# Patient Record
Sex: MALE | Race: WHITE | NOT HISPANIC OR LATINO
[De-identification: names, ages, dates, MRNs, and addresses within clinical notes are randomized per-mention and may not be internally consistent; named-entity substitution may affect disease eponyms.]

---

## 2017-07-12 PROBLEM — K64.9 HEMORRHOIDS, UNSPECIFIED HEMORRHOID TYPE: Status: RESOLVED | Noted: 2017-07-12 | Resolved: 2017-07-12

## 2017-07-12 PROBLEM — K21.9 CHRONIC GERD: Status: RESOLVED | Noted: 2017-07-12 | Resolved: 2017-07-12

## 2017-07-12 PROBLEM — Z87.898 HISTORY OF VERTIGO: Status: RESOLVED | Noted: 2017-07-12 | Resolved: 2017-07-12

## 2017-07-12 PROBLEM — Z87.19 HISTORY OF BARRETT'S ESOPHAGUS: Status: RESOLVED | Noted: 2017-07-12 | Resolved: 2017-07-12

## 2017-07-12 PROBLEM — Z82.5 FAMILY HISTORY OF CHRONIC OBSTRUCTIVE PULMONARY DISEASE: Status: ACTIVE | Noted: 2017-07-12

## 2017-07-12 RX ORDER — GLUCOSAMINE SULFATE DIPOT CHLR 500 MG
400 CAPSULE ORAL
Refills: 0 | Status: ACTIVE | COMMUNITY

## 2017-07-18 ENCOUNTER — APPOINTMENT (OUTPATIENT)
Dept: HEMATOLOGY ONCOLOGY | Facility: CLINIC | Age: 52
End: 2017-07-18

## 2017-07-18 DIAGNOSIS — Z87.19 PERSONAL HISTORY OF OTHER DISEASES OF THE DIGESTIVE SYSTEM: ICD-10-CM

## 2017-07-18 DIAGNOSIS — K21.9 GASTRO-ESOPHAGEAL REFLUX DISEASE W/OUT ESOPHAGITIS: ICD-10-CM

## 2017-07-18 DIAGNOSIS — K64.9 UNSPECIFIED HEMORRHOIDS: ICD-10-CM

## 2017-07-18 DIAGNOSIS — Z87.898 PERSONAL HISTORY OF OTHER SPECIFIED CONDITIONS: ICD-10-CM

## 2017-07-18 DIAGNOSIS — Z80.0 FAMILY HISTORY OF MALIGNANT NEOPLASM OF DIGESTIVE ORGANS: ICD-10-CM

## 2017-07-18 DIAGNOSIS — Z82.5 FAMILY HISTORY OF ASTHMA AND OTHER CHRONIC LOWER RESPIRATORY DISEASES: ICD-10-CM

## 2017-07-18 DIAGNOSIS — Z80.1 FAMILY HISTORY OF MALIGNANT NEOPLASM OF TRACHEA, BRONCHUS AND LUNG: ICD-10-CM

## 2017-07-20 ENCOUNTER — APPOINTMENT (OUTPATIENT)
Dept: HEMATOLOGY ONCOLOGY | Facility: CLINIC | Age: 52
End: 2017-07-20

## 2017-07-20 VITALS
TEMPERATURE: 97.6 F | HEIGHT: 69.69 IN | DIASTOLIC BLOOD PRESSURE: 73 MMHG | WEIGHT: 125 LBS | HEART RATE: 84 BPM | BODY MASS INDEX: 18.1 KG/M2 | OXYGEN SATURATION: 99 % | RESPIRATION RATE: 16 BRPM | SYSTOLIC BLOOD PRESSURE: 116 MMHG

## 2017-07-20 PROBLEM — Z80.1 FAMILY HISTORY OF LUNG CANCER: Status: ACTIVE | Noted: 2017-07-12

## 2017-07-24 ENCOUNTER — APPOINTMENT (OUTPATIENT)
Dept: HEMATOLOGY ONCOLOGY | Facility: CLINIC | Age: 52
End: 2017-07-24

## 2017-07-24 VITALS
TEMPERATURE: 98.4 F | HEART RATE: 87 BPM | DIASTOLIC BLOOD PRESSURE: 70 MMHG | HEIGHT: 69.69 IN | BODY MASS INDEX: 17.95 KG/M2 | SYSTOLIC BLOOD PRESSURE: 107 MMHG | OXYGEN SATURATION: 98 % | WEIGHT: 123.99 LBS | RESPIRATION RATE: 16 BRPM

## 2017-07-24 RX ORDER — FERROUS SULFATE 325(65) MG
325 (65 FE) TABLET ORAL
Refills: 0 | Status: DISCONTINUED | COMMUNITY
End: 2017-07-24

## 2017-07-28 ENCOUNTER — OTHER (OUTPATIENT)
Age: 52
End: 2017-07-28

## 2017-08-23 ENCOUNTER — OTHER (OUTPATIENT)
Age: 52
End: 2017-08-23

## 2017-08-24 ENCOUNTER — APPOINTMENT (OUTPATIENT)
Dept: HEMATOLOGY ONCOLOGY | Facility: CLINIC | Age: 52
End: 2017-08-24
Payer: COMMERCIAL

## 2017-08-24 VITALS
OXYGEN SATURATION: 99 % | RESPIRATION RATE: 16 BRPM | HEIGHT: 69.69 IN | DIASTOLIC BLOOD PRESSURE: 74 MMHG | WEIGHT: 125.99 LBS | HEART RATE: 74 BPM | BODY MASS INDEX: 18.24 KG/M2 | TEMPERATURE: 98 F | SYSTOLIC BLOOD PRESSURE: 109 MMHG

## 2017-08-24 PROCEDURE — 99214 OFFICE O/P EST MOD 30 MIN: CPT

## 2017-09-14 ENCOUNTER — APPOINTMENT (OUTPATIENT)
Dept: HEMATOLOGY ONCOLOGY | Facility: CLINIC | Age: 52
End: 2017-09-14
Payer: COMMERCIAL

## 2017-09-14 VITALS
HEART RATE: 97 BPM | SYSTOLIC BLOOD PRESSURE: 130 MMHG | BODY MASS INDEX: 17.95 KG/M2 | DIASTOLIC BLOOD PRESSURE: 77 MMHG | OXYGEN SATURATION: 98 % | RESPIRATION RATE: 16 BRPM | TEMPERATURE: 99 F | WEIGHT: 123.99 LBS | HEIGHT: 69.69 IN

## 2017-09-14 PROCEDURE — 99214 OFFICE O/P EST MOD 30 MIN: CPT

## 2017-10-02 ENCOUNTER — APPOINTMENT (OUTPATIENT)
Dept: HEMATOLOGY ONCOLOGY | Facility: CLINIC | Age: 52
End: 2017-10-02
Payer: COMMERCIAL

## 2017-10-02 VITALS
SYSTOLIC BLOOD PRESSURE: 119 MMHG | HEIGHT: 69.69 IN | HEART RATE: 92 BPM | TEMPERATURE: 98.2 F | BODY MASS INDEX: 18.68 KG/M2 | RESPIRATION RATE: 16 BRPM | OXYGEN SATURATION: 98 % | WEIGHT: 128.99 LBS | DIASTOLIC BLOOD PRESSURE: 86 MMHG

## 2017-10-02 PROCEDURE — 99499A: CUSTOM | Mod: NC

## 2017-10-23 ENCOUNTER — TRANSCRIPTION ENCOUNTER (OUTPATIENT)
Age: 52
End: 2017-10-23

## 2017-10-25 ENCOUNTER — APPOINTMENT (OUTPATIENT)
Dept: HEMATOLOGY ONCOLOGY | Facility: CLINIC | Age: 52
End: 2017-10-25
Payer: COMMERCIAL

## 2017-10-25 VITALS
HEART RATE: 92 BPM | DIASTOLIC BLOOD PRESSURE: 89 MMHG | SYSTOLIC BLOOD PRESSURE: 129 MMHG | RESPIRATION RATE: 16 BRPM | OXYGEN SATURATION: 99 % | HEIGHT: 69.69 IN | WEIGHT: 134 LBS | BODY MASS INDEX: 19.4 KG/M2 | TEMPERATURE: 97.6 F

## 2017-10-25 PROCEDURE — 99213 OFFICE O/P EST LOW 20 MIN: CPT

## 2017-11-29 ENCOUNTER — APPOINTMENT (OUTPATIENT)
Dept: HEMATOLOGY ONCOLOGY | Facility: CLINIC | Age: 52
End: 2017-11-29
Payer: COMMERCIAL

## 2017-11-29 VITALS
HEIGHT: 69.69 IN | DIASTOLIC BLOOD PRESSURE: 92 MMHG | HEART RATE: 84 BPM | WEIGHT: 134 LBS | TEMPERATURE: 97.9 F | OXYGEN SATURATION: 98 % | RESPIRATION RATE: 16 BRPM | SYSTOLIC BLOOD PRESSURE: 148 MMHG | BODY MASS INDEX: 19.4 KG/M2

## 2017-11-29 PROCEDURE — 99214 OFFICE O/P EST MOD 30 MIN: CPT

## 2017-11-29 RX ORDER — VALACYCLOVIR 1 G/1
1 TABLET, FILM COATED ORAL
Qty: 20 | Refills: 0 | Status: DISCONTINUED | COMMUNITY
Start: 2017-07-20 | End: 2017-11-29

## 2017-11-29 RX ORDER — PREDNISONE 20 MG/1
20 TABLET ORAL
Refills: 0 | Status: DISCONTINUED | COMMUNITY
End: 2017-11-29

## 2018-01-03 ENCOUNTER — APPOINTMENT (OUTPATIENT)
Dept: HEMATOLOGY ONCOLOGY | Facility: CLINIC | Age: 53
End: 2018-01-03
Payer: COMMERCIAL

## 2018-01-03 VITALS
HEIGHT: 69.69 IN | RESPIRATION RATE: 16 BRPM | DIASTOLIC BLOOD PRESSURE: 89 MMHG | SYSTOLIC BLOOD PRESSURE: 147 MMHG | BODY MASS INDEX: 18.82 KG/M2 | HEART RATE: 86 BPM | WEIGHT: 129.98 LBS | OXYGEN SATURATION: 100 % | TEMPERATURE: 97.9 F

## 2018-01-03 PROCEDURE — 99214 OFFICE O/P EST MOD 30 MIN: CPT

## 2018-01-03 RX ORDER — PREDNISONE 10 MG/1
10 TABLET ORAL
Qty: 90 | Refills: 0 | Status: DISCONTINUED | COMMUNITY
Start: 2017-07-15 | End: 2018-01-03

## 2018-01-08 ENCOUNTER — RESULT REVIEW (OUTPATIENT)
Age: 53
End: 2018-01-08

## 2018-01-08 ENCOUNTER — TRANSCRIPTION ENCOUNTER (OUTPATIENT)
Age: 53
End: 2018-01-08

## 2018-02-07 ENCOUNTER — APPOINTMENT (OUTPATIENT)
Dept: HEMATOLOGY ONCOLOGY | Facility: CLINIC | Age: 53
End: 2018-02-07
Payer: COMMERCIAL

## 2018-02-14 ENCOUNTER — APPOINTMENT (OUTPATIENT)
Dept: HEMATOLOGY ONCOLOGY | Facility: CLINIC | Age: 53
End: 2018-02-14
Payer: COMMERCIAL

## 2018-02-14 VITALS
HEART RATE: 85 BPM | HEIGHT: 69.69 IN | WEIGHT: 131.99 LBS | TEMPERATURE: 98 F | RESPIRATION RATE: 16 BRPM | DIASTOLIC BLOOD PRESSURE: 97 MMHG | OXYGEN SATURATION: 99 % | SYSTOLIC BLOOD PRESSURE: 136 MMHG | BODY MASS INDEX: 19.11 KG/M2

## 2018-02-14 PROCEDURE — 99214 OFFICE O/P EST MOD 30 MIN: CPT

## 2018-02-27 ENCOUNTER — TRANSCRIPTION ENCOUNTER (OUTPATIENT)
Age: 53
End: 2018-02-27

## 2018-03-14 ENCOUNTER — APPOINTMENT (OUTPATIENT)
Dept: HEMATOLOGY ONCOLOGY | Facility: CLINIC | Age: 53
End: 2018-03-14
Payer: COMMERCIAL

## 2018-03-14 VITALS
OXYGEN SATURATION: 100 % | WEIGHT: 134 LBS | HEIGHT: 69.69 IN | RESPIRATION RATE: 16 BRPM | DIASTOLIC BLOOD PRESSURE: 91 MMHG | TEMPERATURE: 100 F | SYSTOLIC BLOOD PRESSURE: 123 MMHG | HEART RATE: 83 BPM | BODY MASS INDEX: 19.4 KG/M2

## 2018-03-14 PROCEDURE — 99213 OFFICE O/P EST LOW 20 MIN: CPT

## 2018-04-25 ENCOUNTER — APPOINTMENT (OUTPATIENT)
Dept: HEMATOLOGY ONCOLOGY | Facility: CLINIC | Age: 53
End: 2018-04-25
Payer: COMMERCIAL

## 2018-04-25 VITALS
SYSTOLIC BLOOD PRESSURE: 128 MMHG | RESPIRATION RATE: 20 BRPM | BODY MASS INDEX: 19.11 KG/M2 | DIASTOLIC BLOOD PRESSURE: 90 MMHG | HEART RATE: 80 BPM | OXYGEN SATURATION: 99 % | WEIGHT: 131.99 LBS | HEIGHT: 69.69 IN | TEMPERATURE: 97.8 F

## 2018-04-25 PROCEDURE — 99214 OFFICE O/P EST MOD 30 MIN: CPT

## 2018-05-18 ENCOUNTER — OTHER (OUTPATIENT)
Age: 53
End: 2018-05-18

## 2018-06-06 ENCOUNTER — APPOINTMENT (OUTPATIENT)
Dept: HEMATOLOGY ONCOLOGY | Facility: CLINIC | Age: 53
End: 2018-06-06
Payer: COMMERCIAL

## 2018-06-06 VITALS
HEART RATE: 84 BPM | BODY MASS INDEX: 19.11 KG/M2 | HEIGHT: 69.69 IN | OXYGEN SATURATION: 98 % | RESPIRATION RATE: 16 BRPM | DIASTOLIC BLOOD PRESSURE: 84 MMHG | SYSTOLIC BLOOD PRESSURE: 124 MMHG | TEMPERATURE: 97.8 F | WEIGHT: 131.99 LBS

## 2018-06-06 PROCEDURE — 99214 OFFICE O/P EST MOD 30 MIN: CPT

## 2018-07-22 PROBLEM — Z80.0 FAMILY HISTORY OF COLON CANCER: Status: ACTIVE | Noted: 2017-07-20

## 2018-07-25 ENCOUNTER — APPOINTMENT (OUTPATIENT)
Dept: HEMATOLOGY ONCOLOGY | Facility: CLINIC | Age: 53
End: 2018-07-25
Payer: COMMERCIAL

## 2018-07-25 VITALS
RESPIRATION RATE: 20 BRPM | TEMPERATURE: 97.7 F | HEIGHT: 69.69 IN | OXYGEN SATURATION: 100 % | WEIGHT: 126.99 LBS | DIASTOLIC BLOOD PRESSURE: 71 MMHG | SYSTOLIC BLOOD PRESSURE: 112 MMHG | HEART RATE: 63 BPM | BODY MASS INDEX: 18.39 KG/M2

## 2018-07-25 PROCEDURE — 99214 OFFICE O/P EST MOD 30 MIN: CPT

## 2018-09-05 ENCOUNTER — APPOINTMENT (OUTPATIENT)
Dept: HEMATOLOGY ONCOLOGY | Facility: CLINIC | Age: 53
End: 2018-09-05
Payer: COMMERCIAL

## 2018-09-05 VITALS
BODY MASS INDEX: 17.66 KG/M2 | TEMPERATURE: 98.1 F | OXYGEN SATURATION: 100 % | RESPIRATION RATE: 20 BRPM | DIASTOLIC BLOOD PRESSURE: 73 MMHG | WEIGHT: 121.98 LBS | SYSTOLIC BLOOD PRESSURE: 112 MMHG | HEIGHT: 69.69 IN | HEART RATE: 83 BPM

## 2018-09-05 DIAGNOSIS — Z87.19 PERSONAL HISTORY OF OTHER DISEASES OF THE DIGESTIVE SYSTEM: ICD-10-CM

## 2018-09-05 PROCEDURE — 99214 OFFICE O/P EST MOD 30 MIN: CPT

## 2018-10-17 ENCOUNTER — APPOINTMENT (OUTPATIENT)
Dept: HEMATOLOGY ONCOLOGY | Facility: CLINIC | Age: 53
End: 2018-10-17
Payer: COMMERCIAL

## 2018-10-17 VITALS
WEIGHT: 122 LBS | TEMPERATURE: 97.4 F | DIASTOLIC BLOOD PRESSURE: 68 MMHG | BODY MASS INDEX: 17.66 KG/M2 | OXYGEN SATURATION: 100 % | RESPIRATION RATE: 16 BRPM | HEIGHT: 69.69 IN | HEART RATE: 76 BPM | SYSTOLIC BLOOD PRESSURE: 104 MMHG

## 2018-10-17 PROCEDURE — 99214 OFFICE O/P EST MOD 30 MIN: CPT

## 2018-11-01 ENCOUNTER — RECORD ABSTRACTING (OUTPATIENT)
Age: 53
End: 2018-11-01

## 2018-11-01 DIAGNOSIS — Z98.890 OTHER SPECIFIED POSTPROCEDURAL STATES: ICD-10-CM

## 2018-11-01 DIAGNOSIS — Z90.49 OTHER SPECIFIED POSTPROCEDURAL STATES: ICD-10-CM

## 2018-11-15 ENCOUNTER — RECORD ABSTRACTING (OUTPATIENT)
Age: 53
End: 2018-11-15

## 2018-11-15 DIAGNOSIS — Z86.718 PERSONAL HISTORY OF OTHER VENOUS THROMBOSIS AND EMBOLISM: ICD-10-CM

## 2018-11-15 DIAGNOSIS — Z87.438 PERSONAL HISTORY OF OTHER DISEASES OF MALE GENITAL ORGANS: ICD-10-CM

## 2018-11-15 DIAGNOSIS — Z87.39 PERSONAL HISTORY OF OTHER DISEASES OF THE MUSCULOSKELETAL SYSTEM AND CONNECTIVE TISSUE: ICD-10-CM

## 2018-11-15 DIAGNOSIS — Z86.73 PERSONAL HISTORY OF TRANSIENT ISCHEMIC ATTACK (TIA), AND CEREBRAL INFARCTION W/OUT RESIDUAL DEFICITS: ICD-10-CM

## 2018-11-21 NOTE — PHYSICAL EXAM
[Fully active, able to carry on all pre-disease performance without restriction] : Status 0 - Fully active, able to carry on all pre-disease performance without restriction [Thin] : thin [Normal] : affect appropriate

## 2018-11-28 ENCOUNTER — RESULT REVIEW (OUTPATIENT)
Age: 53
End: 2018-11-28

## 2018-11-28 ENCOUNTER — APPOINTMENT (OUTPATIENT)
Dept: HEMATOLOGY ONCOLOGY | Facility: CLINIC | Age: 53
End: 2018-11-28
Payer: COMMERCIAL

## 2018-11-28 VITALS
SYSTOLIC BLOOD PRESSURE: 121 MMHG | OXYGEN SATURATION: 100 % | DIASTOLIC BLOOD PRESSURE: 74 MMHG | HEIGHT: 69.69 IN | RESPIRATION RATE: 20 BRPM | HEART RATE: 74 BPM | WEIGHT: 121.98 LBS | BODY MASS INDEX: 17.66 KG/M2 | TEMPERATURE: 97.6 F

## 2018-11-28 PROCEDURE — 99214 OFFICE O/P EST MOD 30 MIN: CPT

## 2018-11-28 NOTE — HISTORY OF PRESENT ILLNESS
[0 - No Distress] : Distress Level: 0 [de-identified] : Patient is a 52 year old who is referred for initial consultation for anemia secondary to Crohns/GI bleed vs Iron Deficiency/ Vit b12 def. 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 Patient is status post ileo-colonic resections for SBO  in 2016. In 2016 patient had complaints of 10 - 15 lb weight loss. Labs at that time showed Hgb of 12, steatorrhea and stool calprotectin = 236. In 2016 MRI/MRE with narrowing at ileocolonic anastomosis with upstream dilation. Pt refused bridge with  prednisone and Entocort / Flagyl. In 2017 patient Hgb dropped to 9.5. On 2017 patient underwent an EGD and Colonoscopy. Findings consistent with Page's and no dysplasia or AVMs. Colonoscopy showed an open anastomosis but active disease, moderate on the colonic side and limited to the anastomosis and moderate to severe enteritis, just proximal to the anastomosis. Pat had routine labs on 05/10/2017 Hgb: 8.3.  [FreeTextEntry1] : s/p injectafer, copper\par warfarin [de-identified] : He presents for follow up of  anemia, DVT, MTHFR gene mutation follow up, currently on Coumadin.  Complains of some fatigue last iron infusion was a month ago.  Had an MRI of abdomen followed by CT which showed inflammation vs scar tissue. He is trying to increase his carbs.He takes Ibuprofen 200 mg at night and in the am for hip pain and bottom of torso as well as feet. He uses a cane for microfractures of the pelvis.. \par

## 2018-11-28 NOTE — ASSESSMENT
[FreeTextEntry1] : 1.  Anemia- Hg  10.7  \par  -blood loss, anemia of chronic disease, \par - copper deficiency - replaced\par - Continue B12 injection q 4 weeks-continue\par - s/p Injectafer -repeat ferritin level, keep >100, average needed q 4 weeks, last Ferritin 576- holding iron for now \par \par 2. Osteoporosis \par - left ankle- stress fx- advanced  osteoporosis, patient with h/o steroids use\par \par  \par 3.TIA with MTHFR and h/o DVT -  \par not a candidate for DOAC b/o small bowel resection\par -INR 1.2 - 4 mg x 4, 3mg x 3\par \par \par 4. Hypogammaglobulinemia\par - administer Prevnar 13 with next Etyvio\par - flu vaccine given

## 2018-11-28 NOTE — REVIEW OF SYSTEMS
[Fatigue] : fatigue [Negative] : Allergic/Immunologic [Eye Pain] : no eye pain [Vision Problems] : no vision problems [Dysphagia] : no dysphagia [Hoarseness] : no hoarseness [Chest Pain] : no chest pain [Lower Ext Edema] : no lower extremity edema [Shortness Of Breath] : no shortness of breath [Cough] : no cough [Vomiting] : no vomiting [Constipation] : no constipation [Diarrhea] : no diarrhea [Dysuria] : no dysuria [Joint Pain] : no joint pain [Skin Rash] : no skin rash [Dizziness] : no dizziness [Insomnia] : no insomnia [Anxiety] : no anxiety [Depression] : no depression [Muscle Weakness] : no muscle weakness [Easy Bleeding] : no tendency for easy bleeding [Easy Bruising] : no tendency for easy bruising

## 2018-11-28 NOTE — CONSULT LETTER
[Dear  ___] : Dear  [unfilled], [Consult Letter:] : I had the pleasure of evaluating your patient, [unfilled]. [Please see my note below.] : Please see my note below. [Consult Closing:] : Thank you very much for allowing me to participate in the care of this patient.  If you have any questions, please do not hesitate to contact me. [Sincerely,] : Sincerely, [DrMeron  ___] : Dr. REARDON [FreeTextEntry3] : Angie Biswas MD\par [facility] [place] [facility]\par

## 2018-11-28 NOTE — REASON FOR VISIT
[Follow-Up Visit] : a follow-up visit for [FreeTextEntry2] : Anemia, MTHFR Gene mutation, DVT, colitis, copper deficiency

## 2018-11-29 ENCOUNTER — APPOINTMENT (OUTPATIENT)
Dept: GASTROENTEROLOGY | Facility: CLINIC | Age: 53
End: 2018-11-29
Payer: COMMERCIAL

## 2018-11-29 VITALS
WEIGHT: 125 LBS | DIASTOLIC BLOOD PRESSURE: 76 MMHG | HEIGHT: 70 IN | BODY MASS INDEX: 17.9 KG/M2 | SYSTOLIC BLOOD PRESSURE: 118 MMHG

## 2018-11-29 PROCEDURE — 99215 OFFICE O/P EST HI 40 MIN: CPT

## 2018-12-03 ENCOUNTER — RESULT REVIEW (OUTPATIENT)
Age: 53
End: 2018-12-03

## 2018-12-07 ENCOUNTER — LABORATORY RESULT (OUTPATIENT)
Age: 53
End: 2018-12-07

## 2018-12-10 ENCOUNTER — APPOINTMENT (OUTPATIENT)
Dept: ENDOCRINOLOGY | Facility: CLINIC | Age: 53
End: 2018-12-10

## 2018-12-11 RX ORDER — CHOLECALCIFEROL (VITAMIN D3) 25 MCG
TABLET ORAL
Refills: 0 | Status: DISCONTINUED | COMMUNITY
End: 2018-12-11

## 2018-12-11 RX ORDER — IBUPROFEN 200 MG
600 CAPSULE ORAL
Refills: 0 | Status: DISCONTINUED | COMMUNITY
End: 2018-12-11

## 2018-12-20 ENCOUNTER — APPOINTMENT (OUTPATIENT)
Dept: INTERNAL MEDICINE | Facility: CLINIC | Age: 53
End: 2018-12-20

## 2019-01-15 ENCOUNTER — RX RENEWAL (OUTPATIENT)
Age: 54
End: 2019-01-15

## 2019-01-15 ENCOUNTER — APPOINTMENT (OUTPATIENT)
Dept: HEMATOLOGY ONCOLOGY | Facility: CLINIC | Age: 54
End: 2019-01-15
Payer: COMMERCIAL

## 2019-01-15 VITALS
SYSTOLIC BLOOD PRESSURE: 121 MMHG | BODY MASS INDEX: 17.61 KG/M2 | OXYGEN SATURATION: 100 % | RESPIRATION RATE: 16 BRPM | TEMPERATURE: 94.5 F | HEIGHT: 70 IN | HEART RATE: 89 BPM | DIASTOLIC BLOOD PRESSURE: 77 MMHG | WEIGHT: 123 LBS

## 2019-01-15 PROCEDURE — 99214 OFFICE O/P EST MOD 30 MIN: CPT

## 2019-01-15 NOTE — ASSESSMENT
[FreeTextEntry1] : 1.  Anemia- Hg  10.7  \par  -blood loss, anemia of chronic disease, \par - copper deficiency - replaced\par - Continue B12 injection, level still low - increase to q 2 weeks\par - s/p Injectafer -repeat ferritin level today\par - target >100\par \par 2. Osteoporosis \par - left ankle- stress fx- advanced  osteoporosis, patient with h/o steroids use\par - plan for Forteo with Dr. Akers\par \par  3.TIA with MTHFR and h/o DVT -  \par not a candidate for DOAC b/o small bowel resection\par -INR 1.6 - 4 mg x 2-3 , 2 mg x 3\par - chenge to 3 mg daily and check in 7 days \par \par \par 4. Hypogammaglobulinemia\par - s/p  Prevnar 13 12/2018 \par - check ab in 6 m\par - needs Shingrex\par

## 2019-01-15 NOTE — HISTORY OF PRESENT ILLNESS
[0 - No Distress] : Distress Level: 0 [de-identified] : Patient is a 52 year old who is referred for initial consultation for anemia secondary to Crohns/GI bleed vs Iron Deficiency/ Vit b12 def. 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 Patient is status post ileo-colonic resections for SBO  in 2016. In 2016 patient had complaints of 10 - 15 lb weight loss. Labs at that time showed Hgb of 12, steatorrhea and stool calprotectin = 236. In 2016 MRI/MRE with narrowing at ileocolonic anastomosis with upstream dilation. Pt refused bridge with  prednisone and Entocort / Flagyl. In 2017 patient Hgb dropped to 9.5. On 2017 patient underwent an EGD and Colonoscopy. Findings consistent with Page's and no dysplasia or AVMs. Colonoscopy showed an open anastomosis but active disease, moderate on the colonic side and limited to the anastomosis and moderate to severe enteritis, just proximal to the anastomosis. Pat had routine labs on 05/10/2017 Hgb: 8.3.  [FreeTextEntry1] : s/p injectafer, copper\par warfarin [de-identified] : Patient presents for follow up of  anemia, DVT, MTHFR gene mutation follow up, currently on Coumadin 2mg/4mg.

## 2019-01-15 NOTE — CONSULT LETTER
[Dear  ___] : Dear  [unfilled], [Consult Letter:] : I had the pleasure of evaluating your patient, [unfilled]. [Please see my note below.] : Please see my note below. [Consult Closing:] : Thank you very much for allowing me to participate in the care of this patient.  If you have any questions, please do not hesitate to contact me. [Sincerely,] : Sincerely, [DrMeron  ___] : Dr. REARDON [FreeTextEntry3] : Angie Biswas MD\par [facility] [place] [facility]\par

## 2019-01-17 ENCOUNTER — RX RENEWAL (OUTPATIENT)
Age: 54
End: 2019-01-17

## 2019-01-24 ENCOUNTER — APPOINTMENT (OUTPATIENT)
Dept: GASTROENTEROLOGY | Facility: CLINIC | Age: 54
End: 2019-01-24
Payer: COMMERCIAL

## 2019-01-24 VITALS
SYSTOLIC BLOOD PRESSURE: 118 MMHG | HEIGHT: 70 IN | WEIGHT: 122 LBS | BODY MASS INDEX: 17.47 KG/M2 | DIASTOLIC BLOOD PRESSURE: 72 MMHG

## 2019-01-24 PROCEDURE — 99214 OFFICE O/P EST MOD 30 MIN: CPT

## 2019-01-24 NOTE — HISTORY OF PRESENT ILLNESS
[de-identified] : \par \par   \par        PCP: Carrie\par        52 yo M w h/o Crohns Disease for many years, OP\par        h/o CVA-7/2017, DVT + MTHFR Homozygote Mutation on warfarin-->Eliquis' warfarin \par        GERD w Page's, Hemorrhoids, BPH, \par        S/P Ileocolonic resection 1998\par        Since then multiple SBOs\par \par \par        Got IV Iron x 2, since 10/2/17\par        10/19/17: feeling better, Iu=051 on prednisone 15mg x 3weeks, BMs Brown: #5-6, 1-2/D, occas 3-4/d\par        10/25/17: Hgb=10, ESR=5, CRP=5, Alb=3.6, Phos=2, Qzozfenh=517, C87=834\par        11/16/17: Hgb=11.2, ESR=14, CRP=12, \par        11/13/17: MRE-Chr mild distension of distal & vladislav-ileum s/o mild stricturing at anast, mild mural thick & mucosal enhancemt ~ 20cm; little change from 2015 c/w mild acute on chr ileitis, 2.1 cm Liver hemangioma\par        11/28/17: Entyvio\par        11/29/17: Hgb=9.6, ESR=10, CRP=5.8, Alb=3.8,Ferritin-19, Iron=14,Sat=4%, Phos=2.5, Iy=447, BMs:# 5, 1-2x/D, brown,\par        12/19;17: Hgb=10.1, ESR=12, CRP=6.5; 12/21/17: BMs:#3-5, 1-3x/D , brown, no blood, no pain, uk=620\par        1/3/18:Hgb=11.7, ESR=19, CRP=6.5, Alb=4.1, Qiioxvel=966, Iron=31, Sat%=9,Zinc=55\par        1/16/18: Entyvio, Hgb=11.4, ESR=10, CRP=6.9\par        1/18/18: Today: cellulitis R foot, saw dr KEITH--rx augmentum x 10D, Entyvio 1/9 to 1/16, yh=672 w clothes,BMs: # 4-5, 1-2x/D \par        2/14/18: Hgb=11.1, ESR=16, CRP=11.6, Alb=3.6, Iron=28, Ferritin=23, INR=1.5 \par        2/16/18: s/p Entyvio; Iron infusion, again on 2/27\par        2/20/18: Hgb=10.6, ESR=20, CRP=12, INR=1.7\par        2/27/18: s/p iron infusion\par        3/9/18: Dr. Burden for tenosynovitis, rx w Zorvolex: Diclofenac x 5 days--? response\par        3/14/18: Ot=232; BMs: # 5, 1-2x/D; Hgb=11, ESR=18, CRP=11,Irom=45,Gsnckzzw=692,Alb=3.6, INR=1.5\par        3/19/18: s/p Entyvio\par        3/22/18: yh=513, BMs: # 5, 3-4 x/d\par        4/16/18: s/p Entyvio,Hgb=11.9, INR=1.3, ESR=18, CRP=8.4 \par        4/18/18 EGD: gastritis, no HP, no IM, 2+ Mucous, 0.5cm gastric polyp: fundic, 4cm HH, + Barretts:3cm, no dysplasia\par        4/25/18: Hgb=11.5, INR=1.6, Iron=40, Sat=13%, Ferritin=57, Alb=3.2, Phos=2.2, Mag=1.7\par        4/30/18: s/p Iron Infusion\par        5/14/18: s/p Entyvio \par        5/23/18: Hgb=11.5, ESR=16, CRP< 5\par        5/29/18: s/p Iron Infusion\par        6/6/18: Hgb=10.6, INR=1.7, Kwipymqv=281, Alb=3.5, Phos=2.1, W38=251, jg=004; BMs: # 5, 2-3x/D \par        6/19/18: Hgb=10.6, INR=1, CRP=15, ESR=23\par        6/21/18: R ankle is acting up, swollen, pain, having MRI done, took a couple of advil, on low carbs ,BMs: # 5, 1-2x/D, is=825\par        6/26/18: MRI: fx/stress rxn-talar body/navicula; stress related changes--cuneform, cuboid,distal tibia; mod tibiotalar effusion, mild-mod peroneal tendinosis\par        7/19/18: entyvio 6/26/18, eliminated all carbs--anti inflammatory diet, ro=359, BMs: # 4-5, 1-3x/D \par        7/25/18: Hgb=10, INR=1.3, Alb=3.3, Phos=2.4, Xlynzwjk=806, sat%=12, Iron=35\par        8/2/18: BD--osteoporosis of hip/spine: sig decrease BD--17.6% of hip, 17% of spine, osteopenia of wrist: 6.4%\par        8/7/18: Entyvio\par        8/21/18: Hgb=11.1, INR=1.5, ESR=19, CRP=18.6\par        8/23/18: recently w tooth infection, old implant--loose and removed; rx w amox, and advil\par        eating almost no carbs, kt=405, # 5-6, 2-3x/d \par        8/24/18: Stool fat--neg, Appusgvqqfnd=867\par        9/5/18: Hgb=11.4, INR=1.3, ESR=9, CRP=11.3, Iron=41, Nmclsegt=148, Alb=3.8,\par        9/17/18: entyvio, Hgb=10.7, INR=2.2, ESR=17, CRP=9.1, \par        9/20/18: rk=916, BMs: # 5-6, 1-2x/D \par        9/21/18: Phos=2.4, Ca=8.7, Alb=3.4, Vit D=27, XNX=883, \par        Dr. Akers: Hyperparathyroidism: probably secondary due to low Vit D/Ca & ? superimposed primary, rx replete Vit D and Calcium\par        10/9/18: Hgb=11.6, MCV=94, ESR=14, CRP=5.4, INR=2.2\par        10/10/18 MRE: stricturing of neoterminal ileum w worsened upstream dilatation, moderate length of upstream ileum w stricturing extending at least 20cm and more extensive then previous. suggestion of 2 adj extraluminal fluid collections which may be w/i the wall of strictured ileum near the ileocolonic anastomosis. surrounding spiculation and tethered appearance of bowel in this region.\par 10/16/18: entyvio, Hgb=11.7, MCV=94, ESR=14, CRP <5, Alb=3.5\par 10/18/18: Ra=168,   BMs: # 5-6, 3x/D , \par 11/13/18: Entyvio\par 11/21/18 CTE w C: limited 2/2 bowel underdistention, mucosal hyperenhancemt & extensive SM edema of distal ileum including vladislav-ileum, difficult to exclude a sml fluid collection, Liver w relative enlargement w suggestion of lobulation, enlargemt of PV,SMV,IMV,Spl V, rectal V. No ascites\par 11/28/18: Hgb=10, ESR=19, CRP=17,Alb=3.5,Iron=35, Sat%=11, Ugwndbku=117, INR=1.3\par 11/29/18: ql=752, BMs: # 5-6, 3-4x/D\par 12/3/18: Abd sono--Liver 14.8cm Incr heterogenicity, spleen--wnl\par 12/11/18 & 1/14/19: Entyvio\par 1/14/19: Hgb=10.7, ESR=15, Alb=3.6, Iron=36, Ferritin=67, Sat%=11, INR=1.6\par \par  Today:  stools are # 4-6, 3-4x/d   BMs, no pain, eating more calories, liberalizing carbs--avoiding simple sugars\par        Entyvio : last 1/1419, got IV iron 1/22/19\par        R ankle in immobilizer brace\par        sn=197\par        Abd pain--no \par        Nausea--no \par        Vomit--no \par        Early satiety-no \par        Belching-no \par        Regurgitation--no \par        Ht burn--no \par        Throat Clearing-no\par        Globus-no\par        Hoarseness--no \par        Dysphagia--no \par        BMs: # 4-6, 3/4x/D\par        Constipation--no \par        Diarrhea- intermittent \par        Bloating--no \par        Flatulence-no\par        Gurgling--no \par        Melena--no \par        BRBPR--no \par        Anorexia-no \par        Wt Loss--stable\par \par \par \par        PRIOR HISTORY--2017\par \par        1/17/17: Hgb=9.2, ESR=6, CRP=5; 1/19/17: BMs: #4, 5-6, 2-3x/D, Ys=653, Started Creon 1 tid cc\par        3rd Entyvio begining Feb\par        2/14/17: Labs; Hgb=9.6, MCV=97, ESR=5, CRP< 5, O91=864, FA>23, Iron=59, Retic =3.2,\par        2/17/17 Fecal Calprotectin=16, Fats: Increased neutral & Split\par        3/13/17: Labs Hgb=9.5, MCV=95, ESR=7, CRP <5, ALB=3.1\par        3/16/17: lot of stress, to move -[place], had a job-fell thru, BMs: # 5, 2-4 x/D, wt= 131w clothes\par        4/19/17 EGD: gastritis, MACKENZIE, No HP, 2cm HH, +Barretts, No Dysplasia\par        Colonoscopy: Anastamosis: open w mild -mod active colitis, enteritis severe adj to anastomosis, mild more proximal, remainder of colon-wnl, 2-3 deg int hemorrhoids\par        4/26/17 : Hgb=7.3, MCV=95, ESR=13, CRP<5;Immediately post procedure stools very dark on eliquis/iron.\par        Admitted to [facility]: Hgb=8.3-->8.9 after 2 U, Alb 3.3, BUN=17, creat=1.3, Malina/Wdwty=590/460\par        4/27/17: Alb=2.3, BUN=12, creat=1.2, Malina/Lipase=209/863-No abd pain, N/V; 5/5/17: Hgb=10.7.\par        5/8/17 Entyvio iv. 5/8-5/9 w dark red diarrhea; 5/10/17 Hgb=7.8.\par        5/11/17 Adm [facility] w stools brown, Hgb=7.9, BUN=17, Creat=1.4, Alb=2.9; S/P 2 Units- Hgb=10.6, BUN=15/1.1.\par        Prednisone 40mg qd, entocort d/dee.; 5/18/17: Hgb-10.4, hc=093, BMs: #5, 1-2x/D, no pain\par        6/8/17: Hgb=7.4,MCV=98, CRP<5-ASA stopped, Pred20--> 30\par        6/10/17: Hgb=8.2, Alb=2.9, BUN=20/1.2 ; 6/12/17: Hgb=8.3, Retic=0.19, Iron=10, Sat%=3, Ferritin=57, FA=23,D41=694, 6/13/17: s/p 2 Unit PRBCs\par        6/15/17: started MCT oil, Nz=067 , BMs: # 5, 1-2x/D, stools are brownish/green \par        7/11/17: [facility] w unsteadiness. MRI Head: R Cortical Temporo-occipital encephalomalacia, MRA H&N-no sign lesions, PER-wnl.Hgb=9.9--> 8.4->9.7 after 1 Unit. Seen by Heme: received IV Iron \par        CRP<5, Y26=173, YNI=674, FA>23,Iron=22,Sat%=6, Ferritin=42, Alb=3.5. D/C on warfarin 2mg\par        7/20 Hgb=8.5, 7/28 Hgb=7.7, 7/31-s/p 1 Unit \par        As outpt seeing Heme, to get IV Iron and 5 IV Copper\par        Had 'FLU' Fever=101, chills, bloat, N/V/D, Bilious. no pain, melena,brbpr\par        Given anti-emetic by dr Butler,then able to keep things down\par        On prednisone 25, warfarin w INR bet 1.6-2\par        8/17/17: Hgb=9.9, ESR=20, CRP-P,Sm=586, BMs: # 5, 1-2x/D, stools are brownish/green\par        10/2/17: Hgb=8.8, MCV=89,Phos=1.7, K=3.9, Mag=1.9, Alb=3.6,Iron<10,Ferritin=21, INR=2\par        10/17/17: Hgb=7.9,ESR=6,CRP<5, INR=1.6\par        10/25/17: Hgb=10, ESR=5, CRP=5, Alb=3.6, Phos=2, Dobbnney=895, M56=471\par        11/16/17: Hgb=11.2, ESR=14, CRP=12, \par        11/13/17: MRE-Chr mild distension of distal & vladislav-ileum s/o mild stricturing at anast, mild mural thick & mucosal enhancemt ~ 20cm; little change from 2015 c/w mild acute on chr ileitis, 2.1 cm Liver hemangioma\par        11/28/17: Entyvio\par        11/29/17: Hgb=9.6, ESR=10, CRP=5.8, Alb=3.8,Ferritin-P, Iron=14,Sat=4%, Phos=2.5\par        12/19;17: Hgb=10.1, ESR=12, CRP=6.5; 12/21/17: BMs:#3-5, 1-3x/D , brown, no blood, no pain, ft=390\par        1/3/18:Hgb=11.7, ESR=19, CRP=6.5, Alb=4.1, Zywlgplc=271, Iron=31, Sat%=9,Zinc=55\par \par \par        PRIOR HISTORY---2013:\par \par        2/20/13 CT markedly dilated sb loops ext to anast, colonoscopy w open anast ,mild-mod remy-anastamotic disease. quick response to entocort/humira--probably adhesive dis. \par        8/10/13 w GNR bacteremia, ADA 1.7 /KAYLAH 3.4, Humira 8/7, 8/13,8/18,9/15\par        CT- mucosal thickening and spiculation of the distal sb extending to the anastamosis, thickening and stranding of adj mesenteric fat.\par        Humira increased to 40mg weekly, entocort 4\par        9/2013 MRE--dilated loops in mid and distal ileum, markedly thickened and narrowed TI w decreased peristalsis of TI\par        11/15/13 ADA-24.9, KAYLAH-0\par \par \par        PRIOR HISTORY---2014:\par \par        2/15/14--CT dilated loops SB, loop of sb in mid abd 4.3cm w infiltrative changes in the mesentery, bowel tapers in the RLQ to the anastamosis w/o transition\par        WBC=15K, Rx w IV steroids and Abx \par        3/7/14 SBFT: last 10cm sb prox to anast mild distension and sl irregularity. In the mid portion of this loop there is a mild narrowing which appears to reopen but is some what narrowed.\par        3/20/14 ADA=33.1, KAYLAH=0, Lialda switch to Apriso 4 (2/2014)\par \par \par        PRIOR HISTORY--2015\par \par        1/11/15 Adm [facility] w 1 day N,Recurrent V, Abd pain/distension. CT-multiple distended & fluid-filled sb loops, mild wall thickening of ileum w No inflamm changes.\par        WBC=14.6, Hgb=17, RX w NGT suction, Levaquin iv, Solumedrol 40mg q 12( 1/12-->1/16) to prednisone 30mg BID w 5mg taper/wk\par        3/18/15 --Dr. Butler w edema /High BP, prednisone was tapered slowly to 2.5mg \par        switched to entocort 9mg qd, edema and bp improved w lasix 20mg \par        BMs:#4-5, 3x/D, Hgb=11, ESR=4, CRP<5\par        4/28/15 - 5/1/15: Adm [facility] w 1 day Abd pain, distension,N/V. 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 side of anast, opening appeared ulcerated,scarred & severely narrowed\par        6/11/15: [facility] w N/V x1, decr appetite, CT ABD: no obstruction, dilated thickened sb loops of distal jej and ileum\par        6/19/15 [facility]: s/p Lap w extensive lysis of adhesions, hepatic flex, sigmoid and sb anastamosis, s/p partial r colectomy and sb resection--side to side elisabeth: 8-10 inch from prior anast to TV colon.\par        7/17/15: BMs:#4-6, 4-5x/D, wt 131(from 126)\par        8/20/15: BMs: #4, 1-2x/D or #5, 2-3x/D, pd=861, dry cough,Hgb=10, WBC=3, ZEF=879, CRP=56, ESR=21\par        8/25/15 CT Abd/Pelv: Svl RLQ sb loops w wall thickening, mild nodularity & inflamm stranding in mesentery\par        Advised-restart Entocort 9mg qd, Apriso 4 qd, Maintain Humira but given RX to check drug/Ab levels\par        9/15/15: did nt restart meds,Hgb=9.9, WBC=4, ESR=19, CRP=5.8, yj=756; [facility] : ADA=8.5, Antibodies< 1.7\par        10/15/15: No pain, BMs:#4, 1-2x/D, #5-6, 3-4x/D, throat clearing/cough-better, gu=273\par        11/30/15: No pain, BMs:# 4, 5-6 intermittently, No throat clear, fo=611\par \par \par        PRIOR HISTORY-2016\par \par        1/22/16; 4/8/16; 6/6/16 : No pain, BMs:# 4-5 1-2x/D, also #5-6 3x/D for 2-3D/wk, kg=397\par        7/14/16: xl=236, 9/22/16: bi=012.5\par        11/10/16: 9.5lb wt loss, states BMs: 5-6, 5x/D--Taking Magnesium, No pain/anorexia\par        Labs: Stool Gnhuxvyfhxms=806, + Incr Fecal fat, Alb=3.4, FA =23, B98=035, Hgb=12, ESR=10, CRP <5\par        Magnesium-d/c, Obtain MRE asap--Start steroids and possibly switch to Entyvio\par        DDx discussed: active crohns--loss response to Humira, Malabsorption--loss Bile acids, Bile-induced diarrhea, SIBO\par        Pt wanted to wait for imaging-did not start rx\par        12/1//16 MRE: RUQ ileocolonic anastamosis--narrowed w upstream dilatation to 3.2 cm\par        Pt refused pred, Started Entocort 9mg qd and Flagyl 250mg qid about 7-10days ago \par        awaiting Entyvio load to start 12/22, then 1/5; had Cut back on iron bid\par        12/15/16: BMs:# 5, 2-3x/D, occas #6, No pain, Less bloat/flatulence, Uv=219.

## 2019-01-24 NOTE — ASSESSMENT
[FreeTextEntry1] : 1. Crohns disease of both small and large intestine with rectal bleeding  \par    \par CROHNS DISEASE-s/p ileocolonic resection x 2--last 6/19/15 for recurrent sbos: appears to be active , GI symtoms stable but labs were up CRP=18.6/ESR=19 & Xjdbvmbhhrdn=114 and Wt down ( inflam markers ? 2/2 to R ankle Fx/stress rx and tendinosis, also had tooth infection ) & MRE w ? ileal penetrating dis, wt 124\par s/p 4 SBOs w/i 2 yrs--2/2 distal sb disease adj to anastamosis\par when on Humira weekly w ADA =33 (3/20/14), -but had sbo 2/2014 at ADA=25 and another sbo 4/28/15\par 6/10/2015 Colonoscopy: Inflamm ST mass on ileal side w ulceration/scarred/narrow\par 6/11/2015 CT: dilated thickened sb loops distal jej & ileum\par Post-op **probably some malabsorption 2/2 to removal of R Colon and ileum: ?bile/fat/SIBO\par WT. Loss: r/o malabsorption, ?bile-induced--secretory vs decr micelles, active dis, PLE\par r/o SIBO--Elev FA, Alb=3.4-->3.1-->2.9--> (7/28/17)\par ?SIBO/Prevent post-op recurrence --Flagyl 250 mg qid~12/7/16-->d/c: 5/11/17\par Also discussed trial of Cholestyramine/Xifaxin -wanted to wait\par **ACTIVE Crohn's--11/10/16: 9.5lb wt loss, BMs: #5-6, 5x/D--Taking Magnesium \par 12/1//16 MRE: RUQ ileocolonic anastamosis--narrowed w upstream dilatation to 3.2 cm\par Labs: Stool Hlzqpqmpdkkq=254, + Incr Fecal fat, Alb=3.4, FA =23, G30=126, Hgb=12.3,ESR=10,CRP <5\par 4/19/17 :Colonoscopy: Anastamosis: open w mild -mod active colitis, enteritis severe adj to anastomosis, mild more proximal, remainder of colon=wnl\par 5/11/17- Adm [facility] w stools brown, Hgb=7.9, BUN=17, Creat=1.4, Alb=2.9.\par S/P 2 Units w Hgb=10.6, BUN=15/1.1, Prednisone 40mg taper, entocort d/dee\par 6/8/17: Hgb=7.4, MCV=98, CRP<5--ASA stopped, Pred20--> 30mg, 6/13/17: s/p 2 Unit PRBCs\par 7/11/17 [facility] w unsteadiness. MRI Head: R Cortical Temporo-occipital encephalomalacia\par Hgb=9.9--> 8.4->9.7 after 1 Unit. Seen by Heme: received IV Iron \par CRP<5, Y12=576, ZLA=015, FA>23,Iron=22,Sat%=6, Ferritin=42, Alb=3.5. D/C on warfarin 2mg\par 7/28 Hgb=7.7; 7/31/17-s/p 1 Unit \par Heme Consultation: got IV Iron and 5 IV Copper:___\par **Entyvio :time 0=12/22/16 ( Humira 40mg QW); 1/5/17--2wks, s/p 3rd infusion at wk 6, \par #6 :6/21/17--held 2/2 Shingles, Last Infusions=9/19/17 , 10/17/17, 11/28/17, 1/16/18 ,2/16/18, 3/19/18,4/16/18, 5/14/18, 6/25/18, 8/7/18, 9/17/18, 10/16/18, 11/13/18, 12/11/18, 1/14/19\par Entocort 9mg qd: 12/7/16--d/dee 5/11/17\par **Prednisone 40mg qd (5/11/17)--tapered 5mg/wk to 20mg qd, 6/8/17 30mg, \par 7/25/17 25mg, 3wks ago 20--> 15mg, 10/19/17 10mg alt w 7.5-->11/16/17 10mg alt w 5mg-->11/30/17: 5mg-->12/21/17: 5 alt w 2.5mg-->1/18/18: 2.5mg qd-->2.5mg qod--> d/c \par Probiotics 3 qd \par Lactose free, protein drinks tid\par MCT oil begun\par **trend --cbc, esr, crp, albumin\par **monitor wt= 120-->134-->134 --> 135--> 132 w clothes-->133--> 131 (Low carbs now)--> 129--> 127-->126-->124-->125-->124 ________\par Wt Loss : sig change since May-June/2018\par 8/17/17: Hgb=9.9, ESR=20, Pf=237--Agtnpmsxrw s/p GI infection w N/V/D, no obstruction\par 10/17/17: Hgb=7.9,ESR=6,CRP<5, INR=1.6, Got IV Iron x 2, since 10/2/17 for Iron<10, Hgb=8.8\par 11/16/17: Hgb=11.2, ESR=14, CRP=12, 10/26/17 Stool fat-neg, calprotectin-30\par 11/13/17: MRE-Chr mild distension of distal & vladislav-ileum s/o mild stricturing at anast, mild mural thick & mucosal enhancemt ~ 20cm; little change from 2015 c/w mild acute on chr ileitis, 2.1 cm Liver hemangioma\par 10/9/18: Hgb=11.6, MCV=94, ESR=14, CRP=5.4, INR=2.2\par 10/10/18 MRE: stricturing of neoterminal ileum w worsened upstream dilatation, moderate length of upstream ileum w stricturing extending at least 20cm and more extensive then previous. suggestionof 2 adj extraluminal fluid collections which may be w/i the wall of strictured ileum near the ileocolonic anastomosis. surrounding spiculation and tethered appearance of bowel in this region.\par CTE: no discrete collection or intramural fistula, but mucosal enhancemt\par Today: 1/24/19  : feeling ok , Wt=124___off prednisone, BMs Brown: #4-6 ____, Hgb=10.7 , CRP <5 --> 15-->9.4-->19-->11-->9.1-->5.4-><5-->17 : recent ankle fx/stress rx/tendonitis and tooth infection \par stool w + calprotectin and No fat\par Wt loss-- 2/2 to low carbs-->fat burning and flare ; advised to liberalize and add protein, ensure\par Plan: Prednisone d/c 2/20/18\par Entyvio q 6 wks --> 4 weeks \par check inflammatory markers & stool calprotectin\par ? cipro + flagyl \par consider IBD center at [place] for new rx's--biologics.                                                                                                                                                                                                                                                                                                                                                                                                                                                                                                                                                                                                                                                                                                                                                                                                                                                                                                                                                                    \par 2. Iron deficiency anemia due to chronic blood loss  \par  had initially dropped , after clinical flare and post procedure bleeding\par Probably multifactorial: ACD, Blood loss, malabsorption/nutrient deficiencies\par Eliquis-->warfarin after CVA, On Entyvio and prednsione for active dis\par Still requiring IV Iron--prn, \par s/p IV Cu & transfusions \par 7/11/17 Recent Adm for unsteady gait w MRI c/w CVA--warfarin begun: possible better control of AC\par Chromagen 2 qd--> IV Iron , s/p IV Cu x 5, FA 1mg qd , B12 SL & IM\par Hgb=7.9: 10/17/17--after 2 IV Iron\par 11/16/17: Hgb=11.2, 10/25/17 Iron=69, Ifcymqbv=956--> 11/29/17: Iron=14, Ferritin--19,sat=4%, INR=1.4, 12/19/17 Hgb=10.3 after 2 IV iron, \par 1/3/18 Hgb=11.7,Iron=31,Rsnnnpqi=570,INR=2; 1/16/18 Hgb=11.4, INR=1.4; 2/14/18: Hgb=11.1, Iron=28, Ferritin=23, INR=1.5; \par 2/20/18: Hgb=10.6, INR=1.7; 3/14/18: Hgb=11.1, Iron=45, Noaewyzd=622,INR=1.5; 4/25/18: Hgb=11.5, Iron=40, Ferritin=57, INR=1.6; 5/23/18: hgb=11.5, INR=1.5; 6/19/18: Hgb=10.6\par 7/16/18: Hgb=10.6, INR=1.8; 8/21/18: Hgb=11.1, INR=1.5; 9/5/18: Hgb=11.4, INR=1.3; 9/17/18: Hgb=10.7, INR=2.5, 10/9/18: Hgb=11.6, INR=2.2, 10/16/18: Hgb=11.7, INR=1.6, 1/14/19: Hgb=10.7, INR=1.6\par 7/13/17: K67=498, ZLP=668,FA>23; 1/3/18 Q07=803, Ui=816, Zinc=55; 6/6/18 Z86=880, 9/5/18: Hd=172, Zinc=67\par B12 1cc IM ____L. delt-- Given today, \par Last Iron infusion=1/22/19 , Ferritin=67 on 1/14/19; next Iron infusion -per Heme, \par Hem consult IV Iron /Cu given malabsorption, ? BM bx --r/o occullt etiology--on hold\par trend cbc\par Adj INR--closer to low 2's.    \par Agree w Heme--Prevnar-13\par \par \par \par 3. Other osteoporosis without current pathological fracture  \par : Progression on BD from 5/5/16\par Crohns, h/o steroid use\par Calcium 1102-0611 w Vit 1000mg\par Repeat BD of 8/2018 --showed worsening to Osteoporosis from 2016\par Rec Endo consult w Dr. Akers :Osteoporosis center at [facility]--pt never went originally \par 9/21/18: Phos=2.4, Ca=8.7, Alb=3.4, Vit D=27, CEM=298, \par 10/16/18: Phos=2.8, Ca=8.7, Alb=3.5,\par 1/14/19: Phos=2.6,  Ca=8.7, Alb=3.6\par Dr. Akers: Hyperparathyroidism-- probably secondary due to low Vit D/Ca & ? superimposed primary, Rx replete Vit D and Calcium\par trend Vit D, Ca, Phos, PTH--per endo\par BD--8/2019.    \par \par \par \par 4. Gastroesophageal reflux disease with esophagitis  \par  well controlled, no ht burn, no dysphagia, LPR\par Dry cough, CXR:ana, saw MIKE krishnan-->LPR\par ( +++)LPR, (_ ++)Barretts w (_ No)Dysplasia, (_ No, H/O )Esophagitis grade: (__ ), \par Anti-reflux diet and life-style changes emphasized. (_No ) Bedge. \par reg exercise emphasized. \par **(_ ++)PPI: ( Protonix 40 mg qd ), (++) H2B Qhs: ( Zantac 300mg--> Pepcid 40mg ), \par (_ ) Carafate 1gm:\par **(_ ++)F/U EGD: (_ 4)mo. / (_2020 )yrs\par (_ No)pH Monitor, (_No )Manometry, (_No )esophagram \par (_f/u )ENT eval., (_ No)Surgical eval.    \par \par \par \par 5. Internal hemorrhoids  \par  well-controlled , no swelling, itching, bleeding\par Discussed the potential complications of thrombosis, pain, bleeding, swelling, itching, infection.\par Moderate -Fiber Diet was reviewed and emphasized.\par 6 - 8 cups of decaffeinated fluid daily\par (_++ ) Sitz Bathes BID, (_++++ ) Anusol HC Suppos/Cream WA QHS ,\par (_No ) Tucks BID, (_ No) Balneol Lotion,(_ No) Calmoseptine Oint, (_ ++) Prep H prn\par (_No ) Colorectal Surgical evaluation for possible ablation.    \par \par \par \par 6. Barretts esophagus without dysplasia  \par Notes: see GERD.    \par \par \par 7. Hepatomegaly-->not confirmed by recent abd sono\par CTE w relative enlargemt, ? lobulation & enlarged portal/mesenteric veins\par LFTs-wnl, no ascites or edema\par R/O CLD, Hepatic vein thrombosis\par Abd sono w doppler--> Liver and spleen normal size, no thrombosis, normal portal and hepatic vein flow\par

## 2019-01-24 NOTE — ADDENDUM
[FreeTextEntry1] : Imaging\par \par 10/10/18 MRE: stricturing of neoterminal ileum w worsened upstream dilatation, moderate length of upstream ileum w stricturing extending at least 20cm and more extensive then previous. suggestionof 2 adj extraluminal fluid collections which may be w/i the wall of strictured ileum near the ileocolonic anastomosis. surrounding spiculation and tethered appearance of bowel in this region.\par \par 11/13/17: MRE-Chr mild distension of distal & vladislav-ileum s/o mild stricturing at anast, mild mural thick & mucosal enhancemt ~ 20cm; little change from 2015 c/w mild acute on chr ileitis, 2.1 cm Liver hemangioma\par 12/1//16 MRE: RUQ ileocolonic anastamosis--narrowed w upstream dilatation to 3.2 cm\par 5/5/16 BD: Osteoporotic, sig decr all areas: Lumb T=2.6, L Hip=2.1, L arm=2.5\par 3/10/15 BD: Osteopeneia, sig decr since 1/2013 Lumbar T=2.2, L Hip=1.9, Larm=1.6\par 6/11/15: CT ABD/Pelv: no obstruction, dilated thickened sb loops of distal jej and ileum\par 4/28/15 CT Abd/Pelv: Diffusely dilated SB loops, thick walled ileum\par 1/11/15 CT ABD/PEL w po C: multiple distended and fluid-filled sb loops, mild wall thickening of ileum--no inflammatory changes seen\par 3/7/14 SBFT: last 10cm prox to anast w mild distension, sl irregular. In the mid portion of this loop + mild narrowing but appears to open \par 2/15/14 CT ABD/PEL w po C: Loop in mid abd dilated to 4.3cm, bowel wall mildly thickened. No abrupt transition, bowel tapers in the RLQ to level of anastamosis. \par 9/9/13 MRE: Dilatation of loops mid and distal ileum. Distal ileum w marked thickening and decr peristalsis. Mild thickening in sigmoid colon\par 8/11/13 Ct ABD/PEL w po C: Diffusely abnl sb w marked distension extending to anastamosis. Mucoasl Thickening and spiculation of distal sb extending to anastamosis, there is thickening and stranding of adj mesenteric fat \par 1/24/13 BD: Osteopenia of the wrist, spine and hip--sig decr in the wrist \par \par Procedures:\par \par 4/19/17 EGD: gastritis, MACKENZIE, No HP\par 2cm HH, +Barretts, No Dysplasia\par Colonoscopy: Anastamosis: open w mild -mod active colitis, enteritis severe adj to anastomosis, mild more proximal, remainder of colon-wnl, 2-3 deg int hemorrhoids\par 6/10/15 Colonoscopy: Inflammatory ST mass on the ileal side of anast, opening appeared ulcerated,scarred & severely narrowed\par 8/3/10 Colonoscopy: active dz at anastamosis,ulcerated/edema/narrowed\par 8/20/14 EGD: Gastritis, mild, No HP, No IM\par GE wide open at 40cm, 5 cm seg Barretts, No Dysplasia.

## 2019-02-13 ENCOUNTER — APPOINTMENT (OUTPATIENT)
Dept: ENDOCRINOLOGY | Facility: CLINIC | Age: 54
End: 2019-02-13
Payer: COMMERCIAL

## 2019-02-13 VITALS
BODY MASS INDEX: 17.75 KG/M2 | SYSTOLIC BLOOD PRESSURE: 118 MMHG | HEIGHT: 70 IN | WEIGHT: 124 LBS | DIASTOLIC BLOOD PRESSURE: 82 MMHG | HEART RATE: 86 BPM

## 2019-02-13 VITALS
WEIGHT: 124 LBS | HEART RATE: 84 BPM | DIASTOLIC BLOOD PRESSURE: 82 MMHG | HEIGHT: 70 IN | BODY MASS INDEX: 17.75 KG/M2 | SYSTOLIC BLOOD PRESSURE: 118 MMHG

## 2019-02-13 LAB
24R-OH-CALCIDIOL SERPL-MCNC: 34.2 PG/ML
25(OH)D3 SERPL-MCNC: 39.1 NG/ML
ALBUMIN SERPL ELPH-MCNC: 3.8 G/DL
ALP BLD-CCNC: 200 U/L
ALT SERPL-CCNC: 17 U/L
ANION GAP SERPL CALC-SCNC: 11 MMOL/L
AST SERPL-CCNC: 20 U/L
BILIRUB SERPL-MCNC: <0.2 MG/DL
BUN SERPL-MCNC: 15 MG/DL
CALCIUM SERPL-MCNC: 8.6 MG/DL
CALCIUM SERPL-MCNC: 8.6 MG/DL
CHLORIDE SERPL-SCNC: 106 MMOL/L
CO2 SERPL-SCNC: 24 MMOL/L
CREAT SERPL-MCNC: 0.97 MG/DL
GLUCOSE SERPL-MCNC: 91 MG/DL
MAGNESIUM SERPL-MCNC: 1.8 MG/DL
PARATHYROID HORMONE INTACT: 144 PG/ML
PHOSPHATE SERPL-MCNC: 2 MG/DL
POTASSIUM SERPL-SCNC: 4.6 MMOL/L
PROT SERPL-MCNC: 6.3 G/DL
SODIUM SERPL-SCNC: 141 MMOL/L
TSH SERPL-ACNC: 1.32 UIU/ML

## 2019-02-13 PROCEDURE — 99214 OFFICE O/P EST MOD 30 MIN: CPT

## 2019-02-13 RX ORDER — PREDNISONE 5 MG/1
5 TABLET ORAL
Refills: 0 | Status: DISCONTINUED | COMMUNITY
End: 2019-02-13

## 2019-02-13 RX ORDER — WARFARIN 4 MG/1
4 TABLET ORAL DAILY
Qty: 90 | Refills: 1 | Status: DISCONTINUED | COMMUNITY
Start: 2017-08-24 | End: 2019-02-13

## 2019-02-13 RX ORDER — PRENATAL VIT CALC,IRON,FOLIC
760 TABLET ORAL TWICE DAILY
Qty: 180 | Refills: 1 | Status: DISCONTINUED | COMMUNITY
Start: 2018-12-11 | End: 2019-02-13

## 2019-02-13 NOTE — REVIEW OF SYSTEMS
[FreeTextEntry5] : General weight loss, 8lb reducing CHO Denies fever, chills, sweats, anorexia, fatigue, weakness, malaise, sleep disorder.  Eyes Denies:, vision loss , 1 eye, double vision, eye irritation, both eyes, blurring, eye pain, halos, discharge, light sensitivity.  CV Denies :, difficulty breathing at night, near fainting, chest pain or discomfort, racing/skipping heart beats, fatigue, lightheadedness, shortness of breath with exertion, palpitations, swelling of hands or feet, difficulty breathing while lying down, fainting, leg cramps with exertion, bluish discoloration of lips or nails, weight gain.  Resp Denies: , sleep disturbances due to breathing, cough, shortness of breath, coughing up blood, chest discomfort, wheezing, excessive sputum, excessive snoring.  GI loose stools Denies: , excessive appetite, loss of appetite, indigestion, vomiting blood, nausea, vomiting, yellowish skin color, gas, abdominal pain, abdominal bloating, hemorrhoids, diarrhea, change in bowel habits, constipation, dark tarry stools, bloody stools.   Denies:, foul urinary discharge, blood in urine, urinary frequency, inability to empty bladder, urinary urgency, kidney pain, trouble starting urinary stream, painful urination, night time urination, inability to control bladder, genital sores, lack of sexual drive, unusual urinary color, pelvic pain.  MS muscle aches joint pain, Denies:, muscle cramps, joint swelling, presence of joint fluid, back pain, stiffness, muscle weakness, arthritis, gout, loss of strength, .  Derm Denies:, excessive perspiration, dryness , poor wound healing, unusual hair distribution, skin cancer, itching, changes in color of skin, flushing, rash.  Neuro Denies:, difficulty with concentration, poor balance, headaches, disturbances in coordination, sensation of room spinning, tremors, fainting, excessive daytime sleeping, memory loss.  Psych anxiety, Denies:, sense of great danger, thoughts of suicide, mental problems, depression, thoughts of violence, frightening vision or sounds.  Endo Denies:, excessive hunger, cold intolerance, heat intolerance, excessive urination, excessive thirst, weight change.

## 2019-02-13 NOTE — HISTORY OF PRESENT ILLNESS
[FreeTextEntry1] :   54 yo WM coming for a f/u , for severe osteoporosis with bilateral hip fractures, hyperparathyroidism, low D and ca sec to Chron's disease\par on Vit D 50,000 IU twice a week\par Ca citrate 950mg bid\par reports compliance \par labs reviewed still elevated iPTH despite better Vit D and calcium\par        MRI pelvis done 10/3/18 reviewed :\par        There are insufficiency fractures of the bilateral femoral heads without significant subchondral collapse at this time. There are associated moderate to large joint effusions.\par        There are additional microtrabecular/insufficiency fractures involving the sacrum as well as a few foci involving the iliac margins of the sacroiliac joints. No displaced or cortical fracture.\par        Mild degenerative arthrosis of the right hip.\par        sees GEN Chen will see him today again, bone stimulator is considered\par        dental issues : had an implant failure 2 months ago, bone graft 2 months ago, \par        Osteoporosis \par        has Chron disease sees Dr Lugo , never gained weight back after colon resection\par        received iron infussion and copper infusions\par        takes \par       \par        DEXA \par        LS T scroe -3.8\par        stress fractures \par        9/5/18 right foot Fx stress Fx reviewed\par        Impression:\par        Trabecular fractures involving the cuboid, the partially visualized anterior process of the calcaneus, the lateral cuneiform as well as the third metatarsal base. No evidence for associated cortical break. There is improvement in/essentially complete resolution of the previously described microtrabecular fractures within the partially visualized talus, the navicular as well as the middle and medial cuneiforms.\par        Subacute to chronic mild subchondral fracture of the second metatarsal head.\par        Moderate degenerative changes of the hallux sesamoid complex\par        left ankle pain 6/18 MRI:\par        Impression:\par        Microtrabecular/fracture/stress reaction involving the talar body and navicula without evidence for cortical break. There are additional stress-related changes involving the cuneiforms, the cuboid and distal tibia, as above. Associated moderate tibiotalar joint effusion.\par        Mild to moderate peroneal tendinosis with superimposed segmental longitudinal splitting, as above.\par        has pain hip right will have MRI with [facility]er\par        has h/o kidney stones 6 yrs ago went to [facility] Dr Morris did surgery.

## 2019-02-13 NOTE — PHYSICAL EXAM
[Alert] : alert [No Acute Distress] : no acute distress [Normal Sclera/Conjunctiva] : normal sclera/conjunctiva [EOMI] : extra ocular movement intact [No Proptosis] : no proptosis [Normal Oropharynx] : the oropharynx was normal [Thyroid Not Enlarged] : the thyroid was not enlarged [No Thyroid Nodules] : there were no palpable thyroid nodules [No Respiratory Distress] : no respiratory distress [No Accessory Muscle Use] : no accessory muscle use [Clear to Auscultation] : lungs were clear to auscultation bilaterally [Normal Rate] : heart rate was normal  [Normal S1, S2] : normal S1 and S2 [Regular Rhythm] : with a regular rhythm [Pedal Pulses Normal] : the pedal pulses are present [No Edema] : there was no peripheral edema [Normal Bowel Sounds] : normal bowel sounds [Not Tender] : non-tender [Soft] : abdomen soft [Not Distended] : not distended [Post Cervical Nodes] : posterior cervical nodes [Anterior Cervical Nodes] : anterior cervical nodes [Axillary Nodes] : axillary nodes [Normal] : normal and non tender [No Spinal Tenderness] : no spinal tenderness [Spine Straight] : spine straight [No Stigmata of Cushings Syndrome] : no stigmata of cushings syndrome [Normal Gait] : normal gait [Normal Strength/Tone] : muscle strength and tone were normal [No Rash] : no rash [Acanthosis Nigricans] : no acanthosis nigricans [Normal Reflexes] : deep tendon reflexes were 2+ and symmetric [No Tremors] : no tremors [Oriented x3] : oriented to person, place, and time [de-identified] : underweighted

## 2019-02-28 ENCOUNTER — RESULT REVIEW (OUTPATIENT)
Age: 54
End: 2019-02-28

## 2019-02-28 ENCOUNTER — APPOINTMENT (OUTPATIENT)
Dept: GASTROENTEROLOGY | Facility: CLINIC | Age: 54
End: 2019-02-28
Payer: COMMERCIAL

## 2019-02-28 ENCOUNTER — APPOINTMENT (OUTPATIENT)
Dept: HEMATOLOGY ONCOLOGY | Facility: CLINIC | Age: 54
End: 2019-02-28
Payer: COMMERCIAL

## 2019-02-28 VITALS
HEIGHT: 70 IN | DIASTOLIC BLOOD PRESSURE: 84 MMHG | BODY MASS INDEX: 18.04 KG/M2 | WEIGHT: 126 LBS | SYSTOLIC BLOOD PRESSURE: 122 MMHG

## 2019-02-28 PROCEDURE — 99214 OFFICE O/P EST MOD 30 MIN: CPT

## 2019-02-28 RX ORDER — WARFARIN 2 MG/1
2 TABLET ORAL
Qty: 30 | Refills: 0 | Status: DISCONTINUED | COMMUNITY
Start: 2017-07-15 | End: 2019-02-28

## 2019-02-28 NOTE — ASSESSMENT
[FreeTextEntry1] : 1. Crohns disease of both small and large intestine\par    \par CROHNS DISEASE-s/p ileocolonic resection x 2--last 6/19/15 for recurrent sbos: appears to be active , GI symtoms stable but labs were up CRP=18.6/ESR=19 & Ksczarxyufpm=692 and Wt down ( inflam markers ? 2/2 to R ankle Fx/stress rx and tendinosis, also had tooth infection ) & MRE w ? ileal penetrating dis, wt =126\par s/p 4 SBOs w/i 2 yrs--2/2 distal sb disease adj to anastamosis\par when on Humira weekly w ADA =33 (3/20/14), -but had sbo 2/2014 at ADA=25 and another sbo 4/28/15\par 6/10/2015 Colonoscopy: Inflamm ST mass on ileal side w ulceration/scarred/narrow\par 6/11/2015 CT: dilated thickened sb loops distal jej & ileum\par Post-op **probably some malabsorption 2/2 to removal of R Colon and ileum: ?bile/fat/SIBO\par WT. Loss: r/o malabsorption, ?bile-induced--secretory vs decr micelles, active dis, PLE\par r/o SIBO--Elev FA, Alb=3.4-->3.1-->2.9--> (7/28/17)\par ?SIBO/Prevent post-op recurrence --Flagyl 250 mg qid~12/7/16-->d/c: 5/11/17\par Also discussed trial of Cholestyramine/Xifaxin -wanted to wait\par **ACTIVE Crohn's--11/10/16: 9.5lb wt loss, BMs: #5-6, 5x/D--Taking Magnesium \par 12/1//16 MRE: RUQ ileocolonic anastamosis--narrowed w upstream dilatation to 3.2 cm\par Labs: Stool Dgaajgasrlmg=113, + Incr Fecal fat, Alb=3.4, FA =23, M76=050, Hgb=12.3,ESR=10,CRP <5\par 4/19/17 :Colonoscopy: Anastamosis: open w mild -mod active colitis, enteritis severe adj to anastomosis, mild more proximal, remainder of colon=wnl\par 5/11/17- Adm [facility] w stools brown, Hgb=7.9, BUN=17, Creat=1.4, Alb=2.9.\par S/P 2 Units w Hgb=10.6, BUN=15/1.1, Prednisone 40mg taper, entocort d/dee\par 6/8/17: Hgb=7.4, MCV=98, CRP<5--ASA stopped, Pred20--> 30mg, 6/13/17: s/p 2 Unit PRBCs\par 7/11/17 [facility] w unsteadiness. MRI Head: R Cortical Temporo-occipital encephalomalacia\par Hgb=9.9--> 8.4->9.7 after 1 Unit. Seen by Heme: received IV Iron \par CRP<5, F40=533, OLU=563, FA>23,Iron=22,Sat%=6, Ferritin=42, Alb=3.5. D/C on warfarin 2mg\par 7/28 Hgb=7.7; 7/31/17-s/p 1 Unit \par Heme Consultation: got IV Iron and 5 IV Copper:___\par **Entyvio :time 0=12/22/16 ( Humira 40mg QW); 1/5/17--2wks, s/p 3rd infusion at wk 6, \par #6 :6/21/17--held 2/2 Shingles, Last Infusions=9/19/17 , 10/17/17, 11/28/17, 1/16/18 ,2/16/18, 3/19/18,4/16/18, 5/14/18, 6/25/18, 8/7/18, 9/17/18, 10/16/18, 11/13/18, 12/11/18, 1/14/19, 2/15/19\par Entocort 9mg qd: 12/7/16--d/dee 5/11/17\par **Prednisone 40mg qd (5/11/17)--tapered 5mg/wk to 20mg qd, 6/8/17 30mg, \par 7/25/17 25mg, 3wks ago 20--> 15mg, 10/19/17 10mg alt w 7.5-->11/16/17 10mg alt w 5mg-->11/30/17: 5mg-->12/21/17: 5 alt w 2.5mg-->1/18/18: 2.5mg qd-->2.5mg qod--> d/c \par Probiotics 3 qd \par Lactose free, protein drinks tid\par MCT oil begun\par **trend --cbc, esr, crp, albumin\par **monitor wt= 120-->134-->134 --> 135--> 132 w clothes-->133--> 131 (Low carbs now)--> 129--> 127-->126-->124-->125-->124--> 126________\par Wt Loss : sig change since May-June/2018\par 8/17/17: Hgb=9.9, ESR=20, Sg=051--Bsgjihwyiu s/p GI infection w N/V/D, no obstruction\par 10/17/17: Hgb=7.9,ESR=6,CRP<5, INR=1.6, Got IV Iron x 2, since 10/2/17 for Iron<10, Hgb=8.8\par 11/16/17: Hgb=11.2, ESR=14, CRP=12, 10/26/17 Stool fat-neg, calprotectin-30\par 11/13/17: MRE-Chr mild distension of distal & vladislav-ileum s/o mild stricturing at anast, mild mural thick & mucosal enhancemt ~ 20cm; little change from 2015 c/w mild acute on chr ileitis, 2.1 cm Liver hemangioma\par 10/9/18: Hgb=11.6, MCV=94, ESR=14, CRP=5.4, INR=2.2\par 10/10/18 MRE: stricturing of neoterminal ileum w worsened upstream dilatation, moderate length of upstream ileum w stricturing extending at least 20cm and more extensive then previous. suggestionof 2 adj extraluminal fluid collections which may be w/i the wall of strictured ileum near the ileocolonic anastomosis. surrounding spiculation and tethered appearance of bowel in this region.\par CTE: no discrete collection or intramural fistula, but mucosal enhancemt\par Today: 2/28/19  : feeling ok , Wt=126_off prednisone, BMs Brown: #4-5_, Hgb=11.5 , CRP <5 --> 15-->9.4-->19-->11-->9.1-->5.4-><5-->17-->0.36-->6.5 : recent ankle fx/stress rx/tendonitis and tooth infection \par prior stool studies w + calprotectin and No fat\par Wt loss-- 2/2 to low carbs-->fat burning and flare ; advised to liberalize and add protein, ensure\par Plan: Prednisone d/c 2/20/18\par Entyvio q 6 wks --> 4 weeks \par check inflammatory markers & stool calprotectin--pending \par ? cipro + flagyl \par consider IBD center at [facility] for new rx's--biologics.                                                                                                                                                                                                                                                                                                                                                                                                                                                                                                                                                                                                                                                                                                                                                                                                                                                                                                                                                                    \par 2. Iron deficiency anemia due to chronic blood loss  \par  had initially dropped , after clinical flare and post procedure bleeding\par Probably multifactorial: ACD, Blood loss, malabsorption/nutrient deficiencies\par Eliquis-->warfarin after CVA, On Entyvio and prednsione for active dis\par Still requiring IV Iron--prn, \par s/p IV Cu & transfusions \par 7/11/17 Recent Adm for unsteady gait w MRI c/w CVA--warfarin begun: possible better control of AC\par Chromagen 2 qd--> IV Iron , s/p IV Cu x 5, FA 1mg qd , B12 SL & IM\par  1/14/19: Hgb=10.7, INR=1.6, 2/5/19: Hgb=11.2,INR=1.5; 2/28/19: Hgb=11.5, Jrbxeyiw=191\par 7/13/17: R39=922, JTO=154,FA>23; 1/3/18 P89=208, Zh=913, Zinc=55; 6/6/18 B20=774, 9/5/18: Of=813, Zinc=67\par 11/28/18 V65=277\par B12 1cc IM ____R. delt-- Given today, \par Last Iron infusion=1/22/19 , Ferritin=67 on 1/14/19; next Iron infusion -per Heme, \par Hem consult IV Iron /Cu given malabsorption, ? BM bx --r/o occullt etiology--on hold\par trend cbc\par Adj INR--closer to low 2's.    \par Agree w Heme--Prevnar-13\par \par \par \par 3. Other osteoporosis without current pathological fracture  \par : Progression on BD from 5/5/16\par Crohns, h/o steroid use\par Calcium 0640-0729 w Vit 1000mg\par Repeat BD of 8/2018 --showed worsening to Osteoporosis from 2016\par Rec Endo consult w Dr. Akers :Osteoporosis center at [facility]--pt never went originally \par 9/21/18: Phos=2.4, Ca=8.7, Alb=3.4, Vit D=27, ILK=295, \par 10/16/18: Phos=2.8, Ca=8.7, Alb=3.5,\par 1/14/19: Phos=2.6,  Ca=8.7, Alb=3.6\par 2/28/19: Phos=1.8, Ca=8.9, Alb=3.7, VitD=39, BZN=771\par Dr. Akers: Hyperparathyroidism-- probably secondary due to low Vit D/Ca & ? superimposed primary, Rx replete Vit D and Calcium\par trend Vit D, Ca, Phos, PTH,  For Parathyroid scan pre-op per endo\par BD--8/2019.    \par \par \par \par 4. Gastroesophageal reflux disease with esophagitis  \par  well controlled, no ht burn, no dysphagia, LPR\par Dry cough, CXR:ana, saw MIKE krishnan-->LPR\par ( +++)LPR, (_ ++)Barretts w (_ No)Dysplasia, (_ No, H/O )Esophagitis grade: (__ ), \par Anti-reflux diet and life-style changes emphasized. (_No ) Bedge. \par reg exercise emphasized. \par **(_ ++)PPI: ( Protonix 40 mg qd ), (++) H2B Qhs: ( Zantac 300mg--> Pepcid 40mg ), \par (_ ) Carafate 1gm:\par **(_ ++)F/U EGD: (_ 4)mo. / (_2020 )yrs\par (_ No)pH Monitor, (_No )Manometry, (_No )esophagram \par (_f/u )ENT eval., (_ No)Surgical eval.    \par \par \par \par 5. Internal hemorrhoids  \par  well-controlled , no swelling, itching, bleeding\par Discussed the potential complications of thrombosis, pain, bleeding, swelling, itching, infection.\par Moderate -Fiber Diet was reviewed and emphasized.\par 6 - 8 cups of decaffeinated fluid daily\par (_++ ) Sitz Bathes BID, (_++++ ) Anusol HC Suppos/Cream LA QHS ,\par (_No ) Tucks BID, (_ No) Balneol Lotion,(_ No) Calmoseptine Oint, (_ ++) Prep H prn\par (_No ) Colorectal Surgical evaluation for possible ablation.    \par \par \par \par 6. Barretts esophagus without dysplasia  \par Notes: see GERD.    \par \par \par 7. Hepatomegaly-->not confirmed by recent abd sono\par CTE w relative enlargemt, ? lobulation & enlarged portal/mesenteric veins\par LFTs-wnl, no ascites or edema\par R/O CLD, Hepatic vein thrombosis\par Abd sono w doppler--> Liver and spleen normal size, no thrombosis, normal portal and hepatic vein flow\par

## 2019-02-28 NOTE — HISTORY OF PRESENT ILLNESS
[de-identified] : \par \par   \par        PCP: Carrie\par \par        54 yo M w h/o Crohns Disease for many years, OP\par        h/o CVA-7/2017, DVT + MTHFR Homozygote Mutation on warfarin-->Eliquis' warfarin \par        GERD w Page's, Hemorrhoids, BPH, \par        S/P Ileocolonic resection 1998\par        Since then multiple SBOs\par \par \par        Got IV Iron x 2, since 10/2/17\par        10/19/17: feeling better, Kz=702 on prednisone 15mg x 3weeks, BMs Brown: #5-6, 1-2/D, occas 3-4/d\par        10/25/17: Hgb=10, ESR=5, CRP=5, Alb=3.6, Phos=2, Vlcesbdq=140, X46=330\par        11/16/17: Hgb=11.2, ESR=14, CRP=12, \par        11/13/17: MRE-Chr mild distension of distal & vladislav-ileum s/o mild stricturing at anast, mild mural thick & mucosal enhancemt ~ 20cm; little change from 2015 c/w mild acute on chr ileitis, 2.1 cm Liver hemangioma\par        11/28/17: Entyvio\par        11/29/17: Hgb=9.6, ESR=10, CRP=5.8, Alb=3.8,Ferritin-19, Iron=14,Sat=4%, Phos=2.5, Gg=694, BMs:# 5, 1-2x/D, brown,\par        12/19;17: Hgb=10.1, ESR=12, CRP=6.5; 12/21/17: BMs:#3-5, 1-3x/D , brown, no blood, no pain, zn=934\par        1/3/18:Hgb=11.7, ESR=19, CRP=6.5, Alb=4.1, Biftvpnm=485, Iron=31, Sat%=9,Zinc=55\par        1/16/18: Entyvio, Hgb=11.4, ESR=10, CRP=6.9\par        1/18/18: Today: cellulitis R foot, saw dr KEITH--rx augmentum x 10D, Entyvio 1/9 to 1/16, rq=859 w clothes,BMs: # 4-5, 1-2x/D \par        2/14/18: Hgb=11.1, ESR=16, CRP=11.6, Alb=3.6, Iron=28, Ferritin=23, INR=1.5 \par        2/16/18: s/p Entyvio; Iron infusion, again on 2/27\par        2/20/18: Hgb=10.6, ESR=20, CRP=12, INR=1.7\par        2/27/18: s/p iron infusion\par        3/9/18: Dr. Burden for tenosynovitis, rx w Zorvolex: Diclofenac x 5 days--? response\par        3/14/18: Du=977; BMs: # 5, 1-2x/D; Hgb=11, ESR=18, CRP=11,Irom=45,Dpfgsorv=813,Alb=3.6, INR=1.5\par        3/19/18: s/p Entyvio\par        3/22/18: cg=838, BMs: # 5, 3-4 x/d\par        4/16/18: s/p Entyvio,Hgb=11.9, INR=1.3, ESR=18, CRP=8.4 \par        4/18/18 EGD: gastritis, no HP, no IM, 2+ Mucous, 0.5cm gastric polyp: fundic, 4cm HH, + Barretts:3cm, no dysplasia\par        4/25/18: Hgb=11.5, INR=1.6, Iron=40, Sat=13%, Ferritin=57, Alb=3.2, Phos=2.2, Mag=1.7\par        4/30/18: s/p Iron Infusion\par        5/14/18: s/p Entyvio \par        5/23/18: Hgb=11.5, ESR=16, CRP< 5\par        5/29/18: s/p Iron Infusion\par        6/6/18: Hgb=10.6, INR=1.7, Wxwmeigq=605, Alb=3.5, Phos=2.1, S96=458, lw=055; BMs: # 5, 2-3x/D \par        6/19/18: Hgb=10.6, INR=1, CRP=15, ESR=23\par        6/21/18: R ankle is acting up, swollen, pain, having MRI done, took a couple of advil, on low carbs ,BMs: # 5, 1-2x/D, bt=888\par        6/26/18: MRI: fx/stress rxn-talar body/navicula; stress related changes--cuneform, cuboid,distal tibia; mod tibiotalar effusion, mild-mod peroneal tendinosis\par        7/19/18: entyvio 6/26/18, eliminated all carbs--anti inflammatory diet, kp=461, BMs: # 4-5, 1-3x/D \par        7/25/18: Hgb=10, INR=1.3, Alb=3.3, Phos=2.4, Wcrkpvpq=155, sat%=12, Iron=35\par        8/2/18: BD--osteoporosis of hip/spine: sig decrease BD--17.6% of hip, 17% of spine, osteopenia of wrist: 6.4%\par        8/7/18: Entyvio\par        8/21/18: Hgb=11.1, INR=1.5, ESR=19, CRP=18.6\par        8/23/18: recently w tooth infection, old implant--loose and removed; rx w amox, and advil\par        eating almost no carbs, sr=115, # 5-6, 2-3x/d \par        8/24/18: Stool fat--neg, Qbxvksgggthy=675\par        9/5/18: Hgb=11.4, INR=1.3, ESR=9, CRP=11.3, Iron=41, Xgcnvrch=485, Alb=3.8,\par        9/17/18: entyvio, Hgb=10.7, INR=2.2, ESR=17, CRP=9.1, \par        9/20/18: sh=901, BMs: # 5-6, 1-2x/D \par        9/21/18: Phos=2.4, Ca=8.7, Alb=3.4, Vit D=27, PTA=358, \par        Dr. Akers: Hyperparathyroidism: probably secondary due to low Vit D/Ca & ? superimposed primary, rx replete Vit D and Calcium\par        10/9/18: Hgb=11.6, MCV=94, ESR=14, CRP=5.4, INR=2.2\par        10/10/18 MRE: stricturing of neoterminal ileum w worsened upstream dilatation, moderate length of upstream ileum w stricturing extending at least 20cm and more extensive then previous. suggestion of 2 adj extraluminal fluid collections which may be w/i the wall of strictured ileum near the ileocolonic anastomosis. surrounding spiculation and tethered appearance of bowel in this region.\par 10/16/18: entyvio, Hgb=11.7, MCV=94, ESR=14, CRP <5, Alb=3.5\par 10/18/18: It=324,   BMs: # 5-6, 3x/D , \par 11/13/18: Entyvio\par 11/21/18 CTE w C: limited 2/2 bowel underdistention, mucosal hyperenhancemt & extensive SM edema of distal ileum including vladislav-ileum, difficult to exclude a sml fluid collection, Liver w relative enlargement w suggestion of lobulation, enlargemt of PV,SMV,IMV,Spl V, rectal V. No ascites\par 11/28/18: Hgb=10, ESR=19, CRP=17,Alb=3.5,Iron=35, Sat%=11, Eypscqug=280, INR=1.3\par 11/29/18: qo=460, BMs: # 5-6, 3-4x/D\par 12/3/18: Abd sono--Liver 14.8cm Incr heterogenicity, spleen--wnl\par 12/11/18 & 1/14/19: Entyvio\par 1/14/19:Entyvio,  Hgb=10.7, ESR=15, Alb=3.6, Iron=36, Ferritin=67, Sat%=11, INR=1.6\par 1/24/19:  pu=111, stools are # 4-6, 3-4x/d , no pain\par 2/5/19: Hgb=11.2, ESR=14, CRP=0.36\par 2/28/19: Hgb=11.5, ESR=12, CRP=6.5, Alb=3.7, Iron=69, Fzquvfox=563, Ca=8.9\par \par  Today:  Bms: # 4-5, 2-3x/D   , no pain, eating more calories, liberalizing carbs--avoiding simple sugars.  to have parathyroid scan pre-op, still w elev PTH\par        Entyvio : last 2/1519, got IV iron 1/22/19\par        R ankle in immobilizer brace\par        kw=182\par        Abd pain--no \par        Nausea--no \par        Vomit--no \par        Early satiety-no \par        Belching-no \par        Regurgitation--no \par        Ht burn--no \par        Throat Clearing-no\par        Globus-no\par        Hoarseness--no \par        Dysphagia--no \par        BMs: # 4-5, 2-3x/D \par        Constipation--no \par        Diarrhea- intermittent \par        Bloating--no \par        Flatulence-no\par        Gurgling--no \par        Melena--no \par        BRBPR--no \par        Anorexia-no \par        Wt Loss--stable\par \par \par \par        PRIOR HISTORY--2017\par \par        1/17/17: Hgb=9.2, ESR=6, CRP=5; 1/19/17: BMs: #4, 5-6, 2-3x/D, Lr=836, Started Creon 1 tid cc\par        3rd Entyvio begining Feb\par        2/14/17: Labs; Hgb=9.6, MCV=97, ESR=5, CRP< 5, Q45=703, FA>23, Iron=59, Retic =3.2,\par        2/17/17 Fecal Calprotectin=16, Fats: Increased neutral & Split\par        3/13/17: Labs Hgb=9.5, MCV=95, ESR=7, CRP <5, ALB=3.1\par        3/16/17: lot of stress, to move -[place], had a job-fell thru, BMs: # 5, 2-4 x/D, wt= 131w clothes\par        4/19/17 EGD: gastritis, MACKENZIE, No HP, 2cm HH, +Barretts, No Dysplasia\par        Colonoscopy: Anastamosis: open w mild -mod active colitis, enteritis severe adj to anastomosis, mild more proximal, remainder of colon-wnl, 2-3 deg int hemorrhoids\par        4/26/17 : Hgb=7.3, MCV=95, ESR=13, CRP<5;Immediately post procedure stools very dark on eliquis/iron.\par        Admitted to PM: Hgb=8.3-->8.9 after 2 U, Alb 3.3, BUN=17, creat=1.3, Malina/Yzhqd=335/460\par        4/27/17: Alb=2.3, BUN=12, creat=1.2, Malina/Lipase=209/863-No abd pain, N/V; 5/5/17: Hgb=10.7.\par        5/8/17 Entyvio iv. 5/8-5/9 w dark red diarrhea; 5/10/17 Hgb=7.8.\par        5/11/17 Adm [facility] w stools brown, Hgb=7.9, BUN=17, Creat=1.4, Alb=2.9; S/P 2 Units- Hgb=10.6, BUN=15/1.1.\par        Prednisone 40mg qd, entocort d/dee.; 5/18/17: Hgb-10.4, dh=037, BMs: #5, 1-2x/D, no pain\par        6/8/17: Hgb=7.4,MCV=98, CRP<5-ASA stopped, Pred20--> 30\par        6/10/17: Hgb=8.2, Alb=2.9, BUN=20/1.2 ; 6/12/17: Hgb=8.3, Retic=0.19, Iron=10, Sat%=3, Ferritin=57, FA=23,I29=337, 6/13/17: s/p 2 Unit PRBCs\par        6/15/17: started MCT oil, Ev=874 , BMs: # 5, 1-2x/D, stools are brownish/green \par        7/11/17: [facility] w unsteadiness. MRI Head: R Cortical Temporo-occipital encephalomalacia, MRA H&N-no sign lesions, PER-wnl.Hgb=9.9--> 8.4->9.7 after 1 Unit. Seen by Heme: received IV Iron \par        CRP<5, K14=477, YCX=086, FA>23,Iron=22,Sat%=6, Ferritin=42, Alb=3.5. D/C on warfarin 2mg\par        7/20 Hgb=8.5, 7/28 Hgb=7.7, 7/31-s/p 1 Unit \par        As outpt seeing Heme, to get IV Iron and 5 IV Copper\par        Had 'FLU' Fever=101, chills, bloat, N/V/D, Bilious. no pain, melena,brbpr\par        Given anti-emetic by dr Butler,then able to keep things down\par        On prednisone 25, warfarin w INR bet 1.6-2\par        8/17/17: Hgb=9.9, ESR=20, CRP-P,Xe=237, BMs: # 5, 1-2x/D, stools are brownish/green\par        10/2/17: Hgb=8.8, MCV=89,Phos=1.7, K=3.9, Mag=1.9, Alb=3.6,Iron<10,Ferritin=21, INR=2\par        10/17/17: Hgb=7.9,ESR=6,CRP<5, INR=1.6\par        10/25/17: Hgb=10, ESR=5, CRP=5, Alb=3.6, Phos=2, Aljwgyfh=030, J39=604\par        11/16/17: Hgb=11.2, ESR=14, CRP=12, \par        11/13/17: MRE-Chr mild distension of distal & vladislav-ileum s/o mild stricturing at anast, mild mural thick & mucosal enhancemt ~ 20cm; little change from 2015 c/w mild acute on chr ileitis, 2.1 cm Liver hemangioma\par        11/28/17: Entyvio\par        11/29/17: Hgb=9.6, ESR=10, CRP=5.8, Alb=3.8,Ferritin-P, Iron=14,Sat=4%, Phos=2.5\par        12/19;17: Hgb=10.1, ESR=12, CRP=6.5; 12/21/17: BMs:#3-5, 1-3x/D , brown, no blood, no pain, ta=766\par        1/3/18:Hgb=11.7, ESR=19, CRP=6.5, Alb=4.1, Tazyfkjy=832, Iron=31, Sat%=9,Zinc=55\par \par \par        PRIOR HISTORY---2013:\par \par        2/20/13 CT markedly dilated sb loops ext to anast, colonoscopy w open anast ,mild-mod remy-anastamotic disease. quick response to entocort/humira--probably adhesive dis. \par        8/10/13 w GNR bacteremia, ADA 1.7 /KAYLAH 3.4, Humira 8/7, 8/13,8/18,9/15\par        CT- mucosal thickening and spiculation of the distal sb extending to the anastamosis, thickening and stranding of adj mesenteric fat.\par        Humira increased to 40mg weekly, entocort 4\par        9/2013 MRE--dilated loops in mid and distal ileum, markedly thickened and narrowed TI w decreased peristalsis of TI\par        11/15/13 ADA-24.9, KAYLAH-0\par \par \par        PRIOR HISTORY---2014:\par \par        2/15/14--CT dilated loops SB, loop of sb in mid abd 4.3cm w infiltrative changes in the mesentery, bowel tapers in the RLQ to the anastamosis w/o transition\par        WBC=15K, Rx w IV steroids and Abx \par        3/7/14 SBFT: last 10cm sb prox to anast mild distension and sl irregularity. In the mid portion of this loop there is a mild narrowing which appears to reopen but is some what narrowed.\par        3/20/14 ADA=33.1, KAYLAH=0, Lialda switch to Apriso 4 (2/2014)\par \par \par        PRIOR HISTORY--2015\par \par        1/11/15 Adm [facility] w 1 day N,Recurrent V, Abd pain/distension. CT-multiple distended & fluid-filled sb loops, mild wall thickening of ileum w No inflamm changes.\par        WBC=14.6, Hgb=17, RX w NGT suction, Levaquin iv, Solumedrol 40mg q 12( 1/12-->1/16) to prednisone 30mg BID w 5mg taper/wk\par        3/18/15 --Dr. Butler w edema /High BP, prednisone was tapered slowly to 2.5mg \par        switched to entocort 9mg qd, edema and bp improved w lasix 20mg \par        BMs:#4-5, 3x/D, Hgb=11, ESR=4, CRP<5\par        4/28/15 - 5/1/15: Adm [facility] w 1 day Abd pain, distension,N/V. 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 side of anast, opening appeared ulcerated,scarred & severely narrowed\par        6/11/15: [facility] w N/V x1, decr appetite, CT ABD: no obstruction, dilated thickened sb loops of distal jej and ileum\par        6/19/15 [facility]: s/p Lap w extensive lysis of adhesions, hepatic flex, sigmoid and sb anastamosis, s/p partial r colectomy and sb resection--side to side elisabeth: 8-10 inch from prior anast to TV colon.\par        7/17/15: BMs:#4-6, 4-5x/D, wt 131(from 126)\par        8/20/15: BMs: #4, 1-2x/D or #5, 2-3x/D, wu=853, dry cough,Hgb=10, WBC=3, PNV=816, CRP=56, ESR=21\par        8/25/15 CT Abd/Pelv: Svl RLQ sb loops w wall thickening, mild nodularity & inflamm stranding in mesentery\par        Advised-restart Entocort 9mg qd, Apriso 4 qd, Maintain Humira but given RX to check drug/Ab levels\par        9/15/15: did nt restart meds,Hgb=9.9, WBC=4, ESR=19, CRP=5.8, wh=998; [facility] : ADA=8.5, Antibodies< 1.7\par        10/15/15: No pain, BMs:#4, 1-2x/D, #5-6, 3-4x/D, throat clearing/cough-better, up=208\par        11/30/15: No pain, BMs:# 4, 5-6 intermittently, No throat clear, pp=753\par \par \par        PRIOR HISTORY-2016\par \par        1/22/16; 4/8/16; 6/6/16 : No pain, BMs:# 4-5 1-2x/D, also #5-6 3x/D for 2-3D/wk, wy=253\par        7/14/16: sa=296, 9/22/16: cx=791.5\par        11/10/16: 9.5lb wt loss, states BMs: 5-6, 5x/D--Taking Magnesium, No pain/anorexia\par        Labs: Stool Ogxwsbizpvql=552, + Incr Fecal fat, Alb=3.4, FA =23, J57=077, Hgb=12, ESR=10, CRP <5\par        Magnesium-d/c, Obtain MRE asap--Start steroids and possibly switch to Entyvio\par        DDx discussed: active crohns--loss response to Humira, Malabsorption--loss Bile acids, Bile-induced diarrhea, SIBO\par        Pt wanted to wait for imaging-did not start rx\par        12/1//16 MRE: RUQ ileocolonic anastamosis--narrowed w upstream dilatation to 3.2 cm\par        Pt refused pred, Started Entocort 9mg qd and Flagyl 250mg qid about 7-10days ago \par        awaiting Entyvio load to start 12/22, then 1/5; had Cut back on iron bid\par        12/15/16: BMs:# 5, 2-3x/D, occas #6, No pain, Less bloat/flatulence, Bq=629.

## 2019-03-01 NOTE — CONSULT LETTER
[Dear  ___] : Dear  [unfilled], [Consult Letter:] : I had the pleasure of evaluating your patient, [unfilled]. [Please see my note below.] : Please see my note below. [Consult Closing:] : Thank you very much for allowing me to participate in the care of this patient.  If you have any questions, please do not hesitate to contact me. [Sincerely,] : Sincerely, [DrMeron  ___] : Dr. REARDON [FreeTextEntry3] : Angie Biswas MD\par [facility] [place] [facility]\par

## 2019-03-01 NOTE — HISTORY OF PRESENT ILLNESS
[0 - No Distress] : Distress Level: 0 [de-identified] : Patient is a 52 year old who is referred for initial consultation for anemia secondary to Crohns/GI bleed vs Iron Deficiency/ Vit b12 def. 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 Patient is status post ileo-colonic resections for SBO  in 2016. In 2016 patient had complaints of 10 - 15 lb weight loss. Labs at that time showed Hgb of 12, steatorrhea and stool calprotectin = 236. In 2016 MRI/MRE with narrowing at ileocolonic anastomosis with upstream dilation. Pt refused bridge with  prednisone and Entocort / Flagyl. In 2017 patient Hgb dropped to 9.5. On 2017 patient underwent an EGD and Colonoscopy. Findings consistent with Page's and no dysplasia or AVMs. Colonoscopy showed an open anastomosis but active disease, moderate on the colonic side and limited to the anastomosis and moderate to severe enteritis, just proximal to the anastomosis. Pat had routine labs on 05/10/2017 Hgb: 8.3.  [FreeTextEntry1] : s/p injectafer, copper\par warfarin [de-identified] : Patient presents for follow up of  anemia, DVT, MTHFR gene mutation follow up, currently on Coumadin 3mg daily.  He states he is feeling well s/p Injectafor Tuesday.  .

## 2019-03-01 NOTE — ASSESSMENT
[FreeTextEntry1] : 1.  Anemia- Hg  10.7  \par  -blood loss, anemia of chronic disease, \par - copper deficiency - replaced\par - Continue B12 injection, level still low - increase to q 2 weeks\par - s/p Injectafer -repeat ferritin level today\par - target >100\par - needs 8-10 infusions per year\par \par 2. Osteoporosis \par - left ankle- stress fx- advanced  osteoporosis, patient with h/o steroids use\par - plan for Forteo with Dr. Akers\par \par  3.TIA with MTHFR and h/o DVT -  \par not a candidate for DOAC b/o small bowel resection\par -INR 1.6 - 4 mg x 2-3 , 2 mg x 3\par - chenge to 3 mg daily and check in 7 days \par \par \par 4. Hypogammaglobulinemia\par - s/p  Prevnar 13 12/2018 \par - check ab in 6 m\par - needs Shingrex\par

## 2019-03-06 ENCOUNTER — RESULT REVIEW (OUTPATIENT)
Age: 54
End: 2019-03-06

## 2019-03-20 ENCOUNTER — APPOINTMENT (OUTPATIENT)
Dept: ENDOCRINOLOGY | Facility: CLINIC | Age: 54
End: 2019-03-20
Payer: COMMERCIAL

## 2019-03-20 VITALS
HEART RATE: 82 BPM | WEIGHT: 126 LBS | BODY MASS INDEX: 18.04 KG/M2 | DIASTOLIC BLOOD PRESSURE: 82 MMHG | HEIGHT: 70 IN | SYSTOLIC BLOOD PRESSURE: 122 MMHG

## 2019-03-20 LAB
25(OH)D3 SERPL-MCNC: 44.6 NG/ML
ANION GAP SERPL CALC-SCNC: 12 MMOL/L
BUN SERPL-MCNC: 19 MG/DL
CA-I SERPL-SCNC: 1.27 MMOL/L
CALCIUM SERPL-MCNC: 8.5 MG/DL
CALCIUM SERPL-MCNC: 8.5 MG/DL
CAU: 3 MG/DL
CHLORIDE SERPL-SCNC: 108 MMOL/L
CO2 SERPL-SCNC: 23 MMOL/L
COLLAGEN NTX UR-SCNC: 165 NM BCE/MM CR
CREAT 24H UR-MCNC: 1.2 G/24 H
CREAT 24H UR-MCNC: 1.2 G/24 H
CREAT ?TM UR-MCNC: 113 MG/DL
CREAT ?TM UR-MCNC: 113 MG/DL
CREAT SERPL-MCNC: 1.01 MG/DL
CREAT UR-MCNC: 156 MG/DL
GLUCOSE SERPL-MCNC: 79 MG/DL
PARATHYROID HORMONE INTACT: 93 PG/ML
POTASSIUM SERPL-SCNC: 4.5 MMOL/L
PROT ?TM UR-MCNC: 24 HR
PROT ?TM UR-MCNC: 24 HR
SODIUM SERPL-SCNC: 143 MMOL/L
SPECIMEN VOL 24H UR: 1050 ML
SPECIMEN VOL 24H UR: 1050 ML
SPECIMEN VOL 24H UR: 32 MG/24 H

## 2019-03-20 PROCEDURE — 99215 OFFICE O/P EST HI 40 MIN: CPT

## 2019-03-20 NOTE — HISTORY OF PRESENT ILLNESS
[FreeTextEntry1] :   53 yo WM coming for a f/u , for severe osteoporosis with bilateral hip fractures, hyperparathyroidism, low D and ca sec to Chron's disease\par on Vit D 50,000 IU three times a week, \par Ca citrate 950mg tid\par reports compliance \par labs reviewed still elevated iPTH despite better Vit D and calcium still low normal at 8.5\par 24hr urine low calcium \par parathyroid scan reviewed parathyroid adenoma versus neoplasm mediastinum\par US thyroid reviewed FNA was advised\par \par        MRI pelvis done 10/3/18 reviewed :\par        There are insufficiency fractures of the bilateral femoral heads without significant subchondral collapse at this time. There are associated moderate to large joint effusions.\par        There are additional microtrabecular/insufficiency fractures involving the sacrum as well as a few foci involving the iliac margins of the sacroiliac joints. No displaced or cortical fracture.\par        Mild degenerative arthrosis of the right hip.\par        sees GEN Chen will see him today again, bone stimulator is considered\par        dental issues : had an implant failure 2 months ago, bone graft 2 months ago, \par        Osteoporosis \par        has Chron disease sees Dr Lugo , never gained weight back after colon resection\par        received iron infussion and copper infusions\par        takes \par       \par        DEXA \par        LS T scroe -3.8\par        stress fractures \par        9/5/18 right foot Fx stress Fx reviewed\par        Impression:\par        Trabecular fractures involving the cuboid, the partially visualized anterior process of the calcaneus, the lateral cuneiform as well as the third metatarsal base. No evidence for associated cortical break. There is improvement in/essentially complete resolution of the previously described microtrabecular fractures within the partially visualized talus, the navicular as well as the middle and medial cuneiforms.\par        Subacute to chronic mild subchondral fracture of the second metatarsal head.\par        Moderate degenerative changes of the hallux sesamoid complex\par        left ankle pain 6/18 MRI:\par        Impression:\par        Microtrabecular/fracture/stress reaction involving the talar body and navicula without evidence for cortical break. There are additional stress-related changes involving the cuneiforms, the cuboid and distal tibia, as above. Associated moderate tibiotalar joint effusion.\par        Mild to moderate peroneal tendinosis with superimposed segmental longitudinal splitting, as above.\par        has pain hip right will have MRI with [place]\par        has h/o kidney stones 6 yrs ago went to [place] [facility] Dr Morris did surgery.

## 2019-03-20 NOTE — PHYSICAL EXAM
[Alert] : alert [No Acute Distress] : no acute distress [Normal Sclera/Conjunctiva] : normal sclera/conjunctiva [No Proptosis] : no proptosis [EOMI] : extra ocular movement intact [No Thyroid Nodules] : there were no palpable thyroid nodules [No Respiratory Distress] : no respiratory distress [No Accessory Muscle Use] : no accessory muscle use [Clear to Auscultation] : lungs were clear to auscultation bilaterally [Normal Rate] : heart rate was normal  [Regular Rhythm] : with a regular rhythm [Normal S1, S2] : normal S1 and S2 [Pedal Pulses Normal] : the pedal pulses are present [No Edema] : there was no peripheral edema [Normal Bowel Sounds] : normal bowel sounds [Not Tender] : non-tender [Not Distended] : not distended [Soft] : abdomen soft [Post Cervical Nodes] : posterior cervical nodes [Anterior Cervical Nodes] : anterior cervical nodes [Normal] : normal and non tender [Axillary Nodes] : axillary nodes [Spine Straight] : spine straight [No Spinal Tenderness] : no spinal tenderness [Normal Gait] : normal gait [No Stigmata of Cushings Syndrome] : no stigmata of cushings syndrome [No Rash] : no rash [Normal Strength/Tone] : muscle strength and tone were normal [Normal Reflexes] : deep tendon reflexes were 2+ and symmetric [No Tremors] : no tremors [Oriented x3] : oriented to person, place, and time [Acanthosis Nigricans] : no acanthosis nigricans [de-identified] : underweighted  [de-identified] : mildy enlarged thyroid on exam

## 2019-03-28 ENCOUNTER — APPOINTMENT (OUTPATIENT)
Dept: GASTROENTEROLOGY | Facility: CLINIC | Age: 54
End: 2019-03-28
Payer: COMMERCIAL

## 2019-03-28 VITALS
HEIGHT: 70 IN | BODY MASS INDEX: 17.9 KG/M2 | DIASTOLIC BLOOD PRESSURE: 70 MMHG | WEIGHT: 125 LBS | SYSTOLIC BLOOD PRESSURE: 112 MMHG

## 2019-03-28 PROCEDURE — 99215 OFFICE O/P EST HI 40 MIN: CPT | Mod: 25

## 2019-03-28 PROCEDURE — 96372 THER/PROPH/DIAG INJ SC/IM: CPT

## 2019-03-28 RX ORDER — CYANOCOBALAMIN 1000 UG/ML
1000 INJECTION INTRAMUSCULAR; SUBCUTANEOUS
Qty: 0 | Refills: 0 | Status: COMPLETED | OUTPATIENT
Start: 2019-03-28

## 2019-03-28 RX ADMIN — CYANOCOBALAMIN 0 MCG/ML: 1000 INJECTION INTRAMUSCULAR; SUBCUTANEOUS at 00:00

## 2019-03-28 NOTE — ASSESSMENT
[FreeTextEntry1] : 1. Crohns disease of both small and large intestine\par    \par CROHNS DISEASE-s/p ileocolonic resection x 2--last 6/19/15 for recurrent sbos: appears to be active , GI symtoms stable but labs were up CRP=18.6/ESR=19 & Xbuhlhfgoxwr=924 and Wt down ( inflam markers ? 2/2 to R ankle Fx/stress rx and tendinosis, also had tooth infection ) & MRE w ? ileal penetrating dis, wt =125______\par s/p 4 SBOs w/i 2 yrs--2/2 distal sb disease adj to anastamosis\par when on Humira weekly w ADA =33 (3/20/14), -but had sbo 2/2014 at ADA=25 and another sbo 4/28/15\par 6/10/2015 Colonoscopy: Inflamm ST mass on ileal side w ulceration/scarred/narrow\par 6/11/2015 CT: dilated thickened sb loops distal jej & ileum\par Post-op **probably some malabsorption 2/2 to removal of R Colon and ileum: ?bile/fat/SIBO\par WT. Loss: r/o malabsorption, ?bile-induced--secretory vs decr micelles, active dis, PLE\par r/o SIBO--Elev FA, Alb=3.4-->3.1-->2.9--> (7/28/17)\par ?SIBO/Prevent post-op recurrence --Flagyl 250 mg qid~12/7/16-->d/c: 5/11/17\par Also discussed trial of Cholestyramine/Xifaxin -wanted to wait\par **ACTIVE Crohn's--11/10/16: 9.5lb wt loss, BMs: #5-6, 5x/D--Taking Magnesium \par 12/1//16 MRE: RUQ ileocolonic anastamosis--narrowed w upstream dilatation to 3.2 cm\par Labs: Stool Fjngfwsyhlzz=376, + Incr Fecal fat, Alb=3.4, FA =23, S40=475, Hgb=12.3,ESR=10,CRP <5\par 4/19/17 :Colonoscopy: Anastamosis: open w mild -mod active colitis, enteritis severe adj to anastomosis, mild more proximal, remainder of colon=wnl\par 5/11/17- Adm [facility] w stools brown, Hgb=7.9, BUN=17, Creat=1.4, Alb=2.9.\par S/P 2 Units w Hgb=10.6, BUN=15/1.1, Prednisone 40mg taper, entocort d/dee\par 6/8/17: Hgb=7.4, MCV=98, CRP<5--ASA stopped, Pred20--> 30mg, 6/13/17: s/p 2 Unit PRBCs\par 7/11/17 [facility] w unsteadiness. MRI Head: R Cortical Temporo-occipital encephalomalacia\par Hgb=9.9--> 8.4->9.7 after 1 Unit. Seen by Heme: received IV Iron \par CRP<5, Y61=710, SXA=727, FA>23,Iron=22,Sat%=6, Ferritin=42, Alb=3.5. D/C on warfarin 2mg\par 7/28/17 Hgb=7.7; 7/31/17-s/p 1 Unit \par Heme Consultation: got IV Iron and 5 IV Copper:___\par **Entyvio :time 0=12/22/16 ( Humira 40mg QW); 1/5/17--2wks, s/p 3rd infusion at wk 6, \par #6 :6/21/17--held 2/2 Shingles, Last Infusions=9/19/17 , 10/17/17, 11/28/17, 1/16/18 ,2/16/18, 3/19/18,4/16/18, 5/14/18, 6/25/18, 8/7/18, 9/17/18, 10/16/18, 11/13/18, 12/11/18, 1/14/19, 2/15/19, 3/15/19\par Entocort 9mg qd: 12/7/16--d/dee 5/11/17\par **Prednisone 40mg qd (5/11/17)--tapered 5mg/wk to 20mg qd, 6/8/17 30mg, \par 7/25/17 25mg, 3wks ago 20--> 15mg, 10/19/17 10mg alt w 7.5-->11/16/17 10mg alt w 5mg-->11/30/17: 5mg-->12/21/17: 5 alt w 2.5mg-->1/18/18: 2.5mg qd-->2.5mg qod--> d/c \par Probiotics 3 qd \par Lactose free, protein drinks tid\par MCT oil begun\par **trend --cbc, esr, crp, albumin\par **monitor wt= 120-->134-->134 --> 135--> 132 w clothes-->133--> 131 (Low carbs now)--> 129--> 127-->126-->124-->125-->124--> 126-->125________\par Wt Loss : sig change since May-June/2018\par 8/17/17: Hgb=9.9, ESR=20, Sh=715--Rtrpwmnvbr s/p GI infection w N/V/D, no obstruction\par 10/17/17: Hgb=7.9,ESR=6,CRP<5, INR=1.6, Got IV Iron x 2, since 10/2/17 for Iron<10, Hgb=8.8\par 11/16/17: Hgb=11.2, ESR=14, CRP=12, 10/26/17 Stool fat-neg, calprotectin-30\par 11/13/17: MRE-Chr mild distension of distal & vladislav-ileum s/o mild stricturing at anast, mild mural thick & mucosal enhancemt ~ 20cm; little change from 2015 c/w mild acute on chr ileitis, 2.1 cm Liver hemangioma\par 10/9/18: Hgb=11.6, MCV=94, ESR=14, CRP=5.4, INR=2.2\par 10/10/18 MRE: stricturing of neoterminal ileum w worsened upstream dilatation, moderate length of upstream ileum w stricturing extending at least 20cm and more extensive then previous. suggestionof 2 adj extraluminal fluid collections which may be w/i the wall of strictured ileum near the ileocolonic anastomosis. surrounding spiculation and tethered appearance of bowel in this region.\par CTE: no discrete collection or intramural fistula, but mucosal enhancemt\par Today: 3/28/19  : feeling ok , Wt=125___off prednisone, BMs Brown: #4-5_, Hgb=11.5 , CRP <5 --> 15-->9.4-->19-->11-->9.1-->5.4-><5-->17-->0.36-->6.5 : recent ankle fx/stress rx/tendonitis and tooth infection \par prior stool studies w + calprotectin and No fat\par Wt loss-- 2/2 to low carbs-->fat burning and flare ; advised to liberalize and add protein, ensure\par Plan: Prednisone d/c 2/20/18\par Entyvio q 6 wks --> 4 weeks \par check inflammatory markers: 2/28/19 w ESR=12, CRP=6.5 & 2/21/19 stool calprotectin--> 232\par ? cipro + flagyl \par [facility] or investigational rx's--biologics.  \par ? Colonoscopy                                                                                                                                                                                                                                                                                                                                                                                                                                                                                                                                                                                                                                                                                                                                                                                                                                                                                                                                                                  \par 2. Iron deficiency anemia due to chronic blood loss  \par  had initially dropped , after clinical flare and post procedure bleeding\par Probably multifactorial: ACD, Blood loss, malabsorption/nutrient deficiencies\par Eliquis-->warfarin after CVA, On Entyvio and prednsione for active dis\par Still requiring IV Iron--prn, \par s/p IV Cu & transfusions \par 7/11/17 Recent Adm for unsteady gait w MRI c/w CVA--warfarin begun: possible better control of AC\par Chromagen 2 qd--> IV Iron , s/p IV Cu x 5, FA 1mg qd , B12 SL & IM\par  1/14/19: Hgb=10.7, INR=1.6, 2/5/19: Hgb=11.2,INR=1.5; 2/28/19: Hgb=11.5, Swthzzks=001; 3/11/19: INR=2.6\par 7/13/17: R07=803, SXV=554,FA>23; 1/3/18 L34=990, Zf=221, Zinc=55; 6/6/18 G71=290, 9/5/18: Kv=220, Zinc=67\par 11/28/18 J03=839\par B12 1cc IM ____R. delt--  Given today, \par Last Iron infusion=1/22/19 , Ferritin=67 on 1/14/19 & 444 on 2/28/19; next Iron infusion -per Heme, \par Hem consult IV Iron /Cu given malabsorption, ? BM bx --r/o occullt etiology--on hold\par trend cbc\par Adj INR--closer to low 2's.    \par Agree w Heme--Prevnar-13\par \par \par \par 3. Other osteoporosis without current pathological fracture  \par : Progression on BD from 5/5/16\par Crohns, h/o steroid use\par Calcium 7798-7220 w Vit 1000mg\par Repeat BD of 8/2018 --showed worsening to Osteoporosis from 2016\par Rec Endo consult w Dr. Akers :[facility]--pt never went originally \par 9/21/18: Phos=2.4, Ca=8.7, Alb=3.4, Vit D=27, URJ=780, \par 10/16/18: Phos=2.8, Ca=8.7, Alb=3.5,\par 1/14/19: Phos=2.6,  Ca=8.7, Alb=3.6\par 2/28/19: Phos=1.8, Ca=8.9, Alb=3.7, VitD=39, XXV=444\par 3/18/19: Ca=8.5, Alb=3.7, Vit D=44.6, PTH=93\par Dr. Akers: Hyperparathyroidism-- probably secondary due to low Vit D/Ca & ? superimposed primary, Rx replete Vit D and Calcium\par trend Vit D, Ca, Phos, PTH,  \par For [facility] Consult and further Imaging for Parathyroid scan showing activity in mediastinum\par BD--8/2019.    \par \par \par \par 4. Gastroesophageal reflux disease with esophagitis  \par  well controlled, no ht burn, no dysphagia, LPR\par Dry cough, CXR:ana, saw ENT bergstein-->LPR\par ( +++)LPR, (_ ++)Barretts w (_ No)Dysplasia, (_ No, H/O )Esophagitis grade: (__ ), \par Anti-reflux diet and life-style changes emphasized. (_No ) Bedge. \par reg exercise emphasized. \par **(_ ++)PPI: ( Protonix 40 mg qd ), (++) H2B Qhs: ( Zantac 300mg--> Pepcid 40mg ), \par (_ ) Carafate 1gm:\par **(_ ++)F/U EGD: (_ 4)mo. / (_2020 )yrs\par (_ No)pH Monitor, (_No )Manometry, (_No )esophagram \par (_f/u )ENT eval., (_ No)Surgical eval.    \par \par \par \par 5. Internal hemorrhoids  \par  well-controlled , no swelling, itching, bleeding\par Discussed the potential complications of thrombosis, pain, bleeding, swelling, itching, infection.\par Moderate -Fiber Diet was reviewed and emphasized.\par 6 - 8 cups of decaffeinated fluid daily\par (_++ ) Sitz Bathes BID, (_++++ ) Anusol HC Suppos/Cream CO QHS ,\par (_No ) Tucks BID, (_ No) Balneol Lotion,(_ No) Calmoseptine Oint, (_ ++) Prep H prn\par (_No ) Colorectal Surgical evaluation for possible ablation.    \par \par \par \par 6. Barretts esophagus without dysplasia  \par Notes: see GERD.    \par \par \par 7. Hepatomegaly-->not confirmed by recent abd sono\par CTE w relative enlargemt, ? lobulation & enlarged portal/mesenteric veins\par LFTs-wnl, no ascites or edema\par R/O CLD, Hepatic vein thrombosis\par Abd sono w doppler--> Liver and spleen normal size, no thrombosis, normal portal and hepatic vein flow\par

## 2019-03-28 NOTE — HISTORY OF PRESENT ILLNESS
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-->Eliquis' warfarin \par        GERD w Page's, Hemorrhoids, BPH, \par        S/P Ileocolonic resection 1998\par        Since then multiple SBOs\par \par \par        Got IV Iron x 2, since 10/2/17\par        10/19/17: feeling better, Gx=259 on prednisone 15mg x 3weeks, BMs Brown: #5-6, 1-2/D, occas 3-4/d\par        10/25/17: Hgb=10, ESR=5, CRP=5, Alb=3.6, Phos=2, Wgfanoum=447, X34=171\par        11/16/17: Hgb=11.2, ESR=14, CRP=12, \par        11/13/17: MRE-Chr mild distension of distal & vladislav-ileum s/o mild stricturing at anast, mild mural thick & mucosal enhancemt ~ 20cm; little change from 2015 c/w mild acute on chr ileitis, 2.1 cm Liver hemangioma\par        11/28/17: Entyvio\par        11/29/17: Hgb=9.6, ESR=10, CRP=5.8, Alb=3.8,Ferritin-19, Iron=14,Sat=4%, Phos=2.5, Jz=276, BMs:# 5, 1-2x/D, brown,\par        12/19;17: Hgb=10.1, ESR=12, CRP=6.5; 12/21/17: BMs:#3-5, 1-3x/D , brown, no blood, no pain, ya=278\par        1/3/18:Hgb=11.7, ESR=19, CRP=6.5, Alb=4.1, Bwofbnfi=937, Iron=31, Sat%=9,Zinc=55\par        1/16/18: Entyvio, Hgb=11.4, ESR=10, CRP=6.9\par        1/18/18: Today: cellulitis R foot, saw dr KEITH--rx augmentum x 10D, Entyvio 1/9 to 1/16, ub=921 w clothes,BMs: # 4-5, 1-2x/D \par        2/14/18: Hgb=11.1, ESR=16, CRP=11.6, Alb=3.6, Iron=28, Ferritin=23, INR=1.5 \par        2/16/18: s/p Entyvio; Iron infusion, again on 2/27\par        2/20/18: Hgb=10.6, ESR=20, CRP=12, INR=1.7\par        2/27/18: s/p iron infusion\par        3/9/18: Dr. Burden for tenosynovitis, rx w Zorvolex: Diclofenac x 5 days--? response\par        3/14/18: Of=685; BMs: # 5, 1-2x/D; Hgb=11, ESR=18, CRP=11,Irom=45,Gndghfio=920,Alb=3.6, INR=1.5\par        3/19/18: s/p Entyvio\par        3/22/18: vf=665, BMs: # 5, 3-4 x/d\par        4/16/18: s/p Entyvio,Hgb=11.9, INR=1.3, ESR=18, CRP=8.4 \par        4/18/18 EGD: gastritis, no HP, no IM, 2+ Mucous, 0.5cm gastric polyp: fundic, 4cm HH, + Barretts:3cm, no dysplasia\par        4/25/18: Hgb=11.5, INR=1.6, Iron=40, Sat=13%, Ferritin=57, Alb=3.2, Phos=2.2, Mag=1.7\par        4/30/18: s/p Iron Infusion\par        5/14/18: s/p Entyvio \par        5/23/18: Hgb=11.5, ESR=16, CRP< 5\par        5/29/18: s/p Iron Infusion\par        6/6/18: Hgb=10.6, INR=1.7, Uaxsdjuu=952, Alb=3.5, Phos=2.1, X49=138, eu=466; BMs: # 5, 2-3x/D \par        6/19/18: Hgb=10.6, INR=1, CRP=15, ESR=23\par        6/21/18: R ankle is acting up, swollen, pain, having MRI done, took a couple of advil, on low carbs ,BMs: # 5, 1-2x/D, xu=734\par        6/26/18: MRI: fx/stress rxn-talar body/navicula; stress related changes--cuneform, cuboid,distal tibia; mod tibiotalar effusion, mild-mod peroneal tendinosis\par        7/19/18: entyvio 6/26/18, eliminated all carbs--anti inflammatory diet, ry=468, BMs: # 4-5, 1-3x/D \par        7/25/18: Hgb=10, INR=1.3, Alb=3.3, Phos=2.4, Hzfldznv=396, sat%=12, Iron=35\par        8/2/18: BD--osteoporosis of hip/spine: sig decrease BD--17.6% of hip, 17% of spine, osteopenia of wrist: 6.4%\par        8/7/18: Entyvio\par        8/21/18: Hgb=11.1, INR=1.5, ESR=19, CRP=18.6\par        8/23/18: recently w tooth infection, old implant--loose and removed; rx w amox, and advil\par        eating almost no carbs, fq=460, # 5-6, 2-3x/d \par        8/24/18: Stool fat--neg, Baceczemhqde=227\par        9/5/18: Hgb=11.4, INR=1.3, ESR=9, CRP=11.3, Iron=41, Bgzdjgtj=672, Alb=3.8,\par        9/17/18: entyvio, Hgb=10.7, INR=2.2, ESR=17, CRP=9.1, \par        9/20/18: zi=696, BMs: # 5-6, 1-2x/D \par        9/21/18: Phos=2.4, Ca=8.7, Alb=3.4, Vit D=27, GFK=055, \par        Dr. Akers: Hyperparathyroidism: probably secondary due to low Vit D/Ca & ? superimposed primary, rx replete Vit D and Calcium\par        10/9/18: Hgb=11.6, MCV=94, ESR=14, CRP=5.4, INR=2.2\par        10/10/18 MRE: stricturing of neoterminal ileum w worsened upstream dilatation, moderate length of upstream ileum w stricturing extending at least 20cm and more extensive then previous. suggestion of 2 adj extraluminal fluid collections which may be w/i the wall of strictured ileum near the ileocolonic anastomosis. surrounding spiculation and tethered appearance of bowel in this region.\par 10/16/18: entyvio, Hgb=11.7, MCV=94, ESR=14, CRP <5, Alb=3.5\par 10/18/18: Kp=254,   BMs: # 5-6, 3x/D , \par 11/13/18: Entyvio\par 11/21/18 CTE w C: limited 2/2 bowel underdistention, mucosal hyperenhancemt & extensive SM edema of distal ileum including vladislav-ileum, difficult to exclude a sml fluid collection, Liver w relative enlargement w suggestion of lobulation, enlargemt of PV,SMV,IMV,Spl V, rectal V. No ascites\par 11/28/18: Hgb=10, ESR=19, CRP=17,Alb=3.5,Iron=35, Sat%=11, Mspdheua=453, INR=1.3\par 11/29/18: tl=353, BMs: # 5-6, 3-4x/D\par 12/3/18: Abd sono--Liver 14.8cm Incr heterogenicity, spleen--wnl\par 12/11/18 & 1/14/19: Entyvio\par 1/14/19:Entyvio,  Hgb=10.7, ESR=15, Alb=3.6, Iron=36, Ferritin=67, Sat%=11, INR=1.6\par 1/24/19:  lx=160, stools are # 4-6, 3-4x/d , no pain\par 2/5/19: Hgb=11.2, ESR=14, CRP=0.36\par 2/28/19: Hgb=11.5, ESR=12, CRP=6.5, Alb=3.7, Iron=69, Pmtgyzeb=777, Ca=8.9\par                Bms: # 4-5, 2-3x/D , le=985,  Entyvio : last 2/1519,\par                had parathyroid scan pre-op, still w elev PTH, + activity in mediastinum,? ectopic parathyroid tissuevs neoplasm.  To see ENT and have Imaging at [facility]\par \par  Today:  Bms: # 4-5 , 3x/d , no pain, eating more calories, liberalizing carbs--avoiding simple sugars.  \par        Entyvio : last 3/1519,  got IV iron 1/22/19\par        R ankle in immobilizer brace\par        wt= 125\par        Abd pain--no \par        Nausea--no \par        Vomit--no \par        Early satiety-no \par        Belching-no \par        Regurgitation--no \par        Ht burn--no \par        Throat Clearing-no\par        Globus-no\par        Hoarseness--no \par        Dysphagia--no \par        BMs: # 4-5, 3x/D\par        Constipation--no \par        Diarrhea- intermittent \par        Bloating--no \par        Flatulence-no\par        Gurgling--no \par        Melena--no \par        BRBPR--no \par        Anorexia-no \par        Wt Loss--stable\par \par \par \par        PRIOR HISTORY--2017\par \par        1/17/17: Hgb=9.2, ESR=6, CRP=5; 1/19/17: BMs: #4, 5-6, 2-3x/D, Me=503, Started Creon 1 tid cc\par        3rd Entyvio begining Feb\par        2/14/17: Labs; Hgb=9.6, MCV=97, ESR=5, CRP< 5, V14=817, FA>23, Iron=59, Retic =3.2,\par        2/17/17 Fecal Calprotectin=16, Fats: Increased neutral & Split\par        3/13/17: Labs Hgb=9.5, MCV=95, ESR=7, CRP <5, ALB=3.1\par        3/16/17: lot of stress, to move -[place], had a job-fell thru, BMs: # 5, 2-4 x/D, wt= 131w clothes\par        4/19/17 EGD: gastritis, MACKENZIE, No HP, 2cm HH, +Barretts, No Dysplasia\par        Colonoscopy: Anastamosis: open w mild -mod active colitis, enteritis severe adj to anastomosis, mild more proximal, remainder of colon-wnl, 2-3 deg int hemorrhoids\par        4/26/17 : Hgb=7.3, MCV=95, ESR=13, CRP<5;Immediately post procedure stools very dark on eliquis/iron.\par        Admitted to PM: Hgb=8.3-->8.9 after 2 U, Alb 3.3, BUN=17, creat=1.3, Malina/Utnmw=070/460\par        4/27/17: Alb=2.3, BUN=12, creat=1.2, Malina/Lipase=209/863-No abd pain, N/V; 5/5/17: Hgb=10.7.\par        5/8/17 Entyvio iv. 5/8-5/9 w dark red diarrhea; 5/10/17 Hgb=7.8.\par        5/11/17 Adm [facility] w stools brown, Hgb=7.9, BUN=17, Creat=1.4, Alb=2.9; S/P 2 Units- Hgb=10.6, BUN=15/1.1.\par        Prednisone 40mg qd, entocort d/dee.; 5/18/17: Hgb-10.4, ny=802, BMs: #5, 1-2x/D, no pain\par        6/8/17: Hgb=7.4,MCV=98, CRP<5-ASA stopped, Pred20--> 30\par        6/10/17: Hgb=8.2, Alb=2.9, BUN=20/1.2 ; 6/12/17: Hgb=8.3, Retic=0.19, Iron=10, Sat%=3, Ferritin=57, FA=23,P24=860, 6/13/17: s/p 2 Unit PRBCs\par        6/15/17: started MCT oil, Aa=866 , BMs: # 5, 1-2x/D, stools are brownish/green \par        7/11/17: [facility] w unsteadiness. MRI Head: R Cortical Temporo-occipital encephalomalacia, MRA H&N-no sign lesions, PER-wnl.Hgb=9.9--> 8.4->9.7 after 1 Unit. Seen by Torri: received IV Iron \par        CRP<5, Y16=568, OJM=812, FA>23,Iron=22,Sat%=6, Ferritin=42, Alb=3.5. D/C on warfarin 2mg\par        7/20 Hgb=8.5, 7/28 Hgb=7.7, 7/31-s/p 1 Unit \par        As outpt seeing Heme, to get IV Iron and 5 IV Copper\par        Had 'FLU' Fever=101, chills, bloat, N/V/D, Bilious. no pain, melena,brbpr\par        Given anti-emetic by dr Butler,then able to keep things down\par        On prednisone 25, warfarin w INR bet 1.6-2\par        8/17/17: Hgb=9.9, ESR=20, CRP-P,Dl=030, BMs: # 5, 1-2x/D, stools are brownish/green\par        10/2/17: Hgb=8.8, MCV=89,Phos=1.7, K=3.9, Mag=1.9, Alb=3.6,Iron<10,Ferritin=21, INR=2\par        10/17/17: Hgb=7.9,ESR=6,CRP<5, INR=1.6\par        10/25/17: Hgb=10, ESR=5, CRP=5, Alb=3.6, Phos=2, Exoajnnf=550, P20=354\par        11/16/17: Hgb=11.2, ESR=14, CRP=12, \par        11/13/17: MRE-Chr mild distension of distal & vladislav-ileum s/o mild stricturing at anast, mild mural thick & mucosal enhancemt ~ 20cm; little change from 2015 c/w mild acute on chr ileitis, 2.1 cm Liver hemangioma\par        11/28/17: Entyvio\par        11/29/17: Hgb=9.6, ESR=10, CRP=5.8, Alb=3.8,Ferritin-P, Iron=14,Sat=4%, Phos=2.5\par        12/19;17: Hgb=10.1, ESR=12, CRP=6.5; 12/21/17: BMs:#3-5, 1-3x/D , brown, no blood, no pain, bs=318\par        1/3/18:Hgb=11.7, ESR=19, CRP=6.5, Alb=4.1, Xrcleiln=040, Iron=31, Sat%=9,Zinc=55\par \par \par        PRIOR HISTORY---2013:\par \par        2/20/13 CT markedly dilated sb loops ext to anast, colonoscopy w open anast ,mild-mod remy-anastamotic disease. quick response to entocort/humira--probably adhesive dis. \par        8/10/13 w GNR bacteremia, ADA 1.7 /KAYLAH 3.4, Humira 8/7, 8/13,8/18,9/15\par        CT- mucosal thickening and spiculation of the distal sb extending to the anastamosis, thickening and stranding of adj mesenteric fat.\par        Humira increased to 40mg weekly, entocort 4\par        9/2013 MRE--dilated loops in mid and distal ileum, markedly thickened and narrowed TI w decreased peristalsis of TI\par        11/15/13 ADA-24.9, KAYLAH-0\par \par \par        PRIOR HISTORY---2014:\par \par        2/15/14--CT dilated loops SB, loop of sb in mid abd 4.3cm w infiltrative changes in the mesentery, bowel tapers in the RLQ to the anastamosis w/o transition\par        WBC=15K, Rx w IV steroids and Abx \par        3/7/14 SBFT: last 10cm sb prox to anast mild distension and sl irregularity. In the mid portion of this loop there is a mild narrowing which appears to reopen but is some what narrowed.\par        3/20/14 ADA=33.1, KAYLAH=0, Lialda switch to Apriso 4 (2/2014)\par \par \par        PRIOR HISTORY--2015\par \par        1/11/15 Adm [facility] w 1 day N,Recurrent V, Abd pain/distension. CT-multiple distended & fluid-filled sb loops, mild wall thickening of ileum w No inflamm changes.\par        WBC=14.6, Hgb=17, RX w NGT suction, Levaquin iv, Solumedrol 40mg q 12( 1/12-->1/16) to prednisone 30mg BID w 5mg taper/wk\par        3/18/15 --Dr. Butler w edema /High BP, prednisone was tapered slowly to 2.5mg \par        switched to entocort 9mg qd, edema and bp improved w lasix 20mg \par        BMs:#4-5, 3x/D, Hgb=11, ESR=4, CRP<5\par        4/28/15 - 5/1/15: Adm [facility] w 1 day Abd pain, distension,N/V. 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 side of anast, opening appeared ulcerated,scarred & severely narrowed\par        6/11/15: [facility] w N/V x1, decr appetite, CT ABD: no obstruction, dilated thickened sb loops of distal jej and ileum\par        6/19/15 [facility]: s/p Lap w extensive lysis of adhesions, hepatic flex, sigmoid and sb anastamosis, s/p partial r colectomy and sb resection--side to side elisabeth: 8-10 inch from prior anast to TV colon.\par        7/17/15: BMs:#4-6, 4-5x/D, wt 131(from 126)\par        8/20/15: BMs: #4, 1-2x/D or #5, 2-3x/D, pe=677, dry cough,Hgb=10, WBC=3, RUA=017, CRP=56, ESR=21\par        8/25/15 CT Abd/Pelv: Svl RLQ sb loops w wall thickening, mild nodularity & inflamm stranding in mesentery\par        Advised-restart Entocort 9mg qd, Apriso 4 qd, Maintain Humira but given RX to check drug/Ab levels\par        9/15/15: did nt restart meds,Hgb=9.9, WBC=4, ESR=19, CRP=5.8, xp=541; [facility] : ADA=8.5, Antibodies< 1.7\par        10/15/15: No pain, BMs:#4, 1-2x/D, #5-6, 3-4x/D, throat clearing/cough-better, qz=202\par        11/30/15: No pain, BMs:# 4, 5-6 intermittently, No throat clear, bi=268\par \par \par        PRIOR HISTORY-2016\par \par        1/22/16; 4/8/16; 6/6/16 : No pain, BMs:# 4-5 1-2x/D, also #5-6 3x/D for 2-3D/wk, wt=440\par        7/14/16: vz=586, 9/22/16: pc=618.5\par        11/10/16: 9.5lb wt loss, states BMs: 5-6, 5x/D--Taking Magnesium, No pain/anorexia\par        Labs: Stool Antdqjscdmxu=909, + Incr Fecal fat, Alb=3.4, FA =23, E44=344, Hgb=12, ESR=10, CRP <5\par        Magnesium-d/c, Obtain MRE asap--Start steroids and possibly switch to Entyvio\par        DDx discussed: active crohns--loss response to Humira, Malabsorption--loss Bile acids, Bile-induced diarrhea, SIBO\par        Pt wanted to wait for imaging-did not start rx\par        12/1//16 MRE: RUQ ileocolonic anastamosis--narrowed w upstream dilatation to 3.2 cm\par        Pt refused pred, Started Entocort 9mg qd and Flagyl 250mg qid about 7-10days ago \par        awaiting Entyvio load to start 12/22, then 1/5; had Cut back on iron bid\par        12/15/16: BMs:# 5, 2-3x/D, occas #6, No pain, Less bloat/flatulence, Oi=989.

## 2019-04-02 ENCOUNTER — RX RENEWAL (OUTPATIENT)
Age: 54
End: 2019-04-02

## 2019-04-11 ENCOUNTER — RESULT REVIEW (OUTPATIENT)
Age: 54
End: 2019-04-11

## 2019-04-11 ENCOUNTER — APPOINTMENT (OUTPATIENT)
Dept: HEMATOLOGY ONCOLOGY | Facility: CLINIC | Age: 54
End: 2019-04-11
Payer: COMMERCIAL

## 2019-04-11 VITALS
OXYGEN SATURATION: 98 % | HEART RATE: 89 BPM | BODY MASS INDEX: 17.75 KG/M2 | SYSTOLIC BLOOD PRESSURE: 117 MMHG | RESPIRATION RATE: 18 BRPM | DIASTOLIC BLOOD PRESSURE: 81 MMHG | HEIGHT: 70 IN | WEIGHT: 124 LBS | TEMPERATURE: 98.1 F

## 2019-04-11 PROCEDURE — 99213 OFFICE O/P EST LOW 20 MIN: CPT

## 2019-04-11 NOTE — CONSULT LETTER
[Dear  ___] : Dear  [unfilled], [Consult Letter:] : I had the pleasure of evaluating your patient, [unfilled]. [Please see my note below.] : Please see my note below. [Consult Closing:] : Thank you very much for allowing me to participate in the care of this patient.  If you have any questions, please do not hesitate to contact me. [Sincerely,] : Sincerely, [DrMeron  ___] : Dr. REARDON [FreeTextEntry3] : Angie Biswas MD\par [facility] [place] [facility]\par

## 2019-04-11 NOTE — REVIEW OF SYSTEMS
[Fatigue] : fatigue [Negative] : Allergic/Immunologic [Eye Pain] : no eye pain [Vision Problems] : no vision problems [Dysphagia] : no dysphagia [Hoarseness] : no hoarseness [Chest Pain] : no chest pain [Lower Ext Edema] : no lower extremity edema [Shortness Of Breath] : no shortness of breath [Cough] : no cough [Vomiting] : no vomiting [Constipation] : no constipation [Diarrhea] : no diarrhea [Dysuria] : no dysuria [Dizziness] : no dizziness [Skin Rash] : no skin rash [Joint Pain] : no joint pain [Anxiety] : no anxiety [Insomnia] : no insomnia [Depression] : no depression [Muscle Weakness] : no muscle weakness [Easy Bleeding] : no tendency for easy bleeding [Easy Bruising] : no tendency for easy bruising

## 2019-04-11 NOTE — RESULTS/DATA
[FreeTextEntry1] : April 11, 2019\par WBC 5.9\par Hemoglobin 9.7\par Hematocrit 31.9\par Platelets 306,000\par INR 1.6

## 2019-04-11 NOTE — HISTORY OF PRESENT ILLNESS
[0 - No Distress] : Distress Level: 0 [de-identified] : Patient is a 52 year old who is referred for initial consultation for anemia secondary to Crohns/GI bleed vs Iron Deficiency/ Vit b12 def. 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 Patient is status post ileo-colonic resections for SBO  in 2016. In 2016 patient had complaints of 10 - 15 lb weight loss. Labs at that time showed Hgb of 12, steatorrhea and stool calprotectin = 236. In 2016 MRI/MRE with narrowing at ileocolonic anastomosis with upstream dilation. Pt refused bridge with  prednisone and Entocort / Flagyl. In 2017 patient Hgb dropped to 9.5. On 2017 patient underwent an EGD and Colonoscopy. Findings consistent with Page's and no dysplasia or AVMs. Colonoscopy showed an open anastomosis but active disease, moderate on the colonic side and limited to the anastomosis and moderate to severe enteritis, just proximal to the anastomosis. Pat had routine labs on 05/10/2017 Hgb: 8.3.  [FreeTextEntry1] : s/p injectafer, copper\par warfarin [de-identified] : Patient presents for follow up of  anemia, DVT, MTHFR gene mutation follow up, currently on Coumadin 3mg daily.  He states his last Injectafor was six weeks ago. He is concerned about having a low calcium.

## 2019-04-11 NOTE — ASSESSMENT
[FreeTextEntry1] : 1.  Anemia- Hg  9.7  \par  -blood loss, anemia of chronic disease, \par - copper deficiency - replaced\par - Continue B12 injection, level still low - increase to q 2 weeks\par - s/p Injectafer -repeat ferritin level today\par - target >100\par - needs 8-10 infusions per year\par \par 2. Osteoporosis \par - left ankle- stress fx- advanced  osteoporosis, patient with h/o steroids use\par - plan for Forteo with Dr. Akers\par \par  3.TIA with MTHFR and h/o DVT -  \par not a candidate for DOAC b/o small bowel resection\par -INR 1.6  3 mg daily , may take 1 1/2  one day.\par - and check in 7 days \par \par \par 4. Hypogammaglobulinemia\par - s/p  Prevnar 13 12/2018 \par - check ab in 6 m\par - needs Shingrex\par \par History of present illness, review of systems, physical exam and treatment plan reviewed with . \par \par Office visit in 6  weeks or prn for new or worsening symptoms. \par

## 2019-04-16 ENCOUNTER — APPOINTMENT (OUTPATIENT)
Dept: GASTROENTEROLOGY | Facility: CLINIC | Age: 54
End: 2019-04-16
Payer: COMMERCIAL

## 2019-04-16 VITALS
SYSTOLIC BLOOD PRESSURE: 120 MMHG | BODY MASS INDEX: 17.61 KG/M2 | HEART RATE: 95 BPM | HEIGHT: 70 IN | WEIGHT: 123 LBS | DIASTOLIC BLOOD PRESSURE: 70 MMHG

## 2019-04-16 PROCEDURE — 96372 THER/PROPH/DIAG INJ SC/IM: CPT

## 2019-04-16 PROCEDURE — 99214 OFFICE O/P EST MOD 30 MIN: CPT | Mod: 25

## 2019-04-16 RX ORDER — CYANOCOBALAMIN 1000 UG/ML
1000 INJECTION INTRAMUSCULAR; SUBCUTANEOUS
Qty: 0 | Refills: 0 | Status: COMPLETED | OUTPATIENT
Start: 2019-04-16

## 2019-04-16 RX ADMIN — CYANOCOBALAMIN 0 MCG/ML: 1000 INJECTION INTRAMUSCULAR; SUBCUTANEOUS at 00:00

## 2019-04-16 NOTE — ASSESSMENT
[FreeTextEntry1] : 1. Crohns disease of both small and large intestine\par    \par CROHNS DISEASE-s/p ileocolonic resection x 2--last 6/19/15 for recurrent sbos: appears to be active , GI symtoms stable but labs were up CRP=18.6/ESR=19 & Snxxulejoqlx=834 and Wt down ( inflam markers ? 2/2 to R ankle Fx/stress rx and tendinosis, also had tooth infection ) & MRE w ? ileal penetrating dis, wt =125______\par s/p 4 SBOs w/i 2 yrs--2/2 distal sb disease adj to anastamosis\par when on Humira weekly w ADA =33 (3/20/14), -but had sbo 2/2014 at ADA=25 and another sbo 4/28/15\par 6/10/2015 Colonoscopy: Inflamm ST mass on ileal side w ulceration/scarred/narrow\par 6/11/2015 CT: dilated thickened sb loops distal jej & ileum\par Post-op **probably some malabsorption 2/2 to removal of R Colon and ileum: ?bile/fat/SIBO\par WT. Loss: r/o malabsorption, ?bile-induced--secretory vs decr micelles, active dis, PLE\par r/o SIBO--Elev FA, Alb=3.4-->3.1-->2.9--> (7/28/17)\par ?SIBO/Prevent post-op recurrence --Flagyl 250 mg qid~12/7/16-->d/c: 5/11/17\par Also discussed trial of Cholestyramine/Xifaxin -wanted to wait\par **ACTIVE Crohn's--11/10/16: 9.5lb wt loss, BMs: #5-6, 5x/D--Taking Magnesium \par 12/1//16 MRE: RUQ ileocolonic anastamosis--narrowed w upstream dilatation to 3.2 cm\par Labs: Stool Yamklklddycp=802, + Incr Fecal fat, Alb=3.4, FA =23, S71=378, Hgb=12.3,ESR=10,CRP <5\par 4/19/17 :Colonoscopy: Anastamosis: open w mild -mod active colitis, enteritis severe adj to anastomosis, mild more proximal, remainder of colon=wnl\par 5/11/17- Adm [facility] w stools brown, Hgb=7.9, BUN=17, Creat=1.4, Alb=2.9.\par S/P 2 Units w Hgb=10.6, BUN=15/1.1, Prednisone 40mg taper, entocort d/dee\par 6/8/17: Hgb=7.4, MCV=98, CRP<5--ASA stopped, Pred20--> 30mg, 6/13/17: s/p 2 Unit PRBCs\par 7/11/17 [facility] w unsteadiness. MRI Head: R Cortical Temporo-occipital encephalomalacia\par Hgb=9.9--> 8.4->9.7 after 1 Unit. Seen by Heme: received IV Iron \par CRP<5, U64=397, QVV=024, FA>23,Iron=22,Sat%=6, Ferritin=42, Alb=3.5. D/C on warfarin 2mg\par 7/28/17 Hgb=7.7; 7/31/17-s/p 1 Unit \par Heme Consultation: got IV Iron and 5 IV Copper:___\par **Entyvio :time 0=12/22/16 ( Humira 40mg QW); 1/5/17--2wks, s/p 3rd infusion at wk 6, \par #6 :6/21/17--held 2/2 Shingles, Last Infusions=9/19/17 , 10/17/17, 11/28/17, 1/16/18 ,2/16/18, 3/19/18,4/16/18, 5/14/18, 6/25/18, 8/7/18, 9/17/18, 10/16/18, 11/13/18, 12/11/18, 1/14/19, 2/15/19, 3/15/19\par Entocort 9mg qd: 12/7/16--d/dee 5/11/17\par **Prednisone 40mg qd (5/11/17)--tapered 5mg/wk to 20mg qd, 6/8/17 30mg, \par 7/25/17 25mg, 3wks ago 20--> 15mg, 10/19/17 10mg alt w 7.5-->11/16/17 10mg alt w 5mg-->11/30/17: 5mg-->12/21/17: 5 alt w 2.5mg-->1/18/18: 2.5mg qd-->2.5mg qod--> d/c \par Probiotics 3 qd \par Lactose free, protein drinks tid\par MCT oil begun\par **trend --cbc, esr, crp, albumin\par **monitor wt= 120-->134-->134 --> 135--> 132 w clothes-->133--> 131 (Low carbs now)--> 129--> 127-->126-->124-->125-->124--> 126-->125-->125________\par Wt Loss : sig change since May-June/2018\par 8/17/17: Hgb=9.9, ESR=20, Hf=447--Fduruosyad s/p GI infection w N/V/D, no obstruction\par 10/17/17: Hgb=7.9,ESR=6,CRP<5, INR=1.6, Got IV Iron x 2, since 10/2/17 for Iron<10, Hgb=8.8\par 11/16/17: Hgb=11.2, ESR=14, CRP=12, 10/26/17 Stool fat-neg, calprotectin-30\par 11/13/17: MRE-Chr mild distension of distal & vladislav-ileum s/o mild stricturing at anast, mild mural thick & mucosal enhancemt ~ 20cm; little change from 2015 c/w mild acute on chr ileitis, 2.1 cm Liver hemangioma\par 10/9/18: Hgb=11.6, MCV=94, ESR=14, CRP=5.4, INR=2.2\par 10/10/18 MRE: stricturing of neoterminal ileum w worsened upstream dilatation, moderate length of upstream ileum w stricturing extending at least 20cm and more extensive then previous. suggestionof 2 adj extraluminal fluid collections which may be w/i the wall of strictured ileum near the ileocolonic anastomosis. surrounding spiculation and tethered appearance of bowel in this region.\par CTE: no discrete collection or intramural fistula, but mucosal enhancemt\par Today: 4/16/19  : feeling ok , Wt=12___off prednisone, BMs Brown: #4-5_, Hgb=11.5 , \par CRP <5-->17-->0.36-->6.5 : ? s/p recent ankle fx/stress rx/tendonitis and tooth infection \par prior stool studies w elev  calprotectin and No fat\par Wt loss-- 2/2 to low carbs-->fat burning and flare ; advised to liberalize and add protein, ensure\par Plan: Prednisone d/c 2/20/18\par Entyvio q 6 wks --> 4 weeks \par check inflammatory markers: 2/28/19 w ESR=12, CRP=6.5 & 2/21/19 stool calprotectin--> 232\par 4/11/19: ESR=18, CRP=9.4\par ? cipro + flagyl \par pt to call numbers given for  [facility] or [facility]ational [facility]'s--biologics.  \par Colonoscopy to be scheduled                                                                                                                                                                                                                                                                                                                                                                                                                                                                                                                                                                                                                                                                                                                                                                                                                                                                                                                                                                \par 2. Iron deficiency anemia due to chronic blood loss  \par  had initially dropped , after clinical flare and post procedure bleeding\par Probably multifactorial: ACD, Blood loss, malabsorption/nutrient deficiencies\par Eliquis-->warfarin after CVA, On Entyvio and prednsione for active dis\par Still requiring IV Iron--prn, \par s/p IV Cu & transfusions \par 7/11/17 Recent Adm for unsteady gait w MRI c/w CVA--warfarin begun: possible better control of AC\par Chromagen 2 qd--> IV Iron , s/p IV Cu x 5, FA 1mg qd , B12 SL & IM\par  1/14/19: Hgb=10.7, INR=1.6, 2/5/19: Hgb=11.2,INR=1.5; 2/28/19: Hgb=11.5, Rwrncdow=171; 3/11/19: INR=2.6\par 4/11/19: hgb=9.7, INR=1.6\par 7/13/17: S82=953, YBE=922,FA>23; 1/3/18 J01=983, Zw=501, Zinc=55; 6/6/18 G78=617, 9/5/18: Cg=957, Zinc=67\par 11/28/18 P70=899\par B12 1cc IM ____R. delt--  Given today, \par Last Iron infusion=4/12/19 , Iron =45, ? Ferritin;  Ferritin=67 on 1/14/19 & 444 on 2/28/19;\par Hem consult IV Iron /Cu given malabsorption, ? BM bx --r/o occullt etiology--on hold\par trend cbc\par Adj INR--closer to low 2's.    \par Agree w Heme--Prevnar-13\par \par \par \par 3. Other osteoporosis without current pathological fracture  \par : Progression on BD from 5/5/16\par Crohns, h/o steroid use\par Calcium 8239-3591 w Vit 1000mg\par Repeat BD of 8/2018 --showed worsening to Osteoporosis from 2016\par Rec Endo consult w Dr. Akers :Osteoporosis center at [facility]--pt never went originally \par 9/21/18: Phos=2.4, Ca=8.7, Alb=3.4, Vit D=27, UDM=437, \par 10/16/18: Phos=2.8, Ca=8.7, Alb=3.5,\par 1/14/19: Phos=2.6,  Ca=8.7, Alb=3.6\par 2/28/19: Phos=1.8, Ca=8.9, Alb=3.7, VitD=39, RBX=850\par 3/18/19: Ca=8.5, Alb=3.7, Vit D=44.6, PTH=93\par Dr. Akers: Hyperparathyroidism-- probably secondary due to low Vit D/Ca & ? superimposed primary, Rx replete Vit D and Calcium\par trend Vit D, Ca, Phos, PTH,  \par [facility] ENT Consult felt  Parathyroid scan showing activity in mediastinum is ectopic parathyroid, feels sx not indicated at this time, elev PTH is secondary top Vit D/Ca\par BD--8/2019.    \par \par \par \par 4. Gastroesophageal reflux disease with esophagitis  \par  well controlled, no ht burn, no dysphagia, LPR\par Dry cough, CXR:ana, saw ENT eulogio-->LPR\par ( +++)LPR, (_ ++)Barretts w (_ No)Dysplasia, (_ No, H/O )Esophagitis grade: (__ ), \par Anti-reflux diet and life-style changes emphasized. (_No ) Bedge. \par reg exercise emphasized. \par **(_ ++)PPI: ( Protonix 40 mg qd ), (++) H2B Qhs: ( Zantac 300mg--> Pepcid 40mg ), \par (_ ) Carafate 1gm:\par **(_ ++)F/U EGD: (_ 4)mo. / (_2019 )yrs\par (_ No)pH Monitor, (_No )Manometry, (_No )esophagram \par (_f/u )ENT eval., (_ No)Surgical eval.    \par \par \par \par 5. Internal hemorrhoids  \par  well-controlled , no swelling, itching, bleeding\par Discussed the potential complications of thrombosis, pain, bleeding, swelling, itching, infection.\par Moderate -Fiber Diet was reviewed and emphasized.\par 6 - 8 cups of decaffeinated fluid daily\par (_++ ) Sitz Bathes BID, (_++++ ) Anusol HC Suppos/Cream HI QHS ,\par (_No ) Tucks BID, (_ No) Balneol Lotion,(_ No) Calmoseptine Oint, (_ ++) Prep H prn\par (_No ) Colorectal Surgical evaluation for possible ablation.    \par \par \par \par 6. Barretts esophagus without dysplasia  \par Notes: see GERD.    \par \par \par 7. Hepatomegaly-->not confirmed by recent abd sono\par CTE w relative enlargemt, ? lobulation & enlarged portal/mesenteric veins\par LFTs-wnl, no ascites or edema\par R/O CLD, Hepatic vein thrombosis\par Abd sono w doppler--> Liver and spleen normal size, no thrombosis, normal portal and hepatic vein flow\par

## 2019-04-16 NOTE — HISTORY OF PRESENT ILLNESS
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-->Eliquis' warfarin \par        GERD w Page's, Hemorrhoids, BPH, \par        S/P Ileocolonic resection 1998\par        Since then multiple SBOs\par \par \par        Got IV Iron x 2, since 10/2/17\par        10/19/17: feeling better, Lt=121 on prednisone 15mg x 3weeks, BMs Brown: #5-6, 1-2/D, occas 3-4/d\par        10/25/17: Hgb=10, ESR=5, CRP=5, Alb=3.6, Phos=2, Fipghohc=425, O01=197\par        11/16/17: Hgb=11.2, ESR=14, CRP=12, \par        11/13/17: MRE-Chr mild distension of distal & vladislav-ileum s/o mild stricturing at anast, mild mural thick & mucosal enhancemt ~ 20cm; little change from 2015 c/w mild acute on chr ileitis, 2.1 cm Liver hemangioma\par        11/28/17: Entyvio\par        11/29/17: Hgb=9.6, ESR=10, CRP=5.8, Alb=3.8,Ferritin-19, Iron=14,Sat=4%, Phos=2.5, Ay=798, BMs:# 5, 1-2x/D, brown,\par        12/19;17: Hgb=10.1, ESR=12, CRP=6.5; 12/21/17: BMs:#3-5, 1-3x/D , brown, no blood, no pain, tp=085\par        1/3/18:Hgb=11.7, ESR=19, CRP=6.5, Alb=4.1, Qcdzrhqs=512, Iron=31, Sat%=9,Zinc=55\par        1/16/18: Entyvio, Hgb=11.4, ESR=10, CRP=6.9\par        1/18/18: Today: cellulitis R foot, saw dr KEITH--rx augmentum x 10D, Entyvio 1/9 to 1/16, ez=453 w clothes,BMs: # 4-5, 1-2x/D \par        2/14/18: Hgb=11.1, ESR=16, CRP=11.6, Alb=3.6, Iron=28, Ferritin=23, INR=1.5 \par        2/16/18: s/p Entyvio; Iron infusion, again on 2/27\par        2/20/18: Hgb=10.6, ESR=20, CRP=12, INR=1.7\par        2/27/18: s/p iron infusion\par        3/9/18: Dr. Burden for tenosynovitis, rx w Zorvolex: Diclofenac x 5 days--? response\par        3/14/18: Hm=389; BMs: # 5, 1-2x/D; Hgb=11, ESR=18, CRP=11,Irom=45,Ldkanqag=218,Alb=3.6, INR=1.5\par        3/19/18: s/p Entyvio\par        3/22/18: tq=748, BMs: # 5, 3-4 x/d\par        4/16/18: s/p Entyvio,Hgb=11.9, INR=1.3, ESR=18, CRP=8.4 \par        4/18/18 EGD: gastritis, no HP, no IM, 2+ Mucous, 0.5cm gastric polyp: fundic, 4cm HH, + Barretts:3cm, no dysplasia\par        4/25/18: Hgb=11.5, INR=1.6, Iron=40, Sat=13%, Ferritin=57, Alb=3.2, Phos=2.2, Mag=1.7\par        4/30/18: s/p Iron Infusion\par        5/14/18: s/p Entyvio \par        5/23/18: Hgb=11.5, ESR=16, CRP< 5\par        5/29/18: s/p Iron Infusion\par        6/6/18: Hgb=10.6, INR=1.7, Dxqolqei=849, Alb=3.5, Phos=2.1, S69=021, hw=861; BMs: # 5, 2-3x/D \par        6/19/18: Hgb=10.6, INR=1, CRP=15, ESR=23\par        6/21/18: R ankle is acting up, swollen, pain, having MRI done, took a couple of advil, on low carbs ,BMs: # 5, 1-2x/D, qa=448\par        6/26/18: MRI: fx/stress rxn-talar body/navicula; stress related changes--cuneform, cuboid,distal tibia; mod tibiotalar effusion, mild-mod peroneal tendinosis\par        7/19/18: entyvio 6/26/18, eliminated all carbs--anti inflammatory diet, rj=300, BMs: # 4-5, 1-3x/D \par        7/25/18: Hgb=10, INR=1.3, Alb=3.3, Phos=2.4, Ouuhsjpt=637, sat%=12, Iron=35\par        8/2/18: BD--osteoporosis of hip/spine: sig decrease BD--17.6% of hip, 17% of spine, osteopenia of wrist: 6.4%\par        8/7/18: Entyvio\par        8/21/18: Hgb=11.1, INR=1.5, ESR=19, CRP=18.6\par        8/23/18: recently w tooth infection, old implant--loose and removed; rx w amox, and advil\par        eating almost no carbs, ue=548, # 5-6, 2-3x/d \par        8/24/18: Stool fat--neg, Tylhisyfbdbq=015\par        9/5/18: Hgb=11.4, INR=1.3, ESR=9, CRP=11.3, Iron=41, Wxryrfss=667, Alb=3.8,\par        9/17/18: entyvio, Hgb=10.7, INR=2.2, ESR=17, CRP=9.1, \par        9/20/18: xc=289, BMs: # 5-6, 1-2x/D \par        9/21/18: Phos=2.4, Ca=8.7, Alb=3.4, Vit D=27, QXO=017, \par        Dr. Akers: Hyperparathyroidism: probably secondary due to low Vit D/Ca & ? superimposed primary, rx replete Vit D and Calcium\par        10/9/18: Hgb=11.6, MCV=94, ESR=14, CRP=5.4, INR=2.2\par        10/10/18 MRE: stricturing of neoterminal ileum w worsened upstream dilatation, moderate length of upstream ileum w stricturing extending at least 20cm and more extensive then previous. suggestion of 2 adj extraluminal fluid collections which may be w/i the wall of strictured ileum near the ileocolonic anastomosis. surrounding spiculation and tethered appearance of bowel in this region.\par 10/16/18: entyvio, Hgb=11.7, MCV=94, ESR=14, CRP <5, Alb=3.5\par 10/18/18: Bn=700,   BMs: # 5-6, 3x/D , \par 11/13/18: Entyvio\par 11/21/18 CTE w C: limited 2/2 bowel underdistention, mucosal hyperenhancemt & extensive SM edema of distal ileum including vladislav-ileum, difficult to exclude a sml fluid collection, Liver w relative enlargement w suggestion of lobulation, enlargemt of PV,SMV,IMV,Spl V, rectal V. No ascites\par 11/28/18: Hgb=10, ESR=19, CRP=17,Alb=3.5,Iron=35, Sat%=11, Rjodyefc=294, INR=1.3\par 11/29/18: wz=165, BMs: # 5-6, 3-4x/D\par 12/3/18: Abd sono--Liver 14.8cm Incr heterogenicity, spleen--wnl\par 12/11/18 & 1/14/19: Entyvio\par 1/14/19:Entyvio,  Hgb=10.7, ESR=15, Alb=3.6, Iron=36, Ferritin=67, Sat%=11, INR=1.6\par 1/24/19:  re=515, stools are # 4-6, 3-4x/d , no pain\par 2/5/19: Hgb=11.2, ESR=14, CRP=0.36\par 2/28/19: Hgb=11.5, ESR=12, CRP=6.5, Alb=3.7, Iron=69, Cqrbpxwo=867, Ca=8.9\par                Bms: # 4-5, 2-3x/D , jf=137,  Entyvio : last 2/1519,\par                had parathyroid scan pre-op, still w elev PTH, + activity in mediastinum,? ectopic parathyroid tissuevs neoplasm.  To see ENT and have Imaging at [facility]\par 3/28/19: Bms: # 4-5 , 3x/d ;rz=197\par 4/11/19: Hgb-9.7, Iron=45, ESR=18, CRP=9.4, Alb=3.5, \par Saw Endo SX at [facility], felt hyperparathyroid is secondary to Low Ca/Vit D, no sx at this time\par \par  Today:  Bms: #5,  , no pain, eating more calories, liberalizing carbs--avoiding simple sugars.  \par        Entyvio : last 3/1519,  got IV iron 4/12/19\par        R ankle in immobilizer brace\par        wt= 125\par        Abd pain--no \par        Nausea--no \par        Vomit--no \par        Early satiety-no \par        Belching-no \par        Regurgitation--no \par        Ht burn--no \par        Throat Clearing-no\par        Globus-no\par        Hoarseness--no \par        Dysphagia--no \par        BMs: # 5-6, 3-4x/D\par        Constipation--no \par        Diarrhea- intermittent \par        Bloating--no \par        Flatulence-no\par        Gurgling--no \par        Melena--no \par        BRBPR--no \par        Anorexia-no \par        Wt Loss--stable\par \par \par \par        PRIOR HISTORY--2017\par \par        1/17/17: Hgb=9.2, ESR=6, CRP=5; 1/19/17: BMs: #4, 5-6, 2-3x/D, Dm=701, Started Creon 1 tid cc\par        3rd Entyvio begining Feb\par        2/14/17: Labs; Hgb=9.6, MCV=97, ESR=5, CRP< 5, A89=477, FA>23, Iron=59, Retic =3.2,\par        2/17/17 Fecal Calprotectin=16, Fats: Increased neutral & Split\par        3/13/17: Labs Hgb=9.5, MCV=95, ESR=7, CRP <5, ALB=3.1\par        3/16/17: lot of stress, to move -[place], had a job-fell thru, BMs: # 5, 2-4 x/D, wt= 131w clothes\par        4/19/17 EGD: gastritis, MACKENZIE, No HP, 2cm HH, +Barretts, No Dysplasia\par        Colonoscopy: Anastamosis: open w mild -mod active colitis, enteritis severe adj to anastomosis, mild more proximal, remainder of colon-wnl, 2-3 deg int hemorrhoids\par        4/26/17 : Hgb=7.3, MCV=95, ESR=13, CRP<5;Immediately post procedure stools very dark on eliquis/iron.\par        Admitted to [facility]: Hgb=8.3-->8.9 after 2 U, Alb 3.3, BUN=17, creat=1.3, Malina/Brbzn=678/460\par        4/27/17: Alb=2.3, BUN=12, creat=1.2, Malina/Lipase=209/863-No abd pain, N/V; 5/5/17: Hgb=10.7.\par        5/8/17 Entyvio iv. 5/8-5/9 w dark red diarrhea; 5/10/17 Hgb=7.8.\par        5/11/17 Adm [facility] w stools brown, Hgb=7.9, BUN=17, Creat=1.4, Alb=2.9; S/P 2 Units- Hgb=10.6, BUN=15/1.1.\par        Prednisone 40mg qd, entocort d/dee.; 5/18/17: Hgb-10.4, iz=382, BMs: #5, 1-2x/D, no pain\par        6/8/17: Hgb=7.4,MCV=98, CRP<5-ASA stopped, Pred20--> 30\par        6/10/17: Hgb=8.2, Alb=2.9, BUN=20/1.2 ; 6/12/17: Hgb=8.3, Retic=0.19, Iron=10, Sat%=3, Ferritin=57, FA=23,H76=814, 6/13/17: s/p 2 Unit PRBCs\par        6/15/17: started MCT oil, Ey=497 , BMs: # 5, 1-2x/D, stools are brownish/green \par        7/11/17: [facility] w unsteadiness. MRI Head: R Cortical Temporo-occipital encephalomalacia, MRA H&N-no sign lesions, PER-wnl.Hgb=9.9--> 8.4->9.7 after 1 Unit. Seen by Heme: received IV Iron \par        CRP<5, K82=996, WBB=170, FA>23,Iron=22,Sat%=6, Ferritin=42, Alb=3.5. D/C on warfarin 2mg\par        7/20 Hgb=8.5, 7/28 Hgb=7.7, 7/31-s/p 1 Unit \par        As outpt seeing Heme, to get IV Iron and 5 IV Copper\par        Had 'FLU' Fever=101, chills, bloat, N/V/D, Bilious. no pain, melena,brbpr\par        Given anti-emetic by dr Butler,then able to keep things down\par        On prednisone 25, warfarin w INR bet 1.6-2\par        8/17/17: Hgb=9.9, ESR=20, CRP-P,Au=836, BMs: # 5, 1-2x/D, stools are brownish/green\par        10/2/17: Hgb=8.8, MCV=89,Phos=1.7, K=3.9, Mag=1.9, Alb=3.6,Iron<10,Ferritin=21, INR=2\par        10/17/17: Hgb=7.9,ESR=6,CRP<5, INR=1.6\par        10/25/17: Hgb=10, ESR=5, CRP=5, Alb=3.6, Phos=2, Wrntsmvm=283, Q76=395\par        11/16/17: Hgb=11.2, ESR=14, CRP=12, \par        11/13/17: MRE-Chr mild distension of distal & vladislav-ileum s/o mild stricturing at anast, mild mural thick & mucosal enhancemt ~ 20cm; little change from 2015 c/w mild acute on chr ileitis, 2.1 cm Liver hemangioma\par        11/28/17: Entyvio\par        11/29/17: Hgb=9.6, ESR=10, CRP=5.8, Alb=3.8,Ferritin-P, Iron=14,Sat=4%, Phos=2.5\par        12/19;17: Hgb=10.1, ESR=12, CRP=6.5; 12/21/17: BMs:#3-5, 1-3x/D , brown, no blood, no pain, ph=815\par        1/3/18:Hgb=11.7, ESR=19, CRP=6.5, Alb=4.1, Gmqnaxac=486, Iron=31, Sat%=9,Zinc=55\par \par \par        PRIOR HISTORY---2013:\par \par        2/20/13 CT markedly dilated sb loops ext to anast, colonoscopy w open anast ,mild-mod remy-anastamotic disease. quick response to entocort/humira--probably adhesive dis. \par        8/10/13 w GNR bacteremia, ADA 1.7 /KAYLAH 3.4, Humira 8/7, 8/13,8/18,9/15\par        CT- mucosal thickening and spiculation of the distal sb extending to the anastamosis, thickening and stranding of adj mesenteric fat.\par        Humira increased to 40mg weekly, entocort 4\par        9/2013 MRE--dilated loops in mid and distal ileum, markedly thickened and narrowed TI w decreased peristalsis of TI\par        11/15/13 ADA-24.9, KAYLAH-0\par \par \par        PRIOR HISTORY---2014:\par \par        2/15/14--CT dilated loops SB, loop of sb in mid abd 4.3cm w infiltrative changes in the mesentery, bowel tapers in the RLQ to the anastamosis w/o transition\par        WBC=15K, Rx w IV steroids and Abx \par        3/7/14 SBFT: last 10cm sb prox to anast mild distension and sl irregularity. In the mid portion of this loop there is a mild narrowing which appears to reopen but is some what narrowed.\par        3/20/14 ADA=33.1, KAYLAH=0, Lialda switch to Apriso 4 (2/2014)\par \par \par        PRIOR HISTORY--2015\par \par        1/11/15 Adm [facility] w 1 day N,Recurrent V, Abd pain/distension. CT-multiple distended & fluid-filled sb loops, mild wall thickening of ileum w No inflamm changes.\par        WBC=14.6, Hgb=17, RX w NGT suction, Levaquin iv, Solumedrol 40mg q 12( 1/12-->1/16) to prednisone 30mg BID w 5mg taper/wk\par        3/18/15 --Dr. Butler w edema /High BP, prednisone was tapered slowly to 2.5mg \par        switched to entocort 9mg qd, edema and bp improved w lasix 20mg \par        BMs:#4-5, 3x/D, Hgb=11, ESR=4, CRP<5\par        4/28/15 - 5/1/15: Adm [facility] w 1 day Abd pain, distension,N/V. 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 side of anast, opening appeared ulcerated,scarred & severely narrowed\par        6/11/15: [facility] w N/V x1, decr appetite, CT ABD: no obstruction, dilated thickened sb loops of distal jej and ileum\par        6/19/15 [facility]: s/p Lap w extensive lysis of adhesions, hepatic flex, sigmoid and sb anastamosis, s/p partial r colectomy and sb resection--side to side elisabeth: 8-10 inch from prior anast to TV colon.\par        7/17/15: BMs:#4-6, 4-5x/D, wt 131(from 126)\par        8/20/15: BMs: #4, 1-2x/D or #5, 2-3x/D, at=839, dry cough,Hgb=10, WBC=3, JSN=465, CRP=56, ESR=21\par        8/25/15 CT Abd/Pelv: Svl RLQ sb loops w wall thickening, mild nodularity & inflamm stranding in mesentery\par        Advised-restart Entocort 9mg qd, Apriso 4 qd, Maintain Humira but given RX to check drug/Ab levels\par        9/15/15: did nt restart meds,Hgb=9.9, WBC=4, ESR=19, CRP=5.8, vu=546; [facility] : ADA=8.5, Antibodies< 1.7\par        10/15/15: No pain, BMs:#4, 1-2x/D, #5-6, 3-4x/D, throat clearing/cough-better, ap=139\par        11/30/15: No pain, BMs:# 4, 5-6 intermittently, No throat clear, zo=636\par \par \par        PRIOR HISTORY-2016\par \par        1/22/16; 4/8/16; 6/6/16 : No pain, BMs:# 4-5 1-2x/D, also #5-6 3x/D for 2-3D/wk, rp=627\par        7/14/16: vw=171, 9/22/16: en=446.5\par        11/10/16: 9.5lb wt loss, states BMs: 5-6, 5x/D--Taking Magnesium, No pain/anorexia\par        Labs: Stool Ebdopksdxxnr=379, + Incr Fecal fat, Alb=3.4, FA =23, B07=494, Hgb=12, ESR=10, CRP <5\par        Magnesium-d/c, Obtain MRE asap--Start steroids and possibly switch to Entyvio\par        DDx discussed: active crohns--loss response to Humira, Malabsorption--loss Bile acids, Bile-induced diarrhea, SIBO\par        Pt wanted to wait for imaging-did not start rx\par        12/1//16 MRE: RUQ ileocolonic anastamosis--narrowed w upstream dilatation to 3.2 cm\par        Pt refused pred, Started Entocort 9mg qd and Flagyl 250mg qid about 7-10days ago \par        awaiting Entyvio load to start 12/22, then 1/5; had Cut back on iron bid\par        12/15/16: BMs:# 5, 2-3x/D, occas #6, No pain, Less bloat/flatulence, Uj=653.

## 2019-04-25 ENCOUNTER — APPOINTMENT (OUTPATIENT)
Dept: GASTROENTEROLOGY | Facility: CLINIC | Age: 54
End: 2019-04-25

## 2019-04-29 LAB
ALKPISO INTERP: NORMAL
ALP BLD-CCNC: 184 U/L
ALP BONE CFR SERPL: 69 %
ALP INTEST CFR SERPL: 0 %
ALP LIVER CFR SERPL: 31 %
ALP MACRO CFR SERPL: 0 %
ALP PLAC CFR SERPL: 0 %

## 2019-05-03 ENCOUNTER — INBOUND DOCUMENT (OUTPATIENT)
Age: 54
End: 2019-05-03

## 2019-05-08 ENCOUNTER — LABORATORY RESULT (OUTPATIENT)
Age: 54
End: 2019-05-08

## 2019-05-10 ENCOUNTER — APPOINTMENT (OUTPATIENT)
Dept: ENDOCRINOLOGY | Facility: CLINIC | Age: 54
End: 2019-05-10
Payer: COMMERCIAL

## 2019-05-10 VITALS
HEART RATE: 90 BPM | SYSTOLIC BLOOD PRESSURE: 120 MMHG | DIASTOLIC BLOOD PRESSURE: 68 MMHG | WEIGHT: 125 LBS | HEIGHT: 70 IN | BODY MASS INDEX: 17.9 KG/M2

## 2019-05-10 PROCEDURE — 99215 OFFICE O/P EST HI 40 MIN: CPT

## 2019-05-10 NOTE — PHYSICAL EXAM
[No Acute Distress] : no acute distress [Alert] : alert [Normal Sclera/Conjunctiva] : normal sclera/conjunctiva [EOMI] : extra ocular movement intact [No Proptosis] : no proptosis [No Thyroid Nodules] : there were no palpable thyroid nodules [No Respiratory Distress] : no respiratory distress [No Accessory Muscle Use] : no accessory muscle use [Clear to Auscultation] : lungs were clear to auscultation bilaterally [Normal Rate] : heart rate was normal  [Normal S1, S2] : normal S1 and S2 [Regular Rhythm] : with a regular rhythm [Pedal Pulses Normal] : the pedal pulses are present [No Edema] : there was no peripheral edema [Normal Bowel Sounds] : normal bowel sounds [Not Tender] : non-tender [Soft] : abdomen soft [Anterior Cervical Nodes] : anterior cervical nodes [Not Distended] : not distended [Post Cervical Nodes] : posterior cervical nodes [Axillary Nodes] : axillary nodes [Normal] : normal and non tender [Spine Straight] : spine straight [No Stigmata of Cushings Syndrome] : no stigmata of cushings syndrome [No Spinal Tenderness] : no spinal tenderness [Normal Strength/Tone] : muscle strength and tone were normal [Normal Gait] : normal gait [No Rash] : no rash [No Tremors] : no tremors [Normal Reflexes] : deep tendon reflexes were 2+ and symmetric [Oriented x3] : oriented to person, place, and time [de-identified] : mildy enlarged thyroid on exam  [Acanthosis Nigricans] : no acanthosis nigricans [de-identified] : underweighted

## 2019-05-10 NOTE — HISTORY OF PRESENT ILLNESS
[FreeTextEntry1] : Mr. GUDELIA DUNN is a 54 year old male\par  coming for a f/u , for severe osteoporosis with bilateral hip fractures, hyperparathyroidism, low D and hypocalcemia  secondary  to Chron's disease\par on Vit D 50,000 IU once a day started a week ago \par Ca citrate 950mg 2 tab bid \par reports compliance \par labs reviewed still elevated iPTH at 100 despite 25 OH Vit D at 49  and calcium still low normal at 8.5. Received IV iron \par 24hr urine low calcium \par parathyroid scan reviewed parathyroid adenoma versus neoplasm mediastinum\par US thyroid reviewed FNA was advised\par \par        MRI pelvis done 10/3/18 reviewed :\par        There are insufficiency fractures of the bilateral femoral heads without significant subchondral collapse at this time. There are associated moderate to large joint effusions.\par        There are additional microtrabecular/insufficiency fractures involving the sacrum as well as a few foci involving the iliac margins of the sacroiliac joints. No displaced or cortical fracture.\par        Mild degenerative arthrosis of the right hip.\par        sees GEN Chen will see him today again, bone stimulator is considered\par        dental issues : had an implant failure 2 months ago, bone graft 2 months ago, \par        Osteoporosis \par        has Chron disease sees Dr Lugo , never gained weight back after colon resection\par        received iron infussion and copper infusions\par   \par          DEXA \par        LS T scroe -3.8\par        stress fractures \par        9/5/18 right foot Fx stress Fx reviewed\par        Impression:\par        Trabecular fractures involving the cuboid, the partially visualized anterior process of the calcaneus, the lateral cuneiform as well as the third metatarsal base. No evidence for associated cortical break. There is improvement in/essentially complete resolution of the previously described microtrabecular fractures within the partially visualized talus, the navicular as well as the middle and medial cuneiforms.\par        Subacute to chronic mild subchondral fracture of the second metatarsal head.\par        Moderate degenerative changes of the hallux sesamoid complex\par        left ankle pain 6/18 MRI:\par        Impression:\par        Microtrabecular/fracture/stress reaction involving the talar body and navicula without evidence for cortical break. There are additional stress-related changes involving the cuneiforms, the cuboid and distal tibia, as above. Associated moderate tibiotalar joint effusion.\par        Mild to moderate peroneal tendinosis with superimposed segmental longitudinal splitting, as above.\par        has pain hip right will have MRI with [facility]\par        has h/o kidney stones 6 yrs ago went to [place] [facility] Dr Morris did surgery.

## 2019-05-21 ENCOUNTER — RX RENEWAL (OUTPATIENT)
Age: 54
End: 2019-05-21

## 2019-05-30 ENCOUNTER — RESULT REVIEW (OUTPATIENT)
Age: 54
End: 2019-05-30

## 2019-05-30 ENCOUNTER — APPOINTMENT (OUTPATIENT)
Dept: HEMATOLOGY ONCOLOGY | Facility: CLINIC | Age: 54
End: 2019-05-30
Payer: COMMERCIAL

## 2019-05-30 VITALS
TEMPERATURE: 98.5 F | BODY MASS INDEX: 17.46 KG/M2 | HEART RATE: 80 BPM | OXYGEN SATURATION: 100 % | HEIGHT: 70 IN | WEIGHT: 121.98 LBS | SYSTOLIC BLOOD PRESSURE: 120 MMHG | DIASTOLIC BLOOD PRESSURE: 82 MMHG | RESPIRATION RATE: 16 BRPM

## 2019-05-30 PROCEDURE — 99214 OFFICE O/P EST MOD 30 MIN: CPT

## 2019-06-01 NOTE — ASSESSMENT
[FreeTextEntry1] : 1.  Anemia- Hg  12.2\par  -blood loss, anemia of chronic disease, \par - copper deficiency - replaced\par - Continue B12 injection, level still low - increase to q 2 weeks\par - s/p Injectafer -repeat ferritin level today, denies blood loss\par - target >100\par - needs 8-10 infusions per year\par \par 2. Osteoporosis \par - left ankle- stress fx- advanced  osteoporosis, patient with h/o steroids use\par - started Forteo with Dr. Akers\par - might require IV calcium \par \par  3.TIA with MTHFR and h/o DVT -  \par not a candidate for DOAC b/o small bowel resection\par -INR 1.6  3 mg daily , may take 1 1/2  one day.\par - and check in 7 days \par \par \par 4. Hypogammaglobulinemia\par - s/p  Prevnar 13 12/2018 \par - check ab in 6 m\par - needs Shingrex\par \par \par Office visit in 6  weeks or prn for new or worsening symptoms. \par

## 2019-06-01 NOTE — REVIEW OF SYSTEMS
[Fatigue] : fatigue [Negative] : Allergic/Immunologic [Joint Pain] : joint pain [Eye Pain] : no eye pain [Vision Problems] : no vision problems [Dysphagia] : no dysphagia [Hoarseness] : no hoarseness [Lower Ext Edema] : no lower extremity edema [Chest Pain] : no chest pain [Cough] : no cough [Shortness Of Breath] : no shortness of breath [Vomiting] : no vomiting [Constipation] : no constipation [Diarrhea] : no diarrhea [Dysuria] : no dysuria [Dizziness] : no dizziness [Skin Rash] : no skin rash [Insomnia] : no insomnia [Anxiety] : no anxiety [Muscle Weakness] : no muscle weakness [Depression] : no depression [Easy Bleeding] : no tendency for easy bleeding [Easy Bruising] : no tendency for easy bruising

## 2019-06-01 NOTE — CONSULT LETTER
[Dear  ___] : Dear  [unfilled], [Consult Letter:] : I had the pleasure of evaluating your patient, [unfilled]. [Please see my note below.] : Please see my note below. [Consult Closing:] : Thank you very much for allowing me to participate in the care of this patient.  If you have any questions, please do not hesitate to contact me. [Sincerely,] : Sincerely, [DrMeron  ___] : Dr. REARDON [FreeTextEntry3] : Angie Biswas MD\par [facility] [place] [facility]\par

## 2019-06-01 NOTE — HISTORY OF PRESENT ILLNESS
[0 - No Distress] : Distress Level: 0 [FreeTextEntry1] : s/p injectafer, copper\par warfarin [de-identified] : Patient is a 52 year old who is referred for initial consultation for anemia secondary to Crohns/GI bleed vs Iron Deficiency/ Vit b12 def. 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 Patient is status post ileo-colonic resections for SBO  in 2016. In 2016 patient had complaints of 10 - 15 lb weight loss. Labs at that time showed Hgb of 12, steatorrhea and stool calprotectin = 236. In 2016 MRI/MRE with narrowing at ileocolonic anastomosis with upstream dilation. Pt refused bridge with  prednisone and Entocort / Flagyl. In 2017 patient Hgb dropped to 9.5. On 2017 patient underwent an EGD and Colonoscopy. Findings consistent with Page's and no dysplasia or AVMs. Colonoscopy showed an open anastomosis but active disease, moderate on the colonic side and limited to the anastomosis and moderate to severe enteritis, just proximal to the anastomosis. Pat had routine labs on 05/10/2017 Hgb: 8.3.  [de-identified] : Patient presents for follow up of  anemia, DVT, MTHFR gene mutation follow up, currently on Coumadin 3mg daily- he had right hip surgery with screw placement at end of April for his OP.

## 2019-06-25 NOTE — PHYSICAL EXAM
[Fully active, able to carry on all pre-disease performance without restriction] : Status 0 - Fully active, able to carry on all pre-disease performance without restriction [Thin] : thin [Normal] : grossly intact

## 2019-07-02 ENCOUNTER — RESULT REVIEW (OUTPATIENT)
Age: 54
End: 2019-07-02

## 2019-07-02 ENCOUNTER — APPOINTMENT (OUTPATIENT)
Dept: HEMATOLOGY ONCOLOGY | Facility: CLINIC | Age: 54
End: 2019-07-02
Payer: COMMERCIAL

## 2019-07-02 VITALS
TEMPERATURE: 98.4 F | BODY MASS INDEX: 17.75 KG/M2 | SYSTOLIC BLOOD PRESSURE: 115 MMHG | WEIGHT: 123.99 LBS | RESPIRATION RATE: 16 BRPM | OXYGEN SATURATION: 97 % | HEART RATE: 72 BPM | HEIGHT: 70 IN | DIASTOLIC BLOOD PRESSURE: 77 MMHG

## 2019-07-02 PROCEDURE — 99214 OFFICE O/P EST MOD 30 MIN: CPT

## 2019-07-06 NOTE — CONSULT LETTER
[Consult Letter:] : I had the pleasure of evaluating your patient, [unfilled]. [Dear  ___] : Dear  [unfilled], [Consult Closing:] : Thank you very much for allowing me to participate in the care of this patient.  If you have any questions, please do not hesitate to contact me. [Sincerely,] : Sincerely, [Please see my note below.] : Please see my note below. [DrMeron  ___] : Dr. REARDON [FreeTextEntry3] : Angie Biswas MD\par [facility] [place] [facility]\par

## 2019-07-06 NOTE — ASSESSMENT
[FreeTextEntry1] : 1.  Anemia- Hg  12.2\par  -blood loss, anemia of chronic disease, \par - copper deficiency - replaced, check today\par - Continue B12 injection, level still low - increase to q 2 weeks\par - ferritin declined to 40 - schedule IV injectafer\par \par 2. Osteoporosis \par - left ankle- stress fx- advanced  osteoporosis, patient with h/o steroids use\par - started Forteo with Dr. Akers\par - might require IV calcium \par \par  3.TIA with MTHFR and h/o DVT -  \par not a candidate for DOAC b/o small bowel resection\par -INR 2.2  3 mg daily , may take 1 1/2  one day.\par - home monitoring of warfarin\par \par \par 4. Hypogammaglobulinemia\par - s/p  Prevnar 13 12/2018 \par - check ab in 6 m\par - needs Shingrex\par \par \par Office visit in 6  weeks or prn for new or worsening symptoms. \par

## 2019-07-06 NOTE — HISTORY OF PRESENT ILLNESS
[0 - No Distress] : Distress Level: 0 [FreeTextEntry1] : s/p injectafer, copper\par warfarin [de-identified] : Patient is a 52 year old who is referred for initial consultation for anemia secondary to Crohns/GI bleed vs Iron Deficiency/ Vit b12 def. 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 Patient is status post ileo-colonic resections for SBO  in 2016. In 2016 patient had complaints of 10 - 15 lb weight loss. Labs at that time showed Hgb of 12, steatorrhea and stool calprotectin = 236. In 2016 MRI/MRE with narrowing at ileocolonic anastomosis with upstream dilation. Pt refused bridge with  prednisone and Entocort / Flagyl. In 2017 patient Hgb dropped to 9.5. On 2017 patient underwent an EGD and Colonoscopy. Findings consistent with Page's and no dysplasia or AVMs. Colonoscopy showed an open anastomosis but active disease, moderate on the colonic side and limited to the anastomosis and moderate to severe enteritis, just proximal to the anastomosis. Pat had routine labs on 05/10/2017 Hgb: 8.3.  [de-identified] : Patient presents for follow up of  anemia, DVT, MTHFR gene mutation follow up, currently on Coumadin 3mg daily- he had right hip surgery with screw placement at end of April for his OP.

## 2019-07-06 NOTE — REVIEW OF SYSTEMS
[Fatigue] : fatigue [Joint Pain] : joint pain [Negative] : Allergic/Immunologic [Eye Pain] : no eye pain [Vision Problems] : no vision problems [Hoarseness] : no hoarseness [Dysphagia] : no dysphagia [Lower Ext Edema] : no lower extremity edema [Chest Pain] : no chest pain [Cough] : no cough [Shortness Of Breath] : no shortness of breath [Vomiting] : no vomiting [Constipation] : no constipation [Diarrhea] : no diarrhea [Dysuria] : no dysuria [Skin Rash] : no skin rash [Insomnia] : no insomnia [Dizziness] : no dizziness [Depression] : no depression [Anxiety] : no anxiety [Easy Bleeding] : no tendency for easy bleeding [Muscle Weakness] : no muscle weakness [Easy Bruising] : no tendency for easy bruising

## 2019-07-11 ENCOUNTER — RESULT REVIEW (OUTPATIENT)
Age: 54
End: 2019-07-11

## 2019-07-15 ENCOUNTER — OTHER (OUTPATIENT)
Age: 54
End: 2019-07-15

## 2019-07-15 ENCOUNTER — APPOINTMENT (OUTPATIENT)
Dept: ENDOCRINOLOGY | Facility: CLINIC | Age: 54
End: 2019-07-15
Payer: COMMERCIAL

## 2019-07-15 VITALS
RESPIRATION RATE: 16 BRPM | HEIGHT: 70 IN | HEART RATE: 78 BPM | SYSTOLIC BLOOD PRESSURE: 100 MMHG | DIASTOLIC BLOOD PRESSURE: 70 MMHG | WEIGHT: 124 LBS | BODY MASS INDEX: 17.75 KG/M2

## 2019-07-15 PROCEDURE — 99214 OFFICE O/P EST MOD 30 MIN: CPT

## 2019-07-15 RX ORDER — PANTOPRAZOLE 40 MG/1
40 TABLET, DELAYED RELEASE ORAL DAILY
Refills: 0 | Status: DISCONTINUED | COMMUNITY
End: 2019-07-15

## 2019-07-15 RX ORDER — WARFARIN 3 MG/1
3 TABLET ORAL DAILY
Qty: 90 | Refills: 2 | Status: DISCONTINUED | COMMUNITY
End: 2019-07-15

## 2019-07-15 RX ORDER — FERROUS SULFATE 15 MG/ML
75 (15 FE) DROPS ORAL
Refills: 0 | Status: DISCONTINUED | COMMUNITY
End: 2019-07-15

## 2019-07-15 NOTE — HISTORY OF PRESENT ILLNESS
[FreeTextEntry1] : Mr. GUDELIA DUNN is a 54 year old male\par  coming for a f/u , for severe osteoporosis with bilateral hip fractures, hyperparathyroidism, low D and hypocalcemia  secondary  to Chron's disease\par on Vit D 50,000 IUthree times a week \par \par Ca citrate 950mg 3 tab bid \par reports compliance \par labs reviewed much higher  iPTH  despite 25 OH Vit D at 40  and calcium at 9.1mg/dl  Received IV iron \par 24hr urine low calcium while low D \par parathyroid scan reviewed parathyroid adenoma versus neoplasm mediastinum\par US thyroid reviewed FNA was advised\par \par        MRI pelvis done 10/3/18 reviewed :\par        There are insufficiency fractures of the bilateral femoral heads without significant subchondral collapse at this time. There are associated moderate to large joint effusions.\par        There are additional microtrabecular/insufficiency fractures involving the sacrum as well as a few foci involving the iliac margins of the sacroiliac joints. No displaced or cortical fracture.\par        Mild degenerative arthrosis of the right hip.\par        sees GEN Chen will see him today again, bone stimulator is considered\par        dental issues : had an implant failure 2 months ago, bone graft 2 months ago, \par        Osteoporosis \par        has Chron disease sees Dr Lugo , never gained weight back after colon resection\par        received iron infussion and copper infusions\par   \par          DEXA \par        LS T scroe -3.8\par        stress fractures \par        9/5/18 right foot Fx stress Fx reviewed\par        Impression:\par        Trabecular fractures involving the cuboid, the partially visualized anterior process of the calcaneus, the lateral cuneiform as well as the third metatarsal base. No evidence for associated cortical break. There is improvement in/essentially complete resolution of the previously described microtrabecular fractures within the partially visualized talus, the navicular as well as the middle and medial cuneiforms.\par        Subacute to chronic mild subchondral fracture of the second metatarsal head.\par        Moderate degenerative changes of the hallux sesamoid complex\par        left ankle pain 6/18 MRI:\par        Impression:\par        Microtrabecular/fracture/stress reaction involving the talar body and navicula without evidence for cortical break. There are additional stress-related changes involving the cuneiforms, the cuboid and distal tibia, as above. Associated moderate tibiotalar joint effusion.\par        Mild to moderate peroneal tendinosis with superimposed segmental longitudinal splitting, as above.\par        has pain hip right will have MRI with [facility]\par        has h/o kidney stones 6 yrs ago went to [place] ER Dr Morris did surgery.\par seen Dr mercado, per pt he was advised against surgery, no notes available will request for review

## 2019-07-15 NOTE — PHYSICAL EXAM
[Alert] : alert [No Acute Distress] : no acute distress [Normal Sclera/Conjunctiva] : normal sclera/conjunctiva [EOMI] : extra ocular movement intact [No Proptosis] : no proptosis [No Thyroid Nodules] : there were no palpable thyroid nodules [No Respiratory Distress] : no respiratory distress [No Accessory Muscle Use] : no accessory muscle use [Clear to Auscultation] : lungs were clear to auscultation bilaterally [Normal Rate] : heart rate was normal  [Normal S1, S2] : normal S1 and S2 [Regular Rhythm] : with a regular rhythm [Pedal Pulses Normal] : the pedal pulses are present [No Edema] : there was no peripheral edema [Normal Bowel Sounds] : normal bowel sounds [Not Tender] : non-tender [Soft] : abdomen soft [Not Distended] : not distended [Post Cervical Nodes] : posterior cervical nodes [Anterior Cervical Nodes] : anterior cervical nodes [Axillary Nodes] : axillary nodes [Normal] : normal and non tender [No Spinal Tenderness] : no spinal tenderness [Spine Straight] : spine straight [No Stigmata of Cushings Syndrome] : no stigmata of cushings syndrome [Normal Gait] : normal gait [Normal Strength/Tone] : muscle strength and tone were normal [No Rash] : no rash [Acanthosis Nigricans] : no acanthosis nigricans [Normal Reflexes] : deep tendon reflexes were 2+ and symmetric [No Tremors] : no tremors [Oriented x3] : oriented to person, place, and time [de-identified] : underweighted  [de-identified] : mildy enlarged thyroid on exam

## 2019-07-24 ENCOUNTER — RESULT REVIEW (OUTPATIENT)
Age: 54
End: 2019-07-24

## 2019-08-01 ENCOUNTER — APPOINTMENT (OUTPATIENT)
Dept: GASTROENTEROLOGY | Facility: CLINIC | Age: 54
End: 2019-08-01
Payer: COMMERCIAL

## 2019-08-01 ENCOUNTER — RX RENEWAL (OUTPATIENT)
Age: 54
End: 2019-08-01

## 2019-08-01 VITALS
WEIGHT: 124 LBS | SYSTOLIC BLOOD PRESSURE: 138 MMHG | BODY MASS INDEX: 17.75 KG/M2 | HEIGHT: 70 IN | DIASTOLIC BLOOD PRESSURE: 78 MMHG | HEART RATE: 80 BPM

## 2019-08-01 PROCEDURE — 99214 OFFICE O/P EST MOD 30 MIN: CPT | Mod: 25

## 2019-08-01 PROCEDURE — 96372 THER/PROPH/DIAG INJ SC/IM: CPT

## 2019-08-01 RX ORDER — CYANOCOBALAMIN 1000 UG/ML
1000 INJECTION INTRAMUSCULAR; SUBCUTANEOUS
Qty: 0 | Refills: 0 | Status: COMPLETED | OUTPATIENT
Start: 2019-08-01

## 2019-08-01 RX ADMIN — CYANOCOBALAMIN 0 MCG/ML: 1000 INJECTION INTRAMUSCULAR; SUBCUTANEOUS at 00:00

## 2019-08-01 NOTE — HISTORY OF PRESENT ILLNESS
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-->Eliquis' warfarin \par        GERD w Page's, Hemorrhoids, BPH, \par        S/P Ileocolonic resection 1998\par        Since then multiple SBOs\par \par \par        Got IV Iron x 2, since 10/2/17\par        10/19/17: feeling better, Yi=597 on prednisone 15mg x 3weeks, BMs Brown: #5-6, 1-2/D, occas 3-4/d\par        10/25/17: Hgb=10, ESR=5, CRP=5, Alb=3.6, Phos=2, Vmpxgjyd=353, N46=207\par        11/16/17: Hgb=11.2, ESR=14, CRP=12, \par        11/13/17: MRE-Chr mild distension of distal & vladislav-ileum s/o mild stricturing at anast, mild mural thick & mucosal enhancemt ~ 20cm; little change from 2015 c/w mild acute on chr ileitis, 2.1 cm Liver hemangioma\par        11/28/17: Entyvio\par        11/29/17: Hgb=9.6, ESR=10, CRP=5.8, Alb=3.8,Ferritin-19, Iron=14,Sat=4%, Phos=2.5, Pn=792, BMs:# 5, 1-2x/D, brown,\par        12/19;17: Hgb=10.1, ESR=12, CRP=6.5; 12/21/17: BMs:#3-5, 1-3x/D , brown, no blood, no pain, wx=861\par        1/3/18:Hgb=11.7, ESR=19, CRP=6.5, Alb=4.1, Htqentue=936, Iron=31, Sat%=9,Zinc=55\par        1/16/18: Entyvio, Hgb=11.4, ESR=10, CRP=6.9\par        1/18/18: Today: cellulitis R foot, saw dr KEITH--rx augmentum x 10D, Entyvio 1/9 to 1/16, de=961 w clothes,BMs: # 4-5, 1-2x/D \par        2/14/18: Hgb=11.1, ESR=16, CRP=11.6, Alb=3.6, Iron=28, Ferritin=23, INR=1.5 \par        2/16/18: s/p Entyvio; Iron infusion, again on 2/27\par        2/20/18: Hgb=10.6, ESR=20, CRP=12, INR=1.7\par        2/27/18: s/p iron infusion\par        3/9/18: Dr. Burden for tenosynovitis, rx w Zorvolex: Diclofenac x 5 days--? response\par        3/14/18: Pr=953; BMs: # 5, 1-2x/D; Hgb=11, ESR=18, CRP=11,Irom=45,Drojcnbt=216,Alb=3.6, INR=1.5\par        3/19/18: s/p Entyvio\par        3/22/18: py=127, BMs: # 5, 3-4 x/d\par        4/16/18: s/p Entyvio,Hgb=11.9, INR=1.3, ESR=18, CRP=8.4 \par        4/18/18 EGD: gastritis, no HP, no IM, 2+ Mucous, 0.5cm gastric polyp: fundic, 4cm HH, + Barretts:3cm, no dysplasia\par        4/25/18: Hgb=11.5, INR=1.6, Iron=40, Sat=13%, Ferritin=57, Alb=3.2, Phos=2.2, Mag=1.7\par        4/30/18: s/p Iron Infusion\par        5/14/18: s/p Entyvio \par        5/23/18: Hgb=11.5, ESR=16, CRP< 5\par        5/29/18: s/p Iron Infusion\par        6/6/18: Hgb=10.6, INR=1.7, Blxvbvvi=692, Alb=3.5, Phos=2.1, G65=200, za=641; BMs: # 5, 2-3x/D \par        6/19/18: Hgb=10.6, INR=1, CRP=15, ESR=23\par        6/21/18: R ankle is acting up, swollen, pain, having MRI done, took a couple of advil, on low carbs ,BMs: # 5, 1-2x/D, xt=012\par        6/26/18: MRI: fx/stress rxn-talar body/navicula; stress related changes--cuneform, cuboid,distal tibia; mod tibiotalar effusion, mild-mod peroneal tendinosis\par        7/19/18: entyvio 6/26/18, eliminated all carbs--anti inflammatory diet, fk=098, BMs: # 4-5, 1-3x/D \par        7/25/18: Hgb=10, INR=1.3, Alb=3.3, Phos=2.4, Cjxtmdpf=437, sat%=12, Iron=35\par        8/2/18: BD--osteoporosis of hip/spine: sig decrease BD--17.6% of hip, 17% of spine, osteopenia of wrist: 6.4%\par        8/7/18: Entyvio\par        8/21/18: Hgb=11.1, INR=1.5, ESR=19, CRP=18.6\par        8/23/18: recently w tooth infection, old implant--loose and removed; rx w amox, and advil\par        eating almost no carbs, ep=834, # 5-6, 2-3x/d \par        8/24/18: Stool fat--neg, Xvpmvweoxtkz=215\par        9/5/18: Hgb=11.4, INR=1.3, ESR=9, CRP=11.3, Iron=41, Yaupalzb=693, Alb=3.8,\par        9/17/18: entyvio, Hgb=10.7, INR=2.2, ESR=17, CRP=9.1, \par        9/20/18: kh=192, BMs: # 5-6, 1-2x/D \par        9/21/18: Phos=2.4, Ca=8.7, Alb=3.4, Vit D=27, GLN=644, \par        Dr. Akers: Hyperparathyroidism: probably secondary due to low Vit D/Ca & ? superimposed primary, rx replete Vit D and Calcium\par        10/9/18: Hgb=11.6, MCV=94, ESR=14, CRP=5.4, INR=2.2\par        10/10/18 MRE: stricturing of neoterminal ileum w worsened upstream dilatation, moderate length of upstream ileum w stricturing extending at least 20cm and more extensive then previous. suggestion of 2 adj extraluminal fluid collections which may be w/i the wall of strictured ileum near the ileocolonic anastomosis. surrounding spiculation and tethered appearance of bowel in this region.\par 10/16/18: entyvio, Hgb=11.7, MCV=94, ESR=14, CRP <5, Alb=3.5\par 10/18/18: Ie=326,   BMs: # 5-6, 3x/D , \par 11/13/18: Entyvio\par 11/21/18 CTE w C: limited 2/2 bowel underdistention, mucosal hyperenhancemt & extensive SM edema of distal ileum including vladislav-ileum, difficult to exclude a sml fluid collection, Liver w relative enlargement w suggestion of lobulation, enlargemt of PV,SMV,IMV,Spl V, rectal V. No ascites\par 11/28/18: Hgb=10, ESR=19, CRP=17,Alb=3.5,Iron=35, Sat%=11, Xuypujkv=784, INR=1.3\par 11/29/18: xx=580, BMs: # 5-6, 3-4x/D\par 12/3/18: Abd sono--Liver 14.8cm Incr heterogenicity, spleen--wnl\par 12/11/18 & 1/14/19: Entyvio\par 1/14/19:Entyvio,  Hgb=10.7, ESR=15, Alb=3.6, Iron=36, Ferritin=67, Sat%=11, INR=1.6\par 1/24/19:  sv=437, stools are # 4-6, 3-4x/d , no pain\par 2/5/19: Hgb=11.2, ESR=14, CRP=0.36\par 2/28/19: Hgb=11.5, ESR=12, CRP=6.5, Alb=3.7, Iron=69, Uzeelbym=848, Ca=8.9\par                Bms: # 4-5, 2-3x/D , wl=430,  Entyvio : last 2/1519,\par                had parathyroid scan pre-op, still w elev PTH, + activity in mediastinum,? ectopic parathyroid tissuevs neoplasm.  To see ENT and have Imaging at [facility]\par 3/28/19: Bms: # 4-5 , 3x/d ;tt=223\par 4/11/19: Hgb-9.7, Iron=45, ESR=18, CRP=9.4, Alb=3.5, \par Saw Endo SX at [facility], felt hyperparathyroid is secondary to Low Ca/Vit D, no sx at this time\par 4/16/19: BMs: # 5-6, 3-4x/D, id=220,  Entyvio : last 3/1519,  got IV iron 4/12/19 for Ferritin=53\par 4/27/19: s/p R Hip Nailing--missed 4/2019 2/2 fresh wound\par 5/16/19 & 6/18/19 Entyvio\par 7/2/19: Hgb=11.7, Ferritin=41,ESR=12, CRP=5.5, B6=2.8,Mag=1.8,Phos=3.9,Je=011,Zinc=61\par 7/11/19: s/p IV iron\par 7/11/19: Alb=3.7, UGL=497, Ca=9.1, Vit D=40/306, \par 7/24/19: Entyvio, Alb=3.8, JFO=932, Ca=8.9, Vit D=128 \par  Today:  Bms: # 5-6, occas #4, 2-3x/D  , no pain, eating more calories, liberalizing carbs--avoiding simple sugars.  \par      \par       \par        wt= 125\par        Abd pain--no \par        Nausea--no \par        Vomit--no \par        Early satiety-no \par        Belching-no \par        Regurgitation--no \par        Ht burn--no \par        Throat Clearing-no\par        Globus-no\par        Hoarseness--no \par        Dysphagia--no \par        BMs: #  # 5-6, occas #4, 2-3x/D\par        Constipation--no \par        Diarrhea- intermittent \par        Bloating--no \par        Flatulence-no\par        Gurgling--no \par        Melena--no \par        BRBPR--no \par        Anorexia-no \par        Wt Loss--yes\par \par \par \par        PRIOR HISTORY--2017\par \par        1/17/17: Hgb=9.2, ESR=6, CRP=5; 1/19/17: BMs: #4, 5-6, 2-3x/D, Vz=017, Started Creon 1 tid cc\par        3rd Entyvio begining Feb\par        2/14/17: Labs; Hgb=9.6, MCV=97, ESR=5, CRP< 5, A17=341, FA>23, Iron=59, Retic =3.2,\par        2/17/17 Fecal Calprotectin=16, Fats: Increased neutral & Split\par        3/13/17: Labs Hgb=9.5, MCV=95, ESR=7, CRP <5, ALB=3.1\par        3/16/17: lot of stress, to move -[place], had a job-fell thru, BMs: # 5, 2-4 x/D, wt= 131w clothes\par        4/19/17 EGD: gastritis, MACKENZIE, No HP, 2cm HH, +Barretts, No Dysplasia\par        Colonoscopy: Anastamosis: open w mild -mod active colitis, enteritis severe adj to anastomosis, mild more proximal, remainder of colon-wnl, 2-3 deg int hemorrhoids\par        4/26/17 : Hgb=7.3, MCV=95, ESR=13, CRP<5;Immediately post procedure stools very dark on eliquis/iron.\par        Admitted to [facility]: Hgb=8.3-->8.9 after 2 U, Alb 3.3, BUN=17, creat=1.3, Malina/Tgtuq=471/460\par        4/27/17: Alb=2.3, BUN=12, creat=1.2, Malina/Lipase=209/863-No abd pain, N/V; 5/5/17: Hgb=10.7.\par        5/8/17 Entyvio iv. 5/8-5/9 w dark red diarrhea; 5/10/17 Hgb=7.8.\par        5/11/17 Adm [facility] w stools brown, Hgb=7.9, BUN=17, Creat=1.4, Alb=2.9; S/P 2 Units- Hgb=10.6, BUN=15/1.1.\par        Prednisone 40mg qd, entocort d/dee.; 5/18/17: Hgb-10.4, ii=666, BMs: #5, 1-2x/D, no pain\par        6/8/17: Hgb=7.4,MCV=98, CRP<5-ASA stopped, Pred20--> 30\par        6/10/17: Hgb=8.2, Alb=2.9, BUN=20/1.2 ; 6/12/17: Hgb=8.3, Retic=0.19, Iron=10, Sat%=3, Ferritin=57, FA=23,K53=359, 6/13/17: s/p 2 Unit PRBCs\par        6/15/17: started MCT oil, Oz=023 , BMs: # 5, 1-2x/D, stools are brownish/green \par        7/11/17: [facility] w unsteadiness. MRI Head: R Cortical Temporo-occipital encephalomalacia, MRA H&N-no sign lesions, PER-wnl.Hgb=9.9--> 8.4->9.7 after 1 Unit. Seen by Heme: received IV Iron \par        CRP<5, V19=436, SIH=137, FA>23,Iron=22,Sat%=6, Ferritin=42, Alb=3.5. D/C on warfarin 2mg\par        7/20 Hgb=8.5, 7/28 Hgb=7.7, 7/31-s/p 1 Unit \par        As outpt seeing Heme, to get IV Iron and 5 IV Copper\par        Had 'FLU' Fever=101, chills, bloat, N/V/D, Bilious. no pain, melena,brbpr\par        Given anti-emetic by dr Butler,then able to keep things down\par        On prednisone 25, warfarin w INR bet 1.6-2\par        8/17/17: Hgb=9.9, ESR=20, CRP-P,Cs=593, BMs: # 5, 1-2x/D, stools are brownish/green\par        10/2/17: Hgb=8.8, MCV=89,Phos=1.7, K=3.9, Mag=1.9, Alb=3.6,Iron<10,Ferritin=21, INR=2\par        10/17/17: Hgb=7.9,ESR=6,CRP<5, INR=1.6\par        10/25/17: Hgb=10, ESR=5, CRP=5, Alb=3.6, Phos=2, Uqawzmtw=424, D88=753\par        11/16/17: Hgb=11.2, ESR=14, CRP=12, \par        11/13/17: MRE-Chr mild distension of distal & vladislav-ileum s/o mild stricturing at anast, mild mural thick & mucosal enhancemt ~ 20cm; little change from 2015 c/w mild acute on chr ileitis, 2.1 cm Liver hemangioma\par        11/28/17: Entyvio\par        11/29/17: Hgb=9.6, ESR=10, CRP=5.8, Alb=3.8,Ferritin-P, Iron=14,Sat=4%, Phos=2.5\par        12/19;17: Hgb=10.1, ESR=12, CRP=6.5; 12/21/17: BMs:#3-5, 1-3x/D , brown, no blood, no pain, li=115\par        1/3/18:Hgb=11.7, ESR=19, CRP=6.5, Alb=4.1, Ndxzcett=191, Iron=31, Sat%=9,Zinc=55\par \par \par        PRIOR HISTORY---2013:\par \par        2/20/13 CT markedly dilated sb loops ext to anast, colonoscopy w open anast ,mild-mod remy-anastamotic disease. quick response to entocort/humira--probably adhesive dis. \par        8/10/13 w GNR bacteremia, ADA 1.7 /KAYLAH 3.4, Humira 8/7, 8/13,8/18,9/15\par        CT- mucosal thickening and spiculation of the distal sb extending to the anastamosis, thickening and stranding of adj mesenteric fat.\par        Humira increased to 40mg weekly, entocort 4\par        9/2013 MRE--dilated loops in mid and distal ileum, markedly thickened and narrowed TI w decreased peristalsis of TI\par        11/15/13 ADA-24.9, KAYLAH-0\par \par \par        PRIOR HISTORY---2014:\par \par        2/15/14--CT dilated loops SB, loop of sb in mid abd 4.3cm w infiltrative changes in the mesentery, bowel tapers in the RLQ to the anastamosis w/o transition\par        WBC=15K, Rx w IV steroids and Abx \par        3/7/14 SBFT: last 10cm sb prox to anast mild distension and sl irregularity. In the mid portion of this loop there is a mild narrowing which appears to reopen but is some what narrowed.\par        3/20/14 ADA=33.1, KAYLAH=0, Lialda switch to Apriso 4 (2/2014)\par \par \par        PRIOR HISTORY--2015\par \par        1/11/15 Adm [facility] w 1 day N,Recurrent V, Abd pain/distension. CT-multiple distended & fluid-filled sb loops, mild wall thickening of ileum w No inflamm changes.\par        WBC=14.6, Hgb=17, RX w NGT suction, Levaquin iv, Solumedrol 40mg q 12( 1/12-->1/16) to prednisone 30mg BID w 5mg taper/wk\par        3/18/15 --Dr. Butler w edema /High BP, prednisone was tapered slowly to 2.5mg \par        switched to entocort 9mg qd, edema and bp improved w lasix 20mg \par        BMs:#4-5, 3x/D, Hgb=11, ESR=4, CRP<5\par        4/28/15 - 5/1/15: Adm [facility] w 1 day Abd pain, distension,N/V. 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 side of anast, opening appeared ulcerated,scarred & severely narrowed\par        6/11/15: [facility] w N/V x1, decr appetite, CT ABD: no obstruction, dilated thickened sb loops of distal jej and ileum\par        6/19/15 [facility]: s/p Lap w extensive lysis of adhesions, hepatic flex, sigmoid and sb anastamosis, s/p partial r colectomy and sb resection--side to side elisabeth: 8-10 inch from prior anast to TV colon.\par        7/17/15: BMs:#4-6, 4-5x/D, wt 131(from 126)\par        8/20/15: BMs: #4, 1-2x/D or #5, 2-3x/D, sp=362, dry cough,Hgb=10, WBC=3, MJV=346, CRP=56, ESR=21\par        8/25/15 CT Abd/Pelv: Svl RLQ sb loops w wall thickening, mild nodularity & inflamm stranding in mesentery\par        Advised-restart Entocort 9mg qd, Apriso 4 qd, Maintain Humira but given RX to check drug/Ab levels\par        9/15/15: did nt restart meds,Hgb=9.9, WBC=4, ESR=19, CRP=5.8, qo=394; [facility] : ADA=8.5, Antibodies< 1.7\par        10/15/15: No pain, BMs:#4, 1-2x/D, #5-6, 3-4x/D, throat clearing/cough-better, el=139\par        11/30/15: No pain, BMs:# 4, 5-6 intermittently, No throat clear, mo=347\par \par \par        PRIOR HISTORY-2016\par \par        1/22/16; 4/8/16; 6/6/16 : No pain, BMs:# 4-5 1-2x/D, also #5-6 3x/D for 2-3D/wk, zw=614\par        7/14/16: qi=078, 9/22/16: gs=328.5\par        11/10/16: 9.5lb wt loss, states BMs: 5-6, 5x/D--Taking Magnesium, No pain/anorexia\par        Labs: Stool Nklotuyhfxbb=562, + Incr Fecal fat, Alb=3.4, FA =23, X62=187, Hgb=12, ESR=10, CRP <5\par        Magnesium-d/c, Obtain MRE asap--Start steroids and possibly switch to Entyvio\par        DDx discussed: active crohns--loss response to Humira, Malabsorption--loss Bile acids, Bile-induced diarrhea, SIBO\par        Pt wanted to wait for imaging-did not start rx\par        12/1//16 MRE: RUQ ileocolonic anastamosis--narrowed w upstream dilatation to 3.2 cm\par        Pt refused pred, Started Entocort 9mg qd and Flagyl 250mg qid about 7-10days ago \par        awaiting Entyvio load to start 12/22, then 1/5; had Cut back on iron bid\par        12/15/16: BMs:# 5, 2-3x/D, occas #6, No pain, Less bloat/flatulence, Qz=138.

## 2019-08-01 NOTE — ASSESSMENT
[FreeTextEntry1] : 1. Crohns disease of both small and large intestine\par    \par CROHNS DISEASE-s/p ileocolonic resection x 2--last 6/19/15 for recurrent sbos: appears to be active , GI symtoms stable but labs were up CRP=18.6/ESR=19 & Cupvkocidctp=367 and Wt down ( inflam markers ? 2/2 to R ankle Fx/stress rx and tendinosis, also had tooth infection ) & MRE w ? ileal penetrating dis, wt =125______\par s/p 4 SBOs w/i 2 yrs--2/2 distal sb disease adj to anastamosis\par when on Humira weekly w ADA =33 (3/20/14), -but had sbo 2/2014 at ADA=25 and another sbo 4/28/15\par 6/10/2015 Colonoscopy: Inflamm ST mass on ileal side w ulceration/scarred/narrow\par 6/11/2015 CT: dilated thickened sb loops distal jej & ileum\par Post-op **probably some malabsorption 2/2 to removal of R Colon and ileum: ?bile/fat/SIBO\par WT. Loss: r/o malabsorption, ?bile-induced--secretory vs decr micelles, active dis, PLE\par r/o SIBO--Elev FA, Alb=3.4-->3.1-->2.9--> (7/28/17)\par ?SIBO/Prevent post-op recurrence --Flagyl 250 mg qid~12/7/16-->d/c: 5/11/17\par Also discussed trial of Cholestyramine/Xifaxin -wanted to wait\par **ACTIVE Crohn's--11/10/16: 9.5lb wt loss, BMs: #5-6, 5x/D--Taking Magnesium \par 12/1//16 MRE: RUQ ileocolonic anastamosis--narrowed w upstream dilatation to 3.2 cm\par Labs: Stool Wtmjldnqxlzv=654, + Incr Fecal fat, Alb=3.4, FA =23, D00=575, Hgb=12.3,ESR=10,CRP <5\par 4/19/17 :Colonoscopy: Anastamosis: open w mild -mod active colitis, enteritis severe adj to anastomosis, mild more proximal, remainder of colon=wnl\par 5/11/17- Adm [facility] w stools brown, Hgb=7.9, BUN=17, Creat=1.4, Alb=2.9.\par S/P 2 Units w Hgb=10.6, BUN=15/1.1, Prednisone 40mg taper, entocort d/dee\par 6/8/17: Hgb=7.4, MCV=98, CRP<5--ASA stopped, Pred20--> 30mg, 6/13/17: s/p 2 Unit PRBCs\par 7/11/17 [facility] w unsteadiness. MRI Head: R Cortical Temporo-occipital encephalomalacia\par Hgb=9.9--> 8.4->9.7 after 1 Unit. Seen by Heme: received IV Iron \par CRP<5, A10=973, HVG=191, FA>23,Iron=22,Sat%=6, Ferritin=42, Alb=3.5. D/C on warfarin 2mg\par 7/28/17 Hgb=7.7; 7/31/17-s/p 1 Unit \par Heme Consultation: received  IV Iron and 5 IV Copper:___\par **Entyvio :time 0=12/22/16 ( Humira 40mg QW); 1/5/17--2wks, s/p 3rd infusion at wk 6, \par #6 :6/21/17--held 2/2 Shingles, Last Infusions=9/19/17 , 10/17/17, 11/28/17, 1/16/18 ,2/16/18, 3/19/18,4/16/18, 5/14/18, 6/25/18, 8/7/18, 9/17/18, 10/16/18, 11/13/18, 12/11/18, 1/14/19, 2/15/19, 3/15/19, 5/16/19, 6/18/19\par Entocort 9mg qd: 12/7/16--d/dee 5/11/17\par **Prednisone 40mg qd (5/11/17)--tapered 5mg/wk to 20mg qd, 6/8/17 30mg, \par 7/25/17 25mg, 3wks ago 20--> 15mg, 10/19/17 10mg alt w 7.5-->11/16/17 10mg alt w 5mg-->11/30/17: 5mg-->12/21/17: 5 alt w 2.5mg-->1/18/18: 2.5mg qd-->2.5mg qod--> d/c \par Probiotics 3 qd \par Lactose free, protein drinks tid\par MCT oil begun\par **trend --cbc, esr, crp, albumin\par **monitor wt= 120-->134-->134 --> 135--> 132 w clothes-->133--> 131 (Low carbs now)--> 129--> 127-->126-->124-->125-->124--> 126-->125-->125-->125________\par Wt Loss : sig change since May-June/2018\par 8/17/17: Hgb=9.9, ESR=20, Ss=980--Jpmomjtmbq s/p GI infection w N/V/D, no obstruction\par 10/17/17: Hgb=7.9,ESR=6,CRP<5, INR=1.6, Got IV Iron x 2, since 10/2/17 for Iron<10, Hgb=8.8\par 11/16/17: Hgb=11.2, ESR=14, CRP=12, 10/26/17 Stool fat-neg, calprotectin-30\par 11/13/17: MRE-Chr mild distension of distal & vladislav-ileum s/o mild stricturing at anast, mild mural thick & mucosal enhancemt ~ 20cm; little change from 2015 c/w mild acute on chr ileitis, 2.1 cm Liver hemangioma\par 10/9/18: Hgb=11.6, MCV=94, ESR=14, CRP=5.4, INR=2.2\par 10/10/18 MRE: stricturing of neoterminal ileum w worsened upstream dilatation, moderate length of upstream ileum w stricturing extending at least 20cm and more extensive then previous. suggestion of 2 adj extraluminal fluid collections which may be w/i the wall of strictured ileum near the ileocolonic anastomosis. surrounding spiculation and tethered appearance of bowel in this region.\par CTE: no discrete collection or intramural fistula, but mucosal enhancemt\par Today: 8/1/19  : feeling ok , Wt=125___off prednisone, BMs Brown: #5_, Hgb=11.7, \par CRP <5-->17-->0.36-->6.5-->5.5: ? s/p recent ankle fx/stress rx/tendonitis and tooth infection \par prior stool studies w elev  calprotectin and No fat\par Wt loss-- 2/2 to low carbs-->fat burning and flare ; advised to liberalize and add protein, ensure\par Plan: Prednisone d/c 2/20/18\par Entyvio q 6 wks --> 4 weeks \par check inflammatory markers: 2/28/19 w ESR=12, CRP=6.5 & 2/21/19 stool calprotectin--> 232\par 7/2/19: ESR=12, CRP=5.5\par ? cipro + flagyl \par pt to call numbers given for  [facility] or investigational rx's--biologics.  \par Colonoscopy to be scheduled           \par cbc,esr,crp--pending\par check stool calprotectin/fat                                                                                                                                                                                                                                                                                                                                                                                                                                                                                                                                                                                                                                                                                                                                                                                                                                                                                                                                                     \par 2. Iron deficiency anemia due to chronic blood loss  \par  had initially dropped , after clinical flare and post procedure bleeding\par Probably multifactorial: ACD, Blood loss, malabsorption/nutrient deficiencies\par Eliquis-->warfarin after CVA, On Entyvio and prednsione for active dis\par Still requiring IV Iron--prn, \par s/p IV Cu & transfusions \par 7/11/17 Recent Adm for unsteady gait w MRI c/w CVA--warfarin begun: possible better control of AC\par Chromagen 2 qd--> IV Iron , s/p IV Cu x 5, FA 1mg qd , B12 SL & IM\par  1/14/19: Hgb=10.7, INR=1.6, 2/5/19: Hgb=11.2,INR=1.5; 2/28/19: Hgb=11.5, Nphzzfal=087; 3/11/19: INR=2.6\par 4/11/19: hgb=9.7, INR=1.6, 7/2/19: Hgb=11.7, Ferritin=41,INR=2.2\par 7/13/17: Z35=395, IPM=424,FA>23; 1/3/18 H41=705, Fy=018, Zinc=55; 6/6/18 G83=384, 9/5/18: Ja=574, Zinc=67\par 11/28/18 J90=016, 7/2/19: Wk=859, Zinc=61,B6=2.8, \par B12 1cc IM ____R. delt--  not Given today, \par Last Iron infusion=7/11/19 , Iron =61,  Ferritin=41\par Hem consult IV Iron /Cu given malabsorption, ? BM bx --r/o occullt etiology--on hold\par trend cbc\par Adj INR--closer to low 2's.    \par Agree w Heme--Prevnar-13\par \par \par \par 3. Other osteoporosis without current pathological fracture  \par : Progression on BD from 5/5/16\par Crohns, h/o steroid use\par Calcium citrate & Vit D per Endo\par Forteo\par Repeat BD of 8/2018 --showed worsening to Osteoporosis from 2016\par Rec Endo consult w Dr. Akers :Osteoporosis center at [facility]--pt never went originally \par 9/21/18: Phos=2.4, Ca=8.7, Alb=3.4, Vit D=27, PQL=679, \par 10/16/18: Phos=2.8, Ca=8.7, Alb=3.5,\par 1/14/19: Phos=2.6,  Ca=8.7, Alb=3.6\par 2/28/19: Phos=1.8, Ca=8.9, Alb=3.7, VitD=39, UNR=765\par 3/18/19: Ca=8.5, Alb=3.7, Vit D=44.6, PTH=93\par 7/11/19: Ca=9.1, Alb=3.7, Phos=3.9, Vit D=40/ 306, ANA=806\par Dr. Akers: Hyperparathyroidism-- probably secondary due to low Vit D/Ca & ? superimposed primary, Rx replete Vit D and Calcium\par trend Vit D, Ca, Phos, PTH,  \par [facility] ENT Consult init felt  Parathyroid scan showing activity in mediastinum is ectopic parathyroid, feels sx not indicated at this time, elev PTH is secondary to low  Vit D/Ca--to be reconsulted\par BD--8/2019.    \par \par \par \par 4. Gastroesophageal reflux disease with esophagitis  \par  well controlled, no ht burn, no dysphagia, LPR\par Dry cough, CXR:ana, saw ENT bergstein-->LPR\par ( +++)LPR, (_ ++)Barretts w (_ No)Dysplasia, (_ No, H/O )Esophagitis grade: (__ ), \par Anti-reflux diet and life-style changes emphasized. (_No ) Bedge. \par reg exercise emphasized. \par **(_ ++)PPI: ( Protonix 40 mg qd ), (++) H2B Qhs: ( Zantac 300mg--> Pepcid 40mg ), \par (_ ) Carafate 1gm:\par **(_ ++)F/U EGD: (_ 4)mo. / (_2019 )yrs\par (_ No)pH Monitor, (_No )Manometry, (_No )esophagram \par (_f/u )ENT eval., (_ No)Surgical eval.    \par \par \par \par 5. Internal hemorrhoids  \par  well-controlled , no swelling, itching, bleeding\par Discussed the potential complications of thrombosis, pain, bleeding, swelling, itching, infection.\par Moderate -Fiber Diet was reviewed and emphasized.\par 6 - 8 cups of decaffeinated fluid daily\par (_++ ) Sitz Bathes BID, (_++++ ) Anusol HC Suppos/Cream AK QHS ,\par (_No ) Tucks BID, (_ No) Balneol Lotion,(_ No) Calmoseptine Oint, (_ ++) Prep H prn\par (_No ) Colorectal Surgical evaluation for possible ablation.    \par \par \par \par \par 6. Barretts esophagus without dysplasia  \par Notes: see GERD.    \par \par \par \par 7. Hepatomegaly-->not confirmed by recent abd sono\par CTE w relative enlargemt, ? lobulation & enlarged portal/mesenteric veins\par LFTs-wnl, no ascites or edema\par R/O CLD, Hepatic vein thrombosis\par Abd sono w doppler--> Liver and spleen normal size, no thrombosis, normal portal and hepatic vein flow\par \par \par \par \par Informed Consent:\par * The risks & Benefits of EGD  /  Colonoscopy were discussed w patient.\par * This included but was not limited to perforation, bleeding, sedation /med rxns possibly requiring surgery, blood transfusions, antibiotics & CPR/Intubation.\par * Pt. understands & agrees to the procedures.\par * Pt. advised to D/C  ASA/NSAIDs  7  Days  &   Warfarin,  4  -  5  Days  PTP.\par * [ +++ ]  Dulcolax / Miralax / Mag. Citrate ,  [     ] Prepopik/ Clenpiq ,  [     ] Osmo Prep,  [    ] GoLytely,  prep. reviewed w Pt.\par * Hold  [           ] AM of procedure.\par * Hold  [           ] PM of procedure.\par * Take  [           ] PM of procedure.\par * Take  [           ] AM of procedure.\par \par

## 2019-08-05 ENCOUNTER — LABORATORY RESULT (OUTPATIENT)
Age: 54
End: 2019-08-05

## 2019-08-15 ENCOUNTER — APPOINTMENT (OUTPATIENT)
Dept: HEMATOLOGY ONCOLOGY | Facility: CLINIC | Age: 54
End: 2019-08-15
Payer: COMMERCIAL

## 2019-08-15 ENCOUNTER — RESULT REVIEW (OUTPATIENT)
Age: 54
End: 2019-08-15

## 2019-08-15 VITALS
BODY MASS INDEX: 18.04 KG/M2 | RESPIRATION RATE: 18 BRPM | HEART RATE: 75 BPM | OXYGEN SATURATION: 97 % | HEIGHT: 70 IN | WEIGHT: 126 LBS | TEMPERATURE: 98.1 F | DIASTOLIC BLOOD PRESSURE: 68 MMHG | SYSTOLIC BLOOD PRESSURE: 109 MMHG

## 2019-08-15 PROCEDURE — 99213 OFFICE O/P EST LOW 20 MIN: CPT

## 2019-08-20 ENCOUNTER — LABORATORY RESULT (OUTPATIENT)
Age: 54
End: 2019-08-20

## 2019-08-20 NOTE — HISTORY OF PRESENT ILLNESS
[0 - No Distress] : Distress Level: 0 [FreeTextEntry1] : s/p injectafer, copper\par warfarin [de-identified] : Patient is a 52 year old who is referred for initial consultation for anemia secondary to Crohns/GI bleed vs Iron Deficiency/ Vit b12 def. 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 Patient is status post ileo-colonic resections for SBO  in 2016. In 2016 patient had complaints of 10 - 15 lb weight loss. Labs at that time showed Hgb of 12, steatorrhea and stool calprotectin = 236. In 2016 MRI/MRE with narrowing at ileocolonic anastomosis with upstream dilation. Pt refused bridge with  prednisone and Entocort / Flagyl. In 2017 patient Hgb dropped to 9.5. On 2017 patient underwent an EGD and Colonoscopy. Findings consistent with Page's and no dysplasia or AVMs. Colonoscopy showed an open anastomosis but active disease, moderate on the colonic side and limited to the anastomosis and moderate to severe enteritis, just proximal to the anastomosis. Pat had routine labs on 05/10/2017 Hgb: 8.3.  [de-identified] : Patient presents for follow up of  anemia, DVT, MTHFR gene mutation follow up, currently on Coumadin 3mg daily. His last Injectafer was approximately 5 weeks ago. He is having some bruising. His endoscopy and colonoscopy are upcoming. His had a an elevated parathyroid hormone and is getting calcium in the infusion unit

## 2019-08-20 NOTE — CONSULT LETTER
[Dear  ___] : Dear  [unfilled], [Please see my note below.] : Please see my note below. [Consult Letter:] : I had the pleasure of evaluating your patient, [unfilled]. [Sincerely,] : Sincerely, [Consult Closing:] : Thank you very much for allowing me to participate in the care of this patient.  If you have any questions, please do not hesitate to contact me. [DrMeron  ___] : Dr. REARDON [FreeTextEntry3] : Angie Biswas MD\par [facility] [place] [facility]\par

## 2019-08-20 NOTE — ASSESSMENT
[FreeTextEntry1] : 1.  Anemia- Hg  12.2\par  -blood loss, anemia of chronic disease, \par - copper deficiency - replaced, check today\par - Continue B12 injection, level still low - increase to q 2 weeks\par - ferritin 80 - IV injectafer q 5 weeks\par \par 2. Osteoporosis \par - left ankle- stress fx- advanced  osteoporosis, patient with h/o steroids use\par - started Forteo with Dr. Akers\par - will bbe starting IV calcium \par \par  3.TIA with MTHFR and h/o DVT -  \par not a candidate for DOAC b/o small bowel resection\par - INR home monitoring of warfarin takes 3 mg daily\par - \par \par \par 4. Hypogammaglobulinemia\par - s/p  Prevnar 13 12/2018 \par - check ab in 6 m\par - needs Shingrex\par \par History of present illness, review of systems, physical exam and treatment plan reviewed with . \par \par Office visit in 6  weeks or prn for new or worsening symptoms. \par

## 2019-08-20 NOTE — REVIEW OF SYSTEMS
[Fatigue] : fatigue [Joint Pain] : joint pain [Negative] : Heme/Lymph [Eye Pain] : no eye pain [Vision Problems] : no vision problems [Dysphagia] : no dysphagia [Hoarseness] : no hoarseness [Chest Pain] : no chest pain [Shortness Of Breath] : no shortness of breath [Lower Ext Edema] : no lower extremity edema [Vomiting] : no vomiting [Cough] : no cough [Constipation] : no constipation [Diarrhea] : no diarrhea [Skin Rash] : no skin rash [Dysuria] : no dysuria [Dizziness] : no dizziness [Anxiety] : no anxiety [Insomnia] : no insomnia [Depression] : no depression [Muscle Weakness] : no muscle weakness [Easy Bleeding] : no tendency for easy bleeding [Easy Bruising] : no tendency for easy bruising

## 2019-08-23 ENCOUNTER — APPOINTMENT (OUTPATIENT)
Dept: ENDOCRINOLOGY | Facility: CLINIC | Age: 54
End: 2019-08-23
Payer: COMMERCIAL

## 2019-08-23 VITALS
WEIGHT: 125 LBS | HEART RATE: 68 BPM | BODY MASS INDEX: 18.73 KG/M2 | OXYGEN SATURATION: 100 % | RESPIRATION RATE: 16 BRPM | SYSTOLIC BLOOD PRESSURE: 100 MMHG | HEIGHT: 68.5 IN | DIASTOLIC BLOOD PRESSURE: 60 MMHG

## 2019-08-23 PROCEDURE — 99215 OFFICE O/P EST HI 40 MIN: CPT

## 2019-08-23 RX ORDER — FOLIC ACID 1 MG/1
1 TABLET ORAL
Qty: 60 | Refills: 0 | Status: DISCONTINUED | COMMUNITY
Start: 2017-05-12 | End: 2019-08-23

## 2019-08-23 RX ORDER — TERIPARATIDE 250 UG/ML
600 INJECTION, SOLUTION SUBCUTANEOUS
Refills: 0 | Status: DISCONTINUED | COMMUNITY
End: 2019-08-23

## 2019-08-23 RX ORDER — FAMOTIDINE 40 MG/1
40 TABLET, FILM COATED ORAL
Qty: 90 | Refills: 0 | Status: DISCONTINUED | COMMUNITY
Start: 2017-05-18 | End: 2019-08-23

## 2019-08-23 RX ORDER — CHROMIUM 200 MCG
TABLET ORAL
Refills: 0 | Status: DISCONTINUED | COMMUNITY
End: 2019-08-23

## 2019-08-28 NOTE — PHYSICAL EXAM
[Alert] : alert [No Acute Distress] : no acute distress [Normal Sclera/Conjunctiva] : normal sclera/conjunctiva [No Proptosis] : no proptosis [EOMI] : extra ocular movement intact [No Thyroid Nodules] : there were no palpable thyroid nodules [No Respiratory Distress] : no respiratory distress [No Accessory Muscle Use] : no accessory muscle use [Clear to Auscultation] : lungs were clear to auscultation bilaterally [Normal Rate] : heart rate was normal  [Normal S1, S2] : normal S1 and S2 [Regular Rhythm] : with a regular rhythm [Pedal Pulses Normal] : the pedal pulses are present [No Edema] : there was no peripheral edema [Normal Bowel Sounds] : normal bowel sounds [Not Tender] : non-tender [Soft] : abdomen soft [Not Distended] : not distended [Anterior Cervical Nodes] : anterior cervical nodes [Post Cervical Nodes] : posterior cervical nodes [Normal] : normal and non tender [Axillary Nodes] : axillary nodes [Spine Straight] : spine straight [No Spinal Tenderness] : no spinal tenderness [No Stigmata of Cushings Syndrome] : no stigmata of cushings syndrome [Normal Gait] : normal gait [No Rash] : no rash [Normal Strength/Tone] : muscle strength and tone were normal [Acanthosis Nigricans] : no acanthosis nigricans [Normal Reflexes] : deep tendon reflexes were 2+ and symmetric [No Tremors] : no tremors [de-identified] : underweighted  [Oriented x3] : oriented to person, place, and time [de-identified] : mildy enlarged thyroid on exam

## 2019-08-28 NOTE — HISTORY OF PRESENT ILLNESS
[FreeTextEntry1] : Mr. GUDELIA DUNN is a 54 year old male\par  coming for a f/u , for severe osteoporosis with bilateral hip fractures, hyperparathyroidism, low D and hypocalcemia  secondary  to Chron's disease\par on Vit D 50,000 IU 4 times a week\par \par started Forteo May 10 2019\par last DEXA 8/2018\par \par had first IV calcium last Friday at the infusion center\par labs done next Tuesday after the infusion \par \par Ca citrate 950mg 3 tab bid \par reports compliance \par labs reviewed much higher  iPTH  despite 25 OH Vit D at 40  and calcium at 9.1mg/dl  Received IV iron \par 24hr urine low calcium while low D \par parathyroid scan reviewed parathyroid adenoma versus neoplasm mediastinum\par US thyroid reviewed FNA was advised\par \par        MRI pelvis done 10/3/18 reviewed :\par        There are insufficiency fractures of the bilateral femoral heads without significant subchondral collapse at this time. There are associated moderate to large joint effusions.\par        There are additional microtrabecular/insufficiency fractures involving the sacrum as well as a few foci involving the iliac margins of the sacroiliac joints. No displaced or cortical fracture.\par        Mild degenerative arthrosis of the right hip.\par        sees GEN Chen will see him today again, bone stimulator is considered\par        dental issues : had an implant failure 2 months ago, bone graft 2 months ago, \par        Osteoporosis \par        has Chron disease sees Dr Lugo , never gained weight back after colon resection\par        received iron infussion and copper infusions\par   \par          DEXA \par        LS T scroe -3.8\par        stress fractures \par        9/5/18 right foot Fx stress Fx reviewed\par        Impression:\par        Trabecular fractures involving the cuboid, the partially visualized anterior process of the calcaneus, the lateral cuneiform as well as the third metatarsal base. No evidence for associated cortical break. There is improvement in/essentially complete resolution of the previously described microtrabecular fractures within the partially visualized talus, the navicular as well as the middle and medial cuneiforms.\par        Subacute to chronic mild subchondral fracture of the second metatarsal head.\par        Moderate degenerative changes of the hallux sesamoid complex\par        left ankle pain 6/18 MRI:\par        Impression:\par        Microtrabecular/fracture/stress reaction involving the talar body and navicula without evidence for cortical break. There are additional stress-related changes involving the cuneiforms, the cuboid and distal tibia, as above. Associated moderate tibiotalar joint effusion.\par        Mild to moderate peroneal tendinosis with superimposed segmental longitudinal splitting, as above.\par        has pain hip right will have MRI with Iraan\par        has h/o kidney stones 6 yrs ago went to [place] [facility] Dr Morris did surgery.\par seen Dr mercado, per pt he was advised against surgery, no notes available will request for review

## 2019-09-12 ENCOUNTER — RESULT REVIEW (OUTPATIENT)
Age: 54
End: 2019-09-12

## 2019-09-19 ENCOUNTER — APPOINTMENT (OUTPATIENT)
Dept: HEMATOLOGY ONCOLOGY | Facility: CLINIC | Age: 54
End: 2019-09-19
Payer: COMMERCIAL

## 2019-09-19 ENCOUNTER — RESULT REVIEW (OUTPATIENT)
Age: 54
End: 2019-09-19

## 2019-09-19 VITALS
DIASTOLIC BLOOD PRESSURE: 69 MMHG | HEIGHT: 68.5 IN | BODY MASS INDEX: 18.88 KG/M2 | OXYGEN SATURATION: 97 % | HEART RATE: 88 BPM | WEIGHT: 126 LBS | RESPIRATION RATE: 18 BRPM | TEMPERATURE: 97.7 F | SYSTOLIC BLOOD PRESSURE: 120 MMHG

## 2019-09-19 PROCEDURE — 99213 OFFICE O/P EST LOW 20 MIN: CPT

## 2019-09-20 ENCOUNTER — RX RENEWAL (OUTPATIENT)
Age: 54
End: 2019-09-20

## 2019-09-26 ENCOUNTER — APPOINTMENT (OUTPATIENT)
Dept: GASTROENTEROLOGY | Facility: CLINIC | Age: 54
End: 2019-09-26
Payer: COMMERCIAL

## 2019-09-26 VITALS
SYSTOLIC BLOOD PRESSURE: 118 MMHG | BODY MASS INDEX: 18.88 KG/M2 | HEART RATE: 102 BPM | WEIGHT: 126 LBS | HEIGHT: 68.5 IN | DIASTOLIC BLOOD PRESSURE: 82 MMHG

## 2019-09-26 PROCEDURE — 96372 THER/PROPH/DIAG INJ SC/IM: CPT

## 2019-09-26 PROCEDURE — 99214 OFFICE O/P EST MOD 30 MIN: CPT | Mod: 25

## 2019-09-26 RX ORDER — CYANOCOBALAMIN 1000 UG/ML
1000 INJECTION INTRAMUSCULAR; SUBCUTANEOUS
Qty: 0 | Refills: 0 | Status: COMPLETED | OUTPATIENT
Start: 2019-09-26

## 2019-09-26 RX ADMIN — CYANOCOBALAMIN 0 MCG/ML: 1000 INJECTION INTRAMUSCULAR; SUBCUTANEOUS at 00:00

## 2019-09-26 NOTE — HISTORY OF PRESENT ILLNESS
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-->Eliquis' warfarin \par        GERD w Page's, Hemorrhoids, BPH, \par        S/P Ileocolonic resection 1998\par        Since then multiple SBOs\par \par \par        Got IV Iron x 2, since 10/2/17\par        10/19/17: feeling better, Wi=126 on prednisone 15mg x 3weeks, BMs Brown: #5-6, 1-2/D, occas 3-4/d\par        10/25/17: Hgb=10, ESR=5, CRP=5, Alb=3.6, Phos=2, Ahjqsvqr=733, R55=256\par        11/16/17: Hgb=11.2, ESR=14, CRP=12, \par        11/13/17: MRE-Chr mild distension of distal & vladislav-ileum s/o mild stricturing at anast, mild mural thick & mucosal enhancemt ~ 20cm; little change from 2015 c/w mild acute on chr ileitis, 2.1 cm Liver hemangioma\par        11/28/17: Entyvio\par        11/29/17: Hgb=9.6, ESR=10, CRP=5.8, Alb=3.8,Ferritin-19, Iron=14,Sat=4%, Phos=2.5, Xr=695, BMs:# 5, 1-2x/D, brown,\par        12/19;17: Hgb=10.1, ESR=12, CRP=6.5; 12/21/17: BMs:#3-5, 1-3x/D , brown, no blood, no pain, da=410\par        1/3/18:Hgb=11.7, ESR=19, CRP=6.5, Alb=4.1, Gcablwnr=274, Iron=31, Sat%=9,Zinc=55\par        1/16/18: Entyvio, Hgb=11.4, ESR=10, CRP=6.9\par        1/18/18: Today: cellulitis R foot, saw dr KEITH--rx augmentum x 10D, Entyvio 1/9 to 1/16, de=290 w clothes,BMs: # 4-5, 1-2x/D \par        2/14/18: Hgb=11.1, ESR=16, CRP=11.6, Alb=3.6, Iron=28, Ferritin=23, INR=1.5 \par        2/16/18: s/p Entyvio; Iron infusion, again on 2/27\par        2/20/18: Hgb=10.6, ESR=20, CRP=12, INR=1.7\par        2/27/18: s/p iron infusion\par        3/9/18: Dr. Burden for tenosynovitis, rx w Zorvolex: Diclofenac x 5 days--? response\par        3/14/18: Fo=790; BMs: # 5, 1-2x/D; Hgb=11, ESR=18, CRP=11,Irom=45,Tdrwzjuk=611,Alb=3.6, INR=1.5\par        3/19/18: s/p Entyvio\par        3/22/18: md=698, BMs: # 5, 3-4 x/d\par        4/16/18: s/p Entyvio,Hgb=11.9, INR=1.3, ESR=18, CRP=8.4 \par        4/18/18 EGD: gastritis, no HP, no IM, 2+ Mucous, 0.5cm gastric polyp: fundic, 4cm HH, + Barretts:3cm, no dysplasia\par        4/25/18: Hgb=11.5, INR=1.6, Iron=40, Sat=13%, Ferritin=57, Alb=3.2, Phos=2.2, Mag=1.7\par        4/30/18: s/p Iron Infusion\par        5/14/18: s/p Entyvio \par        5/23/18: Hgb=11.5, ESR=16, CRP< 5\par        5/29/18: s/p Iron Infusion\par        6/6/18: Hgb=10.6, INR=1.7, Gkmiaexb=497, Alb=3.5, Phos=2.1, Y09=405, nz=970; BMs: # 5, 2-3x/D \par        6/19/18: Hgb=10.6, INR=1, CRP=15, ESR=23\par        6/21/18: R ankle is acting up, swollen, pain, having MRI done, took a couple of advil, on low carbs ,BMs: # 5, 1-2x/D, zs=850\par        6/26/18: MRI: fx/stress rxn-talar body/navicula; stress related changes--cuneform, cuboid,distal tibia; mod tibiotalar effusion, mild-mod peroneal tendinosis\par        7/19/18: entyvio 6/26/18, eliminated all carbs--anti inflammatory diet, yv=214, BMs: # 4-5, 1-3x/D \par        7/25/18: Hgb=10, INR=1.3, Alb=3.3, Phos=2.4, Eeesyzrk=248, sat%=12, Iron=35\par        8/2/18: BD--osteoporosis of hip/spine: sig decrease BD--17.6% of hip, 17% of spine, osteopenia of wrist: 6.4%\par        8/7/18: Entyvio\par        8/21/18: Hgb=11.1, INR=1.5, ESR=19, CRP=18.6\par        8/23/18: recently w tooth infection, old implant--loose and removed; rx w amox, and advil\par        eating almost no carbs, pd=515, # 5-6, 2-3x/d \par        8/24/18: Stool fat--neg, Gyxwjoupgnhl=666\par        9/5/18: Hgb=11.4, INR=1.3, ESR=9, CRP=11.3, Iron=41, Eoounglk=953, Alb=3.8,\par        9/17/18: entyvio, Hgb=10.7, INR=2.2, ESR=17, CRP=9.1, \par        9/20/18: wl=706, BMs: # 5-6, 1-2x/D \par        9/21/18: Phos=2.4, Ca=8.7, Alb=3.4, Vit D=27, SPG=190, \par        Dr. Akers: Hyperparathyroidism: probably secondary due to low Vit D/Ca & ? superimposed primary, rx replete Vit D and Calcium\par        10/9/18: Hgb=11.6, MCV=94, ESR=14, CRP=5.4, INR=2.2\par        10/10/18 MRE: stricturing of neoterminal ileum w worsened upstream dilatation, moderate length of upstream ileum w stricturing extending at least 20cm and more extensive then previous. suggestion of 2 adj extraluminal fluid collections which may be w/i the wall of strictured ileum near the ileocolonic anastomosis. surrounding spiculation and tethered appearance of bowel in this region.\par 10/16/18: entyvio, Hgb=11.7, MCV=94, ESR=14, CRP <5, Alb=3.5\par 10/18/18: Qb=915,   BMs: # 5-6, 3x/D , \par 11/13/18: Entyvio\par 11/21/18 CTE w C: limited 2/2 bowel underdistention, mucosal hyperenhancemt & extensive SM edema of distal ileum including vladislav-ileum, difficult to exclude a sml fluid collection, Liver w relative enlargement w suggestion of lobulation, enlargemt of PV,SMV,IMV,Spl V, rectal V. No ascites\par 11/28/18: Hgb=10, ESR=19, CRP=17,Alb=3.5,Iron=35, Sat%=11, Vxwooles=147, INR=1.3\par 11/29/18: vc=755, BMs: # 5-6, 3-4x/D\par 12/3/18: Abd sono--Liver 14.8cm Incr heterogenicity, spleen--wnl\par 12/11/18 & 1/14/19: Entyvio\par 1/14/19:Entyvio,  Hgb=10.7, ESR=15, Alb=3.6, Iron=36, Ferritin=67, Sat%=11, INR=1.6\par 1/24/19:  zz=079, stools are # 4-6, 3-4x/d , no pain\par 2/5/19: Hgb=11.2, ESR=14, CRP=0.36\par 2/28/19: Hgb=11.5, ESR=12, CRP=6.5, Alb=3.7, Iron=69, Kzjfxipb=607, Ca=8.9\par                Bms: # 4-5, 2-3x/D , hs=807,  Entyvio : last 2/1519,\par                had parathyroid scan pre-op, still w elev PTH, + activity in mediastinum,? ectopic parathyroid tissuevs neoplasm.  To see ENT and have Imaging at [facility]\par 3/28/19: Bms: # 4-5 , 3x/d ;as=990\par 4/11/19: Hgb-9.7, Iron=45, ESR=18, CRP=9.4, Alb=3.5, \par Saw Endo SX at [facility], felt hyperparathyroid is secondary to Low Ca/Vit D, no sx at this time\par 4/16/19: BMs: # 5-6, 3-4x/D, fp=782,  Entyvio : last 3/1519,  got IV iron 4/12/19 for Ferritin=53\par 4/27/19: s/p R Hip Nailing--missed 4/2019 2/2 fresh wound\par 5/16/19 & 6/18/19 Entyvio\par 7/2/19: Hgb=11.7, Ferritin=41,ESR=12, CRP=5.5, B6=2.8,Mag=1.8,Phos=3.9,Or=613,Zinc=61\par 7/11/19: s/p IV iron\par 7/11/19: Alb=3.7, YOJ=922, Ca=9.1, Vit D=40/306, \par 7/24/19: Entyvio, Alb=3.8, TIR=361, Ca=8.9, Vit D=128 \par 8/1/19: Bms: # 5-6, occas #4, 2-3x/D , re=668\par 8/15/19: Hgb=12, ESR=10, CRP=5.2, Ferritin=68, Alb=3.4, Phos=4.4,Mg=1.7, Ca=8.5,Calprotectin=88,\par 8/20/19: HSK=052, Ca=9, Alb=4.2\par \par  Today:  Bms: # 5-6, 2-3x/D , no pain, eating more calories, liberalizing carbs--avoiding simple sugars.  \par      \par       \par        xy=009 \par        Abd pain--no \par        Nausea--no \par        Vomit--no \par        Early satiety-no \par        Belching-no \par        Regurgitation--no \par        Ht burn--no \par        Throat Clearing-no\par        Globus-no\par        Hoarseness--no \par        Dysphagia--no \par        BMs: # # 5-6, 2-3x/D\par        Constipation--no \par        Diarrhea- intermittent \par        Bloating--no \par        Flatulence-no\par        Gurgling--no \par        Melena--no \par        BRBPR--no \par        Anorexia-no \par        Wt Loss--yes\par \par \par \par        PRIOR HISTORY--2017\par \par        1/17/17: Hgb=9.2, ESR=6, CRP=5; 1/19/17: BMs: #4, 5-6, 2-3x/D, Pg=052, Started Creon 1 tid cc\par        3rd Entyvio begining Feb\par        2/14/17: Labs; Hgb=9.6, MCV=97, ESR=5, CRP< 5, L95=168, FA>23, Iron=59, Retic =3.2,\par        2/17/17 Fecal Calprotectin=16, Fats: Increased neutral & Split\par        3/13/17: Labs Hgb=9.5, MCV=95, ESR=7, CRP <5, ALB=3.1\par        3/16/17: lot of stress, to move -[place], had a job-fell thru, BMs: # 5, 2-4 x/D, wt= 131w clothes\par        4/19/17 EGD: gastritis, MACKENZIE, No HP, 2cm HH, +Barretts, No Dysplasia\par        Colonoscopy: Anastamosis: open w mild -mod active colitis, enteritis severe adj to anastomosis, mild more proximal, remainder of colon-wnl, 2-3 deg int hemorrhoids\par        4/26/17 : Hgb=7.3, MCV=95, ESR=13, CRP<5;Immediately post procedure stools very dark on eliquis/iron.\par        Admitted to [facility]: Hgb=8.3-->8.9 after 2 U, Alb 3.3, BUN=17, creat=1.3, Malina/Utuyi=322/460\par        4/27/17: Alb=2.3, BUN=12, creat=1.2, Malina/Lipase=209/863-No abd pain, N/V; 5/5/17: Hgb=10.7.\par        5/8/17 Entyvio iv. 5/8-5/9 w dark red diarrhea; 5/10/17 Hgb=7.8.\par        5/11/17 Adm [facility] w stools brown, Hgb=7.9, BUN=17, Creat=1.4, Alb=2.9; S/P 2 Units- Hgb=10.6, BUN=15/1.1.\par        Prednisone 40mg qd, entocort d/dee.; 5/18/17: Hgb-10.4, yo=955, BMs: #5, 1-2x/D, no pain\par        6/8/17: Hgb=7.4,MCV=98, CRP<5-ASA stopped, Pred20--> 30\par        6/10/17: Hgb=8.2, Alb=2.9, BUN=20/1.2 ; 6/12/17: Hgb=8.3, Retic=0.19, Iron=10, Sat%=3, Ferritin=57, FA=23,W23=539, 6/13/17: s/p 2 Unit PRBCs\par        6/15/17: started MCT oil, Xl=200 , BMs: # 5, 1-2x/D, stools are brownish/green \par        7/11/17: [facility] w unsteadiness. MRI Head: R Cortical Temporo-occipital encephalomalacia, MRA H&N-no sign lesions, PER-wnl.Hgb=9.9--> 8.4->9.7 after 1 Unit. Seen by Torri: received IV Iron \par        CRP<5, I88=744, KPI=949, FA>23,Iron=22,Sat%=6, Ferritin=42, Alb=3.5. D/C on warfarin 2mg\par        7/20 Hgb=8.5, 7/28 Hgb=7.7, 7/31-s/p 1 Unit \par        As outpt seeing Heme, to get IV Iron and 5 IV Copper\par        Had 'FLU' Fever=101, chills, bloat, N/V/D, Bilious. no pain, melena,brbpr\par        Given anti-emetic by dr Butler,then able to keep things down\par        On prednisone 25, warfarin w INR bet 1.6-2\par        8/17/17: Hgb=9.9, ESR=20, CRP-P,Or=571, BMs: # 5, 1-2x/D, stools are brownish/green\par        10/2/17: Hgb=8.8, MCV=89,Phos=1.7, K=3.9, Mag=1.9, Alb=3.6,Iron<10,Ferritin=21, INR=2\par        10/17/17: Hgb=7.9,ESR=6,CRP<5, INR=1.6\par        10/25/17: Hgb=10, ESR=5, CRP=5, Alb=3.6, Phos=2, Rzjllkgl=745, V34=239\par        11/16/17: Hgb=11.2, ESR=14, CRP=12, \par        11/13/17: MRE-Chr mild distension of distal & vladislav-ileum s/o mild stricturing at anast, mild mural thick & mucosal enhancemt ~ 20cm; little change from 2015 c/w mild acute on chr ileitis, 2.1 cm Liver hemangioma\par        11/28/17: Entyvio\par        11/29/17: Hgb=9.6, ESR=10, CRP=5.8, Alb=3.8,Ferritin-P, Iron=14,Sat=4%, Phos=2.5\par        12/19;17: Hgb=10.1, ESR=12, CRP=6.5; 12/21/17: BMs:#3-5, 1-3x/D , brown, no blood, no pain, jv=654\par        1/3/18:Hgb=11.7, ESR=19, CRP=6.5, Alb=4.1, Yxmwyvtw=290, Iron=31, Sat%=9,Zinc=55\par \par \par        PRIOR HISTORY---2013:\par \par        2/20/13 CT markedly dilated sb loops ext to anast, colonoscopy w open anast ,mild-mod remy-anastamotic disease. quick response to entocort/humira--probably adhesive dis. \par        8/10/13 w GNR bacteremia, ADA 1.7 /KAYLAH 3.4, Humira 8/7, 8/13,8/18,9/15\par        CT- mucosal thickening and spiculation of the distal sb extending to the anastamosis, thickening and stranding of adj mesenteric fat.\par        Humira increased to 40mg weekly, entocort 4\par        9/2013 MRE--dilated loops in mid and distal ileum, markedly thickened and narrowed TI w decreased peristalsis of TI\par        11/15/13 ADA-24.9, KAYLAH-0\par \par \par        PRIOR HISTORY---2014:\par \par        2/15/14--CT dilated loops SB, loop of sb in mid abd 4.3cm w infiltrative changes in the mesentery, bowel tapers in the RLQ to the anastamosis w/o transition\par        WBC=15K, Rx w IV steroids and Abx \par        3/7/14 SBFT: last 10cm sb prox to anast mild distension and sl irregularity. In the mid portion of this loop there is a mild narrowing which appears to reopen but is some what narrowed.\par        3/20/14 ADA=33.1, KAYLAH=0, Lialda switch to Apriso 4 (2/2014)\par \par \par        PRIOR HISTORY--2015\par \par        1/11/15 Adm [facility] w 1 day N,Recurrent V, Abd pain/distension. CT-multiple distended & fluid-filled sb loops, mild wall thickening of ileum w No inflamm changes.\par        WBC=14.6, Hgb=17, RX w NGT suction, Levaquin iv, Solumedrol 40mg q 12( 1/12-->1/16) to prednisone 30mg BID w 5mg taper/wk\par        3/18/15 --Dr. Butler w edema /High BP, prednisone was tapered slowly to 2.5mg \par        switched to entocort 9mg qd, edema and bp improved w lasix 20mg \par        BMs:#4-5, 3x/D, Hgb=11, ESR=4, CRP<5\par        4/28/15 - 5/1/15: Adm [facility] w 1 day Abd pain, distension,N/V. 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 side of anast, opening appeared ulcerated,scarred & severely narrowed\par        6/11/15: [facility] w N/V x1, decr appetite, CT ABD: no obstruction, dilated thickened sb loops of distal jej and ileum\par        6/19/15 [facility]: s/p Lap w extensive lysis of adhesions, hepatic flex, sigmoid and sb anastamosis, s/p partial r colectomy and sb resection--side to side elisabeth: 8-10 inch from prior anast to TV colon.\par        7/17/15: BMs:#4-6, 4-5x/D, wt 131(from 126)\par        8/20/15: BMs: #4, 1-2x/D or #5, 2-3x/D, mc=045, dry cough,Hgb=10, WBC=3, ESX=140, CRP=56, ESR=21\par        8/25/15 CT Abd/Pelv: Svl RLQ sb loops w wall thickening, mild nodularity & inflamm stranding in mesentery\par        Advised-restart Entocort 9mg qd, Apriso 4 qd, Maintain Humira but given RX to check drug/Ab levels\par        9/15/15: did nt restart meds,Hgb=9.9, WBC=4, ESR=19, CRP=5.8, mp=092; [facility] : ADA=8.5, Antibodies< 1.7\par        10/15/15: No pain, BMs:#4, 1-2x/D, #5-6, 3-4x/D, throat clearing/cough-better, an=417\par        11/30/15: No pain, BMs:# 4, 5-6 intermittently, No throat clear, oe=735\par \par \par        PRIOR HISTORY-2016\par \par        1/22/16; 4/8/16; 6/6/16 : No pain, BMs:# 4-5 1-2x/D, also #5-6 3x/D for 2-3D/wk, rf=874\par        7/14/16: ji=000, 9/22/16: yt=153.5\par        11/10/16: 9.5lb wt loss, states BMs: 5-6, 5x/D--Taking Magnesium, No pain/anorexia\par        Labs: Stool Vqeroczikjts=470, + Incr Fecal fat, Alb=3.4, FA =23, J88=016, Hgb=12, ESR=10, CRP <5\par        Magnesium-d/c, Obtain MRE asap--Start steroids and possibly switch to Entyvio\par        DDx discussed: active crohns--loss response to Humira, Malabsorption--loss Bile acids, Bile-induced diarrhea, SIBO\par        Pt wanted to wait for imaging-did not start rx\par        12/1//16 MRE: RUQ ileocolonic anastamosis--narrowed w upstream dilatation to 3.2 cm\par        Pt refused pred, Started Entocort 9mg qd and Flagyl 250mg qid about 7-10days ago \par        awaiting Entyvio load to start 12/22, then 1/5; had Cut back on iron bid\par        12/15/16: BMs:# 5, 2-3x/D, occas #6, No pain, Less bloat/flatulence, Ur=487.

## 2019-09-26 NOTE — ASSESSMENT
[FreeTextEntry1] : 1. Crohns disease of both small and large intestine\par    \par CROHNS DISEASE-s/p ileocolonic resection x 2--last 6/19/15 for recurrent sbos: appears to be active , GI symtoms stable but labs were up CRP=18.6/ESR=19 & Ufmgtfbtkhdt=344 and Wt down ( inflam markers ? 2/2 to R ankle Fx/stress rx and tendinosis, also had tooth infection ) & MRE w ? ileal penetrating dis, wt =126______\par s/p 4 SBOs w/i 2 yrs--2/2 distal sb disease adj to anastamosis\par when on Humira weekly w ADA =33 (3/20/14), -but had sbo 2/2014 at ADA=25 and another sbo 4/28/15\par 6/10/2015 Colonoscopy: Inflamm ST mass on ileal side w ulceration/scarred/narrow\par 6/11/2015 CT: dilated thickened sb loops distal jej & ileum\par Post-op **probably some malabsorption 2/2 to removal of R Colon and ileum: ?bile/fat/SIBO\par WT. Loss: r/o malabsorption, ?bile-induced--secretory vs decr micelles, active dis, PLE\par r/o SIBO--Elev FA, Alb=3.4-->3.1-->2.9--> (7/28/17)\par ?SIBO/Prevent post-op recurrence --Flagyl 250 mg qid~12/7/16-->d/c: 5/11/17\par Also discussed trial of Cholestyramine/Xifaxin -wanted to wait\par **ACTIVE Crohn's--11/10/16: 9.5lb wt loss, BMs: #5-6, 5x/D--Taking Magnesium \par 12/1//16 MRE: RUQ ileocolonic anastamosis--narrowed w upstream dilatation to 3.2 cm\par Labs: Stool Xshsfxrlyvea=765, + Incr Fecal fat, Alb=3.4, FA =23, D00=537, Hgb=12.3,ESR=10,CRP <5\par 4/19/17 :Colonoscopy: Anastamosis: open w mild -mod active colitis, enteritis severe adj to anastomosis, mild more proximal, remainder of colon=wnl\par 5/11/17- Adm [facility] w stools brown, Hgb=7.9, BUN=17, Creat=1.4, Alb=2.9.\par S/P 2 Units w Hgb=10.6, BUN=15/1.1, Prednisone 40mg taper, entocort d/dee\par 6/8/17: Hgb=7.4, MCV=98, CRP<5--ASA stopped, Pred20--> 30mg, 6/13/17: s/p 2 Unit PRBCs\par 7/11/17 [facility] w unsteadiness. MRI Head: R Cortical Temporo-occipital encephalomalacia\par Hgb=9.9--> 8.4->9.7 after 1 Unit. Seen by Heme: received IV Iron \par CRP<5, D87=575, OZP=367, FA>23,Iron=22,Sat%=6, Ferritin=42, Alb=3.5. D/C on warfarin 2mg\par 7/28/17 Hgb=7.7; 7/31/17-s/p 1 Unit \par Heme Consultation: received  IV Iron and 5 IV Copper:___\par **Entyvio :time 0=12/22/16 ( Humira 40mg QW); 1/5/17--2wks, s/p 3rd infusion at wk 6, \par #6 :6/21/17--held 2/2 Shingles, Last Infusions=9/19/17 , 10/17/17, 11/28/17, 1/16/18 ,2/16/18, 3/19/18,4/16/18, 5/14/18, 6/25/18, 8/7/18, 9/17/18, 10/16/18, 11/13/18, 12/11/18, 1/14/19, 2/15/19, 3/15/19, 5/16/19, 6/18/19,7/24/19,\par 9/9/19, \par Entocort 9mg qd: 12/7/16--d/dee 5/11/17\par **Prednisone 40mg qd (5/11/17)--tapered 5mg/wk to 20mg qd, 6/8/17 30mg, \par 7/25/17 25mg, 3wks ago 20--> 15mg, 10/19/17 10mg alt w 7.5-->11/16/17 10mg alt w 5mg-->11/30/17: 5mg-->12/21/17: 5 alt w 2.5mg-->1/18/18: 2.5mg qd-->2.5mg qod--> d/c \par Probiotics 3 qd \par Lactose free, protein drinks tid\par MCT oil begun\par **trend --cbc, esr, crp, albumin\par **monitor wt= 120-->134-->134 --> 135--> 132 w clothes-->133--> 131 (Low carbs now)--> 129--> 127-->126-->124-->125-->124--> 126-->125-->125-->125-->126________\par Wt Loss : sig change since May-June/2018\par 8/17/17: Hgb=9.9, ESR=20, Ez=374--Luwcsykhsz s/p GI infection w N/V/D, no obstruction\par 10/17/17: Hgb=7.9,ESR=6,CRP<5, INR=1.6, Got IV Iron x 2, since 10/2/17 for Iron<10, Hgb=8.8\par 11/16/17: Hgb=11.2, ESR=14, CRP=12, 10/26/17 Stool fat-neg, calprotectin-30\par 11/13/17: MRE-Chr mild distension of distal & vladislav-ileum s/o mild stricturing at anast, mild mural thick & mucosal enhancemt ~ 20cm; little change from 2015 c/w mild acute on chr ileitis, 2.1 cm Liver hemangioma\par 10/9/18: Hgb=11.6, MCV=94, ESR=14, CRP=5.4, INR=2.2\par 10/10/18 MRE: stricturing of neoterminal ileum w worsened upstream dilatation, moderate length of upstream ileum w stricturing extending at least 20cm and more extensive then previous. suggestion of 2 adj extraluminal fluid collections which may be w/i the wall of strictured ileum near the ileocolonic anastomosis. surrounding spiculation and tethered appearance of bowel in this region.\par CTE: no discrete collection or intramural fistula, but mucosal enhancemt\par Today: 9/26/19  : feeling ok , Wt=126___off prednisone, BMs Brown: #5-6_, Hgb=12.8, \par CRP <5-->17-->0.36-->6.5-->5.5: ? s/p recent ankle fx/stress rx/tendonitis and tooth infection \par prior stool studies w elev  calprotectin and No fat\par Wt loss-- 2/2 to low carbs-->fat burning and flare ; advised to liberalize and add protein, ensure\par Plan: Prednisone d/c 2/20/18\par Entyvio q 6 wks --> 4 weeks \par check inflammatory markers: 2/28/19 w ESR=12, CRP=6.5 & 2/21/19 stool calprotectin--> 232\par 8/15/19: ESR=10, CRP=5.2, Calprotectin=88\par 9/19/19: ESR=9, CRP=5.5\par ? cipro + flagyl \par pt to call numbers given for  [facility] new or investigational rx's--biologics.  \par Colonoscopy to be scheduled           \par cbc,esr,crp--pending\par                                                                                                                                                                                                                                                                                                                                                                                                                                                                                                                                                                                                                                                                                                                                                                                                                                                                                                                                                 \par 2. Iron deficiency anemia due to chronic blood loss  \par  had initially dropped , after clinical flare and post procedure bleeding\par Probably multifactorial: ACD, Blood loss, malabsorption/nutrient deficiencies\par Eliquis-->warfarin after CVA, On Entyvio and prednsione for active dis\par Still requiring IV Iron--prn, \par s/p IV Cu & transfusions \par 7/11/17 Recent Adm for unsteady gait w MRI c/w CVA--warfarin begun: possible better control of AC\par Chromagen 2 qd--> IV Iron , s/p IV Cu x 5, FA 1mg qd , B12 SL & IM\par  1/14/19: Hgb=10.7, INR=1.6, 2/5/19: Hgb=11.2,INR=1.5; 2/28/19: Hgb=11.5, Xhpbejoi=003; 3/11/19: INR=2.6\par 4/11/19: hgb=9.7, INR=1.6, 7/2/19: Hgb=11.7, Ferritin=41,INR=2.2, 8/15/19: Hgb=12.2,Ferritin=81,INR=1.6\par 7/13/17: L33=337, VSM=684,FA>23; 1/3/18 L94=625, In=820, Zinc=55; 6/6/18 Y02=941, 9/5/18: Ou=946, Zinc=67\par 11/28/18 O81=247, 7/2/19: Nf=803, Zinc=61,B6=2.8, 8/15/19: Dc=497,Zinc=54,T70=879,\par B12 1cc IM ____R. delt--   Given today, \par Last Iron infusion=9/19/19 , Iron =73,  Bvessghj=892\par Hem consult IV Iron /Cu given malabsorption, ? BM bx --r/o occullt etiology--on hold\par trend cbc\par Adj INR--closer to low 2's.    \par Agree w Heme--Prevnar-13\par \par \par \par 3. Other osteoporosis without current pathological fracture  \par : Progression on BD from 5/5/16\par Crohns, h/o steroid use\par Calcium citrate & Vit D per Endo\par Forteo\par Repeat BD of 8/2018 --showed worsening to Osteoporosis from 2016\par Rec Endo consult w Dr. Akers :Osteoporosis center at [facility]--pt never went originally \par 9/21/18: Phos=2.4, Ca=8.7, Alb=3.4, Vit D=27, NMT=386, \par 10/16/18: Phos=2.8, Ca=8.7, Alb=3.5,\par 1/14/19: Phos=2.6,  Ca=8.7, Alb=3.6\par 2/28/19: Phos=1.8, Ca=8.9, Alb=3.7, VitD=39, IPX=265\par 3/18/19: Ca=8.5, Alb=3.7, Vit D=44.6, PTH=93\par 7/11/19: Ca=9.1, Alb=3.7, Phos=3.9, Vit D=40/ 306, EYP=593\par 8/15/19: Ca=9, Alb=4.2, Phos=4.4, VitD=59, WFZ=233\par Dr. Akers: Hyperparathyroidism-- probably secondary due to low Vit D/Ca & ? superimposed primary, Rx replete Vit D and Calcium\par trend Vit D, Ca, Phos, PTH,  \par [facility] E[facility] Consult init felt  Parathyroid scan showing activity in mediastinum is ectopic parathyroid, feels sx not indicated at this time, elev PTH is secondary to low  Vit D/Ca--to be reconsulted\par BD--9/2019: incr in spine, no change in wrist/hip\par \par \par \par 4. Gastroesophageal reflux disease with esophagitis  \par  well controlled, no ht burn, no dysphagia, LPR\par Dry cough, CXR:ana, saw ENT bergstein-->LPR\par ( +++)LPR, (_ ++)Barretts w (_ No)Dysplasia, (_ No, H/O )Esophagitis grade: (__ ), \par Anti-reflux diet and life-style changes emphasized. (_No ) Bedge. \par reg exercise emphasized. \par **(_ ++)PPI: ( Protonix 40 mg qd ), (++) H2B Qhs: ( Zantac 300mg--> Pepcid 40mg ), \par (_ ) Carafate 1gm:\par **(_ ++)F/U EGD: (_ 4)mo. / (_2019 )yrs\par (_ No)pH Monitor, (_No )Manometry, (_No )esophagram \par (_f/u )ENT eval., (_ No)Surgical eval.    \par \par \par \par 5. Internal hemorrhoids  \par  well-controlled , no swelling, itching, bleeding\par Discussed the potential complications of thrombosis, pain, bleeding, swelling, itching, infection.\par Moderate -Fiber Diet was reviewed and emphasized.\par 6 - 8 cups of decaffeinated fluid daily\par (_++ ) Sitz Bathes BID, (_++++ ) Anusol HC Suppos/Cream MN QHS ,\par (_No ) Tucks BID, (_ No) Balneol Lotion,(_ No) Calmoseptine Oint, (_ ++) Prep H prn\par (_No ) Colorectal Surgical evaluation for possible ablation.    \par \par \par \par \par 6. Barretts esophagus without dysplasia  \par Notes: see GERD.    \par \par \par \par 7. Hepatomegaly-->not confirmed by recent abd sono\par CTE w relative enlargemt, ? lobulation & enlarged portal/mesenteric veins\par LFTs-wnl, no ascites or edema\par R/O CLD, Hepatic vein thrombosis\par Abd sono w doppler--> Liver and spleen normal size, no thrombosis, normal portal and hepatic vein flow\par \par \par \par \par Informed Consent:\par * The risks & Benefits of EGD  /  Colonoscopy were discussed w patient.\par * This included but was not limited to perforation, bleeding, sedation /med rxns possibly requiring surgery, blood transfusions, antibiotics & CPR/Intubation.\par * Pt. understands & agrees to the procedures.\par * Pt. advised to D/C  ASA/NSAIDs  7  Days  &   Warfarin,  4  -  5  Days  PTP.\par * [ +++ ]  Dulcolax / Miralax / Mag. Citrate ,  [     ] Prepopik/ Clenpiq ,  [     ] Osmo Prep,  [    ] GoLytely,  prep. reviewed w Pt.\par * Hold  [           ] AM of procedure.\par * Hold  [           ] PM of procedure.\par * Take  [           ] PM of procedure.\par * Take  [           ] AM of procedure.\par \par

## 2019-10-04 ENCOUNTER — RX RENEWAL (OUTPATIENT)
Age: 54
End: 2019-10-04

## 2019-10-07 ENCOUNTER — APPOINTMENT (OUTPATIENT)
Dept: INTERNAL MEDICINE | Facility: CLINIC | Age: 54
End: 2019-10-07
Payer: COMMERCIAL

## 2019-10-07 VITALS
DIASTOLIC BLOOD PRESSURE: 68 MMHG | TEMPERATURE: 97.7 F | WEIGHT: 123 LBS | SYSTOLIC BLOOD PRESSURE: 102 MMHG | HEIGHT: 68.5 IN | BODY MASS INDEX: 18.43 KG/M2 | HEART RATE: 88 BPM

## 2019-10-07 DIAGNOSIS — M10.372 GOUT DUE TO RENAL IMPAIRMENT, LEFT ANKLE AND FOOT: ICD-10-CM

## 2019-10-07 DIAGNOSIS — Z86.2 PERSONAL HISTORY OF DISEASES OF THE BLOOD AND BLOOD-FORMING ORGANS AND CERTAIN DISORDERS INVOLVING THE IMMUNE MECHANISM: ICD-10-CM

## 2019-10-07 DIAGNOSIS — Z87.19 PERSONAL HISTORY OF OTHER DISEASES OF THE DIGESTIVE SYSTEM: ICD-10-CM

## 2019-10-07 DIAGNOSIS — B02.23 POSTHERPETIC POLYNEUROPATHY: ICD-10-CM

## 2019-10-07 DIAGNOSIS — I82.502 CHRONIC EMBOLISM AND THROMBOSIS OF UNSPECIFIED DEEP VEINS OF LEFT LOWER EXTREMITY: ICD-10-CM

## 2019-10-07 PROCEDURE — 99214 OFFICE O/P EST MOD 30 MIN: CPT

## 2019-10-07 PROCEDURE — 99204 OFFICE O/P NEW MOD 45 MIN: CPT

## 2019-10-07 RX ORDER — FAMOTIDINE 40 MG/1
40 TABLET, FILM COATED ORAL DAILY
Refills: 0 | Status: DISCONTINUED | COMMUNITY
End: 2019-10-07

## 2019-10-07 RX ORDER — PANTOPRAZOLE SODIUM 40 MG/1
40 TABLET, DELAYED RELEASE ORAL DAILY
Refills: 0 | Status: DISCONTINUED | COMMUNITY
End: 2019-10-07

## 2019-10-07 RX ORDER — PANTOPRAZOLE 40 MG/1
40 TABLET, DELAYED RELEASE ORAL DAILY
Qty: 90 | Refills: 3 | Status: DISCONTINUED | COMMUNITY
Start: 2019-04-02 | End: 2019-10-07

## 2019-10-07 NOTE — PHYSICAL EXAM
[Normal] : normal gait, coordination grossly intact, no focal deficits [Thin] : thin [de-identified] : no rashes

## 2019-10-07 NOTE — HEALTH RISK ASSESSMENT
[No] : No [No falls in past year] : Patient reported no falls in the past year [0] : 2) Feeling down, depressed, or hopeless: Not at all (0) [] : No [KFM6Exsub] : 0

## 2019-10-07 NOTE — REVIEW OF SYSTEMS
[Negative] : Heme/Lymph [Abdominal Pain] : no abdominal pain [Diarrhea] : no diarrhea [Vomiting] : no vomiting [FreeTextEntry7] : soft stool

## 2019-10-10 NOTE — ASSESSMENT
[FreeTextEntry1] : 1.  Anemia- Hg  12.2\par  -blood loss, anemia of chronic disease, \par - copper deficiency - replaced, check today\par - Continue B12 injection, level still low - increase to q 2 weeks\par - ferritin 162 today but drops secondary to malabsorption, IV injectafer q 5 weeks\par \par 2. Osteoporosis \par - left ankle- stress fx- advanced  osteoporosis, patient with h/o steroids use\par - started Forteo with Dr. Akers\par - will bbe starting IV calcium \par \par  3.TIA with MTHFR and h/o DVT -  \par not a candidate for DOAC b/o small bowel resection\par - INR home monitoring of warfarin takes 3 mg daily\par - INR 2.2\par \par \par 4. Hypogammaglobulinemia\par - s/p  Prevnar 13 12/2018 \par - check ab in 6 m\par - needs Shingrex\par \par History of present illness, review of systems, physical exam and treatment plan reviewed with . \par \par Office visit in 6  weeks or prn for new or worsening symptoms. \par \par CBC,Chem Profile, Irons, B12,Copper,Zinc, PT/INR at next visit \par

## 2019-10-10 NOTE — REVIEW OF SYSTEMS
[Fatigue] : fatigue [Joint Pain] : joint pain [Negative] : Allergic/Immunologic [Eye Pain] : no eye pain [Dysphagia] : no dysphagia [Vision Problems] : no vision problems [Lower Ext Edema] : no lower extremity edema [Hoarseness] : no hoarseness [Chest Pain] : no chest pain [Cough] : no cough [Vomiting] : no vomiting [Shortness Of Breath] : no shortness of breath [Diarrhea] : no diarrhea [Dysuria] : no dysuria [Constipation] : no constipation [Skin Rash] : no skin rash [Insomnia] : no insomnia [Dizziness] : no dizziness [Depression] : no depression [Anxiety] : no anxiety [Muscle Weakness] : no muscle weakness [Easy Bleeding] : no tendency for easy bleeding [Easy Bruising] : no tendency for easy bruising

## 2019-10-10 NOTE — CONSULT LETTER
[Consult Letter:] : I had the pleasure of evaluating your patient, [unfilled]. [Dear  ___] : Dear  [unfilled], [Please see my note below.] : Please see my note below. [Consult Closing:] : Thank you very much for allowing me to participate in the care of this patient.  If you have any questions, please do not hesitate to contact me. [Sincerely,] : Sincerely, [DrMeron  ___] : Dr. REARDON [FreeTextEntry3] : Angie Biswas MD\par [facility] [place] at [facility]\par

## 2019-10-10 NOTE — HISTORY OF PRESENT ILLNESS
[0 - No Distress] : Distress Level: 0 [de-identified] : Patient is a 52 year old who is referred for initial consultation for anemia secondary to Crohns/GI bleed vs Iron Deficiency/ Vit b12 def. 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 Patient is status post ileo-colonic resections for SBO  in 2016. In 2016 patient had complaints of 10 - 15 lb weight loss. Labs at that time showed Hgb of 12, steatorrhea and stool calprotectin = 236. In 2016 MRI/MRE with narrowing at ileocolonic anastomosis with upstream dilation. Pt refused bridge with  prednisone and Entocort / Flagyl. In 2017 patient Hgb dropped to 9.5. On 2017 patient underwent an EGD and Colonoscopy. Findings consistent with Page's and no dysplasia or AVMs. Colonoscopy showed an open anastomosis but active disease, moderate on the colonic side and limited to the anastomosis and moderate to severe enteritis, just proximal to the anastomosis. Pat had routine labs on 05/10/2017 Hgb: 8.3.  [FreeTextEntry1] : s/p injectafer, copper\par warfarin [de-identified] : Patient presents for follow up of  anemia, DVT, MTHFR gene mutation follow up, currently on Coumadin 3mg daily. He is receiving Injectafer  approximately every 5 weeks. He continues to have some fatigue. He is getting Forteo for osteoporosis. On anticoagulation.

## 2019-10-10 NOTE — REVIEW OF SYSTEMS
[Fatigue] : fatigue [Joint Pain] : joint pain [Negative] : Allergic/Immunologic [Eye Pain] : no eye pain [Vision Problems] : no vision problems [Dysphagia] : no dysphagia [Chest Pain] : no chest pain [Lower Ext Edema] : no lower extremity edema [Hoarseness] : no hoarseness [Cough] : no cough [Shortness Of Breath] : no shortness of breath [Vomiting] : no vomiting [Diarrhea] : no diarrhea [Dysuria] : no dysuria [Constipation] : no constipation [Skin Rash] : no skin rash [Dizziness] : no dizziness [Insomnia] : no insomnia [Anxiety] : no anxiety [Depression] : no depression [Easy Bruising] : no tendency for easy bruising [Easy Bleeding] : no tendency for easy bleeding [Muscle Weakness] : no muscle weakness

## 2019-10-10 NOTE — REVIEW OF SYSTEMS
[Fatigue] : fatigue [Joint Pain] : joint pain [Negative] : Allergic/Immunologic [Eye Pain] : no eye pain [Vision Problems] : no vision problems [Dysphagia] : no dysphagia [Chest Pain] : no chest pain [Hoarseness] : no hoarseness [Lower Ext Edema] : no lower extremity edema [Vomiting] : no vomiting [Cough] : no cough [Shortness Of Breath] : no shortness of breath [Diarrhea] : no diarrhea [Constipation] : no constipation [Dysuria] : no dysuria [Skin Rash] : no skin rash [Dizziness] : no dizziness [Insomnia] : no insomnia [Depression] : no depression [Anxiety] : no anxiety [Easy Bruising] : no tendency for easy bruising [Muscle Weakness] : no muscle weakness [Easy Bleeding] : no tendency for easy bleeding

## 2019-10-10 NOTE — HISTORY OF PRESENT ILLNESS
[0 - No Distress] : Distress Level: 0 [FreeTextEntry1] : s/p injectafer, copper\par warfarin [de-identified] : Patient is a 52 year old who is referred for initial consultation for anemia secondary to Crohns/GI bleed vs Iron Deficiency/ Vit b12 def. 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 Patient is status post ileo-colonic resections for SBO  in 2016. In 2016 patient had complaints of 10 - 15 lb weight loss. Labs at that time showed Hgb of 12, steatorrhea and stool calprotectin = 236. In 2016 MRI/MRE with narrowing at ileocolonic anastomosis with upstream dilation. Pt refused bridge with  prednisone and Entocort / Flagyl. In 2017 patient Hgb dropped to 9.5. On 2017 patient underwent an EGD and Colonoscopy. Findings consistent with Page's and no dysplasia or AVMs. Colonoscopy showed an open anastomosis but active disease, moderate on the colonic side and limited to the anastomosis and moderate to severe enteritis, just proximal to the anastomosis. Pat had routine labs on 05/10/2017 Hgb: 8.3.  [de-identified] : Patient presents for follow up of  anemia, DVT, MTHFR gene mutation follow up, currently on Coumadin 3mg daily. He is receiving Injectafer  approximately every 5 weeks. He continues to have some fatigue. He is getting Forteo for osteoporosis. On anticoagulation.

## 2019-10-10 NOTE — CONSULT LETTER
[Consult Letter:] : I had the pleasure of evaluating your patient, [unfilled]. [Dear  ___] : Dear  [unfilled], [Consult Closing:] : Thank you very much for allowing me to participate in the care of this patient.  If you have any questions, please do not hesitate to contact me. [Please see my note below.] : Please see my note below. [Sincerely,] : Sincerely, [DrMeron  ___] : Dr. REARDON [FreeTextEntry3] : Angie Biswas MD\par [facility] [place] at [facility]\par

## 2019-10-10 NOTE — HISTORY OF PRESENT ILLNESS
[0 - No Distress] : Distress Level: 0 [FreeTextEntry1] : s/p injectafer, copper\par warfarin [de-identified] : Patient is a 52 year old who is referred for initial consultation for anemia secondary to Crohns/GI bleed vs Iron Deficiency/ Vit b12 def. 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 Patient is status post ileo-colonic resections for SBO  in 2016. In 2016 patient had complaints of 10 - 15 lb weight loss. Labs at that time showed Hgb of 12, steatorrhea and stool calprotectin = 236. In 2016 MRI/MRE with narrowing at ileocolonic anastomosis with upstream dilation. Pt refused bridge with  prednisone and Entocort / Flagyl. In 2017 patient Hgb dropped to 9.5. On 2017 patient underwent an EGD and Colonoscopy. Findings consistent with Page's and no dysplasia or AVMs. Colonoscopy showed an open anastomosis but active disease, moderate on the colonic side and limited to the anastomosis and moderate to severe enteritis, just proximal to the anastomosis. Pat had routine labs on 05/10/2017 Hgb: 8.3.  [de-identified] : Patient presents for follow up of  anemia, DVT, MTHFR gene mutation follow up, currently on Coumadin 3mg daily. He is receiving Injectafer  approximately every 5 weeks. He continues to have some fatigue. He is getting Forteo for osteoporosis. On anticoagulation.

## 2019-10-10 NOTE — REVIEW OF SYSTEMS
[Fatigue] : fatigue [Joint Pain] : joint pain [Negative] : Allergic/Immunologic [Eye Pain] : no eye pain [Vision Problems] : no vision problems [Dysphagia] : no dysphagia [Chest Pain] : no chest pain [Lower Ext Edema] : no lower extremity edema [Hoarseness] : no hoarseness [Vomiting] : no vomiting [Shortness Of Breath] : no shortness of breath [Cough] : no cough [Diarrhea] : no diarrhea [Constipation] : no constipation [Dysuria] : no dysuria [Skin Rash] : no skin rash [Dizziness] : no dizziness [Insomnia] : no insomnia [Depression] : no depression [Anxiety] : no anxiety [Easy Bruising] : no tendency for easy bruising [Muscle Weakness] : no muscle weakness [Easy Bleeding] : no tendency for easy bleeding

## 2019-10-10 NOTE — CONSULT LETTER
[Dear  ___] : Dear  [unfilled], [Consult Letter:] : I had the pleasure of evaluating your patient, [unfilled]. [Consult Closing:] : Thank you very much for allowing me to participate in the care of this patient.  If you have any questions, please do not hesitate to contact me. [Please see my note below.] : Please see my note below. [Sincerely,] : Sincerely, [DrMeron  ___] : Dr. REARDON [FreeTextEntry3] : Angie Biswas MD\par [facility] [place] at [facility]\par

## 2019-10-10 NOTE — HISTORY OF PRESENT ILLNESS
[0 - No Distress] : Distress Level: 0 [de-identified] : Patient is a 52 year old who is referred for initial consultation for anemia secondary to Crohns/GI bleed vs Iron Deficiency/ Vit b12 def. 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 Patient is status post ileo-colonic resections for SBO  in 2016. In 2016 patient had complaints of 10 - 15 lb weight loss. Labs at that time showed Hgb of 12, steatorrhea and stool calprotectin = 236. In 2016 MRI/MRE with narrowing at ileocolonic anastomosis with upstream dilation. Pt refused bridge with  prednisone and Entocort / Flagyl. In 2017 patient Hgb dropped to 9.5. On 2017 patient underwent an EGD and Colonoscopy. Findings consistent with Page's and no dysplasia or AVMs. Colonoscopy showed an open anastomosis but active disease, moderate on the colonic side and limited to the anastomosis and moderate to severe enteritis, just proximal to the anastomosis. Pat had routine labs on 05/10/2017 Hgb: 8.3.  [FreeTextEntry1] : s/p injectafer, copper\par warfarin [de-identified] : Patient presents for follow up of  anemia, DVT, MTHFR gene mutation follow up, currently on Coumadin 3mg daily. He is receiving Injectafer  approximately every 5 weeks. He continues to have some fatigue. He is getting Forteo for osteoporosis. On anticoagulation.

## 2019-10-10 NOTE — CONSULT LETTER
[Consult Letter:] : I had the pleasure of evaluating your patient, [unfilled]. [Dear  ___] : Dear  [unfilled], [Please see my note below.] : Please see my note below. [Consult Closing:] : Thank you very much for allowing me to participate in the care of this patient.  If you have any questions, please do not hesitate to contact me. [Sincerely,] : Sincerely, [DrMeron  ___] : Dr. REARDON [FreeTextEntry3] : Angie Biswas MD\par [facility] [place] at [facility]\par

## 2019-10-17 ENCOUNTER — RESULT REVIEW (OUTPATIENT)
Age: 54
End: 2019-10-17

## 2019-10-17 ENCOUNTER — APPOINTMENT (OUTPATIENT)
Dept: HEMATOLOGY ONCOLOGY | Facility: CLINIC | Age: 54
End: 2019-10-17
Payer: COMMERCIAL

## 2019-10-17 VITALS
TEMPERATURE: 98 F | SYSTOLIC BLOOD PRESSURE: 102 MMHG | RESPIRATION RATE: 16 BRPM | OXYGEN SATURATION: 100 % | BODY MASS INDEX: 18.58 KG/M2 | HEIGHT: 68.5 IN | DIASTOLIC BLOOD PRESSURE: 68 MMHG | WEIGHT: 123.99 LBS | HEART RATE: 88 BPM

## 2019-10-17 PROCEDURE — 99214 OFFICE O/P EST MOD 30 MIN: CPT

## 2019-10-17 RX ORDER — COLD-HOT PACK
50 EACH MISCELLANEOUS
Refills: 0 | Status: ACTIVE | COMMUNITY

## 2019-10-17 NOTE — HISTORY OF PRESENT ILLNESS
[0 - No Distress] : Distress Level: 0 [de-identified] : Patient is a 52 year old who is referred for initial consultation for anemia secondary to Crohns/GI bleed vs Iron Deficiency/ Vit b12 def. 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 Patient is status post ileo-colonic resections for SBO  in 2016. In 2016 patient had complaints of 10 - 15 lb weight loss. Labs at that time showed Hgb of 12, steatorrhea and stool calprotectin = 236. In 2016 MRI/MRE with narrowing at ileocolonic anastomosis with upstream dilation. Pt refused bridge with  prednisone and Entocort / Flagyl. In 2017 patient Hgb dropped to 9.5. On 2017 patient underwent an EGD and Colonoscopy. Findings consistent with Page's and no dysplasia or AVMs. Colonoscopy showed an open anastomosis but active disease, moderate on the colonic side and limited to the anastomosis and moderate to severe enteritis, just proximal to the anastomosis. Pat had routine labs on 05/10/2017 Hgb: 8.3.  [FreeTextEntry1] : s/p injectafer, copper\par warfarin [de-identified] : Patient presents for follow up of  anemia, DVT, MTHFR gene mutation follow up, currently on Coumadin 3mg daily. He is receiving Injectafer approximately every 5 weeks. He continues to have some fatigue. He is getting Forteo for osteoporosis.

## 2019-10-17 NOTE — REVIEW OF SYSTEMS
[Fatigue] : fatigue [Joint Pain] : joint pain [Negative] : Endocrine [Eye Pain] : no eye pain [Vision Problems] : no vision problems [Dysphagia] : no dysphagia [Hoarseness] : no hoarseness [Chest Pain] : no chest pain [Lower Ext Edema] : no lower extremity edema [Shortness Of Breath] : no shortness of breath [Cough] : no cough [Vomiting] : no vomiting [Constipation] : no constipation [Diarrhea] : no diarrhea [Dysuria] : no dysuria [Skin Rash] : no skin rash [Dizziness] : no dizziness [Insomnia] : no insomnia [Anxiety] : no anxiety [Depression] : no depression [Muscle Weakness] : no muscle weakness [Easy Bleeding] : no tendency for easy bleeding [Easy Bruising] : no tendency for easy bruising

## 2019-10-17 NOTE — ASSESSMENT
[FreeTextEntry1] : 1.  Anemia- Hg  12.2\par  -blood loss, anemia of chronic disease, \par - copper deficiency - replaced, check today\par - Continue B12 injection, level still low - q 2 weeks\par - ferritin 162 today but drops secondary to malabsorption, IV injectafer q 5 weeks\par \par 2. Osteoporosis \par - left ankle- stress fx- advanced  osteoporosis, patient with h/o steroids use\par - started Forteo with Dr. Akers\par - on IV calcium weekly \par - PT for osteoporosis\par \par  3.TIA with MTHFR and h/o DVT -  \par not a candidate for DOAC b/o small bowel resection\par - INR home monitoring of warfarin takes 3 mg daily\par - INR 1.6\par \par 4. Hypogammaglobulinemia\par - s/p  Prevnar 13 12/2018 \par - check ab in 6 m\par - needs Shingrex ( last shingles August 2017) \par \par 5. Zinc deficiency\par - start oral Zinc\par \par

## 2019-10-17 NOTE — CONSULT LETTER
[Dear  ___] : Dear  [unfilled], [Consult Closing:] : Thank you very much for allowing me to participate in the care of this patient.  If you have any questions, please do not hesitate to contact me. [Please see my note below.] : Please see my note below. [Consult Letter:] : I had the pleasure of evaluating your patient, [unfilled]. [Sincerely,] : Sincerely, [DrMeron  ___] : Dr. REARDON [FreeTextEntry3] : Angie Biswas MD\par [facility] [place] at [facility]\par

## 2019-10-24 ENCOUNTER — APPOINTMENT (OUTPATIENT)
Dept: GASTROENTEROLOGY | Facility: CLINIC | Age: 54
End: 2019-10-24
Payer: COMMERCIAL

## 2019-10-24 VITALS
DIASTOLIC BLOOD PRESSURE: 70 MMHG | BODY MASS INDEX: 18.58 KG/M2 | HEART RATE: 80 BPM | WEIGHT: 124 LBS | HEIGHT: 68.5 IN | SYSTOLIC BLOOD PRESSURE: 122 MMHG

## 2019-10-24 PROCEDURE — 96372 THER/PROPH/DIAG INJ SC/IM: CPT

## 2019-10-24 RX ORDER — CYANOCOBALAMIN 1000 UG/ML
1000 INJECTION INTRAMUSCULAR; SUBCUTANEOUS
Qty: 0 | Refills: 0 | Status: COMPLETED | OUTPATIENT
Start: 2019-10-24

## 2019-10-24 RX ADMIN — CYANOCOBALAMIN 0 MCG/ML: 1000 INJECTION INTRAMUSCULAR; SUBCUTANEOUS at 00:00

## 2019-10-24 NOTE — ASSESSMENT
[FreeTextEntry1] : 1. Crohns disease of both small and large intestine\par    \par CROHNS DISEASE-s/p ileocolonic resection x 2--last 6/19/15 for recurrent sbos: appears to be active , GI symtoms stable but labs were up CRP=18.6/ESR=19 & Ekfnwqgcpqlb=129 and Wt down ( inflam markers ? 2/2 to R ankle Fx/stress rx and tendinosis, also had tooth infection ) & MRE w ? ileal penetrating dis, wt =128______\par s/p 4 SBOs w/i 2 yrs--2/2 distal sb disease adj to anastamosis\par when on Humira weekly w ADA =33 (3/20/14), -but had sbo 2/2014 at ADA=25 and another sbo 4/28/15\par 6/10/2015 Colonoscopy: Inflamm ST mass on ileal side w ulceration/scarred/narrow\par 6/11/2015 CT: dilated thickened sb loops distal jej & ileum\par Post-op **probably some malabsorption 2/2 to removal of R Colon and ileum: ?bile/fat/SIBO\par WT. Loss: r/o malabsorption, ?bile-induced--secretory vs decr micelles, active dis, PLE\par r/o SIBO--Elev FA, Alb=3.4-->3.1-->2.9--> (7/28/17)\par ?SIBO/Prevent post-op recurrence --Flagyl 250 mg qid~12/7/16-->d/c: 5/11/17\par Also discussed trial of Cholestyramine/Xifaxin -wanted to wait\par **ACTIVE Crohn's--11/10/16: 9.5lb wt loss, BMs: #5-6, 5x/D--Taking Magnesium \par 12/1/16 MRE: RUQ ileocolonic anastamosis--narrowed w upstream dilatation to 3.2 cm\par Labs: Stool Nqmoiahuhlop=317, + Incr Fecal fat, Alb=3.4, FA =23, P25=564, Hgb=12.3,ESR=10,CRP <5\par 4/19/17 :Colonoscopy: Anastamosis: open w mild -mod active colitis, enteritis severe adj to anastomosis, mild more proximal, remainder of colon=wnl\par 5/11/17- Adm [facility] w stools brown, Hgb=7.9, BUN=17, Creat=1.4, Alb=2.9.\par S/P 2 Units w Hgb=10.6, BUN=15/1.1, Prednisone 40mg taper, entocort d/dee\par 6/8/17: Hgb=7.4, MCV=98, CRP<5--ASA stopped, Pred20--> 30mg, 6/13/17: s/p 2 Unit PRBCs\par 7/11/17 [facility] w unsteadiness. MRI Head: R Cortical Temporo-occipital encephalomalacia\par Hgb=9.9--> 8.4->9.7 after 1 Unit. Seen by Heme: received IV Iron \par CRP<5, Z50=206, NOO=680, FA>23,Iron=22,Sat%=6, Ferritin=42, Alb=3.5. D/C on warfarin 2mg\par 7/28/17 Hgb=7.7; 7/31/17-s/p 1 Unit \par Heme Consultation: received  IV Iron and 5 IV Copper:___\par **Entyvio :time 0=12/22/16 ( Humira 40mg QW); 1/5/17--2wks, s/p 3rd infusion at wk 6, \par #6 :6/21/17--held 2/2 Shingles, Last Infusions=9/19/17 , 10/17/17, 11/28/17, 1/16/18 ,2/16/18, 3/19/18,4/16/18, 5/14/18, 6/25/18, 8/7/18, 9/17/18, 10/16/18, 11/13/18, 12/11/18, 1/14/19, 2/15/19, 3/15/19, 5/16/19, 6/18/19,7/24/19,\par 9/9/19, 10/2/19\par Entocort 9mg qd: 12/7/16--d/dee 5/11/17\par **Prednisone 40mg qd (5/11/17)--tapered 5mg/wk to 20mg qd, 6/8/17 30mg, \par 7/25/17 25mg, 3wks ago 20--> 15mg, 10/19/17 10mg alt w 7.5-->11/16/17 10mg alt w 5mg-->11/30/17: 5mg-->12/21/17: 5 alt w 2.5mg-->1/18/18: 2.5mg qd-->2.5mg qod--> d/c \par Probiotics 3 qd \par Lactose free, protein drinks tid\par MCT oil begun\par **trend --cbc, esr, crp, albumin\par **monitor wt= 120-->134-->134 --> 135--> 132 w clothes-->133--> 131 (Low carbs now)--> 129--> 127-->126-->124-->125-->124--> 126-->125-->125-->125-->126-->128________\par Wt Loss : sig change since May-June/2018\par 8/17/17: Hgb=9.9, ESR=20, Bi=550--Cqkqlugpzd s/p GI infection w N/V/D, no obstruction\par 10/17/17: Hgb=7.9,ESR=6,CRP<5, INR=1.6, Got IV Iron x 2, since 10/2/17 for Iron<10, Hgb=8.8\par 11/16/17: Hgb=11.2, ESR=14, CRP=12, 10/26/17 Stool fat-neg, calprotectin-30\par 11/13/17: MRE-Chr mild distension of distal & vladislav-ileum s/o mild stricturing at anast, mild mural thick & mucosal enhancemt ~ 20cm; little change from 2015 c/w mild acute on chr ileitis, 2.1 cm Liver hemangioma\par 10/9/18: Hgb=11.6, MCV=94, ESR=14, CRP=5.4, INR=2.2\par 10/10/18 MRE: stricturing of neoterminal ileum w worsened upstream dilatation, moderate length of upstream ileum w stricturing extending at least 20cm and more extensive then previous. suggestion of 2 adj extraluminal fluid collections which may be w/i the wall of strictured ileum near the ileocolonic anastomosis. surrounding spiculation and tethered appearance of bowel in this region.\par CTE: no discrete collection or intramural fistula, but mucosal enhancemt\par Today: 10/24/19  : feeling ok , Wt=128___off prednisone, BMs Brown: #5-6_, Hgb=14\par CRP <5-->17-->0.36-->6.5-->5.5--> <5: ? s/p recent ankle fx/stress rx/tendonitis and tooth infection \par prior stool studies w elev  calprotectin and No fat\par Wt loss-- 2/2 to low carbs-->fat burning and flare ; advised to liberalize and add protein, ensure\par Plan: Prednisone d/c 2/20/18\par Entyvio q 6 wks --> 4 weeks \par check inflammatory markers: 2/28/19 w ESR=12, CRP=6.5 & 2/21/19 stool calprotectin--> 232\par 8/15/19: ESR=10, CRP=5.2, Calprotectin=88\par 9/19/19: ESR=9, CRP=5.5\par ? cipro + flagyl \par pt to call numbers given for  [facility] or [facility]ational [facility]'s--biologics.  \par Colonoscopy scheduled           \par cbc,esr,crp--pending\par                                                                                                                                                                                                                                                                                                                                                                                                                                                                                                                                                                                                                                                                                                                                                                                                                                                                                                                                                 \par 2. Iron deficiency anemia due to chronic blood loss  \par  had initially dropped , after clinical flare and post procedure bleeding\par Probably multifactorial: ACD, Blood loss, malabsorption/nutrient deficiencies\par Eliquis-->warfarin after CVA, On Entyvio and prednsione for active dis\par Still requiring IV Iron--prn, \par s/p IV Cu & transfusions \par 7/11/17 Recent Adm for unsteady gait w MRI c/w CVA--warfarin begun: possible better control of AC\par Chromagen 2 qd--> IV Iron , s/p IV Cu x 5, FA 1mg qd , B12 SL & IM\par  1/14/19: Hgb=10.7, INR=1.6, 2/5/19: Hgb=11.2,INR=1.5; 2/28/19: Hgb=11.5, Orlamewp=738; 3/11/19: INR=2.6\par 4/11/19: hgb=9.7, INR=1.6, 7/2/19: Hgb=11.7, Ferritin=41,INR=2.2, 8/15/19: Hgb=12.2,Ferritin=81,INR=1.6, \par 9/19/19: Hgb=12.8\par 7/13/17: G64=119, PMI=199,FA>23; 1/3/18 H02=924, Wh=776, Zinc=55; 6/6/18 G15=350, 9/5/18: Ku=218, Zinc=67\par 11/28/18 I93=812, 7/2/19: Jp=227, Zinc=61,B6=2.8, 8/15/19: Vd=377,Zinc=54,D22=078,\par B12 1cc IM ____R. delt--  Given today, \par Last Iron infusion=9/19/19 , Iron =73,  Hzlnzfbv=954; most recent Ferritin =297, Iron=85\par Hem consult IV Iron /Cu given malabsorption, ? BM bx --r/o occult etiology--on hold\par trend cbc\par Adj INR--closer to low 2's.    \par Agree w Heme--Prevnar-13\par \par \par \par 3. Other osteoporosis without current pathological fracture  \par : Progression on BD from 5/5/16\par Crohns, h/o steroid use\par Calcium citrate & Vit D per Endo\par Forteo\par Repeat BD of 8/2018 --showed worsening to Osteoporosis from 2016\par Rec Endo consult w Dr. Akers :Osteoporosis center at [facility]--pt never went originally \par 9/21/18: Phos=2.4, Ca=8.7, Alb=3.4, Vit D=27, KPJ=769, \par 10/16/18: Phos=2.8, Ca=8.7, Alb=3.5,\par 1/14/19: Phos=2.6,  Ca=8.7, Alb=3.6\par 2/28/19: Phos=1.8, Ca=8.9, Alb=3.7, VitD=39, AHF=173\par 3/18/19: Ca=8.5, Alb=3.7, Vit D=44.6, PTH=93\par 7/11/19: Ca=9.1, Alb=3.7, Phos=3.9, Vit D=40/ 306, DFJ=094\par 8/15/19: Ca=9, Alb=4.2, Phos=4.4, VitD=59, ILK=906\par 10/17/19: Ca=9.6, Alb=3.9, Phos=3.5\par \par Dr. Akers: Hyperparathyroidism-- probably secondary due to low Vit D/Ca & ? superimposed primary, Rx replete Vit D and current IV Calcium\par trend Vit D, Ca, Phos, PTH,  \par [facility] ENT Consult init felt  Parathyroid scan showing activity in mediastinum is ectopic parathyroid, feels sx not indicated at this time, elev PTH is secondary to low  Vit D/Ca--to be reconsulted\par BD--9/2019: incr in spine, no change in wrist/hip\par \par \par \par 4. Gastroesophageal reflux disease w esophagitis : well controlled, no ht burn, no dysphagia, LPR\par Dry cough, CXR:ana, saw ENT bergstein-->LPR\par ( +++)LPR, (_ ++)Barretts w (_ No)Dysplasia, (_ No, H/O )Esophagitis grade: (__ ), \par Anti-reflux diet and life-style changes emphasized. (_No ) Bedge. \par reg exercise emphasized. \par **(_ ++)PPI: ( Protonix 40 mg qd ), (++) H2B Qhs: ( Zantac 300mg--> Pepcid 40mg ), \par (_ ) Carafate 1gm:\par **(_ ++)F/U EGD: (_ 4)mo. / (_2019 )yrs\par (_ No)pH Monitor, (_No )Manometry, (_No )esophagram \par (_f/u )ENT eval., (_ No)Surgical eval.    \par \par \par \par 5. Internal hemorrhoids  \par  well-controlled , no swelling, itching, bleeding\par Discussed the potential complications of thrombosis, pain, bleeding, swelling, itching, infection.\par Moderate -Fiber Diet was reviewed and emphasized.\par 6 - 8 cups of decaffeinated fluid daily\par (_++ ) Sitz Bathes BID, (_++++ ) Anusol HC Suppos/Cream MS QHS ,\par (_No ) Tucks BID, (_ No) Balneol Lotion,(_ No) Calmoseptine Oint, (_ ++) Prep H prn\par (_No ) Colorectal Surgical evaluation for possible ablation.    \par \par \par \par \par 6. Barretts esophagus without dysplasia  \par Notes: see GERD.    \par \par \par \par 7. Hepatomegaly-->not confirmed by recent abd sono\par CTE w relative enlargemt, ? lobulation & enlarged portal/mesenteric veins\par LFTs-wnl, no ascites or edema\par R/O CLD, Hepatic vein thrombosis\par Abd sono w doppler--> Liver and spleen normal size, no thrombosis, normal portal and hepatic vein flow\par \par \par \par \par Informed Consent:\par * The risks & Benefits of EGD  /  Colonoscopy were discussed w patient.\par * This included but was not limited to perforation, bleeding, sedation /med rxns possibly requiring surgery, blood transfusions, antibiotics & CPR/Intubation.\par * Pt. understands & agrees to the procedures.\par * Pt. advised to D/C  ASA/NSAIDs  7  Days  &   Warfarin,  4  -  5  Days  PTP.\par * [ +++ ]  Dulcolax / Miralax / Mag. Citrate ,  [     ] Prepopik/ Clenpiq ,  [     ] Osmo Prep,  [    ] GoLytely,  prep. reviewed w Pt.\par * Hold  [           ] AM of procedure.\par * Hold  [           ] PM of procedure.\par * Take  [           ] PM of procedure.\par * Take  [           ] AM of procedure.\par \par

## 2019-10-24 NOTE — HISTORY OF PRESENT ILLNESS
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-->Eliquis' warfarin \par        GERD w Page's, Hemorrhoids, BPH, \par        S/P Ileocolonic resection 1998\par        Since then multiple SBOs\par \par \par        Got IV Iron x 2, since 10/2/17\par        10/19/17: feeling better, Mp=418 on prednisone 15mg x 3weeks, BMs Brown: #5-6, 1-2/D, occas 3-4/d\par        10/25/17: Hgb=10, ESR=5, CRP=5, Alb=3.6, Phos=2, Wohsivpc=682, P43=433\par        11/16/17: Hgb=11.2, ESR=14, CRP=12, \par        11/13/17: MRE-Chr mild distension of distal & vladislav-ileum s/o mild stricturing at anast, mild mural thick & mucosal enhancemt ~ 20cm; little change from 2015 c/w mild acute on chr ileitis, 2.1 cm Liver hemangioma\par        11/28/17: Entyvio\par        11/29/17: Hgb=9.6, ESR=10, CRP=5.8, Alb=3.8,Ferritin-19, Iron=14,Sat=4%, Phos=2.5, Za=422, BMs:# 5, 1-2x/D, brown,\par        12/19;17: Hgb=10.1, ESR=12, CRP=6.5; 12/21/17: BMs:#3-5, 1-3x/D , brown, no blood, no pain, ve=853\par        1/3/18:Hgb=11.7, ESR=19, CRP=6.5, Alb=4.1, Zjpebast=158, Iron=31, Sat%=9,Zinc=55\par        1/16/18: Entyvio, Hgb=11.4, ESR=10, CRP=6.9\par        1/18/18: Today: cellulitis R foot, saw dr KEITH--rx augmentum x 10D, Entyvio 1/9 to 1/16, wa=912 w clothes,BMs: # 4-5, 1-2x/D \par        2/14/18: Hgb=11.1, ESR=16, CRP=11.6, Alb=3.6, Iron=28, Ferritin=23, INR=1.5 \par        2/16/18: s/p Entyvio; Iron infusion, again on 2/27\par        2/20/18: Hgb=10.6, ESR=20, CRP=12, INR=1.7\par        2/27/18: s/p iron infusion\par        3/9/18: Dr. Burden for tenosynovitis, rx w Zorvolex: Diclofenac x 5 days--? response\par        3/14/18: Ud=808; BMs: # 5, 1-2x/D; Hgb=11, ESR=18, CRP=11,Irom=45,Sjsvifpm=721,Alb=3.6, INR=1.5\par        3/19/18: s/p Entyvio\par        3/22/18: rg=828, BMs: # 5, 3-4 x/d\par        4/16/18: s/p Entyvio,Hgb=11.9, INR=1.3, ESR=18, CRP=8.4 \par        4/18/18 EGD: gastritis, no HP, no IM, 2+ Mucous, 0.5cm gastric polyp: fundic, 4cm HH, + Barretts:3cm, no dysplasia\par        4/25/18: Hgb=11.5, INR=1.6, Iron=40, Sat=13%, Ferritin=57, Alb=3.2, Phos=2.2, Mag=1.7\par        4/30/18: s/p Iron Infusion\par        5/14/18: s/p Entyvio \par        5/23/18: Hgb=11.5, ESR=16, CRP< 5\par        5/29/18: s/p Iron Infusion\par        6/6/18: Hgb=10.6, INR=1.7, Ibidsrnr=724, Alb=3.5, Phos=2.1, P12=222, gw=455; BMs: # 5, 2-3x/D \par        6/19/18: Hgb=10.6, INR=1, CRP=15, ESR=23\par        6/21/18: R ankle is acting up, swollen, pain, having MRI done, took a couple of advil, on low carbs ,BMs: # 5, 1-2x/D, no=418\par        6/26/18: MRI: fx/stress rxn-talar body/navicula; stress related changes--cuneform, cuboid,distal tibia; mod tibiotalar effusion, mild-mod peroneal tendinosis\par        7/19/18: entyvio 6/26/18, eliminated all carbs--anti inflammatory diet, mi=472, BMs: # 4-5, 1-3x/D \par        7/25/18: Hgb=10, INR=1.3, Alb=3.3, Phos=2.4, Zfsdaget=329, sat%=12, Iron=35\par        8/2/18: BD--osteoporosis of hip/spine: sig decrease BD--17.6% of hip, 17% of spine, osteopenia of wrist: 6.4%\par        8/7/18: Entyvio\par        8/21/18: Hgb=11.1, INR=1.5, ESR=19, CRP=18.6\par        8/23/18: recently w tooth infection, old implant--loose and removed; rx w amox, and advil\par        eating almost no carbs, db=736, # 5-6, 2-3x/d \par        8/24/18: Stool fat--neg, Jrigaomcpflp=994\par        9/5/18: Hgb=11.4, INR=1.3, ESR=9, CRP=11.3, Iron=41, Yvoealrd=921, Alb=3.8,\par        9/17/18: entyvio, Hgb=10.7, INR=2.2, ESR=17, CRP=9.1, \par        9/20/18: cg=390, BMs: # 5-6, 1-2x/D \par        9/21/18: Phos=2.4, Ca=8.7, Alb=3.4, Vit D=27, ZDP=280, \par        Dr. Akers: Hyperparathyroidism: probably secondary due to low Vit D/Ca & ? superimposed primary, rx replete Vit D and Calcium\par        10/9/18: Hgb=11.6, MCV=94, ESR=14, CRP=5.4, INR=2.2\par        10/10/18 MRE: stricturing of neoterminal ileum w worsened upstream dilatation, moderate length of upstream ileum w stricturing extending at least 20cm and more extensive then previous. suggestion of 2 adj extraluminal fluid collections which may be w/i the wall of strictured ileum near the ileocolonic anastomosis. surrounding spiculation and tethered appearance of bowel in this region.\par 10/16/18: entyvio, Hgb=11.7, MCV=94, ESR=14, CRP <5, Alb=3.5\par 10/18/18: Bk=305,   BMs: # 5-6, 3x/D , \par 11/13/18: Entyvio\par 11/21/18 CTE w C: limited 2/2 bowel underdistention, mucosal hyperenhancemt & extensive SM edema of distal ileum including vladislav-ileum, difficult to exclude a sml fluid collection, Liver w relative enlargement w suggestion of lobulation, enlargemt of PV,SMV,IMV,Spl V, rectal V. No ascites\par 11/28/18: Hgb=10, ESR=19, CRP=17,Alb=3.5,Iron=35, Sat%=11, Aithrtks=569, INR=1.3\par 11/29/18: ky=568, BMs: # 5-6, 3-4x/D\par 12/3/18: Abd sono--Liver 14.8cm Incr heterogenicity, spleen--wnl\par 12/11/18 & 1/14/19: Entyvio\par 1/14/19:Entyvio,  Hgb=10.7, ESR=15, Alb=3.6, Iron=36, Ferritin=67, Sat%=11, INR=1.6\par 1/24/19:  ud=850, stools are # 4-6, 3-4x/d , no pain\par 2/5/19: Hgb=11.2, ESR=14, CRP=0.36\par 2/28/19: Hgb=11.5, ESR=12, CRP=6.5, Alb=3.7, Iron=69, Tugkdefd=173, Ca=8.9\par                Bms: # 4-5, 2-3x/D , ku=891,  Entyvio : last 2/1519,\par                had parathyroid scan pre-op, still w elev PTH, + activity in mediastinum,? ectopic parathyroid tissuevs neoplasm.  To see ENT and have Imaging at [facility]\par 3/28/19: Bms: # 4-5 , 3x/d ;mk=170\par 4/11/19: Hgb-9.7, Iron=45, ESR=18, CRP=9.4, Alb=3.5, \par Saw Endo SX at [facility], felt hyperparathyroid is secondary to Low Ca/Vit D, no sx at this time\par 4/16/19: BMs: # 5-6, 3-4x/D, yj=519,  Entyvio : last 3/1519,  got IV iron 4/12/19 for Ferritin=53\par 4/27/19: s/p R Hip Nailing--missed 4/2019 2/2 fresh wound\par 5/16/19 & 6/18/19 Entyvio\par 7/2/19: Hgb=11.7, Ferritin=41,ESR=12, CRP=5.5, B6=2.8,Mag=1.8,Phos=3.9,Wv=442,Zinc=61\par 7/11/19: s/p IV iron\par 7/11/19: Alb=3.7, YWN=958, Ca=9.1, Vit D=40/306, \par 7/24/19: Entyvio, Alb=3.8, RPW=407, Ca=8.9, Vit D=128 \par 8/1/19: Bms: # 5-6, occas #4, 2-3x/D , yg=219\par 8/15/19: Hgb=12, ESR=10, CRP=5.2, Ferritin=68, Alb=3.4, Phos=4.4,Mg=1.7, Ca=8.5,Calprotectin=88,\par 8/20/19: ULA=928, Ca=9, Alb=4.2\par 9/19/19: Hgb=12.8, ESR=9, CRP=5.5, Alb=3.7, Wafcfrqp=671, Mag=1.8, Ca=8.9, Phos=4.2\par 9/26/19: lq=684, BMs: #5-6, 2-3x/D, no pain\par 10/17/19: kc=492, BMs: #4-6, 1-2x/D, no Pain; Hgb=14, ESR=7, CRP<5, Alb=3.9, Iiyuhnsx=888, Mag=1.7, Ca=9.6\par  Today:  Bms: # 4-6 , no pain, eating more calories, liberalizing carbs--avoiding simple sugars.  \par      \par       \par        hl=271\par        Abd pain--no \par        Nausea--no \par        Vomit--no \par        Early satiety-no \par        Belching-no \par        Regurgitation--no \par        Ht burn--no \par        Throat Clearing-no\par        Globus-no\par        Hoarseness--no \par        Dysphagia--no \par        BMs: # : #4-6, 1-2x/D,\par        Constipation--no \par        Diarrhea- intermittent \par        Bloating--no \par        Flatulence-no\par        Gurgling--no \par        Melena--no \par        BRBPR--no \par        Anorexia-no \par        Wt Loss--yes\par \par \par \par        PRIOR HISTORY--2017\par \par        1/17/17: Hgb=9.2, ESR=6, CRP=5; 1/19/17: BMs: #4, 5-6, 2-3x/D, Ji=768, Started Creon 1 tid cc\par        3rd Entyvio begining Feb\par        2/14/17: Labs; Hgb=9.6, MCV=97, ESR=5, CRP< 5, J23=720, FA>23, Iron=59, Retic =3.2,\par        2/17/17 Fecal Calprotectin=16, Fats: Increased neutral & Split\par        3/13/17: Labs Hgb=9.5, MCV=95, ESR=7, CRP <5, ALB=3.1\par        3/16/17: lot of stress, to move -[place], had a job-fell thru, BMs: # 5, 2-4 x/D, wt= 131w clothes\par        4/19/17 EGD: gastritis, MACKENZIE, No HP, 2cm HH, +Barretts, No Dysplasia\par        Colonoscopy: Anastamosis: open w mild -mod active colitis, enteritis severe adj to anastomosis, mild more proximal, remainder of colon-wnl, 2-3 deg int hemorrhoids\par        4/26/17 : Hgb=7.3, MCV=95, ESR=13, CRP<5;Immediately post procedure stools very dark on eliquis/iron.\par        Admitted to [facility]: Hgb=8.3-->8.9 after 2 U, Alb 3.3, BUN=17, creat=1.3, Malina/Llcty=193/460\par        4/27/17: Alb=2.3, BUN=12, creat=1.2, Malina/Lipase=209/863-No abd pain, N/V; 5/5/17: Hgb=10.7.\par        5/8/17 Entyvio iv. 5/8-5/9 w dark red diarrhea; 5/10/17 Hgb=7.8.\par        5/11/17 Adm [facility] w stools brown, Hgb=7.9, BUN=17, Creat=1.4, Alb=2.9; S/P 2 Units- Hgb=10.6, BUN=15/1.1.\par        Prednisone 40mg qd, entocort d/dee.; 5/18/17: Hgb-10.4, bp=616, BMs: #5, 1-2x/D, no pain\par        6/8/17: Hgb=7.4,MCV=98, CRP<5-ASA stopped, Pred20--> 30\par        6/10/17: Hgb=8.2, Alb=2.9, BUN=20/1.2 ; 6/12/17: Hgb=8.3, Retic=0.19, Iron=10, Sat%=3, Ferritin=57, FA=23,E17=034, 6/13/17: s/p 2 Unit PRBCs\par        6/15/17: started MCT oil, Gr=179 , BMs: # 5, 1-2x/D, stools are brownish/green \par        7/11/17: [facility] w unsteadiness. MRI Head: R Cortical Temporo-occipital encephalomalacia, MRA H&N-no sign lesions, PER-wnl.Hgb=9.9--> 8.4->9.7 after 1 Unit. Seen by Torri: received IV Iron \par        CRP<5, V91=723, ILU=832, FA>23,Iron=22,Sat%=6, Ferritin=42, Alb=3.5. D/C on warfarin 2mg\par        7/20 Hgb=8.5, 7/28 Hgb=7.7, 7/31-s/p 1 Unit \par        As outpt seeing Heme, to get IV Iron and 5 IV Copper\par        Had 'FLU' Fever=101, chills, bloat, N/V/D, Bilious. no pain, melena,brbpr\par        Given anti-emetic by dr Butler,then able to keep things down\par        On prednisone 25, warfarin w INR bet 1.6-2\par        8/17/17: Hgb=9.9, ESR=20, CRP-P,Le=240, BMs: # 5, 1-2x/D, stools are brownish/green\par        10/2/17: Hgb=8.8, MCV=89,Phos=1.7, K=3.9, Mag=1.9, Alb=3.6,Iron<10,Ferritin=21, INR=2\par        10/17/17: Hgb=7.9,ESR=6,CRP<5, INR=1.6\par        10/25/17: Hgb=10, ESR=5, CRP=5, Alb=3.6, Phos=2, Qozjjmim=463, H95=201\par        11/16/17: Hgb=11.2, ESR=14, CRP=12, \par        11/13/17: MRE-Chr mild distension of distal & vladislav-ileum s/o mild stricturing at anast, mild mural thick & mucosal enhancemt ~ 20cm; little change from 2015 c/w mild acute on chr ileitis, 2.1 cm Liver hemangioma\par        11/28/17: Entyvio\par        11/29/17: Hgb=9.6, ESR=10, CRP=5.8, Alb=3.8,Ferritin-P, Iron=14,Sat=4%, Phos=2.5\par        12/19;17: Hgb=10.1, ESR=12, CRP=6.5; 12/21/17: BMs:#3-5, 1-3x/D , brown, no blood, no pain, bi=250\par        1/3/18:Hgb=11.7, ESR=19, CRP=6.5, Alb=4.1, Ifkwkptg=629, Iron=31, Sat%=9,Zinc=55\par \par \par        PRIOR HISTORY---2013:\par \par        2/20/13 CT markedly dilated sb loops ext to anast, colonoscopy w open anast ,mild-mod remy-anastamotic disease. quick response to entocort/humira--probably adhesive dis. \par        8/10/13 w GNR bacteremia, ADA 1.7 /KAYLAH 3.4, Humira 8/7, 8/13,8/18,9/15\par        CT- mucosal thickening and spiculation of the distal sb extending to the anastamosis, thickening and stranding of adj mesenteric fat.\par        Humira increased to 40mg weekly, entocort 4\par        9/2013 MRE--dilated loops in mid and distal ileum, markedly thickened and narrowed TI w decreased peristalsis of TI\par        11/15/13 ADA-24.9, KAYLAH-0\par \par \par        PRIOR HISTORY---2014:\par \par        2/15/14--CT dilated loops SB, loop of sb in mid abd 4.3cm w infiltrative changes in the mesentery, bowel tapers in the RLQ to the anastamosis w/o transition\par        WBC=15K, Rx w IV steroids and Abx \par        3/7/14 SBFT: last 10cm sb prox to anast mild distension and sl irregularity. In the mid portion of this loop there is a mild narrowing which appears to reopen but is some what narrowed.\par        3/20/14 ADA=33.1, KAYLAH=0, Lialda switch to Apriso 4 (2/2014)\par \par \par        PRIOR HISTORY--2015\par \par        1/11/15 Adm [facility] w 1 day N,Recurrent V, Abd pain/distension. CT-multiple distended & fluid-filled sb loops, mild wall thickening of ileum w No inflamm changes.\par        WBC=14.6, Hgb=17, RX w NGT suction, Levaquin iv, Solumedrol 40mg q 12( 1/12-->1/16) to prednisone 30mg BID w 5mg taper/wk\par        3/18/15 --Dr. Butler w edema /High BP, prednisone was tapered slowly to 2.5mg \par        switched to entocort 9mg qd, edema and bp improved w lasix 20mg \par        BMs:#4-5, 3x/D, Hgb=11, ESR=4, CRP<5\par        4/28/15 - 5/1/15: Adm [facility] w 1 day Abd pain, distension,N/V. 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 side of anast, opening appeared ulcerated,scarred & severely narrowed\par        6/11/15: [facility] w N/V x1, decr appetite, CT ABD: no obstruction, dilated thickened sb loops of distal jej and ileum\par        6/19/15 [facility]: s/p Lap w extensive lysis of adhesions, hepatic flex, sigmoid and sb anastamosis, s/p partial r colectomy and sb resection--side to side elisabeth: 8-10 inch from prior anast to TV colon.\par        7/17/15: BMs:#4-6, 4-5x/D, wt 131(from 126)\par        8/20/15: BMs: #4, 1-2x/D or #5, 2-3x/D, vv=677, dry cough,Hgb=10, WBC=3, ZQY=766, CRP=56, ESR=21\par        8/25/15 CT Abd/Pelv: Svl RLQ sb loops w wall thickening, mild nodularity & inflamm stranding in mesentery\par        Advised-restart Entocort 9mg qd, Apriso 4 qd, Maintain Humira but given RX to check drug/Ab levels\par        9/15/15: did nt restart meds,Hgb=9.9, WBC=4, ESR=19, CRP=5.8, re=521; [facility] : ADA=8.5, Antibodies< 1.7\par        10/15/15: No pain, BMs:#4, 1-2x/D, #5-6, 3-4x/D, throat clearing/cough-better, zd=746\par        11/30/15: No pain, BMs:# 4, 5-6 intermittently, No throat clear, ts=873\par \par \par        PRIOR HISTORY-2016\par \par        1/22/16; 4/8/16; 6/6/16 : No pain, BMs:# 4-5 1-2x/D, also #5-6 3x/D for 2-3D/wk, ay=721\par        7/14/16: ru=603, 9/22/16: mg=261.5\par        11/10/16: 9.5lb wt loss, states BMs: 5-6, 5x/D--Taking Magnesium, No pain/anorexia\par        Labs: Stool Uhlssigxyebi=406, + Incr Fecal fat, Alb=3.4, FA =23, K57=204, Hgb=12, ESR=10, CRP <5\par        Magnesium-d/c, Obtain MRE asap--Start steroids and possibly switch to Entyvio\par        DDx discussed: active crohns--loss response to Humira, Malabsorption--loss Bile acids, Bile-induced diarrhea, SIBO\par        Pt wanted to wait for imaging-did not start rx\par        12/1//16 MRE: RUQ ileocolonic anastamosis--narrowed w upstream dilatation to 3.2 cm\par        Pt refused pred, Started Entocort 9mg qd and Flagyl 250mg qid about 7-10days ago \par        awaiting Entyvio load to start 12/22, then 1/5; had Cut back on iron bid\par        12/15/16: BMs:# 5, 2-3x/D, occas #6, No pain, Less bloat/flatulence, Ly=951.

## 2019-10-31 ENCOUNTER — OTHER (OUTPATIENT)
Age: 54
End: 2019-10-31

## 2019-10-31 ENCOUNTER — RESULT REVIEW (OUTPATIENT)
Age: 54
End: 2019-10-31

## 2019-11-06 ENCOUNTER — APPOINTMENT (OUTPATIENT)
Dept: ENDOCRINOLOGY | Facility: CLINIC | Age: 54
End: 2019-11-06
Payer: COMMERCIAL

## 2019-11-06 VITALS
WEIGHT: 126 LBS | OXYGEN SATURATION: 98 % | SYSTOLIC BLOOD PRESSURE: 110 MMHG | BODY MASS INDEX: 18.88 KG/M2 | HEART RATE: 91 BPM | HEIGHT: 68.5 IN | DIASTOLIC BLOOD PRESSURE: 68 MMHG

## 2019-11-06 PROCEDURE — 99214 OFFICE O/P EST MOD 30 MIN: CPT | Mod: 25

## 2019-11-06 PROCEDURE — 36415 COLL VENOUS BLD VENIPUNCTURE: CPT

## 2019-11-08 ENCOUNTER — RX RENEWAL (OUTPATIENT)
Age: 54
End: 2019-11-08

## 2019-11-08 ENCOUNTER — MEDICATION RENEWAL (OUTPATIENT)
Age: 54
End: 2019-11-08

## 2019-11-08 LAB
25(OH)D3 SERPL-MCNC: 50.1 NG/ML
ALBUMIN SERPL ELPH-MCNC: 3.9 G/DL
CA-I SERPL-SCNC: 1.29 MMOL/L
CALCIUM SERPL-MCNC: 9.1 MG/DL
CALCIUM SERPL-MCNC: 9.1 MG/DL
CRP SERPL-MCNC: 0.18 MG/DL
ERYTHROCYTE [SEDIMENTATION RATE] IN BLOOD BY WESTERGREN METHOD: 6 MM/HR
PARATHYROID HORMONE INTACT: 80 PG/ML

## 2019-11-08 NOTE — HISTORY OF PRESENT ILLNESS
[FreeTextEntry1] : Mr. GUDELIA DUNN is a 54 year old male\par  coming for a f/u , for severe osteoporosis with bilateral hip fractures, hyperparathyroidism, low D and hypocalcemia  secondary  to Chron's disease\par on Vit D 50,000 IU 4 times a week\par \par started Forteo May 10 2019\par last DEXA 9/2019 stable \par \par on  IV calcium each  Friday at the infusion center\par labs done next Tuesday after the infusion \par \par Ca citrate 950mg 3 tab bid \par reports compliance \par labs reviewed much higher  iPTH  despite 25 OH Vit D at 40  and calcium at 9.1mg/dl  Received IV iron \par 24hr urine low calcium while low D \par parathyroid scan reviewed parathyroid adenoma versus neoplasm mediastinum\par US thyroid reviewed FNA was advised\par \par        MRI pelvis done 10/3/18 reviewed :\par        There are insufficiency fractures of the bilateral femoral heads without significant subchondral collapse at this time. There are associated moderate to large joint effusions.\par        There are additional microtrabecular/insufficiency fractures involving the sacrum as well as a few foci involving the iliac margins of the sacroiliac joints. No displaced or cortical fracture.\par        Mild degenerative arthrosis of the right hip.\par        sees GEN Chen will see him today again, bone stimulator is considered\par        dental issues : had an implant failure 2 months ago, bone graft 2 months ago, \par        Osteoporosis \par        has Chron disease sees Dr Lugo , never gained weight back after colon resection\par        received iron infussion and copper infusions\par   \par          DEXA \par        LS T scroe -3.8\par        stress fractures \par        9/5/18 right foot Fx stress Fx reviewed\par        Impression:\par        Trabecular fractures involving the cuboid, the partially visualized anterior process of the calcaneus, the lateral cuneiform as well as the third metatarsal base. No evidence for associated cortical break. There is improvement in/essentially complete resolution of the previously described microtrabecular fractures within the partially visualized talus, the navicular as well as the middle and medial cuneiforms.\par        Subacute to chronic mild subchondral fracture of the second metatarsal head.\par        Moderate degenerative changes of the hallux sesamoid complex\par        left ankle pain 6/18 MRI:\par        Impression:\par        Microtrabecular/fracture/stress reaction involving the talar body and navicula without evidence for cortical break. There are additional stress-related changes involving the cuneiforms, the cuboid and distal tibia, as above. Associated moderate tibiotalar joint effusion.\par        Mild to moderate peroneal tendinosis with superimposed segmental longitudinal splitting, as above.\par        has pain hip right will have MRI with [facility]\par        has h/o kidney stones 6 yrs ago went to [facility] Dr Morris did surgery.\par seen Dr mercado, per pt he was advised against surgery, no notes available will request for review

## 2019-11-08 NOTE — PHYSICAL EXAM
[No Acute Distress] : no acute distress [Alert] : alert [Normal Sclera/Conjunctiva] : normal sclera/conjunctiva [EOMI] : extra ocular movement intact [No Proptosis] : no proptosis [No Accessory Muscle Use] : no accessory muscle use [No Thyroid Nodules] : there were no palpable thyroid nodules [No Respiratory Distress] : no respiratory distress [Clear to Auscultation] : lungs were clear to auscultation bilaterally [Normal Rate] : heart rate was normal  [Normal S1, S2] : normal S1 and S2 [Regular Rhythm] : with a regular rhythm [Pedal Pulses Normal] : the pedal pulses are present [Normal Bowel Sounds] : normal bowel sounds [No Edema] : there was no peripheral edema [Not Tender] : non-tender [Soft] : abdomen soft [Not Distended] : not distended [Post Cervical Nodes] : posterior cervical nodes [Anterior Cervical Nodes] : anterior cervical nodes [Axillary Nodes] : axillary nodes [No Spinal Tenderness] : no spinal tenderness [Normal] : normal and non tender [Spine Straight] : spine straight [No Stigmata of Cushings Syndrome] : no stigmata of cushings syndrome [Normal Gait] : normal gait [Normal Strength/Tone] : muscle strength and tone were normal [Acanthosis Nigricans] : no acanthosis nigricans [Normal Reflexes] : deep tendon reflexes were 2+ and symmetric [No Rash] : no rash [Oriented x3] : oriented to person, place, and time [No Tremors] : no tremors [de-identified] : mildly enlarged thyroid on exam  [de-identified] : underweighted

## 2019-11-14 ENCOUNTER — APPOINTMENT (OUTPATIENT)
Dept: GASTROENTEROLOGY | Facility: CLINIC | Age: 54
End: 2019-11-14
Payer: COMMERCIAL

## 2019-11-14 VITALS
HEIGHT: 68.5 IN | DIASTOLIC BLOOD PRESSURE: 82 MMHG | HEART RATE: 88 BPM | SYSTOLIC BLOOD PRESSURE: 132 MMHG | BODY MASS INDEX: 18.88 KG/M2 | WEIGHT: 126 LBS

## 2019-11-14 PROCEDURE — 99214 OFFICE O/P EST MOD 30 MIN: CPT | Mod: 25

## 2019-11-14 PROCEDURE — 96372 THER/PROPH/DIAG INJ SC/IM: CPT

## 2019-11-14 RX ORDER — CYANOCOBALAMIN 1000 UG/ML
1000 INJECTION INTRAMUSCULAR; SUBCUTANEOUS
Qty: 0 | Refills: 0 | Status: COMPLETED | OUTPATIENT
Start: 2019-11-14

## 2019-11-14 RX ADMIN — CYANOCOBALAMIN 0 MCG/ML: 1000 INJECTION INTRAMUSCULAR; SUBCUTANEOUS at 00:00

## 2019-11-14 NOTE — ASSESSMENT
[FreeTextEntry1] : 1. Crohns disease of both small and large intestine\par    \par CROHNS DISEASE-s/p ileocolonic resection x 2--last 6/19/15 for recurrent sbos: appears to be active , GI symtoms stable but labs were up CRP=18.6/ESR=19 & Uciointpcihc=040 and Wt down ( inflam markers ? 2/2 to R ankle Fx/stress rx and tendinosis, also had tooth infection ) & MRE w ? ileal penetrating dis, wt =128______\par s/p 4 SBOs w/i 2 yrs--2/2 distal sb disease adj to anastamosis\par when on Humira weekly w ADA =33 (3/20/14), -but had sbo 2/2014 at ADA=25 and another sbo 4/28/15\par 6/10/2015 Colonoscopy: Inflamm ST mass on ileal side w ulceration/scarred/narrow\par 6/11/2015 CT: dilated thickened sb loops distal jej & ileum\par Post-op **probably some malabsorption 2/2 to removal of R Colon and ileum: ?bile/fat/SIBO\par WT. Loss: r/o malabsorption, ?bile-induced--secretory vs decr micelles, active dis, PLE\par r/o SIBO--Elev FA, Alb=3.4-->3.1-->2.9--> (7/28/17)\par ?SIBO/Prevent post-op recurrence --Flagyl 250 mg qid~12/7/16-->d/c: 5/11/17\par Also discussed trial of Cholestyramine/Xifaxin -wanted to wait\par **ACTIVE Crohn's--11/10/16: 9.5lb wt loss, BMs: #5-6, 5x/D--Taking Magnesium \par 12/1/16 MRE: RUQ ileocolonic anastamosis--narrowed w upstream dilatation to 3.2 cm\par Labs: Stool Doqfzulpynsf=629, + Incr Fecal fat, Alb=3.4, FA =23, U11=970, Hgb=12.3,ESR=10,CRP <5\par 4/19/17 :Colonoscopy: Anastamosis: open w mild -mod active colitis, enteritis severe adj to anastomosis, mild more proximal, remainder of colon=wnl\par 5/11/17- Adm [facility] w stools brown, Hgb=7.9, BUN=17, Creat=1.4, Alb=2.9.\par S/P 2 Units w Hgb=10.6, BUN=15/1.1, Prednisone 40mg taper, entocort d/dee\par 6/8/17: Hgb=7.4, MCV=98, CRP<5--ASA stopped, Pred20--> 30mg, 6/13/17: s/p 2 Unit PRBCs\par 7/11/17 [facility] w unsteadiness. MRI Head: R Cortical Temporo-occipital encephalomalacia\par Hgb=9.9--> 8.4->9.7 after 1 Unit. Seen by Heme: received IV Iron \par CRP<5, M08=503, BDX=663, FA>23,Iron=22,Sat%=6, Ferritin=42, Alb=3.5. D/C on warfarin 2mg\par 7/28/17 Hgb=7.7; 7/31/17-s/p 1 Unit \par Heme Consultation: received  IV Iron and 5 IV Copper:___\par **Entyvio :time 0=12/22/16 ( Humira 40mg QW); 1/5/17--2wks, s/p 3rd infusion at wk 6, \par #6 :6/21/17--held 2/2 Shingles, Last Infusions=9/19/17 , 10/17/17, 11/28/17, 1/16/18 ,2/16/18, 3/19/18,4/16/18, 5/14/18, 6/25/18, 8/7/18, 9/17/18, 10/16/18, 11/13/18, 12/11/18, 1/14/19, 2/15/19, 3/15/19, 5/16/19, 6/18/19,7/24/19,\par 9/9/19, 10/2/19, 11/4/19\par Entocort 9mg qd: 12/7/16--d/dee 5/11/17\par **Prednisone 40mg qd (5/11/17)--tapered 5mg/wk to 20mg qd, 6/8/17 30mg, \par 7/25/17 25mg, 3wks ago 20--> 15mg, 10/19/17 10mg alt w 7.5-->11/16/17 10mg alt w 5mg-->11/30/17: 5mg-->12/21/17: 5 alt w 2.5mg-->1/18/18: 2.5mg qd-->2.5mg qod--> d/c \par Probiotics 3 qd \par Lactose free, protein drinks tid\par MCT oil begun\par **trend --cbc, esr, crp, albumin\par **monitor wt= 120-->134-->134 --> 135--> 132 w clothes-->133--> 131 (Low carbs now)--> 129--> 127-->126-->124-->125-->124--> 126-->125-->125-->125-->126-->128-->____\par Wt Loss : sig change since May-June/2018\par 8/17/17: Hgb=9.9, ESR=20, Mw=778--Mgptsmquyz s/p GI infection w N/V/D, no obstruction\par 10/17/17: Hgb=7.9,ESR=6,CRP<5, INR=1.6, Got IV Iron x 2, since 10/2/17 for Iron<10, Hgb=8.8\par 11/16/17: Hgb=11.2, ESR=14, CRP=12, 10/26/17 Stool fat-neg, calprotectin-30\par 11/13/17: MRE-Chr mild distension of distal & vladislav-ileum s/o mild stricturing at anast, mild mural thick & mucosal enhancemt ~ 20cm; little change from 2015 c/w mild acute on chr ileitis, 2.1 cm Liver hemangioma\par 10/9/18: Hgb=11.6, MCV=94, ESR=14, CRP=5.4, INR=2.2\par 10/10/18 MRE: stricturing of neoterminal ileum w worsened upstream dilatation, moderate length of upstream ileum w stricturing extending at least 20cm and more extensive then previous. suggestion of 2 adj extraluminal fluid collections which may be w/i the wall of strictured ileum near the ileocolonic anastomosis. surrounding spiculation and tethered appearance of bowel in this region.\par CTE: no discrete collection or intramural fistula, but mucosal enhancemt\par Today: 11/14/19  : feeling ok , Wt=12___off prednisone, BMs Brown: #___, Hgb=\par CRP <5-->17-->0.36-->6.5-->5.5--> <5-->0.18: ? s/p recent ankle fx/stress rx/tendonitis and tooth infection \par prior stool studies w elev  calprotectin and No fat\par Wt loss-- 2/2 to low carbs-->fat burning and flare ; advised to liberalize and add protein, ensure\par Plan: Prednisone d/c 2/20/18\par Entyvio q 6 wks --> 4 weeks \par check inflammatory markers: 2/28/19 w ESR=12, CRP=6.5 & 2/21/19 stool calprotectin--> 232\par 8/15/19: ESR=10, CRP=5.2, Calprotectin=88\par 9/19/19: ESR=9, CRP=5.5\par 10/17/19: ESR=7, CRP< 5\par 11/9/19 : ESR=6, CRP=0.18\par ? cipro + flagyl \par pt to call numbers given for  [facility] or [facility]ational rx's--biologics.  \par Colonoscopy scheduled           \par cbc,esr,crp--pending\par                                                                                                                                                                                                                                                                                                                                                                                                                                                                                                                                                                                                                                                                                                                                                                                                                                                                                                                                                 \par 2. Iron deficiency anemia due to chronic blood loss  \par  had initially dropped , after clinical flare and post procedure bleeding\par Probably multifactorial: ACD, Blood loss, malabsorption/nutrient deficiencies\par Eliquis-->warfarin after CVA, On Entyvio and prednsione for active dis\par Still requiring IV Iron--prn, \par s/p IV Cu & transfusions \par 7/11/17 Recent Adm for unsteady gait w MRI c/w CVA--warfarin begun: possible better control of AC\par Chromagen 2 qd--> IV Iron , s/p IV Cu x 5, FA 1mg qd , B12 SL & IM\par  1/14/19: Hgb=10.7, INR=1.6, 2/5/19: Hgb=11.2,INR=1.5; 2/28/19: Hgb=11.5, Iziktivu=534; 3/11/19: INR=2.6\par 4/11/19: hgb=9.7, INR=1.6, 7/2/19: Hgb=11.7, Ferritin=41,INR=2.2, 8/15/19: Hgb=12.2,Ferritin=81,INR=1.6, \par 9/19/19: Hgb=12.8, 10/17/19: Hgb=14, 10/26/19: Hgb=14\par 7/13/17: I69=392, YFY=777,FA>23; 1/3/18 N83=831, Gq=373, Zinc=55; 6/6/18 Q11=927, 9/5/18: Dt=382, Zinc=67\par 11/28/18 L84=374, 7/2/19: Bg=093, Zinc=61,B6=2.8, 8/15/19: Pv=947,Zinc=54,S48=171,\par B12 1cc IM ____R. delt--  Given today, \par Last Iron infusion=9/19/19 , Iron =73,  Pcggmxzu=375; most recent Ferritin =297, Iron=85\par Hem consult IV Iron /Cu given malabsorption, ? BM bx --r/o occult etiology--on hold\par trend cbc\par Adj INR--closer to low 2's.    \par Agree w Heme--Prevnar-13\par \par \par \par 3. Other osteoporosis without current pathological fracture  \par : Progression on BD from 5/5/16\par Crohns, h/o steroid use\par Calcium citrate & Vit D per Endo\par Forteo\par Repeat BD of 8/2018 --showed worsening to Osteoporosis from 2016\par Rec Endo consult w Dr. Akers :Osteoporosis center at [facility]--pt never went originally \par 9/21/18: Phos=2.4, Ca=8.7, Alb=3.4, Vit D=27, AEH=282, \par 10/16/18: Phos=2.8, Ca=8.7, Alb=3.5,\par 1/14/19: Phos=2.6,  Ca=8.7, Alb=3.6\par 2/28/19: Phos=1.8, Ca=8.9, Alb=3.7, VitD=39, FUF=409\par 3/18/19: Ca=8.5, Alb=3.7, Vit D=44.6, PTH=93\par 7/11/19: Ca=9.1, Alb=3.7, Phos=3.9, Vit D=40/ 306, JUO=503\par 8/15/19: Ca=9, Alb=4.2, Phos=4.4, VitD=59, QJO=590\par 10/17/19: Ca=9.6, Alb=3.9, Phos=3.5\par 11/6/19: Ca=9.1, Alb=3.9. Phos=3.7, VitD=50, PTH=80\par Dr. Aekrs: Hyperparathyroidism-- probably secondary due to low Vit D/Ca & ? superimposed primary, Rx replete Vit D and current IV Calcium\par trend Vit D, Ca, Phos, PTH,  \par [facility] Consult init felt  Parathyroid scan showing activity in mediastinum is ectopic parathyroid, feels sx not indicated at this time, elev PTH is secondary to low  Vit D/Ca--to be reconsulted\par BD--9/2019: incr in spine, no change in wrist/hip\par \par \par \par 4. Gastroesophageal reflux disease w esophagitis : well controlled, no ht burn, no dysphagia, LPR\par Dry cough, CXR:ana, saw ENT eulogio-->LPR\par ( +++)LPR, (_ ++)Barretts w (_ No)Dysplasia, (_ No, H/O )Esophagitis grade: (__ ), \par Anti-reflux diet and life-style changes emphasized. (_No ) Bedge. \par reg exercise emphasized. \par **(_ ++)PPI: ( Protonix 40 mg qd ), (++) H2B Qhs: ( Zantac 300mg--> Pepcid 40mg ), \par (_ ) Carafate 1gm:\par **(_ ++)F/U EGD: (_ 4)mo. / (_2019 )yrs\par (_ No)pH Monitor, (_No )Manometry, (_No )esophagram \par (_f/u )ENT eval., (_ No)Surgical eval.    \par \par \par \par 5. Internal hemorrhoids  \par  well-controlled , no swelling, itching, bleeding\par Discussed the potential complications of thrombosis, pain, bleeding, swelling, itching, infection.\par Moderate -Fiber Diet was reviewed and emphasized.\par 6 - 8 cups of decaffeinated fluid daily\par (_++ ) Sitz Bathes BID, (_++++ ) Anusol HC Suppos/Cream ND QHS ,\par (_No ) Tucks BID, (_ No) Balneol Lotion,(_ No) Calmoseptine Oint, (_ ++) Prep H prn\par (_No ) Colorectal Surgical evaluation for possible ablation.    \par \par \par \par \par 6. Barretts esophagus without dysplasia  \par Notes: see GERD.    \par \par \par \par 7. Hepatomegaly-->not confirmed by recent abd sono\par CTE w relative enlargemt, ? lobulation & enlarged portal/mesenteric veins\par LFTs-wnl, no ascites or edema\par R/O CLD, Hepatic vein thrombosis\par Abd sono w doppler--> Liver and spleen normal size, no thrombosis, normal portal and hepatic vein flow\par \par \par \par \par Informed Consent:\par * The risks & Benefits of EGD  /  Colonoscopy were discussed w patient.\par * This included but was not limited to perforation, bleeding, sedation /med rxns possibly requiring surgery, blood transfusions, antibiotics & CPR/Intubation.\par * Pt. understands & agrees to the procedures.\par * Pt. advised to D/C  ASA/NSAIDs  7  Days  &   Warfarin,  4  -  5  Days  PTP.\par * [ +++ ]  Dulcolax / Miralax / Mag. Citrate ,  [     ] Prepopik/ Clenpiq ,  [     ] Osmo Prep,  [    ] GoLytely,  prep. reviewed w Pt.\par * Hold  [           ] AM of procedure.\par * Hold  [           ] PM of procedure.\par * Take  [           ] PM of procedure.\par * Take  [           ] AM of procedure.\par \par

## 2019-11-14 NOTE — HISTORY OF PRESENT ILLNESS
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-->Eliquis' warfarin \par        GERD w Page's, Hemorrhoids, BPH, \par        S/P Ileocolonic resection 1998\par        Since then multiple SBOs\par \par \par        Got IV Iron x 2, since 10/2/17\par        10/19/17: feeling better, Ox=585 on prednisone 15mg x 3weeks, BMs Brown: #5-6, 1-2/D, occas 3-4/d\par        10/25/17: Hgb=10, ESR=5, CRP=5, Alb=3.6, Phos=2, Guxlogqd=332, C37=029\par        11/16/17: Hgb=11.2, ESR=14, CRP=12, \par        11/13/17: MRE-Chr mild distension of distal & vladislav-ileum s/o mild stricturing at anast, mild mural thick & mucosal enhancemt ~ 20cm; little change from 2015 c/w mild acute on chr ileitis, 2.1 cm Liver hemangioma\par        11/28/17: Entyvio\par        11/29/17: Hgb=9.6, ESR=10, CRP=5.8, Alb=3.8,Ferritin-19, Iron=14,Sat=4%, Phos=2.5, Aw=858, BMs:# 5, 1-2x/D, brown,\par        12/19;17: Hgb=10.1, ESR=12, CRP=6.5; 12/21/17: BMs:#3-5, 1-3x/D , brown, no blood, no pain, tw=218\par        1/3/18:Hgb=11.7, ESR=19, CRP=6.5, Alb=4.1, Iazlmlum=653, Iron=31, Sat%=9,Zinc=55\par        1/16/18: Entyvio, Hgb=11.4, ESR=10, CRP=6.9\par        1/18/18: Today: cellulitis R foot, saw dr KEITH--rx augmentum x 10D, Entyvio 1/9 to 1/16, zn=373 w clothes,BMs: # 4-5, 1-2x/D \par        2/14/18: Hgb=11.1, ESR=16, CRP=11.6, Alb=3.6, Iron=28, Ferritin=23, INR=1.5 \par        2/16/18: s/p Entyvio; Iron infusion, again on 2/27\par        2/20/18: Hgb=10.6, ESR=20, CRP=12, INR=1.7\par        2/27/18: s/p iron infusion\par        3/9/18: Dr. Burden for tenosynovitis, rx w Zorvolex: Diclofenac x 5 days--? response\par        3/14/18: Hs=786; BMs: # 5, 1-2x/D; Hgb=11, ESR=18, CRP=11,Irom=45,Ualsdflx=355,Alb=3.6, INR=1.5\par        3/19/18: s/p Entyvio\par        3/22/18: ql=087, BMs: # 5, 3-4 x/d\par        4/16/18: s/p Entyvio,Hgb=11.9, INR=1.3, ESR=18, CRP=8.4 \par        4/18/18 EGD: gastritis, no HP, no IM, 2+ Mucous, 0.5cm gastric polyp: fundic, 4cm HH, + Barretts:3cm, no dysplasia\par        4/25/18: Hgb=11.5, INR=1.6, Iron=40, Sat=13%, Ferritin=57, Alb=3.2, Phos=2.2, Mag=1.7\par        4/30/18: s/p Iron Infusion\par        5/14/18: s/p Entyvio \par        5/23/18: Hgb=11.5, ESR=16, CRP< 5\par        5/29/18: s/p Iron Infusion\par        6/6/18: Hgb=10.6, INR=1.7, Unqhlmiv=951, Alb=3.5, Phos=2.1, I64=354, vy=523; BMs: # 5, 2-3x/D \par        6/19/18: Hgb=10.6, INR=1, CRP=15, ESR=23\par        6/21/18: R ankle is acting up, swollen, pain, having MRI done, took a couple of advil, on low carbs ,BMs: # 5, 1-2x/D, sk=990\par        6/26/18: MRI: fx/stress rxn-talar body/navicula; stress related changes--cuneform, cuboid,distal tibia; mod tibiotalar effusion, mild-mod peroneal tendinosis\par        7/19/18: entyvio 6/26/18, eliminated all carbs--anti inflammatory diet, es=960, BMs: # 4-5, 1-3x/D \par        7/25/18: Hgb=10, INR=1.3, Alb=3.3, Phos=2.4, Knjwpqbh=199, sat%=12, Iron=35\par        8/2/18: BD--osteoporosis of hip/spine: sig decrease BD--17.6% of hip, 17% of spine, osteopenia of wrist: 6.4%\par        8/7/18: Entyvio\par        8/21/18: Hgb=11.1, INR=1.5, ESR=19, CRP=18.6\par        8/23/18: recently w tooth infection, old implant--loose and removed; rx w amox, and advil\par        eating almost no carbs, wk=410, # 5-6, 2-3x/d \par        8/24/18: Stool fat--neg, Qltrnsenxyye=106\par        9/5/18: Hgb=11.4, INR=1.3, ESR=9, CRP=11.3, Iron=41, Upkgfbuy=496, Alb=3.8,\par        9/17/18: entyvio, Hgb=10.7, INR=2.2, ESR=17, CRP=9.1, \par        9/20/18: rm=281, BMs: # 5-6, 1-2x/D \par        9/21/18: Phos=2.4, Ca=8.7, Alb=3.4, Vit D=27, FOF=502, \par        Dr. Akers: Hyperparathyroidism: probably secondary due to low Vit D/Ca & ? superimposed primary, rx replete Vit D and Calcium\par        10/9/18: Hgb=11.6, MCV=94, ESR=14, CRP=5.4, INR=2.2\par        10/10/18 MRE: stricturing of neoterminal ileum w worsened upstream dilatation, moderate length of upstream ileum w stricturing extending at least 20cm and more extensive then previous. suggestion of 2 adj extraluminal fluid collections which may be w/i the wall of strictured ileum near the ileocolonic anastomosis. surrounding spiculation and tethered appearance of bowel in this region.\par 10/16/18: entyvio, Hgb=11.7, MCV=94, ESR=14, CRP <5, Alb=3.5\par 10/18/18: Dp=312,   BMs: # 5-6, 3x/D , \par 11/13/18: Entyvio\par 11/21/18 CTE w C: limited 2/2 bowel underdistention, mucosal hyperenhancemt & extensive SM edema of distal ileum including vladislav-ileum, difficult to exclude a sml fluid collection, Liver w relative enlargement w suggestion of lobulation, enlargemt of PV,SMV,IMV,Spl V, rectal V. No ascites\par 11/28/18: Hgb=10, ESR=19, CRP=17,Alb=3.5,Iron=35, Sat%=11, Phvivznq=232, INR=1.3\par 11/29/18: pn=774, BMs: # 5-6, 3-4x/D\par 12/3/18: Abd sono--Liver 14.8cm Incr heterogenicity, spleen--wnl\par 12/11/18 & 1/14/19: Entyvio\par 1/14/19:Entyvio,  Hgb=10.7, ESR=15, Alb=3.6, Iron=36, Ferritin=67, Sat%=11, INR=1.6\par 1/24/19:  qq=886, stools are # 4-6, 3-4x/d , no pain\par 2/5/19: Hgb=11.2, ESR=14, CRP=0.36\par 2/28/19: Hgb=11.5, ESR=12, CRP=6.5, Alb=3.7, Iron=69, Mprpjgjp=253, Ca=8.9\par                Bms: # 4-5, 2-3x/D , dl=375,  Entyvio : last 2/1519,\par                had parathyroid scan pre-op, still w elev PTH, + activity in mediastinum,? ectopic parathyroid tissuevs neoplasm.  To see ENT and have Imaging at [facility]\par 3/28/19: Bms: # 4-5 , 3x/d ;hb=842\par 4/11/19: Hgb-9.7, Iron=45, ESR=18, CRP=9.4, Alb=3.5, \par Saw Endo SX at [facility], felt hyperparathyroid is secondary to Low Ca/Vit D, no sx at this time\par 4/16/19: BMs: # 5-6, 3-4x/D, dm=096,  Entyvio : last 3/1519,  got IV iron 4/12/19 for Ferritin=53\par 4/27/19: s/p R Hip Nailing--missed 4/2019 2/2 fresh wound\par 5/16/19 & 6/18/19 Entyvio\par 7/2/19: Hgb=11.7, Ferritin=41,ESR=12, CRP=5.5, B6=2.8,Mag=1.8,Phos=3.9,No=930,Zinc=61\par 7/11/19: s/p IV iron\par 7/11/19: Alb=3.7, NDP=914, Ca=9.1, Vit D=40/306, \par 7/24/19: Entyvio, Alb=3.8, GXV=271, Ca=8.9, Vit D=128 \par 8/1/19: Bms: # 5-6, occas #4, 2-3x/D , iv=646\par 8/15/19: Hgb=12, ESR=10, CRP=5.2, Ferritin=68, Alb=3.4, Phos=4.4,Mg=1.7, Ca=8.5,Calprotectin=88,\par 8/20/19: UGU=818, Ca=9, Alb=4.2\par 9/19/19: Hgb=12.8, ESR=9, CRP=5.5, Alb=3.7, Jawalpmu=334, Mag=1.8, Ca=8.9, Phos=4.2\par 9/26/19: nt=190, BMs: #5-6, 2-3x/D, no pain\par 10/17/19: cq=531, BMs: #4-6, 1-2x/D, no Pain; Hgb=14, ESR=7, CRP<5, Alb=3.9, Oklyabgu=781, Mag=1.7, Ca=9.6\par 10/24/19: vp=580, Bms: # 4-6 , no pain,\par 11/6/19: ESR=6, CRP=6, PTH=80,Ca=9.1, VitD=50\par \par  Today:  no pain, eating more calories, liberalizing carbs--avoiding simple sugars.  \par         \par        if=845\par        Abd pain--no \par        Nausea--no \par        Vomit--no \par        Early satiety-no \par        Belching-no \par        Regurgitation--no \par        Ht burn--no \par        Throat Clearing-no\par        Globus-no\par        Hoarseness--no \par        Dysphagia--no \par        BMs: # : 4, 1-2, 0ccas #5\par        Constipation--no \par        Diarrhea- intermittent \par        Bloating--no \par        Flatulence-no\par        Gurgling--no \par        Melena--no \par        BRBPR--no \par        Anorexia-no \par        Wt Loss--stable\par \par \par \par        PRIOR HISTORY--2017\par \par        1/17/17: Hgb=9.2, ESR=6, CRP=5; 1/19/17: BMs: #4, 5-6, 2-3x/D, Ss=385, Started Creon 1 tid cc\par        3rd Entyvio begining Feb\par        2/14/17: Labs; Hgb=9.6, MCV=97, ESR=5, CRP< 5, X02=432, FA>23, Iron=59, Retic =3.2,\par        2/17/17 Fecal Calprotectin=16, Fats: Increased neutral & Split\par        3/13/17: Labs Hgb=9.5, MCV=95, ESR=7, CRP <5, ALB=3.1\par        3/16/17: lot of stress, to move -[place], had a job-fell thru, BMs: # 5, 2-4 x/D, wt= 131w clothes\par        4/19/17 EGD: gastritis, MACKENZIE, No HP, 2cm HH, +Barretts, No Dysplasia\par        Colonoscopy: Anastamosis: open w mild -mod active colitis, enteritis severe adj to anastomosis, mild more proximal, remainder of colon-wnl, 2-3 deg int hemorrhoids\par        4/26/17 : Hgb=7.3, MCV=95, ESR=13, CRP<5;Immediately post procedure stools very dark on eliquis/iron.\par        Admitted to [facility]: Hgb=8.3-->8.9 after 2 U, Alb 3.3, BUN=17, creat=1.3, Malina/Gcidv=257/460\par        4/27/17: Alb=2.3, BUN=12, creat=1.2, Malina/Lipase=209/863-No abd pain, N/V; 5/5/17: Hgb=10.7.\par        5/8/17 Entyvio iv. 5/8-5/9 w dark red diarrhea; 5/10/17 Hgb=7.8.\par        5/11/17 Adm [facility] w stools brown, Hgb=7.9, BUN=17, Creat=1.4, Alb=2.9; S/P 2 Units- Hgb=10.6, BUN=15/1.1.\par        Prednisone 40mg qd, entocort d/dee.; 5/18/17: Hgb-10.4, ie=230, BMs: #5, 1-2x/D, no pain\par        6/8/17: Hgb=7.4,MCV=98, CRP<5-ASA stopped, Pred20--> 30\par        6/10/17: Hgb=8.2, Alb=2.9, BUN=20/1.2 ; 6/12/17: Hgb=8.3, Retic=0.19, Iron=10, Sat%=3, Ferritin=57, FA=23,R73=960, 6/13/17: s/p 2 Unit PRBCs\par        6/15/17: started MCT oil, Ql=176 , BMs: # 5, 1-2x/D, stools are brownish/green \par        7/11/17: [facility] w unsteadiness. MRI Head: R Cortical Temporo-occipital encephalomalacia, MRA H&N-no sign lesions, PER-wnl.Hgb=9.9--> 8.4->9.7 after 1 Unit. Seen by Heme: received IV Iron \par        CRP<5, P50=203, MTP=060, FA>23,Iron=22,Sat%=6, Ferritin=42, Alb=3.5. D/C on warfarin 2mg\par        7/20 Hgb=8.5, 7/28 Hgb=7.7, 7/31-s/p 1 Unit \par        As outpt seeing Heme, to get IV Iron and 5 IV Copper\par        Had 'FLU' Fever=101, chills, bloat, N/V/D, Bilious. no pain, melena,brbpr\par        Given anti-emetic by dr Butler,then able to keep things down\par        On prednisone 25, warfarin w INR bet 1.6-2\par        8/17/17: Hgb=9.9, ESR=20, CRP-P,Fj=982, BMs: # 5, 1-2x/D, stools are brownish/green\par        10/2/17: Hgb=8.8, MCV=89,Phos=1.7, K=3.9, Mag=1.9, Alb=3.6,Iron<10,Ferritin=21, INR=2\par        10/17/17: Hgb=7.9,ESR=6,CRP<5, INR=1.6\par        10/25/17: Hgb=10, ESR=5, CRP=5, Alb=3.6, Phos=2, Xhmcxjhl=266, G91=502\par        11/16/17: Hgb=11.2, ESR=14, CRP=12, \par        11/13/17: MRE-Chr mild distension of distal & vladislav-ileum s/o mild stricturing at anast, mild mural thick & mucosal enhancemt ~ 20cm; little change from 2015 c/w mild acute on chr ileitis, 2.1 cm Liver hemangioma\par        11/28/17: Entyvio\par        11/29/17: Hgb=9.6, ESR=10, CRP=5.8, Alb=3.8,Ferritin-P, Iron=14,Sat=4%, Phos=2.5\par        12/19;17: Hgb=10.1, ESR=12, CRP=6.5; 12/21/17: BMs:#3-5, 1-3x/D , brown, no blood, no pain, ux=904\par        1/3/18:Hgb=11.7, ESR=19, CRP=6.5, Alb=4.1, Rzyxbntc=996, Iron=31, Sat%=9,Zinc=55\par \par \par        PRIOR HISTORY---2013:\par \par        2/20/13 CT markedly dilated sb loops ext to anast, colonoscopy w open anast ,mild-mod remy-anastamotic disease. quick response to entocort/humira--probably adhesive dis. \par        8/10/13 w GNR bacteremia, ADA 1.7 /KAYLAH 3.4, Humira 8/7, 8/13,8/18,9/15\par        CT- mucosal thickening and spiculation of the distal sb extending to the anastamosis, thickening and stranding of adj mesenteric fat.\par        Humira increased to 40mg weekly, entocort 4\par        9/2013 MRE--dilated loops in mid and distal ileum, markedly thickened and narrowed TI w decreased peristalsis of TI\par        11/15/13 ADA-24.9, KAYLAH-0\par \par \par        PRIOR HISTORY---2014:\par \par        2/15/14--CT dilated loops SB, loop of sb in mid abd 4.3cm w infiltrative changes in the mesentery, bowel tapers in the RLQ to the anastamosis w/o transition\par        WBC=15K, Rx w IV steroids and Abx \par        3/7/14 SBFT: last 10cm sb prox to anast mild distension and sl irregularity. In the mid portion of this loop there is a mild narrowing which appears to reopen but is some what narrowed.\par        3/20/14 ADA=33.1, KAYLAH=0, Lialda switch to Apriso 4 (2/2014)\par \par \par        PRIOR HISTORY--2015\par \par        1/11/15 Adm [facility] w 1 day N,Recurrent V, Abd pain/distension. CT-multiple distended & fluid-filled sb loops, mild wall thickening of ileum w No inflamm changes.\par        WBC=14.6, Hgb=17, RX w NGT suction, Levaquin iv, Solumedrol 40mg q 12( 1/12-->1/16) to prednisone 30mg BID w 5mg taper/wk\par        3/18/15 --Dr. Butler w edema /High BP, prednisone was tapered slowly to 2.5mg \par        switched to entocort 9mg qd, edema and bp improved w lasix 20mg \par        BMs:#4-5, 3x/D, Hgb=11, ESR=4, CRP<5\par        4/28/15 - 5/1/15: Adm [facility] w 1 day Abd pain, distension,N/V. 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 side of anast, opening appeared ulcerated,scarred & severely narrowed\par        6/11/15: [facility] w N/V x1, decr appetite, CT ABD: no obstruction, dilated thickened sb loops of distal jej and ileum\par        6/19/15 [facility]: s/p Lap w extensive lysis of adhesions, hepatic flex, sigmoid and sb anastamosis, s/p partial r colectomy and sb resection--side to side elisabeth: 8-10 inch from prior anast to TV colon.\par        7/17/15: BMs:#4-6, 4-5x/D, wt 131(from 126)\par        8/20/15: BMs: #4, 1-2x/D or #5, 2-3x/D, aw=513, dry cough,Hgb=10, WBC=3, CUZ=032, CRP=56, ESR=21\par        8/25/15 CT Abd/Pelv: Svl RLQ sb loops w wall thickening, mild nodularity & inflamm stranding in mesentery\par        Advised-restart Entocort 9mg qd, Apriso 4 qd, Maintain Humira but given RX to check drug/Ab levels\par        9/15/15: did nt restart meds,Hgb=9.9, WBC=4, ESR=19, CRP=5.8, mo=004; [facility] : ADA=8.5, Antibodies< 1.7\par        10/15/15: No pain, BMs:#4, 1-2x/D, #5-6, 3-4x/D, throat clearing/cough-better, de=966\par        11/30/15: No pain, BMs:# 4, 5-6 intermittently, No throat clear, we=247\par \par \par        PRIOR HISTORY-2016\par \par        1/22/16; 4/8/16; 6/6/16 : No pain, BMs:# 4-5 1-2x/D, also #5-6 3x/D for 2-3D/wk, qf=106\par        7/14/16: pc=789, 9/22/16: mz=233.5\par        11/10/16: 9.5lb wt loss, states BMs: 5-6, 5x/D--Taking Magnesium, No pain/anorexia\par        Labs: Stool Xtcorlpvuxva=790, + Incr Fecal fat, Alb=3.4, FA =23, G12=248, Hgb=12, ESR=10, CRP <5\par        Magnesium-d/c, Obtain MRE asap--Start steroids and possibly switch to Entyvio\par        DDx discussed: active crohns--loss response to Humira, Malabsorption--loss Bile acids, Bile-induced diarrhea, SIBO\par        Pt wanted to wait for imaging-did not start rx\par        12/1//16 MRE: RUQ ileocolonic anastamosis--narrowed w upstream dilatation to 3.2 cm\par        Pt refused pred, Started Entocort 9mg qd and Flagyl 250mg qid about 7-10days ago \par        awaiting Entyvio load to start 12/22, then 1/5; had Cut back on iron bid\par        12/15/16: BMs:# 5, 2-3x/D, occas #6, No pain, Less bloat/flatulence, Ic=093.

## 2019-11-24 NOTE — PHYSICAL EXAM
[General Appearance - Alert] : alert [General Appearance - In No Acute Distress] : in no acute distress [Sclera] : the sclera and conjunctiva were normal [PERRL With Normal Accommodation] : pupils were equal in size, round, and reactive to light [Extraocular Movements] : extraocular movements were intact [Auscultation Breath Sounds / Voice Sounds] : lungs were clear to auscultation bilaterally [Heart Rate And Rhythm] : heart rate was normal and rhythm regular [Heart Sounds] : normal S1 and S2 [Murmurs] : no murmurs [Heart Sounds Gallop] : no gallops [Edema] : there was no peripheral edema [Heart Sounds Pericardial Friction Rub] : no pericardial rub [Nail Clubbing] : no clubbing  or cyanosis of the fingernails [Skin Color & Pigmentation] : normal skin color and pigmentation [Skin Turgor] : normal skin turgor [Oriented To Time, Place, And Person] : oriented to person, place, and time [] : no rash [Impaired Insight] : insight and judgment were intact [Affect] : the affect was normal [Normal] : normal [Soft, Nontender] : the abdomen was soft and nontender [Flat] : flat [No HSM] : no hepatosplenomegaly noted [No Mass] : no masses were palpated

## 2019-11-27 ENCOUNTER — RESULT REVIEW (OUTPATIENT)
Age: 54
End: 2019-11-27

## 2019-11-27 ENCOUNTER — APPOINTMENT (OUTPATIENT)
Dept: HEMATOLOGY ONCOLOGY | Facility: CLINIC | Age: 54
End: 2019-11-27
Payer: COMMERCIAL

## 2019-11-27 VITALS
TEMPERATURE: 97.6 F | HEIGHT: 68.5 IN | WEIGHT: 121.98 LBS | RESPIRATION RATE: 16 BRPM | OXYGEN SATURATION: 95 % | HEART RATE: 90 BPM | DIASTOLIC BLOOD PRESSURE: 76 MMHG | SYSTOLIC BLOOD PRESSURE: 108 MMHG | BODY MASS INDEX: 18.28 KG/M2

## 2019-11-27 PROCEDURE — 99214 OFFICE O/P EST MOD 30 MIN: CPT

## 2019-11-27 NOTE — REVIEW OF SYSTEMS
[Fatigue] : fatigue [Joint Pain] : joint pain [Negative] : Allergic/Immunologic [Eye Pain] : no eye pain [Vision Problems] : no vision problems [Dysphagia] : no dysphagia [Hoarseness] : no hoarseness [Chest Pain] : no chest pain [Lower Ext Edema] : no lower extremity edema [Shortness Of Breath] : no shortness of breath [Cough] : no cough [Vomiting] : no vomiting [Constipation] : no constipation [Diarrhea] : no diarrhea [Dysuria] : no dysuria [Skin Rash] : no skin rash [Dizziness] : no dizziness [Insomnia] : no insomnia [Anxiety] : no anxiety [Depression] : no depression [Muscle Weakness] : no muscle weakness [Easy Bleeding] : no tendency for easy bleeding [Easy Bruising] : no tendency for easy bruising

## 2019-11-27 NOTE — CONSULT LETTER
[Dear  ___] : Dear  [unfilled], [Consult Letter:] : I had the pleasure of evaluating your patient, [unfilled]. [Consult Closing:] : Thank you very much for allowing me to participate in the care of this patient.  If you have any questions, please do not hesitate to contact me. [Please see my note below.] : Please see my note below. [Sincerely,] : Sincerely, [DrMeron  ___] : Dr. REARDON [FreeTextEntry3] : Angie Biswas MD\par [facility]\par

## 2019-11-27 NOTE — HISTORY OF PRESENT ILLNESS
[0 - No Distress] : Distress Level: 0 [de-identified] : Patient is a 52 year old who is referred for initial consultation for anemia secondary to Crohns/GI bleed vs Iron Deficiency/ Vit b12 def. 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 Patient is status post ileo-colonic resections for SBO  in 2016. In 2016 patient had complaints of 10 - 15 lb weight loss. Labs at that time showed Hgb of 12, steatorrhea and stool calprotectin = 236. In 2016 MRI/MRE with narrowing at ileocolonic anastomosis with upstream dilation. Pt refused bridge with  prednisone and Entocort / Flagyl. In 2017 patient Hgb dropped to 9.5. On 2017 patient underwent an EGD and Colonoscopy. Findings consistent with Page's and no dysplasia or AVMs. Colonoscopy showed an open anastomosis but active disease, moderate on the colonic side and limited to the anastomosis and moderate to severe enteritis, just proximal to the anastomosis. Pat had routine labs on 05/10/2017 Hgb: 8.3.  [FreeTextEntry1] : s/p injectafer, copper\par warfarin [de-identified] : Patient presents for follow up of  anemia, DVT, MTHFR gene mutation follow up, currently on Coumadin 3mg daily- feeling well offers no complaints.

## 2019-11-27 NOTE — ASSESSMENT
[FreeTextEntry1] : 1.  Anemia- pending \par  -blood loss, anemia of chronic disease, \par - copper deficiency - replaced, check today\par - Continue B12 injection, level still low - q 2 weeks\par - [id number] last time - order level today \par \par 2. Osteoporosis \par - left ankle- stress fx- advanced  osteoporosis, patient with h/o steroids use\par - started Forteo with Dr. Akers\par - on IV calcium weekly \par - PT for osteoporosis\par \par  3.TIA with MTHFR and h/o DVT -  \par not a candidate for DOAC b/o small bowel resection\par - INR home monitoring of warfarin takes 3 mg daily\par - INR 2\par \par 4. Hypogammaglobulinemia\par - s/p  Prevnar 13 12/2018 \par - poor response, needs Pneumococcal vaccine 23 boost \par - schedule Shingrex\par \par \par 5. Zinc deficiency\par - start oral Zinc\par \par

## 2019-12-03 ENCOUNTER — RX RENEWAL (OUTPATIENT)
Age: 54
End: 2019-12-03

## 2019-12-05 ENCOUNTER — APPOINTMENT (OUTPATIENT)
Dept: GASTROENTEROLOGY | Facility: CLINIC | Age: 54
End: 2019-12-05

## 2019-12-19 ENCOUNTER — APPOINTMENT (OUTPATIENT)
Dept: GASTROENTEROLOGY | Facility: CLINIC | Age: 54
End: 2019-12-19
Payer: COMMERCIAL

## 2019-12-19 VITALS
WEIGHT: 121 LBS | SYSTOLIC BLOOD PRESSURE: 120 MMHG | HEIGHT: 68 IN | BODY MASS INDEX: 18.34 KG/M2 | HEART RATE: 88 BPM | DIASTOLIC BLOOD PRESSURE: 78 MMHG

## 2019-12-19 PROCEDURE — 96372 THER/PROPH/DIAG INJ SC/IM: CPT

## 2019-12-19 PROCEDURE — 99214 OFFICE O/P EST MOD 30 MIN: CPT | Mod: 25

## 2019-12-19 RX ORDER — CYANOCOBALAMIN 1000 UG/ML
1000 INJECTION INTRAMUSCULAR; SUBCUTANEOUS
Qty: 0 | Refills: 0 | Status: COMPLETED | OUTPATIENT
Start: 2019-12-19

## 2019-12-19 RX ADMIN — CYANOCOBALAMIN 0 MCG/ML: 1000 INJECTION INTRAMUSCULAR; SUBCUTANEOUS at 00:00

## 2019-12-19 NOTE — ASSESSMENT
[FreeTextEntry1] : 1. Crohns disease of both small and large intestine\par    \par CROHNS DISEASE-s/p ileocolonic resection x 2--last 6/19/15 for recurrent sbos: appears to be stable , GI symtoms stable but labs were up CRP=18.6/ESR=19 & Xefpbgpkwbhh=894 and Wt down ( inflam markers ? 2/2 to R ankle Fx/stress rx and tendinosis, also had tooth infection ) & MRE w ? ileal penetrating dis, wt =127__\par s/p 4 SBOs w/i 2 yrs--2/2 distal sb disease adj to anastamosis\par when on Humira weekly w ADA =33 (3/20/14), -but had sbo 2/2014 at ADA=25 and another sbo 4/28/15\par 6/10/2015 Colonoscopy: Inflamm ST mass on ileal side w ulceration/scarred/narrow\par 6/11/2015 CT: dilated thickened sb loops distal jej & ileum\par Post-op **probably some malabsorption 2/2 to removal of R Colon and ileum: ?bile/fat/SIBO\par WT. Loss: r/o malabsorption, ?bile-induced--secretory vs decr micelles, active dis, PLE\par r/o SIBO--Elev FA, Alb=3.4-->3.1-->2.9--> (7/28/17)\par ?SIBO/Prevent post-op recurrence --Flagyl 250 mg qid~12/7/16-->d/c: 5/11/17\par Also discussed trial of Cholestyramine/Xifaxin -wanted to wait\par **ACTIVE Crohn's--11/10/16: 9.5lb wt loss, BMs: #5-6, 5x/D--Taking Magnesium \par 12/1/16 MRE: RUQ ileocolonic anastamosis--narrowed w upstream dilatation to 3.2 cm\par Labs: Stool Klejiytjoqgo=605, + Incr Fecal fat, Alb=3.4, FA =23, N23=093, Hgb=12.3,ESR=10,CRP <5\par 4/19/17 :Colonoscopy: Anastamosis: open w mild -mod active colitis, enteritis severe adj to anastomosis, mild more proximal, remainder of colon=wnl\par 5/11/17- Adm [facility] w stools brown, Hgb=7.9, BUN=17, Creat=1.4, Alb=2.9.\par S/P 2 Units w Hgb=10.6, BUN=15/1.1, Prednisone 40mg taper, entocort d/dee\par 6/8/17: Hgb=7.4, MCV=98, CRP<5--ASA stopped, Pred20--> 30mg, 6/13/17: s/p 2 Unit PRBCs\par 7/11/17 [facility] w unsteadiness. MRI Head: R Cortical Temporo-occipital encephalomalacia\par Hgb=9.9--> 8.4->9.7 after 1 Unit. Seen by Heme: received IV Iron \par CRP<5, R96=957, DVH=522, FA>23,Iron=22,Sat%=6, Ferritin=42, Alb=3.5. D/C on warfarin 2mg\par 7/28/17 Hgb=7.7; 7/31/17-s/p 1 Unit \par Heme Consultation: received  IV Iron and 5 IV Copper:___\par **Entyvio :time 0=12/22/16 ( Humira 40mg QW); 1/5/17--2wks, s/p 3rd infusion at wk 6, \par #6 :6/21/17--held 2/2 Shingles, Last Infusions=9/19/17 , 10/17/17, 11/28/17, 1/16/18 ,2/16/18, 3/19/18,4/16/18, 5/14/18, 6/25/18, 8/7/18, 9/17/18, 10/16/18, 11/13/18, 12/11/18, 1/14/19, 2/15/19, 3/15/19, 5/16/19, 6/18/19,7/24/19,\par 9/9/19, 10/2/19, 11/4/19, 12/12/19 \par Entocort 9mg qd: 12/7/16--d/dee 5/11/17\par **Prednisone 40mg qd (5/11/17)--tapered 5mg/wk to 20mg qd, 6/8/17 30mg, \par 7/25/17 25mg, 3wks ago 20--> 15mg, 10/19/17 10mg alt w 7.5-->11/16/17 10mg alt w 5mg-->11/30/17: 5mg-->12/21/17: 5 alt w 2.5mg-->1/18/18: 2.5mg qd-->2.5mg qod--> d/c \par Probiotics 3 qd \par Lactose free, protein drinks tid\par MCT oil begun\par **trend --cbc, esr, crp, albumin\par **monitor wt= 120-->134-->134 --> 135--> 132 w clothes-->133--> 131 (Low carbs now)--> 129--> 127-->126-->124-->125-->124--> 126-->125-->125-->125-->126-->128-->127____\par Wt Loss : sig change since May-June/2018\par 8/17/17: Hgb=9.9, ESR=20, Ss=426--Mduapicxxs s/p GI infection w N/V/D, no obstruction\par 10/17/17: Hgb=7.9,ESR=6,CRP<5, INR=1.6, Got IV Iron x 2, since 10/2/17 for Iron<10, Hgb=8.8\par 11/16/17: Hgb=11.2, ESR=14, CRP=12, 10/26/17 Stool fat-neg, calprotectin-30\par 11/13/17: MRE-Chr mild distension of distal & vladislav-ileum s/o mild stricturing at anast, mild mural thick & mucosal enhancemt ~ 20cm; little change from 2015 c/w mild acute on chr ileitis, 2.1 cm Liver hemangioma\par 10/9/18: Hgb=11.6, MCV=94, ESR=14, CRP=5.4, INR=2.2\par 10/10/18 MRE: stricturing of neoterminal ileum w worsened upstream dilatation, moderate length of upstream ileum w stricturing extending at least 20cm and more extensive then previous. suggestion of 2 adj extraluminal fluid collections which may be w/i the wall of strictured ileum near the ileocolonic anastomosis. surrounding spiculation and tethered appearance of bowel in this region.\par CTE: no discrete collection or intramural fistula, but mucosal enhancemt\par Today: 12/19/19  : feeling ok , Wt=12___off prednisone, BMs Brown: #___, Hgb=\par CRP <5-->17-->0.36-->6.5-->5.5--> <5-->0.18: ? s/p recent ankle fx/stress rx/tendonitis and tooth infection \par prior stool studies w elev  calprotectin and No fat\par Wt loss-- 2/2 to low carbs-->fat burning and flare ; advised to liberalize and add protein, ensure\par Plan: Prednisone d/c 2/20/18\par Entyvio q 6 wks --> 4 weeks \par check inflammatory markers: 2/28/19 w ESR=12, CRP=6.5 & 2/21/19 stool calprotectin--> 232\par 8/15/19: ESR=10, CRP=5.2, Calprotectin=88\par 9/19/19: ESR=9, CRP=5.5\par 10/17/19: ESR=7, CRP< 5\par 11/9/19 : ESR=6, CRP=0.18\par pt to call numbers given for  [facility] or [facility]ational rx's--biologics.  \par Colonoscopy scheduled           \par cbc,esr,crp--pending\par check entyvio levels                                                                                                                                                                                                                                                                                                                                                                                                                                                                                                                                                                                                                                                                                                                                                                                                                                                                                                                                             \par 2. Iron deficiency anemia due to chronic blood loss  \par  had initially dropped , after clinical flare and post procedure bleeding\par Probably multifactorial: ACD, Blood loss, malabsorption/nutrient deficiencies\par Eliquis-->warfarin after CVA, On Entyvio and prednsione for active dis\par Still requiring IV Iron--prn, \par s/p IV Cu & transfusions \par 7/11/17 Recent Adm for unsteady gait w MRI c/w CVA--warfarin begun: possible better control of AC\par Chromagen 2 qd--> IV Iron , s/p IV Cu x 5, FA 1mg qd , B12 SL & IM\par  1/14/19: Hgb=10.7, INR=1.6, 2/5/19: Hgb=11.2,INR=1.5; 2/28/19: Hgb=11.5, Ydsveruc=919; 3/11/19: INR=2.6\par 4/11/19: hgb=9.7, INR=1.6, 7/2/19: Hgb=11.7, Ferritin=41,INR=2.2, 8/15/19: Hgb=12.2,Ferritin=81,INR=1.6, \par 9/19/19: Hgb=12.8, 10/17/19: Hgb=14, 10/26/19: Hgb=14\par 7/13/17: Q99=645, RXX=404,FA>23; 1/3/18 Y66=763, Gk=888, Zinc=55; 6/6/18 N38=373, 9/5/18: Um=784, Zinc=67\par 11/28/18 N69=015, 7/2/19: Ug=307, Zinc=61,B6=2.8, 8/15/19: Yr=448,Zinc=54,E89=208,\par B12 1cc IM ____R. delt--   Given today, \par Last Iron infusion=9/19/19 , Iron =73,  Whrpvjho=537; most recent Ferritin =297, Iron=85\par Hem consult IV Iron /Cu given malabsorption, ? BM bx --r/o occult etiology--on hold\par trend cbc\par Adj INR--closer to low 2's.    \par Agree w Heme--Prevnar-13\par \par \par \par 3. Other osteoporosis without current pathological fracture  \par : Progression on BD from 5/5/16\par Crohns, h/o steroid use\par Calcium citrate & Vit D per Endo\par Forteo\par Repeat BD of 8/2018 --showed worsening to Osteoporosis from 2016\par Rec Endo consult w Dr. Akers :Osteoporosis center at [facility]--pt never went originally \par 9/21/18: Phos=2.4, Ca=8.7, Alb=3.4, Vit D=27, YDH=852, \par 10/16/18: Phos=2.8, Ca=8.7, Alb=3.5,\par 1/14/19: Phos=2.6,  Ca=8.7, Alb=3.6\par 2/28/19: Phos=1.8, Ca=8.9, Alb=3.7, VitD=39, PIW=522\par 3/18/19: Ca=8.5, Alb=3.7, Vit D=44.6, PTH=93\par 7/11/19: Ca=9.1, Alb=3.7, Phos=3.9, Vit D=40/ 306, MJA=703\par 8/15/19: Ca=9, Alb=4.2, Phos=4.4, VitD=59, SWV=046\par 10/17/19: Ca=9.6, Alb=3.9, Phos=3.5\par 11/6/19: Ca=9.1, Alb=3.9. Phos=3.7, VitD=50, PTH=80\par Dr. Akers: Hyperparathyroidism-- probably secondary due to low Vit D/Ca & ? superimposed primary, Rx replete Vit D and current IV Calcium\par trend Vit D, Ca, Phos, PTH,  \par [facility] Consult init felt  Parathyroid scan showing activity in mediastinum is ectopic parathyroid, feels sx not indicated at this time, elev PTH is secondary to low  Vit D/Ca--to be reconsulted\par BD--9/2019: incr in spine, no change in wrist/hip\par \par \par \par 4. Gastroesophageal reflux disease w esophagitis : well controlled, no ht burn, no dysphagia, LPR\par Dry cough, CXR:ana, saw ENT eulogio-->LPR\par ( +++)LPR, (_ ++)Barretts w (_ No)Dysplasia, (_ No, H/O )Esophagitis grade: (__ ), \par Anti-reflux diet and life-style changes emphasized. (_No ) Bedge. \par reg exercise emphasized. \par **(_ ++)PPI: ( Protonix 40 mg qd ), (++) H2B Qhs: ( Zantac 300mg--> Pepcid 40mg ), \par (_ ) Carafate 1gm:\par **(_ ++)F/U EGD: (_ 4)mo. / (_2019 )yrs\par (_ No)pH Monitor, (_No )Manometry, (_No )esophagram \par (_f/u )ENT eval., (_ No)Surgical eval.    \par \par \par \par 5. Internal hemorrhoids  \par  well-controlled , no swelling, itching, bleeding\par Discussed the potential complications of thrombosis, pain, bleeding, swelling, itching, infection.\par Moderate -Fiber Diet was reviewed and emphasized.\par 6 - 8 cups of decaffeinated fluid daily\par (_++ ) Sitz Bathes BID, (_++++ ) Anusol HC Suppos/Cream PA QHS ,\par (_No ) Tucks BID, (_ No) Balneol Lotion,(_ No) Calmoseptine Oint, (_ ++) Prep H prn\par (_No ) Colorectal Surgical evaluation for possible ablation.    \par \par \par \par \par 6. Barretts esophagus without dysplasia  \par Notes: see GERD.    \par \par \par \par 7. Hepatomegaly-->not confirmed by recent abd sono\par CTE w relative enlargemt, ? lobulation & enlarged portal/mesenteric veins\par LFTs-wnl, no ascites or edema\par R/O CLD, Hepatic vein thrombosis\par Abd sono w doppler--> Liver and spleen normal size, no thrombosis, normal portal and hepatic vein flow\par \par \par \par \par Informed Consent:\par * The risks & Benefits of EGD  /  Colonoscopy were discussed w patient.\par * This included but was not limited to perforation, bleeding, sedation /med rxns possibly requiring surgery, blood transfusions, antibiotics & CPR/Intubation.\par * Pt. understands & agrees to the procedures.\par * Pt. advised to D/C  ASA/NSAIDs  7  Days  &   Warfarin,  4  -  5  Days  PTP.\par * [ +++ ]  Dulcolax / Miralax / Mag. Citrate ,  [     ] Prepopik/ Clenpiq ,  [     ] Osmo Prep,  [    ] GoLytely,  prep. reviewed w Pt.\par * Hold  [           ] AM of procedure.\par * Hold  [           ] PM of procedure.\par * Take  [           ] PM of procedure.\par * Take  [           ] AM of procedure.\par \par

## 2019-12-19 NOTE — HISTORY OF PRESENT ILLNESS
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-->Eliquis' warfarin \par        GERD w Page's, Hemorrhoids, BPH, \par        S/P Ileocolonic resection 1998\par        Since then multiple SBOs\par \par \par        Got IV Iron x 2, since 10/2/17\par        10/19/17: feeling better, Jt=761 on prednisone 15mg x 3weeks, BMs Brown: #5-6, 1-2/D, occas 3-4/d\par        10/25/17: Hgb=10, ESR=5, CRP=5, Alb=3.6, Phos=2, Kjlvrwjt=003, V56=335\par        11/16/17: Hgb=11.2, ESR=14, CRP=12, \par        11/13/17: MRE-Chr mild distension of distal & vladislav-ileum s/o mild stricturing at anast, mild mural thick & mucosal enhancemt ~ 20cm; little change from 2015 c/w mild acute on chr ileitis, 2.1 cm Liver hemangioma\par        11/28/17: Entyvio\par        11/29/17: Hgb=9.6, ESR=10, CRP=5.8, Alb=3.8,Ferritin-19, Iron=14,Sat=4%, Phos=2.5, Rl=977, BMs:# 5, 1-2x/D, brown,\par        12/19;17: Hgb=10.1, ESR=12, CRP=6.5; 12/21/17: BMs:#3-5, 1-3x/D , brown, no blood, no pain, cv=695\par        1/3/18:Hgb=11.7, ESR=19, CRP=6.5, Alb=4.1, Ikcicvdv=620, Iron=31, Sat%=9,Zinc=55\par        1/16/18: Entyvio, Hgb=11.4, ESR=10, CRP=6.9\par        1/18/18: Today: cellulitis R foot, saw dr KEITH--rx augmentum x 10D, Entyvio 1/9 to 1/16, yn=654 w clothes,BMs: # 4-5, 1-2x/D \par        2/14/18: Hgb=11.1, ESR=16, CRP=11.6, Alb=3.6, Iron=28, Ferritin=23, INR=1.5 \par        2/16/18: s/p Entyvio; Iron infusion, again on 2/27\par        2/20/18: Hgb=10.6, ESR=20, CRP=12, INR=1.7\par        2/27/18: s/p iron infusion\par        3/9/18: Dr. Burden for tenosynovitis, rx w Zorvolex: Diclofenac x 5 days--? response\par        3/14/18: Os=223; BMs: # 5, 1-2x/D; Hgb=11, ESR=18, CRP=11,Irom=45,Rzeiunab=751,Alb=3.6, INR=1.5\par        3/19/18: s/p Entyvio\par        3/22/18: ob=312, BMs: # 5, 3-4 x/d\par        4/16/18: s/p Entyvio,Hgb=11.9, INR=1.3, ESR=18, CRP=8.4 \par        4/18/18 EGD: gastritis, no HP, no IM, 2+ Mucous, 0.5cm gastric polyp: fundic, 4cm HH, + Barretts:3cm, no dysplasia\par        4/25/18: Hgb=11.5, INR=1.6, Iron=40, Sat=13%, Ferritin=57, Alb=3.2, Phos=2.2, Mag=1.7\par        4/30/18: s/p Iron Infusion\par        5/14/18: s/p Entyvio \par        5/23/18: Hgb=11.5, ESR=16, CRP< 5\par        5/29/18: s/p Iron Infusion\par        6/6/18: Hgb=10.6, INR=1.7, Eifzbiiz=323, Alb=3.5, Phos=2.1, S81=858, fz=220; BMs: # 5, 2-3x/D \par        6/19/18: Hgb=10.6, INR=1, CRP=15, ESR=23\par        6/21/18: R ankle is acting up, swollen, pain, having MRI done, took a couple of advil, on low carbs ,BMs: # 5, 1-2x/D, ek=737\par        6/26/18: MRI: fx/stress rxn-talar body/navicula; stress related changes--cuneform, cuboid,distal tibia; mod tibiotalar effusion, mild-mod peroneal tendinosis\par        7/19/18: entyvio 6/26/18, eliminated all carbs--anti inflammatory diet, ki=608, BMs: # 4-5, 1-3x/D \par        7/25/18: Hgb=10, INR=1.3, Alb=3.3, Phos=2.4, Zshhxlst=991, sat%=12, Iron=35\par        8/2/18: BD--osteoporosis of hip/spine: sig decrease BD--17.6% of hip, 17% of spine, osteopenia of wrist: 6.4%\par        8/7/18: Entyvio\par        8/21/18: Hgb=11.1, INR=1.5, ESR=19, CRP=18.6\par        8/23/18: recently w tooth infection, old implant--loose and removed; rx w amox, and advil\par        eating almost no carbs, bm=333, # 5-6, 2-3x/d \par        8/24/18: Stool fat--neg, Dtqwkehzacgt=978\par        9/5/18: Hgb=11.4, INR=1.3, ESR=9, CRP=11.3, Iron=41, Echnesdg=125, Alb=3.8,\par        9/17/18: entyvio, Hgb=10.7, INR=2.2, ESR=17, CRP=9.1, \par        9/20/18: pe=846, BMs: # 5-6, 1-2x/D \par        9/21/18: Phos=2.4, Ca=8.7, Alb=3.4, Vit D=27, WXE=921, \par        Dr. Akers: Hyperparathyroidism: probably secondary due to low Vit D/Ca & ? superimposed primary, rx replete Vit D and Calcium\par        10/9/18: Hgb=11.6, MCV=94, ESR=14, CRP=5.4, INR=2.2\par        10/10/18 MRE: stricturing of neoterminal ileum w worsened upstream dilatation, moderate length of upstream ileum w stricturing extending at least 20cm and more extensive then previous. suggestion of 2 adj extraluminal fluid collections which may be w/i the wall of strictured ileum near the ileocolonic anastomosis. surrounding spiculation and tethered appearance of bowel in this region.\par 10/16/18: entyvio, Hgb=11.7, MCV=94, ESR=14, CRP <5, Alb=3.5\par 10/18/18: Fq=486,   BMs: # 5-6, 3x/D , \par 11/13/18: Entyvio\par 11/21/18 CTE w C: limited 2/2 bowel underdistention, mucosal hyperenhancemt & extensive SM edema of distal ileum including vladislav-ileum, difficult to exclude a sml fluid collection, Liver w relative enlargement w suggestion of lobulation, enlargemt of PV,SMV,IMV,Spl V, rectal V. No ascites\par 11/28/18: Hgb=10, ESR=19, CRP=17,Alb=3.5,Iron=35, Sat%=11, Qkkpdedl=716, INR=1.3\par 11/29/18: tk=949, BMs: # 5-6, 3-4x/D\par 12/3/18: Abd sono--Liver 14.8cm Incr heterogenicity, spleen--wnl\par 12/11/18 & 1/14/19: Entyvio\par 1/14/19:Entyvio,  Hgb=10.7, ESR=15, Alb=3.6, Iron=36, Ferritin=67, Sat%=11, INR=1.6\par 1/24/19:  mp=235, stools are # 4-6, 3-4x/d , no pain\par 2/5/19: Hgb=11.2, ESR=14, CRP=0.36\par 2/28/19: Hgb=11.5, ESR=12, CRP=6.5, Alb=3.7, Iron=69, Ogkaarpz=555, Ca=8.9\par                Bms: # 4-5, 2-3x/D , au=472,  Entyvio : last 2/1519,\par                had parathyroid scan pre-op, still w elev PTH, + activity in mediastinum,? ectopic parathyroid tissuevs neoplasm.  To see ENT and have Imaging at [facility]\par 3/28/19: Bms: # 4-5 , 3x/d ;cw=273\par 4/11/19: Hgb-9.7, Iron=45, ESR=18, CRP=9.4, Alb=3.5, \par Saw Endo SX at [facility], felt hyperparathyroid is secondary to Low Ca/Vit D, no sx at this time\par 4/16/19: BMs: # 5-6, 3-4x/D, sd=573,  Entyvio : last 3/1519,  got IV iron 4/12/19 for Ferritin=53\par 4/27/19: s/p R Hip Nailing--missed 4/2019 2/2 fresh wound\par 5/16/19 & 6/18/19 Entyvio\par 7/2/19: Hgb=11.7, Ferritin=41,ESR=12, CRP=5.5, B6=2.8,Mag=1.8,Phos=3.9,Id=661,Zinc=61\par 7/11/19: s/p IV iron\par 7/11/19: Alb=3.7, XHD=237, Ca=9.1, Vit D=40/306, \par 7/24/19: Entyvio, Alb=3.8, SBR=798, Ca=8.9, Vit D=128 \par 8/1/19: Bms: # 5-6, occas #4, 2-3x/D , vk=813\par 8/15/19: Hgb=12, ESR=10, CRP=5.2, Ferritin=68, Alb=3.4, Phos=4.4,Mg=1.7, Ca=8.5,Calprotectin=88,\par 8/20/19: NJI=393, Ca=9, Alb=4.2\par 9/19/19: Hgb=12.8, ESR=9, CRP=5.5, Alb=3.7, Csamxmcl=840, Mag=1.8, Ca=8.9, Phos=4.2\par 9/26/19: dg=535, BMs: #5-6, 2-3x/D, no pain\par 10/17/19: qc=546, BMs: #4-6, 1-2x/D, no Pain; Hgb=14, ESR=7, CRP<5, Alb=3.9, Jzfljfac=836, Mag=1.7, Ca=9.6\par 10/24/19: ss=979, Bms: # 4-6 , no pain,\par 11/6/19: ESR=6, CRP=6, PTH=80,Ca=9.1, VitD=50\par 11/14/19: ej=693, BMs: # : 4, 1-2, 0ccas #5\par  12/12/19: nc=857, BMs: #5-6, 2-3x/D\par  Today:  no pain, eating more calories, liberalizing carbs--avoiding simple sugars.  \par         \par        cf=858\par        Abd pain--no \par        Nausea--no \par        Vomit--no \par        Early satiety-no \par        Belching-no \par        Regurgitation--no \par        Ht burn--no \par        Throat Clearing-no\par        Globus-no\par        Hoarseness--no \par        Dysphagia--no \par        BMs: # :5, 2-3x/d\par        Constipation--no \par        Diarrhea- intermittent \par        Bloating--no \par        Flatulence-no\par        Gurgling--no \par        Melena--no \par        BRBPR--no \par        Anorexia-no \par        Wt Loss--stable\par \par \par \par        PRIOR HISTORY--2017\par \par        1/17/17: Hgb=9.2, ESR=6, CRP=5; 1/19/17: BMs: #4, 5-6, 2-3x/D, Bh=198, Started Creon 1 tid cc\par        3rd Entyvio begining Feb\par        2/14/17: Labs; Hgb=9.6, MCV=97, ESR=5, CRP< 5, Z44=099, FA>23, Iron=59, Retic =3.2,\par        2/17/17 Fecal Calprotectin=16, Fats: Increased neutral & Split\par        3/13/17: Labs Hgb=9.5, MCV=95, ESR=7, CRP <5, ALB=3.1\par        3/16/17: lot of stress, to move -[place], had a job-fell thru, BMs: # 5, 2-4 x/D, wt= 131w clothes\par        4/19/17 EGD: gastritis, MACKENZIE, No HP, 2cm HH, +Barretts, No Dysplasia\par        Colonoscopy: Anastamosis: open w mild -mod active colitis, enteritis severe adj to anastomosis, mild more proximal, remainder of colon-wnl, 2-3 deg int hemorrhoids\par        4/26/17 : Hgb=7.3, MCV=95, ESR=13, CRP<5;Immediately post procedure stools very dark on eliquis/iron.\par        Admitted to PM: Hgb=8.3-->8.9 after 2 U, Alb 3.3, BUN=17, creat=1.3, Malina/Rmxbh=956/460\par        4/27/17: Alb=2.3, BUN=12, creat=1.2, Malina/Lipase=209/863-No abd pain, N/V; 5/5/17: Hgb=10.7.\par        5/8/17 Entyvio iv. 5/8-5/9 w dark red diarrhea; 5/10/17 Hgb=7.8.\par        5/11/17 Adm [facility] w stools brown, Hgb=7.9, BUN=17, Creat=1.4, Alb=2.9; S/P 2 Units- Hgb=10.6, BUN=15/1.1.\par        Prednisone 40mg qd, entocort d/dee.; 5/18/17: Hgb-10.4, nd=507, BMs: #5, 1-2x/D, no pain\par        6/8/17: Hgb=7.4,MCV=98, CRP<5-ASA stopped, Pred20--> 30\par        6/10/17: Hgb=8.2, Alb=2.9, BUN=20/1.2 ; 6/12/17: Hgb=8.3, Retic=0.19, Iron=10, Sat%=3, Ferritin=57, FA=23,S21=330, 6/13/17: s/p 2 Unit PRBCs\par        6/15/17: started MCT oil, Or=607 , BMs: # 5, 1-2x/D, stools are brownish/green \par        7/11/17: [facility] w unsteadiness. MRI Head: R Cortical Temporo-occipital encephalomalacia, MRA H&N-no sign lesions, PER-wnl.Hgb=9.9--> 8.4->9.7 after 1 Unit. Seen by Torri: received IV Iron \par        CRP<5, E86=255, XXN=889, FA>23,Iron=22,Sat%=6, Ferritin=42, Alb=3.5. D/C on warfarin 2mg\par        7/20 Hgb=8.5, 7/28 Hgb=7.7, 7/31-s/p 1 Unit \par        As outpt seeing Heme, to get IV Iron and 5 IV Copper\par        Had 'FLU' Fever=101, chills, bloat, N/V/D, Bilious. no pain, melena,brbpr\par        Given anti-emetic by dr Butler,then able to keep things down\par        On prednisone 25, warfarin w INR bet 1.6-2\par        8/17/17: Hgb=9.9, ESR=20, CRP-P,Bl=183, BMs: # 5, 1-2x/D, stools are brownish/green\par        10/2/17: Hgb=8.8, MCV=89,Phos=1.7, K=3.9, Mag=1.9, Alb=3.6,Iron<10,Ferritin=21, INR=2\par        10/17/17: Hgb=7.9,ESR=6,CRP<5, INR=1.6\par        10/25/17: Hgb=10, ESR=5, CRP=5, Alb=3.6, Phos=2, Jwwnhgjr=641, K86=122\par        11/16/17: Hgb=11.2, ESR=14, CRP=12, \par        11/13/17: MRE-Chr mild distension of distal & vladislav-ileum s/o mild stricturing at anast, mild mural thick & mucosal enhancemt ~ 20cm; little change from 2015 c/w mild acute on chr ileitis, 2.1 cm Liver hemangioma\par        11/28/17: Entyvio\par        11/29/17: Hgb=9.6, ESR=10, CRP=5.8, Alb=3.8,Ferritin-P, Iron=14,Sat=4%, Phos=2.5\par        12/19;17: Hgb=10.1, ESR=12, CRP=6.5; 12/21/17: BMs:#3-5, 1-3x/D , brown, no blood, no pain, nn=601\par        1/3/18:Hgb=11.7, ESR=19, CRP=6.5, Alb=4.1, Nvbwfffy=320, Iron=31, Sat%=9,Zinc=55\par \par \par        PRIOR HISTORY---2013:\par \par        2/20/13 CT markedly dilated sb loops ext to anast, colonoscopy w open anast ,mild-mod remy-anastamotic disease. quick response to entocort/humira--probably adhesive dis. \par        8/10/13 w GNR bacteremia, ADA 1.7 /KAYLAH 3.4, Humira 8/7, 8/13,8/18,9/15\par        CT- mucosal thickening and spiculation of the distal sb extending to the anastamosis, thickening and stranding of adj mesenteric fat.\par        Humira increased to 40mg weekly, entocort 4\par        9/2013 MRE--dilated loops in mid and distal ileum, markedly thickened and narrowed TI w decreased peristalsis of TI\par        11/15/13 ADA-24.9, KAYLAH-0\par \par \par        PRIOR HISTORY---2014:\par \par        2/15/14--CT dilated loops SB, loop of sb in mid abd 4.3cm w infiltrative changes in the mesentery, bowel tapers in the RLQ to the anastamosis w/o transition\par        WBC=15K, Rx w IV steroids and Abx \par        3/7/14 SBFT: last 10cm sb prox to anast mild distension and sl irregularity. In the mid portion of this loop there is a mild narrowing which appears to reopen but is some what narrowed.\par        3/20/14 ADA=33.1, KAYLAH=0, Lialda switch to Apriso 4 (2/2014)\par \par \par        PRIOR HISTORY--2015\par \par        1/11/15 Adm [facility] w 1 day N,Recurrent V, Abd pain/distension. CT-multiple distended & fluid-filled sb loops, mild wall thickening of ileum w No inflamm changes.\par        WBC=14.6, Hgb=17, RX w NGT suction, Levaquin iv, Solumedrol 40mg q 12( 1/12-->1/16) to prednisone 30mg BID w 5mg taper/wk\par        3/18/15 --Dr. Butler w edema /High BP, prednisone was tapered slowly to 2.5mg \par        switched to entocort 9mg qd, edema and bp improved w lasix 20mg \par        BMs:#4-5, 3x/D, Hgb=11, ESR=4, CRP<5\par        4/28/15 - 5/1/15: Adm [facility] w 1 day Abd pain, distension,N/V. 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 side of anast, opening appeared ulcerated,scarred & severely narrowed\par        6/11/15: [facility] w N/V x1, decr appetite, CT ABD: no obstruction, dilated thickened sb loops of distal jej and ileum\par        6/19/15 [facility]: s/p Lap w extensive lysis of adhesions, hepatic flex, sigmoid and sb anastamosis, s/p partial r colectomy and sb resection--side to side elisabeth: 8-10 inch from prior anast to TV colon.\par        7/17/15: BMs:#4-6, 4-5x/D, wt 131(from 126)\par        8/20/15: BMs: #4, 1-2x/D or #5, 2-3x/D, hq=178, dry cough,Hgb=10, WBC=3, UVF=770, CRP=56, ESR=21\par        8/25/15 CT Abd/Pelv: Svl RLQ sb loops w wall thickening, mild nodularity & inflamm stranding in mesentery\par        Advised-restart Entocort 9mg qd, Apriso 4 qd, Maintain Humira but given RX to check drug/Ab levels\par        9/15/15: did nt restart meds,Hgb=9.9, WBC=4, ESR=19, CRP=5.8, pt=982; [facility] : ADA=8.5, Antibodies< 1.7\par        10/15/15: No pain, BMs:#4, 1-2x/D, #5-6, 3-4x/D, throat clearing/cough-better, ws=604\par        11/30/15: No pain, BMs:# 4, 5-6 intermittently, No throat clear, mh=190\par \par \par        PRIOR HISTORY-2016\par \par        1/22/16; 4/8/16; 6/6/16 : No pain, BMs:# 4-5 1-2x/D, also #5-6 3x/D for 2-3D/wk, zr=201\par        7/14/16: cq=251, 9/22/16: nm=497.5\par        11/10/16: 9.5lb wt loss, states BMs: 5-6, 5x/D--Taking Magnesium, No pain/anorexia\par        Labs: Stool Zwihryhyewsg=608, + Incr Fecal fat, Alb=3.4, FA =23, F29=881, Hgb=12, ESR=10, CRP <5\par        Magnesium-d/c, Obtain MRE asap--Start steroids and possibly switch to Entyvio\par        DDx discussed: active crohns--loss response to Humira, Malabsorption--loss Bile acids, Bile-induced diarrhea, SIBO\par        Pt wanted to wait for imaging-did not start rx\par        12/1//16 MRE: RUQ ileocolonic anastamosis--narrowed w upstream dilatation to 3.2 cm\par        Pt refused pred, Started Entocort 9mg qd and Flagyl 250mg qid about 7-10days ago \par        awaiting Entyvio load to start 12/22, then 1/5; had Cut back on iron bid\par        12/15/16: BMs:# 5, 2-3x/D, occas #6, No pain, Less bloat/flatulence, Vb=196.

## 2019-12-24 ENCOUNTER — RESULT REVIEW (OUTPATIENT)
Age: 54
End: 2019-12-24

## 2020-01-14 LAB
ALBUMIN SERPL ELPH-MCNC: 4.1 G/DL
ALP BLD-CCNC: 89 U/L
ALT SERPL-CCNC: 33 U/L
ANION GAP SERPL CALC-SCNC: 12 MMOL/L
AST SERPL-CCNC: 25 U/L
BASOPHILS # BLD AUTO: 0.03 K/UL
BASOPHILS NFR BLD AUTO: 0.6 %
BILIRUB SERPL-MCNC: 0.3 MG/DL
BUN SERPL-MCNC: 16 MG/DL
CALCIUM SERPL-MCNC: 9.1 MG/DL
CHLORIDE SERPL-SCNC: 105 MMOL/L
CO2 SERPL-SCNC: 24 MMOL/L
CREAT SERPL-MCNC: 1.2 MG/DL
CRP SERPL-MCNC: 0.2 MG/DL
EOSINOPHIL # BLD AUTO: 0.06 K/UL
EOSINOPHIL NFR BLD AUTO: 1.2 %
ERYTHROCYTE [SEDIMENTATION RATE] IN BLOOD BY WESTERGREN METHOD: 7 MM/HR
FERRITIN SERPL-MCNC: 157 NG/ML
FOLATE SERPL-MCNC: 10.1 NG/ML
GLUCOSE SERPL-MCNC: 86 MG/DL
HCT VFR BLD CALC: 43.3 %
HGB BLD-MCNC: 13.5 G/DL
IMM GRANULOCYTES NFR BLD AUTO: 0.2 %
LYMPHOCYTES # BLD AUTO: 0.78 K/UL
LYMPHOCYTES NFR BLD AUTO: 15.5 %
MAN DIFF?: NORMAL
MCHC RBC-ENTMCNC: 31.2 GM/DL
MCHC RBC-ENTMCNC: 31.4 PG
MCV RBC AUTO: 100.7 FL
MONOCYTES # BLD AUTO: 0.52 K/UL
MONOCYTES NFR BLD AUTO: 10.3 %
NEUTROPHILS # BLD AUTO: 3.63 K/UL
NEUTROPHILS NFR BLD AUTO: 72.2 %
PLATELET # BLD AUTO: 267 K/UL
POTASSIUM SERPL-SCNC: 4.1 MMOL/L
PROT SERPL-MCNC: 6.2 G/DL
RBC # BLD: 4.3 M/UL
RBC # FLD: 13.4 %
SODIUM SERPL-SCNC: 141 MMOL/L
VIT B12 SERPL-MCNC: 448 PG/ML
WBC # FLD AUTO: 5.03 K/UL

## 2020-01-15 ENCOUNTER — RX RENEWAL (OUTPATIENT)
Age: 55
End: 2020-01-15

## 2020-01-16 ENCOUNTER — APPOINTMENT (OUTPATIENT)
Dept: GASTROENTEROLOGY | Facility: CLINIC | Age: 55
End: 2020-01-16
Payer: COMMERCIAL

## 2020-01-16 VITALS
BODY MASS INDEX: 18.34 KG/M2 | WEIGHT: 121 LBS | DIASTOLIC BLOOD PRESSURE: 80 MMHG | HEART RATE: 84 BPM | SYSTOLIC BLOOD PRESSURE: 124 MMHG | HEIGHT: 68 IN

## 2020-01-16 PROCEDURE — 99214 OFFICE O/P EST MOD 30 MIN: CPT | Mod: 25

## 2020-01-16 PROCEDURE — 96372 THER/PROPH/DIAG INJ SC/IM: CPT

## 2020-01-16 RX ORDER — CYANOCOBALAMIN 1000 UG/ML
1000 INJECTION INTRAMUSCULAR; SUBCUTANEOUS
Qty: 0 | Refills: 0 | Status: COMPLETED | OUTPATIENT
Start: 2020-01-16

## 2020-01-16 RX ADMIN — CYANOCOBALAMIN 0 MCG/ML: 1000 INJECTION INTRAMUSCULAR; SUBCUTANEOUS at 00:00

## 2020-01-16 NOTE — ASSESSMENT
[FreeTextEntry1] : 1. Crohns disease of both small and large intestine\par    \par CROHNS DISEASE-s/p ileocolonic resection x 2--last 6/19/15 for recurrent sbos: appears to be stable , GI symtoms stable but labs were up CRP=18.6/ESR=19 & Ubinnxbsztjg=694 and Wt down ( inflam markers ? 2/2 to R ankle Fx/stress rx and tendinosis, also had tooth infection ) & MRE w ? ileal penetrating dis, wt =127__\par s/p 4 SBOs w/i 2 yrs--2/2 distal sb disease adj to anastamosis\par when on Humira weekly w ADA =33 (3/20/14), -but had sbo 2/2014 at ADA=25 and another sbo 4/28/15\par 6/10/2015 Colonoscopy: Inflamm ST mass on ileal side w ulceration/scarred/narrow\par 6/11/2015 CT: dilated thickened sb loops distal jej & ileum\par Post-op **probably some malabsorption 2/2 to removal of R Colon and ileum: ?bile/fat/SIBO\par WT. Loss: r/o malabsorption, ?bile-induced--secretory vs decr micelles, active dis, PLE\par r/o SIBO--Elev FA, Alb=3.4-->3.1-->2.9--> (7/28/17)\par ?SIBO/Prevent post-op recurrence --Flagyl 250 mg qid~12/7/16-->d/c: 5/11/17\par Also discussed trial of Cholestyramine/Xifaxin -wanted to wait\par **ACTIVE Crohn's--11/10/16: 9.5lb wt loss, BMs: #5-6, 5x/D--Taking Magnesium \par 12/1/16 MRE: RUQ ileocolonic anastamosis--narrowed w upstream dilatation to 3.2 cm\par Labs: Stool Vbpxmxogdweb=233, + Incr Fecal fat, Alb=3.4, FA =23, D12=588, Hgb=12.3,ESR=10,CRP <5\par 4/19/17 :Colonoscopy: Anastamosis: open w mild -mod active colitis, enteritis severe adj to anastomosis, mild more proximal, remainder of colon=wnl\par 5/11/17- Adm [facility] w stools brown, Hgb=7.9, BUN=17, Creat=1.4, Alb=2.9.\par S/P 2 Units w Hgb=10.6, BUN=15/1.1, Prednisone 40mg taper, entocort d/dee\par 6/8/17: Hgb=7.4, MCV=98, CRP<5--ASA stopped, Pred20--> 30mg, 6/13/17: s/p 2 Unit PRBCs\par 7/11/17 [facility] w unsteadiness. MRI Head: R Cortical Temporo-occipital encephalomalacia\par Hgb=9.9--> 8.4->9.7 after 1 Unit. Seen by Heme: received IV Iron \par CRP<5, V00=648, TLW=518, FA>23,Iron=22,Sat%=6, Ferritin=42, Alb=3.5. D/C on warfarin 2mg\par 7/28/17 Hgb=7.7; 7/31/17-s/p 1 Unit \par Heme Consultation: received  IV Iron and 5 IV Copper:___\par **Entyvio :time 0=12/22/16 ( Humira 40mg QW); 1/5/17--2wks, s/p 3rd infusion at wk 6, \par #6 :6/21/17--held 2/2 Shingles, Last Infusions=9/19/17 , 10/17/17, 11/28/17, 1/16/18 ,2/16/18, 3/19/18,4/16/18, 5/14/18, 6/25/18, 8/7/18, 9/17/18, 10/16/18, 11/13/18, 12/11/18, 1/14/19, 2/15/19, 3/15/19, 5/16/19, 6/18/19,7/24/19,\par 9/9/19, 10/2/19, 11/4/19, 12/12/19, 1/6/20\par Entocort 9mg qd: 12/7/16--d/dee 5/11/17\par **Prednisone 40mg qd (5/11/17)--tapered 5mg/wk to 20mg qd, 6/8/17 30mg, \par 7/25/17 25mg, 3wks ago 20--> 15mg, 10/19/17 10mg alt w 7.5-->11/16/17 10mg alt w 5mg-->11/30/17: 5mg-->12/21/17: 5 alt w 2.5mg-->1/18/18: 2.5mg qd-->2.5mg qod--> d/c \par Probiotics 3 qd \par Lactose free, protein drinks tid\par MCT oil begun\par **trend --cbc, esr, crp, albumin\par **monitor wt= 120-->134-->134 --> 135--> 132 w clothes-->133--> 131 (Low carbs now)--> 129--> 127-->126-->124-->125-->124--> 126-->125-->125-->125-->126-->128-->127-->____\par Wt Loss : sig change since May-June/2018\par 8/17/17: Hgb=9.9, ESR=20, Gw=094--Oemnextrgs s/p GI infection w N/V/D, no obstruction\par 10/17/17: Hgb=7.9,ESR=6,CRP<5, INR=1.6, Got IV Iron x 2, since 10/2/17 for Iron<10, Hgb=8.8\par 11/16/17: Hgb=11.2, ESR=14, CRP=12, 10/26/17 Stool fat-neg, calprotectin-30\par 11/13/17: MRE-Chr mild distension of distal & vladislav-ileum s/o mild stricturing at anast, mild mural thick & mucosal enhancemt ~ 20cm; little change from 2015 c/w mild acute on chr ileitis, 2.1 cm Liver hemangioma\par 10/9/18: Hgb=11.6, MCV=94, ESR=14, CRP=5.4, INR=2.2\par 10/10/18 MRE: stricturing of neoterminal ileum w worsened upstream dilatation, moderate length of upstream ileum w stricturing extending at least 20cm and more extensive then previous. suggestion of 2 adj extraluminal fluid collections which may be w/i the wall of strictured ileum near the ileocolonic anastomosis. surrounding spiculation and tethered appearance of bowel in this region.\par CTE: no discrete collection or intramural fistula, but mucosal enhancemt\par Today: 1/16/20  : feeling ok , Wt=127___off prednisone, BMs Brown: #5,1-3x/d___, Hgb=13.5\par CRP <5-->17-->0.36-->6.5-->5.5--> <5-->0.18--><5-->0.2 : ? s/p recent ankle fx/stress rx/tendonitis and tooth infection \par prior stool studies w elev  calprotectin and No fat\par Wt loss-- 2/2 to low carbs-->fat burning and flare ; advised to liberalize and add protein, ensure\par Plan: Prednisone d/c 2/20/18\par Entyvio q 6 wks --> 4 weeks \par check inflammatory markers: 2/28/19 w ESR=12, CRP=6.5 & 2/21/19 stool calprotectin--> 232\par 8/15/19: ESR=10, CRP=5.2, Calprotectin=88\par 9/19/19: ESR=9, CRP=5.5\par 10/17/19: ESR=7, CRP< 5\par 11/6/19 : ESR=6, CRP=0.18\par 11/27/19: ESR=6, CRP <5\par 1/13/20: ESR=7, CRP=0.2\par pt to call numbers given for  [facility] or investigational rx's--biologics.  \par Colonoscopy scheduled           \par cbc,esr,crp--pending\par check entyvio levels                                                                                                                                                                                                                                                                                                                                                                                                                                                                                                                                                                                                                                                                                                                                                                                                                                                                                                                                             \par 2. Iron deficiency anemia due to chronic blood loss  \par  had initially dropped , after clinical flare and post procedure bleeding\par Probably multifactorial: ACD, Blood loss, malabsorption/nutrient deficiencies\par Eliquis-->warfarin after CVA, On Entyvio and prednsione for active dis\par Still requiring IV Iron--prn, \par s/p IV Cu & transfusions \par 7/11/17 Recent Adm for unsteady gait w MRI c/w CVA--warfarin begun: possible better control of AC\par Chromagen 2 qd--> IV Iron , s/p IV Cu x 5, FA 1mg qd , B12 SL & IM\par  1/14/19: Hgb=10.7, INR=1.6, 2/5/19: Hgb=11.2,INR=1.5; 2/28/19: Hgb=11.5, Ytyuxgmt=544; 3/11/19: INR=2.6\par 4/11/19: hgb=9.7, INR=1.6, 7/2/19: Hgb=11.7, Ferritin=41,INR=2.2, 8/15/19: Hgb=12.2,Ferritin=81,INR=1.6, \par 9/19/19: Hgb=12.8, 10/17/19: Hgb=14, 10/26/19: Hgb=14, 1/13/20: Hgb=13.5\par 7/13/17: E05=417, EQF=823,FA>23; 1/3/18 T87=430, Ia=106, Zinc=55; 6/6/18 Q18=889, 9/5/18: Vx=491, Zinc=67\par 11/28/18 G85=609, 7/2/19: Cl=714, Zinc=61,B6=2.8, 8/15/19: Tz=338,Zinc=54,L58=686, 1/13/20: M36=898\par B12 1cc IM ____R. delt--  Given today, \par Last Iron infusion=9/19/19 , Iron =73,  Hgyopxtr=586; most recent Ferritin =157\par Hem consult IV Iron /Cu given malabsorption, ? BM bx --r/o occult etiology--on hold\par trend cbc\par Adj INR--closer to low 2's.    \par Agree w Heme--Prevnar-13\par \par \par \par 3. Other osteoporosis without current pathological fracture  \par : Progression on BD from 5/5/16\par Crohns, h/o steroid use\par Calcium citrate & Vit D per Endo\par Forteo\par Repeat BD of 8/2018 --showed worsening to Osteoporosis from 2016\par Rec Endo consult w Dr. Akers :Osteoporosis center at [facility]--pt never went originally \par 9/21/18: Phos=2.4, Ca=8.7, Alb=3.4, Vit D=27, ABL=937, \par 10/16/18: Phos=2.8, Ca=8.7, Alb=3.5,\par 1/14/19: Phos=2.6,  Ca=8.7, Alb=3.6\par 2/28/19: Phos=1.8, Ca=8.9, Alb=3.7, VitD=39, CJU=908\par 3/18/19: Ca=8.5, Alb=3.7, Vit D=44.6, PTH=93\par 7/11/19: Ca=9.1, Alb=3.7, Phos=3.9, Vit D=40/ 306, AAC=316\par 8/15/19: Ca=9, Alb=4.2, Phos=4.4, VitD=59, KWT=120\par 10/17/19: Ca=9.6, Alb=3.9, Phos=3.5\par 11/6/19: Ca=9.1, Alb=3.9. Phos=3.7, VitD=50, PTH=80\par 1/13/10: ca=9.1\par Dr. Akers: Hyperparathyroidism-- probably secondary due to low Vit D/Ca & ? superimposed primary, Rx replete Vit D and current IV Calcium\par trend Vit D, Ca, Phos, PTH,  \par [facility] Consult init felt  Parathyroid scan showing activity in mediastinum is ectopic parathyroid, feels sx not indicated at this time, elev PTH is secondary to low  Vit D/Ca--to be reconsulted\par BD--9/2019: incr in spine, no change in wrist/hip\par \par \par \par 4. Gastroesophageal reflux disease w esophagitis : well controlled, no ht burn, no dysphagia, LPR\par Dry cough, CXR:ana, saw ENT devanstein-->LPR\par ( +++)LPR, (_ ++)Barretts w (_ No)Dysplasia, (_ No, H/O )Esophagitis grade: (__ ), \par Anti-reflux diet and life-style changes emphasized. (_No ) Bedge. \par reg exercise emphasized. \par **(_ ++)PPI: ( Protonix 40 mg qd ), (++) H2B Qhs: ( Zantac 300mg--> Pepcid 40mg ), \par (_ ) Carafate 1gm:\par **(_ ++)F/U EGD: (_ 4)mo. / (_2019 )yrs--Barretts screen due \par (_ No)pH Monitor, (_No )Manometry, (_No )esophagram \par (_f/u )ENT eval., (_ No)Surgical eval.    \par \par \par \par 5. Internal hemorrhoids  \par  well-controlled , no swelling, itching, bleeding\par Discussed the potential complications of thrombosis, pain, bleeding, swelling, itching, infection.\par Moderate -Fiber Diet was reviewed and emphasized.\par 6 - 8 cups of decaffeinated fluid daily\par (_++ ) Sitz Bathes BID, (_++++ ) Anusol HC Suppos/Cream TX QHS ,\par (_No ) Tucks BID, (_ No) Balneol Lotion,(_ No) Calmoseptine Oint, (_ ++) Prep H prn\par (_No ) Colorectal Surgical evaluation for possible ablation.    \par \par \par \par \par 6. Barretts esophagus without dysplasia  \par Notes: see GERD.    \par \par \par \par 7. Hepatomegaly-->not confirmed by recent abd sono\par CTE w relative enlargemt, ? lobulation & enlarged portal/mesenteric veins\par LFTs-wnl, no ascites or edema\par R/O CLD, Hepatic vein thrombosis\par Abd sono w doppler--> Liver and spleen normal size, no thrombosis, normal portal and hepatic vein flow\par \par \par \par \par Informed Consent:\par * The risks & Benefits of EGD  /  Colonoscopy were discussed w patient.\par * This included but was not limited to perforation, bleeding, sedation /med rxns possibly requiring surgery, blood transfusions, antibiotics & CPR/Intubation.\par * Pt. understands & agrees to the procedures.\par * Pt. advised to D/C  ASA/NSAIDs  7  Days  &   Warfarin,  4  -  5  Days  PTP.\par * [ +++ ]  Dulcolax / Miralax / Mag. Citrate ,  [     ] Prepopik/ Clenpiq ,  [     ] Osmo Prep,  [    ] GoLytely,  prep. reviewed w Pt.\par * Hold  [           ] AM of procedure.\par * Hold  [           ] PM of procedure.\par * Take  [           ] PM of procedure.\par * Take  [           ] AM of procedure.\par \par

## 2020-01-16 NOTE — HISTORY OF PRESENT ILLNESS
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-->Eliquis' warfarin \par        GERD w Page's, Hemorrhoids, BPH, \par        S/P Ileocolonic resection 1998\par        Since then multiple SBOs\par \par \par        Got IV Iron x 2, since 10/2/17\par        10/19/17: feeling better, Eo=348 on prednisone 15mg x 3weeks, BMs Brown: #5-6, 1-2/D, occas 3-4/d\par        10/25/17: Hgb=10, ESR=5, CRP=5, Alb=3.6, Phos=2, Pjvqouyi=172, D34=902\par        11/16/17: Hgb=11.2, ESR=14, CRP=12, \par        11/13/17: MRE-Chr mild distension of distal & vladislav-ileum s/o mild stricturing at anast, mild mural thick & mucosal enhancemt ~ 20cm; little change from 2015 c/w mild acute on chr ileitis, 2.1 cm Liver hemangioma\par        11/28/17: Entyvio\par        11/29/17: Hgb=9.6, ESR=10, CRP=5.8, Alb=3.8,Ferritin-19, Iron=14,Sat=4%, Phos=2.5, Hg=390, BMs:# 5, 1-2x/D, brown,\par        12/19;17: Hgb=10.1, ESR=12, CRP=6.5; 12/21/17: BMs:#3-5, 1-3x/D , brown, no blood, no pain, vl=894\par        1/3/18:Hgb=11.7, ESR=19, CRP=6.5, Alb=4.1, Rhhbeztk=487, Iron=31, Sat%=9,Zinc=55\par        1/16/18: Entyvio, Hgb=11.4, ESR=10, CRP=6.9\par        1/18/18: Today: cellulitis R foot, saw dr KEITH--rx augmentum x 10D, Entyvio 1/9 to 1/16, ex=917 w clothes,BMs: # 4-5, 1-2x/D \par        2/14/18: Hgb=11.1, ESR=16, CRP=11.6, Alb=3.6, Iron=28, Ferritin=23, INR=1.5 \par        2/16/18: s/p Entyvio; Iron infusion, again on 2/27\par        2/20/18: Hgb=10.6, ESR=20, CRP=12, INR=1.7\par        2/27/18: s/p iron infusion\par        3/9/18: Dr. Burden for tenosynovitis, rx w Zorvolex: Diclofenac x 5 days--? response\par        3/14/18: Gt=376; BMs: # 5, 1-2x/D; Hgb=11, ESR=18, CRP=11,Irom=45,Godxcoqn=138,Alb=3.6, INR=1.5\par        3/19/18: s/p Entyvio\par        3/22/18: hj=650, BMs: # 5, 3-4 x/d\par        4/16/18: s/p Entyvio,Hgb=11.9, INR=1.3, ESR=18, CRP=8.4 \par        4/18/18 EGD: gastritis, no HP, no IM, 2+ Mucous, 0.5cm gastric polyp: fundic, 4cm HH, + Barretts:3cm, no dysplasia\par        4/25/18: Hgb=11.5, INR=1.6, Iron=40, Sat=13%, Ferritin=57, Alb=3.2, Phos=2.2, Mag=1.7\par        4/30/18: s/p Iron Infusion\par        5/14/18: s/p Entyvio \par        5/23/18: Hgb=11.5, ESR=16, CRP< 5\par        5/29/18: s/p Iron Infusion\par        6/6/18: Hgb=10.6, INR=1.7, Vlmzohoe=957, Alb=3.5, Phos=2.1, G00=721, ip=471; BMs: # 5, 2-3x/D \par        6/19/18: Hgb=10.6, INR=1, CRP=15, ESR=23\par        6/21/18: R ankle is acting up, swollen, pain, having MRI done, took a couple of advil, on low carbs ,BMs: # 5, 1-2x/D, iy=963\par        6/26/18: MRI: fx/stress rxn-talar body/navicula; stress related changes--cuneform, cuboid,distal tibia; mod tibiotalar effusion, mild-mod peroneal tendinosis\par        7/19/18: entyvio 6/26/18, eliminated all carbs--anti inflammatory diet, ie=226, BMs: # 4-5, 1-3x/D \par        7/25/18: Hgb=10, INR=1.3, Alb=3.3, Phos=2.4, Gegwhuyi=147, sat%=12, Iron=35\par        8/2/18: BD--osteoporosis of hip/spine: sig decrease BD--17.6% of hip, 17% of spine, osteopenia of wrist: 6.4%\par        8/7/18: Entyvio\par        8/21/18: Hgb=11.1, INR=1.5, ESR=19, CRP=18.6\par        8/23/18: recently w tooth infection, old implant--loose and removed; rx w amox, and advil\par        eating almost no carbs, ia=993, # 5-6, 2-3x/d \par        8/24/18: Stool fat--neg, Dpgdywwytddp=302\par        9/5/18: Hgb=11.4, INR=1.3, ESR=9, CRP=11.3, Iron=41, Bnnhrqkh=146, Alb=3.8,\par        9/17/18: entyvio, Hgb=10.7, INR=2.2, ESR=17, CRP=9.1, \par        9/20/18: pb=083, BMs: # 5-6, 1-2x/D \par        9/21/18: Phos=2.4, Ca=8.7, Alb=3.4, Vit D=27, SSQ=591, \par        Dr. Akers: Hyperparathyroidism: probably secondary due to low Vit D/Ca & ? superimposed primary, rx replete Vit D and Calcium\par        10/9/18: Hgb=11.6, MCV=94, ESR=14, CRP=5.4, INR=2.2\par        10/10/18 MRE: stricturing of neoterminal ileum w worsened upstream dilatation, moderate length of upstream ileum w stricturing extending at least 20cm and more extensive then previous. suggestion of 2 adj extraluminal fluid collections which may be w/i the wall of strictured ileum near the ileocolonic anastomosis. surrounding spiculation and tethered appearance of bowel in this region.\par 10/16/18: entyvio, Hgb=11.7, MCV=94, ESR=14, CRP <5, Alb=3.5\par 10/18/18: Rl=312,   BMs: # 5-6, 3x/D , \par 11/13/18: Entyvio\par 11/21/18 CTE w C: limited 2/2 bowel underdistention, mucosal hyperenhancemt & extensive SM edema of distal ileum including vladislav-ileum, difficult to exclude a sml fluid collection, Liver w relative enlargement w suggestion of lobulation, enlargemt of PV,SMV,IMV,Spl V, rectal V. No ascites\par 11/28/18: Hgb=10, ESR=19, CRP=17,Alb=3.5,Iron=35, Sat%=11, Thkpgfdf=757, INR=1.3\par 11/29/18: cw=672, BMs: # 5-6, 3-4x/D\par 12/3/18: Abd sono--Liver 14.8cm Incr heterogenicity, spleen--wnl\par 12/11/18 & 1/14/19: Entyvio\par 1/14/19:Entyvio,  Hgb=10.7, ESR=15, Alb=3.6, Iron=36, Ferritin=67, Sat%=11, INR=1.6\par 1/24/19:  ea=273, stools are # 4-6, 3-4x/d , no pain\par 2/5/19: Hgb=11.2, ESR=14, CRP=0.36\par 2/28/19: Hgb=11.5, ESR=12, CRP=6.5, Alb=3.7, Iron=69, Opvocegy=253, Ca=8.9\par                Bms: # 4-5, 2-3x/D , kn=930,  Entyvio : last 2/1519,\par                had parathyroid scan pre-op, still w elev PTH, + activity in mediastinum,? ectopic parathyroid tissuevs neoplasm.  To see ENT and have Imaging at [facility]\par 3/28/19: Bms: # 4-5 , 3x/d ;gi=822\par 4/11/19: Hgb-9.7, Iron=45, ESR=18, CRP=9.4, Alb=3.5, \par Saw Endo SX at [facility], felt hyperparathyroid is secondary to Low Ca/Vit D, no sx at this time\par 4/16/19: BMs: # 5-6, 3-4x/D, nl=095,  Entyvio : last 3/1519,  got IV iron 4/12/19 for Ferritin=53\par 4/27/19: s/p R Hip Nailing--missed 4/2019 2/2 fresh wound\par 5/16/19 & 6/18/19 Entyvio\par 7/2/19: Hgb=11.7, Ferritin=41,ESR=12, CRP=5.5, B6=2.8,Mag=1.8,Phos=3.9,Ez=095,Zinc=61\par 7/11/19: s/p IV iron\par 7/11/19: Alb=3.7, JLY=750, Ca=9.1, Vit D=40/306, \par 7/24/19: Entyvio, Alb=3.8, OZO=261, Ca=8.9, Vit D=128 \par 8/1/19: Bms: # 5-6, occas #4, 2-3x/D , gt=421\par 8/15/19: Hgb=12, ESR=10, CRP=5.2, Ferritin=68, Alb=3.4, Phos=4.4,Mg=1.7, Ca=8.5,Calprotectin=88,\par 8/20/19: SHT=287, Ca=9, Alb=4.2\par 9/19/19: Hgb=12.8, ESR=9, CRP=5.5, Alb=3.7, Qwdqmyal=456, Mag=1.8, Ca=8.9, Phos=4.2\par 9/26/19: ed=041, BMs: #5-6, 2-3x/D, no pain\par 10/17/19: ix=259, BMs: #4-6, 1-2x/D, no Pain; Hgb=14, ESR=7, CRP<5, Alb=3.9, Jbiyerri=627, Mag=1.7, Ca=9.6\par 10/24/19: fp=292, Bms: # 4-6 , no pain,\par 11/6/19: ESR=6, CRP=6, PTH=80,Ca=9.1, VitD=50\par 11/14/19: pu=478, BMs: # : 4, 1-2, 0ccas #5\par  11/17/19: ESR=6, CRP<5, Huorpiba=727, \par  12/19/19: ad=080, BMs: #5-6, 2-3x/D\par 1/6/20: entyvio\par 1/13/20: ESR=7, CRP=0.2, Hgb=13.5, Pgfmhnrw=547, V08=015, FA=10, Alb=4.1, Ca=9.1\par  Today:  no pain, eating more calories, liberalizing carbs--avoiding simple sugars.  \par         \par        sb=328\par        Abd pain--no \par        Nausea--no \par        Vomit--no \par        Early satiety-no \par        Belching-no \par        Regurgitation--no \par        Ht burn--no \par        Throat Clearing-no\par        Globus-no\par        Hoarseness--no \par        Dysphagia--no \par        BMs: # 5, 1-3x/d\par        Constipation--no \par        Diarrhea- intermittent \par        Bloating--no \par        Flatulence-no\par        Gurgling--no \par        Melena--no \par        BRBPR--no \par        Anorexia-no \par        Wt Loss--stable\par \par \par \par        PRIOR HISTORY--2017\par \par        1/17/17: Hgb=9.2, ESR=6, CRP=5; 1/19/17: BMs: #4, 5-6, 2-3x/D, Qm=161, Started Creon 1 tid cc\par        3rd Entyvio begining Feb\par        2/14/17: Labs; Hgb=9.6, MCV=97, ESR=5, CRP< 5, C75=754, FA>23, Iron=59, Retic =3.2,\par        2/17/17 Fecal Calprotectin=16, Fats: Increased neutral & Split\par        3/13/17: Labs Hgb=9.5, MCV=95, ESR=7, CRP <5, ALB=3.1\par        3/16/17: lot of stress, to move -[place], had a job-fell thru, BMs: # 5, 2-4 x/D, wt= 131w clothes\par        4/19/17 EGD: gastritis, MACKENZIE, No HP, 2cm HH, +Barretts, No Dysplasia\par        Colonoscopy: Anastamosis: open w mild -mod active colitis, enteritis severe adj to anastomosis, mild more proximal, remainder of colon-wnl, 2-3 deg int hemorrhoids\par        4/26/17 : Hgb=7.3, MCV=95, ESR=13, CRP<5;Immediately post procedure stools very dark on eliquis/iron.\par        Admitted to [facility]: Hgb=8.3-->8.9 after 2 U, Alb 3.3, BUN=17, creat=1.3, Malina/Uvfum=447/460\par        4/27/17: Alb=2.3, BUN=12, creat=1.2, Malina/Lipase=209/863-No abd pain, N/V; 5/5/17: Hgb=10.7.\par        5/8/17 Entyvio iv. 5/8-5/9 w dark red diarrhea; 5/10/17 Hgb=7.8.\par        5/11/17 Adm [facility] w stools brown, Hgb=7.9, BUN=17, Creat=1.4, Alb=2.9; S/P 2 Units- Hgb=10.6, BUN=15/1.1.\par        Prednisone 40mg qd, entocort d/dee.; 5/18/17: Hgb-10.4, mc=732, BMs: #5, 1-2x/D, no pain\par        6/8/17: Hgb=7.4,MCV=98, CRP<5-ASA stopped, Pred20--> 30\par        6/10/17: Hgb=8.2, Alb=2.9, BUN=20/1.2 ; 6/12/17: Hgb=8.3, Retic=0.19, Iron=10, Sat%=3, Ferritin=57, FA=23,R96=524, 6/13/17: s/p 2 Unit PRBCs\par        6/15/17: started MCT oil, Xv=801 , BMs: # 5, 1-2x/D, stools are brownish/green \par        7/11/17: [facility] w unsteadiness. MRI Head: R Cortical Temporo-occipital encephalomalacia, MRA H&N-no sign lesions, PER-wnl.Hgb=9.9--> 8.4->9.7 after 1 Unit. Seen by Heme: received IV Iron \par        CRP<5, T11=412, VZV=728, FA>23,Iron=22,Sat%=6, Ferritin=42, Alb=3.5. D/C on warfarin 2mg\par        7/20 Hgb=8.5, 7/28 Hgb=7.7, 7/31-s/p 1 Unit \par        As outpt seeing Heme, to get IV Iron and 5 IV Copper\par        Had 'FLU' Fever=101, chills, bloat, N/V/D, Bilious. no pain, melena,brbpr\par        Given anti-emetic by dr Butler,then able to keep things down\par        On prednisone 25, warfarin w INR bet 1.6-2\par        8/17/17: Hgb=9.9, ESR=20, CRP-P,Sz=276, BMs: # 5, 1-2x/D, stools are brownish/green\par        10/2/17: Hgb=8.8, MCV=89,Phos=1.7, K=3.9, Mag=1.9, Alb=3.6,Iron<10,Ferritin=21, INR=2\par        10/17/17: Hgb=7.9,ESR=6,CRP<5, INR=1.6\par        10/25/17: Hgb=10, ESR=5, CRP=5, Alb=3.6, Phos=2, Kzzhokte=228, H25=729\par        11/16/17: Hgb=11.2, ESR=14, CRP=12, \par        11/13/17: MRE-Chr mild distension of distal & vladislav-ileum s/o mild stricturing at anast, mild mural thick & mucosal enhancemt ~ 20cm; little change from 2015 c/w mild acute on chr ileitis, 2.1 cm Liver hemangioma\par        11/28/17: Entyvio\par        11/29/17: Hgb=9.6, ESR=10, CRP=5.8, Alb=3.8,Ferritin-P, Iron=14,Sat=4%, Phos=2.5\par        12/19;17: Hgb=10.1, ESR=12, CRP=6.5; 12/21/17: BMs:#3-5, 1-3x/D , brown, no blood, no pain, wb=264\par        1/3/18:Hgb=11.7, ESR=19, CRP=6.5, Alb=4.1, Idujbaed=597, Iron=31, Sat%=9,Zinc=55\par \par \par        PRIOR HISTORY---2013:\par \par        2/20/13 CT markedly dilated sb loops ext to anast, colonoscopy w open anast ,mild-mod remy-anastamotic disease. quick response to entocort/humira--probably adhesive dis. \par        8/10/13 w GNR bacteremia, ADA 1.7 /KAYLAH 3.4, Humira 8/7, 8/13,8/18,9/15\par        CT- mucosal thickening and spiculation of the distal sb extending to the anastamosis, thickening and stranding of adj mesenteric fat.\par        Humira increased to 40mg weekly, entocort 4\par        9/2013 MRE--dilated loops in mid and distal ileum, markedly thickened and narrowed TI w decreased peristalsis of TI\par        11/15/13 ADA-24.9, KAYLAH-0\par \par \par        PRIOR HISTORY---2014:\par \par        2/15/14--CT dilated loops SB, loop of sb in mid abd 4.3cm w infiltrative changes in the mesentery, bowel tapers in the RLQ to the anastamosis w/o transition\par        WBC=15K, Rx w IV steroids and Abx \par        3/7/14 SBFT: last 10cm sb prox to anast mild distension and sl irregularity. In the mid portion of this loop there is a mild narrowing which appears to reopen but is some what narrowed.\par        3/20/14 ADA=33.1, KAYLAH=0, Lialda switch to Apriso 4 (2/2014)\par \par \par        PRIOR HISTORY--2015\par \par        1/11/15 Adm [facility] w 1 day N,Recurrent V, Abd pain/distension. CT-multiple distended & fluid-filled sb loops, mild wall thickening of ileum w No inflamm changes.\par        WBC=14.6, Hgb=17, RX w NGT suction, Levaquin iv, Solumedrol 40mg q 12( 1/12-->1/16) to prednisone 30mg BID w 5mg taper/wk\par        3/18/15 --Dr. Butler w edema /High BP, prednisone was tapered slowly to 2.5mg \par        switched to entocort 9mg qd, edema and bp improved w lasix 20mg \par        BMs:#4-5, 3x/D, Hgb=11, ESR=4, CRP<5\par        4/28/15 - 5/1/15: Adm [facility] w 1 day Abd pain, distension,N/V. 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 side of anast, opening appeared ulcerated,scarred & severely narrowed\par        6/11/15: [facility] w N/V x1, decr appetite, CT ABD: no obstruction, dilated thickened sb loops of distal jej and ileum\par        6/19/15 [facility]: s/p Lap w extensive lysis of adhesions, hepatic flex, sigmoid and sb anastamosis, s/p partial r colectomy and sb resection--side to side elisabeth: 8-10 inch from prior anast to TV colon.\par        7/17/15: BMs:#4-6, 4-5x/D, wt 131(from 126)\par        8/20/15: BMs: #4, 1-2x/D or #5, 2-3x/D, rl=631, dry cough,Hgb=10, WBC=3, NKX=787, CRP=56, ESR=21\par        8/25/15 CT Abd/Pelv: Svl RLQ sb loops w wall thickening, mild nodularity & inflamm stranding in mesentery\par        Advised-restart Entocort 9mg qd, Apriso 4 qd, Maintain Humira but given RX to check drug/Ab levels\par        9/15/15: did nt restart meds,Hgb=9.9, WBC=4, ESR=19, CRP=5.8, bi=493; [facility] : ADA=8.5, Antibodies< 1.7\par        10/15/15: No pain, BMs:#4, 1-2x/D, #5-6, 3-4x/D, throat clearing/cough-better, tm=293\par        11/30/15: No pain, BMs:# 4, 5-6 intermittently, No throat clear, zc=896\par \par \par        PRIOR HISTORY-2016\par \par        1/22/16; 4/8/16; 6/6/16 : No pain, BMs:# 4-5 1-2x/D, also #5-6 3x/D for 2-3D/wk, hi=408\par        7/14/16: mi=394, 9/22/16: xc=984.5\par        11/10/16: 9.5lb wt loss, states BMs: 5-6, 5x/D--Taking Magnesium, No pain/anorexia\par        Labs: Stool Anyrqgmllgsc=338, + Incr Fecal fat, Alb=3.4, FA =23, N62=083, Hgb=12, ESR=10, CRP <5\par        Magnesium-d/c, Obtain MRE asap--Start steroids and possibly switch to Entyvio\par        DDx discussed: active crohns--loss response to Humira, Malabsorption--loss Bile acids, Bile-induced diarrhea, SIBO\par        Pt wanted to wait for imaging-did not start rx\par        12/1//16 MRE: RUQ ileocolonic anastamosis--narrowed w upstream dilatation to 3.2 cm\par        Pt refused pred, Started Entocort 9mg qd and Flagyl 250mg qid about 7-10days ago \par        awaiting Entyvio load to start 12/22, then 1/5; had Cut back on iron bid\par        12/15/16: BMs:# 5, 2-3x/D, occas #6, No pain, Less bloat/flatulence, Er=688.

## 2020-01-30 ENCOUNTER — RESULT REVIEW (OUTPATIENT)
Age: 55
End: 2020-01-30

## 2020-01-30 ENCOUNTER — APPOINTMENT (OUTPATIENT)
Dept: HEMATOLOGY ONCOLOGY | Facility: CLINIC | Age: 55
End: 2020-01-30
Payer: COMMERCIAL

## 2020-01-30 VITALS
OXYGEN SATURATION: 95 % | RESPIRATION RATE: 18 BRPM | DIASTOLIC BLOOD PRESSURE: 80 MMHG | TEMPERATURE: 98 F | SYSTOLIC BLOOD PRESSURE: 117 MMHG | HEIGHT: 68 IN | HEART RATE: 98 BPM | BODY MASS INDEX: 18.94 KG/M2 | WEIGHT: 125 LBS

## 2020-01-30 PROCEDURE — 99214 OFFICE O/P EST MOD 30 MIN: CPT

## 2020-01-30 NOTE — HISTORY OF PRESENT ILLNESS
[de-identified] : Patient is a 52 year old who is referred for initial consultation for anemia secondary to Crohns/GI bleed vs Iron Deficiency/ Vit b12 def. 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 Patient is status post ileo-colonic resections for SBO  in 2016. In 2016 patient had complaints of 10 - 15 lb weight loss. Labs at that time showed Hgb of 12, steatorrhea and stool calprotectin = 236. In 2016 MRI/MRE with narrowing at ileocolonic anastomosis with upstream dilation. Pt refused bridge with  prednisone and Entocort / Flagyl. In 2017 patient Hgb dropped to 9.5. On 2017 patient underwent an EGD and Colonoscopy. Findings consistent with Page's and no dysplasia or AVMs. Colonoscopy showed an open anastomosis but active disease, moderate on the colonic side and limited to the anastomosis and moderate to severe enteritis, just proximal to the anastomosis. Pat had routine labs on 05/10/2017 Hgb: 8.3.  [FreeTextEntry1] : s/p injectafer, copper\par warfarin [de-identified] : Patient presents for follow up of  anemia, DVT, MTHFR gene mutation follow up, currently on Coumadin 3mg daily- feeling well offers no complaints.   [0 - No Distress] : Distress Level: 0

## 2020-01-30 NOTE — REVIEW OF SYSTEMS
[Fatigue] : fatigue [Eye Pain] : no eye pain [Vision Problems] : no vision problems [Dysphagia] : no dysphagia [Hoarseness] : no hoarseness [Lower Ext Edema] : no lower extremity edema [Chest Pain] : no chest pain [Shortness Of Breath] : no shortness of breath [Constipation] : no constipation [Vomiting] : no vomiting [Cough] : no cough [Dysuria] : no dysuria [Diarrhea] : no diarrhea [Skin Rash] : no skin rash [Joint Pain] : joint pain [Insomnia] : no insomnia [Dizziness] : no dizziness [Anxiety] : no anxiety [Depression] : no depression [Muscle Weakness] : no muscle weakness [Easy Bleeding] : no tendency for easy bleeding [Easy Bruising] : no tendency for easy bruising [Negative] : Allergic/Immunologic

## 2020-01-30 NOTE — HISTORY OF PRESENT ILLNESS
[0 - No Distress] : Distress Level: 0 [de-identified] : Patient is a 52 year old who is referred for initial consultation for anemia secondary to Crohns/GI bleed vs Iron Deficiency/ Vit b12 def. 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 Patient is status post ileo-colonic resections for SBO  in 2016. In 2016 patient had complaints of 10 - 15 lb weight loss. Labs at that time showed Hgb of 12, steatorrhea and stool calprotectin = 236. In 2016 MRI/MRE with narrowing at ileocolonic anastomosis with upstream dilation. Pt refused bridge with  prednisone and Entocort / Flagyl. In 2017 patient Hgb dropped to 9.5. On 2017 patient underwent an EGD and Colonoscopy. Findings consistent with Page's and no dysplasia or AVMs. Colonoscopy showed an open anastomosis but active disease, moderate on the colonic side and limited to the anastomosis and moderate to severe enteritis, just proximal to the anastomosis. Pat had routine labs on 05/10/2017 Hgb: 8.3.  [FreeTextEntry1] : s/p injectafer, copper\par warfarin [de-identified] : Patient presents for follow up of  anemia, DVT, MTHFR gene mutation follow up, currently on Coumadin 3mg daily- feeling well offers no complaints.

## 2020-01-30 NOTE — CONSULT LETTER
[Dear  ___] : Dear  [unfilled], [Consult Letter:] : I had the pleasure of evaluating your patient, [unfilled]. [Please see my note below.] : Please see my note below. [Sincerely,] : Sincerely, [Consult Closing:] : Thank you very much for allowing me to participate in the care of this patient.  If you have any questions, please do not hesitate to contact me. [FreeTextEntry3] : Angie Biswas MD\par [facility] [place] [facility]\par  [DrMeron  ___] : Dr. REARDON

## 2020-01-30 NOTE — ASSESSMENT
[FreeTextEntry1] : 1.  Anemia- pending \par  -blood loss, anemia of chronic disease, \par - copper deficiency - replaced, check today\par - Continue B12 injection, level still low - q 2 weeks\par - [id number] last time - order level today \par \par 2. Osteoporosis \par - left ankle- stress fx- advanced  osteoporosis, patient with h/o steroids use\par - started Forteo with Dr. Akers\par - on IV calcium weekly \par - PT for osteoporosis\par \par  3.TIA with MTHFR and h/o DVT -  \par not a candidate for DOAC b/o small bowel resection\par - INR home monitoring of warfarin takes 3 mg daily\par - INR 2\par \par 4. Hypogammaglobulinemia\par - s/p  Prevnar 13 12/2018 \par - poor response, needs Pneumococcal vaccine 23 boost \par - schedule Shingrex\par \par \par 5. Zinc deficiency\par - start oral Zinc\par \par

## 2020-01-30 NOTE — REVIEW OF SYSTEMS
[Fatigue] : fatigue [Joint Pain] : joint pain [Negative] : Allergic/Immunologic [Eye Pain] : no eye pain [Vision Problems] : no vision problems [Hoarseness] : no hoarseness [Dysphagia] : no dysphagia [Chest Pain] : no chest pain [Lower Ext Edema] : no lower extremity edema [Cough] : no cough [Shortness Of Breath] : no shortness of breath [Constipation] : no constipation [Vomiting] : no vomiting [Diarrhea] : no diarrhea [Skin Rash] : no skin rash [Dizziness] : no dizziness [Dysuria] : no dysuria [Anxiety] : no anxiety [Insomnia] : no insomnia [Easy Bleeding] : no tendency for easy bleeding [Muscle Weakness] : no muscle weakness [Depression] : no depression [Easy Bruising] : no tendency for easy bruising

## 2020-01-30 NOTE — CONSULT LETTER
[Dear  ___] : Dear  [unfilled], [Please see my note below.] : Please see my note below. [Consult Letter:] : I had the pleasure of evaluating your patient, [unfilled]. [Sincerely,] : Sincerely, [Consult Closing:] : Thank you very much for allowing me to participate in the care of this patient.  If you have any questions, please do not hesitate to contact me. [DrMeron  ___] : Dr. REARDON [FreeTextEntry3] : Angie Biswas MD\par [facility]\par

## 2020-01-30 NOTE — ASSESSMENT
[FreeTextEntry1] : 1.  Anemia- HG 13\par  -blood loss, anemia of chronic disease, \par - copper deficiency - replaced, last level August 2019- due now to be checked  \par - Continue B12 injection, level still low - q 2 weeks\par - ferritin 157 last time - last infusion November 2019\par \par 2. Osteoporosis \par - left ankle- stress fx- advanced  osteoporosis, patient with h/o steroids use\par - started Forteo with Dr. kAers\par - calcium level stable, stopped IV\par - PT for osteoporosis\par \par  3.TIA with MTHFR and h/o DVT -  \par not a candidate for DOAC b/o small bowel resection\par - INR home monitoring of warfarin takes 3 mg daily\par - INR 2.4 \par \par 4. Hypogammaglobulinemia\par - s/p  Prevnar 13 12/2018 \par - poor response, needs Pneumococcal vaccine 23 boost when next iron \par - needs Shingrex\par \par \par 5. Zinc deficiency\par - start oral Zinc\par \par

## 2020-01-30 NOTE — PHYSICAL EXAM
[Thin] : thin [Fully active, able to carry on all pre-disease performance without restriction] : Status 0 - Fully active, able to carry on all pre-disease performance without restriction [Normal] : affect appropriate

## 2020-02-02 ENCOUNTER — RESULT REVIEW (OUTPATIENT)
Age: 55
End: 2020-02-02

## 2020-02-03 ENCOUNTER — APPOINTMENT (OUTPATIENT)
Dept: GASTROENTEROLOGY | Facility: HOSPITAL | Age: 55
End: 2020-02-03

## 2020-02-07 ENCOUNTER — APPOINTMENT (OUTPATIENT)
Dept: ENDOCRINOLOGY | Facility: CLINIC | Age: 55
End: 2020-02-07
Payer: COMMERCIAL

## 2020-02-07 VITALS
BODY MASS INDEX: 18.94 KG/M2 | SYSTOLIC BLOOD PRESSURE: 112 MMHG | WEIGHT: 125 LBS | OXYGEN SATURATION: 100 % | DIASTOLIC BLOOD PRESSURE: 78 MMHG | HEIGHT: 68 IN | HEART RATE: 68 BPM

## 2020-02-07 PROCEDURE — 99215 OFFICE O/P EST HI 40 MIN: CPT

## 2020-02-14 NOTE — PHYSICAL EXAM
[Alert] : alert [No Acute Distress] : no acute distress [EOMI] : extra ocular movement intact [Normal Sclera/Conjunctiva] : normal sclera/conjunctiva [No Thyroid Nodules] : there were no palpable thyroid nodules [No Proptosis] : no proptosis [No Respiratory Distress] : no respiratory distress [No Accessory Muscle Use] : no accessory muscle use [Normal Rate] : heart rate was normal  [Clear to Auscultation] : lungs were clear to auscultation bilaterally [Normal S1, S2] : normal S1 and S2 [Regular Rhythm] : with a regular rhythm [Pedal Pulses Normal] : the pedal pulses are present [Normal Bowel Sounds] : normal bowel sounds [Not Tender] : non-tender [No Edema] : there was no peripheral edema [Soft] : abdomen soft [Not Distended] : not distended [Post Cervical Nodes] : posterior cervical nodes [Anterior Cervical Nodes] : anterior cervical nodes [No Spinal Tenderness] : no spinal tenderness [Normal] : normal and non tender [Axillary Nodes] : axillary nodes [Spine Straight] : spine straight [No Stigmata of Cushings Syndrome] : no stigmata of cushings syndrome [Normal Gait] : normal gait [Normal Strength/Tone] : muscle strength and tone were normal [No Rash] : no rash [Normal Reflexes] : deep tendon reflexes were 2+ and symmetric [Oriented x3] : oriented to person, place, and time [No Tremors] : no tremors [Acanthosis Nigricans] : no acanthosis nigricans [de-identified] : mildly enlarged thyroid on exam

## 2020-02-14 NOTE — HISTORY OF PRESENT ILLNESS
[FreeTextEntry1] : Mr. GUDELIA DUNN is a 54 year old male\par  coming for a f/u , for severe osteoporosis with bilateral hip fractures, hyperparathyroidism, low D and hypocalcemia  secondary  to Chron's disease\par on Vit D 50,000 IU 4 times a week\par \par started Forteo May 10 2019\par last DEXA 9/2019 stable \par \par on  IV calcium each  Friday at the infusion center, last one a month ago \par labs done much improved \par \par Ca citrate 950mg 3 tab bid \par reports compliance \par \par 24hr urine low calcium while low D \par parathyroid scan reviewed parathyroid adenoma versus neoplasm mediastinum\par US thyroid reviewed FNA was advised\par \par        MRI pelvis done 10/3/18 reviewed :\par        There are insufficiency fractures of the bilateral femoral heads without significant subchondral collapse at this time. There are associated moderate to large joint effusions.\par        There are additional microtrabecular/insufficiency fractures involving the sacrum as well as a few foci involving the iliac margins of the sacroiliac joints. No displaced or cortical fracture.\par        Mild degenerative arthrosis of the right hip.\par        sees GEN Chen will see him today again, bone stimulator is considered\par        dental issues : had an implant failure 2 months ago, bone graft 2 months ago, \par        Osteoporosis \par        has Chron disease sees Dr Lugo , never gained weight back after colon resection\par        received iron infussion and copper infusions\par   \par          DEXA \par        LS T scroe -3.8\par        stress fractures \par        9/5/18 right foot Fx stress Fx reviewed\par        Impression:\par        Trabecular fractures involving the cuboid, the partially visualized anterior process of the calcaneus, the lateral cuneiform as well as the third metatarsal base. No evidence for associated cortical break. There is improvement in/essentially complete resolution of the previously described microtrabecular fractures within the partially visualized talus, the navicular as well as the middle and medial cuneiforms.\par        Subacute to chronic mild subchondral fracture of the second metatarsal head.\par        Moderate degenerative changes of the hallux sesamoid complex\par        left ankle pain 6/18 MRI:\par        Impression:\par        Microtrabecular/fracture/stress reaction involving the talar body and navicula without evidence for cortical break. There are additional stress-related changes involving the cuneiforms, the cuboid and distal tibia, as above. Associated moderate tibiotalar joint effusion.\par        Mild to moderate peroneal tendinosis with superimposed segmental longitudinal splitting, as above.\par        has pain hip right will have MRI with [facility]\par        has h/o kidney stones 6 yrs ago went to [place] [facility] Dr Morris did surgery.\par seen Dr mercado, per pt he was advised against surgery, no notes available will request for review

## 2020-02-18 ENCOUNTER — RESULT REVIEW (OUTPATIENT)
Age: 55
End: 2020-02-18

## 2020-02-25 ENCOUNTER — APPOINTMENT (OUTPATIENT)
Dept: GASTROENTEROLOGY | Facility: CLINIC | Age: 55
End: 2020-02-25
Payer: COMMERCIAL

## 2020-02-25 VITALS
BODY MASS INDEX: 19.55 KG/M2 | HEIGHT: 68 IN | HEART RATE: 80 BPM | WEIGHT: 129 LBS | SYSTOLIC BLOOD PRESSURE: 112 MMHG | DIASTOLIC BLOOD PRESSURE: 70 MMHG

## 2020-02-25 PROCEDURE — 99214 OFFICE O/P EST MOD 30 MIN: CPT | Mod: 25

## 2020-02-25 PROCEDURE — 96372 THER/PROPH/DIAG INJ SC/IM: CPT

## 2020-02-25 RX ORDER — CYANOCOBALAMIN 1000 UG/ML
1000 INJECTION INTRAMUSCULAR; SUBCUTANEOUS
Qty: 0 | Refills: 0 | Status: COMPLETED | OUTPATIENT
Start: 2020-02-25

## 2020-02-25 RX ADMIN — CYANOCOBALAMIN 0 MCG/ML: 1000 INJECTION INTRAMUSCULAR; SUBCUTANEOUS at 00:00

## 2020-02-25 NOTE — HISTORY OF PRESENT ILLNESS
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-->Eliquis' warfarin \par        GERD w Page's, Hemorrhoids, BPH, \par        S/P Ileocolonic resection 1998\par        Since then multiple SBOs\par \par \par        Got IV Iron x 2, since 10/2/17\par        10/19/17: feeling better, Eo=066 on prednisone 15mg x 3weeks, BMs Brown: #5-6, 1-2/D, occas 3-4/d\par        10/25/17: Hgb=10, ESR=5, CRP=5, Alb=3.6, Phos=2, Prsuwatx=810, X95=695\par        11/16/17: Hgb=11.2, ESR=14, CRP=12, \par        11/13/17: MRE-Chr mild distension of distal & vladislav-ileum s/o mild stricturing at anast, mild mural thick & mucosal enhancemt ~ 20cm; little change from 2015 c/w mild acute on chr ileitis, 2.1 cm Liver hemangioma\par        11/28/17: Entyvio\par        11/29/17: Hgb=9.6, ESR=10, CRP=5.8, Alb=3.8,Ferritin-19, Iron=14,Sat=4%, Phos=2.5, Bk=373, BMs:# 5, 1-2x/D, brown,\par        12/19;17: Hgb=10.1, ESR=12, CRP=6.5; 12/21/17: BMs:#3-5, 1-3x/D , brown, no blood, no pain, vq=620\par        1/3/18:Hgb=11.7, ESR=19, CRP=6.5, Alb=4.1, Dkdhwisq=089, Iron=31, Sat%=9,Zinc=55\par        1/16/18: Entyvio, Hgb=11.4, ESR=10, CRP=6.9\par        1/18/18: Today: cellulitis R foot, saw dr KEITH--rx augmentum x 10D, Entyvio 1/9 to 1/16, oa=254 w clothes,BMs: # 4-5, 1-2x/D \par        2/14/18: Hgb=11.1, ESR=16, CRP=11.6, Alb=3.6, Iron=28, Ferritin=23, INR=1.5 \par        2/16/18: s/p Entyvio; Iron infusion, again on 2/27\par        2/20/18: Hgb=10.6, ESR=20, CRP=12, INR=1.7\par        2/27/18: s/p iron infusion\par        3/9/18: Dr. Burden for tenosynovitis, rx w Zorvolex: Diclofenac x 5 days--? response\par        3/14/18: Sa=645; BMs: # 5, 1-2x/D; Hgb=11, ESR=18, CRP=11,Irom=45,Xzxzeluj=338,Alb=3.6, INR=1.5\par        3/19/18: s/p Entyvio\par        3/22/18: rg=796, BMs: # 5, 3-4 x/d\par        4/16/18: s/p Entyvio,Hgb=11.9, INR=1.3, ESR=18, CRP=8.4 \par        4/18/18 EGD: gastritis, no HP, no IM, 2+ Mucous, 0.5cm gastric polyp: fundic, 4cm HH, + Barretts:3cm, no dysplasia\par        4/25/18: Hgb=11.5, INR=1.6, Iron=40, Sat=13%, Ferritin=57, Alb=3.2, Phos=2.2, Mag=1.7\par        4/30/18: s/p Iron Infusion\par        5/14/18: s/p Entyvio \par        5/23/18: Hgb=11.5, ESR=16, CRP< 5\par        5/29/18: s/p Iron Infusion\par        6/6/18: Hgb=10.6, INR=1.7, Woxfycge=000, Alb=3.5, Phos=2.1, U71=615, ro=046; BMs: # 5, 2-3x/D \par        6/19/18: Hgb=10.6, INR=1, CRP=15, ESR=23\par        6/21/18: R ankle is acting up, swollen, pain, having MRI done, took a couple of advil, on low carbs ,BMs: # 5, 1-2x/D, yu=901\par        6/26/18: MRI: fx/stress rxn-talar body/navicula; stress related changes--cuneform, cuboid,distal tibia; mod tibiotalar effusion, mild-mod peroneal tendinosis\par        7/19/18: entyvio 6/26/18, eliminated all carbs--anti inflammatory diet, ov=220, BMs: # 4-5, 1-3x/D \par        7/25/18: Hgb=10, INR=1.3, Alb=3.3, Phos=2.4, Afgaqwht=365, sat%=12, Iron=35\par        8/2/18: BD--osteoporosis of hip/spine: sig decrease BD--17.6% of hip, 17% of spine, osteopenia of wrist: 6.4%\par        8/7/18: Entyvio\par        8/21/18: Hgb=11.1, INR=1.5, ESR=19, CRP=18.6\par        8/23/18: recently w tooth infection, old implant--loose and removed; rx w amox, and advil\par        eating almost no carbs, dx=717, # 5-6, 2-3x/d \par        8/24/18: Stool fat--neg, Mxhxpiymvrvv=129\par        9/5/18: Hgb=11.4, INR=1.3, ESR=9, CRP=11.3, Iron=41, Oibjilma=671, Alb=3.8,\par        9/17/18: entyvio, Hgb=10.7, INR=2.2, ESR=17, CRP=9.1, \par        9/20/18: xh=903, BMs: # 5-6, 1-2x/D \par        9/21/18: Phos=2.4, Ca=8.7, Alb=3.4, Vit D=27, TQA=827, \par        Dr. Akers: Hyperparathyroidism: probably secondary due to low Vit D/Ca & ? superimposed primary, rx replete Vit D and Calcium\par        10/9/18: Hgb=11.6, MCV=94, ESR=14, CRP=5.4, INR=2.2\par        10/10/18 MRE: stricturing of neoterminal ileum w worsened upstream dilatation, moderate length of upstream ileum w stricturing extending at least 20cm and more extensive then previous. suggestion of 2 adj extraluminal fluid collections which may be w/i the wall of strictured ileum near the ileocolonic anastomosis. surrounding spiculation and tethered appearance of bowel in this region.\par 10/16/18: entyvio, Hgb=11.7, MCV=94, ESR=14, CRP <5, Alb=3.5\par 10/18/18: Fp=652,   BMs: # 5-6, 3x/D , \par 11/13/18: Entyvio\par 11/21/18 CTE w C: limited 2/2 bowel underdistention, mucosal hyperenhancemt & extensive SM edema of distal ileum including vladislav-ileum, difficult to exclude a sml fluid collection, Liver w relative enlargement w suggestion of lobulation, enlargemt of PV,SMV,IMV,Spl V, rectal V. No ascites\par 11/28/18: Hgb=10, ESR=19, CRP=17,Alb=3.5,Iron=35, Sat%=11, Mxsxkkir=948, INR=1.3\par 11/29/18: ee=600, BMs: # 5-6, 3-4x/D\par 12/3/18: Abd sono--Liver 14.8cm Incr heterogenicity, spleen--wnl\par 12/11/18 & 1/14/19: Entyvio\par 1/14/19:Entyvio,  Hgb=10.7, ESR=15, Alb=3.6, Iron=36, Ferritin=67, Sat%=11, INR=1.6\par 1/24/19:  fu=265, stools are # 4-6, 3-4x/d , no pain\par 2/5/19: Hgb=11.2, ESR=14, CRP=0.36\par 2/28/19: Hgb=11.5, ESR=12, CRP=6.5, Alb=3.7, Iron=69, Azbzmemz=404, Ca=8.9\par                Bms: # 4-5, 2-3x/D , qn=690,  Entyvio : last 2/1519,\par                had parathyroid scan pre-op, still w elev PTH, + activity in mediastinum,? ectopic parathyroid tissuevs neoplasm.  To see ENT and have Imaging at [facility]\par 3/28/19: Bms: # 4-5 , 3x/d ;zz=558\par 4/11/19: Hgb-9.7, Iron=45, ESR=18, CRP=9.4, Alb=3.5, \par Saw Endo SX at [facility], felt hyperparathyroid is secondary to Low Ca/Vit D, no sx at this time\par 4/16/19: BMs: # 5-6, 3-4x/D, uk=509,  Entyvio : last 3/1519,  got IV iron 4/12/19 for Ferritin=53\par 4/27/19: s/p R Hip Nailing--missed 4/2019 2/2 fresh wound\par 5/16/19 & 6/18/19 Entyvio\par 7/2/19: Hgb=11.7, Ferritin=41,ESR=12, CRP=5.5, B6=2.8,Mag=1.8,Phos=3.9,Fz=437,Zinc=61\par 7/11/19: s/p IV iron\par 7/11/19: Alb=3.7, ACQ=153, Ca=9.1, Vit D=40/306, \par 7/24/19: Entyvio, Alb=3.8, GAQ=549, Ca=8.9, Vit D=128 \par 8/1/19: Bms: # 5-6, occas #4, 2-3x/D , bz=234\par 8/15/19: Hgb=12, ESR=10, CRP=5.2, Ferritin=68, Alb=3.4, Phos=4.4,Mg=1.7, Ca=8.5,Calprotectin=88,\par 8/20/19: OIR=691, Ca=9, Alb=4.2\par 9/19/19: Hgb=12.8, ESR=9, CRP=5.5, Alb=3.7, Ovlnxveh=947, Mag=1.8, Ca=8.9, Phos=4.2\par 9/26/19: sc=330, BMs: #5-6, 2-3x/D, no pain\par 10/17/19: to=602, BMs: #4-6, 1-2x/D, no Pain; Hgb=14, ESR=7, CRP<5, Alb=3.9, Nodqwkvo=107, Mag=1.7, Ca=9.6\par 10/24/19: ys=920, Bms: # 4-6 , no pain,\par 11/6/19: ESR=6, CRP=6, PTH=80,Ca=9.1, VitD=50\par 11/14/19: co=258, BMs: # : 4, 1-2, 0ccas #5\par  11/17/19: ESR=6, CRP<5, Yevhvjjk=275, \par  12/19/19: lu=305, BMs: #5-6, 2-3x/D\par 1/6/20: entyvio\par 1/13/20: ESR=7, CRP=0.2, Hgb=13.5, Jtcammzb=880, V91=426, FA=10, Alb=4.1, Ca=9.1\par 1/16/20: wt = 127, BMs: # 5, 1-3x/d\par 1/30/20: ESR=9, CRP=11, Hgb=13.9, Hxghcafk=417,Iron=38, Alb=3.9,Ca=9.2, PTH=87,\par 2/3/20 EGD: gastritis, +MACKENZIE, No HP, +erosion w ooze -clipped, 0.5cm polyp-neg; 4cm HH, Esophagitis A, + Barretts, VC: 1+\par Colonoscopy: 05cm rectal polyp: HP, 2nd deg int hemorrhoids\par mild-mod activity: MARILY w cryptitis & mild archect distortion at ileocolonic anast: colon & ileal side, no stricture\par 2/4/20: Entyvio; 2/12/20: IV Iron\par \par  Today:  no pain, \par         \par        gs=854\par        Abd pain--no \par        Nausea--no \par        Vomit--no \par        Early satiety-no \par        Belching-no \par        Regurgitation--no \par        Ht burn--no \par        Throat Clearing-no\par        Globus-no\par        Hoarseness--no \par        Dysphagia--no \par        BMs: # 4-5, 1-2x/ d\par        Constipation--no \par        Diarrhea- intermittent \par        Bloating--no \par        Flatulence-no\par        Gurgling--no \par        Melena--no \par        BRBPR--no \par        Anorexia-no \par        Wt Loss--stable\par \par \par \par        PRIOR HISTORY--2017\par \par        1/17/17: Hgb=9.2, ESR=6, CRP=5; 1/19/17: BMs: #4, 5-6, 2-3x/D, Wo=994, Started Creon 1 tid cc\par        3rd Entyvio begining Feb\par        2/14/17: Labs; Hgb=9.6, MCV=97, ESR=5, CRP< 5, D45=769, FA>23, Iron=59, Retic =3.2,\par        2/17/17 Fecal Calprotectin=16, Fats: Increased neutral & Split\par        3/13/17: Labs Hgb=9.5, MCV=95, ESR=7, CRP <5, ALB=3.1\par        3/16/17: lot of stress, to move -[place], had a job-fell thru, BMs: # 5, 2-4 x/D, wt= 131w clothes\par        4/19/17 EGD: gastritis, MACKENZIE, No HP, 2cm HH, +Barretts, No Dysplasia\par        Colonoscopy: Anastamosis: open w mild -mod active colitis, enteritis severe adj to anastomosis, mild more proximal, remainder of colon-wnl, 2-3 deg int hemorrhoids\par        4/26/17 : Hgb=7.3, MCV=95, ESR=13, CRP<5;Immediately post procedure stools very dark on eliquis/iron.\par        Admitted to [facility]: Hgb=8.3-->8.9 after 2 U, Alb 3.3, BUN=17, creat=1.3, Malina/Ylxic=659/460\par        4/27/17: Alb=2.3, BUN=12, creat=1.2, Malina/Lipase=209/863-No abd pain, N/V; 5/5/17: Hgb=10.7.\par        5/8/17 Entyvio iv. 5/8-5/9 w dark red diarrhea; 5/10/17 Hgb=7.8.\par        5/11/17 Adm [facility] w stools brown, Hgb=7.9, BUN=17, Creat=1.4, Alb=2.9; S/P 2 Units- Hgb=10.6, BUN=15/1.1.\par        Prednisone 40mg qd, entocort d/dee.; 5/18/17: Hgb-10.4, ok=317, BMs: #5, 1-2x/D, no pain\par        6/8/17: Hgb=7.4,MCV=98, CRP<5-ASA stopped, Pred20--> 30\par        6/10/17: Hgb=8.2, Alb=2.9, BUN=20/1.2 ; 6/12/17: Hgb=8.3, Retic=0.19, Iron=10, Sat%=3, Ferritin=57, FA=23,O84=195, 6/13/17: s/p 2 Unit PRBCs\par        6/15/17: started MCT oil, Pi=258 , BMs: # 5, 1-2x/D, stools are brownish/green \par        7/11/17: [facility] w unsteadiness. MRI Head: R Cortical Temporo-occipital encephalomalacia, MRA H&N-no sign lesions, PER-wnl.Hgb=9.9--> 8.4->9.7 after 1 Unit. Seen by Torri: received IV Iron \par        CRP<5, R65=601, SLM=795, FA>23,Iron=22,Sat%=6, Ferritin=42, Alb=3.5. D/C on warfarin 2mg\par        7/20 Hgb=8.5, 7/28 Hgb=7.7, 7/31-s/p 1 Unit \par        As outpt seeing Heme, to get IV Iron and 5 IV Copper\par        Had 'FLU' Fever=101, chills, bloat, N/V/D, Bilious. no pain, melena,brbpr\par        Given anti-emetic by dr Butler,then able to keep things down\par        On prednisone 25, warfarin w INR bet 1.6-2\par        8/17/17: Hgb=9.9, ESR=20, CRP-P,Gb=431, BMs: # 5, 1-2x/D, stools are brownish/green\par        10/2/17: Hgb=8.8, MCV=89,Phos=1.7, K=3.9, Mag=1.9, Alb=3.6,Iron<10,Ferritin=21, INR=2\par        10/17/17: Hgb=7.9,ESR=6,CRP<5, INR=1.6\par        10/25/17: Hgb=10, ESR=5, CRP=5, Alb=3.6, Phos=2, Zxajlsnf=240, C35=129\par        11/16/17: Hgb=11.2, ESR=14, CRP=12, \par        11/13/17: MRE-Chr mild distension of distal & vladislav-ileum s/o mild stricturing at anast, mild mural thick & mucosal enhancemt ~ 20cm; little change from 2015 c/w mild acute on chr ileitis, 2.1 cm Liver hemangioma\par        11/28/17: Entyvio\par        11/29/17: Hgb=9.6, ESR=10, CRP=5.8, Alb=3.8,Ferritin-P, Iron=14,Sat=4%, Phos=2.5\par        12/19;17: Hgb=10.1, ESR=12, CRP=6.5; 12/21/17: BMs:#3-5, 1-3x/D , brown, no blood, no pain, aq=813\par        1/3/18:Hgb=11.7, ESR=19, CRP=6.5, Alb=4.1, Feskwmrr=020, Iron=31, Sat%=9,Zinc=55\par \par \par        PRIOR HISTORY---2013:\par \par        2/20/13 CT markedly dilated sb loops ext to anast, colonoscopy w open anast ,mild-mod remy-anastamotic disease. quick response to entocort/humira--probably adhesive dis. \par        8/10/13 w GNR bacteremia, ADA 1.7 /KAYLAH 3.4, Humira 8/7, 8/13,8/18,9/15\par        CT- mucosal thickening and spiculation of the distal sb extending to the anastamosis, thickening and stranding of adj mesenteric fat.\par        Humira increased to 40mg weekly, entocort 4\par        9/2013 MRE--dilated loops in mid and distal ileum, markedly thickened and narrowed TI w decreased peristalsis of TI\par        11/15/13 ADA-24.9, KAYLAH-0\par \par \par        PRIOR HISTORY---2014:\par \par        2/15/14--CT dilated loops SB, loop of sb in mid abd 4.3cm w infiltrative changes in the mesentery, bowel tapers in the RLQ to the anastamosis w/o transition\par        WBC=15K, Rx w IV steroids and Abx \par        3/7/14 SBFT: last 10cm sb prox to anast mild distension and sl irregularity. In the mid portion of this loop there is a mild narrowing which appears to reopen but is some what narrowed.\par        3/20/14 ADA=33.1, KAYLAH=0, Lialda switch to Apriso 4 (2/2014)\par \par \par        PRIOR HISTORY--2015\par \par        1/11/15 Adm [facility] w 1 day N,Recurrent V, Abd pain/distension. CT-multiple distended & fluid-filled sb loops, mild wall thickening of ileum w No inflamm changes.\par        WBC=14.6, Hgb=17, RX w NGT suction, Levaquin iv, Solumedrol 40mg q 12( 1/12-->1/16) to prednisone 30mg BID w 5mg taper/wk\par        3/18/15 --Dr. Butler w edema /High BP, prednisone was tapered slowly to 2.5mg \par        switched to entocort 9mg qd, edema and bp improved w lasix 20mg \par        BMs:#4-5, 3x/D, Hgb=11, ESR=4, CRP<5\par        4/28/15 - 5/1/15: Adm [facility] w 1 day Abd pain, distension,N/V. 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 side of anast, opening appeared ulcerated,scarred & severely narrowed\par        6/11/15: [facility] w N/V x1, decr appetite, CT ABD: no obstruction, dilated thickened sb loops of distal jej and ileum\par        6/19/15 [facility]: s/p Lap w extensive lysis of adhesions, hepatic flex, sigmoid and sb anastamosis, s/p partial r colectomy and sb resection--side to side elisabeth: 8-10 inch from prior anast to TV colon.\par        7/17/15: BMs:#4-6, 4-5x/D, wt 131(from 126)\par        8/20/15: BMs: #4, 1-2x/D or #5, 2-3x/D, gl=780, dry cough,Hgb=10, WBC=3, YNT=254, CRP=56, ESR=21\par        8/25/15 CT Abd/Pelv: Svl RLQ sb loops w wall thickening, mild nodularity & inflamm stranding in mesentery\par        Advised-restart Entocort 9mg qd, Apriso 4 qd, Maintain Humira but given RX to check drug/Ab levels\par        9/15/15: did nt restart meds,Hgb=9.9, WBC=4, ESR=19, CRP=5.8, bb=184; [facility] : ADA=8.5, Antibodies< 1.7\par        10/15/15: No pain, BMs:#4, 1-2x/D, #5-6, 3-4x/D, throat clearing/cough-better, ax=819\par        11/30/15: No pain, BMs:# 4, 5-6 intermittently, No throat clear, fy=147\par \par \par        PRIOR HISTORY-2016\par \par        1/22/16; 4/8/16; 6/6/16 : No pain, BMs:# 4-5 1-2x/D, also #5-6 3x/D for 2-3D/wk, oe=179\par        7/14/16: mk=576, 9/22/16: kx=778.5\par        11/10/16: 9.5lb wt loss, states BMs: 5-6, 5x/D--Taking Magnesium, No pain/anorexia\par        Labs: Stool Mtivvpibxexf=524, + Incr Fecal fat, Alb=3.4, FA =23, C31=786, Hgb=12, ESR=10, CRP <5\par        Magnesium-d/c, Obtain MRE asap--Start steroids and possibly switch to Entyvio\par        DDx discussed: active crohns--loss response to Humira, Malabsorption--loss Bile acids, Bile-induced diarrhea, SIBO\par        Pt wanted to wait for imaging-did not start rx\par        12/1//16 MRE: RUQ ileocolonic anastamosis--narrowed w upstream dilatation to 3.2 cm\par        Pt refused pred, Started Entocort 9mg qd and Flagyl 250mg qid about 7-10days ago \par        awaiting Entyvio load to start 12/22, then 1/5; had Cut back on iron bid\par        12/15/16: BMs:# 5, 2-3x/D, occas #6, No pain, Less bloat/flatulence, Fd=344.

## 2020-02-25 NOTE — ASSESSMENT
[FreeTextEntry1] : 1. Crohns disease of both small and large intestine\par    \par CROHNS DISEASE-s/p ileocolonic resection x 2--last 6/19/15 for recurrent sbos: appears to be stable , GI symtoms stable but labs were up CRP=18.6/ESR=19 & Irknhfgurdlb=031 and Wt down ( inflam markers ? 2/2 to R ankle Fx/stress rx and tendinosis, also had tooth infection ) & MRE w ? ileal penetrating dis, wt =130_____\par s/p 4 SBOs w/i 2 yrs--2/2 distal sb disease adj to anastamosis\par when on Humira weekly w ADA =33 (3/20/14), -but had sbo 2/2014 at ADA=25 and another sbo 4/28/15\par 6/10/2015 Colonoscopy: Inflamm ST mass on ileal side w ulceration/scarred/narrow\par 6/11/2015 CT: dilated thickened sb loops distal jej & ileum\par Post-op **probably some malabsorption 2/2 to removal of R Colon and ileum: ?bile/fat/SIBO\par WT. Loss: r/o malabsorption, ?bile-induced--secretory vs decr micelles, active dis, PLE\par r/o SIBO--Elev FA, Alb=3.4-->3.1-->2.9--> (7/28/17)\par ?SIBO/Prevent post-op recurrence --Flagyl 250 mg qid~12/7/16-->d/c: 5/11/17\par Also discussed trial of Cholestyramine/Xifaxin -wanted to wait\par **ACTIVE Crohn's--11/10/16: 9.5lb wt loss, BMs: #5-6, 5x/D--Taking Magnesium \par 12/1/16 MRE: RUQ ileocolonic anastamosis--narrowed w upstream dilatation to 3.2 cm\par Labs: Stool Hstrfirfricp=562, + Incr Fecal fat, Alb=3.4, FA =23, S68=888, Hgb=12.3,ESR=10,CRP <5\par 4/19/17 :Colonoscopy: Anastamosis: open w mild -mod active colitis, enteritis severe adj to anastomosis, mild more proximal, remainder of colon=wnl\par 5/11/17- Adm [facility] w stools brown, Hgb=7.9, BUN=17, Creat=1.4, Alb=2.9.\par S/P 2 Units w Hgb=10.6, BUN=15/1.1, Prednisone 40mg taper, entocort d/dee\par 6/8/17: Hgb=7.4, MCV=98, CRP<5--ASA stopped, Pred20--> 30mg, 6/13/17: s/p 2 Unit PRBCs\par 7/11/17 [facility] w unsteadiness. MRI Head: R Cortical Temporo-occipital encephalomalacia\par Hgb=9.9--> 8.4->9.7 after 1 Unit. Seen by Heme: received IV Iron \par CRP<5, T96=817, KMO=770, FA>23,Iron=22,Sat%=6, Ferritin=42, Alb=3.5. D/C on warfarin 2mg\par 7/28/17 Hgb=7.7; 7/31/17-s/p 1 Unit \par Heme Consultation: received  IV Iron and 5 IV Copper:___\par **Entyvio :time 0=12/22/16 ( Humira 40mg QW); 1/5/17--2wks, s/p 3rd infusion at wk 6, \par #6 :6/21/17--held 2/2 Shingles, Last Infusions=9/19/17 , 10/17/17, 11/28/17, 1/16/18 ,2/16/18, 3/19/18,4/16/18, 5/14/18, 6/25/18, 8/7/18, 9/17/18, 10/16/18, 11/13/18, 12/11/18, 1/14/19, 2/15/19, 3/15/19, 5/16/19, 6/18/19,7/24/19,\par 9/9/19, 10/2/19, 11/4/19, 12/12/19, 1/6/20, 2/4/20, 3/3/20\par Entocort 9mg qd: 12/7/16--d/dee 5/11/17\par **Prednisone 40mg qd (5/11/17)--tapered 5mg/wk to 20mg qd, 6/8/17 30mg, \par 7/25/17 25mg, 3wks ago 20--> 15mg, 10/19/17 10mg alt w 7.5-->11/16/17 10mg alt w 5mg-->11/30/17: 5mg-->12/21/17: 5 alt w 2.5mg-->1/18/18: 2.5mg qd-->2.5mg qod--> d/c \par Probiotics 3 qd \par Lactose free, protein drinks tid\par MCT oil begun\par **trend --cbc, esr, crp, albumin\par **monitor wt= 120-->134-->134 --> 135--> 132 w clothes-->133--> 131 (Low carbs now)--> 129--> 127-->126-->124-->125-->124--> 126-->125-->125-->125-->126-->128-->127-->130____\par Wt Loss : sig change since May-June/2018\par 8/17/17: Hgb=9.9, ESR=20, Vw=172--Eivayvpysn s/p GI infection w N/V/D, no obstruction\par 10/17/17: Hgb=7.9,ESR=6,CRP<5, INR=1.6, Got IV Iron x 2, since 10/2/17 for Iron<10, Hgb=8.8\par 11/16/17: Hgb=11.2, ESR=14, CRP=12, 10/26/17 Stool fat-neg, calprotectin-30\par 11/13/17: MRE-Chr mild distension of distal & vladislav-ileum s/o mild stricturing at anast, mild mural thick & mucosal enhancemt ~ 20cm; little change from 2015 c/w mild acute on chr ileitis, 2.1 cm Liver hemangioma\par 10/9/18: Hgb=11.6, MCV=94, ESR=14, CRP=5.4, INR=2.2\par 10/10/18 MRE: stricturing of neoterminal ileum w worsened upstream dilatation, moderate length of upstream ileum w stricturing extending at least 20cm and more extensive then previous. suggestion of 2 adj extraluminal fluid collections which may be w/i the wall of strictured ileum near the ileocolonic anastomosis. surrounding spiculation and tethered appearance of bowel in this region.\par CTE: no discrete collection or intramural fistula, but mucosal enhancemt\par \par Colonoscopy: 05cm rectal polyp: HP, 2nd deg int hemorrhoids\par mild-mod activity: MARILY w cryptitis & mild archect distortion at ileocolonic anast: colon & ileal side, no stricture\par \par Today: 2/25/20  : feeling ok , Wt=130____off prednisone, BMs Brown: #4-5,1-2x/d___, Hgb=13\par CRP <5-->17-->0.36-->6.5-->5.5--> <5-->0.18--><5-->0.2 ->11: ? s/p recent ankle fx/stress rx/tendonitis and tooth infection \par prior stool studies w elev  calprotectin and No fat\par Wt loss-- 2/2 to low carbs-->fat burning and flare ; advised to liberalize and add protein, ensure\par Plan: Prednisone d/c 2/20/18\par Entyvio q 6 wks --> 4 weeks \par check inflammatory markers: 2/28/19 w ESR=12, CRP=6.5 & 2/21/19 stool calprotectin--> 232\par 8/15/19: ESR=10, CRP=5.2, Calprotectin=88\par 9/19/19: ESR=9, CRP=5.5\par 10/17/19: ESR=7, CRP< 5\par 11/6/19 : ESR=6, CRP=0.18\par 11/27/19: ESR=6, CRP <5\par 1/13/20: ESR=7, CRP=0.2\par 1/30/20: ESR=9, CRP=11\par pt to call numbers given for  [facility] new or investigational rx's--biologics.  \par           \par cbc,esr,crp--pending\par check stool calprotectin \par check entyvio levels                                                                                                                                                                                                                                                                                                                                                                                                                                                                                                                                                                                                                                                                                                                                                                                                                                                                                                                                             \par 2. Iron deficiency anemia due to chronic blood loss  \par  had initially dropped , after clinical flare and post procedure bleeding\par Probably multifactorial: ACD, Blood loss, malabsorption/nutrient deficiencies\par Eliquis-->warfarin after CVA, On Entyvio and prednsione for active dis\par Still requiring IV Iron--prn, \par s/p IV Cu & transfusions \par 7/11/17 Recent Adm for unsteady gait w MRI c/w CVA--warfarin begun: possible better control of AC\par Chromagen 2 qd--> IV Iron , s/p IV Cu x 5, FA 1mg qd , B12 SL & IM\par  1/14/19: Hgb=10.7, INR=1.6, 2/5/19: Hgb=11.2,INR=1.5; 2/28/19: Hgb=11.5, Brntlycc=900; 3/11/19: INR=2.6\par 4/11/19: hgb=9.7, INR=1.6, 7/2/19: Hgb=11.7, Ferritin=41,INR=2.2, 8/15/19: Hgb=12.2,Ferritin=81,INR=1.6, \par 9/19/19: Hgb=12.8, 10/17/19: Hgb=14, 10/26/19: Hgb=14, 1/13/20: Hgb=13.5, 1/30/20: Hgb=13.9, Xuaziljk=672, Iron=38\par 7/13/17: M35=592, ESG=610,FA>23; 1/3/18 M80=694, Il=537, Zinc=55; 6/6/18 Y20=299, 9/5/18: Is=347, Zinc=67\par 11/28/18 D77=740, 7/2/19: Iv=581, Zinc=61,B6=2.8, 8/15/19: Cw=735,Zinc=54,T54=067, 1/13/20: L42=400\par B12 1cc IM ____R. delt--  not Given today, \par Last Iron infusion=9/19/19 , Iron =73,  Srwmmhoy=319; most recent IV Iron 2/12/20 for 1/30/20 Ferritin =131, Iron=38\par Hem consult IV Iron /Cu given malabsorption, ? BM bx --r/o occult etiology--on hold\par trend cbc\par Adj INR--closer to low 2's.    \par Agree w Heme--Prevnar-13\par \par \par \par 3. Other osteoporosis without current pathological fracture  \par : Progression on BD from 5/5/16\par Crohns, h/o steroid use\par Calcium citrate & Vit D per Endo\par Forteo since 5/10/19 \par Repeat BD of 8/2018 --showed worsening to Osteoporosis from 2016\par Rec Endo consult w Dr. Akers :[facility]oporosis center at [facility]--pt never went originally \par 9/21/18: Phos=2.4, Ca=8.7, Alb=3.4, Vit D=27, DYN=923, \par 10/16/18: Phos=2.8, Ca=8.7, Alb=3.5,\par 1/14/19: Phos=2.6,  Ca=8.7, Alb=3.6\par 2/28/19: Phos=1.8, Ca=8.9, Alb=3.7, VitD=39, KKD=139\par 3/18/19: Ca=8.5, Alb=3.7, Vit D=44.6, PTH=93\par 7/11/19: Ca=9.1, Alb=3.7, Phos=3.9, Vit D=40/ 306, NLL=959\par 8/15/19: Ca=9, Alb=4.2, Phos=4.4, VitD=59, BYT=671\par 10/17/19: Ca=9.6, Alb=3.9, Phos=3.5\par 11/6/19: Ca=9.1, Alb=3.9. Phos=3.7, VitD=50, PTH=80\par 1/13/20: ca=9.1\par 1/30/20: Ca=9.2, Alb=3.9, Phos=2.7, Mag=1.5, Vit D=103/41, PTH=87\par Dr. Akers: Hyperparathyroidism-- probably secondary due to low Vit D/Ca & ? superimposed primary, Rx replete Vit D and current IV Calcium\par trend Vit D, Ca, Phos, PTH,  \par [facility] ENT Consult init felt  Parathyroid scan showing activity in mediastinum is ectopic parathyroid, feels sx not indicated at this time, elev PTH is secondary to low  Vit D/Ca--to be reconsulted\par BD--9/2019: incr in spine, no change in wrist/hip\par \par \par \par 4. Gastroesophageal reflux disease w esophagitis : well controlled, no ht burn, no dysphagia, LPR\par Dry cough, CXR:ana, saw ENT bergstein-->LPR\par ( +++)LPR, (_ ++)Barretts w (_ No)Dysplasia, (_ No, H/O )Esophagitis grade: (__ ), \par Anti-reflux diet and life-style changes emphasized. (_No ) Bedge. \par reg exercise emphasized. \par **(_ ++)PPI: ( Protonix 40 mg qd ), (++) H2B Qhs: ( Zantac 300mg--> Pepcid 40mg ), \par (_ ) Carafate 1gm:\par **(_ ++)F/U EGD: (_ 2mo. / (_2022 )yrs--Barretts screen due \par (_ No)pH Monitor, (_No )Manometry, (_No )esophagram \par (_f/u )ENT eval., (_ No)Surgical eval.    \par \par \par \par 5. Internal hemorrhoids  \par  well-controlled , no swelling, itching, bleeding\par Discussed the potential complications of thrombosis, pain, bleeding, swelling, itching, infection.\par Moderate -Fiber Diet was reviewed and emphasized.\par 6 - 8 cups of decaffeinated fluid daily\par (_++ ) Sitz Bathes BID, (_++++ ) Anusol HC Suppos/Cream MT QHS ,\par (_No ) Tucks BID, (_ No) Balneol Lotion,(_ No) Calmoseptine Oint, (_ ++) Prep H prn\par (_No ) Colorectal Surgical evaluation for possible ablation.    \par \par \par \par \par 6. Barretts esophagus without dysplasia  \par Notes: see GERD.    \par \par \par \par 7. Hepatomegaly-->not confirmed by recent abd sono\par CTE w relative enlargemt, ? lobulation & enlarged portal/mesenteric veins\par LFTs-wnl, no ascites or edema\par R/O CLD, Hepatic vein thrombosis\par Abd sono w doppler--> Liver and spleen normal size, no thrombosis, normal portal and hepatic vein flow\par \par \par \par \par .\par \par

## 2020-03-02 ENCOUNTER — LABORATORY RESULT (OUTPATIENT)
Age: 55
End: 2020-03-02

## 2020-03-17 ENCOUNTER — RESULT REVIEW (OUTPATIENT)
Age: 55
End: 2020-03-17

## 2020-03-17 ENCOUNTER — APPOINTMENT (OUTPATIENT)
Dept: HEMATOLOGY ONCOLOGY | Facility: CLINIC | Age: 55
End: 2020-03-17
Payer: COMMERCIAL

## 2020-03-17 PROCEDURE — 99213 OFFICE O/P EST LOW 20 MIN: CPT

## 2020-03-18 NOTE — CONSULT LETTER
[FreeTextEntry3] : Angie Biswas MD\par [facility]\par

## 2020-03-18 NOTE — RESULTS/DATA
[FreeTextEntry1] : 3/17/20  wbc 5.8 hgb 13.1 hct 39.6 plts 221\par INR/PT - 1.7/18.9\par Ferritin 272

## 2020-03-18 NOTE — HISTORY OF PRESENT ILLNESS
[de-identified] : Patient is a 52 year old who is referred for initial consultation for anemia secondary to Crohns/GI bleed vs Iron Deficiency/ Vit b12 def. 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 Patient is status post ileo-colonic resections for SBO  in 2016. In 2016 patient had complaints of 10 - 15 lb weight loss. Labs at that time showed Hgb of 12, steatorrhea and stool calprotectin = 236. In 2016 MRI/MRE with narrowing at ileocolonic anastomosis with upstream dilation. Pt refused bridge with  prednisone and Entocort / Flagyl. In 2017 patient Hgb dropped to 9.5. On 2017 patient underwent an EGD and Colonoscopy. Findings consistent with Page's and no dysplasia or AVMs. Colonoscopy showed an open anastomosis but active disease, moderate on the colonic side and limited to the anastomosis and moderate to severe enteritis, just proximal to the anastomosis. Pat had routine labs on 05/10/2017 Hgb: 8.3.  [FreeTextEntry1] : s/p injectafer, copper\par warfarin [de-identified] : Patient presents for follow up of  anemia, DVT, MTHFR gene mutation follow up, currently on Coumadin 3mg allternating with 1.5 as needed with INRI level that takes with home machines. Pt is overall, doing well with no fever, sob/duran, n/v, headaches, dizziness, bruises, bleeding, fluid retention.

## 2020-03-18 NOTE — ASSESSMENT
[FreeTextEntry1] : 1.  Anemia- Hgb 13.1\par  -blood loss, anemia of chronic disease, \par - copper deficiency - replaced, last level August 2019- will level ordered - result pending \par - Continue B12 injection, level still low - q 2 weeks\par - 3/17/20 ferritin 272  - last infusion November 2019 - no infusion needed.\par \par 2. Osteoporosis \par - left ankle- stress fx- advanced  osteoporosis, patient with h/o steroids use\par - started Forteo with Dr. Akers\par - calcium level stable, stopped IV\par - PT for osteoporosis\par \par  3.TIA with MTHFR and h/o DVT -  \par not a candidate for DOAC b/o small bowel resection\par - INR home monitoring of warfarin takes 3 mg daily\par - INR 1.7 - was on 1.5mg yesterday - to resume 3mg.  \par \par 4. Hypogammaglobulinemia\par - s/p  Prevnar 13 12/2018 \par - poor response, needs Pneumococcal vaccine 23 boost when next iron \par - needs Shingrex\par \par \par 5. Zinc deficiency\par - start oral Zinc\par \par case and management discussed with Dr. Biswas.\par

## 2020-03-18 NOTE — REVIEW OF SYSTEMS
[Eye Pain] : no eye pain [Vision Problems] : no vision problems [Dysphagia] : no dysphagia [Hoarseness] : no hoarseness [Chest Pain] : no chest pain [Lower Ext Edema] : no lower extremity edema [Shortness Of Breath] : no shortness of breath [Cough] : no cough [Vomiting] : no vomiting [Constipation] : no constipation [Diarrhea] : no diarrhea [Dysuria] : no dysuria [Skin Rash] : no skin rash [Dizziness] : no dizziness [Insomnia] : no insomnia [Anxiety] : no anxiety [Depression] : no depression [Muscle Weakness] : no muscle weakness [Easy Bleeding] : no tendency for easy bleeding [Easy Bruising] : no tendency for easy bruising

## 2020-03-19 ENCOUNTER — APPOINTMENT (OUTPATIENT)
Dept: GASTROENTEROLOGY | Facility: CLINIC | Age: 55
End: 2020-03-19
Payer: COMMERCIAL

## 2020-03-19 VITALS
TEMPERATURE: 98.6 F | DIASTOLIC BLOOD PRESSURE: 78 MMHG | HEIGHT: 68 IN | BODY MASS INDEX: 19.7 KG/M2 | SYSTOLIC BLOOD PRESSURE: 136 MMHG | WEIGHT: 130 LBS

## 2020-03-19 PROCEDURE — 96372 THER/PROPH/DIAG INJ SC/IM: CPT

## 2020-03-19 PROCEDURE — 99214 OFFICE O/P EST MOD 30 MIN: CPT | Mod: 25

## 2020-03-19 RX ORDER — CYANOCOBALAMIN 1000 UG/ML
1000 INJECTION INTRAMUSCULAR; SUBCUTANEOUS
Qty: 0 | Refills: 0 | Status: COMPLETED | OUTPATIENT
Start: 2020-03-19

## 2020-03-19 RX ADMIN — CYANOCOBALAMIN 0 MCG/ML: 1000 INJECTION INTRAMUSCULAR; SUBCUTANEOUS at 00:00

## 2020-03-19 NOTE — HISTORY OF PRESENT ILLNESS
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-->Eliquis' warfarin \par        GERD w Page's, Hemorrhoids, BPH, \par        S/P Ileocolonic resection 1998\par        Since then multiple SBOs\par \par \par        Got IV Iron x 2, since 10/2/17\par        10/19/17: feeling better, Qn=494 on prednisone 15mg x 3weeks, BMs Brown: #5-6, 1-2/D, occas 3-4/d\par        10/25/17: Hgb=10, ESR=5, CRP=5, Alb=3.6, Phos=2, Sgdfyhoj=425, V47=423\par        11/16/17: Hgb=11.2, ESR=14, CRP=12, \par        11/13/17: MRE-Chr mild distension of distal & vladislav-ileum s/o mild stricturing at anast, mild mural thick & mucosal enhancemt ~ 20cm; little change from 2015 c/w mild acute on chr ileitis, 2.1 cm Liver hemangioma\par        11/28/17: Entyvio\par        11/29/17: Hgb=9.6, ESR=10, CRP=5.8, Alb=3.8,Ferritin-19, Iron=14,Sat=4%, Phos=2.5, Ii=012, BMs:# 5, 1-2x/D, brown,\par        12/19;17: Hgb=10.1, ESR=12, CRP=6.5; 12/21/17: BMs:#3-5, 1-3x/D , brown, no blood, no pain, uh=241\par        1/3/18:Hgb=11.7, ESR=19, CRP=6.5, Alb=4.1, Hqktlhms=725, Iron=31, Sat%=9,Zinc=55\par        1/16/18: Entyvio, Hgb=11.4, ESR=10, CRP=6.9\par        1/18/18: Today: cellulitis R foot, saw dr KEITH--rx augmentum x 10D, Entyvio 1/9 to 1/16, fv=600 w clothes,BMs: # 4-5, 1-2x/D \par        2/14/18: Hgb=11.1, ESR=16, CRP=11.6, Alb=3.6, Iron=28, Ferritin=23, INR=1.5 \par        2/16/18: s/p Entyvio; Iron infusion, again on 2/27\par        2/20/18: Hgb=10.6, ESR=20, CRP=12, INR=1.7\par        2/27/18: s/p iron infusion\par        3/9/18: Dr. Burden for tenosynovitis, rx w Zorvolex: Diclofenac x 5 days--? response\par        3/14/18: Hk=070; BMs: # 5, 1-2x/D; Hgb=11, ESR=18, CRP=11,Irom=45,Zbsqytzi=998,Alb=3.6, INR=1.5\par        3/19/18: s/p Entyvio\par        3/22/18: vm=859, BMs: # 5, 3-4 x/d\par        4/16/18: s/p Entyvio,Hgb=11.9, INR=1.3, ESR=18, CRP=8.4 \par        4/18/18 EGD: gastritis, no HP, no IM, 2+ Mucous, 0.5cm gastric polyp: fundic, 4cm HH, + Barretts:3cm, no dysplasia\par        4/25/18: Hgb=11.5, INR=1.6, Iron=40, Sat=13%, Ferritin=57, Alb=3.2, Phos=2.2, Mag=1.7\par        4/30/18: s/p Iron Infusion\par        5/14/18: s/p Entyvio \par        5/23/18: Hgb=11.5, ESR=16, CRP< 5\par        5/29/18: s/p Iron Infusion\par        6/6/18: Hgb=10.6, INR=1.7, Xgpsvfvm=301, Alb=3.5, Phos=2.1, K59=725, zx=735; BMs: # 5, 2-3x/D \par        6/19/18: Hgb=10.6, INR=1, CRP=15, ESR=23\par        6/21/18: R ankle is acting up, swollen, pain, having MRI done, took a couple of advil, on low carbs ,BMs: # 5, 1-2x/D, ev=764\par        6/26/18: MRI: fx/stress rxn-talar body/navicula; stress related changes--cuneform, cuboid,distal tibia; mod tibiotalar effusion, mild-mod peroneal tendinosis\par        7/19/18: entyvio 6/26/18, eliminated all carbs--anti inflammatory diet, wk=643, BMs: # 4-5, 1-3x/D \par        7/25/18: Hgb=10, INR=1.3, Alb=3.3, Phos=2.4, Vlzwxnum=462, sat%=12, Iron=35\par        8/2/18: BD--osteoporosis of hip/spine: sig decrease BD--17.6% of hip, 17% of spine, osteopenia of wrist: 6.4%\par        8/7/18: Entyvio\par        8/21/18: Hgb=11.1, INR=1.5, ESR=19, CRP=18.6\par        8/23/18: recently w tooth infection, old implant--loose and removed; rx w amox, and advil\par        eating almost no carbs, vb=607, # 5-6, 2-3x/d \par        8/24/18: Stool fat--neg, Fbnoabbizxsr=927\par        9/5/18: Hgb=11.4, INR=1.3, ESR=9, CRP=11.3, Iron=41, Fdydltjs=022, Alb=3.8,\par        9/17/18: entyvio, Hgb=10.7, INR=2.2, ESR=17, CRP=9.1, \par        9/20/18: oc=095, BMs: # 5-6, 1-2x/D \par        9/21/18: Phos=2.4, Ca=8.7, Alb=3.4, Vit D=27, OIM=336, \par        Dr. Akers: Hyperparathyroidism: probably secondary due to low Vit D/Ca & ? superimposed primary, rx replete Vit D and Calcium\par        10/9/18: Hgb=11.6, MCV=94, ESR=14, CRP=5.4, INR=2.2\par        10/10/18 MRE: stricturing of neoterminal ileum w worsened upstream dilatation, moderate length of upstream ileum w stricturing extending at least 20cm and more extensive then previous. suggestion of 2 adj extraluminal fluid collections which may be w/i the wall of strictured ileum near the ileocolonic anastomosis. surrounding spiculation and tethered appearance of bowel in this region.\par 10/16/18: entyvio, Hgb=11.7, MCV=94, ESR=14, CRP <5, Alb=3.5\par 10/18/18: St=091,   BMs: # 5-6, 3x/D , \par 11/13/18: Entyvio\par 11/21/18 CTE w C: limited 2/2 bowel underdistention, mucosal hyperenhancemt & extensive SM edema of distal ileum including vladislav-ileum, difficult to exclude a sml fluid collection, Liver w relative enlargement w suggestion of lobulation, enlargemt of PV,SMV,IMV,Spl V, rectal V. No ascites\par 11/28/18: Hgb=10, ESR=19, CRP=17,Alb=3.5,Iron=35, Sat%=11, Fyoqmnhb=324, INR=1.3\par 11/29/18: dq=328, BMs: # 5-6, 3-4x/D\par 12/3/18: Abd sono--Liver 14.8cm Incr heterogenicity, spleen--wnl\par 12/11/18 & 1/14/19: Entyvio\par 1/14/19:Entyvio,  Hgb=10.7, ESR=15, Alb=3.6, Iron=36, Ferritin=67, Sat%=11, INR=1.6\par 1/24/19:  vo=897, stools are # 4-6, 3-4x/d , no pain\par 2/5/19: Hgb=11.2, ESR=14, CRP=0.36\par 2/28/19: Hgb=11.5, ESR=12, CRP=6.5, Alb=3.7, Iron=69, Gnvavlwi=776, Ca=8.9\par                Bms: # 4-5, 2-3x/D , ry=606,  Entyvio : last 2/1519,\par                had parathyroid scan pre-op, still w elev PTH, + activity in mediastinum,? ectopic parathyroid tissuevs neoplasm.  To see ENT and have Imaging at [facility]\par 3/28/19: Bms: # 4-5 , 3x/d ;wg=773\par 4/11/19: Hgb-9.7, Iron=45, ESR=18, CRP=9.4, Alb=3.5, \par Saw Endo SX at [facility], felt hyperparathyroid is secondary to Low Ca/Vit D, no sx at this time\par 4/16/19: BMs: # 5-6, 3-4x/D, me=313,  Entyvio : last 3/1519,  got IV iron 4/12/19 for Ferritin=53\par 4/27/19: s/p R Hip Nailing--missed 4/2019 2/2 fresh wound\par 5/16/19 & 6/18/19 Entyvio\par 7/2/19: Hgb=11.7, Ferritin=41,ESR=12, CRP=5.5, B6=2.8,Mag=1.8,Phos=3.9,Ap=189,Zinc=61\par 7/11/19: s/p IV iron\par 7/11/19: Alb=3.7, LYE=757, Ca=9.1, Vit D=40/306, \par 7/24/19: Entyvio, Alb=3.8, TWI=984, Ca=8.9, Vit D=128 \par 8/1/19: Bms: # 5-6, occas #4, 2-3x/D , sv=606\par 8/15/19: Hgb=12, ESR=10, CRP=5.2, Ferritin=68, Alb=3.4, Phos=4.4,Mg=1.7, Ca=8.5,Calprotectin=88,\par 8/20/19: FXJ=471, Ca=9, Alb=4.2\par 9/19/19: Hgb=12.8, ESR=9, CRP=5.5, Alb=3.7, Knqkbjcm=564, Mag=1.8, Ca=8.9, Phos=4.2\par 9/26/19: uu=105, BMs: #5-6, 2-3x/D, no pain\par 10/17/19: px=265, BMs: #4-6, 1-2x/D, no Pain; Hgb=14, ESR=7, CRP<5, Alb=3.9, Guohswab=435, Mag=1.7, Ca=9.6\par 10/24/19: xm=978, Bms: # 4-6 , no pain,\par 11/6/19: ESR=6, CRP=6, PTH=80,Ca=9.1, VitD=50\par 11/14/19: zn=349, BMs: # : 4, 1-2, 0ccas #5\par  11/17/19: ESR=6, CRP<5, Bzzcyrwd=794, \par  12/19/19: pc=997, BMs: #5-6, 2-3x/D\par 1/6/20: entyvio\par 1/13/20: ESR=7, CRP=0.2, Hgb=13.5, Tbxvccrt=652, G14=759, FA=10, Alb=4.1, Ca=9.1\par 1/16/20: wt = 127, BMs: # 5, 1-3x/d\par 1/30/20: ESR=9, CRP=11, Hgb=13.9, Wzkqoyem=280,Iron=38, Alb=3.9,Ca=9.2, PTH=87,\par 2/3/20 EGD: gastritis, +MACKENZIE, No HP, +erosion w ooze -clipped, 0.5cm polyp-neg; 4cm HH, Esophagitis A, + Barretts, VC: 1+\par Colonoscopy: 05cm rectal polyp: HP, 2nd deg int hemorrhoids\par mild-mod activity: MARILY w cryptitis & mild archect distortion at ileocolonic anast: colon & ileal side, no stricture\par 2/4/20: Entyvio; 2/12/20: IV Iron, 3/3/20: Entyvio\par 2/25/20: go=189,  BMs: # 4-5, 1-2x/ d\par 3/19/20: bx=313, BMs: #4-5, 1-2x/D\par  Today:  no pain, \par         \par        aw=974\par        Abd pain--no \par        Nausea--no \par        Vomit--no \par        Early satiety-no \par        Belching-no \par        Regurgitation--no \par        Ht burn--no \par        Throat Clearing-no\par        Globus-no\par        Hoarseness--no \par        Dysphagia--no \par        BMs:  #4-5, 1-2x/D\par        Constipation--no \par        Diarrhea- intermittent \par        Bloating--no \par        Flatulence-no\par        Gurgling--no \par        Melena--no \par        BRBPR--no \par        Anorexia-no \par        Wt Loss--stable\par \par \par \par        PRIOR HISTORY--2017\par \par        1/17/17: Hgb=9.2, ESR=6, CRP=5; 1/19/17: BMs: #4, 5-6, 2-3x/D, Jo=214, Started Creon 1 tid cc\par        3rd Entyvio begining Feb\par        2/14/17: Labs; Hgb=9.6, MCV=97, ESR=5, CRP< 5, M08=781, FA>23, Iron=59, Retic =3.2,\par        2/17/17 Fecal Calprotectin=16, Fats: Increased neutral & Split\par        3/13/17: Labs Hgb=9.5, MCV=95, ESR=7, CRP <5, ALB=3.1\par        3/16/17: lot of stress, to move -[place], had a job-fell thru, BMs: # 5, 2-4 x/D, wt= 131w clothes\par        4/19/17 EGD: gastritis, MACKENZIE, No HP, 2cm HH, +Barretts, No Dysplasia\par        Colonoscopy: Anastamosis: open w mild -mod active colitis, enteritis severe adj to anastomosis, mild more proximal, remainder of colon-wnl, 2-3 deg int hemorrhoids\par        4/26/17 : Hgb=7.3, MCV=95, ESR=13, CRP<5;Immediately post procedure stools very dark on eliquis/iron.\par        Admitted to [facility]: Hgb=8.3-->8.9 after 2 U, Alb 3.3, BUN=17, creat=1.3, Malina/Hwmzl=892/460\par        4/27/17: Alb=2.3, BUN=12, creat=1.2, Malina/Lipase=209/863-No abd pain, N/V; 5/5/17: Hgb=10.7.\par        5/8/17 Entyvio iv. 5/8-5/9 w dark red diarrhea; 5/10/17 Hgb=7.8.\par        5/11/17 Adm [facility] w stools brown, Hgb=7.9, BUN=17, Creat=1.4, Alb=2.9; S/P 2 Units- Hgb=10.6, BUN=15/1.1.\par        Prednisone 40mg qd, entocort d/dee.; 5/18/17: Hgb-10.4, kr=485, BMs: #5, 1-2x/D, no pain\par        6/8/17: Hgb=7.4,MCV=98, CRP<5-ASA stopped, Pred20--> 30\par        6/10/17: Hgb=8.2, Alb=2.9, BUN=20/1.2 ; 6/12/17: Hgb=8.3, Retic=0.19, Iron=10, Sat%=3, Ferritin=57, FA=23,V73=215, 6/13/17: s/p 2 Unit PRBCs\par        6/15/17: started MCT oil, Dn=764 , BMs: # 5, 1-2x/D, stools are brownish/green \par        7/11/17: [facility] w unsteadiness. MRI Head: R Cortical Temporo-occipital encephalomalacia, MRA H&N-no sign lesions, PER-wnl.Hgb=9.9--> 8.4->9.7 after 1 Unit. Seen by Heme: received IV Iron \par        CRP<5, W75=182, KCT=310, FA>23,Iron=22,Sat%=6, Ferritin=42, Alb=3.5. D/C on warfarin 2mg\par        7/20 Hgb=8.5, 7/28 Hgb=7.7, 7/31-s/p 1 Unit \par        As outpt seeing Heme, to get IV Iron and 5 IV Copper\par        Had 'FLU' Fever=101, chills, bloat, N/V/D, Bilious. no pain, melena,brbpr\par        Given anti-emetic by dr Butler,then able to keep things down\par        On prednisone 25, warfarin w INR bet 1.6-2\par        8/17/17: Hgb=9.9, ESR=20, CRP-P,Yd=652, BMs: # 5, 1-2x/D, stools are brownish/green\par        10/2/17: Hgb=8.8, MCV=89,Phos=1.7, K=3.9, Mag=1.9, Alb=3.6,Iron<10,Ferritin=21, INR=2\par        10/17/17: Hgb=7.9,ESR=6,CRP<5, INR=1.6\par        10/25/17: Hgb=10, ESR=5, CRP=5, Alb=3.6, Phos=2, Vpmqvkxm=526, S13=458\par        11/16/17: Hgb=11.2, ESR=14, CRP=12, \par        11/13/17: MRE-Chr mild distension of distal & vladislav-ileum s/o mild stricturing at anast, mild mural thick & mucosal enhancemt ~ 20cm; little change from 2015 c/w mild acute on chr ileitis, 2.1 cm Liver hemangioma\par        11/28/17: Entyvio\par        11/29/17: Hgb=9.6, ESR=10, CRP=5.8, Alb=3.8,Ferritin-P, Iron=14,Sat=4%, Phos=2.5\par        12/19;17: Hgb=10.1, ESR=12, CRP=6.5; 12/21/17: BMs:#3-5, 1-3x/D , brown, no blood, no pain, zo=502\par        1/3/18:Hgb=11.7, ESR=19, CRP=6.5, Alb=4.1, Svaavphz=523, Iron=31, Sat%=9,Zinc=55\par \par \par        PRIOR HISTORY---2013:\par \par        2/20/13 CT markedly dilated sb loops ext to anast, colonoscopy w open anast ,mild-mod remy-anastamotic disease. quick response to entocort/humira--probably adhesive dis. \par        8/10/13 w GNR bacteremia, ADA 1.7 /KAYLAH 3.4, Humira 8/7, 8/13,8/18,9/15\par        CT- mucosal thickening and spiculation of the distal sb extending to the anastamosis, thickening and stranding of adj mesenteric fat.\par        Humira increased to 40mg weekly, entocort 4\par        9/2013 MRE--dilated loops in mid and distal ileum, markedly thickened and narrowed TI w decreased peristalsis of TI\par        11/15/13 ADA-24.9, KAYLAH-0\par \par \par        PRIOR HISTORY---2014:\par \par        2/15/14--CT dilated loops SB, loop of sb in mid abd 4.3cm w infiltrative changes in the mesentery, bowel tapers in the RLQ to the anastamosis w/o transition\par        WBC=15K, Rx w IV steroids and Abx \par        3/7/14 SBFT: last 10cm sb prox to anast mild distension and sl irregularity. In the mid portion of this loop there is a mild narrowing which appears to reopen but is some what narrowed.\par        3/20/14 ADA=33.1, KAYLAH=0, Lialda switch to Apriso 4 (2/2014)\par \par \par        PRIOR HISTORY--2015\par \par        1/11/15 Adm [facility] w 1 day N,Recurrent V, Abd pain/distension. CT-multiple distended & fluid-filled sb loops, mild wall thickening of ileum w No inflamm changes.\par        WBC=14.6, Hgb=17, RX w NGT suction, Levaquin iv, Solumedrol 40mg q 12( 1/12-->1/16) to prednisone 30mg BID w 5mg taper/wk\par        3/18/15 --Dr. Butler w edema /High BP, prednisone was tapered slowly to 2.5mg \par        switched to entocort 9mg qd, edema and bp improved w lasix 20mg \par        BMs:#4-5, 3x/D, Hgb=11, ESR=4, CRP<5\par        4/28/15 - 5/1/15: Adm [facility] w 1 day Abd pain, distension,N/V. 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 side of anast, opening appeared ulcerated,scarred & severely narrowed\par        6/11/15: [facility] w N/V x1, decr appetite, CT ABD: no obstruction, dilated thickened sb loops of distal jej and ileum\par        6/19/15 [facility]: s/p Lap w extensive lysis of adhesions, hepatic flex, sigmoid and sb anastamosis, s/p partial r colectomy and sb resection--side to side elisabeth: 8-10 inch from prior anast to TV colon.\par        7/17/15: BMs:#4-6, 4-5x/D, wt 131(from 126)\par        8/20/15: BMs: #4, 1-2x/D or #5, 2-3x/D, wd=634, dry cough,Hgb=10, WBC=3, BJY=856, CRP=56, ESR=21\par        8/25/15 CT Abd/Pelv: Svl RLQ sb loops w wall thickening, mild nodularity & inflamm stranding in mesentery\par        Advised-restart Entocort 9mg qd, Apriso 4 qd, Maintain Humira but given RX to check drug/Ab levels\par        9/15/15: did nt restart meds,Hgb=9.9, WBC=4, ESR=19, CRP=5.8, ww=263; [facility] : ADA=8.5, Antibodies< 1.7\par        10/15/15: No pain, BMs:#4, 1-2x/D, #5-6, 3-4x/D, throat clearing/cough-better, wh=328\par        11/30/15: No pain, BMs:# 4, 5-6 intermittently, No throat clear, vh=765\par \par \par        PRIOR HISTORY-2016\par \par        1/22/16; 4/8/16; 6/6/16 : No pain, BMs:# 4-5 1-2x/D, also #5-6 3x/D for 2-3D/wk, gl=702\par        7/14/16: hj=412, 9/22/16: fs=777.5\par        11/10/16: 9.5lb wt loss, states BMs: 5-6, 5x/D--Taking Magnesium, No pain/anorexia\par        Labs: Stool Rfgdewjjfgpf=472, + Incr Fecal fat, Alb=3.4, FA =23, J23=662, Hgb=12, ESR=10, CRP <5\par        Magnesium-d/c, Obtain MRE asap--Start steroids and possibly switch to Entyvio\par        DDx discussed: active crohns--loss response to Humira, Malabsorption--loss Bile acids, Bile-induced diarrhea, SIBO\par        Pt wanted to wait for imaging-did not start rx\par        12/1//16 MRE: RUQ ileocolonic anastamosis--narrowed w upstream dilatation to 3.2 cm\par        Pt refused pred, Started Entocort 9mg qd and Flagyl 250mg qid about 7-10days ago \par        awaiting Entyvio load to start 12/22, then 1/5; had Cut back on iron bid\par        12/15/16: BMs:# 5, 2-3x/D, occas #6, No pain, Less bloat/flatulence, Uv=377.

## 2020-03-19 NOTE — ASSESSMENT
[FreeTextEntry1] : 1. Crohns disease of both small and large intestine\par    \par CROHNS DISEASE-s/p ileocolonic resection x 2--last 6/19/15 for recurrent sbos: appears to be stable , GI symtoms stable but labs were up CRP=18.6/ESR=19 & Wysqfiieoqhp=706 and Wt down ( inflam markers ? 2/2 to R ankle Fx/stress rx and tendinosis, also had tooth infection ) & MRE w ? ileal penetrating dis, wt =131_____\par s/p 4 SBOs w/i 2 yrs--2/2 distal sb disease adj to anastamosis\par when on Humira weekly w ADA =33 (3/20/14), -but had sbo 2/2014 at ADA=25 and another sbo 4/28/15\par 6/10/2015 Colonoscopy: Inflamm ST mass on ileal side w ulceration/scarred/narrow\par 6/11/2015 CT: dilated thickened sb loops distal jej & ileum\par Post-op **probably some malabsorption 2/2 to removal of R Colon and ileum: ?bile/fat/SIBO\par WT. Loss: r/o malabsorption, ?bile-induced--secretory vs decr micelles, active dis, PLE\par r/o SIBO--Elev FA, Alb=3.4-->3.1-->2.9--> (7/28/17)\par ?SIBO/Prevent post-op recurrence --Flagyl 250 mg qid~12/7/16-->d/c: 5/11/17\par Also discussed trial of Cholestyramine/Xifaxin -wanted to wait\par **ACTIVE Crohn's--11/10/16: 9.5lb wt loss, BMs: #5-6, 5x/D--Taking Magnesium \par 12/1/16 MRE: RUQ ileocolonic anastamosis--narrowed w upstream dilatation to 3.2 cm\par Labs: Stool Bvzwanpwinrr=928, + Incr Fecal fat, Alb=3.4, FA =23, W29=074, Hgb=12.3,ESR=10,CRP <5\par 4/19/17 :Colonoscopy: Anastamosis: open w mild -mod active colitis, enteritis severe adj to anastomosis, mild more proximal, remainder of colon=wnl\par 5/11/17- Adm [facility] w stools brown, Hgb=7.9, BUN=17, Creat=1.4, Alb=2.9.\par S/P 2 Units w Hgb=10.6, BUN=15/1.1, Prednisone 40mg taper, entocort d/dee\par 6/8/17: Hgb=7.4, MCV=98, CRP<5--ASA stopped, Pred20--> 30mg, 6/13/17: s/p 2 Unit PRBCs\par 7/11/17 [facility] w unsteadiness. MRI Head: R Cortical Temporo-occipital encephalomalacia\par Hgb=9.9--> 8.4->9.7 after 1 Unit. Seen by Heme: received IV Iron \par CRP<5, A70=007, PWD=941, FA>23,Iron=22,Sat%=6, Ferritin=42, Alb=3.5. D/C on warfarin 2mg\par 7/28/17 Hgb=7.7; 7/31/17-s/p 1 Unit \par Heme Consultation: received  IV Iron and 5 IV Copper:___\par **Entyvio :time 0=12/22/16 ( Humira 40mg QW); 1/5/17--2wks, s/p 3rd infusion at wk 6, \par #6 :6/21/17--held 2/2 Shingles, Last Infusions=9/19/17 , 10/17/17, 11/28/17, 1/16/18 ,2/16/18, 3/19/18,4/16/18, 5/14/18, 6/25/18, 8/7/18, 9/17/18, 10/16/18, 11/13/18, 12/11/18, 1/14/19, 2/15/19, 3/15/19, 5/16/19, 6/18/19,7/24/19,\par 9/9/19, 10/2/19, 11/4/19, 12/12/19, 1/6/20, 2/4/20, 3/3/20\par Entocort 9mg qd: 12/7/16--d/dee 5/11/17\par **Prednisone 40mg qd (5/11/17)--tapered 5mg/wk to 20mg qd, 6/8/17 30mg, \par 7/25/17 25mg, 3wks ago 20--> 15mg, 10/19/17 10mg alt w 7.5-->11/16/17 10mg alt w 5mg-->11/30/17: 5mg-->12/21/17: 5 alt w 2.5mg-->1/18/18: 2.5mg qd-->2.5mg qod--> d/c \par Probiotics 3 qd \par Lactose free, protein drinks tid\par MCT oil begun\par **trend --cbc, esr, crp, albumin\par **monitor wt= 120-->134-->134 --> 135--> 132 w clothes-->133--> 131 (Low carbs now)--> 129--> 127-->126-->124-->125-->124--> 126-->125-->125-->125-->126-->128-->127-->130-->131____\par Wt Loss : sig change since May-June/2018\par 8/17/17: Hgb=9.9, ESR=20, Un=763--Gyuiwautwh s/p GI infection w N/V/D, no obstruction\par 10/17/17: Hgb=7.9,ESR=6,CRP<5, INR=1.6, Got IV Iron x 2, since 10/2/17 for Iron<10, Hgb=8.8\par 11/16/17: Hgb=11.2, ESR=14, CRP=12, 10/26/17 Stool fat-neg, calprotectin-30\par 11/13/17: MRE-Chr mild distension of distal & vladislav-ileum s/o mild stricturing at anast, mild mural thick & mucosal enhancemt ~ 20cm; little change from 2015 c/w mild acute on chr ileitis, 2.1 cm Liver hemangioma\par 10/9/18: Hgb=11.6, MCV=94, ESR=14, CRP=5.4, INR=2.2\par 10/10/18 MRE: stricturing of neoterminal ileum w worsened upstream dilatation, moderate length of upstream ileum w stricturing extending at least 20cm and more extensive then previous. suggestion of 2 adj extraluminal fluid collections which may be w/i the wall of strictured ileum near the ileocolonic anastomosis. surrounding spiculation and tethered appearance of bowel in this region.\par CTE: no discrete collection or intramural fistula, but mucosal enhancemt\par \par Colonoscopy: 05cm rectal polyp: HP, 2nd deg int hemorrhoids\par mild-mod activity: MARILY w cryptitis & mild archect distortion at ileocolonic anast: colon & ileal side, no stricture\par \par Today: 3/19/20  : feeling ok , Wt=13____off prednisone, BMs Brown: #4-5,1-2x/d___, Hgb=12\par CRP <5-->17-->0.36-->6.5-->5.5--> <5-->0.18--><5-->0.2 ->11->0.26: ? s/p recent ankle fx/stress rx/tendonitis and tooth infection \par prior stool studies w elev  calprotectin and No fat\par Wt loss-- 2/2 to low carbs-->fat burning and flare ; advised to liberalize and add protein, ensure\par Plan: Prednisone d/c 2/20/18\par Entyvio q 6 wks --> 4 weeks \par check inflammatory markers: 2/28/19 w ESR=12, CRP=6.5 & 2/21/19 stool calprotectin--> 232\par 8/15/19: ESR=10, CRP=5.2, Calprotectin=88\par 9/19/19: ESR=9, CRP=5.5\par 10/17/19: ESR=7, CRP< 5\par 11/6/19 : ESR=6, CRP=0.18\par 11/27/19: ESR=6, CRP <5\par 1/13/20: ESR=7, CRP=0.2\par 1/30/20: ESR=9, CRP=11\par 3/2/20:ESR=7, CRP=0.26\par 3/9/20: VDZ>40, Ab to VDZ <1.6\par 3/17/20: ESR=6, CRP=6.4\par pt to call numbers given [facility]  [facility] or [facility]ational [facility]'s--biologics.  \par           \par check cbc,esr,crp\par check stool calprotectin \par                                                                                                                                                                                                                                                                                                                                                                                                                                                                                                                                                                                                                                                                                                                                                                                                                                                                                                                                          \par 2. Iron deficiency anemia due to chronic blood loss  \par  had initially dropped , after clinical flare and post procedure bleeding\par Probably multifactorial: ACD, Blood loss, malabsorption/nutrient deficiencies\par Eliquis-->warfarin after CVA, On Entyvio and prednsione for active dis\par Still requiring IV Iron--prn, \par s/p IV Cu & transfusions \par 7/11/17 Recent Adm for unsteady gait w MRI c/w CVA--warfarin begun: possible better control of AC\par Chromagen 2 qd--> IV Iron , s/p IV Cu x 5, FA 1mg qd , B12 SL & IM\par  1/14/19: Hgb=10.7, INR=1.6, 2/5/19: Hgb=11.2,INR=1.5; 2/28/19: Hgb=11.5, Ogejiewo=703; 3/11/19: INR=2.6\par 4/11/19: hgb=9.7, INR=1.6, 7/2/19: Hgb=11.7, Ferritin=41,INR=2.2, 8/15/19: Hgb=12.2,Ferritin=81,INR=1.6, \par 9/19/19: Hgb=12.8, 10/17/19: Hgb=14, 10/26/19: Hgb=14, 1/13/20: Hgb=13.5, 1/30/20: Hgb=13.9, Ekiodihe=640, Iron=38,3/17/20: Hgb=13.1, Sylznrpp=242, INR=1.7\par 7/13/17: V46=548, JHK=522,FA>23; 1/3/18 L57=156, Ju=828, Zinc=55; 6/6/18 H00=643, 9/5/18: Oc=735, Zinc=67\par 11/28/18 B81=006, 7/2/19: Hr=388, Zinc=61,B6=2.8, 8/15/19: Av=418,Zinc=54,J80=310, 1/13/20: O59=909\par B12 1cc IM ____R. delt--  Given today, \par most recent IV Iron 2/12/20 for 1/30/20 Ferritin =131, Iron=38\par Hem consult IV Iron /Cu given malabsorption, ? BM bx --r/o occult etiology--on hold\par trend cbc\par Adj INR--closer to low 2's.    \par Agree w Heme--Prevnar-13\par \par \par \par 3. Other osteoporosis without current pathological fracture  \par : Progression on BD from 5/5/16\par Crohns, h/o steroid use\par Calcium citrate & Vit D per Endo\par Forteo since 5/10/19 \par Repeat BD of 8/2018 --showed worsening to Osteoporosis from 2016\par Rec Endo consult w Dr. Akers :Osteoporosis center at [facility]--pt never went originally \par 9/21/18: Phos=2.4, Ca=8.7, Alb=3.4, Vit D=27, ODX=365, \par 10/16/18: Phos=2.8, Ca=8.7, Alb=3.5,\par 1/14/19: Phos=2.6,  Ca=8.7, Alb=3.6\par 2/28/19: Phos=1.8, Ca=8.9, Alb=3.7, VitD=39, WYK=506\par 3/18/19: Ca=8.5, Alb=3.7, Vit D=44.6, PTH=93\par 7/11/19: Ca=9.1, Alb=3.7, Phos=3.9, Vit D=40/ 306, WFM=098\par 8/15/19: Ca=9, Alb=4.2, Phos=4.4, VitD=59, PRX=508\par 10/17/19: Ca=9.6, Alb=3.9, Phos=3.5\par 11/6/19: Ca=9.1, Alb=3.9. Phos=3.7, VitD=50, PTH=80\par 1/13/20: ca=9.1\par 1/30/20: Ca=9.2, Alb=3.9, Phos=2.7, Mag=1.5, Vit D=103/41, PTH=87\par 2/18/20:ca=8.5, Alb=3.6, \par 3/2/20: Ca=8.5, Alb=3.6\par 3/17/20: Ca=9.1, Alb=3.7, Phos=3.4, Mag=1.7\par Dr. Akers: Hyperparathyroidism-- probably secondary due to low Vit D/Ca & ? superimposed primary, Rx replete Vit D and current IV Calcium\par trend Vit D, Ca, Phos, PTH,  \par [facility] ENT Consult init felt  Parathyroid scan showing activity in mediastinum is ectopic parathyroid, feels sx not indicated at this time, elev PTH is secondary to low  Vit D/Ca--to be reconsulted\par BD--9/2019: incr in spine, no change in wrist/hip\par \par \par \par 4. Gastroesophageal reflux disease w esophagitis : well controlled, no ht burn, no dysphagia, LPR\par Dry cough, CXR:ana, saw ENT bergstein-->LPR\par ( +++)LPR, (_ ++)Barretts w (_ No)Dysplasia, (_ No, H/O )Esophagitis grade: (__ ), \par Anti-reflux diet and life-style changes emphasized. (_No ) Bedge. \par reg exercise emphasized. \par **(_ ++)PPI: ( Protonix 40 mg qd ), (++) H2B Qhs: ( Zantac 300mg--> Pepcid 40mg ), \par (_ ) Carafate 1gm:\par **(_ ++)F/U EGD: (_ 2mo. / (_2022 )yrs--Barretts screen due \par (_ No)pH Monitor, (_No )Manometry, (_No )esophagram \par (_f/u )ENT eval., (_ No)Surgical eval.    \par \par \par \par 5. Internal hemorrhoids  \par  well-controlled , no swelling, itching, bleeding\par Discussed the potential complications of thrombosis, pain, bleeding, swelling, itching, infection.\par Moderate -Fiber Diet was reviewed and emphasized.\par 6 - 8 cups of decaffeinated fluid daily\par (_++ ) Sitz Bathes BID, (_++++ ) Anusol HC Suppos/Cream PA QHS ,\par (_No ) Tucks BID, (_ No) Balneol Lotion,(_ No) Calmoseptine Oint, (_ ++) Prep H prn\par (_No ) Colorectal Surgical evaluation for possible ablation.    \par \par \par \par \par 6. Barretts esophagus without dysplasia  \par Notes: see GERD.    \par \par \par \par 7. Hepatomegaly-->not confirmed by recent abd sono\par CTE w relative enlargemt, ? lobulation & enlarged portal/mesenteric veins\par LFTs-wnl, no ascites or edema\par R/O CLD, Hepatic vein thrombosis\par Abd sono w doppler--> Liver and spleen normal size, no thrombosis, normal portal and hepatic vein flow\par \par \par \par \par .\par \par

## 2020-03-30 ENCOUNTER — RESULT REVIEW (OUTPATIENT)
Age: 55
End: 2020-03-30

## 2020-03-30 NOTE — ASSESSMENT
[FreeTextEntry1] : 1. Crohns disease of both small and large intestine\par    \par CROHNS DISEASE-s/p ileocolonic resection x 2--last 6/19/15 for recurrent sbos: appears to be stable , GI symtoms stable but labs were up CRP=18.6/ESR=19 & Masovelfivab=264 and Wt down ( inflam markers ? 2/2 to R ankle Fx/stress rx and tendinosis, also had tooth infection ) & MRE w ? ileal penetrating dis, wt =1_____\par s/p 4 SBOs w/i 2 yrs--2/2 distal sb disease adj to anastamosis\par when on Humira weekly w ADA =33 (3/20/14), -but had sbo 2/2014 at ADA=25 and another sbo 4/28/15\par 6/10/2015 Colonoscopy: Inflamm ST mass on ileal side w ulceration/scarred/narrow\par 6/11/2015 CT: dilated thickened sb loops distal jej & ileum\par Post-op **probably some malabsorption 2/2 to removal of R Colon and ileum: ?bile/fat/SIBO\par WT. Loss: r/o malabsorption, ?bile-induced--secretory vs decr micelles, active dis, PLE\par r/o SIBO--Elev FA, Alb=3.4-->3.1-->2.9--> (7/28/17)\par ?SIBO/Prevent post-op recurrence --Flagyl 250 mg qid~12/7/16-->d/c: 5/11/17\par Also discussed trial of Cholestyramine/Xifaxin -wanted to wait\par **ACTIVE Crohn's--11/10/16: 9.5lb wt loss, BMs: #5-6, 5x/D--Taking Magnesium \par 12/1/16 MRE: RUQ ileocolonic anastamosis--narrowed w upstream dilatation to 3.2 cm\par Labs: Stool Ucnszzysswxr=062, + Incr Fecal fat, Alb=3.4, FA =23, R08=241, Hgb=12.3,ESR=10,CRP <5\par 4/19/17 :Colonoscopy: Anastamosis: open w mild -mod active colitis, enteritis severe adj to anastomosis, mild more proximal, remainder of colon=wnl\par 5/11/17- Adm [facility] w stools brown, Hgb=7.9, BUN=17, Creat=1.4, Alb=2.9.\par S/P 2 Units w Hgb=10.6, BUN=15/1.1, Prednisone 40mg taper, entocort d/dee\par 6/8/17: Hgb=7.4, MCV=98, CRP<5--ASA stopped, Pred20--> 30mg, 6/13/17: s/p 2 Unit PRBCs\par 7/11/17 [facility] w unsteadiness. MRI Head: R Cortical Temporo-occipital encephalomalacia\par Hgb=9.9--> 8.4->9.7 after 1 Unit. Seen by Heme: received IV Iron \par CRP<5, H89=491, DEN=747, FA>23,Iron=22,Sat%=6, Ferritin=42, Alb=3.5. D/C on warfarin 2mg\par 7/28/17 Hgb=7.7; 7/31/17-s/p 1 Unit \par Heme Consultation: received  IV Iron and 5 IV Copper:___\par **Entyvio :time 0=12/22/16 ( Humira 40mg QW); 1/5/17--2wks, s/p 3rd infusion at wk 6, \par #6 :6/21/17--held 2/2 Shingles, Last Infusions=9/19/17 , 10/17/17, 11/28/17, 1/16/18 ,2/16/18, 3/19/18,4/16/18, 5/14/18, 6/25/18, 8/7/18, 9/17/18, 10/16/18, 11/13/18, 12/11/18, 1/14/19, 2/15/19, 3/15/19, 5/16/19, 6/18/19,7/24/19,\par 9/9/19, 10/2/19, 11/4/19, 12/12/19, 1/6/20, 2/4/20, 3/3/20, _______\par Entocort 9mg qd: 12/7/16--d/dee 5/11/17\par **Prednisone 40mg qd (5/11/17)--tapered 5mg/wk to 20mg qd, 6/8/17 30mg, \par 7/25/17 25mg, 3wks ago 20--> 15mg, 10/19/17 10mg alt w 7.5-->11/16/17 10mg alt w 5mg-->11/30/17: 5mg-->12/21/17: 5 alt w 2.5mg-->1/18/18: 2.5mg qd-->2.5mg qod--> d/c \par Probiotics 3 qd \par Lactose free, protein drinks tid\par MCT oil begun\par **trend --cbc, esr, crp, albumin\par **monitor wt= 120-->134-->134 --> 135--> 132 w clothes-->133--> 131 (Low carbs now)--> 129--> 127-->126-->124-->125-->124--> 126-->125-->125-->125-->126-->128-->127-->130-->131--> ____\par Wt Loss : sig change since May-June/2018\par 8/17/17: Hgb=9.9, ESR=20, Rk=108--Wrumhfcmqj s/p GI infection w N/V/D, no obstruction\par 10/17/17: Hgb=7.9,ESR=6,CRP<5, INR=1.6, Got IV Iron x 2, since 10/2/17 for Iron<10, Hgb=8.8\par 11/16/17: Hgb=11.2, ESR=14, CRP=12, 10/26/17 Stool fat-neg, calprotectin-30\par 11/13/17: MRE-Chr mild distension of distal & vladislav-ileum s/o mild stricturing at anast, mild mural thick & mucosal enhancemt ~ 20cm; little change from 2015 c/w mild acute on chr ileitis, 2.1 cm Liver hemangioma\par 10/9/18: Hgb=11.6, MCV=94, ESR=14, CRP=5.4, INR=2.2\par 10/10/18 MRE: stricturing of neoterminal ileum w worsened upstream dilatation, moderate length of upstream ileum w stricturing extending at least 20cm and more extensive then previous. suggestion of 2 adj extraluminal fluid collections which may be w/i the wall of strictured ileum near the ileocolonic anastomosis. surrounding spiculation and tethered appearance of bowel in this region.\par CTE: no discrete collection or intramural fistula, but mucosal enhancemt\par \par Colonoscopy: 05cm rectal polyp: HP, 2nd deg int hemorrhoids\par mild-mod activity: MARILY w cryptitis & mild archect distortion at ileocolonic anast: colon & ileal side, no stricture\par \par Today: 4/16/20  : feeling ok , Wt=13____off prednisone, BMs Brown: #4-5,1-2x/d___, Hgb=13\par CRP <5-->17-->0.36-->6.5-->5.5--> <5-->0.18--><5-->0.2 ->11->0.26-->6.4 : ? s/p recent ankle fx/stress rx/tendonitis and tooth infection \par prior stool studies w elev  calprotectin and No fat\par Wt loss-- 2/2 to low carbs-->fat burning and flare ; advised to liberalize and add protein, ensure\par Plan: Prednisone d/c 2/20/18\par Entyvio q 6 wks --> 4 weeks \par check inflammatory markers: 2/28/19 w ESR=12, CRP=6.5 & 2/21/19 stool calprotectin--> 232\par 8/15/19: ESR=10, CRP=5.2, Calprotectin=88\par 9/19/19: ESR=9, CRP=5.5\par 10/17/19: ESR=7, CRP< 5\par 11/6/19 : ESR=6, CRP=0.18\par 11/27/19: ESR=6, CRP <5\par 1/13/20: ESR=7, CRP=0.2\par 1/30/20: ESR=9, CRP=11\par 3/2/20:ESR=7, CRP=0.26\par 3/9/20: VDZ>40, Ab to VDZ <1.6\par 3/17/20: ESR=6, CRP=6.4\par pt declined to call numbers given for  IBD center at [facility] for new or investigational rx's--biologics.  \par feels he is stablizing w wt loss, and inflammatory markers\par           \par check cbc,esr,crp\par trend stool calprotectin \par                                                                                                                                                                                                                                                                                                                                                                                                                                                                                                                                                                                                                                                                                                                                                                                                                                                                                                                                          \par 2. Iron deficiency anemia due to chronic blood loss  \par  had initially dropped , after clinical flare and post procedure bleeding\par Probably multifactorial: ACD, Blood loss, malabsorption/nutrient deficiencies\par Eliquis-->warfarin after CVA, On Entyvio and prednsione for active dis\par Still requiring IV Iron--prn, \par s/p IV Cu & transfusions \par 7/11/17 Recent Adm for unsteady gait w MRI c/w CVA--warfarin begun: possible better control of AC\par Chromagen 2 qd--> IV Iron , s/p IV Cu x 5, FA 1mg qd , B12 SL & IM\par  1/14/19: Hgb=10.7, INR=1.6, 2/5/19: Hgb=11.2,INR=1.5; 2/28/19: Hgb=11.5, Vaypmkrz=225; 3/11/19: INR=2.6\par 4/11/19: hgb=9.7, INR=1.6, 7/2/19: Hgb=11.7, Ferritin=41,INR=2.2, 8/15/19: Hgb=12.2,Ferritin=81,INR=1.6, \par 9/19/19: Hgb=12.8, 10/17/19: Hgb=14, 10/26/19: Hgb=14, 1/13/20: Hgb=13.5, 1/30/20: Hgb=13.9, Ezpcetro=227, Iron=38,  3/17/20: Hgb=13.1, Mfmrupzy=715, INR=1.7\par 7/13/17: F21=114, SXV=166,FA>23; 1/3/18 T86=467, Af=191, Zinc=55; 6/6/18 Z41=703, 9/5/18: Pj=554, Zinc=67\par 11/28/18 Y87=988, 7/2/19: At=863, Zinc=61,B6=2.8, 8/15/19: By=711,Zinc=54,O57=369, 1/13/20: S64=802\par B12 1cc IM ____R. delt--  not Given today, \par most recent IV Iron 2/12/20 for 1/30/20 Ferritin =131, Iron=38\par Hem consult IV Iron /Cu given malabsorption, ? BM bx --r/o occult etiology--on hold\par trend cbc\par Adj INR--closer to low 2's.    \par Agree w Heme--Prevnar-13\par \par \par \par 3. Other osteoporosis without current pathological fracture  \par : Progression on BD from 5/5/16\par Crohns, h/o steroid use\par Calcium citrate & Vit D per Endo\par Forteo since 5/10/19 \par Repeat BD of 8/2018 --showed worsening to Osteoporosis from 2016\par Rec Endo consult w Dr. Akers :Osteoporosis center at [facility]--pt never went originally \par 9/21/18: Phos=2.4, Ca=8.7, Alb=3.4, Vit D=27, ZRD=574, \par 10/16/18: Phos=2.8, Ca=8.7, Alb=3.5,\par 1/14/19: Phos=2.6,  Ca=8.7, Alb=3.6\par 2/28/19: Phos=1.8, Ca=8.9, Alb=3.7, VitD=39, GIQ=903\par 3/18/19: Ca=8.5, Alb=3.7, Vit D=44.6, PTH=93\par 7/11/19: Ca=9.1, Alb=3.7, Phos=3.9, Vit D=40/ 306, YPQ=836\par 8/15/19: Ca=9, Alb=4.2, Phos=4.4, VitD=59, FKI=761\par 10/17/19: Ca=9.6, Alb=3.9, Phos=3.5\par 11/6/19: Ca=9.1, Alb=3.9. Phos=3.7, VitD=50, PTH=80\par 1/13/20: ca=9.1\par 1/30/20: Ca=9.2, Alb=3.9, Phos=2.7, Mag=1.5, Vit D=103/41, PTH=87\par 2/18/20:ca=8.5, Alb=3.6, \par 3/2/20: Ca=8.5, Alb=3.6\par 3/17/20: Ca=9.1, Alb=3.7, Phos=3.4, Mag=1.7\par Dr. Akers: Hyperparathyroidism-- probably secondary due to low Vit D/Ca & ? superimposed primary, Rx replete Vit D and current IV Calcium\par trend Vit D, Ca, Phos, PTH,  \par [facility] ENT Consult init felt  Parathyroid scan showing activity in mediastinum is ectopic parathyroid, feels sx not indicated at this time, elev PTH is secondary to low  Vit D/Ca--to be reconsulted\par BD--9/2019: incr in spine, no change in wrist/hip\par \par \par \par 4. Gastroesophageal reflux disease w esophagitis : well controlled, no ht burn, no dysphagia, LPR\par Dry cough, CXR:ana, saw ENT bergstein-->LPR\par * ++ LPR,  ++  Page’s w No Dysplasia,  No , H/O  Esophagitis grade: A\par * Anti-reflux diet & life-style changes emphasized.  ++ Bedge\par * Weight reduction & regular exercise emphasized\par *  ++   PPI: Protonix 40 mg qd ,  ++ H2B q HS:Zantac 300mg--> Pepcid 40mg  ,  No Carafate  1 gram:\par * F/U  EGD:  [   2   ] mo.  /  [   2022    ] yr\par * No  pH Monitor,  No  Manometry,  No  Esophagram\par * ++   ENT  eval/F/U,  No  Surgical  eval\par \par \par \par \par \par 5. Internal hemorrhoids :  well – controlled.  No pain,  swelling,  itch,  bleeding\par * Discussed the potential complications of thrombosis,  pain,  infection,  swelling, itching,  bleeding \par * High – Fiber Diet was reviewed and emphasized\par * 6  --  8 cups of decaffeinated fluid daily\par * Sitz Bathes BID,  ++  :  Anusol HC  Suppos / Cream  CT qhs \par * No:  Tucks BID,   No:  Balneol Lotion,   No:  Calmoseptine Oint,   ++ Prep H prn\par * No:  Colorectal surgical evaluation for possible ablation \par \par \par \par \par \par 6. Barretts esophagus without dysplasia  \par Notes: see GERD.    \par \par \par \par 7. Hepatomegaly-->not confirmed by recent abd sono\par CTE w relative enlargemt, ? lobulation & enlarged portal/mesenteric veins\par LFTs-wnl, no ascites or edema\par R/O CLD, Hepatic vein thrombosis\par Abd sono w doppler--> Liver and spleen normal size, no thrombosis, normal portal and hepatic vein flow\par \par \par \par \par .\par \par

## 2020-03-30 NOTE — PHYSICAL EXAM
[General Appearance - Alert] : alert [General Appearance - In No Acute Distress] : in no acute distress [Sclera] : the sclera and conjunctiva were normal [PERRL With Normal Accommodation] : pupils were equal in size, round, and reactive to light [Extraocular Movements] : extraocular movements were intact [Auscultation Breath Sounds / Voice Sounds] : lungs were clear to auscultation bilaterally [Heart Rate And Rhythm] : heart rate was normal and rhythm regular [Heart Sounds] : normal S1 and S2 [Heart Sounds Gallop] : no gallops [Murmurs] : no murmurs [Heart Sounds Pericardial Friction Rub] : no pericardial rub [Edema] : there was no peripheral edema [Nail Clubbing] : no clubbing  or cyanosis of the fingernails [Skin Color & Pigmentation] : normal skin color and pigmentation [Skin Turgor] : normal skin turgor [] : no rash [Oriented To Time, Place, And Person] : oriented to person, place, and time [Impaired Insight] : insight and judgment were intact [Affect] : the affect was normal [Flat] : flat [Normal] : normal [Soft, Nontender] : the abdomen was soft and nontender [No Mass] : no masses were palpated [No HSM] : no hepatosplenomegaly noted

## 2020-03-30 NOTE — HISTORY OF PRESENT ILLNESS
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-->Eliquis' warfarin \par        GERD w Page's, Hemorrhoids, BPH, \par        S/P Ileocolonic resection 1998\par        Since then multiple SBOs\par \par \par        Got IV Iron x 2, since 10/2/17\par        10/19/17: feeling better, Dg=801 on prednisone 15mg x 3weeks, BMs Brown: #5-6, 1-2/D, occas 3-4/d\par        10/25/17: Hgb=10, ESR=5, CRP=5, Alb=3.6, Phos=2, Mfipqmug=022, Y25=311\par        11/16/17: Hgb=11.2, ESR=14, CRP=12, \par        11/13/17: MRE-Chr mild distension of distal & vladislav-ileum s/o mild stricturing at anast, mild mural thick & mucosal enhancemt ~ 20cm; little change from 2015 c/w mild acute on chr ileitis, 2.1 cm Liver hemangioma\par        11/28/17: Entyvio\par        11/29/17: Hgb=9.6, ESR=10, CRP=5.8, Alb=3.8,Ferritin-19, Iron=14,Sat=4%, Phos=2.5, Nn=080, BMs:# 5, 1-2x/D, brown,\par        12/19;17: Hgb=10.1, ESR=12, CRP=6.5; 12/21/17: BMs:#3-5, 1-3x/D , brown, no blood, no pain, ri=266\par        1/3/18:Hgb=11.7, ESR=19, CRP=6.5, Alb=4.1, Ciswcdbk=828, Iron=31, Sat%=9,Zinc=55\par        1/16/18: Entyvio, Hgb=11.4, ESR=10, CRP=6.9\par        1/18/18: Today: cellulitis R foot, saw dr KEITH--rx augmentum x 10D, Entyvio 1/9 to 1/16, nt=813 w clothes,BMs: # 4-5, 1-2x/D \par        2/14/18: Hgb=11.1, ESR=16, CRP=11.6, Alb=3.6, Iron=28, Ferritin=23, INR=1.5 \par        2/16/18: s/p Entyvio; Iron infusion, again on 2/27\par        2/20/18: Hgb=10.6, ESR=20, CRP=12, INR=1.7\par        2/27/18: s/p iron infusion\par        3/9/18: Dr. Burden for tenosynovitis, rx w Zorvolex: Diclofenac x 5 days--? response\par        3/14/18: Tu=025; BMs: # 5, 1-2x/D; Hgb=11, ESR=18, CRP=11,Irom=45,Zvraflat=191,Alb=3.6, INR=1.5\par        3/19/18: s/p Entyvio\par        3/22/18: wu=882, BMs: # 5, 3-4 x/d\par        4/16/18: s/p Entyvio,Hgb=11.9, INR=1.3, ESR=18, CRP=8.4 \par        4/18/18 EGD: gastritis, no HP, no IM, 2+ Mucous, 0.5cm gastric polyp: fundic, 4cm HH, + Barretts:3cm, no dysplasia\par        4/25/18: Hgb=11.5, INR=1.6, Iron=40, Sat=13%, Ferritin=57, Alb=3.2, Phos=2.2, Mag=1.7\par        4/30/18: s/p Iron Infusion\par        5/14/18: s/p Entyvio \par        5/23/18: Hgb=11.5, ESR=16, CRP< 5\par        5/29/18: s/p Iron Infusion\par        6/6/18: Hgb=10.6, INR=1.7, Fiocppor=993, Alb=3.5, Phos=2.1, R18=139, wp=554; BMs: # 5, 2-3x/D \par        6/19/18: Hgb=10.6, INR=1, CRP=15, ESR=23\par        6/21/18: R ankle is acting up, swollen, pain, having MRI done, took a couple of advil, on low carbs ,BMs: # 5, 1-2x/D, tt=300\par        6/26/18: MRI: fx/stress rxn-talar body/navicula; stress related changes--cuneform, cuboid,distal tibia; mod tibiotalar effusion, mild-mod peroneal tendinosis\par        7/19/18: entyvio 6/26/18, eliminated all carbs--anti inflammatory diet, aw=870, BMs: # 4-5, 1-3x/D \par        7/25/18: Hgb=10, INR=1.3, Alb=3.3, Phos=2.4, Bntqxbqg=954, sat%=12, Iron=35\par        8/2/18: BD--osteoporosis of hip/spine: sig decrease BD--17.6% of hip, 17% of spine, osteopenia of wrist: 6.4%\par        8/7/18: Entyvio\par        8/21/18: Hgb=11.1, INR=1.5, ESR=19, CRP=18.6\par        8/23/18: recently w tooth infection, old implant--loose and removed; rx w amox, and advil\par        eating almost no carbs, fa=487, # 5-6, 2-3x/d \par        8/24/18: Stool fat--neg, Rawlmzpdkmds=023\par        9/5/18: Hgb=11.4, INR=1.3, ESR=9, CRP=11.3, Iron=41, Eatxvtcv=470, Alb=3.8,\par        9/17/18: entyvio, Hgb=10.7, INR=2.2, ESR=17, CRP=9.1, \par        9/20/18: jh=324, BMs: # 5-6, 1-2x/D \par        9/21/18: Phos=2.4, Ca=8.7, Alb=3.4, Vit D=27, TSM=661, \par        Dr. Akers: Hyperparathyroidism: probably secondary due to low Vit D/Ca & ? superimposed primary, rx replete Vit D and Calcium\par        10/9/18: Hgb=11.6, MCV=94, ESR=14, CRP=5.4, INR=2.2\par        10/10/18 MRE: stricturing of neoterminal ileum w worsened upstream dilatation, moderate length of upstream ileum w stricturing extending at least 20cm and more extensive then previous. suggestion of 2 adj extraluminal fluid collections which may be w/i the wall of strictured ileum near the ileocolonic anastomosis. surrounding spiculation and tethered appearance of bowel in this region.\par 10/16/18: entyvio, Hgb=11.7, MCV=94, ESR=14, CRP <5, Alb=3.5\par 10/18/18: Gd=894,   BMs: # 5-6, 3x/D , \par 11/13/18: Entyvio\par 11/21/18 CTE w C: limited 2/2 bowel underdistention, mucosal hyperenhancemt & extensive SM edema of distal ileum including vladislav-ileum, difficult to exclude a sml fluid collection, Liver w relative enlargement w suggestion of lobulation, enlargemt of PV,SMV,IMV,Spl V, rectal V. No ascites\par 11/28/18: Hgb=10, ESR=19, CRP=17,Alb=3.5,Iron=35, Sat%=11, Mitxqgmz=189, INR=1.3\par 11/29/18: pg=313, BMs: # 5-6, 3-4x/D\par 12/3/18: Abd sono--Liver 14.8cm Incr heterogenicity, spleen--wnl\par 12/11/18 & 1/14/19: Entyvio\par 1/14/19:Entyvio,  Hgb=10.7, ESR=15, Alb=3.6, Iron=36, Ferritin=67, Sat%=11, INR=1.6\par 1/24/19:  gg=466, stools are # 4-6, 3-4x/d , no pain\par 2/5/19: Hgb=11.2, ESR=14, CRP=0.36\par 2/28/19: Hgb=11.5, ESR=12, CRP=6.5, Alb=3.7, Iron=69, Iqbxxzpd=078, Ca=8.9\par                Bms: # 4-5, 2-3x/D , du=155,  Entyvio : last 2/1519,\par                had parathyroid scan pre-op, still w elev PTH, + activity in mediastinum,? ectopic parathyroid tissuevs neoplasm.  To see ENT and have Imaging at [facility]\par 3/28/19: Bms: # 4-5 , 3x/d ;oy=997\par 4/11/19: Hgb-9.7, Iron=45, ESR=18, CRP=9.4, Alb=3.5, \par Saw Endo SX at [facility], felt hyperparathyroid is secondary to Low Ca/Vit D, no sx at this time\par 4/16/19: BMs: # 5-6, 3-4x/D, yc=277,  Entyvio : last 3/1519,  got IV iron 4/12/19 for Ferritin=53\par 4/27/19: s/p R Hip Nailing--missed 4/2019 2/2 fresh wound\par 5/16/19 & 6/18/19 Entyvio\par 7/2/19: Hgb=11.7, Ferritin=41,ESR=12, CRP=5.5, B6=2.8,Mag=1.8,Phos=3.9,Va=199,Zinc=61\par 7/11/19: s/p IV iron\par 7/11/19: Alb=3.7, VWL=913, Ca=9.1, Vit D=40/306, \par 7/24/19: Entyvio, Alb=3.8, EVO=793, Ca=8.9, Vit D=128 \par 8/1/19: Bms: # 5-6, occas #4, 2-3x/D , mq=161\par 8/15/19: Hgb=12, ESR=10, CRP=5.2, Ferritin=68, Alb=3.4, Phos=4.4,Mg=1.7, Ca=8.5,Calprotectin=88,\par 8/20/19: IIE=239, Ca=9, Alb=4.2\par 9/19/19: Hgb=12.8, ESR=9, CRP=5.5, Alb=3.7, Pqtzpwor=963, Mag=1.8, Ca=8.9, Phos=4.2\par 9/26/19: me=297, BMs: #5-6, 2-3x/D, no pain\par 10/17/19: hi=123, BMs: #4-6, 1-2x/D, no Pain; Hgb=14, ESR=7, CRP<5, Alb=3.9, Gfbbhwef=352, Mag=1.7, Ca=9.6\par 10/24/19: mp=538, Bms: # 4-6 , no pain,\par 11/6/19: ESR=6, CRP=6, PTH=80,Ca=9.1, VitD=50\par 11/14/19: sm=181, BMs: # : 4, 1-2, 0ccas #5\par  11/17/19: ESR=6, CRP<5, Wxrivajy=550, \par  12/19/19: ni=527, BMs: #5-6, 2-3x/D\par 1/6/20: entyvio\par 1/13/20: ESR=7, CRP=0.2, Hgb=13.5, Mquxxmej=898, S73=361, FA=10, Alb=4.1, Ca=9.1\par 1/16/20: wt = 127, BMs: # 5, 1-3x/d\par 1/30/20: ESR=9, CRP=11, Hgb=13.9, Baqfjlqa=848,Iron=38, Alb=3.9,Ca=9.2, PTH=87,\par 2/3/20 EGD: gastritis, +MACKENZIE, No HP, +erosion w ooze -clipped, 0.5cm polyp-neg; 4cm HH, Esophagitis A, + Barretts, VC: 1+\par Colonoscopy: 05cm rectal polyp: HP, 2nd deg int hemorrhoids\par mild-mod activity: MARILY w cryptitis & mild archect distortion at ileocolonic anast: colon & ileal side, no stricture\par 2/4/20: Entyvio; 2/12/20: IV Iron, 3/3/20: Entyvio\par 2/25/20: mq=244,  BMs: # 4-5, 1-2x/ d\par 3/19/20: pd=515, BMs: #4-5, 1-2x/D\par \par  Today:  no pain, \par         \par        wt=\par        Abd pain--no \par        Nausea--no \par        Vomit--no \par        Early satiety-no \par        Belching-no \par        Regurgitation--no \par        Ht burn--no \par        Throat Clearing-no\par        Globus-no\par        Hoarseness--no \par        Dysphagia--no \par        BMs:  #\par        Constipation--no \par        Diarrhea- intermittent \par        Bloating--no \par        Flatulence-no\par        Gurgling--no \par        Melena--no \par        BRBPR--no \par        Anorexia-no \par        Wt Loss--stable\par \par \par \par        PRIOR HISTORY--2017\par \par        1/17/17: Hgb=9.2, ESR=6, CRP=5; 1/19/17: BMs: #4, 5-6, 2-3x/D, Ys=468, Started Creon 1 tid cc\par        3rd Entyvio begining Feb\par        2/14/17: Labs; Hgb=9.6, MCV=97, ESR=5, CRP< 5, T42=854, FA>23, Iron=59, Retic =3.2,\par        2/17/17 Fecal Calprotectin=16, Fats: Increased neutral & Split\par        3/13/17: Labs Hgb=9.5, MCV=95, ESR=7, CRP <5, ALB=3.1\par        3/16/17: lot of stress, to move -[place], had a job-fell thru, BMs: # 5, 2-4 x/D, wt= 131w clothes\par        4/19/17 EGD: gastritis, MACKENZIE, No HP, 2cm HH, +Barretts, No Dysplasia\par        Colonoscopy: Anastamosis: open w mild -mod active colitis, enteritis severe adj to anastomosis, mild more proximal, remainder of colon-wnl, 2-3 deg int hemorrhoids\par        4/26/17 : Hgb=7.3, MCV=95, ESR=13, CRP<5;Immediately post procedure stools very dark on eliquis/iron.\par        Admitted to PM: Hgb=8.3-->8.9 after 2 U, Alb 3.3, BUN=17, creat=1.3, Malina/Qhvfi=044/460\par        4/27/17: Alb=2.3, BUN=12, creat=1.2, Malina/Lipase=209/863-No abd pain, N/V; 5/5/17: Hgb=10.7.\par        5/8/17 Entyvio iv. 5/8-5/9 w dark red diarrhea; 5/10/17 Hgb=7.8.\par        5/11/17 Adm [facility] w stools brown, Hgb=7.9, BUN=17, Creat=1.4, Alb=2.9; S/P 2 Units- Hgb=10.6, BUN=15/1.1.\par        Prednisone 40mg qd, entocort d/dee.; 5/18/17: Hgb-10.4, zh=974, BMs: #5, 1-2x/D, no pain\par        6/8/17: Hgb=7.4,MCV=98, CRP<5-ASA stopped, Pred20--> 30\par        6/10/17: Hgb=8.2, Alb=2.9, BUN=20/1.2 ; 6/12/17: Hgb=8.3, Retic=0.19, Iron=10, Sat%=3, Ferritin=57, FA=23,L92=034, 6/13/17: s/p 2 Unit PRBCs\par        6/15/17: started MCT oil, Ex=818 , BMs: # 5, 1-2x/D, stools are brownish/green \par        7/11/17: [facility] w unsteadiness. MRI Head: R Cortical Temporo-occipital encephalomalacia, MRA H&N-no sign lesions, PER-wnl.Hgb=9.9--> 8.4->9.7 after 1 Unit. Seen by Heme: received IV Iron \par        CRP<5, Q45=615, RUR=083, FA>23,Iron=22,Sat%=6, Ferritin=42, Alb=3.5. D/C on warfarin 2mg\par        7/20 Hgb=8.5, 7/28 Hgb=7.7, 7/31-s/p 1 Unit \par        As outpt seeing Heme, to get IV Iron and 5 IV Copper\par        Had 'FLU' Fever=101, chills, bloat, N/V/D, Bilious. no pain, melena,brbpr\par        Given anti-emetic by dr Butler,then able to keep things down\par        On prednisone 25, warfarin w INR bet 1.6-2\par        8/17/17: Hgb=9.9, ESR=20, CRP-P,Xd=476, BMs: # 5, 1-2x/D, stools are brownish/green\par        10/2/17: Hgb=8.8, MCV=89,Phos=1.7, K=3.9, Mag=1.9, Alb=3.6,Iron<10,Ferritin=21, INR=2\par        10/17/17: Hgb=7.9,ESR=6,CRP<5, INR=1.6\par        10/25/17: Hgb=10, ESR=5, CRP=5, Alb=3.6, Phos=2, Thhboehs=228, F63=037\par        11/16/17: Hgb=11.2, ESR=14, CRP=12, \par        11/13/17: MRE-Chr mild distension of distal & vladislav-ileum s/o mild stricturing at anast, mild mural thick & mucosal enhancemt ~ 20cm; little change from 2015 c/w mild acute on chr ileitis, 2.1 cm Liver hemangioma\par        11/28/17: Entyvio\par        11/29/17: Hgb=9.6, ESR=10, CRP=5.8, Alb=3.8,Ferritin-P, Iron=14,Sat=4%, Phos=2.5\par        12/19;17: Hgb=10.1, ESR=12, CRP=6.5; 12/21/17: BMs:#3-5, 1-3x/D , brown, no blood, no pain, sy=717\par        1/3/18:Hgb=11.7, ESR=19, CRP=6.5, Alb=4.1, Umumkvmi=505, Iron=31, Sat%=9,Zinc=55\par \par \par        PRIOR HISTORY---2013:\par \par        2/20/13 CT markedly dilated sb loops ext to anast, colonoscopy w open anast ,mild-mod remy-anastamotic disease. quick response to entocort/humira--probably adhesive dis. \par        8/10/13 w GNR bacteremia, ADA 1.7 /KAYLAH 3.4, Humira 8/7, 8/13,8/18,9/15\par        CT- mucosal thickening and spiculation of the distal sb extending to the anastamosis, thickening and stranding of adj mesenteric fat.\par        Humira increased to 40mg weekly, entocort 4\par        9/2013 MRE--dilated loops in mid and distal ileum, markedly thickened and narrowed TI w decreased peristalsis of TI\par        11/15/13 ADA-24.9, KAYLAH-0\par \par \par        PRIOR HISTORY---2014:\par \par        2/15/14--CT dilated loops SB, loop of sb in mid abd 4.3cm w infiltrative changes in the mesentery, bowel tapers in the RLQ to the anastamosis w/o transition\par        WBC=15K, Rx w IV steroids and Abx \par        3/7/14 SBFT: last 10cm sb prox to anast mild distension and sl irregularity. In the mid portion of this loop there is a mild narrowing which appears to reopen but is some what narrowed.\par        3/20/14 ADA=33.1, KAYLAH=0, Lialda switch to Apriso 4 (2/2014)\par \par \par        PRIOR HISTORY--2015\par \par        1/11/15 Adm [facility] w 1 day N,Recurrent V, Abd pain/distension. CT-multiple distended & fluid-filled sb loops, mild wall thickening of ileum w No inflamm changes.\par        WBC=14.6, Hgb=17, RX w NGT suction, Levaquin iv, Solumedrol 40mg q 12( 1/12-->1/16) to prednisone 30mg BID w 5mg taper/wk\par        3/18/15 --Dr. Butler w edema /High BP, prednisone was tapered slowly to 2.5mg \par        switched to entocort 9mg qd, edema and bp improved w lasix 20mg \par        BMs:#4-5, 3x/D, Hgb=11, ESR=4, CRP<5\par        4/28/15 - 5/1/15: Adm [facility] w 1 day Abd pain, distension,N/V. 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 side of anast, opening appeared ulcerated,scarred & severely narrowed\par        6/11/15: [facility] w N/V x1, decr appetite, CT ABD: no obstruction, dilated thickened sb loops of distal jej and ileum\par        6/19/15 [facility]: s/p Lap w extensive lysis of adhesions, hepatic flex, sigmoid and sb anastamosis, s/p partial r colectomy and sb resection--side to side elisabeth: 8-10 inch from prior anast to TV colon.\par        7/17/15: BMs:#4-6, 4-5x/D, wt 131(from 126)\par        8/20/15: BMs: #4, 1-2x/D or #5, 2-3x/D, jk=817, dry cough,Hgb=10, WBC=3, NQU=488, CRP=56, ESR=21\par        8/25/15 CT Abd/Pelv: Svl RLQ sb loops w wall thickening, mild nodularity & inflamm stranding in mesentery\par        Advised-restart Entocort 9mg qd, Apriso 4 qd, Maintain Humira but given RX to check drug/Ab levels\par        9/15/15: did nt restart meds,Hgb=9.9, WBC=4, ESR=19, CRP=5.8, qv=663; [facility] : ADA=8.5, Antibodies< 1.7\par        10/15/15: No pain, BMs:#4, 1-2x/D, #5-6, 3-4x/D, throat clearing/cough-better, pc=891\par        11/30/15: No pain, BMs:# 4, 5-6 intermittently, No throat clear, kz=570\par \par \par        PRIOR HISTORY-2016\par \par        1/22/16; 4/8/16; 6/6/16 : No pain, BMs:# 4-5 1-2x/D, also #5-6 3x/D for 2-3D/wk, fy=478\par        7/14/16: xi=720, 9/22/16: yi=977.5\par        11/10/16: 9.5lb wt loss, states BMs: 5-6, 5x/D--Taking Magnesium, No pain/anorexia\par        Labs: Stool Bnyzqhcpritw=748, + Incr Fecal fat, Alb=3.4, FA =23, S05=973, Hgb=12, ESR=10, CRP <5\par        Magnesium-d/c, Obtain MRE asap--Start steroids and possibly switch to Entyvio\par        DDx discussed: active crohns--loss response to Humira, Malabsorption--loss Bile acids, Bile-induced diarrhea, SIBO\par        Pt wanted to wait for imaging-did not start rx\par        12/1//16 MRE: RUQ ileocolonic anastamosis--narrowed w upstream dilatation to 3.2 cm\par        Pt refused pred, Started Entocort 9mg qd and Flagyl 250mg qid about 7-10days ago \par        awaiting Entyvio load to start 12/22, then 1/5; had Cut back on iron bid\par        12/15/16: BMs:# 5, 2-3x/D, occas #6, No pain, Less bloat/flatulence, Qk=718.

## 2020-04-16 ENCOUNTER — APPOINTMENT (OUTPATIENT)
Dept: GASTROENTEROLOGY | Facility: CLINIC | Age: 55
End: 2020-04-16

## 2020-04-26 ENCOUNTER — MESSAGE (OUTPATIENT)
Age: 55
End: 2020-04-26

## 2020-04-28 DIAGNOSIS — Z00.00 ENCOUNTER FOR GENERAL ADULT MEDICAL EXAMINATION W/OUT ABNORMAL FINDINGS: ICD-10-CM

## 2020-05-05 ENCOUNTER — RESULT REVIEW (OUTPATIENT)
Age: 55
End: 2020-05-05

## 2020-05-09 ENCOUNTER — APPOINTMENT (OUTPATIENT)
Age: 55
End: 2020-05-09

## 2020-05-15 NOTE — CONSULT LETTER
[Dear  ___] : Dear  [unfilled], [Please see my note below.] : Please see my note below. [Consult Letter:] : I had the pleasure of evaluating your patient, [unfilled]. [Consult Closing:] : Thank you very much for allowing me to participate in the care of this patient.  If you have any questions, please do not hesitate to contact me. [DrMeron  ___] : Dr. REARDON [FreeTextEntry3] : Angie Biswas MD\par [facility] [place] [facility]\par  [Sincerely,] : Sincerely,

## 2020-05-15 NOTE — REVIEW OF SYSTEMS
[Fatigue] : fatigue [Eye Pain] : no eye pain [Dysphagia] : no dysphagia [Vision Problems] : no vision problems [Hoarseness] : no hoarseness [Chest Pain] : no chest pain [Lower Ext Edema] : no lower extremity edema [Shortness Of Breath] : no shortness of breath [Cough] : no cough [Vomiting] : no vomiting [Constipation] : no constipation [Diarrhea] : no diarrhea [Dysuria] : no dysuria [Joint Pain] : joint pain [Skin Rash] : no skin rash [Dizziness] : no dizziness [Insomnia] : no insomnia [Anxiety] : no anxiety [Depression] : no depression [Easy Bleeding] : no tendency for easy bleeding [Muscle Weakness] : no muscle weakness [Easy Bruising] : no tendency for easy bruising [Negative] : Allergic/Immunologic

## 2020-05-15 NOTE — HISTORY OF PRESENT ILLNESS
[de-identified] : Patient is a 52 year old who is referred for initial consultation for anemia secondary to Crohns/GI bleed vs Iron Deficiency/ Vit b12 def. 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 Patient is status post ileo-colonic resections for SBO  in 2016. In 2016 patient had complaints of 10 - 15 lb weight loss. Labs at that time showed Hgb of 12, steatorrhea and stool calprotectin = 236. In 2016 MRI/MRE with narrowing at ileocolonic anastomosis with upstream dilation. Pt refused bridge with  prednisone and Entocort / Flagyl. In 2017 patient Hgb dropped to 9.5. On 2017 patient underwent an EGD and Colonoscopy. Findings consistent with Page's and no dysplasia or AVMs. Colonoscopy showed an open anastomosis but active disease, moderate on the colonic side and limited to the anastomosis and moderate to severe enteritis, just proximal to the anastomosis. Pat had routine labs on 05/10/2017 Hgb: 8.3.  [FreeTextEntry1] : s/p injectafer, copper\par warfarin [de-identified] : Patient presents for follow up of  anemia, DVT, MTHFR gene mutation follow up, currently on Coumadin 3mg allternating with 1.5 as needed with INRI level that takes with home machines. Pt is overall, doing well with no fever, sob/duran, n/v, headaches, dizziness, bruises, bleeding, fluid retention.    [0 - No Distress] : Distress Level: 0

## 2020-05-19 ENCOUNTER — RESULT REVIEW (OUTPATIENT)
Age: 55
End: 2020-05-19

## 2020-05-19 ENCOUNTER — APPOINTMENT (OUTPATIENT)
Dept: HEMATOLOGY ONCOLOGY | Facility: CLINIC | Age: 55
End: 2020-05-19
Payer: COMMERCIAL

## 2020-05-19 VITALS
WEIGHT: 132.98 LBS | OXYGEN SATURATION: 96 % | DIASTOLIC BLOOD PRESSURE: 81 MMHG | HEART RATE: 94 BPM | TEMPERATURE: 97.6 F | BODY MASS INDEX: 20.15 KG/M2 | SYSTOLIC BLOOD PRESSURE: 117 MMHG | HEIGHT: 67.99 IN | RESPIRATION RATE: 18 BRPM

## 2020-05-19 PROCEDURE — 99214 OFFICE O/P EST MOD 30 MIN: CPT

## 2020-05-19 NOTE — ASSESSMENT
[FreeTextEntry1] : 1.  Anemia- Hgb 13.1\par  -blood loss, anemia of chronic disease, \par - copper deficiency - replaced, last level August 2019- will level ordered - result pending \par - Continue B12 injection, level still low - q 2 weeks\par - 3/17/20 ferritin 272  - last infusion November 2019 - no infusion needed.\par \par 2. Osteoporosis \par - left ankle- stress fx- advanced  osteoporosis, patient with h/o steroids use\par - started Forteo with Dr. Akers\par - calcium level stable, stopped IV\par - PT for osteoporosis\par \par  3.TIA with MTHFR and h/o DVT -  \par not a candidate for DOAC b/o small bowel resection\par - INR home monitoring of warfarin takes 3 mg daily\par - INR 1.7 - was on 1.5mg yesterday - to resume 3mg.  \par \par 4. Hypogammaglobulinemia\par - s/p  Prevnar 13 12/2018 \par - poor response,  Pneumococcal vaccine 23 boost \par - needs Shingrex\par \par \par 5. Zinc deficiency\par - start oral Zinc\par \par case and management discussed with Dr. Biswas.\par

## 2020-05-19 NOTE — REVIEW OF SYSTEMS
[Fatigue] : fatigue [Joint Pain] : joint pain [Negative] : Allergic/Immunologic [Eye Pain] : no eye pain [Vision Problems] : no vision problems [Dysphagia] : no dysphagia [Chest Pain] : no chest pain [Hoarseness] : no hoarseness [Shortness Of Breath] : no shortness of breath [Lower Ext Edema] : no lower extremity edema [Cough] : no cough [Constipation] : no constipation [Vomiting] : no vomiting [Diarrhea] : no diarrhea [Dysuria] : no dysuria [Skin Rash] : no skin rash [Insomnia] : no insomnia [Dizziness] : no dizziness [Anxiety] : no anxiety [Depression] : no depression [Easy Bleeding] : no tendency for easy bleeding [Muscle Weakness] : no muscle weakness [Easy Bruising] : no tendency for easy bruising

## 2020-05-19 NOTE — HISTORY OF PRESENT ILLNESS
[0 - No Distress] : Distress Level: 0 [de-identified] : Patient is a 52 year old who is referred for initial consultation for anemia secondary to Crohns/GI bleed vs Iron Deficiency/ Vit b12 def. 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 Patient is status post ileo-colonic resections for SBO  in 2016. In 2016 patient had complaints of 10 - 15 lb weight loss. Labs at that time showed Hgb of 12, steatorrhea and stool calprotectin = 236. In 2016 MRI/MRE with narrowing at ileocolonic anastomosis with upstream dilation. Pt refused bridge with  prednisone and Entocort / Flagyl. In 2017 patient Hgb dropped to 9.5. On 2017 patient underwent an EGD and Colonoscopy. Findings consistent with Page's and no dysplasia or AVMs. Colonoscopy showed an open anastomosis but active disease, moderate on the colonic side and limited to the anastomosis and moderate to severe enteritis, just proximal to the anastomosis. Pat had routine labs on 05/10/2017 Hgb: 8.3.  [FreeTextEntry1] : s/p injectafer, copper\par warfarin [de-identified] : Patient presents for follow up of  anemia, DVT, MTHFR gene mutation follow up, currently on Coumadin 3mg allternating with 1.5 as needed with INRI level that takes with home machines. Pt is overall, doing well with no fever, sob/duran, n/v, headaches, dizziness, bruises, bleeding, fluid retention.

## 2020-05-19 NOTE — CONSULT LETTER
[Consult Letter:] : I had the pleasure of evaluating your patient, [unfilled]. [Dear  ___] : Dear  [unfilled], [Consult Closing:] : Thank you very much for allowing me to participate in the care of this patient.  If you have any questions, please do not hesitate to contact me. [Please see my note below.] : Please see my note below. [Sincerely,] : Sincerely, [DrMeron  ___] : Dr. REARDON [FreeTextEntry3] : Angie Biswas MD\par [facility]\par

## 2020-05-21 ENCOUNTER — RX RENEWAL (OUTPATIENT)
Age: 55
End: 2020-05-21

## 2020-06-29 ENCOUNTER — RESULT REVIEW (OUTPATIENT)
Age: 55
End: 2020-06-29

## 2020-07-01 ENCOUNTER — APPOINTMENT (OUTPATIENT)
Dept: ENDOCRINOLOGY | Facility: CLINIC | Age: 55
End: 2020-07-01
Payer: COMMERCIAL

## 2020-07-01 PROCEDURE — 99214 OFFICE O/P EST MOD 30 MIN: CPT | Mod: 95

## 2020-07-12 NOTE — REVIEW OF SYSTEMS
[Recent Weight Gain (___ Lbs)] : recent weight gain: [unfilled] lbs [Back Pain] : back pain [Joint Pain] : joint pain [Negative] : Psychiatric [FreeTextEntry9] : better

## 2020-07-12 NOTE — HISTORY OF PRESENT ILLNESS
[Other Location: e.g. Home (Enter Location, City,State)___] : at [unfilled] [Home] : at home, [unfilled] , at the time of the visit. [Spouse] : spouse [Verbal consent obtained from patient] : the patient, [unfilled] [FreeTextEntry1] : Mr. GUDELIA DUNN is a 54 year old male\par  coming for a f/u , for severe osteoporosis with bilateral hip fractures, hyperparathyroidism, low D and hypocalcemia  secondary  to Chron's disease\par on Vit D 50,000 IU 4 times a week\par \par started Forteo May 10 2019\par last DEXA 9/2019 stable \par \par on  IV calcium each  Friday at the infusion center, last one a month ago \par labs done much improved \par \par Ca citrate 950mg 3 tab bid \par reports compliance \par \par 24hr urine low calcium while low D \par parathyroid scan reviewed parathyroid adenoma versus neoplasm mediastinum\par US thyroid reviewed FNA was advised\par \par        MRI pelvis done 10/3/18 reviewed :\par        There are insufficiency fractures of the bilateral femoral heads without significant subchondral collapse at this time. There are associated moderate to large joint effusions.\par        There are additional microtrabecular/insufficiency fractures involving the sacrum as well as a few foci involving the iliac margins of the sacroiliac joints. No displaced or cortical fracture.\par        Mild degenerative arthrosis of the right hip.\par        sees GEN Chen will see him today again, bone stimulator is considered\par        dental issues : had an implant failure 2 months ago, bone graft 2 months ago, \par        Osteoporosis \par        has Chron disease sees Dr Lugo , never gained weight back after colon resection\par        received iron infussion and copper infusions\par   \par          DEXA \par        LS T scroe -3.8\par        stress fractures \par        9/5/18 right foot Fx stress Fx reviewed\par        Impression:\par        Trabecular fractures involving the cuboid, the partially visualized anterior process of the calcaneus, the lateral cuneiform as well as the third metatarsal base. No evidence for associated cortical break. There is improvement in/essentially complete resolution of the previously described microtrabecular fractures within the partially visualized talus, the navicular as well as the middle and medial cuneiforms.\par        Subacute to chronic mild subchondral fracture of the second metatarsal head.\par        Moderate degenerative changes of the hallux sesamoid complex\par        left ankle pain 6/18 MRI:\par        Impression:\par        Microtrabecular/fracture/stress reaction involving the talar body and navicula without evidence for cortical break. There are additional stress-related changes involving the cuneiforms, the cuboid and distal tibia, as above. Associated moderate tibiotalar joint effusion.\par        Mild to moderate peroneal tendinosis with superimposed segmental longitudinal splitting, as above.\par        has pain hip right will have MRI with [place]\par        has h/o kidney stones 6 yrs ago went to [facility] Dr Morris did surgery.\par seen Dr mercado, per pt he was advised against surgery, no notes available will request for review

## 2020-07-13 ENCOUNTER — RESULT REVIEW (OUTPATIENT)
Age: 55
End: 2020-07-13

## 2020-07-21 ENCOUNTER — RESULT REVIEW (OUTPATIENT)
Age: 55
End: 2020-07-21

## 2020-07-21 ENCOUNTER — APPOINTMENT (OUTPATIENT)
Dept: HEMATOLOGY ONCOLOGY | Facility: CLINIC | Age: 55
End: 2020-07-21
Payer: COMMERCIAL

## 2020-07-21 VITALS
TEMPERATURE: 99.2 F | DIASTOLIC BLOOD PRESSURE: 79 MMHG | OXYGEN SATURATION: 97 % | HEIGHT: 67.99 IN | RESPIRATION RATE: 20 BRPM | BODY MASS INDEX: 20 KG/M2 | SYSTOLIC BLOOD PRESSURE: 110 MMHG | HEART RATE: 83 BPM | WEIGHT: 131.99 LBS

## 2020-07-21 PROCEDURE — 99214 OFFICE O/P EST MOD 30 MIN: CPT

## 2020-07-21 NOTE — HISTORY OF PRESENT ILLNESS
[0 - No Distress] : Distress Level: 0 [FreeTextEntry1] : s/p injectafer, copper\par warfarin [de-identified] : Patient is a 52 year old who is referred for initial consultation for anemia secondary to Crohns/GI bleed vs Iron Deficiency/ Vit b12 def. 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 Patient is status post ileo-colonic resections for SBO  in 2016. In 2016 patient had complaints of 10 - 15 lb weight loss. Labs at that time showed Hgb of 12, steatorrhea and stool calprotectin = 236. In 2016 MRI/MRE with narrowing at ileocolonic anastomosis with upstream dilation. Pt refused bridge with  prednisone and Entocort / Flagyl. In 2017 patient Hgb dropped to 9.5. On 2017 patient underwent an EGD and Colonoscopy. Findings consistent with Page's and no dysplasia or AVMs. Colonoscopy showed an open anastomosis but active disease, moderate on the colonic side and limited to the anastomosis and moderate to severe enteritis, just proximal to the anastomosis. Pat had routine labs on 05/10/2017 Hgb: 8.3.  [de-identified] : Patient presents for follow up of  anemia, DVT, MTHFR gene mutation follow up, currently on Coumadin 3mg alternating with (1.5 as needed) with INR level that takes with home machines. Pt is overall, doing well with no fever, sob/duran, n/v, headaches, dizziness, bruises, bleeding, fluid retention.   \par \par Patient has been off Coumadin since Sunday for FNA of thyroid nodule tomorrow.

## 2020-07-21 NOTE — REVIEW OF SYSTEMS
[Fatigue] : fatigue [Joint Pain] : joint pain [Negative] : Allergic/Immunologic [Eye Pain] : no eye pain [Dysphagia] : no dysphagia [Hoarseness] : no hoarseness [Vision Problems] : no vision problems [Shortness Of Breath] : no shortness of breath [Chest Pain] : no chest pain [Lower Ext Edema] : no lower extremity edema [Vomiting] : no vomiting [Cough] : no cough [Constipation] : no constipation [Diarrhea] : no diarrhea [Dysuria] : no dysuria [Dizziness] : no dizziness [Skin Rash] : no skin rash [Depression] : no depression [Insomnia] : no insomnia [Anxiety] : no anxiety [Easy Bleeding] : no tendency for easy bleeding [Muscle Weakness] : no muscle weakness [Easy Bruising] : no tendency for easy bruising

## 2020-07-21 NOTE — ASSESSMENT
Nursing Note by Altagracia Rios MA at 08/24/18 08:48 AM     Author:  Altagracia Rios MA Service:  (none) Author Type:  Medical Assistant     Filed:  08/24/18 08:50 AM Encounter Date:  8/24/2018 Status:  Signed     :  Altagracia Rios MA (Medical Assistant)            Medicare Health Risk Assessment    Demographics:  Name: Yolanda Guillen  Age: 68 year old  Gender: female   Race:[AG1.1T] [AG1.1M]  Ethnicity:[AG1.1T] [AG1.1M]    Self Assessment:  How do you consider your health status?[AG1.1T] Good[AG1.1M]  Do you feel weak?[AG1.1T] No[AG1.1M]  Are you able to carry out your daily routines?[AG1.1T] Yes[AG1.1M]    Psychosocial Risks:  Depression Screening:  Over the past 2 weeks, has patient felt down, depressed or hopeless?[AG1.1T] No[AG1.1M]  Over the past 2 weeks, has patient felt little interest or pleasure in doing things?[AG1.1T] No[AG1.1M]  On the basis of the above screen, the following is initiated:[AG1.1T]  Normal screen, continue to monitor for symptoms over time[AG1.1M]    Do you feel stressed?[AG1.1T] No[AG1.1M]  Do you feel angry?[AG1.1T] No[AG1.1M]  Do you feel lonely or isolated?[AG1.1T] No[AG1.1M]  Do you experience pain or fatigue?[AG1.1T] No[AG1.1M]    Behavioral Risks:   Do you smoke?[AG1.1T]  No[AG1.1M]  Are you worried about your alcohol consumption?[AG1.1T] No[AG1.1M]  Do you exercise?[AG1.1T] Yes[AG1.1M]  Have you seen your dentist within the last year?[AG1.1T] Yes[AG1.1M]  What meals do you eat?[AG1.1T] Breakfast, Lunch and Dinner[AG1.1M]   Are you sexually active?[AG1.1T] No[AG1.1M]  Do you wear your seatbelt?[AG1.1T] Yes[AG1.1M]  Do you feel safe in your home?[AG1.1T] Yes[AG1.1M]     Activities of Daily Living:  Can you dress yourself without assistance?[AG1.1T] Yes[AG1.1M]  Can you feed yourself without assistance?[AG1.1T] Yes[AG1.1M]  Can you use the toilet without assistance?[AG1.1T] Yes[AG1.1M]  Can you groom yourself without assistance?[AG1.1T] Yes[AG1.1M]  How many times  [FreeTextEntry1] : 1.  Anemia- Hgb 12.5\par  -blood loss, anemia of chronic disease, \par - copper deficiency - replaced, level WNL 5/2020\par -5/20 ferritin dropped to 52- IV iron\par \par 2. Osteoporosis \par - left ankle- stress fx- advanced  osteoporosis, patient with h/o steroids use\par - started Forteo with Dr. Akers\par - calcium level stable, stopped IV\par - PT for osteoporosis\par \par  3.TIA with MTHFR and h/o DVT -  \par not a candidate for DOAC b/o small bowel resection\par - INR home monitoring of warfarin takes 3 mg daily\par - INR 1.7 - was on 1.5mg yesterday - to resume 3mg.  \par \par 4. Hypogammaglobulinemia\par - s/p  Prevnar 13 12/2018 \par -  Pneumococcal vaccine 23 boost \par - needs Shingrex\par \par 5. Zinc deficiency\par - oral Zinc, level better - 72\par \par \par  have you fallen in the last 12 months?[AG1.1T] Zero - Patient is not considered to be at increased risk for falls.[AG1.1M]  Can you bathe yourself without assistance?[AG1.1T] Yes[AG1.1M]    Instrumental Activities of Daily Living:  Do you need assistance with shopping?[AG1.1T] No[AG1.1M]  Do you need assistance preparing your food?[AG1.1T] No[AG1.1M]  Do you need assistance using the telephone?[AG1.1T] No[AG1.1M]  Do you need assistance cleaning your home?[AG1.1T] No[AG1.1M]  Do you need assistance doing your laundry?[AG1.1T] No[AG1.1M]  Do you drive?[AG1.1T] Yes[AG1.1M]  Do you manage your own medications?[AG1.1T] Yes[AG1.1M]  Do you manage your own finances?[AG1.1T] Yes[AG1.1M]          Revision History        User Key Date/Time User Provider Type Action    > AG1.1 08/24/18 08:50 AM Altagracia Rios MA Medical Assistant Sign    M - Manual, T - Template

## 2020-07-21 NOTE — CONSULT LETTER
[Dear  ___] : Dear  [unfilled], [Consult Letter:] : I had the pleasure of evaluating your patient, [unfilled]. [Please see my note below.] : Please see my note below. [Consult Closing:] : Thank you very much for allowing me to participate in the care of this patient.  If you have any questions, please do not hesitate to contact me. [Sincerely,] : Sincerely, [DrMeron  ___] : Dr. REARDON [FreeTextEntry3] : Angie Biswas MD\par [facility] [place] [facility]\par

## 2020-07-26 ENCOUNTER — RESULT REVIEW (OUTPATIENT)
Age: 55
End: 2020-07-26

## 2020-07-27 ENCOUNTER — APPOINTMENT (OUTPATIENT)
Dept: ENDOCRINOLOGY | Facility: CLINIC | Age: 55
End: 2020-07-27
Payer: COMMERCIAL

## 2020-07-27 PROCEDURE — 99443: CPT

## 2020-07-27 NOTE — DISCUSSION/SUMMARY
[FreeTextEntry1] : I spoke with Mr boyd on Friday evening, FNA thyroid suspicious for papillary thyroid cancer from Dr King result, I advised Dr Luis Felipe Monsalve at [facility] \par pt reluctant to go to the [facility]\par then consider Dr Bolanos Ezekiel [facility] versus [facility]\par he wrote all the names and info, will let me know what he decides

## 2020-07-27 NOTE — HISTORY OF PRESENT ILLNESS
[Home] : at home, [unfilled] , at the time of the visit. [Medical Office: ([facility])___] : at the medical office located in  [Spouse] : spouse [Verbal consent obtained from patient] : the patient, [unfilled] [FreeTextEntry1] : abnormal FNA suspicious for papillary thyroid cancer

## 2020-07-27 NOTE — DISCUSSION/SUMMARY
[FreeTextEntry1] : I spoke with Mr boyd on Friday evening, FNA thyroid suspicious for papillary thyroid cancer from Dr King result, I advised Dr Luis Felipe Monsalve at [facility] \par pt reluctant to go to the [facility]\par then consider Dr Bolanos Ezekiel [facility] versus [facility]\par he wrote all the names and info, will let me know what he decides

## 2020-07-27 NOTE — HISTORY OF PRESENT ILLNESS
[Medical Office: ([facility])___] : at the medical office located in  [Home] : at home, [unfilled] , at the time of the visit. [Verbal consent obtained from patient] : the patient, [unfilled] [Spouse] : spouse [FreeTextEntry1] : abnormal FNA suspicious for papillary thyroid cancer

## 2020-08-06 ENCOUNTER — APPOINTMENT (OUTPATIENT)
Dept: OTOLARYNGOLOGY | Facility: CLINIC | Age: 55
End: 2020-08-06
Payer: COMMERCIAL

## 2020-08-06 VITALS
RESPIRATION RATE: 14 BRPM | SYSTOLIC BLOOD PRESSURE: 124 MMHG | TEMPERATURE: 98.6 F | BODY MASS INDEX: 19.33 KG/M2 | HEART RATE: 80 BPM | WEIGHT: 135 LBS | DIASTOLIC BLOOD PRESSURE: 83 MMHG | OXYGEN SATURATION: 97 % | HEIGHT: 70 IN

## 2020-08-06 DIAGNOSIS — Z78.9 OTHER SPECIFIED HEALTH STATUS: ICD-10-CM

## 2020-08-06 PROCEDURE — 31575 DIAGNOSTIC LARYNGOSCOPY: CPT

## 2020-08-06 PROCEDURE — 76536 US EXAM OF HEAD AND NECK: CPT

## 2020-08-06 PROCEDURE — 99205 OFFICE O/P NEW HI 60 MIN: CPT | Mod: 25

## 2020-08-06 NOTE — REASON FOR VISIT
[FreeTextEntry2] : a surgical consultation for a left mid lobe thyroid nodule suspicious for PTC [FreeTextEntry1] : Referred by Montserrat Akers MD Endocrinologist, PCP Manasa Garcia MD, Hematologist is Angie Biswas MD, Nolan Lugo MD GI

## 2020-08-06 NOTE — CONSULT LETTER
[Dear  ___] : Dear  [unfilled], [Consult Letter:] : I had the pleasure of evaluating your patient, [unfilled]. [Please see my note below.] : Please see my note below. [Consult Closing:] : Thank you very much for allowing me to participate in the care of this patient.  If you have any questions, please do not hesitate to contact me. [Sincerely,] : Sincerely, [DrMeron ___] : Dr. REARDON [DrMeron  ___] : Dr. REARDON

## 2020-08-06 NOTE — HISTORY OF PRESENT ILLNESS
[de-identified] : Miles is a 55-year-old  with Crohns disease, chronic anemia, osteoporosis, Barretts esophagus, past Hx of DVT (MTHR mutation) who was noted to have a left thyroid lobe solid nodule that was monitored but grew over the past 2 years.  This was initially detected after imaging for w/u of possible primary hyperparathyroidism.  A 4D CT scan of the neck in December 2019 revealed a left thyroid lobe nodule and a left inferior ovoid structure thought to be a possible atypical parathyroid adenoma and an FNA biopsy in July was suspicious for papillary thyroid carcinoma. Normocalcemic hyperparathyroidism is now thought to be secondary to malabsorption (2 SM resections). His osteoporosis was in part due to steroid Rx for the Crohns and secondary hyperparathyroidism. Miles denies recent shortness of breath, voice changes, dysphagia, anterior neck pain, neck pressure or mass. His sister had a total thyroidectomy for thyroid cancer. He denies any known radiation exposures in his youth. He denies fever, body aches, cough, cyanosis, chest burning, anosmia or recent known COVID exposures.  All family members at home are well.

## 2020-08-06 NOTE — DATA REVIEWED
[de-identified] : see HPI [de-identified] : see HPI [de-identified] : see HPI [de-identified] : cytology reviewed

## 2020-08-06 NOTE — PROCEDURE
[Image(s) Captured] : image(s) captured and filed [Unable to Cooperate with Mirror] : patient unable to cooperate with mirror [Gag Reflex] : gag reflex preventing mirror examination [Topical Lidocaine] : topical lidocaine [Serial Number: ___] : Serial Number: [unfilled] [Flexible Endoscope] : examined with the flexible endoscope [Oxymetazoline HCl] : oxymetazoline HCl [FreeTextEntry1] : Physician performed high-resolution ultrasound gray scale imaging and color Doppler supplementation of the thyroid gland and neck was obtained in the longitudinal and transverse planes using a 13 MHz linear transducer with image capture.  All measurements are in centimeters (longitudinal x AP x transverse. [FreeTextEntry2] : Preoperative assessment and lymph node mapping for papillary thyroid carcinoma. [FreeTextEntry3] : See full dictated note:\par \par Preliminary findings: The thyroid gland is heterogeneous in echotexture normal vascularity and is not significantly enlarged. Posterior to the left mid lobe there is a hypoechoic structure with numerous punctate echogenic foci and infiltrative borders measuring 1.3 cm, consistent with this patient's biopsy-proven suspicious for papillary thyroid carcinoma. Extrathyroidal extension is of concern.  There are no other significant thyroid nodules. Inferior to the left thyroid lobe, level VI there is another hypoechoic structure with ill-defined margins and punctate echogenic foci measuring ~ 7 mm also consistent with a metastatic central compartment lymph node.  In the left neck, level IV, 2 rounded  lymph nodes that lack central echogenic hilar lines are also suspicious for possible metastatic papillary thyroid carcinoma. [de-identified] : The nasal septum is minimally deviated to the right.  There are no masses or polyps and the nasal mucosa and secretions are normal. The choanae and posterior nasopharynx are normal without masses or drainage. The Eustachian tube orifices appear patent. The pharynx, including the posterior and lateral pharyngeal walls, the vallecula and base of tongue are normal without ulcerations, lesions or masses. The hypopharynx including the pyriform sinuses open well without pooling of secretions, mucosal lesions or masses. The supraglottic larynx including the epiglottis, petiole, arytenoids, glossoepiglottic, aryepiglottic and pharyngoepiglottic folds are normal without mucosal lesions, ulcerations or masses. The glottis reveals normal false vocal folds. The true vocal folds are glistening white, tense and of equal length, without paralysis, having symmetric mobility on adduction and abduction. There are no mucosal lesions, nodules, cysts, erythroplasia or leukoplakia. The posterior cricoid area has healthy pink mucosa in the interarytenoid area and esophageal inlet. There is no thickening/edema of the interarytenoid mucosa suggestive of posterior laryngitis from laryngopharyngeal acid reflux disease. The trachea is clear without narrowing in the immediate subglottic region, without deviation or lesions. \par  [de-identified] : preoperative assessment for thyroid cancer

## 2020-08-09 ENCOUNTER — RESULT REVIEW (OUTPATIENT)
Age: 55
End: 2020-08-09

## 2020-08-19 ENCOUNTER — APPOINTMENT (OUTPATIENT)
Dept: INTERNAL MEDICINE | Facility: CLINIC | Age: 55
End: 2020-08-19
Payer: COMMERCIAL

## 2020-08-19 ENCOUNTER — NON-APPOINTMENT (OUTPATIENT)
Age: 55
End: 2020-08-19

## 2020-08-19 ENCOUNTER — RESULT REVIEW (OUTPATIENT)
Age: 55
End: 2020-08-19

## 2020-08-19 VITALS
DIASTOLIC BLOOD PRESSURE: 70 MMHG | HEIGHT: 70 IN | TEMPERATURE: 98.4 F | WEIGHT: 132 LBS | SYSTOLIC BLOOD PRESSURE: 106 MMHG | BODY MASS INDEX: 18.9 KG/M2 | HEART RATE: 100 BPM | OXYGEN SATURATION: 96 %

## 2020-08-19 DIAGNOSIS — E72.12 METHYLENETETRAHYDROFOLATE REDUCTASE DEFICIENCY: ICD-10-CM

## 2020-08-19 DIAGNOSIS — Z87.81 PERSONAL HISTORY OF (HEALED) TRAUMATIC FRACTURE: ICD-10-CM

## 2020-08-19 DIAGNOSIS — I82.409 ACUTE EMBOLISM AND THROMBOSIS OF UNSPECIFIED DEEP VEINS OF UNSPECIFIED LOWER EXTREMITY: ICD-10-CM

## 2020-08-19 PROCEDURE — 99214 OFFICE O/P EST MOD 30 MIN: CPT | Mod: 25

## 2020-08-19 PROCEDURE — 36415 COLL VENOUS BLD VENIPUNCTURE: CPT

## 2020-08-19 PROCEDURE — 93000 ELECTROCARDIOGRAM COMPLETE: CPT

## 2020-08-19 RX ORDER — OMEPRAZOLE 40 MG/1
40 CAPSULE, DELAYED RELEASE ORAL
Qty: 90 | Refills: 3 | Status: DISCONTINUED | COMMUNITY
Start: 2020-02-12 | End: 2020-08-19

## 2020-08-19 RX ORDER — OMEPRAZOLE MAGNESIUM 10 MG/1
GRANULE, DELAYED RELEASE ORAL
Refills: 0 | Status: DISCONTINUED | COMMUNITY
End: 2020-08-19

## 2020-08-20 ENCOUNTER — APPOINTMENT (OUTPATIENT)
Dept: ENDOCRINOLOGY | Facility: CLINIC | Age: 55
End: 2020-08-20

## 2020-08-20 LAB
24R-OH-CALCIDIOL SERPL-MCNC: 97 PG/ML
25(OH)D3 SERPL-MCNC: 67.3 NG/ML
ALBUMIN SERPL ELPH-MCNC: 4.2 G/DL
ALP BLD-CCNC: 87 U/L
ALT SERPL-CCNC: 27 U/L
ANION GAP SERPL CALC-SCNC: 12 MMOL/L
APTT BLD: 40.4 SEC
AST SERPL-CCNC: 26 U/L
BASOPHILS # BLD AUTO: 0.03 K/UL
BASOPHILS NFR BLD AUTO: 0.5 %
BILIRUB SERPL-MCNC: 0.3 MG/DL
BUN SERPL-MCNC: 17 MG/DL
CALCIUM SERPL-MCNC: 9.4 MG/DL
CALCIUM SERPL-MCNC: 9.4 MG/DL
CHLORIDE SERPL-SCNC: 108 MMOL/L
CO2 SERPL-SCNC: 25 MMOL/L
CREAT SERPL-MCNC: 1.14 MG/DL
EOSINOPHIL # BLD AUTO: 0.06 K/UL
EOSINOPHIL NFR BLD AUTO: 1.1 %
GLUCOSE SERPL-MCNC: 92 MG/DL
HCT VFR BLD CALC: 43.1 %
HGB BLD-MCNC: 13.6 G/DL
IMM GRANULOCYTES NFR BLD AUTO: 0.2 %
INR PPP: 1.52 RATIO
LYMPHOCYTES # BLD AUTO: 0.71 K/UL
LYMPHOCYTES NFR BLD AUTO: 12.9 %
MAN DIFF?: NORMAL
MCHC RBC-ENTMCNC: 30.2 PG
MCHC RBC-ENTMCNC: 31.6 GM/DL
MCV RBC AUTO: 95.6 FL
MONOCYTES # BLD AUTO: 0.43 K/UL
MONOCYTES NFR BLD AUTO: 7.8 %
NEUTROPHILS # BLD AUTO: 4.26 K/UL
NEUTROPHILS NFR BLD AUTO: 77.5 %
PARATHYROID HORMONE INTACT: 60 PG/ML
PLATELET # BLD AUTO: 290 K/UL
POTASSIUM SERPL-SCNC: 4.9 MMOL/L
PROT SERPL-MCNC: 6.2 G/DL
PT BLD: 17.7 SEC
RBC # BLD: 4.51 M/UL
RBC # FLD: 14.9 %
SODIUM SERPL-SCNC: 144 MMOL/L
T3FREE SERPL-MCNC: 2.88 PG/ML
T4 FREE SERPL-MCNC: 1.5 NG/DL
THYROGLOB AB SERPL-ACNC: <20 IU/ML
THYROPEROXIDASE AB SERPL IA-ACNC: 24.7 IU/ML
TSH SERPL-ACNC: 0.83 UIU/ML
WBC # FLD AUTO: 5.5 K/UL

## 2020-08-23 ENCOUNTER — RESULT REVIEW (OUTPATIENT)
Age: 55
End: 2020-08-23

## 2020-08-25 ENCOUNTER — RESULT REVIEW (OUTPATIENT)
Age: 55
End: 2020-08-25

## 2020-08-25 PROCEDURE — 88321 CONSLTJ&REPRT SLD PREP ELSWR: CPT

## 2020-08-27 ENCOUNTER — OUTPATIENT (OUTPATIENT)
Dept: OUTPATIENT SERVICES | Facility: HOSPITAL | Age: 55
LOS: 1 days | End: 2020-08-27
Payer: COMMERCIAL

## 2020-08-27 DIAGNOSIS — C73 MALIGNANT NEOPLASM OF THYROID GLAND: ICD-10-CM

## 2020-08-28 ENCOUNTER — TRANSCRIPTION ENCOUNTER (OUTPATIENT)
Age: 55
End: 2020-08-28

## 2020-09-01 ENCOUNTER — RESULT REVIEW (OUTPATIENT)
Age: 55
End: 2020-09-01

## 2020-09-01 ENCOUNTER — APPOINTMENT (OUTPATIENT)
Dept: HEMATOLOGY ONCOLOGY | Facility: CLINIC | Age: 55
End: 2020-09-01
Payer: COMMERCIAL

## 2020-09-01 VITALS
DIASTOLIC BLOOD PRESSURE: 86 MMHG | OXYGEN SATURATION: 99 % | HEART RATE: 71 BPM | BODY MASS INDEX: 19.04 KG/M2 | TEMPERATURE: 98.6 F | RESPIRATION RATE: 18 BRPM | HEIGHT: 70 IN | SYSTOLIC BLOOD PRESSURE: 135 MMHG | WEIGHT: 132.98 LBS

## 2020-09-01 PROCEDURE — 99214 OFFICE O/P EST MOD 30 MIN: CPT

## 2020-09-01 NOTE — HISTORY OF PRESENT ILLNESS
[No Pertinent Cardiac History] : no history of aortic stenosis, atrial fibrillation, coronary artery disease, recent myocardial infarction, or implantable device/pacemaker [No Pertinent Pulmonary History] : no history of asthma, COPD, sleep apnea, or smoking [No Adverse Anesthesia Reaction] : no adverse anesthesia reaction in self or family member [Chronic Anticoagulation] : chronic anticoagulation [(Patient denies any chest pain, claudication, dyspnea on exertion, orthopnea, palpitations or syncope)] : Patient denies any chest pain, claudication, dyspnea on exertion, orthopnea, palpitations or syncope [Good (7-10 METs)] : Good (7-10 METs) [Chronic Kidney Disease] : no chronic kidney disease [Diabetes] : no diabetes [FreeTextEntry1] : thyroidectomy [FreeTextEntry2] : 9/11 [FreeTextEntry3] : Dr Hernandez [FreeTextEntry4] : Mr. Azul comes  in today for preoperative evaluation prior to total thyroidectomy with dr Monsalve  on September 11.\par He has been followed by endocrinology and hematology. He had prior biopsy of a thyroid nodule which showed papillary carcinoma. He  is on chronic anticoagulation and stopped Coumadin prior other previous surgeries without bridging and no adverse outcome.

## 2020-09-01 NOTE — PHYSICAL EXAM
[Thin] : thin [Normal] : normal gait, coordination grossly intact, no focal deficits [de-identified] : no thyroid nodule or large LN [de-identified] : no rashes

## 2020-09-01 NOTE — ASSESSMENT
[Patient Optimized for Surgery] : Patient optimized for surgery [No Further Testing Recommended] : no further testing recommended [As per surgery] : as per surgery [FreeTextEntry4] : Mr. DUNN  is a 55  year-old male  with no significant history of coronary artery disease.  He  denies chest pain, palpitations or shortness of breath and  His EKG today is normal.  He  does take blood thinners and will continue his  medications perioperatively as instructed. He will stop Coumadin 4 days prior to procedure. \par \par Mr. DUNN  has a low risk for perioperative cardiovascular complications and will undergo the procedure as planned provided today's blood work is favorable.\par

## 2020-09-01 NOTE — REVIEW OF SYSTEMS
[Easy Bruising] : easy bruising [Negative] : Neurological [Abdominal Pain] : no abdominal pain [Diarrhea] : no diarrhea [Vomiting] : no vomiting [Easy Bleeding] : no easy bleeding

## 2020-09-08 ENCOUNTER — RESULT REVIEW (OUTPATIENT)
Age: 55
End: 2020-09-08

## 2020-09-10 ENCOUNTER — TRANSCRIPTION ENCOUNTER (OUTPATIENT)
Age: 55
End: 2020-09-10

## 2020-09-10 VITALS
SYSTOLIC BLOOD PRESSURE: 135 MMHG | HEART RATE: 79 BPM | OXYGEN SATURATION: 98 % | WEIGHT: 130.95 LBS | DIASTOLIC BLOOD PRESSURE: 90 MMHG | RESPIRATION RATE: 16 BRPM | HEIGHT: 69 IN | TEMPERATURE: 98 F

## 2020-09-10 NOTE — ASU PATIENT PROFILE, ADULT - PMH
Anemia    Page's esophagus    BPH (benign prostatic hyperplasia)    Crohn's disease    DVT (deep venous thrombosis)    GERD (gastroesophageal reflux disease)    Gout    Hemorrhoids    MTHFR gene mutation  homozygous, curently on blood thinner  Osteoporosis    Thyroid cancer    TIA (transient ischemic attack) Anemia    Page's esophagus    BPH (benign prostatic hyperplasia)    Crohn's disease    DVT (deep venous thrombosis)  left leg 2002  GERD (gastroesophageal reflux disease)    Gout    Hemorrhoids    MTHFR gene mutation  homozygous, curently on blood thinner  Osteoporosis    Thyroid cancer    TIA (transient ischemic attack)  2017

## 2020-09-10 NOTE — ASU PATIENT PROFILE, ADULT - PSH
Surgery, elective  Hypospadias repair  Surgery, elective  Small bowel resection  Surgery, elective  Ileocecal Resection  Surgery, elective  right hip, s/p fx

## 2020-09-10 NOTE — ASU PREOP CHECKLIST - HEIGHT IN INCHES
Physical Therapy Evaluation     Patient's Name: Kareem Painting    Admitting Diagnosis  Sore throat [J02 9]  Tylenol overdose [T39 1X1A]    Problem List  Patient Active Problem List   Diagnosis    Macrocytic anemia    Hypertensive heart disease without heart failure    Mixed hyperlipidemia    Chronic obstructive pulmonary disease with acute exacerbation (HCC)    Thyroid nodule    Palpitations    Severe episode of recurrent major depressive disorder, without psychotic features ([facility] )    Type 2 diabetes mellitus with hyperglycemia, without long-term current use of insulin (HCC)    Chronic pain    MDS (myelodysplastic syndrome) (HCC)    GERD (gastroesophageal reflux disease)    Acute colitis    Dysplastic colon polyp    Tobacco abuse    Generalized anxiety disorder    Myelodysplastic syndrome, unspecified ([facility] )    Hypokalemia    [facility]a ([facility] )    Septic shock ([place] [address] )    Sepsis due to methicillin resistant Staphylococcus aureus (MRSA) ([place] [address] )    Port or reservoir infection    Chronic kidney disease    Depression    Iron deficiency anemia    Osteoporosis    Vitamin D deficiency    Impacted cerumen of right ear    URI (upper respiratory infection)    Abnormal EKG    Essential hypertension    First degree AV block    Right bundle branch block    Accidental acetaminophen overdose    Acetaminophen overdose of undetermined intent    Elevated AST (SGOT)    Tenderness of chest wall    Epigastric pain    Aortic mural thrombus (HCC)    Epigastric abdominal tenderness with rebound tenderness    Acute respiratory failure with hypoxia (HCC)    Polyp of ascending colon       Past Medical History  Past Medical History:   Diagnosis Date    Acute colitis     Anemia     Anxiety     Arthritis     Asthma     Cataracts, bilateral     Chronic kidney disease 11/9/2019    COPD (chronic obstructive pulmonary disease) (HCC)     Diabetes mellitus ([facility] )     niddm - type 2    GERD (gastroesophageal reflux disease)     History of GI bleed     History of transfusion     Hyperlipidemia     Hypertension     Hyperthyroidism     MDS [facility]elodysplastic syndrome) [facility] ) 10/12/2018    Migraines     Osteoporosis 3/28/2017    Pancreatitis     Panic attack     Paroxysmal A-fib ([facility] ) 2017    Pneumonia of both upper lobes ([facility] ) 10/18/2018    Psychiatric disorder     Severe episode of recurrent major depressive disorder, without psychotic features ([place] [address] ) 7/24/2018    Sleep difficulties     Suicide attempt [facility])        Past Surgical History  Past Surgical History:   Procedure Laterality Date    ABDOMINAL SURGERY Right     right upper quadrant - pt does not know specifics    CATARACT EXTRACTION      and lens implantation    CHOLECYSTECTOMY      EGD AND COLONOSCOPY N/A 11/15/2018    Procedure: EGD with biopsy  AND COLONOSCOPY with biopsy;  Surgeon: Jermain Sherman MD;  Location: [facility]; Service: Gastroenterology    ESOPHAGOGASTRODUODENOSCOPY N/A 2/10/2017    Procedure: ESOPHAGOGASTRODUODENOSCOPY (EGD); Surgeon: Nidia Davis MD;  Location: [facility]; Service:     FRACTURE SURGERY      HEMORRHOID SURGERY      HEMORROIDECTOMY      IR PORT PLACEMENT  10/25/2019    KIDNEY STONE SURGERY      KNEE SURGERY      KNEE SURGERY      LEG SURGERY      REMOVAL VENOUS PORT (PORT-A-CATH) Right 11/7/2019    Procedure: REMOVAL VENOUS PORT (PORT-A-CATH);   Surgeon: Max Kemp MD;  Location[facility] MAIN OR;  Service: General       Vitals:    02/10/20 1401 02/10/20 1423 02/10/20 1432 02/10/20 1516   BP: 105/53 109/54 115/76 122/63   BP Location: Right arm Right arm Right arm Right arm   Pulse: 90 90 90 98   Resp: 18 18 18 18   Temp: 98 1 °F (36 7 °C) 98 1 °F (36 7 °C) 98 1 °F (36 7 °C) 98 7 °F (37 1 °C)   TempSrc: Temporal Temporal Temporal Temporal   SpO2: 90% 90% 97% 96%   Weight:       Height:            02/10/20 1520   Note Type   Note type Eval only   Pain Assessment   Pain Assessment 0-10   Pain Score 6   Pain Location Head   Home Living   Type of [address] Two level;Performs ADLs on one level; Able to live on main level with bedroom/bathroom;Stairs to enter with rails  (4 ROLLY)   Bathroom Shower/Tub Tub/shower unit   Bathroom Toilet Standard   [facility]  (for community ambulation)   Prior Function   Lives With [employer] Help From Family   ADL Assistance Independent   IADLs Independent   Comments pt lives with dtr and son in law who are home most of day and are available to provide assist as needed   Restrictions/Precautions   Weight Bearing Precautions Per Order No   Other Precautions Contact/isolation; Fall Risk;Pain   General   Family/Caregiver Present No   Cognition   Overall Cognitive Status WFL   Arousal/Participation Alert   Orientation Level Oriented X4   Memory Within functional limits   Following Commands Follows all commands and directions without difficulty   RLE Assessment   RLE Assessment WFL   LLE Assessment   LLE Assessment WFL   Coordination   Movements are Fluid and Coordinated 1   Sensation WFL   Bed Mobility   Supine to Sit 6  Modified independent   Additional items Increased time required   Sit to Supine 6  Modified independent   Additional items Increased time required   Transfers   Sit to Stand 5  Supervision   Additional items Increased time required   Stand to Sit 5  Supervision   Additional items Increased time required   Additional Comments completed with no AD   Ambulation/Elevation   Gait pattern Excessively slow; Short stride;Decreased foot clearance;Narrow CRESENCIO   Gait Assistance 5  Supervision   Additional items Verbal cues   Assistive Device None   Distance 250ft   Stair Management Assistance 5  Supervision   Additional items Verbal cues; Increased time required   Stair Management Technique One rail R;Step to pattern; Foreward   Number of Stairs 5   Balance   Static Sitting Good   Dynamic Sitting Good   Static Standing Fair +   Dynamic Standing Fair   Ambulatory Fair   Endurance Deficit   Endurance Deficit Yes   Endurance Deficit Description fatigue and minor SOB with ambulation   Activity Tolerance   Activity Tolerance Patient tolerated treatment well   Nurse Made Aware spoke to RN   Assessment   Prognosis Good   Problem List Decreased endurance; Impaired balance;Decreased mobility;Pain   Assessment Yenni Batista is a 77 y o  female admitted to [facility]/2020 for Acetaminophen overdose of undetermined intent  Pt  has a past medical history of Acute colitis, Anemia, Anxiety, Arthritis, Asthma, Cataracts, bilateral, Chronic kidney disease (11/9/2019), COPD (chronic obstructive pulmonary disease) ([facility] ), Diabetes mellitus ([facility] ), GERD (gastroesophageal reflux disease), History of GI bleed, History of transfusion, Hyperlipidemia, Hypertension, Hyperthyroidism, MDS (myelodysplastic syndrome) ([facility] ) (10/12/2018), Migraines, Osteoporosis (3/28/2017), Pancreatitis, Panic attack, Paroxysmal [facility]fib ([facility] ) (2017), Pneumonia of both upper lobes ([facility] ) (10/18/2018), Psychiatric disorder, Severe episode of recurrent major depressive disorder, without psychotic features ([facility] ) (7/24/2018), Sleep difficulties, and Suicide attempt ([place] [address] )    PT was consulted and pt was seen on 2/10/2020 for mobility assessment and d/c planning  Pt presents supine in bed alert and agreeable to therapy when provided encouragement  She is reporting increased anxiety, as well as a headache, RN aware of pt Sx  She has functional strength in BLE  O2 saturation hovering around 90% throughout session on room air  Pt lives with family in a 2 story home with 4 ROLLY and first floor setup  Prior to admission pt required modified independent assistance for transfers, elevations and ambulation using a Rolling Walker for community distances and received occasional assistance as needed for IADLs and ADLs   Pt is currently functioning at a modified independent assistance level for bed mobility, supervision assistance x1 level for transfers, supervision assistance x1 level for ambulation with no assistive device, and supervision assistance x1 for elevations  Pt demonstrated fair balance during upright mobility, provided VC for safety and sequencing on stairs but able to complete without LOB  Pt did have some minor BARGER and reported very minimal SOB after stair negotiation however O2 sat after stairs measured at 92%  Pt reports that O2 sats usually improve for her with ambulation and activity  Pt is completing functional mobility at level consistent with safe D/C home but would benefit from the initiation of home PT to address the above mentioned impairments  in order to maximize recovery and increase functional independence when completing mobility and ADLs and to prevent hospital readmission  Currently PT recommendations for DME include no AD for household distance ambulation and RW for community ambulation  At this time PT recommendations for d/c are home with family support and initiate Home PT  Barriers to Discharge Inaccessible home environment   Goals   Patient Goals to return home tomorrow   Plan   Treatment/Interventions   (D/C inpt PT)   Recommendation   Recommendation Home PT; Home with family support   Equipment Recommended Walker  (for community ambulation)   PT - OK to Discharge Yes   Additional Comments pt OK to D/C home when medically cleared     Yaakov Renae PT, DPT 9

## 2020-09-11 ENCOUNTER — OUTPATIENT (OUTPATIENT)
Dept: OUTPATIENT SERVICES | Facility: HOSPITAL | Age: 55
LOS: 1 days | Discharge: HOME | End: 2020-09-11
Payer: COMMERCIAL

## 2020-09-11 ENCOUNTER — RESULT REVIEW (OUTPATIENT)
Age: 55
End: 2020-09-11

## 2020-09-11 ENCOUNTER — APPOINTMENT (OUTPATIENT)
Dept: OTOLARYNGOLOGY | Facility: HOSPITAL | Age: 55
End: 2020-09-11

## 2020-09-11 DIAGNOSIS — Z41.9 ENCOUNTER FOR PROCEDURE FOR PURPOSES OTHER THAN REMEDYING HEALTH STATE, UNSPECIFIED: Chronic | ICD-10-CM

## 2020-09-11 LAB — PTH-INTACT IO % DIF SERPL: 71.8 PG/ML — SIGNIFICANT CHANGE UP (ref 8.5–72.5)

## 2020-09-11 PROCEDURE — 88305 TISSUE EXAM BY PATHOLOGIST: CPT | Mod: 26

## 2020-09-11 PROCEDURE — 88331 PATH CONSLTJ SURG 1 BLK 1SPC: CPT | Mod: 26

## 2020-09-11 PROCEDURE — 88321 CONSLTJ&REPRT SLD PREP ELSWR: CPT

## 2020-09-11 PROCEDURE — 88307 TISSUE EXAM BY PATHOLOGIST: CPT | Mod: 26

## 2020-09-11 RX ORDER — METOCLOPRAMIDE HCL 10 MG
10 TABLET ORAL ONCE
Refills: 0 | Status: COMPLETED | OUTPATIENT
Start: 2020-09-11 | End: 2020-09-11

## 2020-09-11 RX ORDER — ACETAMINOPHEN 500 MG
1000 TABLET ORAL ONCE
Refills: 0 | Status: COMPLETED | OUTPATIENT
Start: 2020-09-11 | End: 2020-09-11

## 2020-09-11 RX ORDER — ONDANSETRON 8 MG/1
4 TABLET, FILM COATED ORAL ONCE
Refills: 0 | Status: COMPLETED | OUTPATIENT
Start: 2020-09-11 | End: 2020-09-11

## 2020-09-11 RX ORDER — HYDROMORPHONE HYDROCHLORIDE 2 MG/ML
0.5 INJECTION INTRAMUSCULAR; INTRAVENOUS; SUBCUTANEOUS ONCE
Refills: 0 | Status: DISCONTINUED | OUTPATIENT
Start: 2020-09-11 | End: 2020-09-11

## 2020-09-11 RX ADMIN — HYDROMORPHONE HYDROCHLORIDE 0.5 MILLIGRAM(S): 2 INJECTION INTRAMUSCULAR; INTRAVENOUS; SUBCUTANEOUS at 19:49

## 2020-09-11 RX ADMIN — Medication 400 MILLIGRAM(S): at 21:51

## 2020-09-11 RX ADMIN — HYDROMORPHONE HYDROCHLORIDE 0.5 MILLIGRAM(S): 2 INJECTION INTRAMUSCULAR; INTRAVENOUS; SUBCUTANEOUS at 20:28

## 2020-09-11 RX ADMIN — ONDANSETRON 4 MILLIGRAM(S): 8 TABLET, FILM COATED ORAL at 19:49

## 2020-09-11 RX ADMIN — Medication 10 MILLIGRAM(S): at 21:13

## 2020-09-11 NOTE — PROGRESS NOTE ADULT - SUBJECTIVE AND OBJECTIVE BOX
STATUS POST:  total tyroidectomy     SUBJECTIVE: Pt seen and examined in the PACU. patient feeling okay. nausea is improved with reglan. slight headache. sore throat. denies numbness and dizziness    Vital Signs Last 24 Hrs  T(C): 37 (11 Sep 2020 19:00), Max: 37 (11 Sep 2020 19:00)  T(F): 98.6 (11 Sep 2020 19:00), Max: 98.6 (11 Sep 2020 19:00)  HR: 91 (11 Sep 2020 21:15) (91 - 116)  BP: 138/82 (11 Sep 2020 21:15) (125/79 - 144/88)  BP(mean): 105 (11 Sep 2020 21:15) (93 - 110)  RR: 15 (11 Sep 2020 21:15) (15 - 21)  SpO2: 97% (11 Sep 2020 21:15) (97% - 99%)  I&O's Summary    11 Sep 2020 07:01  -  11 Sep 2020 21:42  --------------------------------------------------------  IN: 1000 mL / OUT: 310 mL / NET: 690 mL      I&O's Detail    11 Sep 2020 07:01  -  11 Sep 2020 21:42  --------------------------------------------------------  IN:    Lactated Ringers IV Bolus: 1000 mL  Total IN: 1000 mL    OUT:    Estimated Blood Loss: 50 mL    Voided: 260 mL  Total OUT: 310 mL    Total NET: 690 mL            LABS:                Physical exam :  Neuro: Alert and Oriented X 4  Neck: dressing c/d/i, ALISSON to suction with serosangfluid   Respiratory: CTA b/l. no respiratory distress  Cardiovascular: NSR, RRR  Gastrointestinal: soft, NT/ND  Extremities: (-) edema b/l      A/P: 55y Male   - Diet: soft  - Activity: OOBA  - Labs: 10 PM labs   - Pain medication/ nausea control   - SCDs

## 2020-09-11 NOTE — BRIEF OPERATIVE NOTE - OPERATION/FINDINGS
Total thyroidectomy with central lymph node dissection via 5cm incision in inferior neck fold. Bilateral vagus, superior and recurrent laryngeal nerves identified and preserved. Left upper, right upper and lower parathyroid glands identified and preserved. Left lower parathyroid likely in specimen. Central lymph node dissection; multiple grossly positive nodes. Postoperative PTH 71.8. Closed strap muscles with 5-0 Vicryl. Drain left below strap muscles. Closed skin with 5-0 Prolene and Steri-Strips.

## 2020-09-11 NOTE — ASSESSMENT
[FreeTextEntry1] : 1.  Anemia- Hgb 12.5\par  -blood loss, anemia of chronic disease, \par - copper deficiency - replaced, level WNL 5/2020\par -5/20 ferritin dropped to 52- IV iron\par \par 2. Osteoporosis \par - left ankle- stress fx- advanced  osteoporosis, patient with h/o steroids use\par - started Forteo with Dr. Akers\par - calcium level stable, stopped IV\par - PT for osteoporosis\par \par  3.TIA with MTHFR and h/o DVT -  \par not a candidate for DOAC b/o small bowel resection\par - INR home monitoring of warfarin takes 3 mg daily\par - INR 1.7 - was on 1.5mg yesterday - to resume 3mg.  \par \par 4. Hypogammaglobulinemia\par - s/p  Prevnar 13 12/2018 \par -  Pneumococcal vaccine 23 boost \par - s/p  Shingrex\par \par 5. Zinc deficiency\par - oral Zinc, level better - 72\par \par Patient diagnosed with thyroid papillary cancer, planned for thyroidectomy on 9/11/2020.  He has h/o DVT x3, no PE, h/o cryptogenic stroke at high risk for thrombosis. \par He will stop warfarin 5 full days prior to surgery and bridge with enoxaparin 60 mg SQ q 24 h with last dose 24h before surgery. He will resume anticoagulation 24 h after surgery with enoxaparin 60 mg and if no bleeding resume warfarin after 72h.  D/w Dr. Monsalve\par \par Dr. Luis Felipe Monsalve\par cell [phone]\par office [phone]\par \par \par \par \par \par

## 2020-09-11 NOTE — REVIEW OF SYSTEMS
[Fatigue] : fatigue [Joint Pain] : joint pain [Negative] : Allergic/Immunologic [Dysphagia] : no dysphagia [Vision Problems] : no vision problems [Eye Pain] : no eye pain [Chest Pain] : no chest pain [Hoarseness] : no hoarseness [Lower Ext Edema] : no lower extremity edema [Shortness Of Breath] : no shortness of breath [Cough] : no cough [Constipation] : no constipation [Vomiting] : no vomiting [Diarrhea] : no diarrhea [Dizziness] : no dizziness [Dysuria] : no dysuria [Skin Rash] : no skin rash [Insomnia] : no insomnia [Depression] : no depression [Anxiety] : no anxiety [Muscle Weakness] : no muscle weakness [Easy Bleeding] : no tendency for easy bleeding [Easy Bruising] : no tendency for easy bruising

## 2020-09-11 NOTE — CONSULT LETTER
[Dear  ___] : Dear  [unfilled], [Please see my note below.] : Please see my note below. [Consult Letter:] : I had the pleasure of evaluating your patient, [unfilled]. [Consult Closing:] : Thank you very much for allowing me to participate in the care of this patient.  If you have any questions, please do not hesitate to contact me. [Sincerely,] : Sincerely, [DrMeron  ___] : Dr. REARDON [FreeTextEntry3] : Angie Biswas MD\par [facility] [place] [facility]\par

## 2020-09-11 NOTE — HISTORY OF PRESENT ILLNESS
[0 - No Distress] : Distress Level: 0 [de-identified] : Patient is a 52 year old who is referred for initial consultation for anemia secondary to Crohns/GI bleed vs Iron Deficiency/ Vit b12 def. 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 Patient is status post ileo-colonic resections for SBO  in 2016. In 2016 patient had complaints of 10 - 15 lb weight loss. Labs at that time showed Hgb of 12, steatorrhea and stool calprotectin = 236. In 2016 MRI/MRE with narrowing at ileocolonic anastomosis with upstream dilation. Pt refused bridge with  prednisone and Entocort / Flagyl. In 2017 patient Hgb dropped to 9.5. On 2017 patient underwent an EGD and Colonoscopy. Findings consistent with Page's and no dysplasia or AVMs. Colonoscopy showed an open anastomosis but active disease, moderate on the colonic side and limited to the anastomosis and moderate to severe enteritis, just proximal to the anastomosis. Pat had routine labs on 05/10/2017 Hgb: 8.3.  [de-identified] : Patient presents for follow up of  anemia, DVT, MTHFR gene mutation follow up, currently on Coumadin 3mg alternating with (1.5 as needed) with INR level that takes with home machines. Pt preparing for upcoming surgery. [FreeTextEntry1] : s/p injectafer, copper\par warfarin

## 2020-09-12 ENCOUNTER — TRANSCRIPTION ENCOUNTER (OUTPATIENT)
Age: 55
End: 2020-09-12

## 2020-09-12 VITALS
SYSTOLIC BLOOD PRESSURE: 154 MMHG | DIASTOLIC BLOOD PRESSURE: 86 MMHG | HEART RATE: 85 BPM | TEMPERATURE: 98 F | RESPIRATION RATE: 18 BRPM | OXYGEN SATURATION: 95 %

## 2020-09-12 LAB
ANION GAP SERPL CALC-SCNC: 12 MMOL/L — SIGNIFICANT CHANGE UP (ref 5–17)
ANION GAP SERPL CALC-SCNC: 13 MMOL/L — SIGNIFICANT CHANGE UP (ref 5–17)
BUN SERPL-MCNC: 14 MG/DL — SIGNIFICANT CHANGE UP (ref 7–23)
BUN SERPL-MCNC: 14 MG/DL — SIGNIFICANT CHANGE UP (ref 7–23)
CA-I BLD-SCNC: 1.15 MMOL/L — SIGNIFICANT CHANGE UP (ref 1.12–1.3)
CA-I BLD-SCNC: 1.16 MMOL/L — SIGNIFICANT CHANGE UP (ref 1.12–1.3)
CALCIUM SERPL-MCNC: 8.3 MG/DL — LOW (ref 8.4–10.5)
CALCIUM SERPL-MCNC: 8.5 MG/DL — SIGNIFICANT CHANGE UP (ref 8.4–10.5)
CHLORIDE SERPL-SCNC: 103 MMOL/L — SIGNIFICANT CHANGE UP (ref 96–108)
CHLORIDE SERPL-SCNC: 105 MMOL/L — SIGNIFICANT CHANGE UP (ref 96–108)
CO2 SERPL-SCNC: 23 MMOL/L — SIGNIFICANT CHANGE UP (ref 22–31)
CO2 SERPL-SCNC: 26 MMOL/L — SIGNIFICANT CHANGE UP (ref 22–31)
CREAT SERPL-MCNC: 1.14 MG/DL — SIGNIFICANT CHANGE UP (ref 0.5–1.3)
CREAT SERPL-MCNC: 1.18 MG/DL — SIGNIFICANT CHANGE UP (ref 0.5–1.3)
GLUCOSE SERPL-MCNC: 100 MG/DL — HIGH (ref 70–99)
GLUCOSE SERPL-MCNC: 169 MG/DL — HIGH (ref 70–99)
HCT VFR BLD CALC: 41.8 % — SIGNIFICANT CHANGE UP (ref 39–50)
HGB BLD-MCNC: 13.1 G/DL — SIGNIFICANT CHANGE UP (ref 13–17)
MAGNESIUM SERPL-MCNC: 1.7 MG/DL — SIGNIFICANT CHANGE UP (ref 1.6–2.6)
MCHC RBC-ENTMCNC: 29.4 PG — SIGNIFICANT CHANGE UP (ref 27–34)
MCHC RBC-ENTMCNC: 31.3 GM/DL — LOW (ref 32–36)
MCV RBC AUTO: 93.7 FL — SIGNIFICANT CHANGE UP (ref 80–100)
NRBC # BLD: 0 /100 WBCS — SIGNIFICANT CHANGE UP (ref 0–0)
PHOSPHATE SERPL-MCNC: 3.5 MG/DL — SIGNIFICANT CHANGE UP (ref 2.5–4.5)
PLATELET # BLD AUTO: 227 K/UL — SIGNIFICANT CHANGE UP (ref 150–400)
POTASSIUM SERPL-MCNC: 3.9 MMOL/L — SIGNIFICANT CHANGE UP (ref 3.5–5.3)
POTASSIUM SERPL-MCNC: 4.2 MMOL/L — SIGNIFICANT CHANGE UP (ref 3.5–5.3)
POTASSIUM SERPL-SCNC: 3.9 MMOL/L — SIGNIFICANT CHANGE UP (ref 3.5–5.3)
POTASSIUM SERPL-SCNC: 4.2 MMOL/L — SIGNIFICANT CHANGE UP (ref 3.5–5.3)
PTH-INTACT IO % DIF SERPL: 63.8 PG/ML — SIGNIFICANT CHANGE UP (ref 8.5–72.5)
PTH-INTACT IO % DIF SERPL: 88.5 PG/ML — HIGH (ref 8.5–72.5)
RBC # BLD: 4.46 M/UL — SIGNIFICANT CHANGE UP (ref 4.2–5.8)
RBC # FLD: 14.8 % — HIGH (ref 10.3–14.5)
SODIUM SERPL-SCNC: 140 MMOL/L — SIGNIFICANT CHANGE UP (ref 135–145)
SODIUM SERPL-SCNC: 142 MMOL/L — SIGNIFICANT CHANGE UP (ref 135–145)
WBC # BLD: 15.66 K/UL — HIGH (ref 3.8–10.5)
WBC # FLD AUTO: 15.66 K/UL — HIGH (ref 3.8–10.5)

## 2020-09-12 PROCEDURE — 83735 ASSAY OF MAGNESIUM: CPT

## 2020-09-12 PROCEDURE — 84100 ASSAY OF PHOSPHORUS: CPT

## 2020-09-12 PROCEDURE — 60252 REMOVAL OF THYROID: CPT

## 2020-09-12 PROCEDURE — 88331 PATH CONSLTJ SURG 1 BLK 1SPC: CPT

## 2020-09-12 PROCEDURE — 85027 COMPLETE CBC AUTOMATED: CPT

## 2020-09-12 PROCEDURE — 80048 BASIC METABOLIC PNL TOTAL CA: CPT

## 2020-09-12 PROCEDURE — 88305 TISSUE EXAM BY PATHOLOGIST: CPT

## 2020-09-12 PROCEDURE — C1889: CPT

## 2020-09-12 PROCEDURE — 82330 ASSAY OF CALCIUM: CPT

## 2020-09-12 PROCEDURE — 88307 TISSUE EXAM BY PATHOLOGIST: CPT

## 2020-09-12 PROCEDURE — 83970 ASSAY OF PARATHORMONE: CPT

## 2020-09-12 PROCEDURE — 36415 COLL VENOUS BLD VENIPUNCTURE: CPT

## 2020-09-12 NOTE — PROGRESS NOTE ADULT - SUBJECTIVE AND OBJECTIVE BOX
Pt did well ON. He is drinking fluids and tolerating, no nausea/vomiting. Dressing is still in place and pt was informed that it will be removed before discharge. He feels his throat is sore and voice is a little hoarse from ET tube, but no signs of nerve injury. Pain is well controlled. Patient seen and examined bedside with chief resident.        Vital Signs Last 24 Hrs  T(C): 36.4 (12 Sep 2020 09:25), Max: 37 (11 Sep 2020 19:00)  T(F): 97.6 (12 Sep 2020 09:25), Max: 98.6 (11 Sep 2020 19:00)  HR: 103 (12 Sep 2020 09:25) (91 - 116)  BP: 133/79 (12 Sep 2020 09:25) (125/79 - 144/88)  BP(mean): 105 (11 Sep 2020 21:15) (93 - 110)  RR: 16 (12 Sep 2020 09:25) (15 - 21)  SpO2: 95% (12 Sep 2020 09:25) (95% - 99%)    I&O's Detail    11 Sep 2020 07:01  -  12 Sep 2020 07:00  --------------------------------------------------------  IN:    IV PiggyBack: 100 mL    Lactated Ringers Bolus: 1000 mL  Total IN: 1100 mL    OUT:    Estimated Blood Loss (mL): 50 mL    Voided (mL): 260 mL  Total OUT: 310 mL    Total NET: 790 mL      12 Sep 2020 07:01  -  12 Sep 2020 10:58  --------------------------------------------------------  IN:    Oral Fluid: 360 mL  Total IN: 360 mL    OUT:    Voided (mL): 200 mL  Total OUT: 200 mL    Total NET: 160 mL                          13.1   15.66 )-----------( 227      ( 12 Sep 2020 07:39 )             41.8       PHYSICAL EXAM:  09-12    142  |  103  |  14  ----------------------------<  100<H>  3.9   |  26  |  1.18    Ca    8.5      12 Sep 2020 07:38  Phos  3.5     09-12  Mg     1.7     09-12      Physical Exam  General: NAD, resting comfortably in bed  Neck: dressing in place, no hematoma, ALISSON w serosanguinous output  Pulm: Nonlabored breathing, no respiratory distress  Abd: soft, non distended, incisions c/d/i, non tender  Extrem: WWP, no edema,  Neuro: A/O x 3, CNs II-XII grossly intact, no focal deficits, normal sensation  Pulses: palpable distal pulses

## 2020-09-12 NOTE — PROGRESS NOTE ADULT - ASSESSMENT
55 year old PMHx Crohns, DVT with MTHFR gene mutation on lovenox, GERD with Page's, left parapillary thyroid cancer s/p total thyroidectomy with central neck dissection (9/11).   tylenol for pain  zofran for nausea  soft diet  SCDs, no dvt ppx given recent lovenox use  d/c today pending AM labs

## 2020-09-12 NOTE — DISCHARGE NOTE PROVIDER - NSDCFUADDINST_GEN_ALL_CORE_FT
Please follow up with Dr. Monsalve next week. Call his office to schedule an appointment: [phone].    General Discharge Instructions:  Please resume all regular home medications unless specifically advised not to take a particular medication. Please resume your lovenox this evening 9/12. Also, please take any new medications as prescribed.  Please get plenty of rest, continue to ambulate several times per day, and drink adequate amounts of fluids. Avoid lifting weights greater than 5-10 lbs until you follow-up with your surgeon, who will instruct you further regarding activity restrictions.  Avoid driving or operating heavy machinery while taking pain medications.  Please follow-up with your surgeon and Primary Care Provider (PCP) as advised.  Incision Care:  *Please call your doctor or nurse practitioner if you have increased pain, swelling, redness, or drainage from the incision site.  *Avoid swimming and baths until your follow-up appointment.  *You may shower, and wash surgical incisions with a mild soap and warm water. Gently pat the area dry.  *If you have steri-strips, they will fall off on their own. Please remove any remaining strips 7-10 days after surgery.     Warning Signs:  Please call your doctor or nurse practitioner if you experience the following:  *You experience new chest pain, pressure, squeezing or tightness.  *New or worsening cough, shortness of breath, or wheeze.  *If you are vomiting and cannot keep down fluids or your medications.  *You are getting dehydrated due to continued vomiting, diarrhea, or other reasons. Signs of dehydration include dry mouth, rapid heartbeat, or feeling dizzy or faint when standing.  *You see blood or dark/black material when you vomit or have a bowel movement.  *You experience burning when you urinate, have blood in your urine, or experience a discharge.  *Your pain is not improving within 8-12 hours or is not gone within 24 hours. Call or return immediately if your pain is getting worse, changes location, or moves to your chest or back.  *You have shaking chills, or fever greater than 101.5 degrees Fahrenheit or 38 degrees Celsius.  *Any change in your symptoms, or any new symptoms that concern you.

## 2020-09-12 NOTE — DISCHARGE NOTE PROVIDER - CARE PROVIDERS DIRECT ADDRESSES
[email or web address]

## 2020-09-12 NOTE — DISCHARGE NOTE NURSING/CASE MANAGEMENT/SOCIAL WORK - NSDCPNINST_GEN_ALL_CORE
Report to Dr. Monsalve if you have numbness or tingling sensations around mouth or hands, neck swelling. Keep head of bed elevated with pillows until cleared from Dr. Monsalve. Due to pandemic corona virus- please continue proper handwashing/ hand hygiene, wearing mask, social distancing. Educational material given- [employer] Online Patient Education: Surgical Wound Discharge Instructions.

## 2020-09-12 NOTE — DISCHARGE NOTE PROVIDER - NSDCCPCAREPLAN_GEN_ALL_CORE_FT
PRINCIPAL DISCHARGE DIAGNOSIS  Diagnosis: Papillary thyroid carcinoma  Assessment and Plan of Treatment:

## 2020-09-12 NOTE — DISCHARGE NOTE NURSING/CASE MANAGEMENT/SOCIAL WORK - PATIENT PORTAL LINK FT
You can access the FollowMyHealth Patient Portal offered by [facility] by registering at the following website: [email or web address]. By joining [facility]’s FollowMyHealth portal, you will also be able to view your health information using other applications (apps) compatible with our system.

## 2020-09-12 NOTE — DISCHARGE NOTE PROVIDER - HOSPITAL COURSE
55 year old PMHx Crohns, DVT with MTHFR gene mutation on lovenox, GERD with Page's, left parapillary thyroid cancer s/p total thyroidectomy with central neck dissection (9/11). Patient's post-operative course was uncomplicated. Diet was advanced as tolerated and pain was well controlled on medication. On day of discharge, patient had stable vital signs, was tolerating diet, voiding without assistance, and pain was controlled. The patient is to follow up in 1-2 weeks.

## 2020-09-12 NOTE — DISCHARGE NOTE PROVIDER - NSDCFUSCHEDAPPT_GEN_ALL_CORE_FT
GUDELIA DUNN ; 09/25/2020 ; [facility] [address] GUDELIA Gama ; 10/13/2020 ; [facility] [address]

## 2020-09-17 ENCOUNTER — LABORATORY RESULT (OUTPATIENT)
Age: 55
End: 2020-09-17

## 2020-09-17 ENCOUNTER — APPOINTMENT (OUTPATIENT)
Dept: OTOLARYNGOLOGY | Facility: CLINIC | Age: 55
End: 2020-09-17
Payer: COMMERCIAL

## 2020-09-17 VITALS
HEART RATE: 112 BPM | SYSTOLIC BLOOD PRESSURE: 142 MMHG | TEMPERATURE: 98.1 F | OXYGEN SATURATION: 98 % | DIASTOLIC BLOOD PRESSURE: 87 MMHG

## 2020-09-17 LAB
25(OH)D3 SERPL-MCNC: 65.6 NG/ML
ALBUMIN SERPL ELPH-MCNC: 4.1 G/DL
ALP BLD-CCNC: 77 U/L
ALT SERPL-CCNC: 34 U/L
ANION GAP SERPL CALC-SCNC: 13 MMOL/L
AST SERPL-CCNC: 30 U/L
BILIRUB SERPL-MCNC: 0.2 MG/DL
BUN SERPL-MCNC: 17 MG/DL
CALCIUM SERPL-MCNC: 9.4 MG/DL
CALCIUM SERPL-MCNC: 9.4 MG/DL
CHLORIDE SERPL-SCNC: 101 MMOL/L
CO2 SERPL-SCNC: 26 MMOL/L
CREAT SERPL-MCNC: 1.16 MG/DL
GLUCOSE SERPL-MCNC: 99 MG/DL
PARATHYROID HORMONE INTACT: 47 PG/ML
POTASSIUM SERPL-SCNC: 4.5 MMOL/L
PROT SERPL-MCNC: 6.2 G/DL
SODIUM SERPL-SCNC: 140 MMOL/L

## 2020-09-17 PROCEDURE — 99024 POSTOP FOLLOW-UP VISIT: CPT

## 2020-09-17 PROCEDURE — 31575 DIAGNOSTIC LARYNGOSCOPY: CPT | Mod: 58

## 2020-09-17 RX ORDER — ACETAMINOPHEN AND CODEINE 300; 30 MG/1; MG/1
300-30 TABLET ORAL
Qty: 12 | Refills: 0 | Status: COMPLETED | COMMUNITY
Start: 2020-09-10 | End: 2020-09-17

## 2020-09-17 NOTE — PHYSICAL EXAM
[de-identified] : The neck is flat without seroma or hematoma. The subcuticular suture was removed. The voice is raspy.  A [place] sign was not present.

## 2020-09-17 NOTE — CONSULT LETTER
[FreeTextEntry3] : \par Luis Felipe Monsalve M.D., FACS, ECNU\par Director [facility]\par [facility]\par Certified in Thyroid/Parathyroid/Neck Ultrasound, ECNU/ AIUM\par \par , Department of Otolaryngology\par [employer] [employer] at [facility]\par

## 2020-09-17 NOTE — REASON FOR VISIT
[FreeTextEntry2] : a first postop visit after total thyroidectomy for papillary thyroid carcinoma [FreeTextEntry1] : Referred by Montserrat Akers MD Endocrinologist, PCP Manasa Garcia MD, Hematologist is Angie Biswas MD, Nolan Lugo MD GI

## 2020-09-17 NOTE — PROCEDURE
[de-identified] : The nasal septum is minimally deviated to the right. There are no masses or polyps and the nasal mucosa and secretions are normal. The choanae and posterior nasopharynx are normal without masses or drainage. The Eustachian tube orifices appear patent. The pharynx, including the posterior and lateral pharyngeal walls, the vallecula and base of tongue are normal without ulcerations, lesions or masses. The hypopharynx including the pyriform sinuses open well without pooling of secretions, mucosal lesions or masses. The supraglottic larynx including the epiglottis, petiole, arytenoids, glossoepiglottic, aryepiglottic and pharyngoepiglottic folds are normal without mucosal lesions, ulcerations or masses. The glottis reveals normal false vocal folds. The true vocal folds are hyperemic but tense and of equal length, without paralysis, having symmetric mobility on adduction and abduction. There are no mucosal lesions, nodules, cysts, erythroplasia or leukoplakia. The posterior cricoid area has healthy pink mucosa in the interarytenoid area and esophageal inlet. There is no thickening/edema of the interarytenoid mucosa suggestive of posterior laryngitis from laryngopharyngeal acid reflux disease. The trachea is clear without narrowing in the immediate subglottic region, without deviation or lesions. \par  [de-identified] : postoperative assessment after total thyroidectomy for PTC

## 2020-09-17 NOTE — HISTORY OF PRESENT ILLNESS
[de-identified] : Miles is a 55-year-old  with Crohns disease, chronic anemia, osteoporosis, Barretts esophagus, past Hx of DVT (MTHR mutation) who was noted to have a left thyroid lobe solid nodule that was monitored but grew over the past 2 years.  This was initially detected after imaging for w/u of possible primary hyperparathyroidism.  A 4D CT scan of the neck in December 2019 revealed a left thyroid lobe nodule and a left inferior ovoid structure thought to be a possible atypical parathyroid adenoma and an FNA biopsy in July was suspicious for papillary thyroid carcinoma. Normocalcemic hyperparathyroidism is now thought to be secondary to malabsorption (2 SM resections). His osteoporosis was in part due to steroid Rx for the Crohns and secondary hyperparathyroidism. Miles denies recent shortness of breath, voice changes, dysphagia, anterior neck pain, neck pressure or mass. His sister had a total thyroidectomy for thyroid cancer. He denies any known radiation exposures in his youth. He denies fever, body aches, cough, cyanosis, chest burning, anosmia or recent known COVID exposures.  All family members at home are well.\par    [FreeTextEntry1] : Miles had an uneventful total thyroidectomy and left CCLND on 09/11/2020.  His hospital discharge Ca/PTH were WNL.  He takes only vitamin D3. He denies paresthesias. His voice is minimally hoarse but improving. Surgical path is still pending. He has restarted his Coumadin and there is no history of bleeding or hematoma.

## 2020-09-18 PROBLEM — D64.9 ANEMIA, UNSPECIFIED: Chronic | Status: ACTIVE | Noted: 2020-09-10

## 2020-09-18 PROBLEM — C73 MALIGNANT NEOPLASM OF THYROID GLAND: Chronic | Status: ACTIVE | Noted: 2020-09-10

## 2020-09-18 PROBLEM — E72.12 METHYLENETETRAHYDROFOLATE REDUCTASE DEFICIENCY: Chronic | Status: ACTIVE | Noted: 2020-09-10

## 2020-09-18 PROBLEM — K50.90 CROHN'S DISEASE, UNSPECIFIED, WITHOUT COMPLICATIONS: Chronic | Status: ACTIVE | Noted: 2020-09-10

## 2020-09-18 PROBLEM — G45.9 TRANSIENT CEREBRAL ISCHEMIC ATTACK, UNSPECIFIED: Chronic | Status: ACTIVE | Noted: 2020-09-10

## 2020-09-18 PROBLEM — K22.70 BARRETT'S ESOPHAGUS WITHOUT DYSPLASIA: Chronic | Status: ACTIVE | Noted: 2020-09-10

## 2020-09-18 PROBLEM — N40.0 BENIGN PROSTATIC HYPERPLASIA WITHOUT LOWER URINARY TRACT SYMPTOMS: Chronic | Status: ACTIVE | Noted: 2020-09-10

## 2020-09-18 PROBLEM — M10.9 GOUT, UNSPECIFIED: Chronic | Status: ACTIVE | Noted: 2020-09-10

## 2020-09-18 PROBLEM — K64.9 UNSPECIFIED HEMORRHOIDS: Chronic | Status: ACTIVE | Noted: 2020-09-10

## 2020-09-18 PROBLEM — M81.0 AGE-RELATED OSTEOPOROSIS WITHOUT CURRENT PATHOLOGICAL FRACTURE: Chronic | Status: ACTIVE | Noted: 2020-09-10

## 2020-09-18 PROBLEM — I82.409 ACUTE EMBOLISM AND THROMBOSIS OF UNSPECIFIED DEEP VEINS OF UNSPECIFIED LOWER EXTREMITY: Chronic | Status: ACTIVE | Noted: 2020-09-10

## 2020-09-18 PROBLEM — K21.9 GASTRO-ESOPHAGEAL REFLUX DISEASE WITHOUT ESOPHAGITIS: Chronic | Status: ACTIVE | Noted: 2020-09-10

## 2020-09-18 LAB
THYROGLOB AB SERPL-ACNC: <20 IU/ML
THYROGLOB SERPL-MCNC: 60.2 NG/ML

## 2020-09-21 ENCOUNTER — LABORATORY RESULT (OUTPATIENT)
Age: 55
End: 2020-09-21

## 2020-09-21 ENCOUNTER — RESULT REVIEW (OUTPATIENT)
Age: 55
End: 2020-09-21

## 2020-09-22 LAB
CAU: 4 MG/DL
CREAT 24H UR-MCNC: 1.3 G/24 H
CREAT 24H UR-MCNC: 1.3 G/24 H
CREAT ?TM UR-MCNC: 102 MG/DL
CREAT ?TM UR-MCNC: 102 MG/DL
PROT ?TM UR-MCNC: 24 HR
PROT ?TM UR-MCNC: 24 HR
SPECIMEN VOL 24H UR: 1250 ML
SPECIMEN VOL 24H UR: 1250 ML
SPECIMEN VOL 24H UR: 50 MG/24 H
SURGICAL PATHOLOGY STUDY: SIGNIFICANT CHANGE UP

## 2020-09-25 ENCOUNTER — APPOINTMENT (OUTPATIENT)
Dept: ENDOCRINOLOGY | Facility: CLINIC | Age: 55
End: 2020-09-25
Payer: COMMERCIAL

## 2020-09-25 VITALS
BODY MASS INDEX: 19.04 KG/M2 | DIASTOLIC BLOOD PRESSURE: 80 MMHG | OXYGEN SATURATION: 96 % | HEART RATE: 94 BPM | SYSTOLIC BLOOD PRESSURE: 124 MMHG | TEMPERATURE: 99 F | HEIGHT: 70 IN | WEIGHT: 133 LBS

## 2020-09-25 DIAGNOSIS — E04.2 NONTOXIC MULTINODULAR GOITER: ICD-10-CM

## 2020-09-25 DIAGNOSIS — R89.9 UNSPECIFIED ABNORMAL FINDING IN SPECIMENS FROM OTHER ORGANS, SYSTEMS AND TISSUES: ICD-10-CM

## 2020-09-25 PROCEDURE — 99215 OFFICE O/P EST HI 40 MIN: CPT

## 2020-10-06 ENCOUNTER — TRANSCRIPTION ENCOUNTER (OUTPATIENT)
Age: 55
End: 2020-10-06

## 2020-10-08 NOTE — CONSULT LETTER
[Dear  ___] : Dear  [unfilled], [Consult Letter:] : I had the pleasure of evaluating your patient, [unfilled]. [Please see my note below.] : Please see my note below. [Consult Closing:] : Thank you very much for allowing me to participate in the care of this patient.  If you have any questions, please do not hesitate to contact me. [Sincerely,] : Sincerely, [DrMeron  ___] : Dr. REARDON

## 2020-10-08 NOTE — REASON FOR VISIT
[Follow - Up] : a follow-up visit [Hyperparathyroidism] : hyperparathyroidism [Follow-Up Visit] : a follow-up visit for

## 2020-10-09 PROBLEM — R89.9 ABNORMAL THYROID BIOPSY: Status: RESOLVED | Noted: 2020-07-27 | Resolved: 2020-10-09

## 2020-10-09 PROBLEM — E04.2 MULTIPLE THYROID NODULES: Status: RESOLVED | Noted: 2020-02-07 | Resolved: 2020-10-09

## 2020-10-09 NOTE — REVIEW OF SYSTEMS
[Fatigue] : fatigue [Joint Pain] : joint pain [Muscle Weakness] : muscle weakness [Negative] : Heme/Lymph

## 2020-10-09 NOTE — HISTORY OF PRESENT ILLNESS
[0 - No Distress] : Distress Level: 0 [FreeTextEntry1] : total thyroidectomy and central compartment  dissection 9/11/2020\par started Levothyroxine 125 mcg on 9/18/2020 no side effects \par \par abnormal FNA suspicious for papillary thyroid cancer

## 2020-10-13 ENCOUNTER — RESULT REVIEW (OUTPATIENT)
Age: 55
End: 2020-10-13

## 2020-10-13 ENCOUNTER — APPOINTMENT (OUTPATIENT)
Dept: HEMATOLOGY ONCOLOGY | Facility: CLINIC | Age: 55
End: 2020-10-13
Payer: COMMERCIAL

## 2020-10-13 VITALS
HEIGHT: 70 IN | OXYGEN SATURATION: 98 % | TEMPERATURE: 98.1 F | RESPIRATION RATE: 18 BRPM | HEART RATE: 98 BPM | BODY MASS INDEX: 19.18 KG/M2 | WEIGHT: 134 LBS | SYSTOLIC BLOOD PRESSURE: 120 MMHG | DIASTOLIC BLOOD PRESSURE: 80 MMHG

## 2020-10-13 PROCEDURE — 99214 OFFICE O/P EST MOD 30 MIN: CPT

## 2020-10-13 NOTE — ASSESSMENT
[FreeTextEntry1] : 1.  Anemia- Hgb 13.1 \par  -blood loss, anemia of chronic disease, \par - copper deficiency - replaced, level WNL 5/2020\par -5/20 ferritin dropped to 52- IV iron September 2020\par \par 2. Osteoporosis \par - left ankle- stress fx- advanced  osteoporosis, patient with h/o steroids use\par - started Forteo with Dr. Akers\par - calcium level stable, stopped IV\par - PT for osteoporosis\par \par  3.TIA with MTHFR and h/o DVT x 3 with cryptogenic CVA  \par not a candidate for DOAC b/o small bowel resection\par - INR home monitoring of warfarin takes 3 mg daily\par - Warfarin 3 mg x 6, 1.5 mg x 1\par \par 4. Hypogammaglobulinemia- no increased infection rate \par - s/p  Prevnar 13 12/2018 \par -  Pneumococcal vaccine 23 boost \par - s/p  Shingrex\par \par 5. Zinc deficiency\par - oral Zinc, level better - 72\par \par Dr. Luis Felipe Monsalve\par cell [phone]\par office [phone]\par \par \par \par \par \par

## 2020-10-13 NOTE — HISTORY OF PRESENT ILLNESS
[de-identified] : Patient is a 52 year old who is referred for initial consultation for anemia secondary to Crohns/GI bleed vs Iron Deficiency/ Vit b12 def. 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 Patient is status post ileo-colonic resections for SBO  in 2016. In 2016 patient had complaints of 10 - 15 lb weight loss. Labs at that time showed Hgb of 12, steatorrhea and stool calprotectin = 236. In 2016 MRI/MRE with narrowing at ileocolonic anastomosis with upstream dilation. Pt refused bridge with  prednisone and Entocort / Flagyl. In 2017 patient Hgb dropped to 9.5. On 2017 patient underwent an EGD and Colonoscopy. Findings consistent with Page's and no dysplasia or AVMs. Colonoscopy showed an open anastomosis but active disease, moderate on the colonic side and limited to the anastomosis and moderate to severe enteritis, just proximal to the anastomosis. Pat had routine labs on 05/10/2017 Hgb: 8.3.  [FreeTextEntry1] : s/p injectafer, copper\par warfarin [de-identified] : Patient presents for follow up of  anemia, DVT, MTHFR gene mutation follow up, currently on Coumadin 3mg alternating with (1.5 as needed) with INR level that takes with home machines. Pt preparing for upcoming surgery.

## 2020-10-13 NOTE — CONSULT LETTER
[FreeTextEntry3] : Angie Biswas MD\par [facility] [place] [facility]\par

## 2020-10-16 LAB
OXALATE UR-SCNC: 42.5 MG/24 H
PORPHYRINS UR-MCNC: 1250 ML/24 H

## 2020-10-23 ENCOUNTER — LABORATORY RESULT (OUTPATIENT)
Age: 55
End: 2020-10-23

## 2020-11-02 ENCOUNTER — NON-APPOINTMENT (OUTPATIENT)
Age: 55
End: 2020-11-02

## 2020-11-05 ENCOUNTER — APPOINTMENT (OUTPATIENT)
Dept: GASTROENTEROLOGY | Facility: CLINIC | Age: 55
End: 2020-11-05
Payer: COMMERCIAL

## 2020-11-05 VITALS
HEIGHT: 70 IN | WEIGHT: 135 LBS | DIASTOLIC BLOOD PRESSURE: 70 MMHG | SYSTOLIC BLOOD PRESSURE: 118 MMHG | BODY MASS INDEX: 19.33 KG/M2 | HEART RATE: 88 BPM

## 2020-11-05 PROCEDURE — 99072 ADDL SUPL MATRL&STAF TM PHE: CPT

## 2020-11-05 PROCEDURE — 99214 OFFICE O/P EST MOD 30 MIN: CPT

## 2020-11-05 NOTE — ASSESSMENT
[FreeTextEntry1] : 1. Crohns disease of both small and large intestine\par    \par CROHNS DISEASE-s/p ileocolonic resection x 2--last 6/19/15 for recurrent sbos: appears to be stable , GI symtoms stable but labs were up CRP=18.6/ESR=19 & Taeotbkidxrq=894 and Wt down ( inflam markers ? 2/2 to R ankle Fx/stress rx and tendinosis, also had tooth infection ) & MRE w ? ileal penetrating dis, wt =135_____\par s/p 4 SBOs w/i 2 yrs--2/2 distal sb disease adj to anastamosis\par when on Humira weekly w ADA =33 (3/20/14), -but had sbo 2/2014 at ADA=25 and another sbo 4/28/15\par 6/10/2015 Colonoscopy: Inflamm ST mass on ileal side w ulceration/scarred/narrow\par 6/11/2015 CT: dilated thickened sb loops distal jej & ileum\par Post-op **probably some malabsorption 2/2 to removal of R Colon and ileum: ?bile/fat/SIBO\par WT. Loss: r/o malabsorption, ?bile-induced--secretory vs decr micelles, active dis, PLE\par r/o SIBO--Elev FA, Alb=3.4-->3.1-->2.9--> (7/28/17)\par ?SIBO/Prevent post-op recurrence --Flagyl 250 mg qid~12/7/16-->d/c: 5/11/17\par Also discussed trial of Cholestyramine/Xifaxin -wanted to wait\par **ACTIVE Crohn's--11/10/16: 9.5lb wt loss, BMs: #5-6, 5x/D--Taking Magnesium \par 12/1/16 MRE: RUQ ileocolonic anastamosis--narrowed w upstream dilatation to 3.2 cm\par Labs: Stool Rsvkejquagkv=313, + Incr Fecal fat, Alb=3.4, FA =23, L82=877, Hgb=12.3,ESR=10,CRP <5\par 4/19/17 :Colonoscopy: Anastamosis: open w mild -mod active colitis, enteritis severe adj to anastomosis, mild more proximal, remainder of colon=wnl\par 5/11/17- Adm [facility] w stools brown, Hgb=7.9, BUN=17, Creat=1.4, Alb=2.9.\par S/P 2 Units w Hgb=10.6, BUN=15/1.1, Prednisone 40mg taper, entocort d/dee\par 6/8/17: Hgb=7.4, MCV=98, CRP<5--ASA stopped, Pred20--> 30mg, 6/13/17: s/p 2 Unit PRBCs\par 7/11/17 [facility] w unsteadiness. MRI Head: R Cortical Temporo-occipital encephalomalacia\par Hgb=9.9--> 8.4->9.7 after 1 Unit. Seen by Heme: received IV Iron \par CRP<5, T71=906, HPR=273, FA>23,Iron=22,Sat%=6, Ferritin=42, Alb=3.5. D/C on warfarin 2mg\par 7/28/17 Hgb=7.7; 7/31/17-s/p 1 Unit \par Heme Consultation: received  IV Infusions of  Iron and 5 IV Copper:___\par **Entyvio :time 0=12/22/16 ( Humira 40mg QW); 1/5/17--2wks, s/p 3rd infusion at wk 6, \par #6 :6/21/17--held 2/2 Shingles, Last Infusions=9/19/17 , 10/17/17, 11/28/17, 1/16/18 ,2/16/18, 3/19/18,4/16/18, 5/14/18, 6/25/18, 8/7/18, 9/17/18, 10/16/18, 11/13/18, 12/11/18, 1/14/19, 2/15/19, 3/15/19, 5/16/19, 6/18/19,7/24/19,\par 9/9/19, 10/2/19, 11/4/19, 12/12/19, 1/6/20, 2/4/20, 3/3/20, 9/17/20, 10/15/20, _______\par Entocort 9mg qd: 12/7/16--d/dee 5/11/17\par **Prednisone 40mg qd (5/11/17)--tapered 5mg/wk to 20mg qd, 6/8/17 30mg, \par 7/25/17 25mg, 3wks ago 20--> 15mg, 10/19/17 10mg alt w 7.5-->11/16/17 10mg alt w 5mg-->11/30/17: 5mg-->12/21/17: 5 alt w 2.5mg-->1/18/18: 2.5mg qd-->2.5mg qod--> d/c \par Probiotics 3 qd \par Lactose free, protein drinks tid\par MCT oil begun\par **trend --cbc, esr, crp, albumin\par **monitor wt= 120-->134-->134 --> 135--> 132 w clothes-->133--> 131 (Low carbs now)--> 129--> 127-->126-->124-->125-->124--> 126-->125-->125-->125-->126-->128-->127-->130-->131--> 135____\par Wt Loss : sig change since May-June/2018\par 8/17/17: Hgb=9.9, ESR=20, Je=863--Stvelqinjk s/p GI infection w N/V/D, no obstruction\par 10/17/17: Hgb=7.9,ESR=6,CRP<5, INR=1.6, Got IV Iron x 2, since 10/2/17 for Iron<10, Hgb=8.8\par 11/16/17: Hgb=11.2, ESR=14, CRP=12, 10/26/17 Stool fat-neg, calprotectin-30\par 11/13/17: MRE-Chr mild distension of distal & vladislav-ileum s/o mild stricturing at anast, mild mural thick & mucosal enhancemt ~ 20cm; little change from 2015 c/w mild acute on chr ileitis, 2.1 cm Liver hemangioma\par 10/9/18: Hgb=11.6, MCV=94, ESR=14, CRP=5.4, INR=2.2\par 10/10/18 MRE: stricturing of neoterminal ileum w worsened upstream dilatation, moderate length of upstream ileum w stricturing extending at least 20cm and more extensive then previous. suggestion of 2 adj extraluminal fluid collections which may be w/i the wall of strictured ileum near the ileocolonic anastomosis. surrounding spiculation and tethered appearance of bowel in this region.\par CTE: no discrete collection or intramural fistula, but mucosal enhancemt\par \par Colonoscopy: 05cm rectal polyp: HP, 2nd deg int hemorrhoids\par mild-mod activity: MARILY w cryptitis & mild archect distortion at ileocolonic anast: colon & ileal side, no stricture\par \par Today: 11/5/20 : feeling ok , Wt=135____off prednisone, BMs Brown: #4-5,1-2x/d___, Hgb=13\par CRP <5 : ? s/p recent ankle fx/stress rx/tendonitis and tooth infection \par prior stool studies w elev  calprotectin and No fat\par Wt loss-- 2/2 to low carbs-->fat burning and flare ; advised to liberalize and add protein, ensure\par Plan: Prednisone d/c 2/20/18\par Entyvio q 6 wks --> 4 weeks \par check inflammatory markers: 2/28/19 w ESR=12, CRP=6.5 & 2/21/19 stool calprotectin--> 232\par 8/15/19: ESR=10, CRP=5.2, Calprotectin=88\par 9/19/19: ESR=9, CRP=5.5\par 10/17/19: ESR=7, CRP< 5\par 11/6/19 : ESR=6, CRP=0.18\par 11/27/19: ESR=6, CRP <5\par 1/13/20: ESR=7, CRP=0.2\par 1/30/20: ESR=9, CRP=11\par 3/2/20:ESR=7, CRP=0.26\par 3/9/20: VDZ>40, Ab to VDZ <1.6\par 3/17/20: ESR=6, CRP=6.4\par 10/17/20: ESR=11, CRP <5\par pt declined to call numbers given for  [facility] or investigational rx's--biologics.  \par feels he is stablizing w wt loss, and inflammatory markers\par           \par check cbc,esr,crp\par trend stool calprotectin \par                                                                                                                                                                                                                                                                                                                                                                                                                                                                                                                                                                                                                                                                                                                                                                                                                                                                                                                                          \par 2. Iron deficiency anemia due to chronic blood loss  \par  had initially dropped , after clinical flare and post procedure bleeding\par Probably multifactorial: ACD, Blood loss, malabsorption/nutrient deficiencies\par Eliquis-->warfarin after CVA, On Entyvio and prednsione for active dis\par Still requiring IV Iron--prn, \par s/p IV Cu & transfusions \par 7/11/17 Recent Adm for unsteady gait w MRI c/w CVA--warfarin begun: possible better control of AC\par Chromagen 2 qd--> IV Iron , s/p IV Cu x 5, FA 1mg qd , B12 SL & IM\par  1/14/19: Hgb=10.7, INR=1.6, 2/5/19: Hgb=11.2,INR=1.5; 2/28/19: Hgb=11.5, Ehfweira=957; 3/11/19: INR=2.6\par 4/11/19: hgb=9.7, INR=1.6, 7/2/19: Hgb=11.7, Ferritin=41,INR=2.2, 8/15/19: Hgb=12.2,Ferritin=81,INR=1.6, \par 9/19/19: Hgb=12.8, 10/17/19: Hgb=14, 10/26/19: Hgb=14, 1/13/20: Hgb=13.5, 1/30/20: Hgb=13.9, Gyzqctby=628, Iron=38,  3/17/20: Hgb=13.1, Vkenmbaz=975, INR=1.7, 10/17/20: Hgb=13, Ferritin=46, INR=1.9\par 7/13/17: V98=178, EUT=613,FA>23; 1/3/18 F02=405, Xq=034, Zinc=55; 6/6/18 H02=795, 9/5/18: Oe=602, Zinc=67\par 11/28/18 Y63=689, 7/2/19: Tu=254, Zinc=61,B6=2.8, 8/15/19: Sc=114,Zinc=54,P91=506, 1/13/20: V58=823;\par 10/o17/20: \par B12 1cc IM ____R. delt-- t Given today, \par most recent IV Iron 11/4/20  for 10/17/ /20 Ferritin =46, Iron=44\par Hem consult IV Iron /Cu given malabsorption, ? BM bx --r/o occult etiology--on hold\par trend cbc\par Adj INR--closer to low 2's.    \par Agree w Heme--Prevnar-13\par \par \par \par 3. Other osteoporosis without current pathological fracture  \par : Progression on BD from 5/5/16\par Crohns, h/o steroid use\par Calcium citrate & Vit D per Endo\par Forteo since 5/10/19 , for Reclast          \par Repeat BD of 8/2018 --showed worsening to Osteoporosis from 2016\par Rec Endo consult w Dr. Akers :Osteoporosis center at [facility]--pt never went originally \par 9/21/18: Phos=2.4, Ca=8.7, Alb=3.4, Vit D=27, ELR=423, \par 10/16/18: Phos=2.8, Ca=8.7, Alb=3.5,\par 1/14/19: Phos=2.6,  Ca=8.7, Alb=3.6\par 2/28/19: Phos=1.8, Ca=8.9, Alb=3.7, VitD=39, GZK=559\par 3/18/19: Ca=8.5, Alb=3.7, Vit D=44.6, PTH=93\par 7/11/19: Ca=9.1, Alb=3.7, Phos=3.9, Vit D=40/ 306, UMQ=985\par 8/15/19: Ca=9, Alb=4.2, Phos=4.4, VitD=59, YRN=828\par 10/17/19: Ca=9.6, Alb=3.9, Phos=3.5\par 11/6/19: Ca=9.1, Alb=3.9. Phos=3.7, VitD=50, PTH=80\par 1/13/20: ca=9.1\par 1/30/20: Ca=9.2, Alb=3.9, Phos=2.7, Mag=1.5, Vit D=103/41, PTH=87\par 2/18/20:ca=8.5, Alb=3.6, \par 3/2/20: Ca=8.5, Alb=3.6\par 3/17/20: Ca=9.1, Alb=3.7, Phos=3.4, Mag=1.7\par 10/17/20: Ca=8.9, Alb=4, Phos=2.3, Mag-2.0, Vit D=66, PTH=47\par Dr. Akers: Hyperparathyroidism-- probably secondary due to low Vit D/Ca & ? superimposed primary, Rx replete Vit D and current IV Calcium\par trend Vit D, Ca, Phos, PTH,  \par [facility] ENT Consult init felt  Parathyroid scan showing activity in mediastinum is ectopic parathyroid, feels sx not indicated at this time, elev PTH is secondary to low  Vit D/Ca--to be reconsulted\par BD--9/2019: incr in spine, no change in wrist/hip\par \par \par \par 4. Gastroesophageal reflux disease w esophagitis : well controlled, no ht burn, no dysphagia, LPR\par Dry cough, CXR:ana, saw ENT devanstein-->LPR\par * ++ LPR,  ++  Page’s w No Dysplasia,  No , H/O  Esophagitis grade: A\par * Anti-reflux diet & life-style changes emphasized.  ++ Bedge\par * Weight reduction & regular exercise emphasized\par *  ++   PPI: Protonix 40 mg qd ,  ++ H2B q HS:Zantac 300mg--> Pepcid 40mg  ,  No Carafate  1 gram:\par * F/U  EGD:  [   2   ] mo.  /  [   2022    ] yr\par * No  pH Monitor,  No  Manometry,  No  Esophagram\par * ++   ENT  eval/F/U,  No  Surgical  eval\par \par \par \par \par \par 5. Internal hemorrhoids :  well – controlled.  No pain,  swelling,  itch,  bleeding\par * Discussed the potential complications of thrombosis,  pain,  infection,  swelling, itching,  bleeding \par * High – Fiber Diet was reviewed and emphasized\par * 6  --  8 cups of decaffeinated fluid daily\par * Sitz Bathes BID,  ++  :  Anusol HC  Suppos / Cream  MN qhs \par * No:  Tucks BID,   No:  Balneol Lotion,   No:  Calmoseptine Oint,   ++ Prep H prn\par * No:  Colorectal surgical evaluation for possible ablation \par \par \par \par \par \par 6. Barretts esophagus without dysplasia  \par Notes: see GERD.    \par \par \par \par 7. Hepatomegaly-->not confirmed by recent abd sono\par CTE w relative enlargemt, ? lobulation & enlarged portal/mesenteric veins\par LFTs-wnl, no ascites or edema\par R/O CLD, Hepatic vein thrombosis\par Abd sono w doppler--> Liver and spleen normal size, no thrombosis, normal portal and hepatic vein flow\par \par \par \par \par .\par \par

## 2020-11-05 NOTE — HISTORY OF PRESENT ILLNESS
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-->Eliquis' warfarin \par        GERD w Page's, Hemorrhoids, BPH, \par        S/P Ileocolonic resection 1998\par        Since then multiple SBOs\par \par \par        Got IV Iron x 2, since 10/2/17\par        10/19/17: feeling better, Ue=798 on prednisone 15mg x 3weeks, BMs Brown: #5-6, 1-2/D, occas 3-4/d\par        10/25/17: Hgb=10, ESR=5, CRP=5, Alb=3.6, Phos=2, Vmrsafex=520, Z36=605\par        11/16/17: Hgb=11.2, ESR=14, CRP=12, \par        11/13/17: MRE-Chr mild distension of distal & vladislav-ileum s/o mild stricturing at anast, mild mural thick & mucosal enhancemt ~ 20cm; little change from 2015 c/w mild acute on chr ileitis, 2.1 cm Liver hemangioma\par        11/28/17: Entyvio\par        11/29/17: Hgb=9.6, ESR=10, CRP=5.8, Alb=3.8,Ferritin-19, Iron=14,Sat=4%, Phos=2.5, Xa=593, BMs:# 5, 1-2x/D, brown,\par        12/19;17: Hgb=10.1, ESR=12, CRP=6.5; 12/21/17: BMs:#3-5, 1-3x/D , brown, no blood, no pain, pt=443\par        1/3/18:Hgb=11.7, ESR=19, CRP=6.5, Alb=4.1, Grwpfkbf=110, Iron=31, Sat%=9,Zinc=55\par        1/16/18: Entyvio, Hgb=11.4, ESR=10, CRP=6.9\par        1/18/18: Today: cellulitis R foot, saw dr KEITH--rx augmentum x 10D, Entyvio 1/9 to 1/16, uf=736 w clothes,BMs: # 4-5, 1-2x/D \par        2/14/18: Hgb=11.1, ESR=16, CRP=11.6, Alb=3.6, Iron=28, Ferritin=23, INR=1.5 \par        2/16/18: s/p Entyvio; Iron infusion, again on 2/27\par        2/20/18: Hgb=10.6, ESR=20, CRP=12, INR=1.7\par        2/27/18: s/p iron infusion\par        3/9/18: Dr. Burden for tenosynovitis, rx w Zorvolex: Diclofenac x 5 days--? response\par        3/14/18: Uc=359; BMs: # 5, 1-2x/D; Hgb=11, ESR=18, CRP=11,Irom=45,Ktscfpmv=907,Alb=3.6, INR=1.5\par        3/19/18: s/p Entyvio\par        3/22/18: xz=116, BMs: # 5, 3-4 x/d\par        4/16/18: s/p Entyvio,Hgb=11.9, INR=1.3, ESR=18, CRP=8.4 \par        4/18/18 EGD: gastritis, no HP, no IM, 2+ Mucous, 0.5cm gastric polyp: fundic, 4cm HH, + Barretts:3cm, no dysplasia\par        4/25/18: Hgb=11.5, INR=1.6, Iron=40, Sat=13%, Ferritin=57, Alb=3.2, Phos=2.2, Mag=1.7\par        4/30/18: s/p Iron Infusion\par        5/14/18: s/p Entyvio \par        5/23/18: Hgb=11.5, ESR=16, CRP< 5\par        5/29/18: s/p Iron Infusion\par        6/6/18: Hgb=10.6, INR=1.7, Hzhfsxgd=859, Alb=3.5, Phos=2.1, S87=252, bo=976; BMs: # 5, 2-3x/D \par        6/19/18: Hgb=10.6, INR=1, CRP=15, ESR=23\par        6/21/18: R ankle is acting up, swollen, pain, having MRI done, took a couple of advil, on low carbs ,BMs: # 5, 1-2x/D, ss=283\par        6/26/18: MRI: fx/stress rxn-talar body/navicula; stress related changes--cuneform, cuboid,distal tibia; mod tibiotalar effusion, mild-mod peroneal tendinosis\par        7/19/18: entyvio 6/26/18, eliminated all carbs--anti inflammatory diet, yw=261, BMs: # 4-5, 1-3x/D \par        7/25/18: Hgb=10, INR=1.3, Alb=3.3, Phos=2.4, Jlntycyh=089, sat%=12, Iron=35\par        8/2/18: BD--osteoporosis of hip/spine: sig decrease BD--17.6% of hip, 17% of spine, osteopenia of wrist: 6.4%\par        8/7/18: Entyvio\par        8/21/18: Hgb=11.1, INR=1.5, ESR=19, CRP=18.6\par        8/23/18: recently w tooth infection, old implant--loose and removed; rx w amox, and advil\par        eating almost no carbs, og=303, # 5-6, 2-3x/d \par        8/24/18: Stool fat--neg, Dbbgamefaake=237\par        9/5/18: Hgb=11.4, INR=1.3, ESR=9, CRP=11.3, Iron=41, Yraaanfd=693, Alb=3.8,\par        9/17/18: entyvio, Hgb=10.7, INR=2.2, ESR=17, CRP=9.1, \par        9/20/18: yj=151, BMs: # 5-6, 1-2x/D \par        9/21/18: Phos=2.4, Ca=8.7, Alb=3.4, Vit D=27, VAM=291, \par        Dr. Akers: Hyperparathyroidism: probably secondary due to low Vit D/Ca & ? superimposed primary, rx replete Vit D and Calcium\par        10/9/18: Hgb=11.6, MCV=94, ESR=14, CRP=5.4, INR=2.2\par        10/10/18 MRE: stricturing of neoterminal ileum w worsened upstream dilatation, moderate length of upstream ileum w stricturing extending at least 20cm and more extensive then previous. suggestion of 2 adj extraluminal fluid collections which may be w/i the wall of strictured ileum near the ileocolonic anastomosis. surrounding spiculation and tethered appearance of bowel in this region.\par 10/16/18: entyvio, Hgb=11.7, MCV=94, ESR=14, CRP <5, Alb=3.5\par 10/18/18: Ez=014,   BMs: # 5-6, 3x/D , \par 11/13/18: Entyvio\par 11/21/18 CTE w C: limited 2/2 bowel underdistention, mucosal hyperenhancemt & extensive SM edema of distal ileum including vladislav-ileum, difficult to exclude a sml fluid collection, Liver w relative enlargement w suggestion of lobulation, enlargemt of PV,SMV,IMV,Spl V, rectal V. No ascites\par 11/28/18: Hgb=10, ESR=19, CRP=17,Alb=3.5,Iron=35, Sat%=11, Urtbwmkq=874, INR=1.3\par 11/29/18: mo=665, BMs: # 5-6, 3-4x/D\par 12/3/18: Abd sono--Liver 14.8cm Incr heterogenicity, spleen--wnl\par 12/11/18 & 1/14/19: Entyvio\par 1/14/19:Entyvio,  Hgb=10.7, ESR=15, Alb=3.6, Iron=36, Ferritin=67, Sat%=11, INR=1.6\par 1/24/19:  fu=742, stools are # 4-6, 3-4x/d , no pain\par 2/5/19: Hgb=11.2, ESR=14, CRP=0.36\par 2/28/19: Hgb=11.5, ESR=12, CRP=6.5, Alb=3.7, Iron=69, Iukshouh=763, Ca=8.9\par                Bms: # 4-5, 2-3x/D , bi=574,  Entyvio : last 2/1519,\par                had parathyroid scan pre-op, still w elev PTH, + activity in mediastinum,? ectopic parathyroid tissuevs neoplasm.  To see ENT and have [facility] at [facility]\par 3/28/19: Bms: # 4-5 , 3x/d ;zk=064\par 4/11/19: Hgb-9.7, Iron=45, ESR=18, CRP=9.4, Alb=3.5, \par Saw Endo SX at [facility], felt hyperparathyroid is secondary to Low Ca/Vit D, no sx at this time\par 4/16/19: BMs: # 5-6, 3-4x/D, em=706,  Entyvio : last 3/1519,  got IV iron 4/12/19 for Ferritin=53\par 4/27/19: s/p R Hip Nailing--missed 4/2019 2/2 fresh wound\par 5/16/19 & 6/18/19 Entyvio\par 7/2/19: Hgb=11.7, Ferritin=41,ESR=12, CRP=5.5, B6=2.8,Mag=1.8,Phos=3.9,Zc=832,Zinc=61\par 7/11/19: s/p IV iron\par 7/11/19: Alb=3.7, WUX=712, Ca=9.1, Vit D=40/306, \par 7/24/19: Entyvio, Alb=3.8, KKM=292, Ca=8.9, Vit D=128 \par 8/1/19: Bms: # 5-6, occas #4, 2-3x/D , rc=408\par 8/15/19: Hgb=12, ESR=10, CRP=5.2, Ferritin=68, Alb=3.4, Phos=4.4,Mg=1.7, Ca=8.5,Calprotectin=88,\par 8/20/19: EGF=057, Ca=9, Alb=4.2\par 9/19/19: Hgb=12.8, ESR=9, CRP=5.5, Alb=3.7, Rpyzlgwr=911, Mag=1.8, Ca=8.9, Phos=4.2\par 9/26/19: jh=628, BMs: #5-6, 2-3x/D, no pain\par 10/17/19: gm=460, BMs: #4-6, 1-2x/D, no Pain; Hgb=14, ESR=7, CRP<5, Alb=3.9, Xerrczop=156, Mag=1.7, Ca=9.6\par 10/24/19: gd=429, Bms: # 4-6 , no pain,\par 11/6/19: ESR=6, CRP=6, PTH=80,Ca=9.1, VitD=50\par 11/14/19: qc=834, BMs: # : 4, 1-2, 0ccas #5\par  11/17/19: ESR=6, CRP<5, Blluzqjk=604, \par  12/19/19: mo=419, BMs: #5-6, 2-3x/D\par 1/6/20: entyvio\par 1/13/20: ESR=7, CRP=0.2, Hgb=13.5, Kbaaxkwp=594, P68=510, FA=10, Alb=4.1, Ca=9.1\par 1/16/20: wt = 127, BMs: # 5, 1-3x/d\par 1/30/20: ESR=9, CRP=11, Hgb=13.9, Piahhofp=272,Iron=38, Alb=3.9,Ca=9.2, PTH=87,\par 2/3/20 EGD: gastritis, +MACKENZIE, No HP, +erosion w ooze -clipped, 0.5cm polyp-neg; 4cm HH, Esophagitis A, + Barretts, VC: 1+\par Colonoscopy: 05cm rectal polyp: HP, 2nd deg int hemorrhoids\par mild-mod activity: MARILY w cryptitis & mild archect distortion at ileocolonic anast: colon & ileal side, no stricture\par 2/4/20: Entyvio; 2/12/20: IV Iron, 3/3/20: Entyvio\par 2/25/20: pv=937,  BMs: # 4-5, 1-2x/ d\par 3/19/20: te=039, BMs: #4-5, 1-2x/D\par 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\par 10/17/20 : Hgb=13, CRP< 5, ESR= 11, Iron=44, Ferritin=46, Alb=4, Phos=2.3, \par  s/p IV Iron x 2 ,  11/4 and 1 week prior\par  Entyvio ~ 2 weeks ago \par \par  Today:  no pain, stool more formed \par         \par        rd=534\par        Abd pain--no \par        Nausea--no \par        Vomit--no \par        Early satiety-no \par        Belching-no \par        Regurgitation--no \par        Ht burn--no \par        Throat Clearing-no\par        Globus-no\par        Hoarseness--no \par        Dysphagia--no \par        BMs:  # 4-5, 1-2x/D \par        Constipation--no \par        Diarrhea- intermittent: occas \par        Bloating--no \par        Flatulence-no\par        Gurgling--no \par        Melena--no \par        BRBPR--no \par        Anorexia-no \par        Wt Loss--stable\par \par \par \par        PRIOR HISTORY--2017\par \par        1/17/17: Hgb=9.2, ESR=6, CRP=5; 1/19/17: BMs: #4, 5-6, 2-3x/D, Yo=105, Started Creon 1 tid cc\par        3rd Entyvio begining Feb\par        2/14/17: Labs; Hgb=9.6, MCV=97, ESR=5, CRP< 5, V98=134, FA>23, Iron=59, Retic =3.2,\par        2/17/17 Fecal Calprotectin=16, Fats: Increased neutral & Split\par        3/13/17: Labs Hgb=9.5, MCV=95, ESR=7, CRP <5, ALB=3.1\par        3/16/17: lot of stress, to move -[place], had a job-fell thru, BMs: # 5, 2-4 x/D, wt= 131w clothes\par        4/19/17 EGD: gastritis, MACKENZIE, No HP, 2cm HH, +Barretts, No Dysplasia\par        Colonoscopy: Anastamosis: open w mild -mod active colitis, enteritis severe adj to anastomosis, mild more proximal, remainder of colon-wnl, 2-3 deg int hemorrhoids\par        4/26/17 : Hgb=7.3, MCV=95, ESR=13, CRP<5;Immediately post procedure stools very dark on eliquis/iron.\par        Admitted to [facility]: Hgb=8.3-->8.9 after 2 U, Alb 3.3, BUN=17, creat=1.3, Malina/Ilvtd=337/460\par        4/27/17: Alb=2.3, BUN=12, creat=1.2, Malina/Lipase=209/863-No abd pain, N/V; 5/5/17: Hgb=10.7.\par        5/8/17 Entyvio iv. 5/8-5/9 w dark red diarrhea; 5/10/17 Hgb=7.8.\par        5/11/17 Adm [facility] w stools brown, Hgb=7.9, BUN=17, Creat=1.4, Alb=2.9; S/P 2 Units- Hgb=10.6, BUN=15/1.1.\par        Prednisone 40mg qd, entocort d/dee.; 5/18/17: Hgb-10.4, aw=667, BMs: #5, 1-2x/D, no pain\par        6/8/17: Hgb=7.4,MCV=98, CRP<5-ASA stopped, Pred20--> 30\par        6/10/17: Hgb=8.2, Alb=2.9, BUN=20/1.2 ; 6/12/17: Hgb=8.3, Retic=0.19, Iron=10, Sat%=3, Ferritin=57, FA=23,U94=018, 6/13/17: s/p 2 Unit PRBCs\par        6/15/17: started MCT oil, Ib=982 , BMs: # 5, 1-2x/D, stools are brownish/green \par        7/11/17: [facility] w unsteadiness. MRI Head: R Cortical Temporo-occipital encephalomalacia, MRA H&N-no sign lesions, PER-wnl.Hgb=9.9--> 8.4->9.7 after 1 Unit. Seen by Torri: received IV Iron \par        CRP<5, X07=435, FGV=392, FA>23,Iron=22,Sat%=6, Ferritin=42, Alb=3.5. D/C on warfarin 2mg\par        7/20 Hgb=8.5, 7/28 Hgb=7.7, 7/31-s/p 1 Unit \par        As outpt seeing Heme, to get IV Iron and 5 IV Copper\par        Had 'FLU' Fever=101, chills, bloat, N/V/D, Bilious. no pain, melena,brbpr\par        Given anti-emetic by dr Butler,then able to keep things down\par        On prednisone 25, warfarin w INR bet 1.6-2\par        8/17/17: Hgb=9.9, ESR=20, CRP-P,Rj=824, BMs: # 5, 1-2x/D, stools are brownish/green\par        10/2/17: Hgb=8.8, MCV=89,Phos=1.7, K=3.9, Mag=1.9, Alb=3.6,Iron<10,Ferritin=21, INR=2\par        10/17/17: Hgb=7.9,ESR=6,CRP<5, INR=1.6\par        10/25/17: Hgb=10, ESR=5, CRP=5, Alb=3.6, Phos=2, Xecwvjvf=854, R71=977\par        11/16/17: Hgb=11.2, ESR=14, CRP=12, \par        11/13/17: MRE-Chr mild distension of distal & vladislav-ileum s/o mild stricturing at anast, mild mural thick & mucosal enhancemt ~ 20cm; little change from 2015 c/w mild acute on chr ileitis, 2.1 cm Liver hemangioma\par        11/28/17: Entyvio\par        11/29/17: Hgb=9.6, ESR=10, CRP=5.8, Alb=3.8,Ferritin-P, Iron=14,Sat=4%, Phos=2.5\par        12/19;17: Hgb=10.1, ESR=12, CRP=6.5; 12/21/17: BMs:#3-5, 1-3x/D , brown, no blood, no pain, vs=748\par        1/3/18:Hgb=11.7, ESR=19, CRP=6.5, Alb=4.1, Hzvscfnr=667, Iron=31, Sat%=9,Zinc=55\par \par \par        PRIOR HISTORY---2013:\par \par        2/20/13 CT markedly dilated sb loops ext to anast, colonoscopy w open anast ,mild-mod remy-anastamotic disease. quick response to entocort/humira--probably adhesive dis. \par        8/10/13 w GNR bacteremia, ADA 1.7 /KAYLAH 3.4, Humira 8/7, 8/13,8/18,9/15\par        CT- mucosal thickening and spiculation of the distal sb extending to the anastamosis, thickening and stranding of adj mesenteric fat.\par        Humira increased to 40mg weekly, entocort 4\par        9/2013 MRE--dilated loops in mid and distal ileum, markedly thickened and narrowed TI w decreased peristalsis of TI\par        11/15/13 ADA-24.9, KAYLAH-0\par \par \par        PRIOR HISTORY---2014:\par \par        2/15/14--CT dilated loops SB, loop of sb in mid abd 4.3cm w infiltrative changes in the mesentery, bowel tapers in the RLQ to the anastamosis w/o transition\par        WBC=15K, Rx w IV steroids and Abx \par        3/7/14 SBFT: last 10cm sb prox to anast mild distension and sl irregularity. In the mid portion of this loop there is a mild narrowing which appears to reopen but is some what narrowed.\par        3/20/14 ADA=33.1, KAYLAH=0, Lialda switch to Apriso 4 (2/2014)\par \par \par        PRIOR HISTORY--2015\par \par        1/11/15 Adm [facility] w 1 day N,Recurrent V, Abd pain/distension. CT-multiple distended & fluid-filled sb loops, mild wall thickening of ileum w No inflamm changes.\par        WBC=14.6, Hgb=17, RX w NGT suction, Levaquin iv, Solumedrol 40mg q 12( 1/12-->1/16) to prednisone 30mg BID w 5mg taper/wk\par        3/18/15 --Dr. Butler w edema /High BP, prednisone was tapered slowly to 2.5mg \par        switched to entocort 9mg qd, edema and bp improved w lasix 20mg \par        BMs:#4-5, 3x/D, Hgb=11, ESR=4, CRP<5\par        4/28/15 - 5/1/15: Adm [facility] w 1 day Abd pain, distension,N/V. 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 side of anast, opening appeared ulcerated,scarred & severely narrowed\par        6/11/15: [facility] w N/V x1, decr appetite, CT ABD: no obstruction, dilated thickened sb loops of distal jej and ileum\par        6/19/15 [facility]: s/p Lap w extensive lysis of adhesions, hepatic flex, sigmoid and sb anastamosis, s/p partial r colectomy and sb resection--side to side elisabeth: 8-10 inch from prior anast to TV colon.\par        7/17/15: BMs:#4-6, 4-5x/D, wt 131(from 126)\par        8/20/15: BMs: #4, 1-2x/D or #5, 2-3x/D, db=047, dry cough,Hgb=10, WBC=3, WSO=540, CRP=56, ESR=21\par        8/25/15 CT Abd/Pelv: Svl RLQ sb loops w wall thickening, mild nodularity & inflamm stranding in mesentery\par        Advised-restart Entocort 9mg qd, Apriso 4 qd, Maintain Humira but given RX to check drug/Ab levels\par        9/15/15: did nt restart meds,Hgb=9.9, WBC=4, ESR=19, CRP=5.8, uh=209; [facility] : ADA=8.5, Antibodies< 1.7\par        10/15/15: No pain, BMs:#4, 1-2x/D, #5-6, 3-4x/D, throat clearing/cough-better, ak=538\par        11/30/15: No pain, BMs:# 4, 5-6 intermittently, No throat clear, xd=239\par \par \par        PRIOR HISTORY-2016\par \par        1/22/16; 4/8/16; 6/6/16 : No pain, BMs:# 4-5 1-2x/D, also #5-6 3x/D for 2-3D/wk, qr=936\par        7/14/16: wn=219, 9/22/16: rp=953.5\par        11/10/16: 9.5lb wt loss, states BMs: 5-6, 5x/D--Taking Magnesium, No pain/anorexia\par        Labs: Stool Yzyapzhxopkf=345, + Incr Fecal fat, Alb=3.4, FA =23, A34=515, Hgb=12, ESR=10, CRP <5\par        Magnesium-d/c, Obtain MRE asap--Start steroids and possibly switch to Entyvio\par        DDx discussed: active crohns--loss response to Humira, Malabsorption--loss Bile acids, Bile-induced diarrhea, SIBO\par        Pt wanted to wait for imaging-did not start rx\par        12/1//16 MRE: RUQ ileocolonic anastamosis--narrowed w upstream dilatation to 3.2 cm\par        Pt refused pred, Started Entocort 9mg qd and Flagyl 250mg qid about 7-10days ago \par        awaiting Entyvio load to start 12/22, then 1/5; had Cut back on iron bid\par        12/15/16: BMs:# 5, 2-3x/D, occas #6, No pain, Less bloat/flatulence, Gc=103.

## 2020-11-10 ENCOUNTER — LABORATORY RESULT (OUTPATIENT)
Age: 55
End: 2020-11-10

## 2020-11-11 LAB
THYROGLOB AB SERPL-ACNC: <20 IU/ML
THYROGLOB SERPL-MCNC: 0.45 NG/ML

## 2020-11-12 LAB
25(OH)D3 SERPL-MCNC: 87.5 NG/ML
T4 FREE SERPL-MCNC: 1.2 NG/DL
THYROGLOB AB SERPL-ACNC: <20 IU/ML
THYROGLOB SERPL-MCNC: <0.2 NG/ML
TSH SERPL-ACNC: 6.61 UIU/ML

## 2020-11-16 ENCOUNTER — APPOINTMENT (OUTPATIENT)
Dept: OTOLARYNGOLOGY | Facility: CLINIC | Age: 55
End: 2020-11-16

## 2020-11-24 ENCOUNTER — RESULT REVIEW (OUTPATIENT)
Age: 55
End: 2020-11-24

## 2020-11-24 ENCOUNTER — APPOINTMENT (OUTPATIENT)
Dept: HEMATOLOGY ONCOLOGY | Facility: CLINIC | Age: 55
End: 2020-11-24

## 2020-11-24 ENCOUNTER — APPOINTMENT (OUTPATIENT)
Dept: HEMATOLOGY ONCOLOGY | Facility: CLINIC | Age: 55
End: 2020-11-24
Payer: COMMERCIAL

## 2020-11-24 VITALS
WEIGHT: 134.5 LBS | HEIGHT: 70 IN | TEMPERATURE: 98.2 F | OXYGEN SATURATION: 97 % | RESPIRATION RATE: 18 BRPM | BODY MASS INDEX: 19.26 KG/M2 | SYSTOLIC BLOOD PRESSURE: 116 MMHG | DIASTOLIC BLOOD PRESSURE: 78 MMHG | HEART RATE: 78 BPM

## 2020-11-24 PROCEDURE — 99214 OFFICE O/P EST MOD 30 MIN: CPT

## 2020-11-24 NOTE — CONSULT LETTER
[FreeTextEntry3] : Angie Biswas MD\par [facility] [place] [facility]\par

## 2020-11-24 NOTE — HISTORY OF PRESENT ILLNESS
[de-identified] : Patient is a 52 year old who is referred for initial consultation for anemia secondary to Crohns/GI bleed vs Iron Deficiency/ Vit b12 def. 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 Patient is status post ileo-colonic resections for SBO  in 2016. In 2016 patient had complaints of 10 - 15 lb weight loss. Labs at that time showed Hgb of 12, steatorrhea and stool calprotectin = 236. In 2016 MRI/MRE with narrowing at ileocolonic anastomosis with upstream dilation. Pt refused bridge with  prednisone and Entocort / Flagyl. In 2017 patient Hgb dropped to 9.5. On 2017 patient underwent an EGD and Colonoscopy. Findings consistent with Page's and no dysplasia or AVMs. Colonoscopy showed an open anastomosis but active disease, moderate on the colonic side and limited to the anastomosis and moderate to severe enteritis, just proximal to the anastomosis. Pat had routine labs on 05/10/2017 Hgb: 8.3.  [FreeTextEntry1] : s/p injectafer, copper\par warfarin [de-identified] : Patient presents for follow up of  anemia, DVT, MTHFR gene mutation follow up, currently on Coumadin 3mg- takes 1.5mg once a week for lower lab values with INR level that takes with home machines.

## 2020-11-24 NOTE — ASSESSMENT
[FreeTextEntry1] : 1.  Anemia- Hgb 13.0\par  -blood loss, anemia of chronic disease, \par - copper deficiency - replaced, level WNL 5/2020\par -10/2020 ferritin dropped to 46- IV iron September 2020\par \par 2. Osteoporosis \par - left ankle- stress fx- advanced  osteoporosis, patient with h/o steroids use\par - started Forteo with Dr. Akers\par - calcium level stable, stopped IV\par - PT for osteoporosis\par \par  3.TIA with MTHFR and h/o DVT x 3 with cryptogenic CVA  \par not a candidate for DOAC b/o small bowel resection\par - INR home monitoring of warfarin takes 3 mg daily\par - Warfarin 3 mg x 6, 0.5mg x 1, INR- INR 1.5 today will take 5mg tonight and resume reg dosing \par \par 4. Hypogammaglobulinemia- no increased infection rate \par - s/p  Prevnar 13 12/2018 \par -  Pneumococcal vaccine 23 boost \par - s/p  Shingrex\par \par 5. Zinc deficiency\par - oral Zinc, level better - 72\par \par Dr. Luis Felipe Monsalve\par cell [phone]\par office [phone]\par \par \par \par \par \par

## 2020-12-28 ENCOUNTER — RESULT REVIEW (OUTPATIENT)
Age: 55
End: 2020-12-28

## 2021-01-04 ENCOUNTER — RESULT REVIEW (OUTPATIENT)
Age: 56
End: 2021-01-04

## 2021-01-04 ENCOUNTER — APPOINTMENT (OUTPATIENT)
Dept: HEMATOLOGY ONCOLOGY | Facility: CLINIC | Age: 56
End: 2021-01-04
Payer: COMMERCIAL

## 2021-01-04 VITALS
RESPIRATION RATE: 18 BRPM | WEIGHT: 137.2 LBS | SYSTOLIC BLOOD PRESSURE: 118 MMHG | HEIGHT: 70 IN | DIASTOLIC BLOOD PRESSURE: 81 MMHG | HEART RATE: 88 BPM | OXYGEN SATURATION: 99 % | TEMPERATURE: 97.8 F | BODY MASS INDEX: 19.64 KG/M2

## 2021-01-04 PROCEDURE — 99072 ADDL SUPL MATRL&STAF TM PHE: CPT

## 2021-01-04 PROCEDURE — 99214 OFFICE O/P EST MOD 30 MIN: CPT

## 2021-01-04 NOTE — CONSULT LETTER
[Dear  ___] : Dear  [unfilled], [Consult Letter:] : I had the pleasure of evaluating your patient, [unfilled]. [Please see my note below.] : Please see my note below. [Consult Closing:] : Thank you very much for allowing me to participate in the care of this patient.  If you have any questions, please do not hesitate to contact me. [Sincerely,] : Sincerely, [DrMeron  ___] : Dr. REARDON [FreeTextEntry3] : Angie Biswas MD\par [facility] [place] [facility]\par

## 2021-01-04 NOTE — HISTORY OF PRESENT ILLNESS
[0 - No Distress] : Distress Level: 0 [de-identified] : Patient is a 52 year old who is referred for initial consultation for anemia secondary to Crohns/GI bleed vs Iron Deficiency/ Vit b12 def. 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 Patient is status post ileo-colonic resections for SBO  in 2016. In 2016 patient had complaints of 10 - 15 lb weight loss. Labs at that time showed Hgb of 12, steatorrhea and stool calprotectin = 236. In 2016 MRI/MRE with narrowing at ileocolonic anastomosis with upstream dilation. Pt refused bridge with  prednisone and Entocort / Flagyl. In 2017 patient Hgb dropped to 9.5. On 2017 patient underwent an EGD and Colonoscopy. Findings consistent with Pgae's and no dysplasia or AVMs. Colonoscopy showed an open anastomosis but active disease, moderate on the colonic side and limited to the anastomosis and moderate to severe enteritis, just proximal to the anastomosis. Pat had routine labs on 05/10/2017 Hgb: 8.3.  [FreeTextEntry1] : s/p injectafer, copper\par warfarin [de-identified] : Patient presents for follow up of  anemia, DVT, MTHFR gene mutation follow up, currently on Coumadin 3mg- takes 1.5mg once a week for lower lab values with INR level that takes with home machines.

## 2021-01-04 NOTE — ASSESSMENT
[FreeTextEntry1] : 1.  Anemia- Hgb 13.6 \par  -blood loss, anemia of chronic disease with need for intermittent iron  \par - copper deficiency - replaced, level WNL 5/2020\par - 11/2020- Ferritin 141, last IV iron November 4 2020- Venofer 400 mg x 2\par \par 2. Osteoporosis \par - left ankle- stress fx- advanced  osteoporosis, patient with h/o steroids use\par - completed Forteo 18/24 m\par - calcium level stable,IV calcium 1-2 times per month\par - PT for osteoporosis\par - dexa - Sep 2020- Left fem neck -2.8 \par \par  3.TIA with MTHFR and h/o DVT x 3 with cryptogenic CVA  \par not a candidate for DOAC b/o small bowel resection\par - INR home monitoring of warfarin takes 3 mg daily\par - Warfarin 3 mg x 6, 1.5mg x 1, INR- INR 2.4 at home\par \par 4. Hypogammaglobulinemia- no increased infection rate \par - s/p  Prevnar 13 12/2018 \par -  Pneumococcal vaccine 23 boost \par - s/p  Shingrex\par - schedule COVID vaccine \par \par 5. Zinc deficiency\par - oral Zinc, level better - 72\par \par Dr. Luis Felipe Monsalve\par cell [phone]\par office [phone]\par \par Labs next visit- Zinc, Copper, Irons, Igg, \par \par \par \par \par \par

## 2021-01-11 ENCOUNTER — RESULT REVIEW (OUTPATIENT)
Age: 56
End: 2021-01-11

## 2021-01-11 ENCOUNTER — NON-APPOINTMENT (OUTPATIENT)
Age: 56
End: 2021-01-11

## 2021-01-13 ENCOUNTER — APPOINTMENT (OUTPATIENT)
Dept: ENDOCRINOLOGY | Facility: CLINIC | Age: 56
End: 2021-01-13
Payer: COMMERCIAL

## 2021-01-13 ENCOUNTER — NON-APPOINTMENT (OUTPATIENT)
Age: 56
End: 2021-01-13

## 2021-01-13 VITALS
TEMPERATURE: 97.4 F | BODY MASS INDEX: 20.31 KG/M2 | WEIGHT: 134 LBS | SYSTOLIC BLOOD PRESSURE: 120 MMHG | OXYGEN SATURATION: 98 % | DIASTOLIC BLOOD PRESSURE: 76 MMHG | HEIGHT: 68 IN | HEART RATE: 86 BPM

## 2021-01-13 PROCEDURE — 99072 ADDL SUPL MATRL&STAF TM PHE: CPT

## 2021-01-13 PROCEDURE — 99215 OFFICE O/P EST HI 40 MIN: CPT

## 2021-01-13 RX ORDER — TERIPARATIDE 250 UG/ML
600 INJECTION, SOLUTION SUBCUTANEOUS
Qty: 9 | Refills: 1 | Status: DISCONTINUED | COMMUNITY
Start: 2020-05-21 | End: 2021-01-13

## 2021-01-13 RX ORDER — SYRINGE WITH NEEDLE, 1 ML 25GX5/8"
25G X 5/8" SYRINGE, EMPTY DISPOSABLE MISCELLANEOUS
Qty: 24 | Refills: 0 | Status: DISCONTINUED | COMMUNITY
Start: 2017-07-24 | End: 2021-01-13

## 2021-01-13 RX ORDER — PEN NEEDLE, DIABETIC 29 G X1/2"
31G X 5 MM NEEDLE, DISPOSABLE MISCELLANEOUS
Qty: 1 | Refills: 1 | Status: DISCONTINUED | COMMUNITY
Start: 2019-05-10 | End: 2021-01-13

## 2021-01-13 RX ORDER — LEVOTHYROXINE SODIUM 0.12 MG/1
125 TABLET ORAL DAILY
Qty: 60 | Refills: 3 | Status: DISCONTINUED | COMMUNITY
Start: 2020-09-17 | End: 2021-01-13

## 2021-01-13 NOTE — HISTORY OF PRESENT ILLNESS
[FreeTextEntry1] : Mr. GUDELIA DUNN is a 54 year old male\par  coming for a f/u , for severe osteoporosis with bilateral hip fractures, hyperparathyroidism, low D and hypocalcemia secondary to Chron's disease\par on Vit D 50,000 IU 4 times a week\par \par started Forteo May 10 2019\par last DEXA 9/2019 stable \par \par on IV calcium each Friday at the infusion center, last one a month ago \par labs done much improved \par \par Ca citrate 950mg 3 tab bid \par reports compliance \par \par 24hr urine low calcium while low D \par parathyroid scan reviewed parathyroid adenoma versu\par \par total thyroidectomy and central compartment  dissection 9/11/2020\par Pathology showing 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 No evidence of lymphatic or vascular invasion no extra thyroid extension. 8 of 10 lymph nodes positive for metastatic papillary thyroid carcinoma.\par \par Large test metastatic focus is 0.7 cm in greatest dimension extra low dose extension present. Thyroid gland left sided within normal limits. Tihymic tissue present.\par \par received iodine 131 at 29.3 mCi in October 2020\par Posterior ideation scan showed focal site of increased tracer localization in the neck to the right of midline as was evident on prior study on October 23, 1920.\par \par started Levothyroxine 125 mcg on 9/18/2020 no side effects dose increased to 137 mcg on November 2020\par \par last DEXA 9/2020\par As compared to the patient's most recent study dated 9/12/2019, there has been a statistically\par significant interval increase in bone density at both the lumbar spine and left hip with no s\par tatistically significant change noted at the left forearm.\par worse T score LFN -2.8

## 2021-01-13 NOTE — REASON FOR VISIT
[Follow - Up] : a follow-up visit [Osteoporosis] : osteoporosis [Hyperparathyroidism] : hyperparathyroidism [Thyroid Cancer] : thyroid cancer

## 2021-01-14 ENCOUNTER — APPOINTMENT (OUTPATIENT)
Dept: GASTROENTEROLOGY | Facility: CLINIC | Age: 56
End: 2021-01-14
Payer: COMMERCIAL

## 2021-01-14 VITALS
HEIGHT: 68 IN | HEART RATE: 80 BPM | WEIGHT: 134 LBS | DIASTOLIC BLOOD PRESSURE: 78 MMHG | BODY MASS INDEX: 20.31 KG/M2 | SYSTOLIC BLOOD PRESSURE: 120 MMHG | TEMPERATURE: 98.2 F

## 2021-01-14 PROCEDURE — 99072 ADDL SUPL MATRL&STAF TM PHE: CPT

## 2021-01-14 PROCEDURE — 96372 THER/PROPH/DIAG INJ SC/IM: CPT

## 2021-01-14 PROCEDURE — 99215 OFFICE O/P EST HI 40 MIN: CPT | Mod: 25

## 2021-01-14 RX ORDER — CYANOCOBALAMIN 1000 UG/ML
1000 INJECTION INTRAMUSCULAR; SUBCUTANEOUS
Qty: 0 | Refills: 0 | Status: COMPLETED | OUTPATIENT
Start: 2021-01-14

## 2021-01-14 RX ADMIN — CYANOCOBALAMIN 0 MCG/ML: 1000 INJECTION INTRAMUSCULAR; SUBCUTANEOUS at 00:00

## 2021-01-14 NOTE — ASSESSMENT
[FreeTextEntry1] : 1. Crohns disease of both small and large intestine\par    \par CROHNS DISEASE-s/p ileocolonic resection x 2--last 6/19/15 for recurrent sbos: appears to be stable , GI symptoms stable but labs were up CRP=18.6/ESR=19 & Htuqggpbfqev=347 and Wt down ( inflam markers ? 2/2 to R ankle Fx/stress rx and tendinosis, also had tooth infection ) & MRE w ? ileal penetrating dis, \par Presently GI symptoms, APR's and wt are all improved w wt =135 - 137 _____\par s/p 4 SBOs w/i 2 yrs--2/2 distal sb disease adj to anastamosis\par when on Humira weekly w ADA =33 (3/20/14), -but had sbo 2/2014 at ADA=25 and another sbo 4/28/15\par 6/10/2015 Colonoscopy: Inflamm ST mass on ileal side w ulceration/scarred/narrow\par 6/11/2015 CT: dilated thickened sb loops distal jej & ileum\par Post-op **probably some malabsorption 2/2 to removal of R Colon and ileum: ?bile/fat/SIBO\par WT. Loss: r/o malabsorption, ?bile-induced--secretory vs decr micelles, active dis, PLE\par r/o SIBO--Elev FA, Alb=3.4-->3.1-->2.9--> (7/28/17)\par ?SIBO/Prevent post-op recurrence --Flagyl 250 mg qid~12/7/16-->d/c: 5/11/17\par Also discussed trial of Cholestyramine/Xifaxin -wanted to wait\par **ACTIVE Crohn's--11/10/16: 9.5lb wt loss, BMs: #5-6, 5x/D--Taking Magnesium \par 12/1/16 MRE: RUQ ileocolonic anastamosis--narrowed w upstream dilatation to 3.2 cm\par Labs: Stool Eiwivyqywtac=358, + Incr Fecal fat, Alb=3.4, FA =23, I49=402, Hgb=12.3,ESR=10,CRP <5\par 4/19/17 :Colonoscopy: Anastamosis: open w mild -mod active colitis, enteritis severe adj to anastomosis, mild more proximal, remainder of colon=wnl\par 5/11/17- Adm [facility] w stools brown, Hgb=7.9, BUN=17, Creat=1.4, Alb=2.9.\par S/P 2 Units w Hgb=10.6, BUN=15/1.1, Prednisone 40mg taper, entocort d/dee\par 6/8/17: Hgb=7.4, MCV=98, CRP<5--ASA stopped, Pred20--> 30mg, 6/13/17: s/p 2 Unit PRBCs\par 7/11/17 [facility] w unsteadiness. MRI Head: R Cortical Temporo-occipital encephalomalacia\par Hgb=9.9--> 8.4->9.7 after 1 Unit. Seen by Heme: received IV Iron \par CRP<5, D77=823, QYS=262, FA>23,Iron=22,Sat%=6, Ferritin=42, Alb=3.5. D/C on warfarin 2mg\par 7/28/17 Hgb=7.7; 7/31/17-s/p 1 Unit \par Heme Consultation: received  IV Infusions of  Iron and 5 IV Copper:___\par **Entyvio :time 0=12/22/16 ( Humira 40mg QW); 1/5/17--2wks, s/p 3rd infusion at wk 6, \par #6 :6/21/17--held 2/2 Shingles, Last Infusions=9/19/17 , 10/17/17, 11/28/17, 1/16/18 ,2/16/18, 3/19/18,4/16/18, 5/14/18, 6/25/18, 8/7/18, 9/17/18, 10/16/18, 11/13/18, 12/11/18, 1/14/19, 2/15/19, 3/15/19, 5/16/19, 6/18/19,7/24/19,\par 9/9/19, 10/2/19, 11/4/19, 12/12/19, 1/6/20, 2/4/20, 3/3/20, 9/17/20, 10/15/20, 11/18/20, 1/11/21 _______\par Entocort 9mg qd: 12/7/16--d/dee 5/11/17\par **Prednisone 40mg qd (5/11/17)--tapered 5mg/wk to 20mg qd, 6/8/17 30mg, \par 7/25/17 25mg, 3wks ago 20--> 15mg, 10/19/17 10mg alt w 7.5-->11/16/17 10mg alt w 5mg-->11/30/17: 5mg-->12/21/17: 5 alt w 2.5mg-->1/18/18: 2.5mg qd-->2.5mg qod--> d/c \par Probiotics 3 qd \par Lactose free, protein drinks tid\par MCT oil begun\par **trend --cbc, esr, crp, albumin\par **monitor wt= 120-->134-->134 --> 135--> 132 w clothes-->133--> 131 (Low carbs now)--> 129--> 127-->126-->124-->125-->124--> 126-->125-->125-->125-->126-->128-->127-->130-->131--> 135-->135 - 137 ____\par Wt Loss : sig change since May-June/2018\par 8/17/17: Hgb=9.9, ESR=20, Zl=313--Rvaangcwhq s/p GI infection w N/V/D, no obstruction\par 10/17/17: Hgb=7.9,ESR=6,CRP<5, INR=1.6, Got IV Iron x 2, since 10/2/17 for Iron<10, Hgb=8.8\par 11/16/17: Hgb=11.2, ESR=14, CRP=12, 10/26/17 Stool fat-neg, calprotectin-30\par 11/13/17: MRE-Chr mild distension of distal & valdislav-ileum s/o mild stricturing at anast, mild mural thick & mucosal enhancemt ~ 20cm; little change from 2015 c/w mild acute on chr ileitis, 2.1 cm Liver hemangioma\par 10/9/18: Hgb=11.6, MCV=94, ESR=14, CRP=5.4, INR=2.2\par 10/10/18 MRE: stricturing of neoterminal ileum w worsened upstream dilatation, moderate length of upstream ileum w stricturing extending at least 20cm and more extensive then previous. suggestion of 2 adj extraluminal fluid collections which may be w/i the wall of strictured ileum near the ileocolonic anastomosis. surrounding spiculation and tethered appearance of bowel in this region.\par CTE: no discrete collection or intramural fistula, but mucosal enhancemt\par \par Colonoscopy: 05cm rectal polyp: HP, 2nd deg int hemorrhoids\par mild-mod activity: MARILY w cryptitis & mild archect distortion at ileocolonic anast: colon & ileal side, no stricture\par \par Today: 1/4/21 : feeling ok , Mm=971 - 137 ____off prednisone, BMs Brown: #4-5,1-2x/d___, Hgb=13.6\par CRP <5 : ? s/p recent ankle fx/stress rx/tendonitis and tooth infection \par prior stool studies w elev  calprotectin and No fat\par Wt loss-- 2/2 to low carbs-->fat burning and flare ; advised to liberalize and add protein, ensure\par Plan: Prednisone d/c 2/20/18\par Entyvio q 6 wks --> 4 weeks \par check inflammatory markers: 2/28/19 w ESR=12, CRP=6.5 & 2/21/19 stool calprotectin--> 232\par 8/15/19: ESR=10, CRP=5.2, Calprotectin=88\par 9/19/19: ESR=9, CRP=5.5\par 10/17/19: ESR=7, CRP< 5\par 11/6/19 : ESR=6, CRP=0.18\par 11/27/19: ESR=6, CRP <5\par 1/13/20: ESR=7, CRP=0.2\par 1/30/20: ESR=9, CRP=11\par 3/2/20:ESR=7, CRP=0.26\par 3/9/20: VDZ>40, Ab to VDZ <1.6\par 3/17/20: ESR=6, CRP=6.4\par 10/17/20: ESR=11, CRP <5\par 1/4/21:ESR=9, CRP<5\par pt declined to call numbers given for  [facility] or investigational rx's--biologics.  \par feels he is stablizing w wt loss, and inflammatory markers\par           \par \par today 1/14/21 --  Most recent labs  and imaging reviewed w patient:\par \par check cbc,esr,crp\par trend stool calprotectin \par                                                                                                                                                                                                                                                                                                                                                                                                                                                                                                                                                                                                                                                                                                                                                                                                                                                                                                                                          \par 2. Iron deficiency anemia due to chronic blood loss  \par  had initially dropped , after clinical flare and post procedure bleeding\par Probably multifactorial: ACD, Blood loss, malabsorption/nutrient deficiencies\par Eliquis-->warfarin after CVA, On Entyvio and prednsione for active dis\par Still requiring IV Iron--prn, \par s/p IV Cu & transfusions \par 7/11/17 Recent Adm for unsteady gait w MRI c/w CVA--warfarin begun: possible better control of AC\par Chromagen 2 qd--> IV Iron , s/p IV Cu x 5, FA 1mg qd , B12 SL & IM\par  1/14/19: Hgb=10.7, INR=1.6, 2/5/19: Hgb=11.2,INR=1.5; 2/28/19: Hgb=11.5, Puabqhaf=782; 3/11/19: INR=2.6\par 4/11/19: hgb=9.7, INR=1.6, 7/2/19: Hgb=11.7, Ferritin=41,INR=2.2, 8/15/19: Hgb=12.2,Ferritin=81,INR=1.6, \par 9/19/19: Hgb=12.8, 10/17/19: Hgb=14, 10/26/19: Hgb=14, 1/13/20: Hgb=13.5, 1/30/20: Hgb=13.9, Pjirvdyj=015, Iron=38,  3/17/20: Hgb=13.1, Olrzubbn=113, INR=1.7, 10/17/20: Hgb=13, Ferritin=46, INR=1.9,  \par 1/4/21 Hgb=13.6, Ferritn=60, Iron=73\par 7/13/17: Z98=166, EJZ=325,FA>23; 1/3/18 R03=824, Qf=868, Zinc=55; 6/6/18 O94=868, 9/5/18: Yb=347, Zinc=67\par 11/28/18 O16=059, 7/2/19: Gc=404, Zinc=61,B6=2.8, 8/15/19: Mk=943,Zinc=54,Q41=237, 1/13/20: K84=327;\par \par B12 1cc IM ____R. delt-- Given today, \par most recent IV Iron 1/11/21   for 1/4/21  Ferritin =60, Iron=44\par Hem consult IV Iron /Cu given malabsorption, ? BM bx --r/o occult etiology--on hold\par trend cbc, B12, FA, Iron panel \par Adj INR--closer to low 2's.    \par Agree w Heme--Prevnar-13\par \par \par \par 3. Other osteoporosis without current pathological fracture  \par : Progression on BD from 5/5/16\par Crohns, h/o steroid use\par Calcium citrate & Vit D per Endo\par Forteo since 5/10/19 , for Reclast          \par Repeat BD of 8/2018 --showed worsening to Osteoporosis from 2016\par Rec Endo consult w Dr. Akers :Osteoporosis center at [facility]--pt never went originally \par 9/21/18: Phos=2.4, Ca=8.7, Alb=3.4, Vit D=27, BCL=999, \par 10/16/18: Phos=2.8, Ca=8.7, Alb=3.5,\par 1/14/19: Phos=2.6,  Ca=8.7, Alb=3.6\par 2/28/19: Phos=1.8, Ca=8.9, Alb=3.7, VitD=39, YHJ=452\par 3/18/19: Ca=8.5, Alb=3.7, Vit D=44.6, PTH=93\par 7/11/19: Ca=9.1, Alb=3.7, Phos=3.9, Vit D=40/ 306, KMF=393\par 8/15/19: Ca=9, Alb=4.2, Phos=4.4, VitD=59, HYX=324\par 10/17/19: Ca=9.6, Alb=3.9, Phos=3.5\par 11/6/19: Ca=9.1, Alb=3.9. Phos=3.7, VitD=50, PTH=80\par 1/13/20: ca=9.1\par 1/30/20: Ca=9.2, Alb=3.9, Phos=2.7, Mag=1.5, Vit D=103/41, PTH=87\par 2/18/20:ca=8.5, Alb=3.6, \par 3/2/20: Ca=8.5, Alb=3.6\par 3/17/20: Ca=9.1, Alb=3.7, Phos=3.4, Mag=1.7\par 10/17/20: Ca=8.9, Alb=4, Phos=2.3, Mag-2.0, Vit D=66, PTH=47\par 1/4/21 : Ca=9.4, Alb=4.2, Phos=2.7, Mag=2, Vit D=64, PTH=87.5\par Dr. Akers: Hyperparathyroidism-- probably secondary due to low Vit D/Ca & ? superimposed primary, Rx replete Vit D and current IV Calcium\par trend Vit D, Ca, Phos, PTH,  \par [facility] ENT Consult init felt  Parathyroid scan showing activity in mediastinum is ectopic parathyroid, feels sx not indicated at this time, elev PTH is secondary to low  Vit D/Ca--to be reconsulted\par BD--9/2019: incr in spine, no change in wrist/hip\par 10/5/20-s/p Reclast\par \par \par 4. Gastroesophageal reflux disease w esophagitis : well controlled, no ht burn, no dysphagia, LPR\par Dry cough, CXR:ana, saw ENT bergstein-->LPR\par * ++ LPR,  ++  Page’s w No Dysplasia,  No , H/O  Esophagitis grade: A\par * Anti-reflux diet & life-style changes emphasized.  ++ Bedge\par * Weight reduction & regular exercise emphasized\par *  ++   PPI: Protonix 40 mg qd ,  ++ H2B q HS:Zantac 300mg--> Pepcid 40mg  ,  No Carafate  1 gram:\par * F/U  EGD:  [   2   ] mo.  /  [   2022    ] yr\par * No  pH Monitor,  No  Manometry,  No  Esophagram\par * ++   ENT  eval/F/U,  No  Surgical  eval\par \par \par \par \par \par 5. Hemorrhoids:  well - controlled.  No pain,  swelling,  itch,  bleeding\par * Discussed the potential complications of thrombosis,  pain,  infection,  swelling, itching,  bleeding \par Reccomend: \par * Moderate  - Fiber Diet was reviewed and emphasized\par * 6  --  8 cups of decaffeinated fluid daily was emphasized \par * Sitz Bathes BID,  No:  Anusol HC  Suppos / Cream  [place] BID -- was needed\par * No:  Tucks BID,   No:  Balneol Lotion,   No:  Calmoseptine Oint -- was needed ;    ++ Prep H prn\par * No:  need for  Colorectal surgical evaluation for possible ablation w Dr --  was emphasized\par \par \par \par \par \par \par \par 6. Barretts esophagus without dysplasia  \par Notes: see GERD.    \par \par \par \par 7. Hepatomegaly-->not confirmed by recent abd sono\par CTE w relative enlargemt, ? lobulation & enlarged portal/mesenteric veins\par LFTs-wnl, no ascites or edema\par R/O CLD, Hepatic vein thrombosis\par Abd sono w doppler--> Liver and spleen normal size, no thrombosis, normal portal and hepatic vein flow\par \par \par \par \par .\par \par

## 2021-01-14 NOTE — HISTORY OF PRESENT ILLNESS
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-->Eliquis' warfarin \par        GERD w Page's, Hemorrhoids, BPH, \par        S/P Ileocolonic resection 1998\par        Since then multiple SBOs\par \par \par        Got IV Iron x 2, since 10/2/17\par        10/19/17: feeling better, Mo=489 on prednisone 15mg x 3weeks, BMs Brown: #5-6, 1-2/D, occas 3-4/d\par        10/25/17: Hgb=10, ESR=5, CRP=5, Alb=3.6, Phos=2, Txdstozc=302, B01=832\par        11/16/17: Hgb=11.2, ESR=14, CRP=12, \par        11/13/17: MRE-Chr mild distension of distal & vladislav-ileum s/o mild stricturing at anast, mild mural thick & mucosal enhancemt ~ 20cm; little change from 2015 c/w mild acute on chr ileitis, 2.1 cm Liver hemangioma\par        11/28/17: Entyvio\par        11/29/17: Hgb=9.6, ESR=10, CRP=5.8, Alb=3.8,Ferritin-19, Iron=14,Sat=4%, Phos=2.5, Tz=927, BMs:# 5, 1-2x/D, brown,\par        12/19;17: Hgb=10.1, ESR=12, CRP=6.5; 12/21/17: BMs:#3-5, 1-3x/D , brown, no blood, no pain, uj=938\par        1/3/18:Hgb=11.7, ESR=19, CRP=6.5, Alb=4.1, Qbxkgsoh=400, Iron=31, Sat%=9,Zinc=55\par        1/16/18: Entyvio, Hgb=11.4, ESR=10, CRP=6.9\par        1/18/18: Today: cellulitis R foot, saw dr KEITH--rx augmentum x 10D, Entyvio 1/9 to 1/16, vr=762 w clothes,BMs: # 4-5, 1-2x/D \par        2/14/18: Hgb=11.1, ESR=16, CRP=11.6, Alb=3.6, Iron=28, Ferritin=23, INR=1.5 \par        2/16/18: s/p Entyvio; Iron infusion, again on 2/27\par        2/20/18: Hgb=10.6, ESR=20, CRP=12, INR=1.7\par        2/27/18: s/p iron infusion\par        3/9/18: Dr. Burden for tenosynovitis, rx w Zorvolex: Diclofenac x 5 days--? response\par        3/14/18: Is=907; BMs: # 5, 1-2x/D; Hgb=11, ESR=18, CRP=11,Irom=45,Ohdjvdis=010,Alb=3.6, INR=1.5\par        3/19/18: s/p Entyvio\par        3/22/18: zx=852, BMs: # 5, 3-4 x/d\par        4/16/18: s/p Entyvio,Hgb=11.9, INR=1.3, ESR=18, CRP=8.4 \par        4/18/18 EGD: gastritis, no HP, no IM, 2+ Mucous, 0.5cm gastric polyp: fundic, 4cm HH, + Barretts:3cm, no dysplasia\par        4/25/18: Hgb=11.5, INR=1.6, Iron=40, Sat=13%, Ferritin=57, Alb=3.2, Phos=2.2, Mag=1.7\par        4/30/18: s/p Iron Infusion\par        5/14/18: s/p Entyvio \par        5/23/18: Hgb=11.5, ESR=16, CRP< 5\par        5/29/18: s/p Iron Infusion\par        6/6/18: Hgb=10.6, INR=1.7, Wgzxchye=410, Alb=3.5, Phos=2.1, X54=712, ct=527; BMs: # 5, 2-3x/D \par        6/19/18: Hgb=10.6, INR=1, CRP=15, ESR=23\par        6/21/18: R ankle is acting up, swollen, pain, having MRI done, took a couple of advil, on low carbs ,BMs: # 5, 1-2x/D, jv=710\par        6/26/18: MRI: fx/stress rxn-talar body/navicula; stress related changes--cuneform, cuboid,distal tibia; mod tibiotalar effusion, mild-mod peroneal tendinosis\par        7/19/18: entyvio 6/26/18, eliminated all carbs--anti inflammatory diet, ed=488, BMs: # 4-5, 1-3x/D \par        7/25/18: Hgb=10, INR=1.3, Alb=3.3, Phos=2.4, Tntdaeez=016, sat%=12, Iron=35\par        8/2/18: BD--osteoporosis of hip/spine: sig decrease BD--17.6% of hip, 17% of spine, osteopenia of wrist: 6.4%\par        8/7/18: Entyvio\par        8/21/18: Hgb=11.1, INR=1.5, ESR=19, CRP=18.6\par        8/23/18: recently w tooth infection, old implant--loose and removed; rx w amox, and advil\par        eating almost no carbs, ac=126, # 5-6, 2-3x/d \par        8/24/18: Stool fat--neg, Zlryzozdnvtt=024\par        9/5/18: Hgb=11.4, INR=1.3, ESR=9, CRP=11.3, Iron=41, Ssfotfoq=109, Alb=3.8,\par        9/17/18: entyvio, Hgb=10.7, INR=2.2, ESR=17, CRP=9.1, \par        9/20/18: je=574, BMs: # 5-6, 1-2x/D \par        9/21/18: Phos=2.4, Ca=8.7, Alb=3.4, Vit D=27, YPH=239, \par        Dr. Akers: Hyperparathyroidism: probably secondary due to low Vit D/Ca & ? superimposed primary, rx replete Vit D and Calcium\par        10/9/18: Hgb=11.6, MCV=94, ESR=14, CRP=5.4, INR=2.2\par        10/10/18 MRE: stricturing of neoterminal ileum w worsened upstream dilatation, moderate length of upstream ileum w stricturing extending at least 20cm and more extensive then previous. suggestion of 2 adj extraluminal fluid collections which may be w/i the wall of strictured ileum near the ileocolonic anastomosis. surrounding spiculation and tethered appearance of bowel in this region.\par 10/16/18: entyvio, Hgb=11.7, MCV=94, ESR=14, CRP <5, Alb=3.5\par 10/18/18: Yp=905,   BMs: # 5-6, 3x/D , \par 11/13/18: Entyvio\par 11/21/18 CTE w C: limited 2/2 bowel underdistention, mucosal hyperenhancemt & extensive SM edema of distal ileum including vladislav-ileum, difficult to exclude a sml fluid collection, Liver w relative enlargement w suggestion of lobulation, enlargemt of PV,SMV,IMV,Spl V, rectal V. No ascites\par 11/28/18: Hgb=10, ESR=19, CRP=17,Alb=3.5,Iron=35, Sat%=11, Vssehvcq=251, INR=1.3\par 11/29/18: zo=027, BMs: # 5-6, 3-4x/D\par 12/3/18: Abd sono--Liver 14.8cm Incr heterogenicity, spleen--wnl\par 12/11/18 & 1/14/19: Entyvio\par 1/14/19:Entyvio,  Hgb=10.7, ESR=15, Alb=3.6, Iron=36, Ferritin=67, Sat%=11, INR=1.6\par 1/24/19:  hu=256, stools are # 4-6, 3-4x/d , no pain\par 2/5/19: Hgb=11.2, ESR=14, CRP=0.36\par 2/28/19: Hgb=11.5, ESR=12, CRP=6.5, Alb=3.7, Iron=69, Krkjftzd=848, Ca=8.9\par                Bms: # 4-5, 2-3x/D , ii=054,  Entyvio : last 2/1519,\par                had parathyroid scan pre-op, still w elev PTH, + activity in mediastinum,? ectopic parathyroid tissuevs neoplasm.  To see ENT and have Imaging at [facility]\par 3/28/19: Bms: # 4-5 , 3x/d ;ua=028\par 4/11/19: Hgb-9.7, Iron=45, ESR=18, CRP=9.4, Alb=3.5, \par Saw Endo SX at [facility], felt hyperparathyroid is secondary to Low Ca/Vit D, no sx at this time\par 4/16/19: BMs: # 5-6, 3-4x/D, nu=662,  Entyvio : last 3/1519,  got IV iron 4/12/19 for Ferritin=53\par 4/27/19: s/p R Hip Nailing--missed 4/2019 2/2 fresh wound\par 5/16/19 & 6/18/19 Entyvio\par 7/2/19: Hgb=11.7, Ferritin=41,ESR=12, CRP=5.5, B6=2.8,Mag=1.8,Phos=3.9,Yy=726,Zinc=61\par 7/11/19: s/p IV iron\par 7/11/19: Alb=3.7, EAR=736, Ca=9.1, Vit D=40/306, \par 7/24/19: Entyvio, Alb=3.8, BDS=410, Ca=8.9, Vit D=128 \par 8/1/19: Bms: # 5-6, occas #4, 2-3x/D , qi=093\par 8/15/19: Hgb=12, ESR=10, CRP=5.2, Ferritin=68, Alb=3.4, Phos=4.4,Mg=1.7, Ca=8.5,Calprotectin=88,\par 8/20/19: MXP=724, Ca=9, Alb=4.2\par 9/19/19: Hgb=12.8, ESR=9, CRP=5.5, Alb=3.7, Gieigpab=249, Mag=1.8, Ca=8.9, Phos=4.2\par 9/26/19: xc=758, BMs: #5-6, 2-3x/D, no pain\par 10/17/19: qw=410, BMs: #4-6, 1-2x/D, no Pain; Hgb=14, ESR=7, CRP<5, Alb=3.9, Enxvqknk=660, Mag=1.7, Ca=9.6\par 10/24/19: lb=754, Bms: # 4-6 , no pain,\par 11/6/19: ESR=6, CRP=6, PTH=80,Ca=9.1, VitD=50\par 11/14/19: bw=697, BMs: # : 4, 1-2, 0ccas #5\par  11/17/19: ESR=6, CRP<5, Vaosjeud=897, \par  12/19/19: zu=121, BMs: #5-6, 2-3x/D\par 1/6/20: entyvio\par 1/13/20: ESR=7, CRP=0.2, Hgb=13.5, Tffvzgtu=201, V26=355, FA=10, Alb=4.1, Ca=9.1\par 1/16/20: wt = 127, BMs: # 5, 1-3x/d\par 1/30/20: ESR=9, CRP=11, Hgb=13.9, Keaywbzp=611,Iron=38, Alb=3.9,Ca=9.2, PTH=87,\par 2/3/20 EGD: gastritis, +MACKENZIE, No HP, +erosion w ooze -clipped, 0.5cm polyp-neg; 4cm HH, Esophagitis A, + Barretts, VC: 1+\par Colonoscopy: 05cm rectal polyp: HP, 2nd deg int hemorrhoids\par mild-mod activity: MARILY w cryptitis & mild archect distortion at ileocolonic anast: colon & ileal side, no stricture\par 2/4/20: Entyvio; 2/12/20: IV Iron, 3/3/20: Entyvio\par 2/25/20: ew=667,  BMs: # 4-5, 1-2x/ d\par 3/19/20: ox=738, BMs: #4-5, 1-2x/D\par 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\par 10/17/20 : Hgb=13, CRP< 5, ESR= 11, Iron=44, Ferritin=46, Alb=4, Phos=2.3, \par 11/5/20: qy=282; s/p IV Iron x 2 ,  11/4 and 1 week prior;   BMs:  # 4-5, 1-2x/D , no pain\par 11/18/20  Entyvio + IV Ca\par  1/4/21: Hgb=13.6, CRP<5, ESR=9, Ferritin=60, Alb=4.2, Phos=2.7\par 1/11/21: Entyvio & IV Ca\par \par  Today:  no pain, stool more formed \par         \par        ol=705 - 137\par        Abd pain--no \par        Nausea--no \par        Vomit--no \par        Early satiety-no \par        Belching-no \par        Regurgitation--no \par        Ht burn--no \par        Throat Clearing-no\par        Globus-no\par        Hoarseness--no \par        Dysphagia--no \par        BMs:  # 4-5, 1-2x/D \par        Constipation--no \par        Diarrhea- intermittent: occas \par        Bloating--no \par        Flatulence-no\par        Gurgling--no \par        Melena--no \par        BRBPR--no \par        Anorexia-no \par        Wt Loss--stable\par \par \par \par        PRIOR HISTORY--2017\par \par        1/17/17: Hgb=9.2, ESR=6, CRP=5; 1/19/17: BMs: #4, 5-6, 2-3x/D, Ad=289, Started Creon 1 tid cc\par        3rd Entyvio begining Feb\par        2/14/17: Labs; Hgb=9.6, MCV=97, ESR=5, CRP< 5, H72=858, FA>23, Iron=59, Retic =3.2,\par        2/17/17 Fecal Calprotectin=16, Fats: Increased neutral & Split\par        3/13/17: Labs Hgb=9.5, MCV=95, ESR=7, CRP <5, ALB=3.1\par        3/16/17: lot of stress, to move -[place], had a job-fell thru, BMs: # 5, 2-4 x/D, wt= 131w clothes\par        4/19/17 EGD: gastritis, MACKENZIE, No HP, 2cm HH, +Barretts, No Dysplasia\par        Colonoscopy: Anastamosis: open w mild -mod active colitis, enteritis severe adj to anastomosis, mild more proximal, remainder of colon-wnl, 2-3 deg int hemorrhoids\par        4/26/17 : Hgb=7.3, MCV=95, ESR=13, CRP<5;Immediately post procedure stools very dark on eliquis/iron.\par        Admitted to [facility]: Hgb=8.3-->8.9 after 2 U, Alb 3.3, BUN=17, creat=1.3, Malina/Tidkq=597/460\par        4/27/17: Alb=2.3, BUN=12, creat=1.2, Malina/Lipase=209/863-No abd pain, N/V; 5/5/17: Hgb=10.7.\par        5/8/17 Entyvio iv. 5/8-5/9 w dark red diarrhea; 5/10/17 Hgb=7.8.\par        5/11/17 Adm [facility] w stools brown, Hgb=7.9, BUN=17, Creat=1.4, Alb=2.9; S/P 2 Units- Hgb=10.6, BUN=15/1.1.\par        Prednisone 40mg qd, entocort d/dee.; 5/18/17: Hgb-10.4, mc=078, BMs: #5, 1-2x/D, no pain\par        6/8/17: Hgb=7.4,MCV=98, CRP<5-ASA stopped, Pred20--> 30\par        6/10/17: Hgb=8.2, Alb=2.9, BUN=20/1.2 ; 6/12/17: Hgb=8.3, Retic=0.19, Iron=10, Sat%=3, Ferritin=57, FA=23,P93=300, 6/13/17: s/p 2 Unit PRBCs\par        6/15/17: started MCT oil, Cp=483 , BMs: # 5, 1-2x/D, stools are brownish/green \par        7/11/17: [facility] w unsteadiness. MRI Head: R Cortical Temporo-occipital encephalomalacia, MRA H&N-no sign lesions, PER-wnl.Hgb=9.9--> 8.4->9.7 after 1 Unit. Seen by Heme: received IV Iron \par        CRP<5, F41=250, KZW=180, FA>23,Iron=22,Sat%=6, Ferritin=42, Alb=3.5. D/C on warfarin 2mg\par        7/20 Hgb=8.5, 7/28 Hgb=7.7, 7/31-s/p 1 Unit \par        As outpt seeing Heme, to get IV Iron and 5 IV Copper\par        Had 'FLU' Fever=101, chills, bloat, N/V/D, Bilious. no pain, melena,brbpr\par        Given anti-emetic by dr Butler,then able to keep things down\par        On prednisone 25, warfarin w INR bet 1.6-2\par        8/17/17: Hgb=9.9, ESR=20, CRP-P,Uo=369, BMs: # 5, 1-2x/D, stools are brownish/green\par        10/2/17: Hgb=8.8, MCV=89,Phos=1.7, K=3.9, Mag=1.9, Alb=3.6,Iron<10,Ferritin=21, INR=2\par        10/17/17: Hgb=7.9,ESR=6,CRP<5, INR=1.6\par        10/25/17: Hgb=10, ESR=5, CRP=5, Alb=3.6, Phos=2, Jngosoni=474, Z39=985\par        11/16/17: Hgb=11.2, ESR=14, CRP=12, \par        11/13/17: MRE-Chr mild distension of distal & vladislav-ileum s/o mild stricturing at anast, mild mural thick & mucosal enhancemt ~ 20cm; little change from 2015 c/w mild acute on chr ileitis, 2.1 cm Liver hemangioma\par        11/28/17: Entyvio\par        11/29/17: Hgb=9.6, ESR=10, CRP=5.8, Alb=3.8,Ferritin-P, Iron=14,Sat=4%, Phos=2.5\par        12/19;17: Hgb=10.1, ESR=12, CRP=6.5; 12/21/17: BMs:#3-5, 1-3x/D , brown, no blood, no pain, ih=812\par        1/3/18:Hgb=11.7, ESR=19, CRP=6.5, Alb=4.1, Uktfinkc=988, Iron=31, Sat%=9,Zinc=55\par \par \par        PRIOR HISTORY---2013:\par \par        2/20/13 CT markedly dilated sb loops ext to anast, colonoscopy w open anast ,mild-mod remy-anastamotic disease. quick response to entocort/humira--probably adhesive dis. \par        8/10/13 w GNR bacteremia, ADA 1.7 /KAYLAH 3.4, Humira 8/7, 8/13,8/18,9/15\par        CT- mucosal thickening and spiculation of the distal sb extending to the anastamosis, thickening and stranding of adj mesenteric fat.\par        Humira increased to 40mg weekly, entocort 4\par        9/2013 MRE--dilated loops in mid and distal ileum, markedly thickened and narrowed TI w decreased peristalsis of TI\par        11/15/13 ADA-24.9, KAYLAH-0\par \par \par        PRIOR HISTORY---2014:\par \par        2/15/14--CT dilated loops SB, loop of sb in mid abd 4.3cm w infiltrative changes in the mesentery, bowel tapers in the RLQ to the anastamosis w/o transition\par        WBC=15K, Rx w IV steroids and Abx \par        3/7/14 SBFT: last 10cm sb prox to anast mild distension and sl irregularity. In the mid portion of this loop there is a mild narrowing which appears to reopen but is some what narrowed.\par        3/20/14 ADA=33.1, KAYLAH=0, Lialda switch to Apriso 4 (2/2014)\par \par \par        PRIOR HISTORY--2015\par \par        1/11/15 Adm [facility] w 1 day N,Recurrent V, Abd pain/distension. CT-multiple distended & fluid-filled sb loops, mild wall thickening of ileum w No inflamm changes.\par        WBC=14.6, Hgb=17, RX w NGT suction, Levaquin iv, Solumedrol 40mg q 12( 1/12-->1/16) to prednisone 30mg BID w 5mg taper/wk\par        3/18/15 --Dr. Butler w edema /High BP, prednisone was tapered slowly to 2.5mg \par        switched to entocort 9mg qd, edema and bp improved w lasix 20mg \par        BMs:#4-5, 3x/D, Hgb=11, ESR=4, CRP<5\par        4/28/15 - 5/1/15: Adm [facility] w 1 day Abd pain, distension,N/V. 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 side of anast, opening appeared ulcerated,scarred & severely narrowed\par        6/11/15: [facility] w N/V x1, decr appetite, CT ABD: no obstruction, dilated thickened sb loops of distal jej and ileum\par        6/19/15 [facility]: s/p Lap w extensive lysis of adhesions, hepatic flex, sigmoid and sb anastamosis, s/p partial r colectomy and sb resection--side to side elisabeth: 8-10 inch from prior anast to TV colon.\par        7/17/15: BMs:#4-6, 4-5x/D, wt 131(from 126)\par        8/20/15: BMs: #4, 1-2x/D or #5, 2-3x/D, sm=204, dry cough,Hgb=10, WBC=3, NDG=651, CRP=56, ESR=21\par        8/25/15 CT Abd/Pelv: Svl RLQ sb loops w wall thickening, mild nodularity & inflamm stranding in mesentery\par        Advised-restart Entocort 9mg qd, Apriso 4 qd, Maintain Humira but given RX to check drug/Ab levels\par        9/15/15: did nt restart meds,Hgb=9.9, WBC=4, ESR=19, CRP=5.8, sg=299; [facility] : ADA=8.5, Antibodies< 1.7\par        10/15/15: No pain, BMs:#4, 1-2x/D, #5-6, 3-4x/D, throat clearing/cough-better, wp=977\par        11/30/15: No pain, BMs:# 4, 5-6 intermittently, No throat clear, ce=767\par \par \par        PRIOR HISTORY-2016\par \par        1/22/16; 4/8/16; 6/6/16 : No pain, BMs:# 4-5 1-2x/D, also #5-6 3x/D for 2-3D/wk, hl=092\par        7/14/16: cp=192, 9/22/16: gz=746.5\par        11/10/16: 9.5lb wt loss, states BMs: 5-6, 5x/D--Taking Magnesium, No pain/anorexia\par        Labs: Stool Iyegmbxaprju=300, + Incr Fecal fat, Alb=3.4, FA =23, U70=957, Hgb=12, ESR=10, CRP <5\par        Magnesium-d/c, Obtain MRE asap--Start steroids and possibly switch to Entyvio\par        DDx discussed: active crohns--loss response to Humira, Malabsorption--loss Bile acids, Bile-induced diarrhea, SIBO\par        Pt wanted to wait for imaging-did not start rx\par        12/1//16 MRE: RUQ ileocolonic anastamosis--narrowed w upstream dilatation to 3.2 cm\par        Pt refused pred, Started Entocort 9mg qd and Flagyl 250mg qid about 7-10days ago \par        awaiting Entyvio load to start 12/22, then 1/5; had Cut back on iron bid\par        12/15/16: BMs:# 5, 2-3x/D, occas #6, No pain, Less bloat/flatulence, Al=434.

## 2021-02-21 ENCOUNTER — RESULT REVIEW (OUTPATIENT)
Age: 56
End: 2021-02-21

## 2021-02-22 ENCOUNTER — RESULT REVIEW (OUTPATIENT)
Age: 56
End: 2021-02-22

## 2021-02-22 ENCOUNTER — APPOINTMENT (OUTPATIENT)
Dept: HEMATOLOGY ONCOLOGY | Facility: CLINIC | Age: 56
End: 2021-02-22
Payer: COMMERCIAL

## 2021-02-22 VITALS
TEMPERATURE: 98.2 F | SYSTOLIC BLOOD PRESSURE: 149 MMHG | RESPIRATION RATE: 18 BRPM | BODY MASS INDEX: 20.31 KG/M2 | HEIGHT: 68 IN | OXYGEN SATURATION: 98 % | HEART RATE: 48 BPM | WEIGHT: 134 LBS | DIASTOLIC BLOOD PRESSURE: 82 MMHG

## 2021-02-22 PROCEDURE — 99213 OFFICE O/P EST LOW 20 MIN: CPT

## 2021-02-22 PROCEDURE — 99072 ADDL SUPL MATRL&STAF TM PHE: CPT

## 2021-02-25 ENCOUNTER — NON-APPOINTMENT (OUTPATIENT)
Age: 56
End: 2021-02-25

## 2021-02-26 ENCOUNTER — NON-APPOINTMENT (OUTPATIENT)
Age: 56
End: 2021-02-26

## 2021-02-26 ENCOUNTER — APPOINTMENT (OUTPATIENT)
Dept: GASTROENTEROLOGY | Facility: CLINIC | Age: 56
End: 2021-02-26
Payer: COMMERCIAL

## 2021-02-26 VITALS
DIASTOLIC BLOOD PRESSURE: 80 MMHG | BODY MASS INDEX: 20.61 KG/M2 | TEMPERATURE: 98.5 F | WEIGHT: 136 LBS | SYSTOLIC BLOOD PRESSURE: 110 MMHG | HEIGHT: 68 IN | HEART RATE: 78 BPM

## 2021-02-26 PROCEDURE — 99072 ADDL SUPL MATRL&STAF TM PHE: CPT

## 2021-02-26 PROCEDURE — 99215 OFFICE O/P EST HI 40 MIN: CPT | Mod: 25

## 2021-02-26 PROCEDURE — 96372 THER/PROPH/DIAG INJ SC/IM: CPT

## 2021-02-26 RX ORDER — CYANOCOBALAMIN 1000 UG/ML
1000 INJECTION INTRAMUSCULAR; SUBCUTANEOUS
Qty: 0 | Refills: 0 | Status: COMPLETED | OUTPATIENT
Start: 2021-02-26

## 2021-02-26 RX ADMIN — CYANOCOBALAMIN 0 MCG/ML: 1000 INJECTION INTRAMUSCULAR; SUBCUTANEOUS at 00:00

## 2021-03-08 ENCOUNTER — NON-APPOINTMENT (OUTPATIENT)
Age: 56
End: 2021-03-08

## 2021-03-21 ENCOUNTER — RESULT REVIEW (OUTPATIENT)
Age: 56
End: 2021-03-21

## 2021-03-28 NOTE — HISTORY OF PRESENT ILLNESS
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-->Eliquis' warfarin \par        GERD w Page's, Hemorrhoids, BPH, \par        S/P Ileocolonic resection 1998\par        Since then multiple SBOs\par \par \par        Got IV Iron x 2, since 10/2/17\par        10/19/17: feeling better, Fi=994 on prednisone 15mg x 3weeks, BMs Brown: #5-6, 1-2/D, occas 3-4/d\par        10/25/17: Hgb=10, ESR=5, CRP=5, Alb=3.6, Phos=2, Dklxmuvv=336, Y10=189\par        11/16/17: Hgb=11.2, ESR=14, CRP=12, \par        11/13/17: MRE-Chr mild distension of distal & vladislav-ileum s/o mild stricturing at anast, mild mural thick & mucosal enhancemt ~ 20cm; little change from 2015 c/w mild acute on chr ileitis, 2.1 cm Liver hemangioma\par        11/28/17: Entyvio\par        11/29/17: Hgb=9.6, ESR=10, CRP=5.8, Alb=3.8,Ferritin-19, Iron=14,Sat=4%, Phos=2.5, Yq=418, BMs:# 5, 1-2x/D, brown,\par        12/19;17: Hgb=10.1, ESR=12, CRP=6.5; 12/21/17: BMs:#3-5, 1-3x/D , brown, no blood, no pain, ag=170\par        1/3/18:Hgb=11.7, ESR=19, CRP=6.5, Alb=4.1, Eqlikiiy=096, Iron=31, Sat%=9,Zinc=55\par        1/16/18: Entyvio, Hgb=11.4, ESR=10, CRP=6.9\par        1/18/18: Today: cellulitis R foot, saw dr KEITH--rx augmentum x 10D, Entyvio 1/9 to 1/16, jz=407 w clothes,BMs: # 4-5, 1-2x/D \par        2/14/18: Hgb=11.1, ESR=16, CRP=11.6, Alb=3.6, Iron=28, Ferritin=23, INR=1.5 \par        2/16/18: s/p Entyvio; Iron infusion, again on 2/27\par        2/20/18: Hgb=10.6, ESR=20, CRP=12, INR=1.7\par        2/27/18: s/p iron infusion\par        3/9/18: Dr. Burden for tenosynovitis, rx w Zorvolex: Diclofenac x 5 days--? response\par        3/14/18: Wm=695; BMs: # 5, 1-2x/D; Hgb=11, ESR=18, CRP=11,Irom=45,Nwjxwqtg=183,Alb=3.6, INR=1.5\par        3/19/18: s/p Entyvio\par        3/22/18: iw=010, BMs: # 5, 3-4 x/d\par        4/16/18: s/p Entyvio,Hgb=11.9, INR=1.3, ESR=18, CRP=8.4 \par        4/18/18 EGD: gastritis, no HP, no IM, 2+ Mucous, 0.5cm gastric polyp: fundic, 4cm HH, + Barretts:3cm, no dysplasia\par        4/25/18: Hgb=11.5, INR=1.6, Iron=40, Sat=13%, Ferritin=57, Alb=3.2, Phos=2.2, Mag=1.7\par        4/30/18: s/p Iron Infusion\par        5/14/18: s/p Entyvio \par        5/23/18: Hgb=11.5, ESR=16, CRP< 5\par        5/29/18: s/p Iron Infusion\par        6/6/18: Hgb=10.6, INR=1.7, Yojrxcrt=516, Alb=3.5, Phos=2.1, L02=933, av=539; BMs: # 5, 2-3x/D \par        6/19/18: Hgb=10.6, INR=1, CRP=15, ESR=23\par        6/21/18: R ankle is acting up, swollen, pain, having MRI done, took a couple of advil, on low carbs ,BMs: # 5, 1-2x/D, nf=744\par        6/26/18: MRI: fx/stress rxn-talar body/navicula; stress related changes--cuneform, cuboid,distal tibia; mod tibiotalar effusion, mild-mod peroneal tendinosis\par        7/19/18: entyvio 6/26/18, eliminated all carbs--anti inflammatory diet, er=666, BMs: # 4-5, 1-3x/D \par        7/25/18: Hgb=10, INR=1.3, Alb=3.3, Phos=2.4, Ycccagdi=345, sat%=12, Iron=35\par        8/2/18: BD--osteoporosis of hip/spine: sig decrease BD--17.6% of hip, 17% of spine, osteopenia of wrist: 6.4%\par        8/7/18: Entyvio\par        8/21/18: Hgb=11.1, INR=1.5, ESR=19, CRP=18.6\par        8/23/18: recently w tooth infection, old implant--loose and removed; rx w amox, and advil\par        eating almost no carbs, su=043, # 5-6, 2-3x/d \par        8/24/18: Stool fat--neg, Cyemqfrhhyzk=461\par        9/5/18: Hgb=11.4, INR=1.3, ESR=9, CRP=11.3, Iron=41, Rvodjezi=637, Alb=3.8,\par        9/17/18: entyvio, Hgb=10.7, INR=2.2, ESR=17, CRP=9.1, \par        9/20/18: as=791, BMs: # 5-6, 1-2x/D \par        9/21/18: Phos=2.4, Ca=8.7, Alb=3.4, Vit D=27, LMC=035, \par        Dr. Akers: Hyperparathyroidism: probably secondary due to low Vit D/Ca & ? superimposed primary, rx replete Vit D and Calcium\par        10/9/18: Hgb=11.6, MCV=94, ESR=14, CRP=5.4, INR=2.2\par        10/10/18 MRE: stricturing of neoterminal ileum w worsened upstream dilatation, moderate length of upstream ileum w stricturing extending at least 20cm and more extensive then previous. suggestion of 2 adj extraluminal fluid collections which may be w/i the wall of strictured ileum near the ileocolonic anastomosis. surrounding spiculation and tethered appearance of bowel in this region.\par 10/16/18: entyvio, Hgb=11.7, MCV=94, ESR=14, CRP <5, Alb=3.5\par 10/18/18: Fb=381,   BMs: # 5-6, 3x/D , \par 11/13/18: Entyvio\par 11/21/18 CTE w C: limited 2/2 bowel underdistention, mucosal hyperenhancemt & extensive SM edema of distal ileum including vladislav-ileum, difficult to exclude a sml fluid collection, Liver w relative enlargement w suggestion of lobulation, enlargemt of PV,SMV,IMV,Spl V, rectal V. No ascites\par 11/28/18: Hgb=10, ESR=19, CRP=17,Alb=3.5,Iron=35, Sat%=11, Ybswozht=462, INR=1.3\par 11/29/18: hz=051, BMs: # 5-6, 3-4x/D\par 12/3/18: Abd sono--Liver 14.8cm Incr heterogenicity, spleen--wnl\par 12/11/18 & 1/14/19: Entyvio\par 1/14/19:Entyvio,  Hgb=10.7, ESR=15, Alb=3.6, Iron=36, Ferritin=67, Sat%=11, INR=1.6\par 1/24/19:  il=255, stools are # 4-6, 3-4x/d , no pain\par 2/5/19: Hgb=11.2, ESR=14, CRP=0.36\par 2/28/19: Hgb=11.5, ESR=12, CRP=6.5, Alb=3.7, Iron=69, Wqzdaism=253, Ca=8.9\par                Bms: # 4-5, 2-3x/D , ca=899,  Entyvio : last 2/1519,\par                had parathyroid scan pre-op, still w elev PTH, + activity in mediastinum,? ectopic parathyroid tissuevs neoplasm.  To see ENT and have Imaging at [facility]\par 3/28/19: Bms: # 4-5 , 3x/d ;ei=446\par 4/11/19: Hgb-9.7, Iron=45, ESR=18, CRP=9.4, Alb=3.5, \par Saw Endo SX at [facility], felt hyperparathyroid is secondary to Low Ca/Vit D, no sx at this time\par 4/16/19: BMs: # 5-6, 3-4x/D, ke=444,  Entyvio : last 3/1519,  got IV iron 4/12/19 for Ferritin=53\par 4/27/19: s/p R Hip Nailing--missed 4/2019 2/2 fresh wound\par 5/16/19 & 6/18/19 Entyvio\par 7/2/19: Hgb=11.7, Ferritin=41,ESR=12, CRP=5.5, B6=2.8,Mag=1.8,Phos=3.9,Zp=612,Zinc=61\par 7/11/19: s/p IV iron\par 7/11/19: Alb=3.7, XQP=003, Ca=9.1, Vit D=40/306, \par 7/24/19: Entyvio, Alb=3.8, KID=534, Ca=8.9, Vit D=128 \par 8/1/19: Bms: # 5-6, occas #4, 2-3x/D , az=496\par 8/15/19: Hgb=12, ESR=10, CRP=5.2, Ferritin=68, Alb=3.4, Phos=4.4,Mg=1.7, Ca=8.5,Calprotectin=88,\par 8/20/19: HDH=834, Ca=9, Alb=4.2\par 9/19/19: Hgb=12.8, ESR=9, CRP=5.5, Alb=3.7, Qnfhwhyp=731, Mag=1.8, Ca=8.9, Phos=4.2\par 9/26/19: ii=763, BMs: #5-6, 2-3x/D, no pain\par 10/17/19: rw=675, BMs: #4-6, 1-2x/D, no Pain; Hgb=14, ESR=7, CRP<5, Alb=3.9, Krpdpekm=735, Mag=1.7, Ca=9.6\par 10/24/19: xq=980, Bms: # 4-6 , no pain,\par 11/6/19: ESR=6, CRP=6, PTH=80,Ca=9.1, VitD=50\par 11/14/19: yo=436, BMs: # : 4, 1-2, 0ccas #5\par  11/17/19: ESR=6, CRP<5, Qsosjlyi=277, \par  12/19/19: gy=426, BMs: #5-6, 2-3x/D\par 1/6/20: entyvio\par 1/13/20: ESR=7, CRP=0.2, Hgb=13.5, Qoirpgpc=670, O05=391, FA=10, Alb=4.1, Ca=9.1\par 1/16/20: wt = 127, BMs: # 5, 1-3x/d\par 1/30/20: ESR=9, CRP=11, Hgb=13.9, Zufckvnf=869,Iron=38, Alb=3.9,Ca=9.2, PTH=87,\par 2/3/20 EGD: gastritis, +MACKENZIE, No HP, +erosion w ooze -clipped, 0.5cm polyp-neg; 4cm HH, Esophagitis A, + Barretts, VC: 1+\par Colonoscopy: 05cm rectal polyp: HP, 2nd deg int hemorrhoids\par mild-mod activity: MARILY w cryptitis & mild archect distortion at ileocolonic anast: colon & ileal side, no stricture\par 2/4/20: Entyvio; 2/12/20: IV Iron, 3/3/20: Entyvio\par 2/25/20: xs=766,  BMs: # 4-5, 1-2x/ d\par 3/19/20: xe=784, BMs: #4-5, 1-2x/D\par 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\par 10/17/20 : Hgb=13, CRP< 5, ESR= 11, Iron=44, Ferritin=46, Alb=4, Phos=2.3, \par 11/5/20: ex=388; s/p IV Iron x 2 ,  11/4 and 1 week prior;   BMs:  # 4-5, 1-2x/D , no pain\par 11/18/20  Entyvio + IV Ca\par  1/4/21: Hgb=13.6, CRP<5, ESR=9, Ferritin=60, Alb=4.2, Phos=2.7\par 1/11/21: Entyvio & IV Ca\par 1/14/21: no pain, stool more formed ,  nd=919 - 137; BMs:  # 4-5, 1-2x/D \par 2/8/21: Entyvio & IV Ca\par 2/23/21 IV Ca\par 2/22/21: Hgb=12.7, CRP<5, ESR=8, Wnmsklgmf=370, Phos=2.3, Mag=1.8, X74=461, Zinc=58, Dr=635\par               \par \par  Today:  no pain, stool more formed \par         \par        ks=720\par        Abd pain--no \par        Nausea--no \par        Vomit--no \par        Early satiety-no \par        Belching-no \par        Regurgitation--no \par        Ht burn--no \par        Throat Clearing-no\par        Globus-no\par        Hoarseness--no \par        Dysphagia--no \par        BMs:  # 4-5, 1-2x/D \par        Constipation--no \par        Diarrhea- intermittent: rare \par        Bloating--no \par        Flatulence-no\par        Gurgling--no \par        Melena--no \par        BRBPR--no \par        Anorexia-no \par        Wt Loss--stable\par \par \par \par        PRIOR HISTORY--2017\par \par        1/17/17: Hgb=9.2, ESR=6, CRP=5; 1/19/17: BMs: #4, 5-6, 2-3x/D, Xe=343, Started Creon 1 tid cc\par        3rd Entyvio begining Feb\par        2/14/17: Labs; Hgb=9.6, MCV=97, ESR=5, CRP< 5, J20=711, FA>23, Iron=59, Retic =3.2,\par        2/17/17 Fecal Calprotectin=16, Fats: Increased neutral & Split\par        3/13/17: Labs Hgb=9.5, MCV=95, ESR=7, CRP <5, ALB=3.1\par        3/16/17: lot of stress, to move -[place], had a job-fell thru, BMs: # 5, 2-4 x/D, wt= 131w clothes\par        4/19/17 EGD: gastritis, MACKENZIE, No HP, 2cm HH, +Barretts, No Dysplasia\par        Colonoscopy: Anastamosis: open w mild -mod active colitis, enteritis severe adj to anastomosis, mild more proximal, remainder of colon-wnl, 2-3 deg int hemorrhoids\par        4/26/17 : Hgb=7.3, MCV=95, ESR=13, CRP<5;Immediately post procedure stools very dark on eliquis/iron.\par        Admitted to [facility]: Hgb=8.3-->8.9 after 2 U, Alb 3.3, BUN=17, creat=1.3, Malina/Cdpwd=355/460\par        4/27/17: Alb=2.3, BUN=12, creat=1.2, Malina/Lipase=209/863-No abd pain, N/V; 5/5/17: Hgb=10.7.\par        5/8/17 Entyvio iv. 5/8-5/9 w dark red diarrhea; 5/10/17 Hgb=7.8.\par        5/11/17 Adm [facility] w stools brown, Hgb=7.9, BUN=17, Creat=1.4, Alb=2.9; S/P 2 Units- Hgb=10.6, BUN=15/1.1.\par        Prednisone 40mg qd, entocort d/dee.; 5/18/17: Hgb-10.4, ml=721, BMs: #5, 1-2x/D, no pain\par        6/8/17: Hgb=7.4,MCV=98, CRP<5-ASA stopped, Pred20--> 30\par        6/10/17: Hgb=8.2, Alb=2.9, BUN=20/1.2 ; 6/12/17: Hgb=8.3, Retic=0.19, Iron=10, Sat%=3, Ferritin=57, FA=23,R88=553, 6/13/17: s/p 2 Unit PRBCs\par        6/15/17: started MCT oil, Mh=252 , BMs: # 5, 1-2x/D, stools are brownish/green \par        7/11/17: [facility] w unsteadiness. MRI Head: R Cortical Temporo-occipital encephalomalacia, MRA H&N-no sign lesions, PER-wnl.Hgb=9.9--> 8.4->9.7 after 1 Unit. Seen by Heme: received IV Iron \par        CRP<5, A27=435, YRW=068, FA>23,Iron=22,Sat%=6, Ferritin=42, Alb=3.5. D/C on warfarin 2mg\par        7/20 Hgb=8.5, 7/28 Hgb=7.7, 7/31-s/p 1 Unit \par        As outpt seeing Heme, to get IV Iron and 5 IV Copper\par        Had 'FLU' Fever=101, chills, bloat, N/V/D, Bilious. no pain, melena,brbpr\par        Given anti-emetic by dr Butler,then able to keep things down\par        On prednisone 25, warfarin w INR bet 1.6-2\par        8/17/17: Hgb=9.9, ESR=20, CRP-P,Yi=062, BMs: # 5, 1-2x/D, stools are brownish/green\par        10/2/17: Hgb=8.8, MCV=89,Phos=1.7, K=3.9, Mag=1.9, Alb=3.6,Iron<10,Ferritin=21, INR=2\par        10/17/17: Hgb=7.9,ESR=6,CRP<5, INR=1.6\par        10/25/17: Hgb=10, ESR=5, CRP=5, Alb=3.6, Phos=2, Jqzatngg=109, L55=069\par        11/16/17: Hgb=11.2, ESR=14, CRP=12, \par        11/13/17: MRE-Chr mild distension of distal & vladislav-ileum s/o mild stricturing at anast, mild mural thick & mucosal enhancemt ~ 20cm; little change from 2015 c/w mild acute on chr ileitis, 2.1 cm Liver hemangioma\par        11/28/17: Entyvio\par        11/29/17: Hgb=9.6, ESR=10, CRP=5.8, Alb=3.8,Ferritin-P, Iron=14,Sat=4%, Phos=2.5\par        12/19;17: Hgb=10.1, ESR=12, CRP=6.5; 12/21/17: BMs:#3-5, 1-3x/D , brown, no blood, no pain, wv=013\par        1/3/18:Hgb=11.7, ESR=19, CRP=6.5, Alb=4.1, Aaqoxfty=773, Iron=31, Sat%=9,Zinc=55\par \par \par        PRIOR HISTORY---2013:\par \par        2/20/13 CT markedly dilated sb loops ext to anast, colonoscopy w open anast ,mild-mod remy-anastamotic disease. quick response to entocort/humira--probably adhesive dis. \par        8/10/13 w GNR bacteremia, ADA 1.7 /KAYLAH 3.4, Humira 8/7, 8/13,8/18,9/15\par        CT- mucosal thickening and spiculation of the distal sb extending to the anastamosis, thickening and stranding of adj mesenteric fat.\par        Humira increased to 40mg weekly, entocort 4\par        9/2013 MRE--dilated loops in mid and distal ileum, markedly thickened and narrowed TI w decreased peristalsis of TI\par        11/15/13 ADA-24.9, KAYLAH-0\par \par \par        PRIOR HISTORY---2014:\par \par        2/15/14--CT dilated loops SB, loop of sb in mid abd 4.3cm w infiltrative changes in the mesentery, bowel tapers in the RLQ to the anastamosis w/o transition\par        WBC=15K, Rx w IV steroids and Abx \par        3/7/14 SBFT: last 10cm sb prox to anast mild distension and sl irregularity. In the mid portion of this loop there is a mild narrowing which appears to reopen but is some what narrowed.\par        3/20/14 ADA=33.1, KAYLAH=0, Lialda switch to Apriso 4 (2/2014)\par \par \par        PRIOR HISTORY--2015\par \par        1/11/15 Adm [facility] w 1 day N,Recurrent V, Abd pain/distension. CT-multiple distended & fluid-filled sb loops, mild wall thickening of ileum w No inflamm changes.\par        WBC=14.6, Hgb=17, RX w NGT suction, Levaquin iv, Solumedrol 40mg q 12( 1/12-->1/16) to prednisone 30mg BID w 5mg taper/wk\par        3/18/15 --Dr. Butler w edema /High BP, prednisone was tapered slowly to 2.5mg \par        switched to entocort 9mg qd, edema and bp improved w lasix 20mg \par        BMs:#4-5, 3x/D, Hgb=11, ESR=4, CRP<5\par        4/28/15 - 5/1/15: Adm [facility] w 1 day Abd pain, distension,N/V. 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 side of anast, opening appeared ulcerated,scarred & severely narrowed\par        6/11/15: [facility] w N/V x1, decr appetite, CT ABD: no obstruction, dilated thickened sb loops of distal jej and ileum\par        6/19/15 [facility]: s/p Lap w extensive lysis of adhesions, hepatic flex, sigmoid and sb anastamosis, s/p partial r colectomy and sb resection--side to side elisabeth: 8-10 inch from prior anast to TV colon.\par        7/17/15: BMs:#4-6, 4-5x/D, wt 131(from 126)\par        8/20/15: BMs: #4, 1-2x/D or #5, 2-3x/D, qe=824, dry cough,Hgb=10, WBC=3, SKX=783, CRP=56, ESR=21\par        8/25/15 CT Abd/Pelv: Svl RLQ sb loops w wall thickening, mild nodularity & inflamm stranding in mesentery\par        Advised-restart Entocort 9mg qd, Apriso 4 qd, Maintain Humira but given RX to check drug/Ab levels\par        9/15/15: did nt restart meds,Hgb=9.9, WBC=4, ESR=19, CRP=5.8, lm=026; [facility] : ADA=8.5, Antibodies< 1.7\par        10/15/15: No pain, BMs:#4, 1-2x/D, #5-6, 3-4x/D, throat clearing/cough-better, in=922\par        11/30/15: No pain, BMs:# 4, 5-6 intermittently, No throat clear, jq=443\par \par \par        PRIOR HISTORY-2016\par \par        1/22/16; 4/8/16; 6/6/16 : No pain, BMs:# 4-5 1-2x/D, also #5-6 3x/D for 2-3D/wk, jl=396\par        7/14/16: ge=820, 9/22/16: fh=526.5\par        11/10/16: 9.5lb wt loss, states BMs: 5-6, 5x/D--Taking Magnesium, No pain/anorexia\par        Labs: Stool Ybqqyfikqlbc=736, + Incr Fecal fat, Alb=3.4, FA =23, K84=777, Hgb=12, ESR=10, CRP <5\par        Magnesium-d/c, Obtain MRE asap--Start steroids and possibly switch to Entyvio\par        DDx discussed: active crohns--loss response to Humira, Malabsorption--loss Bile acids, Bile-induced diarrhea, SIBO\par        Pt wanted to wait for imaging-did not start rx\par        12/1//16 MRE: RUQ ileocolonic anastamosis--narrowed w upstream dilatation to 3.2 cm\par        Pt refused pred, Started Entocort 9mg qd and Flagyl 250mg qid about 7-10days ago \par        awaiting Entyvio load to start 12/22, then 1/5; had Cut back on iron bid\par        12/15/16: BMs:# 5, 2-3x/D, occas #6, No pain, Less bloat/flatulence, Nc=423.

## 2021-03-28 NOTE — ASSESSMENT
[FreeTextEntry1] : 1. Crohns disease of both small and large intestine\par    \par CROHNS DISEASE-s/p ileocolonic resection x 2--last 6/19/15 for recurrent sbos: appears to be stable , GI symptoms stable but labs were up CRP=18.6/ESR=19 & Siosemjrrsyu=579 and Wt down ( inflam markers ? 2/2 to R ankle Fx/stress rx and tendinosis, also had tooth infection ) & MRE w ? ileal penetrating dis, \par Presently GI symptoms, APR's and wt are all improved w wt =136 _____\par s/p 4 SBOs w/i 2 yrs--2/2 distal sb disease adj to anastamosis\par when on Humira weekly w ADA =33 (3/20/14), -but had sbo 2/2014 at ADA=25 and another sbo 4/28/15\par 6/10/2015 Colonoscopy: Inflamm ST mass on ileal side w ulceration/scarred/narrow\par 6/11/2015 CT: dilated thickened sb loops distal jej & ileum\par Post-op **probably some malabsorption 2/2 to removal of R Colon and ileum: ?bile/fat/SIBO\par WT. Loss: r/o malabsorption, ?bile-induced--secretory vs decr micelles, active dis, PLE\par r/o SIBO--Elev FA, Alb=3.4-->3.1-->2.9--> (7/28/17)\par ?SIBO/Prevent post-op recurrence --Flagyl 250 mg qid~12/7/16-->d/c: 5/11/17\par Also discussed trial of Cholestyramine/Xifaxin -wanted to wait\par **ACTIVE Crohn's--11/10/16: 9.5lb wt loss, BMs: #5-6, 5x/D--Taking Magnesium \par 12/1/16 MRE: RUQ ileocolonic anastamosis--narrowed w upstream dilatation to 3.2 cm\par Labs: Stool Yrkzhtztyvby=697, + Incr Fecal fat, Alb=3.4, FA =23, P02=390, Hgb=12.3,ESR=10,CRP <5\par 4/19/17 :Colonoscopy: Anastamosis: open w mild -mod active colitis, enteritis severe adj to anastomosis, mild more proximal, remainder of colon=wnl\par 5/11/17- Adm [facility] w stools brown, Hgb=7.9, BUN=17, Creat=1.4, Alb=2.9.\par S/P 2 Units w Hgb=10.6, BUN=15/1.1, Prednisone 40mg taper, entocort d/dee\par 6/8/17: Hgb=7.4, MCV=98, CRP<5--ASA stopped, Pred20--> 30mg, 6/13/17: s/p 2 Unit PRBCs\par 7/11/17 [facility] w unsteadiness. MRI Head: R Cortical Temporo-occipital encephalomalacia\par Hgb=9.9--> 8.4->9.7 after 1 Unit. Seen by Heme: received IV Iron \par CRP<5, O44=535, RVG=684, FA>23,Iron=22,Sat%=6, Ferritin=42, Alb=3.5. D/C on warfarin 2mg\par 7/28/17 Hgb=7.7; 7/31/17-s/p 1 Unit \par Heme Consultation: received  IV Infusions of  Iron and 5 IV Copper:___\par **Entyvio :time 0=12/22/16 ( Humira 40mg QW); 1/5/17--2wks, s/p 3rd infusion at wk 6, \par #6 :6/21/17--held 2/2 Shingles, Last Infusions=9/19/17 , 10/17/17, 11/28/17, 1/16/18 ,2/16/18, 3/19/18,4/16/18, 5/14/18, 6/25/18, 8/7/18, 9/17/18, 10/16/18, 11/13/18, 12/11/18, 1/14/19, 2/15/19, 3/15/19, 5/16/19, 6/18/19,7/24/19,\par 9/9/19, 10/2/19, 11/4/19, 12/12/19, 1/6/20, 2/4/20, 3/3/20, 9/17/20, 10/15/20, 11/18/20, 1/11/21, 2/8/21 _______\par Entocort 9mg qd: 12/7/16--d/dee 5/11/17\par **Prednisone 40mg qd (5/11/17)--tapered 5mg/wk to 20mg qd, 6/8/17 30mg, \par 7/25/17 25mg, 3wks ago 20--> 15mg, 10/19/17 10mg alt w 7.5-->11/16/17 10mg alt w 5mg-->11/30/17: 5mg-->12/21/17: 5 alt w 2.5mg-->1/18/18: 2.5mg qd-->2.5mg qod--> d/c \par Probiotics 3 qd \par Lactose free, protein drinks tid\par MCT oil begun\par **trend --cbc, esr, crp, albumin\par **monitor wt= 120-->134-->134 --> 135--> 132 w clothes-->133--> 131 (Low carbs now)--> 129--> 127-->126-->124-->125-->124--> 126-->125-->125-->125-->126-->128-->127-->130-->131--> 135-->135 - 137--> 136 ____\par Wt Loss : sig change since May-June/2018\par 8/17/17: Hgb=9.9, ESR=20, Hs=636--[place] s/p GI infection w N/V/D, no obstruction\par 10/17/17: Hgb=7.9,ESR=6,CRP<5, INR=1.6, Got IV Iron x 2, since 10/2/17 for Iron<10, Hgb=8.8\par 11/16/17: Hgb=11.2, ESR=14, CRP=12, 10/26/17 Stool fat-neg, calprotectin-30\par 11/13/17: MRE-Chr mild distension of distal & vladislav-ileum s/o mild stricturing at anast, mild mural thick & mucosal enhancemt ~ 20cm; little change from 2015 c/w mild acute on chr ileitis, 2.1 cm Liver hemangioma\par 10/9/18: Hgb=11.6, MCV=94, ESR=14, CRP=5.4, INR=2.2\par 10/10/18 MRE: stricturing of neoterminal ileum w worsened upstream dilatation, moderate length of upstream ileum w stricturing extending at least 20cm and more extensive then previous. suggestion of 2 adj extraluminal fluid collections which may be w/i the wall of strictured ileum near the ileocolonic a, Hgb=13.6\par Colonoscopy: 05cm rectal polyp: HP, 2nd deg int hemorrhoids\par mild-mod activity: MARILY w cryptitis & mild archect distortion at ileocolonic anast: colon & ileal side, no stricture\par \par \par CRP <5 : ? s/p recent ankle fx/stress rx/tendonitis and tooth infection \par prior stool studies w elev  calprotectin and No fat\par Wt loss-- 2/2 to low carbs-->fat burning and flare ; advised to liberalize and add protein, ensure\par Plan: Prednisone d/c 2/20/18\par Entyvio q 6 wks --> 4 weeks \par check inflammatory markers: 2/28/19 w ESR=12, CRP=6.5 & 2/21/19 stool calprotectin--> 232\par 8/15/19: ESR=10, CRP=5.2, Calprotectin=88\par 9/19/19: ESR=9, CRP=5.5\par 10/17/19: ESR=7, CRP< 5\par 11/6/19 : ESR=6, CRP=0.18\par 11/27/19: ESR=6, CRP <5\par 1/13/20: ESR=7, CRP=0.2\par 1/30/20: ESR=9, CRP=11\par 3/2/20:ESR=7, CRP=0.26\par 3/9/20: VDZ>40, Ab to VDZ <1.6\par 3/17/20: ESR=6, CRP=6.4\par 10/17/20: ESR=11, CRP <5\par 1/4/21:ESR=9, CRP<5\par 2/22/21: ESR=8, CRP<5\par pt declined to call numbers given for  IBD center at [facility] center for new or investigational rx's--biologics.  \par feels he is stablizing w wt loss, and inflammatory markers\par           \par \par today 2/26/21 --  Most recent labs  and imaging reviewed w patient: from2/22/21\par \par trend cbc,esr,crp\par trend stool calprotectin \par                                                                                                                                                                                                                                                                                                                                                                                                                                                                                                                                                                                                                                                                                                                                                                                                                                                                                                                                          \par 2. Iron deficiency anemia due to chronic blood loss  \par  had initially dropped , after clinical flare and post procedure bleeding\par Probably multifactorial: ACD, Blood loss, malabsorption/nutrient deficiencies\par Eliquis-->warfarin after CVA, On Entyvio and prednsione for active dis\par Still requiring IV Iron--prn, \par s/p IV Cu & transfusions \par 7/11/17 Recent Adm for unsteady gait w MRI c/w CVA--warfarin begun: possible better control of AC\par Chromagen 2 qd--> IV Iron , s/p IV Cu x 5, FA 1mg qd , B12 SL & IM\par  1/14/19: Hgb=10.7, INR=1.6, 2/5/19: Hgb=11.2,INR=1.5; 2/28/19: Hgb=11.5, Wdbttjox=229; 3/11/19: INR=2.6\par 4/11/19: hgb=9.7, INR=1.6, 7/2/19: Hgb=11.7, Ferritin=41,INR=2.2, 8/15/19: Hgb=12.2,Ferritin=81,INR=1.6, \par 9/19/19: Hgb=12.8, 10/17/19: Hgb=14, 10/26/19: Hgb=14, 1/13/20: Hgb=13.5, 1/30/20: Hgb=13.9, Lsouzzak=827, Iron=38,  3/17/20: Hgb=13.1, Puueomci=183, INR=1.7, 10/17/20: Hgb=13, Ferritin=46, INR=1.9,  \par 1/4/21 Hgb=13.6, Ferritn=60, Iron=73, 2/22/21: Hgb=12.7, Lqqhimnq=670\par 7/13/17: X56=844, HSC=637,FA>23; 1/3/18 S05=271, Kp=804, Zinc=55; 6/6/18 H16=425, 9/5/18: Kv=963, Zinc=67\par 11/28/18 J50=548, 7/2/19: Ld=564, Zinc=61,B6=2.8, 8/15/19: Lb=902,Zinc=54,H96=271, 1/13/20: F14=440;\par 2/22/21: F17=234, Zk=368, Zinc=58, \par B12 1cc IM ____R. delt-- Given today, \par most recent IV Iron 1/11/21   for 1/4/21  Ferritin =60, Iron=44\par Hem consult IV Iron /Cu given malabsorption, ? BM bx --r/o occult etiology--on hold\par trend cbc, B12, FA, Iron panel \par Adj INR--closer to low 2's.    \par Agree w Heme--Prevnar-13\par \par \par \par 3. Other osteoporosis without current pathological fracture  \par : Progression on BD from 5/5/16\par Crohns, h/o steroid use\par Calcium citrate & Vit D per Endo\par Forteo since 5/10/19 , for Reclast          \par Repeat BD of 8/2018 --showed worsening to Osteoporosis from 2016\par Rec Endo consult w Dr. Akers :Osteoporosis center at [facility]--pt never went originally \par 9/21/18: Phos=2.4, Ca=8.7, Alb=3.4, Vit D=27, LER=934, \par 10/16/18: Phos=2.8, Ca=8.7, Alb=3.5,\par 1/14/19: Phos=2.6,  Ca=8.7, Alb=3.6\par 2/28/19: Phos=1.8, Ca=8.9, Alb=3.7, VitD=39, MUN=909\par 3/18/19: Ca=8.5, Alb=3.7, Vit D=44.6, PTH=93\par 7/11/19: Ca=9.1, Alb=3.7, Phos=3.9, Vit D=40/ 306, HFD=105\par 8/15/19: Ca=9, Alb=4.2, Phos=4.4, VitD=59, VKV=141\par 10/17/19: Ca=9.6, Alb=3.9, Phos=3.5\par 11/6/19: Ca=9.1, Alb=3.9. Phos=3.7, VitD=50, PTH=80\par 1/13/20: ca=9.1\par 1/30/20: Ca=9.2, Alb=3.9, Phos=2.7, Mag=1.5, Vit D=103/41, PTH=87\par 2/18/20:ca=8.5, Alb=3.6, \par 3/2/20: Ca=8.5, Alb=3.6\par 3/17/20: Ca=9.1, Alb=3.7, Phos=3.4, Mag=1.7\par 10/17/20: Ca=8.9, Alb=4, Phos=2.3, Mag-2.0, Vit D=66, PTH=47\par 1/4/21 : Ca=9.4, Alb=4.2, Phos=2.7, Mag=2, Vit D=64, PTH=87.5\par Dr. Akers: Hyperparathyroidism-- probably secondary due to low Vit D/Ca & ? superimposed primary, Rx replete Vit D and current IV Calcium\par trend Vit D, Ca, Phos, PTH,  \par [facility] ENT Consult init felt  Parathyroid scan showing activity in mediastinum is ectopic parathyroid, feels sx not indicated at this time, elev PTH is secondary to low  Vit D/Ca--to be reconsulted\par BD--9/2019: incr in spine, no change in wrist/hip\par 10/5/20-s/p Reclast\par \par \par 4. Gastroesophageal reflux disease w esophagitis : well controlled, no ht burn, no dysphagia, LPR\par Dry cough, CXR:ana, saw ENT eulogio-->LPR\par * ++ LPR,  ++  Page’s w No Dysplasia,  No , H/O  Esophagitis grade: A\par * Anti-reflux diet & life-style changes emphasized.  ++ Bedge\par * Weight reduction & regular exercise emphasized\par *  ++   PPI: Protonix 40 mg qd ,  ++ H2B q HS:Zantac 300mg--> Pepcid 40mg  ,  No Carafate  1 gram:\par * F/U  EGD:  [   2   ] mo.  /  [   2022    ] yr\par * No  pH Monitor,  No  Manometry,  No  Esophagram\par * ++   ENT  eval/F/U,  No  Surgical  eval\par \par \par \par \par \par 5. Hemorrhoids:  well - controlled.  No pain,  swelling,  itch,  bleeding\par * Discussed the potential complications of thrombosis,  pain,  infection,  swelling, itching,  bleeding \par Reccomend: \par * Moderate  - Fiber Diet was reviewed and emphasized\par * 6  --  8 cups of decaffeinated fluid daily was emphasized \par * Sitz Bathes BID,  No:  Anusol HC  Suppos / Cream  AK BID -- was needed\par * No:  Tucks BID,   No:  Balneol Lotion,   No:  Calmoseptine Oint -- was needed ;    ++ Prep H prn\par * No:  need for  Colorectal surgical evaluation for possible ablation w Dr --  was emphasized\par \par \par \par \par \par \par \par 6. Barretts esophagus without dysplasia  \par Notes: see GERD.    \par \par \par \par 7. Hepatomegaly-->not confirmed by recent abd sono\par CTE w relative enlargemt, ? lobulation & enlarged portal/mesenteric veins\par LFTs-wnl, no ascites or edema\par R/O CLD, Hepatic vein thrombosis\par Abd sono w doppler--> Liver and spleen normal size, no thrombosis, normal portal and hepatic vein flow\par \par \par \par \par .\par \par

## 2021-04-05 ENCOUNTER — RESULT REVIEW (OUTPATIENT)
Age: 56
End: 2021-04-05

## 2021-04-05 ENCOUNTER — APPOINTMENT (OUTPATIENT)
Dept: HEMATOLOGY ONCOLOGY | Facility: CLINIC | Age: 56
End: 2021-04-05
Payer: COMMERCIAL

## 2021-04-05 VITALS
HEIGHT: 68 IN | BODY MASS INDEX: 20.16 KG/M2 | DIASTOLIC BLOOD PRESSURE: 84 MMHG | TEMPERATURE: 97.6 F | RESPIRATION RATE: 18 BRPM | OXYGEN SATURATION: 97 % | SYSTOLIC BLOOD PRESSURE: 126 MMHG | WEIGHT: 133 LBS | HEART RATE: 98 BPM

## 2021-04-05 PROCEDURE — 99214 OFFICE O/P EST MOD 30 MIN: CPT

## 2021-04-05 PROCEDURE — 99072 ADDL SUPL MATRL&STAF TM PHE: CPT

## 2021-04-05 RX ORDER — ENOXAPARIN SODIUM 100 MG/ML
60 INJECTION SUBCUTANEOUS
Qty: 10 | Refills: 0 | Status: COMPLETED | COMMUNITY
Start: 2020-09-01 | End: 2021-04-05

## 2021-04-05 RX ORDER — ENOXAPARIN SODIUM 100 MG/ML
60 INJECTION SUBCUTANEOUS
Qty: 100 | Refills: 11 | Status: COMPLETED | COMMUNITY
Start: 2020-09-11 | End: 2021-04-05

## 2021-04-05 NOTE — DISCUSSION/SUMMARY
[FreeTextEntry1] : Patients ferritin dropped to 60 from 148- Hgb 13.6- to receive venofer 400 mg x 2 per Dr. Evans

## 2021-04-05 NOTE — CONSULT LETTER
[FreeTextEntry3] : Angie Biswas MD\par [facility] [place] [facility] [place]\par

## 2021-04-05 NOTE — REVIEW OF SYSTEMS
[Easy Bruising] : a tendency for easy bruising [Negative] : Musculoskeletal [Eye Pain] : no eye pain [Vision Problems] : no vision problems [Dysphagia] : no dysphagia [Hoarseness] : no hoarseness [Chest Pain] : no chest pain [Lower Ext Edema] : no lower extremity edema [Shortness Of Breath] : no shortness of breath [Cough] : no cough [Vomiting] : no vomiting [Constipation] : no constipation [Diarrhea] : no diarrhea [Dysuria] : no dysuria [Joint Pain] : no joint pain [Skin Rash] : no skin rash [Dizziness] : no dizziness [Insomnia] : no insomnia [Anxiety] : no anxiety [Depression] : no depression [Muscle Weakness] : no muscle weakness [Easy Bleeding] : no tendency for easy bleeding [de-identified] : pt is on coumadin

## 2021-04-05 NOTE — HISTORY OF PRESENT ILLNESS
[de-identified] : Patient is a 52 year old who is referred for initial consultation for anemia secondary to Crohns/GI bleed vs Iron Deficiency/ Vit b12 def. 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 Patient is status post ileo-colonic resections for SBO  in 2016. In 2016 patient had complaints of 10 - 15 lb weight loss. Labs at that time showed Hgb of 12, steatorrhea and stool calprotectin = 236. In 2016 MRI/MRE with narrowing at ileocolonic anastomosis with upstream dilation. Pt refused bridge with  prednisone and Entocort / Flagyl. In 2017 patient Hgb dropped to 9.5. On 2017 patient underwent an EGD and Colonoscopy. Findings consistent with Page's and no dysplasia or AVMs. Colonoscopy showed an open anastomosis but active disease, moderate on the colonic side and limited to the anastomosis and moderate to severe enteritis, just proximal to the anastomosis. Pat had routine labs on 05/10/2017 Hgb: 8.3.  [FreeTextEntry1] : s/p injectafer, copper\par warfarin [de-identified] : Patient presents for follow up of  anemia, DVT, MTHFR gene mutation follow up, currently on Coumadin 3mg- takes 0.5mg once a week for lower lab values with INR level that takes with home machines. Patient states that he feels a little fatigued but overall feels good. Patient received the COVID vaccination in Jan, 2021.

## 2021-04-05 NOTE — ASSESSMENT
[FreeTextEntry1] : 1.  Anemia- Hgb stable\par  -blood loss, anemia of chronic disease with need for intermittent iron  \par - copper deficiency - replaced, level WNL 5/2020\par - 11/2020- Ferritin 141, last IV iron November 4 2020- Venofer 400 mg x 2- March 2021\par \par 2. Osteoporosis \par - left ankle- stress fx- advanced  osteoporosis, patient with h/o steroids use\par - completed Forteo 18/24 m\par - calcium level stable,IV calcium 1-2 times per month\par - PT for osteoporosis\par - dexa - Sep 2020- Left fem neck -2.8 \par \par  3.TIA with MTHFR and h/o DVT x 3 with cryptogenic CVA  \par not a candidate for DOAC b/o small bowel resection\par - INR home monitoring of warfarin takes 3 mg daily\par - Warfarin 3 mg x 6, 1.5mg x 1, INR- INR 2.5 at home\par \par 4. Hypogammaglobulinemia- no increased infection rate \par - s/p  Prevnar 13 12/2018 \par -  Pneumococcal vaccine 23 boost \par - s/p  Shingrex\par - schedule COVID vaccine \par \par 5. Zinc deficiency\par - oral Zinc, level better - 72\par \par Dr. Luis Felipe Monsalve\par cell [phone]\par office [phone]\par \par Labs next visit- Zinc, Copper, Irons, Igg, \par \par \par \par \par \par

## 2021-04-07 ENCOUNTER — NON-APPOINTMENT (OUTPATIENT)
Age: 56
End: 2021-04-07

## 2021-04-09 ENCOUNTER — APPOINTMENT (OUTPATIENT)
Dept: GASTROENTEROLOGY | Facility: CLINIC | Age: 56
End: 2021-04-09
Payer: COMMERCIAL

## 2021-04-09 PROCEDURE — 99072 ADDL SUPL MATRL&STAF TM PHE: CPT

## 2021-04-09 PROCEDURE — 99215 OFFICE O/P EST HI 40 MIN: CPT

## 2021-04-09 NOTE — ASSESSMENT
[FreeTextEntry1] : 1. Crohns disease of both small and large intestine\par    \par CROHNS DISEASE-s/p ileocolonic resection x 2--last 6/19/15 for recurrent sbos: appears to be stable , GI symptoms stable but labs were up CRP=18.6/ESR=19 & Bcnqeiffymzc=942 and Wt down ( inflam markers ? 2/2 to R ankle Fx/stress rx and tendinosis, also had tooth infection ) & MRE w ? ileal penetrating dis, \par Presently GI symptoms, APR's and wt are all improved w wt =135_____\par s/p 4 SBOs w/i 2 yrs--2/2 distal sb disease adj to anastamosis\par when on Humira weekly w ADA =33 (3/20/14), -but had sbo 2/2014 at ADA=25 and another sbo 4/28/15\par 6/10/2015 Colonoscopy: Inflamm ST mass on ileal side w ulceration/scarred/narrow\par 6/11/2015 CT: dilated thickened sb loops distal jej & ileum\par Post-op **probably some malabsorption 2/2 to removal of R Colon and ileum: ?bile/fat/SIBO\par WT. Loss: r/o malabsorption, ?bile-induced--secretory vs decr micelles, active dis, PLE\par r/o SIBO--Elev FA, Alb=3.4-->3.1-->2.9--> (7/28/17)\par ?SIBO/Prevent post-op recurrence --Flagyl 250 mg qid~12/7/16-->d/c: 5/11/17\par Also discussed trial of Cholestyramine/Xifaxin -wanted to wait\par **ACTIVE Crohn's--11/10/16: 9.5lb wt loss, BMs: #5-6, 5x/D--Taking Magnesium \par 12/1/16 MRE: RUQ ileocolonic anastamosis--narrowed w upstream dilatation to 3.2 cm\par Labs: Stool Hmbmkzbczazu=143, + Incr Fecal fat, Alb=3.4, FA =23, E69=200, Hgb=12.3,ESR=10,CRP <5\par 4/19/17 :Colonoscopy: Anastamosis: open w mild -mod active colitis, enteritis severe adj to anastomosis, mild more proximal, remainder of colon=wnl\par 5/11/17- Adm [facility] w stools brown, Hgb=7.9, BUN=17, Creat=1.4, Alb=2.9.\par S/P 2 Units w Hgb=10.6, BUN=15/1.1, Prednisone 40mg taper, entocort d/dee\par 6/8/17: Hgb=7.4, MCV=98, CRP<5--ASA stopped, Pred20--> 30mg, 6/13/17: s/p 2 Unit PRBCs\par 7/11/17 [facility] w unsteadiness. MRI Head: R Cortical Temporo-occipital encephalomalacia\par Hgb=9.9--> 8.4->9.7 after 1 Unit. Seen by Heme: received IV Iron \par CRP<5, S79=189, UXB=536, FA>23,Iron=22,Sat%=6, Ferritin=42, Alb=3.5. D/C on warfarin 2mg\par 7/28/17 Hgb=7.7; 7/31/17-s/p 1 Unit \par Heme Consultation: received  IV Infusions of  Iron and 5 IV Copper:___\par **Entyvio :time 0=12/22/16 ( Humira 40mg QW); 1/5/17--2wks, s/p 3rd infusion at wk 6, \par #6 :6/21/17--held 2/2 Shingles, Last Infusions=9/19/17 , 10/17/17, 11/28/17, 1/16/18 ,2/16/18, 3/19/18,4/16/18, 5/14/18, 6/25/18, 8/7/18, 9/17/18, 10/16/18, 11/13/18, 12/11/18, 1/14/19, 2/15/19, 3/15/19, 5/16/19, 6/18/19,7/24/19,\par 9/9/19, 10/2/19, 11/4/19, 12/12/19, 1/6/20, 2/4/20, 3/3/20, 9/17/20, 10/15/20, 11/18/20, 1/11/21, 2/8/21, 3/8/21,   _______\par Entocort 9mg qd: 12/7/16--d/dee 5/11/17\par **Prednisone 40mg qd (5/11/17)--tapered 5mg/wk to 20mg qd, 6/8/17 30mg, \par 7/25/17 25mg, 3wks ago 20--> 15mg, 10/19/17 10mg alt w 7.5-->11/16/17 10mg alt w 5mg-->11/30/17: 5mg-->12/21/17: 5 alt w 2.5mg-->1/18/18: 2.5mg qd-->2.5mg qod--> d/c \par Probiotics 3 qd \par Lactose free, protein drinks tid\par MCT oil begun\par **trend --cbc, esr, crp, albumin\par **monitor wt= 120-->134-->134 --> 135--> 132 w clothes-->133--> 131 (Low carbs now)--> 129--> 127-->126-->124-->125-->124--> 126-->125-->125-->125-->126-->128-->127-->130-->131--> 135-->135 - 137--> 136--> 135____\par Wt Loss : sig change since May-June/2018\par 8/17/17: Hgb=9.9, ESR=20, Aw=937--Rgpjnoeknl s/p GI infection w N/V/D, no obstruction\par 10/17/17: Hgb=7.9,ESR=6,CRP<5, INR=1.6, Got IV Iron x 2, since 10/2/17 for Iron<10, Hgb=8.8\par 11/16/17: Hgb=11.2, ESR=14, CRP=12, 10/26/17 Stool fat-neg, calprotectin-30\par 11/13/17: MRE-Chr mild distension of distal & vladislav-ileum s/o mild stricturing at anast, mild mural thick & mucosal enhancemt ~ 20cm; little change from 2015 c/w mild acute on chr ileitis, 2.1 cm Liver hemangioma\par 10/9/18: Hgb=11.6, MCV=94, ESR=14, CRP=5.4, INR=2.2\par 10/10/18 MRE: stricturing of neoterminal ileum w worsened upstream dilatation, moderate length of upstream ileum w stricturing extending at least 20cm and more extensive then previous. suggestion of 2 adj extraluminal fluid collections which may be w/i the wall of strictured ileum near the ileocolonic a, Hgb=13.6\par Colonoscopy: 05cm rectal polyp: HP, 2nd deg int hemorrhoids\par mild-mod activity: MARILY w cryptitis & mild archect distortion at ileocolonic anast: colon & ileal side, no stricture\par \par \par CRP <5 : ? s/p recent ankle fx/stress rx/tendonitis and tooth infection \par prior stool studies w elev  calprotectin and No fat\par Wt loss-- 2/2 to low carbs-->fat burning and flare ; advised to liberalize and add protein, ensure\par Plan: Prednisone d/c 2/20/18\par Entyvio q 6 wks --> 4 weeks \par check inflammatory markers: 2/28/19 w ESR=12, CRP=6.5 & 2/21/19 stool calprotectin--> 232\par 8/15/19: ESR=10, CRP=5.2, Calprotectin=88\par 9/19/19: ESR=9, CRP=5.5\par 10/17/19: ESR=7, CRP< 5\par 11/6/19 : ESR=6, CRP=0.18\par 11/27/19: ESR=6, CRP <5\par 1/13/20: ESR=7, CRP=0.2\par 1/30/20: ESR=9, CRP=11\par 3/2/20:ESR=7, CRP=0.26\par 3/9/20: VDZ>40, Ab to VDZ <1.6\par 3/17/20: ESR=6, CRP=6.4\par 10/17/20: ESR=11, CRP <5\par 1/4/21:ESR=9, CRP<5\par 2/22/21: ESR=8, CRP<5\par 4/5/21: ESR=9, CRP<5\par pt declined to call numbers given for  [facility] or investigational rx's--biologics.  \par feels he is stablizing w wt loss, and inflammatory markers\par           \par \par today 4/9/21  --  Most recent labs  and imaging reviewed w patient: from:  4/5/21\par \par trend cbc,esr,crp\par trend stool calprotectin \par                                                                                                                                                                                                                                                                                                                                                                                                                                                                                                                                                                                                                                                                                                                                                                                                                                                                                                                                          \par 2. Iron deficiency anemia due to chronic blood loss  \par  had initially dropped , after clinical flare and post procedure bleeding\par Probably multifactorial: ACD, Blood loss, malabsorption/nutrient deficiencies\par Eliquis-->warfarin after CVA, On Entyvio and prednsione for active dis\par Still requiring IV Iron--prn, \par s/p IV Cu & transfusions \par 7/11/17 Recent Adm for unsteady gait w MRI c/w CVA--warfarin begun: possible better control of AC\par Chromagen 2 qd--> IV Iron , s/p IV Cu x 5, FA 1mg qd , B12 SL & IM\par  1/14/19: Hgb=10.7, INR=1.6, 2/5/19: Hgb=11.2,INR=1.5; 2/28/19: Hgb=11.5, Eqcxgvbd=615; 3/11/19: INR=2.6\par 4/11/19: hgb=9.7, INR=1.6, 7/2/19: Hgb=11.7, Ferritin=41,INR=2.2, 8/15/19: Hgb=12.2,Ferritin=81,INR=1.6, \par 9/19/19: Hgb=12.8, 10/17/19: Hgb=14, 10/26/19: Hgb=14, 1/13/20: Hgb=13.5, 1/30/20: Hgb=13.9, Oxretyhc=961, Iron=38,  3/17/20: Hgb=13.1, Xohrgjhb=909, INR=1.7, 10/17/20: Hgb=13, Ferritin=46, INR=1.9,  \par 1/4/21 Hgb=13.6, Ferritn=60, Iron=73, 2/22/21: Hgb=12.7, Hbdkbube=964, 4/5/21: Hgb=13, Ferritin=94\par 7/13/17: L33=798, BOY=772,FA>23; 1/3/18 N88=841, Hb=788, Zinc=55; 6/6/18 P66=295, 9/5/18: Bc=117, Zinc=67\par 11/28/18 S24=167, 7/2/19: Ts=411, Zinc=61,B6=2.8, 8/15/19: Vx=513,Zinc=54,X42=570, 1/13/20: E12=088;\par 2/22/21: D15=128, Fs=754, Zinc=58, \par B12 1cc IM ____R. delt--  Given today, \par most recent IV Iron 3.24.21   for 1/4/21  Ferritin =60, Iron=44; 4/5/21 Ferritin=94, Iron-56\par Hem consult IV Iron /Cu given malabsorption, ? BM bx --r/o occult etiology--on hold\par trend cbc, B12, FA, Iron panel \par Adj INR--closer to low 2's.    \par Agree w Heme--Prevnar-13\par \par \par \par 3. Other osteoporosis without current pathological fracture  \par : Progression on BD from 5/5/16\par Crohns, h/o steroid use\par Calcium citrate & Vit D per Endo\par Forteo since 5/10/19 , for Reclast          \par Repeat BD of 8/2018 --showed worsening to Osteoporosis from 2016\par Rec Endo consult w Dr. Akers :Osteoporosis center at [facility]--pt never went originally \par 9/21/18: Phos=2.4, Ca=8.7, Alb=3.4, Vit D=27, IMY=939, \par 10/16/18: Phos=2.8, Ca=8.7, Alb=3.5,\par 1/14/19: Phos=2.6,  Ca=8.7, Alb=3.6\par 2/28/19: Phos=1.8, Ca=8.9, Alb=3.7, VitD=39, VPR=802\par 3/18/19: Ca=8.5, Alb=3.7, Vit D=44.6, PTH=93\par 7/11/19: Ca=9.1, Alb=3.7, Phos=3.9, Vit D=40/ 306, YIS=101\par 8/15/19: Ca=9, Alb=4.2, Phos=4.4, VitD=59, AWQ=215\par 10/17/19: Ca=9.6, Alb=3.9, Phos=3.5\par 11/6/19: Ca=9.1, Alb=3.9. Phos=3.7, VitD=50, PTH=80\par 1/13/20: ca=9.1\par 1/30/20: Ca=9.2, Alb=3.9, Phos=2.7, Mag=1.5, Vit D=103/41, PTH=87\par 2/18/20:ca=8.5, Alb=3.6, \par 3/2/20: Ca=8.5, Alb=3.6\par 3/17/20: Ca=9.1, Alb=3.7, Phos=3.4, Mag=1.7\par 10/17/20: Ca=8.9, Alb=4, Phos=2.3, Mag-2.0, Vit D=66, PTH=47\par 1/4/21 : Ca=9.4, Alb=4.2, Phos=2.7, Mag=2, Vit D=64, PTH=87.5\par 4/5/21: Ca=9.1, Alb=4, Phos=3.1, Mag=1.7, \par Dr. Akers: Hyperparathyroidism-- probably secondary due to low Vit D/Ca & ? superimposed primary, Rx replete Vit D and current IV Calcium\par trend Vit D, Ca, Phos, PTH,  \par [facility] ENT Consult init felt  Parathyroid scan showing activity in mediastinum is ectopic parathyroid, feels sx not indicated at this time, elev PTH is secondary to low  Vit D/Ca--to be reconsulted\par BD--9/2019: incr in spine, no change in wrist/hip\par 10/5/20-s/p Reclast\par \par \par \par \par \par 4. GERD:  well  -  controlled,  no ht burn,  dysphagia,  throat clear\par                 Dry cough, CXR:ana, saw ENT eulogio-->LPR\par * ++ LPR,  ++  Page’s w No Dysplasia,  No , H/O  Esophagitis grade: A   was found \par \par Recommend: \par * Anti-reflux diet & life-style changes reviewed & re-emphasized.  ++  Bedge required\par * Weight reduction & regular exercise emphasized\par \par * need for  PPI:  Protonix 40 mg qd ,  ++ H2B q HS:Zantac 300mg--> Pepcid 40mg\par No Carafate  1 gram:   --was emphasized\par I reviewed & summarized the prospective randomized & retrospective non-randomized studies\par looking at potential long term SE's of PPIs, w special attention to associations & actual cause\par as related to GI infections, bone loss, cognitive changes, KD, Covid, vitamin & electrolyte deficiencies\par questions were answered, pt advised that PPIs should be used when needed as indicated by their\par clinical indication and response and tapering off is always the goal if possible. pt understood.\par \par *  F/U  EGD:  [  2    ] mo.  /  [   2022   ] yr  --for Barretts screening / surveillance \par * No  need for pH Monitor,  No  Manometry,  No  Esophagram -- was emphasized \par *  ++  need for ENT  eval/F/U,  No  need for Surgical  eval  --  was emphasized \par \par \par \par \par \par 5. Hemorrhoids:  well - controlled.  No pain,  swelling,  itch,  bleeding\par * Discussed the potential complications of thrombosis,  pain,  infection,  swelling, itching,  bleeding \par Reccomend: \par * Moderate  - Fiber Diet was reviewed and emphasized\par * 6  --  8 cups of decaffeinated fluid daily was emphasized \par * Sitz Bathes BID,  No:  Anusol HC  Suppos / Cream  ND BID -- was needed\par * No:  Tucks BID,   No:  Balneol Lotion,   No:  Calmoseptine Oint -- was needed ;    ++ Prep H prn\par * No:  need for  Colorectal surgical evaluation for possible ablation w Dr --  was emphasized\par \par \par \par \par \par \par \par 6. Barretts esophagus without dysplasia  \par Notes: see GERD.    \par \par \par \par 7. Hepatomegaly-->not confirmed by recent abd sono\par CTE w relative enlargemt, ? lobulation & enlarged portal/mesenteric veins\par LFTs-wnl, no ascites or edema\par R/O CLD, Hepatic vein thrombosis\par Abd sono w doppler--> Liver and spleen normal size, no thrombosis, normal portal and hepatic vein flow\par \par \par \par \par .\par \par

## 2021-04-09 NOTE — REVIEW OF SYSTEMS
[Red Eyes] : eyes not red [Dysuria] : no dysuria [Confused] : no confusion [Suicidal] : not suicidal [Proptosis] : no proptosis

## 2021-04-09 NOTE — HISTORY OF PRESENT ILLNESS
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-->Eliquis' warfarin \par        GERD w Page's, Hemorrhoids, BPH, \par        S/P Ileocolonic resection 1998\par        Since then multiple SBOs\par \par \par        Got IV Iron x 2, since 10/2/17\par        10/19/17: feeling better, Jg=127 on prednisone 15mg x 3weeks, BMs Brown: #5-6, 1-2/D, occas 3-4/d\par        10/25/17: Hgb=10, ESR=5, CRP=5, Alb=3.6, Phos=2, Mrajyfuz=430, B48=213\par        11/16/17: Hgb=11.2, ESR=14, CRP=12, \par        11/13/17: MRE-Chr mild distension of distal & vladislav-ileum s/o mild stricturing at anast, mild mural thick & mucosal enhancemt ~ 20cm; little change from 2015 c/w mild acute on chr ileitis, 2.1 cm Liver hemangioma\par        11/28/17: Entyvio\par        11/29/17: Hgb=9.6, ESR=10, CRP=5.8, Alb=3.8,Ferritin-19, Iron=14,Sat=4%, Phos=2.5, Uh=718, BMs:# 5, 1-2x/D, brown,\par        12/19;17: Hgb=10.1, ESR=12, CRP=6.5; 12/21/17: BMs:#3-5, 1-3x/D , brown, no blood, no pain, cw=757\par        1/3/18:Hgb=11.7, ESR=19, CRP=6.5, Alb=4.1, Sajmrega=900, Iron=31, Sat%=9,Zinc=55\par        1/16/18: Entyvio, Hgb=11.4, ESR=10, CRP=6.9\par        1/18/18: Today: cellulitis R foot, saw dr KEITH--rx augmentum x 10D, Entyvio 1/9 to 1/16, by=361 w clothes,BMs: # 4-5, 1-2x/D \par        2/14/18: Hgb=11.1, ESR=16, CRP=11.6, Alb=3.6, Iron=28, Ferritin=23, INR=1.5 \par        2/16/18: s/p Entyvio; Iron infusion, again on 2/27\par        2/20/18: Hgb=10.6, ESR=20, CRP=12, INR=1.7\par        2/27/18: s/p iron infusion\par        3/9/18: Dr. Burden for tenosynovitis, rx w Zorvolex: Diclofenac x 5 days--? response\par        3/14/18: Dm=761; BMs: # 5, 1-2x/D; Hgb=11, ESR=18, CRP=11,Irom=45,Jhkvnbib=102,Alb=3.6, INR=1.5\par        3/19/18: s/p Entyvio\par        3/22/18: lg=112, BMs: # 5, 3-4 x/d\par        4/16/18: s/p Entyvio,Hgb=11.9, INR=1.3, ESR=18, CRP=8.4 \par        4/18/18 EGD: gastritis, no HP, no IM, 2+ Mucous, 0.5cm gastric polyp: fundic, 4cm HH, + Barretts:3cm, no dysplasia\par        4/25/18: Hgb=11.5, INR=1.6, Iron=40, Sat=13%, Ferritin=57, Alb=3.2, Phos=2.2, Mag=1.7\par        4/30/18: s/p Iron Infusion\par        5/14/18: s/p Entyvio \par        5/23/18: Hgb=11.5, ESR=16, CRP< 5\par        5/29/18: s/p Iron Infusion\par        6/6/18: Hgb=10.6, INR=1.7, Inalhxnr=737, Alb=3.5, Phos=2.1, Z23=795, es=439; BMs: # 5, 2-3x/D \par        6/19/18: Hgb=10.6, INR=1, CRP=15, ESR=23\par        6/21/18: R ankle is acting up, swollen, pain, having MRI done, took a couple of advil, on low carbs ,BMs: # 5, 1-2x/D, mv=965\par        6/26/18: MRI: fx/stress rxn-talar body/navicula; stress related changes--cuneform, cuboid,distal tibia; mod tibiotalar effusion, mild-mod peroneal tendinosis\par        7/19/18: entyvio 6/26/18, eliminated all carbs--anti inflammatory diet, lh=651, BMs: # 4-5, 1-3x/D \par        7/25/18: Hgb=10, INR=1.3, Alb=3.3, Phos=2.4, Cpihynid=829, sat%=12, Iron=35\par        8/2/18: BD--osteoporosis of hip/spine: sig decrease BD--17.6% of hip, 17% of spine, osteopenia of wrist: 6.4%\par        8/7/18: Entyvio\par        8/21/18: Hgb=11.1, INR=1.5, ESR=19, CRP=18.6\par        8/23/18: recently w tooth infection, old implant--loose and removed; rx w amox, and advil\par        eating almost no carbs, ph=699, # 5-6, 2-3x/d \par        8/24/18: Stool fat--neg, Rotupfejpxms=191\par        9/5/18: Hgb=11.4, INR=1.3, ESR=9, CRP=11.3, Iron=41, Kjbsfriw=988, Alb=3.8,\par        9/17/18: entyvio, Hgb=10.7, INR=2.2, ESR=17, CRP=9.1, \par        9/20/18: px=625, BMs: # 5-6, 1-2x/D \par        9/21/18: Phos=2.4, Ca=8.7, Alb=3.4, Vit D=27, YOO=407, \par        Dr. Akers: Hyperparathyroidism: probably secondary due to low Vit D/Ca & ? superimposed primary, rx replete Vit D and Calcium\par        10/9/18: Hgb=11.6, MCV=94, ESR=14, CRP=5.4, INR=2.2\par        10/10/18 MRE: stricturing of neoterminal ileum w worsened upstream dilatation, moderate length of upstream ileum w stricturing extending at least 20cm and more extensive then previous. suggestion of 2 adj extraluminal fluid collections which may be w/i the wall of strictured ileum near the ileocolonic anastomosis. surrounding spiculation and tethered appearance of bowel in this region.\par 10/16/18: entyvio, Hgb=11.7, MCV=94, ESR=14, CRP <5, Alb=3.5\par 10/18/18: Aa=639,   BMs: # 5-6, 3x/D , \par 11/13/18: Entyvio\par 11/21/18 CTE w C: limited 2/2 bowel underdistention, mucosal hyperenhancemt & extensive SM edema of distal ileum including vladislav-ileum, difficult to exclude a sml fluid collection, Liver w relative enlargement w suggestion of lobulation, enlargemt of PV,SMV,IMV,Spl V, rectal V. No ascites\par 11/28/18: Hgb=10, ESR=19, CRP=17,Alb=3.5,Iron=35, Sat%=11, Xctdpaxj=860, INR=1.3\par 11/29/18: km=370, BMs: # 5-6, 3-4x/D\par 12/3/18: Abd sono--Liver 14.8cm Incr heterogenicity, spleen--wnl\par 12/11/18 & 1/14/19: Entyvio\par 1/14/19:Entyvio,  Hgb=10.7, ESR=15, Alb=3.6, Iron=36, Ferritin=67, Sat%=11, INR=1.6\par 1/24/19:  ul=116, stools are # 4-6, 3-4x/d , no pain\par 2/5/19: Hgb=11.2, ESR=14, CRP=0.36\par 2/28/19: Hgb=11.5, ESR=12, CRP=6.5, Alb=3.7, Iron=69, Gvmibuqw=792, Ca=8.9\par                Bms: # 4-5, 2-3x/D , jj=768,  Entyvio : last 2/1519,\par                had parathyroid scan pre-op, still w elev PTH, + activity in mediastinum,? ectopic parathyroid tissuevs neoplasm.  To see ENT and have [facility] at [facility]\par 3/28/19: Bms: # 4-5 , 3x/d ;xi=772\par 4/11/19: Hgb-9.7, Iron=45, ESR=18, CRP=9.4, Alb=3.5, \par Saw Endo SX at [facility], felt hyperparathyroid is secondary to Low Ca/Vit D, no sx at this time\par 4/16/19: BMs: # 5-6, 3-4x/D, kv=489,  Entyvio : last 3/1519,  got IV iron 4/12/19 for Ferritin=53\par 4/27/19: s/p R Hip Nailing--missed 4/2019 2/2 fresh wound\par 5/16/19 & 6/18/19 Entyvio\par 7/2/19: Hgb=11.7, Ferritin=41,ESR=12, CRP=5.5, B6=2.8,Mag=1.8,Phos=3.9,Hy=201,Zinc=61\par 7/11/19: s/p IV iron\par 7/11/19: Alb=3.7, RPQ=713, Ca=9.1, Vit D=40/306, \par 7/24/19: Entyvio, Alb=3.8, YRS=429, Ca=8.9, Vit D=128 \par 8/1/19: Bms: # 5-6, occas #4, 2-3x/D , gy=586\par 8/15/19: Hgb=12, ESR=10, CRP=5.2, Ferritin=68, Alb=3.4, Phos=4.4,Mg=1.7, Ca=8.5,Calprotectin=88,\par 8/20/19: UIN=842, Ca=9, Alb=4.2\par 9/19/19: Hgb=12.8, ESR=9, CRP=5.5, Alb=3.7, Meazzkwd=539, Mag=1.8, Ca=8.9, Phos=4.2\par 9/26/19: tc=991, BMs: #5-6, 2-3x/D, no pain\par 10/17/19: mq=765, BMs: #4-6, 1-2x/D, no Pain; Hgb=14, ESR=7, CRP<5, Alb=3.9, Rnjnzwst=505, Mag=1.7, Ca=9.6\par 10/24/19: gw=169, Bms: # 4-6 , no pain,\par 11/6/19: ESR=6, CRP=6, PTH=80,Ca=9.1, VitD=50\par 11/14/19: rf=515, BMs: # : 4, 1-2, 0ccas #5\par  11/17/19: ESR=6, CRP<5, Ibmbgwdg=474, \par  12/19/19: ao=683, BMs: #5-6, 2-3x/D\par 1/6/20: entyvio\par 1/13/20: ESR=7, CRP=0.2, Hgb=13.5, Cmuifpak=279, L69=751, FA=10, Alb=4.1, Ca=9.1\par 1/16/20: wt = 127, BMs: # 5, 1-3x/d\par 1/30/20: ESR=9, CRP=11, Hgb=13.9, Rpymmxsm=972,Iron=38, Alb=3.9,Ca=9.2, PTH=87,\par 2/3/20 EGD: gastritis, +MACKENZIE, No HP, +erosion w ooze -clipped, 0.5cm polyp-neg; 4cm HH, Esophagitis A, + Barretts, VC: 1+\par Colonoscopy: 05cm rectal polyp: HP, 2nd deg int hemorrhoids\par mild-mod activity: MARILY w cryptitis & mild archect distortion at ileocolonic anast: colon & ileal side, no stricture\par 2/4/20: Entyvio; 2/12/20: IV Iron, 3/3/20: Entyvio\par 2/25/20: vb=938,  BMs: # 4-5, 1-2x/ d\par 3/19/20: dx=740, BMs: #4-5, 1-2x/D\par 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\par 10/17/20 : Hgb=13, CRP< 5, ESR= 11, Iron=44, Ferritin=46, Alb=4, Phos=2.3, \par 11/5/20: ff=182; s/p IV Iron x 2 ,  11/4 and 1 week prior;   BMs:  # 4-5, 1-2x/D , no pain\par 11/18/20  Entyvio + IV Ca\par  1/4/21: Hgb=13.6, CRP<5, ESR=9, Ferritin=60, Alb=4.2, Phos=2.7\par 1/11/21: Entyvio & IV Ca\par 1/14/21: no pain, stool more formed ,  nu=132 - 137; BMs:  # 4-5, 1-2x/D \par 2/8/21: Entyvio & IV Ca\par 2/23/21 IV Ca\par 2/22/21: Hgb=12.7, CRP<5, ESR=8, Tpignxctz=822, Phos=2.3, Mag=1.8, G95=413, Zinc=58, Ch=388\par 2/26/21: zu=832,  BMs:  # 4-5, 1-2x/D , no pain \par 3/8/21 & 4/8/21: Entyvio\par 3/9/21 & 3/24/21: IV Iron\par 4/5/21: Hgb=13, CRP<5, ESR=9, Ferritin=94, Phos=3.1, Mag=1.7,Ca=9.1/ Alb=4\par               \par Pt Ins co is refusing to cover Entyvio q 4wks, despite numerous attempts to appeal, they also refuse to set up a peer to peer discussion bet myself and another healthcare provider, even one that works for a third party.  They do acknowledge that there is literature supporting the successful use in individual cases who don’t respond to the q 8 wk interval and need\par less then q 8weeks , but state it had not in FDA approved at less then 8wks so they will not approve it.  I spoke to the ins co rep and discussed that fact that patients should not be  pigeon holed since practicing medicine is done on an individual basis.  Humans are not all the same.   Their  response didn’t change eventhough the pt clinically needed the medication and it  induced a beneficial clinical response towards remission.  Therefore the patient and I decided to slowly increase the interval since the insurance company will not pay for this benefit.  Hopefully he may be able to maintain his present clinical state.  If not we will return to q 4weeks and will again appeal using this patients real clinical data .\par \par \par  Today:  Feeling well, no c/o , CP, SOB/ AMARO, Cough, Wheeze, Palpitations, edema\par   Most recent labs  reviewed w patient: 4/5/21 \par '\par \par        no pain, stool more formed # 4, 1-2x/d \par         \par        im=391\par        Abd pain--no \par        Nausea--no \par        Vomit--no \par        Early satiety-no \par        Belching-no \par        Regurgitation--no \par        Ht burn--no \par        Throat Clearing-no\par        Globus-no\par        Hoarseness--no \par        Dysphagia--no \par        BMs:  # 4, 1-2x/d \par        Constipation--no \par        Diarrhea- intermittent:  no\par        Bloating--no \par        Flatulence-no\par        Gurgling--no \par        Melena--no \par        BRBPR--no \par        Anorexia-no \par        Wt Loss--stable\par \par \par \par        PRIOR HISTORY--2017\par \par        1/17/17: Hgb=9.2, ESR=6, CRP=5; 1/19/17: BMs: #4, 5-6, 2-3x/D, Oo=845, Started Creon 1 tid cc\par        3rd Entyvio begining Feb\par        2/14/17: Labs; Hgb=9.6, MCV=97, ESR=5, CRP< 5, H45=631, FA>23, Iron=59, Retic =3.2,\par        2/17/17 Fecal Calprotectin=16, Fats: Increased neutral & Split\par        3/13/17: Labs Hgb=9.5, MCV=95, ESR=7, CRP <5, ALB=3.1\par        3/16/17: lot of stress, to move -[place], had a job-fell thru, BMs: # 5, 2-4 x/D, wt= 131w clothes\par        4/19/17 EGD: gastritis, MACKENZIE, No HP, 2cm HH, +Barretts, No Dysplasia\par        Colonoscopy: Anastamosis: open w mild -mod active colitis, enteritis severe adj to anastomosis, mild more proximal, remainder of colon-wnl, 2-3 deg int hemorrhoids\par        4/26/17 : Hgb=7.3, MCV=95, ESR=13, CRP<5;Immediately post procedure stools very dark on eliquis/iron.\par        Admitted to [facility]: Hgb=8.3-->8.9 after 2 U, Alb 3.3, BUN=17, creat=1.3, Malina/Ymjjc=672/460\par        4/27/17: Alb=2.3, BUN=12, creat=1.2, Malina/Lipase=209/863-No abd pain, N/V; 5/5/17: Hgb=10.7.\par        5/8/17 Entyvio iv. 5/8-5/9 w dark red diarrhea; 5/10/17 Hgb=7.8.\par        5/11/17 Adm [facility] w stools brown, Hgb=7.9, BUN=17, Creat=1.4, Alb=2.9; S/P 2 Units- Hgb=10.6, BUN=15/1.1.\par        Prednisone 40mg qd, entocort d/dee.; 5/18/17: Hgb-10.4, fl=965, BMs: #5, 1-2x/D, no pain\par        6/8/17: Hgb=7.4,MCV=98, CRP<5-ASA stopped, Pred20--> 30\par        6/10/17: Hgb=8.2, Alb=2.9, BUN=20/1.2 ; 6/12/17: Hgb=8.3, Retic=0.19, Iron=10, Sat%=3, Ferritin=57, FA=23,M22=387, 6/13/17: s/p 2 Unit PRBCs\par        6/15/17: started MCT oil, Iv=140 , BMs: # 5, 1-2x/D, stools are brownish/green \par        7/11/17: [facility] w unsteadiness. MRI Head: R Cortical Temporo-occipital encephalomalacia, MRA H&N-no sign lesions, PER-wnl.Hgb=9.9--> 8.4->9.7 after 1 Unit. Seen by Heme: received IV Iron \par        CRP<5, Y78=498, MMZ=076, FA>23,Iron=22,Sat%=6, Ferritin=42, Alb=3.5. D/C on warfarin 2mg\par        7/20 Hgb=8.5, 7/28 Hgb=7.7, 7/31-s/p 1 Unit \par        As outpt seeing Heme, to get IV Iron and 5 IV Copper\par        Had 'FLU' Fever=101, chills, bloat, N/V/D, Bilious. no pain, melena,brbpr\par        Given anti-emetic by dr Butler,then able to keep things down\par        On prednisone 25, warfarin w INR bet 1.6-2\par        8/17/17: Hgb=9.9, ESR=20, CRP-P,Cf=233, BMs: # 5, 1-2x/D, stools are brownish/green\par        10/2/17: Hgb=8.8, MCV=89,Phos=1.7, K=3.9, Mag=1.9, Alb=3.6,Iron<10,Ferritin=21, INR=2\par        10/17/17: Hgb=7.9,ESR=6,CRP<5, INR=1.6\par        10/25/17: Hgb=10, ESR=5, CRP=5, Alb=3.6, Phos=2, Mteuwxst=828, Y29=850\par        11/16/17: Hgb=11.2, ESR=14, CRP=12, \par        11/13/17: MRE-Chr mild distension of distal & vladislav-ileum s/o mild stricturing at anast, mild mural thick & mucosal enhancemt ~ 20cm; little change from 2015 c/w mild acute on chr ileitis, 2.1 cm Liver hemangioma\par        11/28/17: Entyvio\par        11/29/17: Hgb=9.6, ESR=10, CRP=5.8, Alb=3.8,Ferritin-P, Iron=14,Sat=4%, Phos=2.5\par        12/19;17: Hgb=10.1, ESR=12, CRP=6.5; 12/21/17: BMs:#3-5, 1-3x/D , brown, no blood, no pain, ud=483\par        1/3/18:Hgb=11.7, ESR=19, CRP=6.5, Alb=4.1, Yfczclfx=371, Iron=31, Sat%=9,Zinc=55\par \par \par        PRIOR HISTORY---2013:\par \par        2/20/13 CT markedly dilated sb loops ext to anast, colonoscopy w open anast ,mild-mod remy-anastamotic disease. quick response to entocort/humira--probably adhesive dis. \par        8/10/13 w GNR bacteremia, ADA 1.7 /KAYLAH 3.4, Humira 8/7, 8/13,8/18,9/15\par        CT- mucosal thickening and spiculation of the distal sb extending to the anastamosis, thickening and stranding of adj mesenteric fat.\par        Humira increased to 40mg weekly, entocort 4\par        9/2013 MRE--dilated loops in mid and distal ileum, markedly thickened and narrowed TI w decreased peristalsis of TI\par        11/15/13 ADA-24.9, KAYLAH-0\par \par \par        PRIOR HISTORY---2014:\par \par        2/15/14--CT dilated loops SB, loop of sb in mid abd 4.3cm w infiltrative changes in the mesentery, bowel tapers in the RLQ to the anastamosis w/o transition\par        WBC=15K, Rx w IV steroids and Abx \par        3/7/14 SBFT: last 10cm sb prox to anast mild distension and sl irregularity. In the mid portion of this loop there is a mild narrowing which appears to reopen but is some what narrowed.\par        3/20/14 ADA=33.1, KAYLAH=0, Lialda switch to Apriso 4 (2/2014)\par \par \par        PRIOR HISTORY--2015\par \par        1/11/15 Adm [facility] w 1 day N,Recurrent V, Abd pain/distension. CT-multiple distended & fluid-filled sb loops, mild wall thickening of ileum w No inflamm changes.\par        WBC=14.6, Hgb=17, RX w NGT suction, Levaquin iv, Solumedrol 40mg q 12( 1/12-->1/16) to prednisone 30mg BID w 5mg taper/wk\par        3/18/15 --Dr. Butler w edema /High BP, prednisone was tapered slowly to 2.5mg \par        switched to entocort 9mg qd, edema and bp improved w lasix 20mg \par        BMs:#4-5, 3x/D, Hgb=11, ESR=4, CRP<5\par        4/28/15 - 5/1/15: Adm [facility] w 1 day Abd pain, distension,N/V. 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 side of anast, opening appeared ulcerated,scarred & severely narrowed\par        6/11/15: [facility] w N/V x1, decr appetite, CT ABD: no obstruction, dilated thickened sb loops of distal jej and ileum\par        6/19/15 [facility]: s/p Lap w extensive lysis of adhesions, hepatic flex, sigmoid and sb anastamosis, s/p partial r colectomy and sb resection--side to side elisabeth: 8-10 inch from prior anast to TV colon.\par        7/17/15: BMs:#4-6, 4-5x/D, wt 131(from 126)\par        8/20/15: BMs: #4, 1-2x/D or #5, 2-3x/D, vu=487, dry cough,Hgb=10, WBC=3, FMH=773, CRP=56, ESR=21\par        8/25/15 CT Abd/Pelv: Svl RLQ sb loops w wall thickening, mild nodularity & inflamm stranding in mesentery\par        Advised-restart Entocort 9mg qd, Apriso 4 qd, Maintain Humira but given RX to check drug/Ab levels\par        9/15/15: did nt restart meds,Hgb=9.9, WBC=4, ESR=19, CRP=5.8, lw=614; [facility] : ADA=8.5, Antibodies< 1.7\par        10/15/15: No pain, BMs:#4, 1-2x/D, #5-6, 3-4x/D, throat clearing/cough-better, dh=796\par        11/30/15: No pain, BMs:# 4, 5-6 intermittently, No throat clear, gq=911\par \par \par        PRIOR HISTORY-2016\par \par        1/22/16; 4/8/16; 6/6/16 : No pain, BMs:# 4-5 1-2x/D, also #5-6 3x/D for 2-3D/wk, lt=058\par        7/14/16: fe=435, 9/22/16: yn=378.5\par        11/10/16: 9.5lb wt loss, states BMs: 5-6, 5x/D--Taking Magnesium, No pain/anorexia\par        Labs: Stool Ugekfbdganyy=066, + Incr Fecal fat, Alb=3.4, FA =23, J91=354, Hgb=12, ESR=10, CRP <5\par        Magnesium-d/c, Obtain MRE asap--Start steroids and possibly switch to Entyvio\par        DDx discussed: active crohns--loss response to Humira, Malabsorption--loss Bile acids, Bile-induced diarrhea, SIBO\par        Pt wanted to wait for imaging-did not start rx\par        12/1//16 MRE: RUQ ileocolonic anastamosis--narrowed w upstream dilatation to 3.2 cm\par        Pt refused pred, Started Entocort 9mg qd and Flagyl 250mg qid about 7-10days ago \par        awaiting Entyvio load to start 12/22, then 1/5; had Cut back on iron bid\par        12/15/16: BMs:# 5, 2-3x/D, occas #6, No pain, Less bloat/flatulence, Pg=587.

## 2021-04-10 ENCOUNTER — RESULT REVIEW (OUTPATIENT)
Age: 56
End: 2021-04-10

## 2021-04-19 NOTE — CONSULT LETTER
[Dear  ___] : Dear  [unfilled], [Consult Letter:] : I had the pleasure of evaluating your patient, [unfilled]. [Please see my note below.] : Please see my note below. [Consult Closing:] : Thank you very much for allowing me to participate in the care of this patient.  If you have any questions, please do not hesitate to contact me. [Sincerely,] : Sincerely, [DrMeron  ___] : Dr. REARDON [FreeTextEntry3] : Angie Biswas MD\par [facility] [place] [facility]\par

## 2021-04-19 NOTE — HISTORY OF PRESENT ILLNESS
[0 - No Distress] : Distress Level: 0 [de-identified] : Patient is a 52 year old who is referred for initial consultation for anemia secondary to Crohns/GI bleed vs Iron Deficiency/ Vit b12 def. 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 Patient is status post ileo-colonic resections for SBO  in 2016. In 2016 patient had complaints of 10 - 15 lb weight loss. Labs at that time showed Hgb of 12, steatorrhea and stool calprotectin = 236. In 2016 MRI/MRE with narrowing at ileocolonic anastomosis with upstream dilation. Pt refused bridge with  prednisone and Entocort / Flagyl. In 2017 patient Hgb dropped to 9.5. On 2017 patient underwent an EGD and Colonoscopy. Findings consistent with Page's and no dysplasia or AVMs. Colonoscopy showed an open anastomosis but active disease, moderate on the colonic side and limited to the anastomosis and moderate to severe enteritis, just proximal to the anastomosis. Pat had routine labs on 05/10/2017 Hgb: 8.3.  [FreeTextEntry1] : s/p injectafer, copper\par warfarin [de-identified] : Patient presents for follow up of  anemia, DVT, MTHFR gene mutation follow up, currently on Coumadin 3mg- takes 0.5mg once a week for lower lab values with INR level that takes with home machines.

## 2021-04-19 NOTE — ASSESSMENT
[FreeTextEntry1] : 1.  Anemia- Hgb 12.7\par  -blood loss, anemia of chronic disease with need for intermittent iron  \par - copper deficiency - replaced, level WNL 5/2020\par - 11/2020- Ferritin 141, last IV iron November 4 2020- Venofer 400 mg x 2\par \par 2. Osteoporosis \par - left ankle- stress fx- advanced  osteoporosis, patient with h/o steroids use\par - completed Forteo 18/24 m\par - calcium level stable,IV calcium 1-2 times per month\par - PT for osteoporosis\par - dexa - Sep 2020- Left fem neck -2.8 \par \par  3.TIA with MTHFR and h/o DVT x 3 with cryptogenic CVA  \par not a candidate for DOAC b/o small bowel resection\par - INR home monitoring of warfarin takes 3 mg daily\par - Warfarin 3 mg x 6, 1.5mg x 1, INR- INR 2.4 at home\par \par 4. Hypogammaglobulinemia- no increased infection rate \par - s/p  Prevnar 13 12/2018 \par -  Pneumococcal vaccine 23 boost \par - s/p  Shingrex\par - schedule COVID vaccine \par \par 5. Zinc deficiency\par - oral Zinc, level better - 72\par \par Dr. Luis Felipe Monsalve\par cell [phone]\par office [phone]\par \par Labs next visit- Zinc, Copper, Irons, Igg case and mgmt discussed with dr walter \par \par \par \par \par \par

## 2021-04-22 ENCOUNTER — RESULT REVIEW (OUTPATIENT)
Age: 56
End: 2021-04-22

## 2021-05-10 ENCOUNTER — RESULT REVIEW (OUTPATIENT)
Age: 56
End: 2021-05-10

## 2021-05-13 ENCOUNTER — RESULT REVIEW (OUTPATIENT)
Age: 56
End: 2021-05-13

## 2021-05-17 ENCOUNTER — RESULT REVIEW (OUTPATIENT)
Age: 56
End: 2021-05-17

## 2021-05-19 ENCOUNTER — APPOINTMENT (OUTPATIENT)
Dept: ENDOCRINOLOGY | Facility: CLINIC | Age: 56
End: 2021-05-19
Payer: COMMERCIAL

## 2021-05-19 VITALS
DIASTOLIC BLOOD PRESSURE: 70 MMHG | RESPIRATION RATE: 16 BRPM | OXYGEN SATURATION: 98 % | HEIGHT: 68 IN | BODY MASS INDEX: 20.61 KG/M2 | SYSTOLIC BLOOD PRESSURE: 116 MMHG | HEART RATE: 98 BPM | WEIGHT: 136 LBS

## 2021-05-19 PROCEDURE — 99072 ADDL SUPL MATRL&STAF TM PHE: CPT

## 2021-05-19 PROCEDURE — 99215 OFFICE O/P EST HI 40 MIN: CPT

## 2021-05-19 RX ORDER — FAMOTIDINE 40 MG/1
40 TABLET, FILM COATED ORAL TWICE DAILY
Qty: 180 | Refills: 3 | Status: DISCONTINUED | COMMUNITY
Start: 2019-04-02 | End: 2021-05-19

## 2021-05-19 RX ORDER — OMEPRAZOLE 40 MG/1
40 CAPSULE, DELAYED RELEASE ORAL
Qty: 90 | Refills: 3 | Status: DISCONTINUED | COMMUNITY
Start: 2020-02-12 | End: 2021-05-19

## 2021-05-19 NOTE — HISTORY OF PRESENT ILLNESS
[FreeTextEntry1] : Mr. GUDELIA DUNN is a 56 year old male\par  coming for a f/u , for severe osteoporosis with bilateral hip fractures, hyperparathyroidism, low D and hypocalcemia secondary to Chron's disease\par on Vit D 50,000 IU 4 times a week\par \par started Forteo May 10 2019\par last DEXA 9/2019 stable \par \par on IV calcium each Friday at the infusion center, last one a month ago \par labs done much improved \par \par Ca citrate 950mg 3 tab bid \par reports compliance \par \par 24hr urine low calcium while low D \par parathyroid scan reviewed parathyroid adenoma versu\par \par total thyroidectomy and central compartment  dissection 9/11/2020\par Pathology showing 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 No evidence of lymphatic or vascular invasion no extra thyroid extension. 8 of 10 lymph nodes positive for metastatic papillary thyroid carcinoma.\par \par Large test metastatic focus is 0.7 cm in greatest dimension extra low dose extension present. Thyroid gland left sided within normal limits. Tihymic tissue present.\par \par received iodine 131 at 29.3 mCi in October 2020\par Posterior ideation scan showed focal site of increased tracer localization in the neck to the right of midline as was evident on prior study on October 23, 1920.\par \par started Levothyroxine 125 mcg on 9/18/2020 no side effects dose increased to 137 mcg on November 2020\par \par last DEXA 9/2020\par As compared to the patient's most recent study dated 9/12/2019, there has been a statistically\par significant interval increase in bone density at both the lumbar spine and left hip with no s\par tatistically significant change noted at the left forearm.\par worse T score LFN -2.8

## 2021-06-04 ENCOUNTER — RESULT REVIEW (OUTPATIENT)
Age: 56
End: 2021-06-04

## 2021-06-04 ENCOUNTER — APPOINTMENT (OUTPATIENT)
Dept: HEMATOLOGY ONCOLOGY | Facility: CLINIC | Age: 56
End: 2021-06-04

## 2021-06-04 VITALS
BODY MASS INDEX: 20.76 KG/M2 | OXYGEN SATURATION: 98 % | HEART RATE: 78 BPM | SYSTOLIC BLOOD PRESSURE: 128 MMHG | WEIGHT: 137 LBS | RESPIRATION RATE: 18 BRPM | TEMPERATURE: 97.5 F | HEIGHT: 68 IN | DIASTOLIC BLOOD PRESSURE: 77 MMHG

## 2021-06-09 ENCOUNTER — NON-APPOINTMENT (OUTPATIENT)
Age: 56
End: 2021-06-09

## 2021-06-10 ENCOUNTER — RX RENEWAL (OUTPATIENT)
Age: 56
End: 2021-06-10

## 2021-06-11 ENCOUNTER — APPOINTMENT (OUTPATIENT)
Dept: GASTROENTEROLOGY | Facility: CLINIC | Age: 56
End: 2021-06-11
Payer: COMMERCIAL

## 2021-06-11 VITALS
WEIGHT: 135 LBS | DIASTOLIC BLOOD PRESSURE: 80 MMHG | HEART RATE: 74 BPM | OXYGEN SATURATION: 96 % | SYSTOLIC BLOOD PRESSURE: 120 MMHG | HEIGHT: 68 IN | BODY MASS INDEX: 20.46 KG/M2

## 2021-06-11 PROCEDURE — 96372 THER/PROPH/DIAG INJ SC/IM: CPT

## 2021-06-11 PROCEDURE — 99072 ADDL SUPL MATRL&STAF TM PHE: CPT

## 2021-06-11 PROCEDURE — 99215 OFFICE O/P EST HI 40 MIN: CPT | Mod: 25

## 2021-06-11 RX ORDER — CYANOCOBALAMIN 1000 UG/ML
1000 INJECTION INTRAMUSCULAR; SUBCUTANEOUS
Qty: 0 | Refills: 0 | Status: COMPLETED | OUTPATIENT
Start: 2021-06-11

## 2021-06-11 RX ADMIN — CYANOCOBALAMIN 0 MCG/ML: 1000 INJECTION INTRAMUSCULAR; SUBCUTANEOUS at 00:00

## 2021-06-11 NOTE — ASSESSMENT
[FreeTextEntry1] : 1. Crohns disease of both small and large intestine\par    possible recently flare vs partial sbo vs GE vs gastric dysmotility\par CROHNS DISEASE-s/p ileocolonic resection x 2--last 6/19/15 for recurrent sbos: appears to be stable , GI symptoms stable but labs were up CRP=18.6/ESR=19 & Bxncuxdleuxg=247 and Wt down ( inflam markers ? 2/2 to R ankle Fx/stress rx and tendinosis, also had tooth infection ) & MRE w ? ileal penetrating dis, \par Presently GI symptoms, APR's and wt are all improved w wt =135_____\par s/p 4 SBOs w/i 2 yrs--2/2 distal sb disease adj to anastamosis\par when on Humira weekly w ADA =33 (3/20/14), -but had sbo 2/2014 at ADA=25 and another sbo 4/28/15\par 6/10/2015 Colonoscopy: Inflamm ST mass on ileal side w ulceration/scarred/narrow\par 6/11/2015 CT: dilated thickened sb loops distal jej & ileum\par Post-op **probably some malabsorption 2/2 to removal of R Colon and ileum: ?bile/fat/SIBO\par WT. Loss: r/o malabsorption, ?bile-induced--secretory vs decr micelles, active dis, PLE\par r/o SIBO--Elev FA, Alb=3.4-->3.1-->2.9--> (7/28/17)\par ?SIBO/Prevent post-op recurrence --Flagyl 250 mg qid~12/7/16-->d/c: 5/11/17\par Also discussed trial of Cholestyramine/Xifaxin -wanted to wait\par **ACTIVE Crohn's--11/10/16: 9.5lb wt loss, BMs: #5-6, 5x/D--Taking Magnesium \par 12/1/16 MRE: RUQ ileocolonic anastamosis--narrowed w upstream dilatation to 3.2 cm\par Labs: Stool Bzafgmjcvmaw=049, + Incr Fecal fat, Alb=3.4, FA =23, Y18=291, Hgb=12.3,ESR=10,CRP <5\par 4/19/17 :Colonoscopy: Anastamosis: open w mild -mod active colitis, enteritis severe adj to anastomosis, mild more proximal, remainder of colon=wnl\par 5/11/17- Adm [facility] w stools brown, Hgb=7.9, BUN=17, Creat=1.4, Alb=2.9.\par S/P 2 Units w Hgb=10.6, BUN=15/1.1, Prednisone 40mg taper, entocort d/dee\par 6/8/17: Hgb=7.4, MCV=98, CRP<5--ASA stopped, Pred20--> 30mg, 6/13/17: s/p 2 Unit PRBCs\par 7/11/17 [facility] w unsteadiness. MRI Head: R Cortical Temporo-occipital encephalomalacia\par Hgb=9.9--> 8.4->9.7 after 1 Unit. Seen by Heme: received IV Iron \par CRP<5, W80=255, DNU=746, FA>23,Iron=22,Sat%=6, Ferritin=42, Alb=3.5. D/C on warfarin 2mg\par 7/28/17 Hgb=7.7; 7/31/17-s/p 1 Unit \par Heme Consultation: received  IV Infusions of  Iron and 5 IV Copper:___\par **Entyvio :time 0=12/22/16 ( Humira 40mg QW); 1/5/17--2wks, s/p 3rd infusion at wk 6, \par #6 :6/21/17--held 2/2 Shingles, Last Infusions=9/19/17 , 10/17/17, 11/28/17, 1/16/18 ,2/16/18, 3/19/18,4/16/18, 5/14/18, 6/25/18, 8/7/18, 9/17/18, 10/16/18, 11/13/18, 12/11/18, 1/14/19, 2/15/19, 3/15/19, 5/16/19, 6/18/19,7/24/19,\par 9/9/19, 10/2/19, 11/4/19, 12/12/19, 1/6/20, 2/4/20, 3/3/20, 9/17/20, 10/15/20, 11/18/20, 1/11/21, 2/8/21, 3/8/21, 4/8/21, 6/3/21 _______\par Entocort 9mg qd: 12/7/16--d/dee 5/11/17\par **Prednisone 40mg qd (5/11/17)--tapered 5mg/wk to 20mg qd, 6/8/17 30mg, \par 7/25/17 25mg, 3wks ago 20--> 15mg, 10/19/17 10mg alt w 7.5-->11/16/17 10mg alt w 5mg-->11/30/17: 5mg-->12/21/17: 5 alt w 2.5mg-->1/18/18: 2.5mg qd-->2.5mg qod--> d/c \par Probiotics 3 qd \par Lactose free, protein drinks tid\par MCT oil begun\par **trend --cbc, esr, crp, albumin\par **monitor wt= 120-->134-->134 --> 135--> 132 w clothes-->133--> 131 (Low carbs now)--> 129--> 127-->126-->124-->125-->124--> 126-->125-->125-->125-->126-->128-->127-->130-->131--> 135-->135 - 137--> 136--> 135--> 135 ____\par Wt Loss : sig change since May-June/2018\par 8/17/17: Hgb=9.9, ESR=20, Wr=064--Sdnmnuzwcw s/p GI infection w N/V/D, no obstruction\par 10/17/17: Hgb=7.9,ESR=6,CRP<5, INR=1.6, Got IV Iron x 2, since 10/2/17 for Iron<10, Hgb=8.8\par 11/16/17: Hgb=11.2, ESR=14, CRP=12, 10/26/17 Stool fat-neg, calprotectin-30\par 11/13/17: MRE-Chr mild distension of distal & vladislav-ileum s/o mild stricturing at anast, mild mural thick & mucosal enhancemt ~ 20cm; little change from 2015 c/w mild acute on chr ileitis, 2.1 cm Liver hemangioma\par 10/9/18: Hgb=11.6, MCV=94, ESR=14, CRP=5.4, INR=2.2\par 10/10/18 MRE: stricturing of neoterminal ileum w worsened upstream dilatation, moderate length of upstream ileum w stricturing extending at least 20cm and more extensive then previous. suggestion of 2 adj extraluminal fluid collections which may be w/i the wall of strictured ileum near the ileocolonic a, Hgb=13.6\par pt had declined to call numbers given for  [facility] or [facility]ational [facility]'s--biologics.  \par later he felt  he was  stablizing w wt loss, and inflammatory markers\par Colonoscopy: 05cm rectal polyp: HP, 2nd deg int hemorrhoids\par mild-mod activity: MARILY w cryptitis & mild archect distortion at ileocolonic anast: colon & ileal side, no stricture\par \par CRP <5 : ? s/p recent ankle fx/stress rx/tendonitis and tooth infection \par prior stool studies w elev  calprotectin and No fat\par Wt loss-- 2/2 to low carbs-->fat burning and flare ; advised to liberalize and add protein, ensure\par Plan: Prednisone d/c 2/20/18\par Entyvio q 6 wks --> 4 weeks \par check inflammatory markers: 2/28/19 w ESR=12, CRP=6.5 & 2/21/19 stool calprotectin--> 232\par 8/15/19: ESR=10, CRP=5.2, Calprotectin=88\par 9/19/19: ESR=9, CRP=5.5\par 10/17/19: ESR=7, CRP< 5\par 11/6/19 : ESR=6, CRP=0.18\par 11/27/19: ESR=6, CRP <5\par 1/13/20: ESR=7, CRP=0.2\par 1/30/20: ESR=9, CRP=11\par 3/2/20:ESR=7, CRP=0.26\par 3/9/20: VDZ>40, Ab to VDZ <1.6\par 3/17/20: ESR=6, CRP=6.4\par 10/17/20: ESR=11, CRP <5\par 1/4/21:ESR=9, CRP<5\par 2/22/21: ESR=8, CRP<5\par 4/5/21: ESR=9, CRP<5\par 6/4/21: ESR=9, CRP<5  \par \par today 6/11/21   --  ? flare --> entyvio should be q 5, went 8 wks\par          next dose should be in 4 weeks x 2 then 5 weeks \par          MRE\par \par trend cbc,esr,crp\par trend stool calprotectin \par                                                                                                                                                                                                                                                                                                                                                                                                                                                                                                                                                                                                                                                                                                                                                                                                                                                                                                                                          \par 2. Iron deficiency anemia due to chronic blood loss  \par  had initially dropped , after clinical flare and post procedure bleeding\par Probably multifactorial: ACD, Blood loss, malabsorption/nutrient deficiencies\par Eliquis-->warfarin after CVA, On Entyvio and prednsione was tapered  for active dis\par Still requiring IV Iron--prn, \par s/p IV Cu & transfusions \par 7/11/17 Recent Adm for unsteady gait w MRI c/w CVA--warfarin begun: possible better control of AC\par Chromagen 2 qd--> IV Iron , s/p IV Cu x 5, FA 1mg qd , B12 SL & IM\par  1/14/19: Hgb=10.7, INR=1.6, 2/5/19: Hgb=11.2,INR=1.5; 2/28/19: Hgb=11.5, Kjxmiuou=563; 3/11/19: INR=2.6\par 4/11/19: hgb=9.7, INR=1.6, 7/2/19: Hgb=11.7, Ferritin=41,INR=2.2, 8/15/19: Hgb=12.2,Ferritin=81,INR=1.6, \par 9/19/19: Hgb=12.8, 10/17/19: Hgb=14, 10/26/19: Hgb=14, 1/13/20: Hgb=13.5, 1/30/20: Hgb=13.9, Vutdjytt=634, Iron=38,  3/17/20: Hgb=13.1, Jnekqocq=003, INR=1.7, 10/17/20: Hgb=13, Ferritin=46, INR=1.9,  \par 1/4/21 Hgb=13.6, Ferritn=60, Iron=73, 2/22/21: Hgb=12.7, Dbiyxbbj=780, 4/5/21: Hgb=13, Ferritin=94\par 7/13/17: C57=189, NKV=562,FA>23; 1/3/18 Z33=513, Qn=206, Zinc=55; 6/6/18 A79=765, 9/5/18: Bt=243, Zinc=67\par 11/28/18 D31=124, 7/2/19: Lc=460, Zinc=61,B6=2.8, 8/15/19: Cw=145,Zinc=54,U61=968, 1/13/20: W27=444;\par 2/22/21: Y08=003, Hx=439, Zinc=58, \par 6/4/21: Cu=91, Zinc=69, Ferritin=32,Iron=43, \par B12 1cc IM ____R. delt--  Given today, \par most recent IV Iron  : to have 2 over next 2weeks  for  6/4/21 Ferritin=32\par Hem consult IV Iron /Cu given malabsorption, ? BM bx --r/o occult etiology--on hold\par trend cbc, B12, FA, Iron panel \par Adj INR--closer to low 2's.    \par Agree w Heme--Prevnar-13\par \par \par \par 3. Other osteoporosis without current pathological fracture  \par : Progression on BD from 5/5/16\par Crohns, h/o steroid use\par Calcium citrate & Vit D per Endo\par Forteo since 5/10/19 , for Reclast          \par Repeat BD of 8/2018 --showed worsening to Osteoporosis from 2016\par Rec Endo consult w Dr. Akers :Osteoporosis center at [facility]--pt never went originally \par 9/21/18: Phos=2.4, Ca=8.7, Alb=3.4, Vit D=27, HRH=796, \par 10/16/18: Phos=2.8, Ca=8.7, Alb=3.5,\par 1/14/19: Phos=2.6,  Ca=8.7, Alb=3.6\par 2/28/19: Phos=1.8, Ca=8.9, Alb=3.7, VitD=39, JDT=536\par 3/18/19: Ca=8.5, Alb=3.7, Vit D=44.6, PTH=93\par 7/11/19: Ca=9.1, Alb=3.7, Phos=3.9, Vit D=40/ 306, TOD=275\par 8/15/19: Ca=9, Alb=4.2, Phos=4.4, VitD=59, TMX=690\par 10/17/19: Ca=9.6, Alb=3.9, Phos=3.5\par 11/6/19: Ca=9.1, Alb=3.9. Phos=3.7, VitD=50, PTH=80\par 1/13/20: ca=9.1\par 1/30/20: Ca=9.2, Alb=3.9, Phos=2.7, Mag=1.5, Vit D=103/41, PTH=87\par 2/18/20:ca=8.5, Alb=3.6, \par 3/2/20: Ca=8.5, Alb=3.6\par 3/17/20: Ca=9.1, Alb=3.7, Phos=3.4, Mag=1.7\par 10/17/20: Ca=8.9, Alb=4, Phos=2.3, Mag-2.0, Vit D=66, PTH=47\par 1/4/21 : Ca=9.4, Alb=4.2, Phos=2.7, Mag=2, Vit D=64, PTH=87.5\par 4/5/21: Ca=9.1, Alb=4, Phos=3.1, Mag=1.7, \par 6/4/21: ca=9, Alb=3.8, Phos=2.8, Mag=1.8\par Dr. Akers: Hyperparathyroidism-- probably secondary due to low Vit D/Ca & ? superimposed primary, Rx replete Vit D and current IV Calcium\par trend Vit D, Ca, Phos, PTH,  \par [facility] ENT Consult init felt  Parathyroid scan showing activity in mediastinum is ectopic parathyroid, feels sx not indicated at this time, elev PTH is secondary to low  Vit D/Ca--to be reconsulted\par BD--9/2019: incr in spine, no change in wrist/hip\par 10/5/20-s/p Reclast\par \par \par \par \par \par 4. GERD:  well  -  controlled,  no ht burn,  dysphagia,  throat clear\par                 Dry cough, CXR:ana, saw ENT bergstein-->LPR\par * ++ LPR,  ++  Page’s w No Dysplasia,  No , H/O  Esophagitis grade: A   was found \par \par Recommend: \par * Anti-reflux diet & life-style changes reviewed & re-emphasized.  ++  Bedge required\par * Weight reduction & regular exercise emphasized\par \par * need for  PPI:  Protonix 40 mg qd ,  ++ H2B q HS:Zantac 300mg--> Pepcid 40mg\par No Carafate  1 gram:   --was emphasized\par I reviewed & summarized the prospective randomized & retrospective non-randomized studies\par looking at potential long term SE's of PPIs, w special attention to associations & actual cause\par as related to GI infections, bone loss, cognitive changes, KD, Covid, vitamin & electrolyte deficiencies\par questions were answered, pt advised that PPIs should be used when needed as indicated by their\par clinical indication and response and tapering off is always the goal if possible. pt understood.\par \par *  F/U  EGD:  [  2    ] mo.  /  [   2022   ] yr  --for Barretts screening / surveillance \par * No  need for pH Monitor,  No  Manometry,  No  Esophagram -- was emphasized \par *  ++  need for ENT  eval/F/U,  No  need for Surgical  eval  --  was emphasized \par \par \par \par \par \par 5. Hemorrhoids:  well - controlled.  No pain,  swelling,  itch,  bleeding\par * Discussed the potential complications of thrombosis,  pain,  infection,  swelling, itching,  bleeding \par Reccomend: \par * Moderate  - Fiber Diet was reviewed and emphasized\par * 6  --  8 cups of decaffeinated fluid daily was emphasized \par * Sitz Bathes BID,  No:  Anusol HC  Suppos / Cream  AK BID -- was needed\par * No:  Tucks BID,   No:  Balneol Lotion,   No:  Calmoseptine Oint -- was needed ;    ++ Prep H prn\par * No:  need for  Colorectal surgical evaluation for possible ablation w Dr --  was emphasized\par \par \par \par \par \par \par \par 6. Barretts esophagus without dysplasia  \par Notes: see GERD.    \par \par \par \par 7. Hepatomegaly-->not confirmed by recent abd sono\par CTE w relative enlargemt, ? lobulation & enlarged portal/mesenteric veins\par LFTs-wnl, no ascites or edema\par R/O CLD, Hepatic vein thrombosis\par Abd sono w doppler--> Liver and spleen normal size, no thrombosis, normal portal and hepatic vein flow\par \par \par \par \par .\par \par

## 2021-06-11 NOTE — HISTORY OF PRESENT ILLNESS
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-->Eliquis' warfarin \par        GERD w Page's, Hemorrhoids, BPH, \par        S/P Ileocolonic resection 1998\par        Since then multiple SBOs\par \par \par        Got IV Iron x 2, since 10/2/17\par        10/19/17: feeling better, Vg=236 on prednisone 15mg x 3weeks, BMs Brown: #5-6, 1-2/D, occas 3-4/d\par        10/25/17: Hgb=10, ESR=5, CRP=5, Alb=3.6, Phos=2, Dbkshrrx=496, E80=057\par        11/16/17: Hgb=11.2, ESR=14, CRP=12, \par        11/13/17: MRE-Chr mild distension of distal & vladislav-ileum s/o mild stricturing at anast, mild mural thick & mucosal enhancemt ~ 20cm; little change from 2015 c/w mild acute on chr ileitis, 2.1 cm Liver hemangioma\par        11/28/17: Entyvio\par        11/29/17: Hgb=9.6, ESR=10, CRP=5.8, Alb=3.8,Ferritin-19, Iron=14,Sat=4%, Phos=2.5, Qu=763, BMs:# 5, 1-2x/D, brown,\par        12/19;17: Hgb=10.1, ESR=12, CRP=6.5; 12/21/17: BMs:#3-5, 1-3x/D , brown, no blood, no pain, rj=798\par        1/3/18:Hgb=11.7, ESR=19, CRP=6.5, Alb=4.1, Qswecapp=756, Iron=31, Sat%=9,Zinc=55\par        1/16/18: Entyvio, Hgb=11.4, ESR=10, CRP=6.9\par        1/18/18: Today: cellulitis R foot, saw dr KEITH--rx augmentum x 10D, Entyvio 1/9 to 1/16, fk=855 w clothes,BMs: # 4-5, 1-2x/D \par        2/14/18: Hgb=11.1, ESR=16, CRP=11.6, Alb=3.6, Iron=28, Ferritin=23, INR=1.5 \par        2/16/18: s/p Entyvio; Iron infusion, again on 2/27\par        2/20/18: Hgb=10.6, ESR=20, CRP=12, INR=1.7\par        2/27/18: s/p iron infusion\par        3/9/18: Dr. Burden for tenosynovitis, rx w Zorvolex: Diclofenac x 5 days--? response\par        3/14/18: Pd=394; BMs: # 5, 1-2x/D; Hgb=11, ESR=18, CRP=11,Irom=45,Lxlkawtu=431,Alb=3.6, INR=1.5\par        3/19/18: s/p Entyvio\par        3/22/18: np=403, BMs: # 5, 3-4 x/d\par        4/16/18: s/p Entyvio,Hgb=11.9, INR=1.3, ESR=18, CRP=8.4 \par        4/18/18 EGD: gastritis, no HP, no IM, 2+ Mucous, 0.5cm gastric polyp: fundic, 4cm HH, + Barretts:3cm, no dysplasia\par        4/25/18: Hgb=11.5, INR=1.6, Iron=40, Sat=13%, Ferritin=57, Alb=3.2, Phos=2.2, Mag=1.7\par        4/30/18: s/p Iron Infusion\par        5/14/18: s/p Entyvio \par        5/23/18: Hgb=11.5, ESR=16, CRP< 5\par        5/29/18: s/p Iron Infusion\par        6/6/18: Hgb=10.6, INR=1.7, Hcjjgvux=039, Alb=3.5, Phos=2.1, H51=759, zk=813; BMs: # 5, 2-3x/D \par        6/19/18: Hgb=10.6, INR=1, CRP=15, ESR=23\par        6/21/18: R ankle is acting up, swollen, pain, having MRI done, took a couple of advil, on low carbs ,BMs: # 5, 1-2x/D, qx=023\par        6/26/18: MRI: fx/stress rxn-talar body/navicula; stress related changes--cuneform, cuboid,distal tibia; mod tibiotalar effusion, mild-mod peroneal tendinosis\par        7/19/18: entyvio 6/26/18, eliminated all carbs--anti inflammatory diet, nt=664, BMs: # 4-5, 1-3x/D \par        7/25/18: Hgb=10, INR=1.3, Alb=3.3, Phos=2.4, Xavnqptb=598, sat%=12, Iron=35\par        8/2/18: BD--osteoporosis of hip/spine: sig decrease BD--17.6% of hip, 17% of spine, osteopenia of wrist: 6.4%\par        8/7/18: Entyvio\par        8/21/18: Hgb=11.1, INR=1.5, ESR=19, CRP=18.6\par        8/23/18: recently w tooth infection, old implant--loose and removed; rx w amox, and advil\par        eating almost no carbs, do=380, # 5-6, 2-3x/d \par        8/24/18: Stool fat--neg, Mrqcrxtysqeg=808\par        9/5/18: Hgb=11.4, INR=1.3, ESR=9, CRP=11.3, Iron=41, Nbnlhjii=661, Alb=3.8,\par        9/17/18: entyvio, Hgb=10.7, INR=2.2, ESR=17, CRP=9.1, \par        9/20/18: da=042, BMs: # 5-6, 1-2x/D \par        9/21/18: Phos=2.4, Ca=8.7, Alb=3.4, Vit D=27, CXH=553, \par        Dr. Akers: Hyperparathyroidism: probably secondary due to low Vit D/Ca & ? superimposed primary, rx replete Vit D and Calcium\par        10/9/18: Hgb=11.6, MCV=94, ESR=14, CRP=5.4, INR=2.2\par        10/10/18 MRE: stricturing of neoterminal ileum w worsened upstream dilatation, moderate length of upstream ileum w stricturing extending at least 20cm and more extensive then previous. suggestion of 2 adj extraluminal fluid collections which may be w/i the wall of strictured ileum near the ileocolonic anastomosis. surrounding spiculation and tethered appearance of bowel in this region.\par 10/16/18: entyvio, Hgb=11.7, MCV=94, ESR=14, CRP <5, Alb=3.5\par 10/18/18: Gx=195,   BMs: # 5-6, 3x/D , \par 11/13/18: Entyvio\par 11/21/18 CTE w C: limited 2/2 bowel underdistention, mucosal hyperenhancemt & extensive SM edema of distal ileum including vladislav-ileum, difficult to exclude a sml fluid collection, Liver w relative enlargement w suggestion of lobulation, enlargemt of PV,SMV,IMV,Spl V, rectal V. No ascites\par 11/28/18: Hgb=10, ESR=19, CRP=17,Alb=3.5,Iron=35, Sat%=11, Gongdthp=254, INR=1.3\par 11/29/18: nk=193, BMs: # 5-6, 3-4x/D\par 12/3/18: Abd sono--Liver 14.8cm Incr heterogenicity, spleen--wnl\par 12/11/18 & 1/14/19: Entyvio\par 1/14/19:Entyvio,  Hgb=10.7, ESR=15, Alb=3.6, Iron=36, Ferritin=67, Sat%=11, INR=1.6\par 1/24/19:  lg=550, stools are # 4-6, 3-4x/d , no pain\par 2/5/19: Hgb=11.2, ESR=14, CRP=0.36\par 2/28/19: Hgb=11.5, ESR=12, CRP=6.5, Alb=3.7, Iron=69, Eoovhvjr=416, Ca=8.9\par                Bms: # 4-5, 2-3x/D , cl=826,  Entyvio : last 2/1519,\par                had parathyroid scan pre-op, still w elev PTH, + activity in mediastinum,? ectopic parathyroid tissuevs neoplasm.  To see ENT and have Imaging at [facility]\par 3/28/19: Bms: # 4-5 , 3x/d ;ri=466\par 4/11/19: Hgb-9.7, Iron=45, ESR=18, CRP=9.4, Alb=3.5, \par Saw Endo SX at [facility], felt hyperparathyroid is secondary to Low Ca/Vit D, no sx at this time\par 4/16/19: BMs: # 5-6, 3-4x/D, cm=382,  Entyvio : last 3/1519,  got IV iron 4/12/19 for Ferritin=53\par 4/27/19: s/p R Hip Nailing--missed 4/2019 2/2 fresh wound\par 5/16/19 & 6/18/19 Entyvio\par 7/2/19: Hgb=11.7, Ferritin=41,ESR=12, CRP=5.5, B6=2.8,Mag=1.8,Phos=3.9,Qr=079,Zinc=61\par 7/11/19: s/p IV iron\par 7/11/19: Alb=3.7, BKC=633, Ca=9.1, Vit D=40/306, \par 7/24/19: Entyvio, Alb=3.8, QDD=702, Ca=8.9, Vit D=128 \par 8/1/19: Bms: # 5-6, occas #4, 2-3x/D , hn=347\par 8/15/19: Hgb=12, ESR=10, CRP=5.2, Ferritin=68, Alb=3.4, Phos=4.4,Mg=1.7, Ca=8.5,Calprotectin=88,\par 8/20/19: WBQ=446, Ca=9, Alb=4.2\par 9/19/19: Hgb=12.8, ESR=9, CRP=5.5, Alb=3.7, Nwygxawv=395, Mag=1.8, Ca=8.9, Phos=4.2\par 9/26/19: sz=318, BMs: #5-6, 2-3x/D, no pain\par 10/17/19: ln=458, BMs: #4-6, 1-2x/D, no Pain; Hgb=14, ESR=7, CRP<5, Alb=3.9, Ggcvqdbk=418, Mag=1.7, Ca=9.6\par 10/24/19: ex=450, Bms: # 4-6 , no pain,\par 11/6/19: ESR=6, CRP=6, PTH=80,Ca=9.1, VitD=50\par 11/14/19: bd=899, BMs: # : 4, 1-2, 0ccas #5\par  11/17/19: ESR=6, CRP<5, Wdyclifj=199, \par  12/19/19: pn=082, BMs: #5-6, 2-3x/D\par 1/6/20: entyvio\par 1/13/20: ESR=7, CRP=0.2, Hgb=13.5, Mfxyhblg=563, J28=536, FA=10, Alb=4.1, Ca=9.1\par 1/16/20: wt = 127, BMs: # 5, 1-3x/d\par 1/30/20: ESR=9, CRP=11, Hgb=13.9, Tvjhqvcn=489,Iron=38, Alb=3.9,Ca=9.2, PTH=87,\par 2/3/20 EGD: gastritis, +MACKENZIE, No HP, +erosion w ooze -clipped, 0.5cm polyp-neg; 4cm HH, Esophagitis A, + Barretts, VC: 1+\par Colonoscopy: 05cm rectal polyp: HP, 2nd deg int hemorrhoids\par mild-mod activity: MARILY w cryptitis & mild archect distortion at ileocolonic anast: colon & ileal side, no stricture\par 2/4/20: Entyvio; 2/12/20: IV Iron, 3/3/20: Entyvio\par 2/25/20: nn=452,  BMs: # 4-5, 1-2x/ d\par 3/19/20: jy=339, BMs: #4-5, 1-2x/D\par 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\par 10/17/20 : Hgb=13, CRP< 5, ESR= 11, Iron=44, Ferritin=46, Alb=4, Phos=2.3, \par 11/5/20: nm=200; s/p IV Iron x 2 ,  11/4 and 1 week prior;   BMs:  # 4-5, 1-2x/D , no pain\par 11/18/20  Entyvio + IV Ca\par  1/4/21: Hgb=13.6, CRP<5, ESR=9, Ferritin=60, Alb=4.2, Phos=2.7\par 1/11/21: Entyvio & IV Ca\par 1/14/21: no pain, stool more formed ,  ex=176 - 137; BMs:  # 4-5, 1-2x/D \par 2/8/21: Entyvio & IV Ca\par 2/23/21 IV Ca\par 2/22/21: Hgb=12.7, CRP<5, ESR=8, Gavvqlbiu=858, Phos=2.3, Mag=1.8, K11=619, Zinc=58, Bc=490\par 2/26/21: px=461,  BMs:  # 4-5, 1-2x/D , no pain \par 3/8/21 & 4/8/21: Entyvio\par 3/9/21 & 3/24/21: IV Iron\par 4/5/21: Hgb=13, CRP<5, ESR=9, Ferritin=94, Phos=3.1, Mag=1.7,Ca=9.1/ Alb=4\par               \par Pt Ins co is refusing to cover Entyvio q 4wks, despite numerous attempts to appeal, they also refuse to set up a peer to peer discussion bet myself and another healthcare provider, even one that works for a third party.  They do acknowledge that there is literature supporting the successful use in individual cases who don’t respond to the q 8 wk interval and need\par less then q 8weeks , but state it had not in FDA approved at less then 8wks so they will not approve it.  I spoke to the ins co rep and discussed that fact that patients should not be  pigeon holed since practicing medicine is done on an individual basis.  Humans are not all the same.   Their  response didn’t change eventhough the pt clinically needed the medication and it  induced a beneficial clinical response towards remission.  Therefore the patient and I decided to slowly increase the interval since the insurance company will not pay for this benefit.  Hopefully he may be able to maintain his present clinical state.  If not we will return to q 4weeks and will again appeal using this patients real clinical data .\par \par 4/9/21:  te=636,  no pain, stool more formed # 4, 1-2x/d \par         \par        \par  Today:  Feeling well, no c/o , CP, SOB/ AMARO, Cough, Wheeze, Palpitations, edema\par   Most recent labs  reviewed w patient: 6/4/21 \par '\par recently had an episode of abd distenstion, pain, N/V, no BM\par   eventually started having diarrhea and flatus, BMs returned to normal\par   lost 2-3 lbs but regained\par  Doesnt recall eating anything different, no fever, chills, brbpr, melena\par  Last entyvio was 6/3/21--which is almost 8 weeks, was supposed to be 5-6 to start\par        no pain, stool more formed # 4, 1-2x/d \par         \par        ag=313 \par        Abd pain--no \par        Nausea--no \par        Vomit--no \par        Early satiety-no \par        Belching-no \par        Regurgitation--no \par        Ht burn--no \par        Throat Clearing-no\par        Globus-no\par        Hoarseness--no \par        Dysphagia--no \par        BMs:  # 4, 1-2x/d \par        Constipation--no \par        Diarrhea- intermittent:  no\par        Bloating--no \par        Flatulence-no\par        Gurgling--no \par        Melena--no \par        BRBPR--no \par        Anorexia-no \par        Wt Loss--stable\par \par \par \par        PRIOR HISTORY--2017\par \par        1/17/17: Hgb=9.2, ESR=6, CRP=5; 1/19/17: BMs: #4, 5-6, 2-3x/D, Gw=271, Started Creon 1 tid cc\par        3rd Entyvio begining Feb\par        2/14/17: Labs; Hgb=9.6, MCV=97, ESR=5, CRP< 5, H74=181, FA>23, Iron=59, Retic =3.2,\par        2/17/17 Fecal Calprotectin=16, Fats: Increased neutral & Split\par        3/13/17: Labs Hgb=9.5, MCV=95, ESR=7, CRP <5, ALB=3.1\par        3/16/17: lot of stress, to move -[place], had a job-fell thru, BMs: # 5, 2-4 x/D, wt= 131w clothes\par        4/19/17 EGD: gastritis, MACKENZIE, No HP, 2cm HH, +Barretts, No Dysplasia\par        Colonoscopy: Anastamosis: open w mild -mod active colitis, enteritis severe adj to anastomosis, mild more proximal, remainder of colon-wnl, 2-3 deg int hemorrhoids\par        4/26/17 : Hgb=7.3, MCV=95, ESR=13, CRP<5;Immediately post procedure stools very dark on eliquis/iron.\par        Admitted to [facility]: Hgb=8.3-->8.9 after 2 U, Alb 3.3, BUN=17, creat=1.3, Malina/Ihuvk=459/460\par        4/27/17: Alb=2.3, BUN=12, creat=1.2, Malina/Lipase=209/863-No abd pain, N/V; 5/5/17: Hgb=10.7.\par        5/8/17 Entyvio iv. 5/8-5/9 w dark red diarrhea; 5/10/17 Hgb=7.8.\par        5/11/17 Adm [facility] w stools brown, Hgb=7.9, BUN=17, Creat=1.4, Alb=2.9; S/P 2 Units- Hgb=10.6, BUN=15/1.1.\par        Prednisone 40mg qd, entocort d/dee.; 5/18/17: Hgb-10.4, pm=651, BMs: #5, 1-2x/D, no pain\par        6/8/17: Hgb=7.4,MCV=98, CRP<5-ASA stopped, Pred20--> 30\par        6/10/17: Hgb=8.2, Alb=2.9, BUN=20/1.2 ; 6/12/17: Hgb=8.3, Retic=0.19, Iron=10, Sat%=3, Ferritin=57, FA=23,L94=408, 6/13/17: s/p 2 Unit PRBCs\par        6/15/17: started MCT oil, Mi=839 , BMs: # 5, 1-2x/D, stools are brownish/green \par        7/11/17: [facility] w unsteadiness. MRI Head: R Cortical Temporo-occipital encephalomalacia, MRA H&N-no sign lesions, PER-wnl.Hgb=9.9--> 8.4->9.7 after 1 Unit. Seen by Heme: received IV Iron \par        CRP<5, E27=994, GEW=989, FA>23,Iron=22,Sat%=6, Ferritin=42, Alb=3.5. D/C on warfarin 2mg\par        7/20 Hgb=8.5, 7/28 Hgb=7.7, 7/31-s/p 1 Unit \par        As outpt seeing Heme, to get IV Iron and 5 IV Copper\par        Had 'FLU' Fever=101, chills, bloat, N/V/D, Bilious. no pain, melena,brbpr\par        Given anti-emetic by dr Butler,then able to keep things down\par        On prednisone 25, warfarin w INR bet 1.6-2\par        8/17/17: Hgb=9.9, ESR=20, CRP-P,Na=179, BMs: # 5, 1-2x/D, stools are brownish/green\par        10/2/17: Hgb=8.8, MCV=89,Phos=1.7, K=3.9, Mag=1.9, Alb=3.6,Iron<10,Ferritin=21, INR=2\par        10/17/17: Hgb=7.9,ESR=6,CRP<5, INR=1.6\par        10/25/17: Hgb=10, ESR=5, CRP=5, Alb=3.6, Phos=2, Nffwctjn=179, H56=589\par        11/16/17: Hgb=11.2, ESR=14, CRP=12, \par        11/13/17: MRE-Chr mild distension of distal & vladislav-ileum s/o mild stricturing at anast, mild mural thick & mucosal enhancemt ~ 20cm; little change from 2015 c/w mild acute on chr ileitis, 2.1 cm Liver hemangioma\par        11/28/17: Entyvio\par        11/29/17: Hgb=9.6, ESR=10, CRP=5.8, Alb=3.8,Ferritin-P, Iron=14,Sat=4%, Phos=2.5\par        12/19;17: Hgb=10.1, ESR=12, CRP=6.5; 12/21/17: BMs:#3-5, 1-3x/D , brown, no blood, no pain, uh=105\par        1/3/18:Hgb=11.7, ESR=19, CRP=6.5, Alb=4.1, Papllysy=818, Iron=31, Sat%=9,Zinc=55\par \par \par        PRIOR HISTORY---2013:\par \par        2/20/13 CT markedly dilated sb loops ext to anast, colonoscopy w open anast ,mild-mod remy-anastamotic disease. quick response to entocort/humira--probably adhesive dis. \par        8/10/13 w GNR bacteremia, ADA 1.7 /KAYLAH 3.4, Humira 8/7, 8/13,8/18,9/15\par        CT- mucosal thickening and spiculation of the distal sb extending to the anastamosis, thickening and stranding of adj mesenteric fat.\par        Humira increased to 40mg weekly, entocort 4\par        9/2013 MRE--dilated loops in mid and distal ileum, markedly thickened and narrowed TI w decreased peristalsis of TI\par        11/15/13 ADA-24.9, KAYLAH-0\par \par \par        PRIOR HISTORY---2014:\par \par        2/15/14--CT dilated loops SB, loop of sb in mid abd 4.3cm w infiltrative changes in the mesentery, bowel tapers in the RLQ to the anastamosis w/o transition\par        WBC=15K, Rx w IV steroids and Abx \par        3/7/14 SBFT: last 10cm sb prox to anast mild distension and sl irregularity. In the mid portion of this loop there is a mild narrowing which appears to reopen but is some what narrowed.\par        3/20/14 ADA=33.1, KAYLAH=0, Lialda switch to Apriso 4 (2/2014)\par \par \par        PRIOR HISTORY--2015\par \par        1/11/15 Adm [facility] w 1 day N,Recurrent V, Abd pain/distension. CT-multiple distended & fluid-filled sb loops, mild wall thickening of ileum w No inflamm changes.\par        WBC=14.6, Hgb=17, RX w NGT suction, Levaquin iv, Solumedrol 40mg q 12( 1/12-->1/16) to prednisone 30mg BID w 5mg taper/wk\par        3/18/15 --Dr. Butler w edema /High BP, prednisone was tapered slowly to 2.5mg \par        switched to entocort 9mg qd, edema and bp improved w lasix 20mg \par        BMs:#4-5, 3x/D, Hgb=11, ESR=4, CRP<5\par        4/28/15 - 5/1/15: Adm [facility] w 1 day Abd pain, distension,N/V. 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 side of anast, opening appeared ulcerated,scarred & severely narrowed\par        6/11/15: [facility] w N/V x1, decr appetite, CT ABD: no obstruction, dilated thickened sb loops of distal jej and ileum\par        6/19/15 [facility]: s/p Lap w extensive lysis of adhesions, hepatic flex, sigmoid and sb anastamosis, s/p partial r colectomy and sb resection--side to side elisabeth: 8-10 inch from prior anast to TV colon.\par        7/17/15: BMs:#4-6, 4-5x/D, wt 131(from 126)\par        8/20/15: BMs: #4, 1-2x/D or #5, 2-3x/D, ux=842, dry cough,Hgb=10, WBC=3, XTG=420, CRP=56, ESR=21\par        8/25/15 CT Abd/Pelv: Svl RLQ sb loops w wall thickening, mild nodularity & inflamm stranding in mesentery\par        Advised-restart Entocort 9mg qd, Apriso 4 qd, Maintain Humira but given RX to check drug/Ab levels\par        9/15/15: did nt restart meds,Hgb=9.9, WBC=4, ESR=19, CRP=5.8, vn=132; [facility] : ADA=8.5, Antibodies< 1.7\par        10/15/15: No pain, BMs:#4, 1-2x/D, #5-6, 3-4x/D, throat clearing/cough-better, oe=495\par        11/30/15: No pain, BMs:# 4, 5-6 intermittently, No throat clear, dc=493\par \par \par        PRIOR HISTORY-2016\par \par        1/22/16; 4/8/16; 6/6/16 : No pain, BMs:# 4-5 1-2x/D, also #5-6 3x/D for 2-3D/wk, de=886\par        7/14/16: yh=164, 9/22/16: uk=665.5\par        11/10/16: 9.5lb wt loss, states BMs: 5-6, 5x/D--Taking Magnesium, No pain/anorexia\par        Labs: Stool Lcztyputtdnu=684, + Incr Fecal fat, Alb=3.4, FA =23, N41=312, Hgb=12, ESR=10, CRP <5\par        Magnesium-d/c, Obtain MRE asap--Start steroids and possibly switch to Entyvio\par        DDx discussed: active crohns--loss response to Humira, Malabsorption--loss Bile acids, Bile-induced diarrhea, SIBO\par        Pt wanted to wait for imaging-did not start rx\par        12/1//16 MRE: RUQ ileocolonic anastamosis--narrowed w upstream dilatation to 3.2 cm\par        Pt refused pred, Started Entocort 9mg qd and Flagyl 250mg qid about 7-10days ago \par        awaiting Entyvio load to start 12/22, then 1/5; had Cut back on iron bid\par        12/15/16: BMs:# 5, 2-3x/D, occas #6, No pain, Less bloat/flatulence, Zh=798.

## 2021-06-17 ENCOUNTER — RX RENEWAL (OUTPATIENT)
Age: 56
End: 2021-06-17

## 2021-07-12 ENCOUNTER — APPOINTMENT (OUTPATIENT)
Dept: HEMATOLOGY ONCOLOGY | Facility: CLINIC | Age: 56
End: 2021-07-12
Payer: COMMERCIAL

## 2021-07-12 ENCOUNTER — RESULT REVIEW (OUTPATIENT)
Age: 56
End: 2021-07-12

## 2021-07-12 VITALS
RESPIRATION RATE: 16 BRPM | SYSTOLIC BLOOD PRESSURE: 119 MMHG | BODY MASS INDEX: 19.83 KG/M2 | TEMPERATURE: 98 F | WEIGHT: 137 LBS | HEIGHT: 69.6 IN | DIASTOLIC BLOOD PRESSURE: 78 MMHG | OXYGEN SATURATION: 97 % | HEART RATE: 67 BPM

## 2021-07-12 PROCEDURE — 99072 ADDL SUPL MATRL&STAF TM PHE: CPT

## 2021-07-12 PROCEDURE — 99214 OFFICE O/P EST MOD 30 MIN: CPT

## 2021-07-16 ENCOUNTER — RESULT REVIEW (OUTPATIENT)
Age: 56
End: 2021-07-16

## 2021-07-18 ENCOUNTER — NON-APPOINTMENT (OUTPATIENT)
Age: 56
End: 2021-07-18

## 2021-07-18 NOTE — REVIEW OF SYSTEMS
[Fatigue] : fatigue [Easy Bruising] : a tendency for easy bruising [Negative] : Allergic/Immunologic [Eye Pain] : no eye pain [Vision Problems] : no vision problems [Dysphagia] : no dysphagia [Hoarseness] : no hoarseness [Chest Pain] : no chest pain [Lower Ext Edema] : no lower extremity edema [Shortness Of Breath] : no shortness of breath [Cough] : no cough [Vomiting] : no vomiting [Constipation] : no constipation [Diarrhea] : no diarrhea [Dysuria] : no dysuria [Joint Pain] : no joint pain [Skin Rash] : no skin rash [Dizziness] : no dizziness [Insomnia] : no insomnia [Anxiety] : no anxiety [Depression] : no depression [Muscle Weakness] : no muscle weakness [Easy Bleeding] : no tendency for easy bleeding [de-identified] : pt is on coumadin

## 2021-07-18 NOTE — CONSULT LETTER
[Dear  ___] : Dear  [unfilled], [Consult Letter:] : I had the pleasure of evaluating your patient, [unfilled]. [Please see my note below.] : Please see my note below. [Consult Closing:] : Thank you very much for allowing me to participate in the care of this patient.  If you have any questions, please do not hesitate to contact me. [Sincerely,] : Sincerely, [DrMeron  ___] : Dr. REARDON [FreeTextEntry3] : Angie Biswas MD\par [facility]\par

## 2021-07-18 NOTE — ASSESSMENT
[FreeTextEntry1] : 1.  Anemia- Hgb stable\par  -blood loss, anemia of chronic disease with need for intermittent iron  \par - copper deficiency - replaced, level WNL 5/2020\par - 11/2020- Ferritin 141, last IV iron November 4 2020- Venofer 400 mg x 2- March 2021\par - ferritin 111 7/2021\par \par 2. Osteoporosis \par - left ankle- stress fx- advanced  osteoporosis, patient with h/o steroids use\par - completed Forteo 18/24 m\par - calcium level stable,IV calcium 1-2 times per month\par - PT for osteoporosis\par - dexa - Sep 2020- Left fem neck -2.8 \par \par  3.TIA with MTHFR and h/o DVT x 3 with cryptogenic CVA  \par not a candidate for DOAC b/o small bowel resection\par - INR home monitoring of warfarin takes 3 mg daily\par - Warfarin 3 mg x 6, 1.5mg x 1, INR- INR 1.7 at home\par \par 4. Hypogammaglobulinemia- no increased infection rate \par - s/p  Prevnar 13 12/2018 \par -  Pneumococcal vaccine 23 boost \par - s/p  Shingrex\par -  COVID vaccine \par \par 5. Zinc deficiency\par - oral Zinc, level better - 72\par \par Dr. Luis Felipe Monsalve\par cell [phone]\par office [phone]\par \par Labs next visit- PT, INR, irons\par \par \par \par \par \par

## 2021-07-18 NOTE — HISTORY OF PRESENT ILLNESS
[0 - No Distress] : Distress Level: 0 [de-identified] : Patient is a 52 year old who is referred for initial consultation for anemia secondary to Crohns/GI bleed vs Iron Deficiency/ Vit b12 def. 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 Patient is status post ileo-colonic resections for SBO  in 2016. In 2016 patient had complaints of 10 - 15 lb weight loss. Labs at that time showed Hgb of 12, steatorrhea and stool calprotectin = 236. In 2016 MRI/MRE with narrowing at ileocolonic anastomosis with upstream dilation. Pt refused bridge with  prednisone and Entocort / Flagyl. In 2017 patient Hgb dropped to 9.5. On 2017 patient underwent an EGD and Colonoscopy. Findings consistent with Page's and no dysplasia or AVMs. Colonoscopy showed an open anastomosis but active disease, moderate on the colonic side and limited to the anastomosis and moderate to severe enteritis, just proximal to the anastomosis. Pat had routine labs on 05/10/2017 Hgb: 8.3.  [FreeTextEntry1] : s/p injectafer, copper\par warfarin [de-identified] : Patient presents for follow up of  anemia, DVT, MTHFR gene mutation follow up, currently on Coumadin 3mg- takes 0.5mg once a week for lower lab values with INR level that takes with home machines. Patient had 2 recent iron infusions, venofer.  Patient will have a MRI on 7/16/21.

## 2021-07-20 ENCOUNTER — APPOINTMENT (OUTPATIENT)
Dept: GASTROENTEROLOGY | Facility: CLINIC | Age: 56
End: 2021-07-20
Payer: COMMERCIAL

## 2021-07-20 VITALS
BODY MASS INDEX: 19.69 KG/M2 | WEIGHT: 136 LBS | SYSTOLIC BLOOD PRESSURE: 122 MMHG | DIASTOLIC BLOOD PRESSURE: 70 MMHG | TEMPERATURE: 98 F | HEIGHT: 69.6 IN

## 2021-07-20 PROCEDURE — 99072 ADDL SUPL MATRL&STAF TM PHE: CPT

## 2021-07-20 PROCEDURE — 96372 THER/PROPH/DIAG INJ SC/IM: CPT

## 2021-07-20 PROCEDURE — 99215 OFFICE O/P EST HI 40 MIN: CPT | Mod: 25

## 2021-07-20 RX ORDER — CYANOCOBALAMIN 1000 UG/ML
1000 INJECTION INTRAMUSCULAR; SUBCUTANEOUS
Qty: 0 | Refills: 0 | Status: COMPLETED | OUTPATIENT
Start: 2021-07-20

## 2021-07-20 RX ADMIN — CYANOCOBALAMIN 0 MCG/ML: 1000 INJECTION INTRAMUSCULAR; SUBCUTANEOUS at 00:00

## 2021-07-20 NOTE — ASSESSMENT
[FreeTextEntry1] : 1. Crohns disease of both small and large intestine\par    possible recently flare vs partial sbo vs GE vs gastric dysmotility--now better\par CROHNS DISEASE-s/p ileocolonic resection x 2--last 6/19/15 for recurrent sbos: appears to be stable , GI symptoms stable but labs were up CRP=18.6/ESR=19 & Ysukmmiaclwm=981 and Wt down ( inflam markers ? 2/2 to R ankle Fx/stress rx and tendinosis, also had tooth infection ) & MRE w ? ileal penetrating dis, \par Presently GI symptoms, APR's and wt are all improved w wt =136_____\par s/p 4 SBOs w/i 2 yrs--2/2 distal sb disease adj to anastamosis\par when on Humira weekly w ADA =33 (3/20/14), -but had sbo 2/2014 at ADA=25 and another sbo 4/28/15\par 6/10/2015 Colonoscopy: Inflamm ST mass on ileal side w ulceration/scarred/narrow\par 6/11/2015 CT: dilated thickened sb loops distal jej & ileum\par Post-op **probably some malabsorption 2/2 to removal of R Colon and ileum: ?bile/fat/SIBO\par WT. Loss: r/o malabsorption, ?bile-induced--secretory vs decr micelles, active dis, PLE\par r/o SIBO--Elev FA, Alb=3.4-->3.1-->2.9--> (7/28/17)\par ?SIBO/Prevent post-op recurrence --Flagyl 250 mg qid~12/7/16-->d/c: 5/11/17\par Also discussed trial of Cholestyramine/Xifaxin -wanted to wait\par **ACTIVE Crohn's--11/10/16: 9.5lb wt loss, BMs: #5-6, 5x/D--Taking Magnesium \par 12/1/16 MRE: RUQ ileocolonic anastamosis--narrowed w upstream dilatation to 3.2 cm\par Labs: Stool Ncgfczupdlbb=348, + Incr Fecal fat, Alb=3.4, FA =23, L39=653, Hgb=12.3,ESR=10,CRP <5\par 4/19/17 :Colonoscopy: Anastamosis: open w mild -mod active colitis, enteritis severe adj to anastomosis, mild more proximal, remainder of colon=wnl\par 5/11/17- Adm [facility] w stools brown, Hgb=7.9, BUN=17, Creat=1.4, Alb=2.9.\par S/P 2 Units w Hgb=10.6, BUN=15/1.1, Prednisone 40mg taper, entocort d/dee\par 6/8/17: Hgb=7.4, MCV=98, CRP<5--ASA stopped, Pred20--> 30mg, 6/13/17: s/p 2 Unit PRBCs\par 7/11/17 [facility] w unsteadiness. MRI Head: R Cortical Temporo-occipital encephalomalacia\par Hgb=9.9--> 8.4->9.7 after 1 Unit. Seen by Heme: received IV Iron \par CRP<5, I34=074, UMB=408, FA>23,Iron=22,Sat%=6, Ferritin=42, Alb=3.5. D/C on warfarin 2mg\par 7/28/17 Hgb=7.7; 7/31/17-s/p 1 Unit \par Heme Consultation: received  IV Infusions of  Iron and 5 IV Copper:___\par **Entyvio :time 0=12/22/16 ( Humira 40mg QW); 1/5/17--2wks, s/p 3rd infusion at wk 6, \par #6 :6/21/17--held 2/2 Shingles, Last Infusions=9/19/17 , 10/17/17, 11/28/17, 1/16/18 ,2/16/18, 3/19/18,4/16/18, 5/14/18, 6/25/18, 8/7/18, 9/17/18, 10/16/18, 11/13/18, 12/11/18, 1/14/19, 2/15/19, 3/15/19, 5/16/19, 6/18/19,7/24/19,\par 9/9/19, 10/2/19, 11/4/19, 12/12/19, 1/6/20, 2/4/20, 3/3/20, 9/17/20, 10/15/20, 11/18/20, 1/11/21, 2/8/21, 3/8/21, 4/8/21, 6/3/21, 7/1/21  _______\par Entocort 9mg qd: 12/7/16--d/dee 5/11/17\par **Prednisone 40mg qd (5/11/17)--tapered 5mg/wk to 20mg qd, 6/8/17 30mg, \par 7/25/17 25mg, 3wks ago 20--> 15mg, 10/19/17 10mg alt w 7.5-->11/16/17 10mg alt w 5mg-->11/30/17: 5mg-->12/21/17: 5 alt w 2.5mg-->1/18/18: 2.5mg qd-->2.5mg qod--> d/c \par Probiotics 3 qd \par Lactose free, protein drinks tid\par MCT oil begun\par **trend --cbc, esr, crp, albumin\par **monitor wt= 120-->134-->134 --> 135--> 132 w clothes-->133--> 131 (Low carbs now)--> 129--> 127-->126-->124-->125-->124--> 126-->125-->125-->125-->126-->128-->127-->130-->131--> 135-->135 - 137--> 136--> 135--> 135--> 136  ____\par Wt Loss : sig change since May-June/2018\par 8/17/17: Hgb=9.9, ESR=20, Vi=679--Ssrnorooov s/p GI infection w N/V/D, no obstruction\par 10/17/17: Hgb=7.9,ESR=6,CRP<5, INR=1.6, Got IV Iron x 2, since 10/2/17 for Iron<10, Hgb=8.8\par 11/16/17: Hgb=11.2, ESR=14, CRP=12, 10/26/17 Stool fat-neg, calprotectin-30\par 11/13/17: MRE-Chr mild distension of distal & vladislav-ileum s/o mild stricturing at anast, mild mural thick & mucosal enhancemt ~ 20cm; little change from 2015 c/w mild acute on chr ileitis, 2.1 cm Liver hemangioma\par 10/9/18: Hgb=11.6, MCV=94, ESR=14, CRP=5.4, INR=2.2\par 10/10/18 MRE: stricturing of neoterminal ileum w worsened upstream dilatation, moderate length of upstream ileum w stricturing extending at least 20cm and more extensive then previous. suggestion of 2 adj extraluminal fluid collections which may be w/i the wall of strictured ileum near the ileocolonic a, Hgb=13.6\par pt had declined to call numbers given for  [facility] or [facility]ational [facility]'s--biologics.  \par later he felt  he was  stablizing w wt loss, and inflammatory markers\par Colonoscopy: 05cm rectal polyp: HP, 2nd deg int hemorrhoids\par mild-mod activity: MARILY w cryptitis & mild archect distortion at ileocolonic anast: colon & ileal side, no stricture\par \par  7/16/21 MRE: limited to incomplete bowel distention, chr distal ileitis/probable inflammatory stricturing vs collapse of the vladislav-TI--but improved since 2018 , moderate proctitis\par \par CRP <5 : ? s/p recent ankle fx/stress rx/tendonitis and tooth infection \par prior stool studies w elev  calprotectin and No fat\par Wt loss-- 2/2 to low carbs-->fat burning and flare ; advised to liberalize and add protein, ensure\par \par Plan: Prednisone d/c 2/20/18\par Entyvio q 6 wks --> 4 weeks \par check inflammatory markers: 2/28/19 w ESR=12, CRP=6.5 & 2/21/19 stool calprotectin--> 232\par 8/15/19: ESR=10, CRP=5.2, Calprotectin=88\par 9/19/19: ESR=9, CRP=5.5\par 10/17/19: ESR=7, CRP< 5\par 11/6/19 : ESR=6, CRP=0.18\par 11/27/19: ESR=6, CRP <5\par 1/13/20: ESR=7, CRP=0.2\par 1/30/20: ESR=9, CRP=11\par 3/2/20:ESR=7, CRP=0.26\par 3/9/20: VDZ>40, Ab to VDZ <1.6\par 3/17/20: ESR=6, CRP=6.4\par 10/17/20: ESR=11, CRP <5\par 1/4/21:ESR=9, CRP<5\par 2/22/21: ESR=8, CRP<5\par 4/5/21: ESR=9, CRP<5\par 6/4/21: ESR=9, CRP<5  \par 7/4/21: CRP <5, Hgb=12\par \par 7/12/21   --  ? flare --> entyvio should be q 5, went 8 wks\par          next dose should be in 4 weeks x 2 then 5 weeks \par          MRE 7/16/21 -->  limited to incomplete bowel distention, chr distal ileitis/probable inflammatory stricturing vs collapse of the vladislav-TI--but improved since 2018 , moderate proctitis\par \par Cliically stable, unclear if MRE acurate 2/2 underdistension, but gain wt and CRP--normal\par will \par trend cbc,esr,crp\par will check  stool calprotectin and Vedo/VEMAB levels next prior to next infusion\par                                                                                                                                                                                                                                                                                                                                                                                                                                                                                                                                                                                                                                                                                                                                                                                                                                                                                                                                          \par 2. Iron deficiency anemia due to chronic blood loss  \par  had initially dropped , after clinical flare and post procedure bleeding\par Probably multifactorial: ACD, Blood loss, malabsorption/nutrient deficiencies\par Eliquis-->warfarin after CVA, On Entyvio and prednsione was tapered  for active dis\par Still requiring IV Iron--prn, \par s/p IV Cu & transfusions \par 7/11/17 Recent Adm for unsteady gait w MRI c/w CVA--warfarin begun: possible better control of AC\par Chromagen 2 qd--> IV Iron , s/p IV Cu x 5, FA 1mg qd , B12 SL & IM\par  1/14/19: Hgb=10.7, INR=1.6, 2/5/19: Hgb=11.2,INR=1.5; 2/28/19: Hgb=11.5, Ynxuzfkm=166; 3/11/19: INR=2.6\par 4/11/19: hgb=9.7, INR=1.6, 7/2/19: Hgb=11.7, Ferritin=41,INR=2.2, 8/15/19: Hgb=12.2,Ferritin=81,INR=1.6, \par 9/19/19: Hgb=12.8, 10/17/19: Hgb=14, 10/26/19: Hgb=14, 1/13/20: Hgb=13.5, 1/30/20: Hgb=13.9, Qcvlwwvs=045, Iron=38,  3/17/20: Hgb=13.1, Ikzrjmyy=188, INR=1.7, 10/17/20: Hgb=13, Ferritin=46, INR=1.9,  \par 1/4/21 Hgb=13.6, Ferritn=60, Iron=73, 2/22/21: Hgb=12.7, Tbckxeva=235, 4/5/21: Hgb=13, Ferritin=94\par 7/13/17: A93=776, GFO=950,FA>23; 1/3/18 M92=755, Qn=501, Zinc=55; 6/6/18 I23=875, 9/5/18: Xo=137, Zinc=67\par 11/28/18 X74=103, 7/2/19: Fo=787, Zinc=61,B6=2.8, 8/15/19: Vi=964,Zinc=54,I04=529, 1/13/20: U08=562;\par 2/22/21: P55=586, Vv=238, Zinc=58, \par 6/4/21: Cu=91, Zinc=69, Ferritin=32,Iron=43, \par 7/12/21: Th=813, Zinc=74, Nezvhprl=915, Iron=35\par B12 1cc IM ____R. delt--   given today, \par most recent IV Iron  : to have 2 over next 2weeks  for  6/4/21 Ferritin=32\par Hem consult IV Iron /Cu given malabsorption, ? BM bx --r/o occult etiology--on hold\par trend cbc, B12, FA, Iron panel \par Adj INR--closer to low 2's.    \par Agree w Heme--Prevnar-13\par \par \par \par 3. Other osteoporosis without current pathological fracture  \par : Progression on BD from 5/5/16\par Crohns, h/o steroid use\par Calcium citrate & Vit D per Endo\par Forteo since 5/10/19 , for Reclast          \par Repeat BD of 8/2018 --showed worsening to Osteoporosis from 2016\par Rec Endo consult w Dr. Akers :Osteoporosis center at [facility]--pt never went originally \par 9/21/18: Phos=2.4, Ca=8.7, Alb=3.4, Vit D=27, UQD=807, \par 10/16/18: Phos=2.8, Ca=8.7, Alb=3.5,\par 1/14/19: Phos=2.6,  Ca=8.7, Alb=3.6\par 2/28/19: Phos=1.8, Ca=8.9, Alb=3.7, VitD=39, EVS=237\par 3/18/19: Ca=8.5, Alb=3.7, Vit D=44.6, PTH=93\par 7/11/19: Ca=9.1, Alb=3.7, Phos=3.9, Vit D=40/ 306, AYB=468\par 8/15/19: Ca=9, Alb=4.2, Phos=4.4, VitD=59, KMB=475\par 10/17/19: Ca=9.6, Alb=3.9, Phos=3.5\par 11/6/19: Ca=9.1, Alb=3.9. Phos=3.7, VitD=50, PTH=80\par 1/13/20: ca=9.1\par 1/30/20: Ca=9.2, Alb=3.9, Phos=2.7, Mag=1.5, Vit D=103/41, PTH=87\par 2/18/20:ca=8.5, Alb=3.6, \par 3/2/20: Ca=8.5, Alb=3.6\par 3/17/20: Ca=9.1, Alb=3.7, Phos=3.4, Mag=1.7\par 10/17/20: Ca=8.9, Alb=4, Phos=2.3, Mag-2.0, Vit D=66, PTH=47\par 1/4/21 : Ca=9.4, Alb=4.2, Phos=2.7, Mag=2, Vit D=64, PTH=87.5\par 4/5/21: Ca=9.1, Alb=4, Phos=3.1, Mag=1.7, \par 6/4/21: ca=9, Alb=3.8, Phos=2.8, Mag=1.8\par 7/12/21: Ca=8.6, ALB=6, phosph=2.3, Mag=1.8\par Dr. Akers: Hyperparathyroidism-- probably secondary due to low Vit D/Ca & ? superimposed primary, Rx replete Vit D and current IV Calcium\par trend Vit D, Ca, Phos, PTH,  \par [facility] Consult init felt  Parathyroid scan showing activity in mediastinum is ectopic parathyroid, feels sx not indicated at this time, elev PTH is secondary to low  Vit D/Ca--to be reconsulted\par BD--9/2019: incr in spine, no change in wrist/hip\par 10/5/20-s/p Reclast\par \par \par \par \par \par 4. GERD:  well  -  controlled,  no ht burn,  dysphagia,  throat clear\par                 Dry cough, CXR:ana, saw ENT eulogio-->LPR\par * ++ LPR,  ++  Page’s w No Dysplasia,  No , H/O  Esophagitis grade: A   was found \par \par Recommend: \par * Anti-reflux diet & life-style changes reviewed & re-emphasized.  ++  Bedge required\par * Weight reduction & regular exercise emphasized\par \par * need for  PPI:  Protonix 40 mg qd ,  ++ H2B q HS:Zantac 300mg--> Pepcid 40mg\par No Carafate  1 gram:   --was emphasized\par I reviewed & summarized the prospective randomized & retrospective non-randomized studies\par looking at potential long term SE's of PPIs, w special attention to associations & actual cause\par as related to GI infections, bone loss, cognitive changes, KD, Covid, vitamin & electrolyte deficiencies\par questions were answered, pt advised that PPIs should be used when needed as indicated by their\par clinical indication and response and tapering off is always the goal if possible. pt understood.\par \par *  F/U  EGD:  [  2    ] mo.  /  [   2022   ] yr  --for Barretts screening / surveillance \par * No  need for pH Monitor,  No  Manometry,  No  Esophagram -- was emphasized \par *  ++  need for ENT  eval/F/U,  No  need for Surgical  eval  --  was emphasized \par \par \par \par \par \par 5. Hemorrhoids:  well - controlled.  No pain,  swelling,  itch,  bleeding\par * Discussed the potential complications of thrombosis,  pain,  infection,  swelling, itching,  bleeding \par Reccomend: \par * Moderate  - Fiber Diet was reviewed and emphasized\par * 6  --  8 cups of decaffeinated fluid daily was emphasized \par * Sitz Bathes BID,  No:  Anusol HC  Suppos / Cream  OH BID -- was needed\par * No:  Tucks BID,   No:  Balneol Lotion,   No:  Calmoseptine Oint -- was needed ;    ++ Prep H prn\par * No:  need for  Colorectal surgical evaluation for possible ablation w Dr --  was emphasized\par \par \par \par \par \par \par \par 6. Barretts esophagus without dysplasia  \par Notes: see GERD.    \par \par \par \par 7. Hepatomegaly-->not confirmed by recent abd sono\par CTE w relative enlargemt, ? lobulation & enlarged portal/mesenteric veins\par LFTs-wnl, no ascites or edema\par R/O CLD, Hepatic vein thrombosis\par Abd sono w doppler--> Liver and spleen normal size, no thrombosis, normal portal and hepatic vein flow\par \par \par \par \par .\par \par

## 2021-07-20 NOTE — HISTORY OF PRESENT ILLNESS
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-->Eliquis' warfarin \par        GERD w Page's, Hemorrhoids, BPH, \par        S/P Ileocolonic resection 1998\par        Since then multiple SBOs\par \par \par        Got IV Iron x 2, since 10/2/17\par        10/19/17: feeling better, Fi=178 on prednisone 15mg x 3weeks, BMs Brown: #5-6, 1-2/D, occas 3-4/d\par        10/25/17: Hgb=10, ESR=5, CRP=5, Alb=3.6, Phos=2, Hlhjfrlq=064, F87=549\par        11/16/17: Hgb=11.2, ESR=14, CRP=12, \par        11/13/17: MRE-Chr mild distension of distal & vladislav-ileum s/o mild stricturing at anast, mild mural thick & mucosal enhancemt ~ 20cm; little change from 2015 c/w mild acute on chr ileitis, 2.1 cm Liver hemangioma\par        11/28/17: Entyvio\par        11/29/17: Hgb=9.6, ESR=10, CRP=5.8, Alb=3.8,Ferritin-19, Iron=14,Sat=4%, Phos=2.5, Ty=983, BMs:# 5, 1-2x/D, brown,\par        12/19;17: Hgb=10.1, ESR=12, CRP=6.5; 12/21/17: BMs:#3-5, 1-3x/D , brown, no blood, no pain, ji=110\par        1/3/18:Hgb=11.7, ESR=19, CRP=6.5, Alb=4.1, Xuifedku=075, Iron=31, Sat%=9,Zinc=55\par        1/16/18: Entyvio, Hgb=11.4, ESR=10, CRP=6.9\par        1/18/18: Today: cellulitis R foot, saw dr KEITH--rx augmentum x 10D, Entyvio 1/9 to 1/16, vz=172 w clothes,BMs: # 4-5, 1-2x/D \par        2/14/18: Hgb=11.1, ESR=16, CRP=11.6, Alb=3.6, Iron=28, Ferritin=23, INR=1.5 \par        2/16/18: s/p Entyvio; Iron infusion, again on 2/27\par        2/20/18: Hgb=10.6, ESR=20, CRP=12, INR=1.7\par        2/27/18: s/p iron infusion\par        3/9/18: Dr. Burden for tenosynovitis, rx w Zorvolex: Diclofenac x 5 days--? response\par        3/14/18: Pi=620; BMs: # 5, 1-2x/D; Hgb=11, ESR=18, CRP=11,Irom=45,Ksfxuttw=748,Alb=3.6, INR=1.5\par        3/19/18: s/p Entyvio\par        3/22/18: mb=783, BMs: # 5, 3-4 x/d\par        4/16/18: s/p Entyvio,Hgb=11.9, INR=1.3, ESR=18, CRP=8.4 \par        4/18/18 EGD: gastritis, no HP, no IM, 2+ Mucous, 0.5cm gastric polyp: fundic, 4cm HH, + Barretts:3cm, no dysplasia\par        4/25/18: Hgb=11.5, INR=1.6, Iron=40, Sat=13%, Ferritin=57, Alb=3.2, Phos=2.2, Mag=1.7\par        4/30/18: s/p Iron Infusion\par        5/14/18: s/p Entyvio \par        5/23/18: Hgb=11.5, ESR=16, CRP< 5\par        5/29/18: s/p Iron Infusion\par        6/6/18: Hgb=10.6, INR=1.7, Xmsptwwg=681, Alb=3.5, Phos=2.1, D62=821, fp=876; BMs: # 5, 2-3x/D \par        6/19/18: Hgb=10.6, INR=1, CRP=15, ESR=23\par        6/21/18: R ankle is acting up, swollen, pain, having MRI done, took a couple of advil, on low carbs ,BMs: # 5, 1-2x/D, dk=413\par        6/26/18: MRI: fx/stress rxn-talar body/navicula; stress related changes--cuneform, cuboid,distal tibia; mod tibiotalar effusion, mild-mod peroneal tendinosis\par        7/19/18: entyvio 6/26/18, eliminated all carbs--anti inflammatory diet, kh=240, BMs: # 4-5, 1-3x/D \par        7/25/18: Hgb=10, INR=1.3, Alb=3.3, Phos=2.4, Inqzqqmq=395, sat%=12, Iron=35\par        8/2/18: BD--osteoporosis of hip/spine: sig decrease BD--17.6% of hip, 17% of spine, osteopenia of wrist: 6.4%\par        8/7/18: Entyvio\par        8/21/18: Hgb=11.1, INR=1.5, ESR=19, CRP=18.6\par        8/23/18: recently w tooth infection, old implant--loose and removed; rx w amox, and advil\par        eating almost no carbs, rc=998, # 5-6, 2-3x/d \par        8/24/18: Stool fat--neg, Otbqcelvyghd=378\par        9/5/18: Hgb=11.4, INR=1.3, ESR=9, CRP=11.3, Iron=41, Usgjvqzh=182, Alb=3.8,\par        9/17/18: entyvio, Hgb=10.7, INR=2.2, ESR=17, CRP=9.1, \par        9/20/18: go=018, BMs: # 5-6, 1-2x/D \par        9/21/18: Phos=2.4, Ca=8.7, Alb=3.4, Vit D=27, LYE=227, \par        Dr. Akers: Hyperparathyroidism: probably secondary due to low Vit D/Ca & ? superimposed primary, rx replete Vit D and Calcium\par        10/9/18: Hgb=11.6, MCV=94, ESR=14, CRP=5.4, INR=2.2\par        10/10/18 MRE: stricturing of neoterminal ileum w worsened upstream dilatation, moderate length of upstream ileum w stricturing extending at least 20cm and more extensive then previous. suggestion of 2 adj extraluminal fluid collections which may be w/i the wall of strictured ileum near the ileocolonic anastomosis. surrounding spiculation and tethered appearance of bowel in this region.\par 10/16/18: entyvio, Hgb=11.7, MCV=94, ESR=14, CRP <5, Alb=3.5\par 10/18/18: Hu=786,   BMs: # 5-6, 3x/D , \par 11/13/18: Entyvio\par 11/21/18 CTE w C: limited 2/2 bowel underdistention, mucosal hyperenhancemt & extensive SM edema of distal ileum including vladislav-ileum, difficult to exclude a sml fluid collection, Liver w relative enlargement w suggestion of lobulation, enlargemt of PV,SMV,IMV,Spl V, rectal V. No ascites\par 11/28/18: Hgb=10, ESR=19, CRP=17,Alb=3.5,Iron=35, Sat%=11, Ekeldnut=941, INR=1.3\par 11/29/18: am=208, BMs: # 5-6, 3-4x/D\par 12/3/18: Abd sono--Liver 14.8cm Incr heterogenicity, spleen--wnl\par 12/11/18 & 1/14/19: Entyvio\par 1/14/19:Entyvio,  Hgb=10.7, ESR=15, Alb=3.6, Iron=36, Ferritin=67, Sat%=11, INR=1.6\par 1/24/19:  ki=278, stools are # 4-6, 3-4x/d , no pain\par 2/5/19: Hgb=11.2, ESR=14, CRP=0.36\par 2/28/19: Hgb=11.5, ESR=12, CRP=6.5, Alb=3.7, Iron=69, Ohklsxan=325, Ca=8.9\par                Bms: # 4-5, 2-3x/D , rb=168,  Entyvio : last 2/1519,\par                had parathyroid scan pre-op, still w elev PTH, + activity in mediastinum,? ectopic parathyroid tissuevs neoplasm.  To see ENT and have Imaging at [facility]\par 3/28/19: Bms: # 4-5 , 3x/d ;nu=949\par 4/11/19: Hgb-9.7, Iron=45, ESR=18, CRP=9.4, Alb=3.5, \par Saw Endo SX at [facility], felt hyperparathyroid is secondary to Low Ca/Vit D, no sx at this time\par 4/16/19: BMs: # 5-6, 3-4x/D, vm=305,  Entyvio : last 3/1519,  got IV iron 4/12/19 for Ferritin=53\par 4/27/19: s/p R Hip Nailing--missed 4/2019 2/2 fresh wound\par 5/16/19 & 6/18/19 Entyvio\par 7/2/19: Hgb=11.7, Ferritin=41,ESR=12, CRP=5.5, B6=2.8,Mag=1.8,Phos=3.9,It=675,Zinc=61\par 7/11/19: s/p IV iron\par 7/11/19: Alb=3.7, XDW=388, Ca=9.1, Vit D=40/306, \par 7/24/19: Entyvio, Alb=3.8, RUA=532, Ca=8.9, Vit D=128 \par 8/1/19: Bms: # 5-6, occas #4, 2-3x/D , qk=042\par 8/15/19: Hgb=12, ESR=10, CRP=5.2, Ferritin=68, Alb=3.4, Phos=4.4,Mg=1.7, Ca=8.5,Calprotectin=88,\par 8/20/19: LLD=067, Ca=9, Alb=4.2\par 9/19/19: Hgb=12.8, ESR=9, CRP=5.5, Alb=3.7, Bxlsbura=208, Mag=1.8, Ca=8.9, Phos=4.2\par 9/26/19: aw=497, BMs: #5-6, 2-3x/D, no pain\par 10/17/19: te=536, BMs: #4-6, 1-2x/D, no Pain; Hgb=14, ESR=7, CRP<5, Alb=3.9, Onkjntbw=268, Mag=1.7, Ca=9.6\par 10/24/19: hz=413, Bms: # 4-6 , no pain,\par 11/6/19: ESR=6, CRP=6, PTH=80,Ca=9.1, VitD=50\par 11/14/19: lw=013, BMs: # : 4, 1-2, 0ccas #5\par  11/17/19: ESR=6, CRP<5, Ztsigykw=238, \par  12/19/19: lk=463, BMs: #5-6, 2-3x/D\par 1/6/20: entyvio\par 1/13/20: ESR=7, CRP=0.2, Hgb=13.5, Rcsmliqw=549, S52=634, FA=10, Alb=4.1, Ca=9.1\par 1/16/20: wt = 127, BMs: # 5, 1-3x/d\par 1/30/20: ESR=9, CRP=11, Hgb=13.9, Mqhyhdiz=403,Iron=38, Alb=3.9,Ca=9.2, PTH=87,\par 2/3/20 EGD: gastritis, +MACKENZIE, No HP, +erosion w ooze -clipped, 0.5cm polyp-neg; 4cm HH, Esophagitis A, + Barretts, VC: 1+\par Colonoscopy: 05cm rectal polyp: HP, 2nd deg int hemorrhoids\par mild-mod activity: MARILY w cryptitis & mild archect distortion at ileocolonic anast: colon & ileal side, no stricture\par 2/4/20: Entyvio; 2/12/20: IV Iron, 3/3/20: Entyvio\par 2/25/20: we=066,  BMs: # 4-5, 1-2x/ d\par 3/19/20: ag=418, BMs: #4-5, 1-2x/D\par 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\par 10/17/20 : Hgb=13, CRP< 5, ESR= 11, Iron=44, Ferritin=46, Alb=4, Phos=2.3, \par 11/5/20: kn=439; s/p IV Iron x 2 ,  11/4 and 1 week prior;   BMs:  # 4-5, 1-2x/D , no pain\par 11/18/20  Entyvio + IV Ca\par  1/4/21: Hgb=13.6, CRP<5, ESR=9, Ferritin=60, Alb=4.2, Phos=2.7\par 1/11/21: Entyvio & IV Ca\par 1/14/21: no pain, stool more formed ,  ry=083 - 137; BMs:  # 4-5, 1-2x/D \par 2/8/21: Entyvio & IV Ca\par 2/23/21 IV Ca\par 2/22/21: Hgb=12.7, CRP<5, ESR=8, Ztxgjwxnm=062, Phos=2.3, Mag=1.8, X09=726, Zinc=58, Bi=449\par 2/26/21: ul=406,  BMs:  # 4-5, 1-2x/D , no pain \par 3/8/21 & 4/8/21: Entyvio\par 3/9/21 & 3/24/21: IV Iron\par 4/5/21: Hgb=13, CRP<5, ESR=9, Ferritin=94, Phos=3.1, Mag=1.7,Ca=9.1/ Alb=4\par             \par Pt Ins co is refusing to cover Entyvio q 4wks, despite numerous attempts to appeal, they also refuse to set up a peer to peer discussion betw myself and another healthcare provider, even one that works for a third party.  They do acknowledge that there is literature supporting the successful use in individual cases who don’t respond to the q 8 wk interval and need\par less then q 8weeks , but state it had not in FDA approved at less then 8wks so they will not approve it.  I spoke to the ins co rep and discussed that fact that patients should not be  pigeon holed since practicing medicine is done on an individual basis.  Humans are not all the same.   Their  response didn’t change eventhough the pt clinically needed the medication and it  induced a beneficial clinical response towards remission.  Therefore the patient and I decided to slowly increase the interval since the insurance company will not pay for this benefit.  Hopefully he may be able to maintain his present clinical state.  If not we will return to q 4weeks and will again appeal using this patients real clinical data .\par \par 4/9/21:  pl=046,  no pain, stool more formed # 4, 1-2x/d \par 6/11/21: wt= 135,   no pain, stool more formed # 4, 1-2x/d \par              recently had an episode of abd distenstion, pain, N/V, no BM\par              eventually started having diarrhea and flatus, BMs returned to normal\par              lost 2-3 lbs but regained\par  Doesnt recall eating anything different, no fever, chills, brbpr, melena\par  Last entyvio was 6/3/21--which is almost 8 weeks, was supposed to be 5-6 to start\par               6/4/21 Hgb=12, CRP<5, Ferritin=32, pt to receive IV iron    \par  7/16/21 MRE: limited to incomplete bowel distention, chr distal ileitis/probable inflammatory stricturing vs collapse of the vladislav-TI--but improved since 2018 , moderate proctitis\par \par  Today:  Feeling well, no c/o , CP, SOB/ AMARO, Cough, Wheeze, Palpitations, edema\par              Most recent labs  reviewed w patient: 7/12/21 \par '\par \par  Last entyvio was --7/1, next 7/1 \par        no pain, stool more formed # 4-5/6, 1-2x/d \par         \par        lw=921 \par        Abd pain--no \par        Nausea--no \par        Vomit--no \par        Early satiety-no \par        Belching-no \par        Regurgitation--no \par        Ht burn--no \par        Throat Clearing-no\par        Globus-no\par        Hoarseness--no \par        Dysphagia--no \par        BMs:  # 4-5/6, 1-2x/d \par        Constipation--no \par        Diarrhea- intermittent:  no\par        Bloating--no \par        Flatulence-no\par        Gurgling--no \par        Melena--no \par        BRBPR--no \par        Anorexia-no \par        Wt Loss--stable\par \par \par \par        PRIOR HISTORY--2017\par \par        1/17/17: Hgb=9.2, ESR=6, CRP=5; 1/19/17: BMs: #4, 5-6, 2-3x/D, Fp=300, Started Creon 1 tid cc\par        3rd Entyvio begining Feb\par        2/14/17: Labs; Hgb=9.6, MCV=97, ESR=5, CRP< 5, Z46=374, FA>23, Iron=59, Retic =3.2,\par        2/17/17 Fecal Calprotectin=16, Fats: Increased neutral & Split\par        3/13/17: Labs Hgb=9.5, MCV=95, ESR=7, CRP <5, ALB=3.1\par        3/16/17: lot of stress, to move -[place], had a job-fell thru, BMs: # 5, 2-4 x/D, wt= 131w clothes\par        4/19/17 EGD: gastritis, MACKENZIE, No HP, 2cm HH, +Barretts, No Dysplasia\par        Colonoscopy: Anastamosis: open w mild -mod active colitis, enteritis severe adj to anastomosis, mild more proximal, remainder of colon-wnl, 2-3 deg int hemorrhoids\par        4/26/17 : Hgb=7.3, MCV=95, ESR=13, CRP<5;Immediately post procedure stools very dark on eliquis/iron.\par        Admitted to [facility]: Hgb=8.3-->8.9 after 2 U, Alb 3.3, BUN=17, creat=1.3, Malina/Socmp=194/460\par        4/27/17: Alb=2.3, BUN=12, creat=1.2, Malina/Lipase=209/863-No abd pain, N/V; 5/5/17: Hgb=10.7.\par        5/8/17 Entyvio iv. 5/8-5/9 w dark red diarrhea; 5/10/17 Hgb=7.8.\par        5/11/17 Adm [facility] w stools brown, Hgb=7.9, BUN=17, Creat=1.4, Alb=2.9; S/P 2 Units- Hgb=10.6, BUN=15/1.1.\par        Prednisone 40mg qd, entocort d/dee.; 5/18/17: Hgb-10.4, gs=100, BMs: #5, 1-2x/D, no pain\par        6/8/17: Hgb=7.4,MCV=98, CRP<5-ASA stopped, Pred20--> 30\par        6/10/17: Hgb=8.2, Alb=2.9, BUN=20/1.2 ; 6/12/17: Hgb=8.3, Retic=0.19, Iron=10, Sat%=3, Ferritin=57, FA=23,F34=868, 6/13/17: s/p 2 Unit PRBCs\par        6/15/17: started MCT oil, Qw=875 , BMs: # 5, 1-2x/D, stools are brownish/green \par        7/11/17: [facility] w unsteadiness. MRI Head: R Cortical Temporo-occipital encephalomalacia, MRA H&N-no sign lesions, PER-wnl.Hgb=9.9--> 8.4->9.7 after 1 Unit. Seen by Heme: received IV Iron \par        CRP<5, Y80=165, NQE=184, FA>23,Iron=22,Sat%=6, Ferritin=42, Alb=3.5. D/C on warfarin 2mg\par        7/20 Hgb=8.5, 7/28 Hgb=7.7, 7/31-s/p 1 Unit \par        As outpt seeing Heme, to get IV Iron and 5 IV Copper\par        Had 'FLU' Fever=101, chills, bloat, N/V/D, Bilious. no pain, melena,brbpr\par        Given anti-emetic by dr Butler,then able to keep things down\par        On prednisone 25, warfarin w INR bet 1.6-2\par        8/17/17: Hgb=9.9, ESR=20, CRP-P,Dj=548, BMs: # 5, 1-2x/D, stools are brownish/green\par        10/2/17: Hgb=8.8, MCV=89,Phos=1.7, K=3.9, Mag=1.9, Alb=3.6,Iron<10,Ferritin=21, INR=2\par        10/17/17: Hgb=7.9,ESR=6,CRP<5, INR=1.6\par        10/25/17: Hgb=10, ESR=5, CRP=5, Alb=3.6, Phos=2, Adgteusl=135, A40=781\par        11/16/17: Hgb=11.2, ESR=14, CRP=12, \par        11/13/17: MRE-Chr mild distension of distal & vladislav-ileum s/o mild stricturing at anast, mild mural thick & mucosal enhancemt ~ 20cm; little change from 2015 c/w mild acute on chr ileitis, 2.1 cm Liver hemangioma\par        11/28/17: Entyvio\par        11/29/17: Hgb=9.6, ESR=10, CRP=5.8, Alb=3.8,Ferritin-P, Iron=14,Sat=4%, Phos=2.5\par        12/19;17: Hgb=10.1, ESR=12, CRP=6.5; 12/21/17: BMs:#3-5, 1-3x/D , brown, no blood, no pain, qb=330\par        1/3/18:Hgb=11.7, ESR=19, CRP=6.5, Alb=4.1, Mvlnltip=659, Iron=31, Sat%=9,Zinc=55\par \par \par        PRIOR HISTORY---2013:\par \par        2/20/13 CT markedly dilated sb loops ext to anast, colonoscopy w open anast ,mild-mod remy-anastamotic disease. quick response to entocort/humira--probably adhesive dis. \par        8/10/13 w GNR bacteremia, ADA 1.7 /KAYLAH 3.4, Humira 8/7, 8/13,8/18,9/15\par        CT- mucosal thickening and spiculation of the distal sb extending to the anastamosis, thickening and stranding of adj mesenteric fat.\par        Humira increased to 40mg weekly, entocort 4\par        9/2013 MRE--dilated loops in mid and distal ileum, markedly thickened and narrowed TI w decreased peristalsis of TI\par        11/15/13 ADA-24.9, KAYLAH-0\par \par \par        PRIOR HISTORY---2014:\par \par        2/15/14--CT dilated loops SB, loop of sb in mid abd 4.3cm w infiltrative changes in the mesentery, bowel tapers in the RLQ to the anastamosis w/o transition\par        WBC=15K, Rx w IV steroids and Abx \par        3/7/14 SBFT: last 10cm sb prox to anast mild distension and sl irregularity. In the mid portion of this loop there is a mild narrowing which appears to reopen but is some what narrowed.\par        3/20/14 ADA=33.1, KAYLAH=0, Lialda switch to Apriso 4 (2/2014)\par \par \par        PRIOR HISTORY--2015\par \par        1/11/15 Adm [facility] w 1 day N,Recurrent V, Abd pain/distension. CT-multiple distended & fluid-filled sb loops, mild wall thickening of ileum w No inflamm changes.\par        WBC=14.6, Hgb=17, RX w NGT suction, Levaquin iv, Solumedrol 40mg q 12( 1/12-->1/16) to prednisone 30mg BID w 5mg taper/wk\par        3/18/15 --Dr. Butler w edema /High BP, prednisone was tapered slowly to 2.5mg \par        switched to entocort 9mg qd, edema and bp improved w lasix 20mg \par        BMs:#4-5, 3x/D, Hgb=11, ESR=4, CRP<5\par        4/28/15 - 5/1/15: Adm [facility] w 1 day Abd pain, distension,N/V. 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 side of anast, opening appeared ulcerated,scarred & severely narrowed\par        6/11/15: [facility] w N/V x1, decr appetite, CT ABD: no obstruction, dilated thickened sb loops of distal jej and ileum\par        6/19/15 [facility]: s/p Lap w extensive lysis of adhesions, hepatic flex, sigmoid and sb anastamosis, s/p partial r colectomy and sb resection--side to side elisabeth: 8-10 inch from prior anast to TV colon.\par        7/17/15: BMs:#4-6, 4-5x/D, wt 131(from 126)\par        8/20/15: BMs: #4, 1-2x/D or #5, 2-3x/D, xs=930, dry cough,Hgb=10, WBC=3, WKF=499, CRP=56, ESR=21\par        8/25/15 CT Abd/Pelv: Svl RLQ sb loops w wall thickening, mild nodularity & inflamm stranding in mesentery\par        Advised-restart Entocort 9mg qd, Apriso 4 qd, Maintain Humira but given RX to check drug/Ab levels\par        9/15/15: did nt restart meds,Hgb=9.9, WBC=4, ESR=19, CRP=5.8, jq=049; [facility] : ADA=8.5, Antibodies< 1.7\par        10/15/15: No pain, BMs:#4, 1-2x/D, #5-6, 3-4x/D, throat clearing/cough-better, vl=759\par        11/30/15: No pain, BMs:# 4, 5-6 intermittently, No throat clear, uv=373\par \par \par        PRIOR HISTORY-2016\par \par        1/22/16; 4/8/16; 6/6/16 : No pain, BMs:# 4-5 1-2x/D, also #5-6 3x/D for 2-3D/wk, jz=205\par        7/14/16: ce=407, 9/22/16: ev=021.5\par        11/10/16: 9.5lb wt loss, states BMs: 5-6, 5x/D--Taking Magnesium, No pain/anorexia\par        Labs: Stool Fzhudtkrszcs=920, + Incr Fecal fat, Alb=3.4, FA =23, Z28=728, Hgb=12, ESR=10, CRP <5\par        Magnesium-d/c, Obtain MRE asap--Start steroids and possibly switch to Entyvio\par        DDx discussed: active crohns--loss response to Humira, Malabsorption--loss Bile acids, Bile-induced diarrhea, SIBO\par        Pt wanted to wait for imaging-did not start rx\par        12/1//16 MRE: RUQ ileocolonic anastamosis--narrowed w upstream dilatation to 3.2 cm\par        Pt refused pred, Started Entocort 9mg qd and Flagyl 250mg qid about 7-10days ago \par        awaiting Entyvio load to start 12/22, then 1/5; had Cut back on iron bid\par        12/15/16: BMs:# 5, 2-3x/D, occas #6, No pain, Less bloat/flatulence, Gd=577.

## 2021-07-23 ENCOUNTER — LABORATORY RESULT (OUTPATIENT)
Age: 56
End: 2021-07-23

## 2021-08-23 ENCOUNTER — RESULT REVIEW (OUTPATIENT)
Age: 56
End: 2021-08-23

## 2021-08-23 ENCOUNTER — APPOINTMENT (OUTPATIENT)
Dept: HEMATOLOGY ONCOLOGY | Facility: CLINIC | Age: 56
End: 2021-08-23
Payer: COMMERCIAL

## 2021-08-23 VITALS
HEIGHT: 69.6 IN | RESPIRATION RATE: 16 BRPM | HEART RATE: 93 BPM | SYSTOLIC BLOOD PRESSURE: 125 MMHG | BODY MASS INDEX: 20.01 KG/M2 | TEMPERATURE: 98.2 F | DIASTOLIC BLOOD PRESSURE: 84 MMHG | WEIGHT: 138.2 LBS | OXYGEN SATURATION: 95 %

## 2021-08-23 PROCEDURE — 99213 OFFICE O/P EST LOW 20 MIN: CPT

## 2021-08-23 NOTE — HISTORY OF PRESENT ILLNESS
[0 - No Distress] : Distress Level: 0 [de-identified] : Patient is a 52 year old who is referred for initial consultation for anemia secondary to Crohns/GI bleed vs Iron Deficiency/ Vit b12 def. 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 Patient is status post ileo-colonic resections for SBO  in 2016. In 2016 patient had complaints of 10 - 15 lb weight loss. Labs at that time showed Hgb of 12, steatorrhea and stool calprotectin = 236. In 2016 MRI/MRE with narrowing at ileocolonic anastomosis with upstream dilation. Pt refused bridge with  prednisone and Entocort / Flagyl. In 2017 patient Hgb dropped to 9.5. On 2017 patient underwent an EGD and Colonoscopy. Findings consistent with Page's and no dysplasia or AVMs. Colonoscopy showed an open anastomosis but active disease, moderate on the colonic side and limited to the anastomosis and moderate to severe enteritis, just proximal to the anastomosis. Pat had routine labs on 05/10/2017 Hgb: 8.3.  [FreeTextEntry1] : s/p injectafer, copper\par warfarin [de-identified] : Patient presents for follow up of  anemia, DVT, MTHFR gene mutation follow up, currently on Coumadin 3mg- takes 0.5mg once a week for lower lab values with INR level that takes with home machines. Patient had 2 recent iron infusions, venofer.  Patient will have a MRI on 7/16/21.

## 2021-08-23 NOTE — REVIEW OF SYSTEMS
[Fatigue] : fatigue [Easy Bruising] : a tendency for easy bruising [Negative] : Allergic/Immunologic [Eye Pain] : no eye pain [Vision Problems] : no vision problems [Dysphagia] : no dysphagia [Hoarseness] : no hoarseness [Chest Pain] : no chest pain [Lower Ext Edema] : no lower extremity edema [Shortness Of Breath] : no shortness of breath [Cough] : no cough [Vomiting] : no vomiting [Constipation] : no constipation [Diarrhea] : no diarrhea [Dysuria] : no dysuria [Joint Pain] : no joint pain [Skin Rash] : no skin rash [Dizziness] : no dizziness [Insomnia] : no insomnia [Anxiety] : no anxiety [Depression] : no depression [Muscle Weakness] : no muscle weakness [Easy Bleeding] : no tendency for easy bleeding [de-identified] : pt is on coumadin

## 2021-08-23 NOTE — CONSULT LETTER
[Dear  ___] : Dear  [unfilled], [Consult Letter:] : I had the pleasure of evaluating your patient, [unfilled]. [Please see my note below.] : Please see my note below. [Consult Closing:] : Thank you very much for allowing me to participate in the care of this patient.  If you have any questions, please do not hesitate to contact me. [Sincerely,] : Sincerely, [DrMeron  ___] : Dr. REARDON [FreeTextEntry3] : Angie Biswas MD\par [facility] [place] [facility]\par

## 2021-08-27 ENCOUNTER — RESULT REVIEW (OUTPATIENT)
Age: 56
End: 2021-08-27

## 2021-08-31 ENCOUNTER — RX RENEWAL (OUTPATIENT)
Age: 56
End: 2021-08-31

## 2021-09-02 ENCOUNTER — NON-APPOINTMENT (OUTPATIENT)
Age: 56
End: 2021-09-02

## 2021-09-12 ENCOUNTER — NON-APPOINTMENT (OUTPATIENT)
Age: 56
End: 2021-09-12

## 2021-09-12 ENCOUNTER — LABORATORY RESULT (OUTPATIENT)
Age: 56
End: 2021-09-12

## 2021-09-14 ENCOUNTER — RESULT REVIEW (OUTPATIENT)
Age: 56
End: 2021-09-14

## 2021-09-16 ENCOUNTER — APPOINTMENT (OUTPATIENT)
Dept: GASTROENTEROLOGY | Facility: CLINIC | Age: 56
End: 2021-09-16
Payer: COMMERCIAL

## 2021-09-16 PROCEDURE — 99215 OFFICE O/P EST HI 40 MIN: CPT | Mod: 95

## 2021-09-17 ENCOUNTER — APPOINTMENT (OUTPATIENT)
Dept: GASTROENTEROLOGY | Facility: CLINIC | Age: 56
End: 2021-09-17
Payer: COMMERCIAL

## 2021-09-17 VITALS
HEIGHT: 69.6 IN | BODY MASS INDEX: 19.4 KG/M2 | HEART RATE: 90 BPM | WEIGHT: 134 LBS | TEMPERATURE: 98.4 F | SYSTOLIC BLOOD PRESSURE: 124 MMHG | OXYGEN SATURATION: 96 % | DIASTOLIC BLOOD PRESSURE: 78 MMHG

## 2021-09-17 PROCEDURE — 99215 OFFICE O/P EST HI 40 MIN: CPT | Mod: 25

## 2021-09-17 PROCEDURE — 96372 THER/PROPH/DIAG INJ SC/IM: CPT

## 2021-09-17 RX ORDER — CYANOCOBALAMIN 1000 UG/ML
1000 INJECTION INTRAMUSCULAR; SUBCUTANEOUS
Qty: 0 | Refills: 0 | Status: COMPLETED | OUTPATIENT
Start: 2021-09-17

## 2021-09-17 RX ADMIN — CYANOCOBALAMIN 0 MCG/ML: 1000 INJECTION INTRAMUSCULAR; SUBCUTANEOUS at 00:00

## 2021-09-17 NOTE — CONSULT LETTER
[Dear  ___] : Dear  [unfilled], [Consult Letter:] : I had the pleasure of evaluating your patient, [unfilled]. [Please see my note below.] : Please see my note below. [Consult Closing:] : Thank you very much for allowing me to participate in the care of this patient.  If you have any questions, please do not hesitate to contact me. [Sincerely,] : Sincerely, [FreeTextEntry3] : Ricky Heard MD\par Associate Professor of Medicine\par Director IBD Program\par [facility]\par

## 2021-09-17 NOTE — ASSESSMENT
[FreeTextEntry1] : 1. CROHNS DISEASE   of both small and large intestine: s/p flare 8/25--8/30/21\par    s/p ileocolonic resection x 2--last 6/19/15 for recurrent sbos: \par prior was s/p 4 SBOs w/i 2 yrs--2/2 distal sb disease adj to anastamosis\par when on Humira weekly had an  ADA =33 (3/20/14), -but had sbo 2/2014 at ADA=25 and another sbo 4/28/15\par 6/10/2015 Colonoscopy: Inflamm ST mass on ileal side w ulceration/scarred/narrow\par 6/11/2015 CT: dilated thickened sb loops distal jej & ileum\par Post-op 6/2015 probably some malabsorption 2/2 to removal of R Colon and ileum: \par had WT. Loss-->r/o malabsorption:  ?bile-induced->-secretory vs decr micelles, active dis, PLE\par ?  SIBO--Elev FA, Alb=3.4-->3.1-->2.9--> (7/28/17)\par ?SIBO/Prevent post-op recurrence --> trial Flagyl 250 mg qid~12/7/16-->d/c: 5/11/17\par Also discussed trial of Cholestyramine/Xifaxin -wanted to wait\par 11/20/16 ACTIVE Crohn's-->  9.5lb wt loss, BMs: #5-6, 5x/D-- butTaking Magnesium \par 12/1/16 MRE: RUQ ileocolonic anastamosis--narrowed w upstream dilatation to 3.2 cm\par Labs: Stool Dneyjjfzejoz=570, + Incr Fecal fat, Alb=3.4, FA =23, K20=630, Hgb=12.3,ESR=10,CRP <5\par 4/19/17 :Colonoscopy: Anastamosis: open w mild -mod active colitis, enteritis severe adj to anastomosis, mild more proximal, remainder of colon=wnl\par 5/11/17- Adm PMHC w stools brown, but Hgb=7.9, BUN=17, Creat=1.4, Alb=2.9.\par S/P 2 Units w Hgb=10.6, BUN=15/1.1, Prednisone 40mg taper, entocort d/dee\par 6/8/17: Hgb=7.4, MCV=98, CRP<5--ASA stopped, Pred20--> 30mg, 6/13/17: s/p 2 Unit PRBCs\par 7/11/17 [facility] w unsteadiness. MRI Head: R Cortical Temporo-occipital encephalomalacia\par Hgb=9.9--> 8.4->9.7 after 1 Unit. Seen by Heme: received IV Iron \par CRP<5, L32=008, CSE=893, FA>23,Iron=22,Sat%=6, Ferritin=42, Alb=3.5. D/C on warfarin 2mg\par 7/28/17 Hgb=7.7; 7/31/17-s/p 1 Unit \par Heme Consultation ~ 8/2017: started  IV Infusions of  Iron and  IV Copper\par 8/17/17: Hgb=9.9, ESR=20, Mj=926--Zxjtygvurd s/p GI infection w N/V/D, no obstruction\par 10/17/17: Hgb=7.9,ESR=6,CRP<5, INR=1.6, Got IV Iron x 2, since 10/2/17 for Iron<10, Hgb=8.8\par 11/16/17: Hgb=11.2, ESR=14, CRP=12, 10/26/17 Stool fat-neg, calprotectin-30\par 11/13/17: MRE-Chr mild distension of distal & vladislav-ileum s/o mild stricturing at anast, mild mural thick & mucosal enhancemt ~ 20cm; little change from 2015 c/w mild acute on chr ileitis, 2.1 cm Liver hemangioma\par 10/9/18: Hgb=11.6, MCV=94, ESR=14, CRP=5.4, INR=2.2\par 10/10/18 MRE: stricturing of neoterminal ileum w worsened upstream dilatation, moderate length of upstream ileum w stricturing extending at least 20cm and more extensive then previous. suggestion of 2 adj extraluminal fluid collections which may be w/i the wall of strictured ileum near the ileocolonic anastamosis\par pt had declined to call numbers given for  [facility] new or investigational rx's--biologics.  \par later he felt  he was  stablizing w his  wt loss, and inflammatory markers\par \par 2/28/19 w ESR=12, CRP=6.5 & 2/21/19 stool calprotectin--> 232\par 8/15/19: ESR=10, CRP=5.2, Calprotectin--> 88\par 9/19/19: ESR=9, CRP=5.5\par 10/17/19: ESR=7, CRP< 5\par 11/6/19 : ESR=6, CRP=0.18\par 11/27/19: ESR=6, CRP <5\par 1/13/20: ESR=7, CRP=0.2\par 1/30/20: ESR=9, CRP=11\par \par 2/3/20 EGD: gastritis, +MACKENZIE, No HP, +erosion w ooze -clipped, 0.5cm polyp-neg; 4cm HH, Esophagitis A, + Barretts, VC: 1+\par Colonoscopy: 05cm rectal polyp: HP, 2nd deg int hemorrhoids\par mild-mod activity: MARILY w cryptitis & mild archect distortion at ileocolonic anast: colon & ileal side, no stricture\par \par 3/2/20:ESR=7, CRP=0.26\par 3/9/20: VDZ>40, Ab to VDZ <1.6\par 3/17/20: ESR=6, CRP=6.4\par 10/17/20: ESR=11, CRP <5\par 1/4/21:ESR=9, CRP<5\par 2/22/21: ESR=8, CRP<5\par 4/5/21: ESR=9, CRP<5\par 6/4/21: ESR=9, CRP<5  \par 6/11/21: wt= 135,   no pain, stool more formed # 4, 1-2x/d \par              s/p episode --abd distenstion, pain, N/V, no BM\par              eventually started having diarrhea and flatus, BMs returned to normal\par              lost 2-3 lbs but regained\par 7/4/21: CRP <5, Hgb=12\par  7/16/21 MRE: limited to incomplete bowel distention, chr distal ileitis/probable inflammatory stricturing vs collapse of the vladislav-TI--but improved since 2018 , moderate proctitis\par 7/12/21   --  ? flare --> entyvio should have been  q 5 wk , went q 8 wks\par                      next dose should be in 4 weeks x 2 then 5 weeks, to check levels\par 7/20/21: Cliically stable, unclear if MRE accurate 2/2 underdistension, but gained wt and CRP--normal\par 7/23/21 Entyvio level= 39, Abs <1.6\par 8/23/21: Labs--Hgb=13.6, ESR=10, CRP=6.6, Ffbgzexk=456, Iron=26, Alb=3.9, BUN=16\par 8/26/21 p/w sbo  w N/V, abd pain/bloating  x 3 days, unable to keep things down\par Labs: WBC=5, Hgb=12, CRP=43, ESR=11, Alb=4.4, BUN=28, Creat=-1.6\par CT Abd/Pelvis: diffuse marked dilatation of SB Loops w transition pt in Rmid/lower abdomen\par ~ 5-6c, proximal to the ileocolonic anastomosis\par RX w IV solumedrol 20mg q8h, NGT, IVF, pt refused TPN, also w UTI from prostatitis\par 8/27 NGT was pulled, and clears started and advanced to LR,  was d/c home 8/30 on prednisone\par 40mg qd w taper by 10mg/wk to '20mg qd \par \par **Entyvio :time 0=12/22/16 ( Humira 40mg QW); 1/5/17--2wks, s/p 3rd infusion at wk 6, then q 6weeks \par #6 :6/21/17-->held 2/2 Shingles, then  Infusions as follows=9/19/17 , 10/17/17, 11/28/17, 1/16/18 ,2/16/18, 3/19/18,4/16/18, 5/14/18, 6/25/18, 8/7/18, 9/17/18, 10/16/18, 11/13/18, 12/11/18, 1/14/19, 2/15/19, 3/15/19, 5/16/19, 6/18/19,7/24/19, 9/9/19, 10/2/19, 11/4/19, 12/12/19, 1/6/20, 2/4/20, 3/3/20, 9/17/20, 10/15/20, 11/18/20, 1/11/21, 2/8/21, 3/8/21, 4/8/21, 6/3/21, 7/1/21, 8/2/21, 9/2/21,   _______\par \par A / PLAN:  s/p sbo prox to anastamosis\par                  inflammatory vs fibrosis vs combination\par      Responding  to steroids iv--> po\par      Entyvio level was 39 w/o Abs~ 3weeks after 7/1/21, \par      speaks to inflammatory component & probably loss of response\par      tapered steroids to 20mg from 40mg qd , will now start  15mg qd and  taper 5mg/wk\par then will most likely bridge to new biologic --Stelara\par      will f/u w  IBD consultant Dr. Heard at  for  ? Stelera \par      for  Colonoscopy ( w possible balloon dilatation )-> last 1 3/4 yrs ago\par Probiotics 3 qd \par Diet:  LR, Lactose free, protein drinks tid\par MCT oil begun but never maintained \par **trend --cbc, esr, crp, albumin\par   will check  stool calprotectin\par **monitor wt= usu bet 136-139, 7/20/21=136, hennc=883 up from 128\par \par \par                                                                                                                                                                                                                                                                                                                                                                                                                                                                                                                                                                                                                                                                                                                                \par 2. Iron deficiency anemia : \par  had initially dropped , after clinical flare and post procedure bleeding\par Probably multifactorial: ACD, Blood loss, malabsorption/nutrient deficiencies\par Eliquis-->warfarin after CVA, On Entyvio \par 7/11/17 s/p  Adm for unsteady gait w MRI c/w CVA--warfarin begun: possible better control of AC\par Chromagen 2 qd--> IV Iron , s/p IV Cu x 5, FA 1mg qd , B12 SL & IM\par Still requiring IV Iron and cu infusions prn \par most recent IV Iron  :  6/4/21 for Ferritin=32\par 2/22/21: I56=652, \par 6/4/21: Cu=91, Zinc=69, Ferritin=32,Iron=43, \par 7/12/21: Ho=841, Zinc=74, Svzxfvri=284, Iron=35\par \par B12 1cc IM ____R. delt--    given today, \par trend cbc, B12, FA, Iron panel \par Adj INR--closer to low 2's.    \par \par \par \par \par 3. Osteoporosis \par : Progression on BD from 5/5/16\par Crohns, h/o steroid use\par Calcium citrate & Vit D per Endo\par Forteo since 5/10/19 , then  Reclast          \par Repeat BD of 8/2018 --showed worsening to Osteoporosis from 2016\par Rec Endo consult w Dr. Akers :[facility]oporosis center at [facility]--pt never went originally \par 9/21/18: Phos=2.4, Ca=8.7, Alb=3.4, Vit D=27, CBQ=126, \par 10/16/18: Phos=2.8, Ca=8.7, Alb=3.5,\par 1/14/19: Phos=2.6,  Ca=8.7, Alb=3.6\par 2/28/19: Phos=1.8, Ca=8.9, Alb=3.7, VitD=39, QFX=345\par 3/18/19: Ca=8.5, Alb=3.7, Vit D=44.6, PTH=93\par 7/11/19: Ca=9.1, Alb=3.7, Phos=3.9, Vit D=40/ 306, BSA=384\par 8/15/19: Ca=9, Alb=4.2, Phos=4.4, VitD=59, GAU=064\par 10/17/19: Ca=9.6, Alb=3.9, Phos=3.5\par 11/6/19: Ca=9.1, Alb=3.9. Phos=3.7, VitD=50, PTH=80\par 1/13/20: ca=9.1\par 1/30/20: Ca=9.2, Alb=3.9, Phos=2.7, Mag=1.5, Vit D=103/41, PTH=87\par 2/18/20:ca=8.5, Alb=3.6, \par 3/2/20: Ca=8.5, Alb=3.6\par 3/17/20: Ca=9.1, Alb=3.7, Phos=3.4, Mag=1.7\par 10/17/20: Ca=8.9, Alb=4, Phos=2.3, Mag-2.0, Vit D=66, PTH=47\par 1/4/21 : Ca=9.4, Alb=4.2, Phos=2.7, Mag=2, Vit D=64, PTH=87.5\par 4/5/21: Ca=9.1, Alb=4, Phos=3.1, Mag=1.7, \par 6/4/21: ca=9, Alb=3.8, Phos=2.8, Mag=1.8\par 7/12/21: Ca=8.6, ALB=6, phosph=2.3, Mag=1.8\par \par Dr. Akers: Hyperparathyroidism-- probably secondary due to low Vit D/Ca & ? superimposed primary, \par [facility] Consult init felt  Parathyroid scan showing activity in mediastinum is ectopic parathyroid, then felt sx not indicated at that time, elev PTH is secondary to low  Vit D/Ca\par Rx replete Vit D and current IV Calcium-as per endo \par trend Vit D, Ca, Phos, PTH,  \par \par BD--9/2019: incr in spine, no change in wrist/hip\par 10/5/20-s/p Reclast--repeat 9/2021 \par \par \par \par \par \par 4. GERD:  recently active by ENT , no ht burn,  dysphagia,  + throat clear\par               h/o  Dry cough, CXR:ana, saw ENT bergstein-->LPR\par * ++ LPR,  ++  Page’s w No Dysplasia,  + H/O  Esophagitis grade: A   was found \par \par Recommend: \par * Anti-reflux diet & life-style changes reviewed & re-emphasized.  ++  Bedge required\par * Weight reduction & regular exercise emphasized\par \par * need for  PPI:  Omep 40 BID ,  ++ H2B q HS:Zantac 300mg--> Pepcid 40mg\par No Carafate  1 gram:   --was emphasized\par I reviewed & summarized the prospective randomized & retrospective non-randomized studies\par looking at potential long term SE's of PPIs, w special attention to associations & actual cause\par as related to GI infections, bone loss, cognitive changes, KD, Covid, vitamin & electrolyte deficiencies\par questions were answered, pt advised that PPIs should be used when needed as indicated by their\par clinical indication and response and tapering off is always the goal if possible. pt understood.\par \par *  F/U  EGD:  2/2022--for Barretts screening / surveillance \par * No  need for pH Monitor,   Manometry,   Esophagram -- was emphasized \par *  ++  need for ENT  eval/F/U,  No  need for Surgical  eval  --  was emphasized \par \par \par \par \par \par 5. Hemorrhoids:  well - controlled.  No pain,  swelling,  itch,  bleeding\par * Discussed previously the potential complications of thrombosis,  pain,  infection,  swelling, itching,  bleeding \par Reccomend: \par * currently Low   - Fiber Diet was emphasized\par * 6  --  8 cups of decaffeinated fluid daily was emphasized \par * Sitz Bathes prn,   No:  Anusol HC  Suppos / Cream  MS BID -- was needed\par * No:  Tucks BID,  Balneol Lotion,   Calmoseptine Oint -- was needed ;    ++ Prep H prn\par * No:  need for  Colorectal surgical evaluation for possible ablation\par \par \par \par \par \par \par \par 6. Barretts esophagus without dysplasia  \par Notes: see GERD.    \par \par \par \par 7. Hepatomegaly-->not confirmed by recent abd sono\par CTE w relative enlargemt, ? lobulation & enlarged portal/mesenteric veins\par LFTs-wnl, no ascites or edema\par R/O CLD, Hepatic vein thrombosis\par Abd sono w doppler--> Liver and spleen normal size, no thrombosis, normal portal and hepatic vein flow\par \par \par \par \par .\par \par Informed Consent:\par * The risks & Benefits of  Colonoscopy were discussed w patient.\par * This included but was not limited to perforation, bleeding, sedation /med rxns possibly requiring surgery, blood transfusions, antibiotics & CPR/Intubation.\par * Pt. understands & agrees to the procedures.\par The following instructions in regards to the prep and medically essential ( cardiac, pulmonary, sz, psych, endocrine)  pre-op medication administration\par was reviewed and emphasized with the patient . \par * Pt. advised to D/C  ASA/NSAIDs  7  Days & Warfarin 4Days  PTP.\par * [ +++ ]  Dulcolax / Miralax / Mag. Citrate ,  [     ] Prepopik/ Clenpiq ,  [     ] Osmo Prep,  [    ] GoLytely,  prep. reviewed w Pt.\par * Hold  [           ] AM of procedure.\par * Hold  [           ] PM of procedure.\par * Take  [           ] PM of procedure.\par * Take  [           ] AM of procedure.\par \par

## 2021-09-17 NOTE — ASSESSMENT
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  ?stricture at the anastomosis.\par \par # Ileocolonic CD s/p resection x2 with recurrence \par Patient likely has loss of response to VDZ.\par Plan to discuss the patient in IBD conference, review previous colonoscopy, and recent imaging; if he needs change of medical therapy or surgery. \par \par Patient will get his Colonoscopy and biopsy reports and send to office to be scanned in the chart. \par Will also plan to repeat the colonoscopy with possible dilation. Depends on wether we are dealing with one long stricture (10cm) vs 2 short adjacent ones.  His flare ? responded to steroids. \par \par Advised to acutely discontinuing the Prednisone at 20 mg and to continue the taper by 5 mg every 5 days. Medication sent. \par \par Patients previous notes reviewed, will also discuss the plan with patients primary GI - Dr. Lugo. \par \par RTC 5 weeks \par \par Jerri Nguyen MD\par IBD Fellow \par

## 2021-09-17 NOTE — REASON FOR VISIT
[Home] : at home, [unfilled] , at the time of the visit. [Medical Office: ([facility])___] : at the medical office located in  [Verbal consent obtained from patient] : the patient, [unfilled]

## 2021-09-17 NOTE — PHYSICAL EXAM
[General Appearance - Alert] : alert [General Appearance - In No Acute Distress] : in no acute distress [Oriented To Time, Place, And Person] : oriented to person, place, and time [Sclera] : the sclera and conjunctiva were normal [Outer Ear] : the ears and nose were normal in appearance [Neck Appearance] : the appearance of the neck was normal [] : no respiratory distress

## 2021-09-17 NOTE — HISTORY OF PRESENT ILLNESS
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-->Eliquis' warfarin \par        GERD w Page's, Hemorrhoids, BPH, \par        S/P Ileocolonic resection 1998\par        Since then multiple SBOs\par \par \par        Got IV Iron x 2, since 10/2/17\par        10/19/17: feeling better, Dl=268 on prednisone 15mg x 3weeks, BMs Brown: #5-6, 1-2/D, occas 3-4/d\par        10/25/17: Hgb=10, ESR=5, CRP=5, Alb=3.6, Phos=2, Oukqvgcj=361, Z74=249\par        11/16/17: Hgb=11.2, ESR=14, CRP=12, \par        11/13/17: MRE-Chr mild distension of distal & vladislav-ileum s/o mild stricturing at anast, mild mural thick & mucosal enhancemt ~ 20cm; little change from 2015 c/w mild acute on chr ileitis, 2.1 cm Liver hemangioma\par        11/28/17: Entyvio\par        11/29/17: Hgb=9.6, ESR=10, CRP=5.8, Alb=3.8,Ferritin-19, Iron=14,Sat=4%, Phos=2.5, Bp=891, BMs:# 5, 1-2x/D, brown,\par        12/19;17: Hgb=10.1, ESR=12, CRP=6.5; 12/21/17: BMs:#3-5, 1-3x/D , brown, no blood, no pain, de=757\par        1/3/18:Hgb=11.7, ESR=19, CRP=6.5, Alb=4.1, [place]=972, Iron=31, Sat%=9,Zinc=55\par        1/16/18: Entyvio, Hgb=11.4, ESR=10, CRP=6.9\par        1/18/18: Today: cellulitis R foot, saw dr KEITH--rx augmentum x 10D, Entyvio 1/9 to 1/16, wg=445 w clothes,BMs: # 4-5, 1-2x/D \par        2/14/18: Hgb=11.1, ESR=16, CRP=11.6, Alb=3.6, Iron=28, Ferritin=23, INR=1.5 \par        2/16/18: s/p Entyvio; Iron infusion, again on 2/27\par        2/20/18: Hgb=10.6, ESR=20, CRP=12, INR=1.7\par        2/27/18: s/p iron infusion\par        3/9/18: Dr. Burden for tenosynovitis, rx w Zorvolex: Diclofenac x 5 days--? response\par        3/14/18: Vl=612; BMs: # 5, 1-2x/D; Hgb=11, ESR=18, CRP=11,Irom=45,Khbxackk=050,Alb=3.6, INR=1.5\par        3/19/18: s/p Entyvio\par        3/22/18: iu=861, BMs: # 5, 3-4 x/d\par        4/16/18: s/p Entyvio,Hgb=11.9, INR=1.3, ESR=18, CRP=8.4 \par        4/18/18 EGD: gastritis, no HP, no IM, 2+ Mucous, 0.5cm gastric polyp: fundic, 4cm HH, + Barretts:3cm, no dysplasia\par        4/25/18: Hgb=11.5, INR=1.6, Iron=40, Sat=13%, Ferritin=57, Alb=3.2, Phos=2.2, Mag=1.7\par        4/30/18: s/p Iron Infusion\par        5/14/18: s/p Entyvio \par        5/23/18: Hgb=11.5, ESR=16, CRP< 5\par        5/29/18: s/p Iron Infusion\par        6/6/18: Hgb=10.6, INR=1.7, Dqqhlfhx=631, Alb=3.5, Phos=2.1, J55=807, xh=502; BMs: # 5, 2-3x/D \par        6/19/18: Hgb=10.6, INR=1, CRP=15, ESR=23\par        6/21/18: R ankle is acting up, swollen, pain, having MRI done, took a couple of advil, on low carbs ,BMs: # 5, 1-2x/D, kk=748\par        6/26/18: MRI: fx/stress rxn-talar body/navicula; stress related changes--cuneform, cuboid,distal tibia; mod tibiotalar effusion, mild-mod peroneal tendinosis\par        7/19/18: entyvio 6/26/18, eliminated all carbs--anti inflammatory diet, qo=338, BMs: # 4-5, 1-3x/D \par        7/25/18: Hgb=10, INR=1.3, Alb=3.3, Phos=2.4, Vzukzxin=719, sat%=12, Iron=35\par        8/2/18: BD--osteoporosis of hip/spine: sig decrease BD--17.6% of hip, 17% of spine, osteopenia of wrist: 6.4%\par        8/7/18: Entyvio\par        8/21/18: Hgb=11.1, INR=1.5, ESR=19, CRP=18.6\par        8/23/18: recently w tooth infection, old implant--loose and removed; rx w amox, and advil\par        eating almost no carbs, sq=929, # 5-6, 2-3x/d \par        8/24/18: Stool fat--neg, Lkkrzekalqea=785\par        9/5/18: Hgb=11.4, INR=1.3, ESR=9, CRP=11.3, Iron=41, Oysmtygj=376, Alb=3.8,\par        9/17/18: entyvio, Hgb=10.7, INR=2.2, ESR=17, CRP=9.1, \par        9/20/18: ji=888, BMs: # 5-6, 1-2x/D \par        9/21/18: Phos=2.4, Ca=8.7, Alb=3.4, Vit D=27, OQL=119, \par        Dr. Akers: Hyperparathyroidism: probably secondary due to low Vit D/Ca & ? superimposed primary, rx replete Vit D and Calcium\par        10/9/18: Hgb=11.6, MCV=94, ESR=14, CRP=5.4, INR=2.2\par        10/10/18 MRE: stricturing of neoterminal ileum w worsened upstream dilatation, moderate length of upstream ileum w stricturing extending at least 20cm and more extensive then previous. suggestion of 2 adj extraluminal fluid collections which may be w/i the wall of strictured ileum near the ileocolonic anastomosis. surrounding spiculation and tethered appearance of bowel in this region.\par 10/16/18: entyvio, Hgb=11.7, MCV=94, ESR=14, CRP <5, Alb=3.5\par 10/18/18: Ll=243,   BMs: # 5-6, 3x/D , \par 11/13/18: Entyvio\par 11/21/18 CTE w C: limited 2/2 bowel underdistention, mucosal hyperenhancemt & extensive SM edema of distal ileum including vladislav-ileum, difficult to exclude a sml fluid collection, Liver w relative enlargement w suggestion of lobulation, enlargemt of PV,SMV,IMV,Spl V, rectal V. No ascites\par 11/28/18: Hgb=10, ESR=19, CRP=17,Alb=3.5,Iron=35, Sat%=11, Qfffrysk=425, INR=1.3\par 11/29/18: ic=437, BMs: # 5-6, 3-4x/D\par 12/3/18: Abd sono--Liver 14.8cm Incr heterogenicity, spleen--wnl\par 12/11/18 & 1/14/19: Entyvio\par 1/14/19:Entyvio,  Hgb=10.7, ESR=15, Alb=3.6, Iron=36, Ferritin=67, Sat%=11, INR=1.6\par 1/24/19:  fh=552, stools are # 4-6, 3-4x/d , no pain\par 2/5/19: Hgb=11.2, ESR=14, CRP=0.36\par 2/28/19: Hgb=11.5, ESR=12, CRP=6.5, Alb=3.7, Iron=69, Ajekffhw=883, Ca=8.9\par                Bms: # 4-5, 2-3x/D , hh=359,  Entyvio : last 2/1519,\par                had parathyroid scan pre-op, still w elev PTH, + activity in mediastinum,? ectopic parathyroid tissue vs neoplasm.  To see ENT and have Imaging at [facility]\par 3/28/19: Bms: # 4-5 , 3x/d ;nn=020\par 4/11/19: Hgb-9.7, Iron=45, ESR=18, CRP=9.4, Alb=3.5, \par Saw Endo SX at [facility], felt hyperparathyroid is secondary to Low Ca/Vit D, no sx at this time\par 4/16/19: BMs: # 5-6, 3-4x/D, ea=520,  Entyvio : last 3/1519,  got IV iron 4/12/19 for Ferritin=53\par 4/27/19: s/p R Hip Nailing--missed 4/2019 2/2 fresh wound\par 5/16/19 & 6/18/19 Entyvio\par 7/2/19: Hgb=11.7, Ferritin=41,ESR=12, CRP=5.5, B6=2.8,Mag=1.8,Phos=3.9,Zf=267,Zinc=61\par 7/11/19: s/p IV iron\par 7/11/19: Alb=3.7, HZT=650, Ca=9.1, Vit D=40/306, \par 7/24/19: Entyvio, Alb=3.8, GVL=044, Ca=8.9, Vit D=128 \par 8/1/19: Bms: # 5-6, occas #4, 2-3x/D , ji=543\par 8/15/19: Hgb=12, ESR=10, CRP=5.2, Ferritin=68, Alb=3.4, Phos=4.4,Mg=1.7, Ca=8.5,Calprotectin=88,\par 8/20/19: CFR=839, Ca=9, Alb=4.2\par 9/19/19: Hgb=12.8, ESR=9, CRP=5.5, Alb=3.7, Ifnmlfeu=575, Mag=1.8, Ca=8.9, Phos=4.2\par 9/26/19: kz=576, BMs: #5-6, 2-3x/D, no pain\par 10/17/19: rc=426, BMs: #4-6, 1-2x/D, no Pain; Hgb=14, ESR=7, CRP<5, Alb=3.9, Vfecmxqi=898, Mag=1.7, Ca=9.6\par 10/24/19: ll=169, Bms: # 4-6 , no pain,\par 11/6/19: ESR=6, CRP=6, PTH=80,Ca=9.1, VitD=50\par 11/14/19: kc=124, BMs: # : 4, 1-2, 0ccas #5\par  11/17/19: ESR=6, CRP<5, Iwadajzx=397, \par  12/19/19: do=852, BMs: #5-6, 2-3x/D\par 1/6/20: entyvio\par 1/13/20: ESR=7, CRP=0.2, Hgb=13.5, Agbzuwog=102, C78=877, FA=10, Alb=4.1, Ca=9.1\par 1/16/20: wt = 127, BMs: # 5, 1-3x/d\par 1/30/20: ESR=9, CRP=11, Hgb=13.9, Dtrqrgha=035,Iron=38, Alb=3.9,Ca=9.2, PTH=87,\par 2/3/20 EGD: gastritis, +MACKENZIE, No HP, +erosion w ooze -clipped, 0.5cm polyp-neg; 4cm HH, Esophagitis A, + Barretts, VC: 1+\par Colonoscopy: 05cm rectal polyp: HP, 2nd deg int hemorrhoids\par mild-mod activity: MARILY w cryptitis & mild archect distortion at ileocolonic anast: colon & ileal side, no stricture\par 2/4/20: Entyvio; 2/12/20: IV Iron, 3/3/20: Entyvio\par 2/25/20: cu=385,  BMs: # 4-5, 1-2x/ d\par 3/19/20: co=860, BMs: #4-5, 1-2x/D\par 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\par 10/17/20 : Hgb=13, CRP< 5, ESR= 11, Iron=44, Ferritin=46, Alb=4, Phos=2.3, \par 11/5/20: wm=417; s/p IV Iron x 2 ,  11/4 and 1 week prior;   BMs:  # 4-5, 1-2x/D , no pain\par 11/18/20  Entyvio + IV Ca\par  1/4/21: Hgb=13.6, CRP<5, ESR=9, Ferritin=60, Alb=4.2, Phos=2.7\par 1/11/21: Entyvio & IV Ca\par 1/14/21: no pain, stool more formed ,  bx=319 - 137; BMs:  # 4-5, 1-2x/D \par 2/8/21: Entyvio & IV Ca\par 2/23/21 IV Ca\par 2/22/21: Hgb=12.7, CRP<5, ESR=8, Guybxagcn=835, Phos=2.3, Mag=1.8, J53=292, Zinc=58, Ox=616\par 2/26/21: mo=323,  BMs:  # 4-5, 1-2x/D , no pain \par 3/8/21 & 4/8/21: Entyvio\par 3/9/21 & 3/24/21: IV Iron\par 4/5/21: Hgb=13, CRP<5, ESR=9, Ferritin=94, Phos=3.1, Mag=1.7,Ca=9.1/ Alb=4\par             \par Pt Ins co is refusing to cover Entyvio q 4wks, despite numerous attempts to appeal, they also refuse to set up a peer to peer discussion betw myself and another healthcare provider, even one that works for a third party.  They do acknowledge that there is literature supporting the successful use in individual cases who don’t respond to the q 8 wk interval and need\par less then q 8weeks , but state it had not in FDA approved at less then 8wks so they will not approve it.  I spoke to the ins co rep and discussed that fact that patients should not be  pigeon holed since practicing medicine is done on an individual basis.  Humans are not all the same.   Their  response didn’t change eventhough the pt clinically needed the medication and it  induced a beneficial clinical response towards remission.  Therefore the patient and I decided to slowly increase the interval since the insurance company will not pay for this benefit.  Hopefully he may be able to maintain his present clinical state.  If not we will return to q 4weeks and will again appeal using this patients real clinical data .\par \par 4/9/21:  xs=089,  no pain, stool more formed # 4, 1-2x/d \par 6/11/21: wt= 135,   no pain, stool more formed # 4, 1-2x/d \par              recently had an episode of abd distenstion, pain, N/V, no BM\par              eventually started having diarrhea and flatus, BMs returned to normal\par              lost 2-3 lbs but regained\par  Doesnt recall eating anything different, no fever, chills, brbpr, melena\par  entyvio was 6/3/21--which is almost 8 weeks, was supposed to be 5-6 weeks  to start\par               6/4/21 Hgb=12, CRP<5, Ferritin=32, pt to receive IV iron    \par  7/12/21 Labs: Hgb=13.6, ESR=10, CRP=<5, Yqytoaa=753, Alb=3.7, BUN=16\par \par  7/16/21 MRE: limited to incomplete bowel distention, chr distal ileitis/probable inflammatory stricturing vs collapse of the vladislav-TI--but improved since 2018 , moderate proctitis\par 7/20/21:  Last entyvio was --7/1, next early august\par                 aw=642 ,  no pain, stool more formed # 4-5/6, 1-2x/d \par 7/23/21 Entyvio level= 39, Abs <1.6\par 8/23/21: Labs--Hgb=13.6, ESR=10, CRP=6.6, Ejvnzuvi=917, Iron=26, Alb=3.9, BUN=16\par 8/26/21 p/w w N/V, abd pain/bloating  x 3 days, unable to keep things down\par Labs: WBC=5, Hgb=12, CRP=43, ESR=11, Alb=4.4, BUN=28, Creat=-1.6\par CT Abd/Pelvis: diffuse marked dilatation of SB Loops w transition pt in Rmid/lower abdomen\par ~ 5-6c, proximal to the ileocolonic anastomosis\par RX w IV solumedrol 20mg q8h, NGT, IVF, pt refused TPN, also w UTI from prostatitis\par 8/27 NGT was pulled, and clears started and advanced to LR,  was d/c home 8/30 on prednisone\par 40mg qd w taper by 10mg/wk--> to 20mg\par \par     \par  Today:  Feeling well, no c/o , CP, SOB/ AMARO, Cough, Wheeze, Palpitations, edema\par              Most recent labs  reviewed w patient:8/23/21\par Saw Dr. Heard rec steroid taper, colonoscopy w possible balloon dilatation and\par probable switch to Stelara in the near future \par \par  Last entyvios was --7/1, 8/5, 9/2\par         On prednisone 20mg x 5 days, tomorrow to 15mg qd \par        no pain, n/v,  stools # 4 qd \par         \par        tw=162 up from 128 \par        Abd pain--no \par        Nausea--no \par        Vomit--no \par        Early satiety-no \par        Belching-no \par        Regurgitation--no \par        Ht burn--no \par        Throat Clearing-no\par        Globus-no\par        Hoarseness--no \par        Dysphagia--no \par        BMs:  # 4., 1-2x d\par        Constipation--no \par        Diarrhea- intermittent:  no\par        Bloating--no \par        Flatulence-no\par        Gurgling--no \par        Melena--no \par        BRBPR--no \par        Anorexia-no \par        Wt Loss--stable\par \par \par \par        PRIOR HISTORY--2017\par \par        1/17/17: Hgb=9.2, ESR=6, CRP=5; 1/19/17: BMs: #4, 5-6, 2-3x/D, Qk=022, Started Creon 1 tid cc\par        3rd Entyvio begining Feb\par        2/14/17: Labs; Hgb=9.6, MCV=97, ESR=5, CRP< 5, P86=503, FA>23, Iron=59, Retic =3.2,\par        2/17/17 Fecal Calprotectin=16, Fats: Increased neutral & Split\par        3/13/17: Labs Hgb=9.5, MCV=95, ESR=7, CRP <5, ALB=3.1\par        3/16/17: lot of stress, to move -[place], had a job-fell thru, BMs: # 5, 2-4 x/D, wt= 131w clothes\par        4/19/17 EGD: gastritis, MACKENZIE, No HP, 2cm HH, +Barretts, No Dysplasia\par        Colonoscopy: Anastamosis: open w mild -mod active colitis, enteritis severe adj to anastomosis, mild more proximal, remainder of colon-wnl, 2-3 deg int hemorrhoids\par        4/26/17 : Hgb=7.3, MCV=95, ESR=13, CRP<5;Immediately post procedure stools very dark on eliquis/iron.\par        Admitted to [facility]: Hgb=8.3-->8.9 after 2 U, Alb 3.3, BUN=17, creat=1.3, Malina/Emufq=314/460\par        4/27/17: Alb=2.3, BUN=12, creat=1.2, Malina/Lipase=209/863-No abd pain, N/V; 5/5/17: Hgb=10.7.\par        5/8/17 Entyvio iv. 5/8-5/9 w dark red diarrhea; 5/10/17 Hgb=7.8.\par        5/11/17 Adm [facility] w stools brown, Hgb=7.9, BUN=17, Creat=1.4, Alb=2.9; S/P 2 Units- Hgb=10.6, BUN=15/1.1.\par        Prednisone 40mg qd, entocort d/dee.; 5/18/17: Hgb-10.4, to=059, BMs: #5, 1-2x/D, no pain\par        6/8/17: Hgb=7.4,MCV=98, CRP<5-ASA stopped, Pred20--> 30\par        6/10/17: Hgb=8.2, Alb=2.9, BUN=20/1.2 ; 6/12/17: Hgb=8.3, Retic=0.19, Iron=10, Sat%=3, Ferritin=57, FA=23,C91=108, 6/13/17: s/p 2 Unit PRBCs\par        6/15/17: started MCT oil, Ca=315 , BMs: # 5, 1-2x/D, stools are brownish/green \par        7/11/17: [facility] w unsteadiness. MRI Head: R Cortical Temporo-occipital encephalomalacia, MRA H&N-no sign lesions, PER-wnl.Hgb=9.9--> 8.4->9.7 after 1 Unit. Seen by Heme: received IV Iron \par        CRP<5, P93=153, FHP=140, FA>23,Iron=22,Sat%=6, Ferritin=42, Alb=3.5. D/C on warfarin 2mg\par        7/20 Hgb=8.5, 7/28 Hgb=7.7, 7/31-s/p 1 Unit \par        As outpt seeing Heme, to get IV Iron and 5 IV Copper\par        Had 'FLU' Fever=101, chills, bloat, N/V/D, Bilious. no pain, melena,brbpr\par        Given anti-emetic by dr Butler,then able to keep things down\par        On prednisone 25, warfarin w INR bet 1.6-2\par        8/17/17: Hgb=9.9, ESR=20, CRP-P,Kr=760, BMs: # 5, 1-2x/D, stools are brownish/green\par        10/2/17: Hgb=8.8, MCV=89,Phos=1.7, K=3.9, Mag=1.9, Alb=3.6,Iron<10,Ferritin=21, INR=2\par        10/17/17: Hgb=7.9,ESR=6,CRP<5, INR=1.6\par        10/25/17: Hgb=10, ESR=5, CRP=5, Alb=3.6, Phos=2, Bgvnmzjn=783, N82=637\par        11/16/17: Hgb=11.2, ESR=14, CRP=12, \par        11/13/17: MRE-Chr mild distension of distal & vladislav-ileum s/o mild stricturing at anast, mild mural thick & mucosal enhancemt ~ 20cm; little change from 2015 c/w mild acute on chr ileitis, 2.1 cm Liver hemangioma\par        11/28/17: Entyvio\par        11/29/17: Hgb=9.6, ESR=10, CRP=5.8, Alb=3.8,Ferritin-P, Iron=14,Sat=4%, Phos=2.5\par        12/19;17: Hgb=10.1, ESR=12, CRP=6.5; 12/21/17: BMs:#3-5, 1-3x/D , brown, no blood, no pain, xp=014\par        1/3/18:Hgb=11.7, ESR=19, CRP=6.5, Alb=4.1, Snbzvqww=130, Iron=31, Sat%=9,Zinc=55\par \par \par        PRIOR HISTORY---2013:\par \par        2/20/13 CT markedly dilated sb loops ext to anast, colonoscopy w open anast ,mild-mod remy-anastamotic disease. quick response to entocort/humira--probably adhesive dis. \par        8/10/13 w GNR bacteremia, ADA 1.7 /KAYLAH 3.4, Humira 8/7, 8/13,8/18,9/15\par        CT- mucosal thickening and spiculation of the distal sb extending to the anastamosis, thickening and stranding of adj mesenteric fat.\par        Humira increased to 40mg weekly, entocort 4\par        9/2013 MRE--dilated loops in mid and distal ileum, markedly thickened and narrowed TI w decreased peristalsis of TI\par        11/15/13 ADA-24.9, KAYLAH-0\par \par \par        PRIOR HISTORY---2014:\par \par        2/15/14--CT dilated loops SB, loop of sb in mid abd 4.3cm w infiltrative changes in the mesentery, bowel tapers in the RLQ to the anastamosis w/o transition\par        WBC=15K, Rx w IV steroids and Abx \par        3/7/14 SBFT: last 10cm sb prox to anast mild distension and sl irregularity. In the mid portion of this loop there is a mild narrowing which appears to reopen but is some what narrowed.\par        3/20/14 ADA=33.1, KAYLAH=0, Lialda switch to Apriso 4 (2/2014)\par \par \par        PRIOR HISTORY--2015\par \par        1/11/15 Adm [facility] w 1 day N,Recurrent V, Abd pain/distension. CT-multiple distended & fluid-filled sb loops, mild wall thickening of ileum w No inflamm changes.\par        WBC=14.6, Hgb=17, RX w NGT suction, Levaquin iv, Solumedrol 40mg q 12( 1/12-->1/16) to prednisone 30mg BID w 5mg taper/wk\par        3/18/15 --Dr. Butler w edema /High BP, prednisone was tapered slowly to 2.5mg \par        switched to entocort 9mg qd, edema and bp improved w lasix 20mg \par        BMs:#4-5, 3x/D, Hgb=11, ESR=4, CRP<5\par        4/28/15 - 5/1/15: Adm [facility] w 1 day Abd pain, distension,N/V. 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 side of anast, opening appeared ulcerated,scarred & severely narrowed\par        6/11/15: [facility] w N/V x1, decr appetite, CT ABD: no obstruction, dilated thickened sb loops of distal jej and ileum\par        6/19/15 [facility]: s/p Lap w extensive lysis of adhesions, hepatic flex, sigmoid and sb anastamosis, s/p partial r colectomy and sb resection--side to side elisabeth: 8-10 inch from prior anast to TV colon.\par        7/17/15: BMs:#4-6, 4-5x/D, wt 131(from 126)\par        8/20/15: BMs: #4, 1-2x/D or #5, 2-3x/D, sa=473, dry cough,Hgb=10, WBC=3, CWQ=923, CRP=56, ESR=21\par        8/25/15 CT Abd/Pelv: Svl RLQ sb loops w wall thickening, mild nodularity & inflamm stranding in mesentery\par        Advised-restart Entocort 9mg qd, Apriso 4 qd, Maintain Humira but given RX to check drug/Ab levels\par        9/15/15: did nt restart meds,Hgb=9.9, WBC=4, ESR=19, CRP=5.8, fh=139; [facility] : ADA=8.5, Antibodies< 1.7\par        10/15/15: No pain, BMs:#4, 1-2x/D, #5-6, 3-4x/D, throat clearing/cough-better, uy=072\par        11/30/15: No pain, BMs:# 4, 5-6 intermittently, No throat clear, gc=499\par \par \par        PRIOR HISTORY-2016\par \par        1/22/16; 4/8/16; 6/6/16 : No pain, BMs:# 4-5 1-2x/D, also #5-6 3x/D for 2-3D/wk, nc=931\par        7/14/16: zz=021, 9/22/16: jp=166.5\par        11/10/16: 9.5lb wt loss, states BMs: 5-6, 5x/D--Taking Magnesium, No pain/anorexia\par        Labs: Stool Oqxawftvxbpo=012, + Incr Fecal fat, Alb=3.4, FA =23, R15=294, Hgb=12, ESR=10, CRP <5\par        Magnesium-d/c, Obtain MRE asap--Start steroids and possibly switch to Entyvio\par        DDx discussed: active crohns--loss response to Humira, Malabsorption--loss Bile acids, Bile-induced diarrhea, SIBO\par        Pt wanted to wait for imaging-did not start rx\par        12/1//16 MRE: RUQ ileocolonic anastamosis--narrowed w upstream dilatation to 3.2 cm\par        Pt refused pred, Started Entocort 9mg qd and Flagyl 250mg qid about 7-10days ago \par        awaiting Entyvio load to start 12/22, then 1/5; had Cut back on iron bid\par        12/15/16: BMs:# 5, 2-3x/D, occas #6, No pain, Less bloat/flatulence, Dy=634.

## 2021-09-17 NOTE — HISTORY OF PRESENT ILLNESS
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  IV steroids make him feel great and off steroids, still symptomatic.   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 \par \moses Had flares in March and June of this year, managed at home, and then in August for which he was hospitalized. Typical flare symptoms are bloating, and diarrhea. The last episode he had vomiting and intolerance to PO intake. Noted to have partial SBO, managed conservatively with NG tube, IV fluids, Solu-Medrol with improvement, discharged on Prednisone 40 mg taper. he just finished his 5 days of Prednisone 20 mg today. Reports that he is back to his base line now.\par \par Last Colon 2/2022 - Reports it was better than previous ones. \par

## 2021-09-19 LAB
BASOPHILS # BLD AUTO: 0.03 K/UL
BASOPHILS NFR BLD AUTO: 0.3 %
CRP SERPL-MCNC: <3 MG/L
EOSINOPHIL # BLD AUTO: 0.09 K/UL
EOSINOPHIL NFR BLD AUTO: 1 %
ERYTHROCYTE [SEDIMENTATION RATE] IN BLOOD BY WESTERGREN METHOD: 2 MM/HR
FERRITIN SERPL-MCNC: 87 NG/ML
HCT VFR BLD CALC: 38.9 %
HGB BLD-MCNC: 12.1 G/DL
IMM GRANULOCYTES NFR BLD AUTO: 0.3 %
LYMPHOCYTES # BLD AUTO: 0.99 K/UL
LYMPHOCYTES NFR BLD AUTO: 11.1 %
MAN DIFF?: NORMAL
MCHC RBC-ENTMCNC: 30.8 PG
MCHC RBC-ENTMCNC: 31.1 GM/DL
MCV RBC AUTO: 99 FL
MONOCYTES # BLD AUTO: 0.5 K/UL
MONOCYTES NFR BLD AUTO: 5.6 %
NEUTROPHILS # BLD AUTO: 7.24 K/UL
NEUTROPHILS NFR BLD AUTO: 81.7 %
PLATELET # BLD AUTO: 250 K/UL
RBC # BLD: 3.93 M/UL
RBC # FLD: 19.3 %
WBC # FLD AUTO: 8.88 K/UL

## 2021-09-20 LAB — HAPTOGLOB SERPL-MCNC: 170 MG/DL

## 2021-09-22 ENCOUNTER — RESULT REVIEW (OUTPATIENT)
Age: 56
End: 2021-09-22

## 2021-09-24 ENCOUNTER — APPOINTMENT (OUTPATIENT)
Dept: ENDOCRINOLOGY | Facility: CLINIC | Age: 56
End: 2021-09-24
Payer: COMMERCIAL

## 2021-09-24 VITALS
OXYGEN SATURATION: 99 % | WEIGHT: 133 LBS | DIASTOLIC BLOOD PRESSURE: 60 MMHG | BODY MASS INDEX: 19.7 KG/M2 | HEIGHT: 69 IN | HEART RATE: 93 BPM | SYSTOLIC BLOOD PRESSURE: 110 MMHG

## 2021-09-24 PROCEDURE — 99215 OFFICE O/P EST HI 40 MIN: CPT

## 2021-09-25 ENCOUNTER — RESULT REVIEW (OUTPATIENT)
Age: 56
End: 2021-09-25

## 2021-09-30 NOTE — HISTORY OF PRESENT ILLNESS
[de-identified] : Patient is a 52 year old who is referred for initial consultation for anemia secondary to Crohns/GI bleed vs Iron Deficiency/ Vit b12 def. 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 Patient is status post ileo-colonic resections for SBO  in 2016. In 2016 patient had complaints of 10 - 15 lb weight loss. Labs at that time showed Hgb of 12, steatorrhea and stool calprotectin = 236. In 2016 MRI/MRE with narrowing at ileocolonic anastomosis with upstream dilation. Pt refused bridge with  prednisone and Entocort / Flagyl. In 2017 patient Hgb dropped to 9.5. On 2017 patient underwent an EGD and Colonoscopy. Findings consistent with Page's and no dysplasia or AVMs. Colonoscopy showed an open anastomosis but active disease, moderate on the colonic side and limited to the anastomosis and moderate to severe enteritis, just proximal to the anastomosis. Pat had routine labs on 05/10/2017 Hgb: 8.3.  [FreeTextEntry1] : s/p injectafer, copper\par warfarin [de-identified] : Patient presents for follow up of  anemia, DVT, MTHFR gene mutation follow up, currently on Coumadin 3mg- takes 0.5mg once a week for lower lab values with INR level that takes with home machines. Patient had 2 recent iron infusions, venofer.  Patient will have a MRI on 7/16/21.  [0 - No Distress] : Distress Level: 0

## 2021-09-30 NOTE — REVIEW OF SYSTEMS
[Fatigue] : fatigue [Eye Pain] : no eye pain [Vision Problems] : no vision problems [Dysphagia] : no dysphagia [Hoarseness] : no hoarseness [Chest Pain] : no chest pain [Lower Ext Edema] : no lower extremity edema [Shortness Of Breath] : no shortness of breath [Cough] : no cough [Vomiting] : no vomiting [Constipation] : no constipation [Diarrhea] : no diarrhea [Dysuria] : no dysuria [Joint Pain] : no joint pain [Skin Rash] : no skin rash [Dizziness] : no dizziness [Insomnia] : no insomnia [Anxiety] : no anxiety [Depression] : no depression [Muscle Weakness] : no muscle weakness [Easy Bleeding] : no tendency for easy bleeding [Easy Bruising] : a tendency for easy bruising [Negative] : Allergic/Immunologic [de-identified] : pt is on coumadin

## 2021-09-30 NOTE — CONSULT LETTER
[Dear  ___] : Dear  [unfilled], [Consult Letter:] : I had the pleasure of evaluating your patient, [unfilled]. [Please see my note below.] : Please see my note below. [Consult Closing:] : Thank you very much for allowing me to participate in the care of this patient.  If you have any questions, please do not hesitate to contact me. [Sincerely,] : Sincerely, [FreeTextEntry3] : Angie Biswas MD\par [facility] [place] [facility]\par  [DrMeron  ___] : Dr. REARDON

## 2021-09-30 NOTE — ASSESSMENT
[FreeTextEntry1] : 1.  Anemia- Hgb stable\par  -blood loss, anemia of chronic disease with need for intermittent iron  \par - copper deficiency - replaced, level WNL 5/2020\par - 11/2020- Ferritin 141, last IV iron November 4 2020- Venofer 400 mg x 2- March 2021, A ug 2021\par - ferritin 111 7/2021\par \par 2. 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 Hypogammaglobulinemia- no increased infection rate \par - s/p  Prevnar 13 12/2018 \par -  Pneumococcal vaccine 23 boost \par - s/p  Shingrex\par -  COVID vaccine \par \par 5. Zinc deficiency\par - oral Zinc, level better - 72\par \par Dr. Luis Felipe Monsalve\par cell [phone]\par office [phone]\par \par Labs next visit- PT, INR, irons\par \par case and mgmt discussed with Dr. Claudio\par \par \par \par \par

## 2021-10-04 ENCOUNTER — APPOINTMENT (OUTPATIENT)
Dept: HEMATOLOGY ONCOLOGY | Facility: CLINIC | Age: 56
End: 2021-10-04
Payer: COMMERCIAL

## 2021-10-04 ENCOUNTER — RESULT REVIEW (OUTPATIENT)
Age: 56
End: 2021-10-04

## 2021-10-04 VITALS
HEART RATE: 78 BPM | HEIGHT: 69 IN | WEIGHT: 138.6 LBS | SYSTOLIC BLOOD PRESSURE: 125 MMHG | BODY MASS INDEX: 20.53 KG/M2 | OXYGEN SATURATION: 98 % | TEMPERATURE: 98.2 F | DIASTOLIC BLOOD PRESSURE: 81 MMHG | RESPIRATION RATE: 16 BRPM

## 2021-10-04 PROCEDURE — 99214 OFFICE O/P EST MOD 30 MIN: CPT

## 2021-10-04 RX ORDER — PREDNISONE 5 MG/1
5 TABLET ORAL
Qty: 50 | Refills: 0 | Status: COMPLETED | COMMUNITY
Start: 2021-09-16 | End: 2021-10-03

## 2021-10-04 NOTE — CONSULT LETTER
[Dear  ___] : Dear  [unfilled], [Consult Letter:] : I had the pleasure of evaluating your patient, [unfilled]. [Please see my note below.] : Please see my note below. [Consult Closing:] : Thank you very much for allowing me to participate in the care of this patient.  If you have any questions, please do not hesitate to contact me. [Sincerely,] : Sincerely, [DrMeron  ___] : Dr. REARDON [FreeTextEntry3] : Angie Biswas MD\par [facility] [place] [facility]\par

## 2021-10-04 NOTE — REVIEW OF SYSTEMS
[Negative] : Allergic/Immunologic [Fatigue] : no fatigue [Eye Pain] : no eye pain [Vision Problems] : no vision problems [Dysphagia] : no dysphagia [Hoarseness] : no hoarseness [Chest Pain] : no chest pain [Lower Ext Edema] : no lower extremity edema [Shortness Of Breath] : no shortness of breath [Cough] : no cough [Vomiting] : no vomiting [Constipation] : no constipation [Diarrhea] : no diarrhea [Dysuria] : no dysuria [Joint Pain] : no joint pain [Skin Rash] : no skin rash [Dizziness] : no dizziness [Insomnia] : no insomnia [Anxiety] : no anxiety [Depression] : no depression [Muscle Weakness] : no muscle weakness [Easy Bleeding] : no tendency for easy bleeding [Easy Bruising] : no tendency for easy bruising

## 2021-10-04 NOTE — ASSESSMENT
[FreeTextEntry1] : 1.  Anemia- Hgb stable\par  -blood loss, anemia of chronic disease with need for intermittent iron  \par - copper deficiency - replaced, level WNL 5/2020\par -November 4 2020- Venofer 400 mg x 2- March 2021, Aug 2021\par - hospitalized with flare up of colitis, got one iv iron in the hospital\par \par 2. 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 Hypogammaglobulinemia- no increased infection rate \par - s/p  Prevnar 13 12/2018 \par -  Pneumococcal vaccine 23 boost \par - s/p  Shingrex\par -  COVID vaccine - Pfizer x 3\par - FLu shot \par \par 5. Zinc deficiency\par - oral Zinc, level better - 72\par \par Dr. Luis Felipe Monsalve\par cell [phone]\par office [phone]\par \par Labs next visit- PT, INR, irons\par \par \par \par \par \par

## 2021-10-04 NOTE — HISTORY OF PRESENT ILLNESS
[0 - No Distress] : Distress Level: 0 [de-identified] : Patient is a 52 year old who is referred for initial consultation for anemia secondary to Crohns/GI bleed vs Iron Deficiency/ Vit b12 def. 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 Patient is status post ileo-colonic resections for SBO  in 2016. In 2016 patient had complaints of 10 - 15 lb weight loss. Labs at that time showed Hgb of 12, steatorrhea and stool calprotectin = 236. In 2016 MRI/MRE with narrowing at ileocolonic anastomosis with upstream dilation. Pt refused bridge with  prednisone and Entocort / Flagyl. In 2017 patient Hgb dropped to 9.5. On 2017 patient underwent an EGD and Colonoscopy. Findings consistent with Page's and no dysplasia or AVMs. Colonoscopy showed an open anastomosis but active disease, moderate on the colonic side and limited to the anastomosis and moderate to severe enteritis, just proximal to the anastomosis. Pat had routine labs on 05/10/2017 Hgb: 8.3.  [FreeTextEntry1] : s/p injectafer, copper\par warfarin [de-identified] : Patient presents for follow up of DVT, anemia, MTHFR Gene mutation, colitis,\par Patient reports recent hospital visit for flare up of Crohn's back in September. Scan of the stomach done. Treated with solu medrol and prednisone. Last dose of prednisone was yesterday.\par Appetite is fine, no nausea or vomiting, no abdominal pain or change in stool pattern. \par Patient denies any unusual bleeds or bruising. \par Denies numbness or tingling of the extremities. \par \par

## 2021-10-14 ENCOUNTER — NON-APPOINTMENT (OUTPATIENT)
Age: 56
End: 2021-10-14

## 2021-11-03 ENCOUNTER — APPOINTMENT (OUTPATIENT)
Dept: GASTROENTEROLOGY | Facility: CLINIC | Age: 56
End: 2021-11-03
Payer: COMMERCIAL

## 2021-11-03 PROCEDURE — 99214 OFFICE O/P EST MOD 30 MIN: CPT | Mod: 95

## 2021-11-03 RX ORDER — VEDOLIZUMAB 300 MG/5ML
300 INJECTION, POWDER, LYOPHILIZED, FOR SOLUTION INTRAVENOUS
Refills: 0 | Status: DISCONTINUED | COMMUNITY
End: 2021-11-03

## 2021-11-03 RX ORDER — VEDOLIZUMAB 300 MG/5ML
300 INJECTION, POWDER, LYOPHILIZED, FOR SOLUTION INTRAVENOUS
Qty: 1 | Refills: 5 | Status: DISCONTINUED | COMMUNITY
Start: 2021-09-23 | End: 2021-11-03

## 2021-11-03 NOTE — REASON FOR VISIT
[Follow-Up: _____] : a [unfilled] follow-up visit [Home] : at home, [unfilled] , at the time of the visit. [Medical Office: ([facility])___] : at the medical office located in  [Verbal consent obtained from patient] : the patient, [unfilled]

## 2021-11-05 ENCOUNTER — APPOINTMENT (OUTPATIENT)
Dept: PODIATRY | Facility: CLINIC | Age: 56
End: 2021-11-05
Payer: COMMERCIAL

## 2021-11-05 VITALS — BODY MASS INDEX: 20.44 KG/M2 | WEIGHT: 138 LBS | HEIGHT: 69 IN

## 2021-11-05 PROCEDURE — 99203 OFFICE O/P NEW LOW 30 MIN: CPT

## 2021-11-05 NOTE — CONSULT LETTER
[Dear  ___] : Dear  [unfilled], [Please see my note below.] : Please see my note below. [Sincerely,] : Sincerely, [Courtesy Letter:] : I had the pleasure of seeing your patient, [unfilled], in my office today. [FreeTextEntry3] : Ricky Heard MD\par Associate Professor of Medicine\par Director IBD Program\par [facility]\par

## 2021-11-05 NOTE — HISTORY OF PRESENT ILLNESS
[FreeTextEntry1] : Location: hallux nails both feet\par Duration:a few weeks\par Acute:yes\par Chronic:no\par Past Tx:none\par Exacerbated by: pressure, getting better\par Prior Hx:no\par

## 2021-11-05 NOTE — PHYSICAL EXAM
[General Appearance - Alert] : alert [General Appearance - In No Acute Distress] : in no acute distress [Varicose Veins Of Lower Extremities] : bilaterally [Ankle Swelling On The Left] : moderate [No Joint Swelling] : no joint swelling [Pes Cavus] : pes cavus deformity [] : normal strength/tone [Normal Foot/Ankle] : Both lower extremities were exposed and visualized. Standing exam demonstrates normal foot posture and alignment. Hindfoot exam shows no hindfoot valgus or varus [Sensation] : the sensory exam was normal to light touch and pinprick [No Focal Deficits] : no focal deficits [Deep Tendon Reflexes (DTR)] : deep tendon reflexes were 2+ and symmetric [Motor Exam] : the motor exam was normal [Oriented To Time, Place, And Person] : oriented to person, place, and time [Impaired Insight] : insight and judgment were intact [Affect] : the affect was normal [FreeTextEntry3] : Vascular exam reveals palpable pedal pulses, the foot is warm to touch, there was good capillary fill time, the skin is normal in appearance there is no evidence of vascular disease or compromise at this time [FreeTextEntry1] : subungual hematoma hallux nails both

## 2021-11-05 NOTE — PROCEDURE
[FreeTextEntry1] : Based on my physical examination and my clinical findings and the patient's description of the symptoms, a complete differential diagnosis was reviewed with the patient. Possible diagnoses as well as treatment options explained in great detail. All questions asked and answered appropriately.\par \par I had a lengthy discussion with the patient regarding the way they should be cutting a nail in an effort to help prevent recurrence of this problem. I also revised self treatment should not be attempted and should there be any redness or pain on the side of the nail rather than treating it themselves they should call the office to make a follow up.\par A complete and thorough evaluation of the type of shoes they should be wearing and type of shoes for this time of year was discussed with patient.\par \par \par follow up prn\par

## 2021-11-05 NOTE — ASSESSMENT
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  ?stricture at the anastomosis. Now s/p 1st loading dose of Stelara, being managed by Dr. Lugo. Feeling great with weight gain and control of BMs, likely 2/2 steroid exposure since August and low fiber diet. \par \par # Ileocolonic CD s/p resection x2 with recurrence \par Patient likely had loss of response to VDZ, now on Stelara \par Discussed the patient in IBD conference, reviewed previous colonoscopy, and recent imaging; consensus that due to malnutrition and steroid dependency, surgery is next best option however he reports feeling great now and wants to pursue medical treatment which is reasonable given he feels well \par Will repeat imaging in mid-January after 3 months of Stelara, and then consider referral to surgery vs improved candidacy for endoscopic manipulation (currently presumed one long stricture). \par \par Remain off of steroids given feeling well \par Cont low fiber diet and avoiding caffeine \par  \par RTC post-MRE

## 2021-11-05 NOTE — HISTORY OF PRESENT ILLNESS
[Inflammatory Bowel Disease] : inflammatory bowel disease [FreeTextEntry1] : 57 y/o Male with Ileocolonic CD (diagnosed in 1998) s/p IC resection x 2 (1998 and 2015), initially managed with ADA (d/c due to loss of response), cannot use Steroids secondary to Osteoporosis, transitioned to VDZ since 2018 (initially q8 weeks and then changed to q4 weeks, last infusion 9/2), now on Stelara x first loading dose last week, came in for second opinion in Sept. Case discussed at IBD Conf with consensus for surgery. Recent hospitalization due to partial SBO.  IV steroids make him feel great and off steroids, still symptomatic. However last 2 weeks he's off steroids feeling well. Has gained ~ 5-8 lbs on low fiber diet and feels much better.  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 \par \par Since last visit, pt reports he's actually felt much better. Best he's "ever felt since surgery." Reports weight above 140 lbs, on low fiber diet, and avoiding caffeine. Reports symptoms have been improving since hospitalization in August and steroids. Only recently received UST dose 1 week ago, and is looking forward to his next injection. \par \par HPI: Had flares in March and June of this year, managed at home, and then in August for which he was hospitalized. Typical flare symptoms are bloating, and diarrhea. The last episode he had vomiting and intolerance to PO intake. Noted to have partial SBO, managed conservatively with NG tube, IV fluids, Solu-Medrol with improvement, discharged on Prednisone 40 mg taper. He's been off Prednisone for at least 2 weeks. Reports that he is back to his base line now, feeling overall great with weight gain, 1-2 BMs per day, and no pain. \par \par Last Colon 2/2022 - Reports it was better than previous ones. \par

## 2021-11-06 ENCOUNTER — APPOINTMENT (OUTPATIENT)
Dept: PODIATRY | Facility: CLINIC | Age: 56
End: 2021-11-06

## 2021-11-15 ENCOUNTER — RESULT REVIEW (OUTPATIENT)
Age: 56
End: 2021-11-15

## 2021-11-18 ENCOUNTER — NON-APPOINTMENT (OUTPATIENT)
Age: 56
End: 2021-11-18

## 2021-11-18 ENCOUNTER — APPOINTMENT (OUTPATIENT)
Dept: HEMATOLOGY ONCOLOGY | Facility: CLINIC | Age: 56
End: 2021-11-18

## 2021-11-22 ENCOUNTER — APPOINTMENT (OUTPATIENT)
Dept: GASTROENTEROLOGY | Facility: CLINIC | Age: 56
End: 2021-11-22
Payer: COMMERCIAL

## 2021-11-22 VITALS
DIASTOLIC BLOOD PRESSURE: 80 MMHG | HEART RATE: 74 BPM | SYSTOLIC BLOOD PRESSURE: 124 MMHG | WEIGHT: 139 LBS | BODY MASS INDEX: 20.59 KG/M2 | HEIGHT: 69 IN

## 2021-11-22 PROCEDURE — 99215 OFFICE O/P EST HI 40 MIN: CPT

## 2021-11-22 NOTE — ASSESSMENT
[FreeTextEntry1] : 1. CROHNS DISEASE   of both small and large intestine: s/p flare 8/25-->8/30/21\par    s/p ileocolonic resection x 2--last 6/19/15 for recurrent sbos: \par prior was s/p 4 SBOs w/i 2 yrs--2/2 distal sb disease adj to anastamosis\par when on Humira weekly had an  ADA =33 (3/20/14), -but had sbo 2/2014 at ADA=25 and another sbo 4/28/15\par 6/10/2015 Colonoscopy: Inflamm ST mass on ileal side w ulceration/scarred/narrow\par 6/11/2015 CT: dilated thickened sb loops distal jej & ileum\par Post-op 6/2015 probably some malabsorption 2/2 to removal of R Colon and ileum: \par had WT. Loss-->r/o malabsorption:  ?bile-induced->-secretory vs decr micelles, active dis, PLE\par ?  SIBO--Elev FA, Alb=3.4-->3.1-->2.9--> (7/28/17)\par ?SIBO/Prevent post-op recurrence --> trial Flagyl 250 mg qid~12/7/16-->d/c: 5/11/17\par Also discussed trial of Cholestyramine/Xifaxin -wanted to wait\par 11/20/16 ACTIVE Crohn's-->  9.5lb wt loss, BMs: #5-6, 5x/D-- but taking Magnesium \par 12/1/16 MRE: RUQ ileocolonic anastamosis--narrowed w upstream dilatation to 3.2 cm\par Labs: Stool Ouznvmsnmhzr=415, + Incr Fecal fat, Alb=3.4, FA =23, F10=687, Hgb=12.3,ESR=10,CRP <5\par 4/19/17 :Colonoscopy: Anastamosis: open w mild -mod active colitis, enteritis severe adj to anastomosis, mild more proximal, remainder of colon=wnl\par 5/11/17- Adm PMHC w stools brown, but Hgb=7.9, BUN=17, Creat=1.4, Alb=2.9.\par S/P 2 Units w Hgb=10.6, BUN=15/1.1, Prednisone 40mg taper, entocort d/dee\par 6/8/17: Hgb=7.4, MCV=98, CRP<5--ASA stopped, Pred20--> 30mg, 6/13/17: s/p 2 Unit PRBCs\par 7/11/17 [facility] w unsteadiness. MRI Head: R Cortical Temporo-occipital encephalomalacia\par Hgb=9.9--> 8.4->9.7 after 1 Unit. Seen by Heme: received IV Iron \par CRP<5, P90=956, VIH=250, FA>23,Iron=22,Sat%=6, Ferritin=42, Alb=3.5. D/C on warfarin 2mg\par 7/28/17 Hgb=7.7; 7/31/17-s/p 1 Unit \par Heme Consultation ~ 8/2017: started  IV Infusions of  Iron and  IV Copper\par 8/17/17: Hgb=9.9, ESR=20, Xj=617--Lncjcayhdr s/p GI infection w N/V/D, no obstruction\par 10/17/17: Hgb=7.9,ESR=6,CRP<5, INR=1.6, Got IV Iron x 2, since 10/2/17 for Iron<10, Hgb=8.8\par 11/16/17: Hgb=11.2, ESR=14, CRP=12, 10/26/17 Stool fat-neg, calprotectin-30\par 11/13/17: MRE-Chr mild distension of distal & vladislav-ileum s/o mild stricturing at anast, mild mural thick & mucosal enhancemt ~ 20cm; little change from 2015 c/w mild acute on chr ileitis, 2.1 cm Liver hemangioma\par 10/9/18: Hgb=11.6, MCV=94, ESR=14, CRP=5.4, INR=2.2\par 10/10/18 MRE: stricturing of neoterminal ileum w worsened upstream dilatation, moderate length of upstream ileum w stricturing extending at least 20cm and more extensive then previous. suggestion of 2 adj extraluminal fluid collections which may be w/i the wall of strictured ileum near the ileocolonic anastamosis\par pt had declined to call numbers given for  [facility] or investigational rx's--biologics.  \par later he felt  he was  stablizing w his  wt loss, and inflammatory markers\par \par 2/28/19 w ESR=12, CRP=6.5 & 2/21/19 stool calprotectin--> 232\par 8/15/19: ESR=10, CRP=5.2, Calprotectin--> 88\par 9/19/19: ESR=9, CRP=5.5\par 10/17/19: ESR=7, CRP< 5\par 11/6/19 : ESR=6, CRP=0.18\par 11/27/19: ESR=6, CRP <5\par 1/13/20: ESR=7, CRP=0.2\par 1/30/20: ESR=9, CRP=11\par \par 2/3/20 EGD: gastritis, +MACKENZIE, No HP, +erosion w ooze -clipped, 0.5cm polyp-neg; 4cm HH, Esophagitis A, + Barretts, VC: 1+\par Colonoscopy: 05cm rectal polyp: HP, 2nd deg int hemorrhoids\par mild-mod activity: MARILY w cryptitis & mild archect distortion at ileocolonic anast: colon & ileal side, no stricture\par \par 3/2/20:ESR=7, CRP=0.26\par 3/9/20: VDZ>40, Ab to VDZ <1.6\par 3/17/20: ESR=6, CRP=6.4\par 10/17/20: ESR=11, CRP <5\par 1/4/21:ESR=9, CRP<5\par 2/22/21: ESR=8, CRP<5\par 4/5/21: ESR=9, CRP<5\par 6/4/21: ESR=9, CRP<5  \par 6/11/21: wt= 135,   no pain, stool more formed # 4, 1-2x/d \par              s/p episode --abd distenstion, pain, N/V, no BM\par              eventually started having diarrhea and flatus, BMs returned to normal\par              lost 2-3 lbs but regained\par 7/4/21: CRP <5, Hgb=12\par  7/16/21 MRE: limited to incomplete bowel distention, chr distal ileitis/probable inflammatory stricturing vs collapse of the vladislav-TI--but improved since 2018 , moderate proctitis\par 7/12/21   --  ? flare --> entyvio should have been  q 5 wk , went q 8 wks\par                      next dose should be in 4 weeks x 2 then 5 weeks, to check levels\par 7/20/21: Cliically stable, unclear if MRE accurate 2/2 underdistension, but gained wt and CRP--normal\par 7/23/21 Entyvio level= 39, Abs <1.6\par 8/23/21: Labs--Hgb=13.6, ESR=10, CRP=6.6, Wpewxbkp=298, Iron=26, Alb=3.9, BUN=16\par 8/26/21 p/w sbo  w N/V, abd pain/bloating  x 3 days, unable to keep things down\par Labs: WBC=5, Hgb=12, CRP=43, ESR=11, Alb=4.4, BUN=28, Creat=-1.6\par CT Abd/Pelvis: diffuse marked dilatation of SB Loops w transition pt in Rmid/lower abdomen\par ~ 5-6cm, proximal to the ileocolonic anastomosis\par RX w IV solumedrol 20mg q8h, NGT, IVF, pt refused TPN, also w UTI from prostatitis\par 8/27 NGT was pulled, and clears started and advanced to LR,  was d/c home 8/30 on prednisone\par 40mg qd w taper by 10mg/wk to '20mg qd \par \par **Entyvio :time 0=12/22/16 ( Humira 40mg QW); 1/5/17--2wks, s/p 3rd infusion at wk 6, then q 6weeks \par #6 :6/21/17-->held 2/2 Shingles, then  Infusions as follows=9/19/17 , 10/17/17, 11/28/17, 1/16/18 ,2/16/18, 3/19/18,4/16/18, 5/14/18, 6/25/18, 8/7/18, 9/17/18, 10/16/18, 11/13/18, 12/11/18, 1/14/19, 2/15/19, 3/15/19, 5/16/19, 6/18/19,7/24/19, 9/9/19, 10/2/19, 11/4/19, 12/12/19, 1/6/20, 2/4/20, 3/3/20, 9/17/20, 10/15/20, 11/18/20, 1/11/21, 2/8/21, 3/8/21, 4/8/21, 6/3/21, 7/1/21, 8/2/21, 9/2/21,10/25/21   _______\par \par A / PLAN:  s/p sbo prox to anastamosis\par                  inflammatory vs fibrosis vs combination\par      Responded to  steroids iv--> po--d/c ~ 10/20/21\par      tapered steroids from 40mg qd  to 20mg, then  15mg qd and  taper 5mg/wk\par      Entyvio level was 39 w/o Abs~ 3weeks after 7/1/21, \par      speaks to inflammatory component & probably loss of response\par      Stelara # 1 dose on 10/25/21\par       IBD consensus due to malnutrition /steroid dependency, surgery is next best option \par      pt reports feeling well and wants to pursue medical treatment \par      repeat imaging in mid-January after 3 months of Stelara, then consider surgery vs improved candidacy for      endoscopic manipulation (currently presumed one long stricture).\par        will f/u w  IBD consultant Dr. Heard at  imaging after 3 months of Stelera\par        for  Colonoscopy ( w possible balloon dilatation )-> last 1 3/4 yrs ago\par \par Probiotics 3 qd \par Diet:  LR, Lactose free, protein drinks tid\par MCT oil begun but never maintained \par **trend --cbc, esr, crp, albumin\par   will check  stool calprotectin\par **monitor wt--- usu bet 136-139, 7/20/21=136, bioce=069\par \par \par                                                                                                                                                                                                                                                                                                                                                                                                                                                                                                                                                                                                                                                                                                                                \par 2. Iron deficiency anemia : \par  had initially dropped , after clinical flare and post procedure bleeding\par Probably multifactorial: ACD, Blood loss, malabsorption/nutrient deficiencies\par Eliquis-->warfarin after CVA, On Entyvio \par 7/11/17 s/p  Adm for unsteady gait w MRI c/w CVA--warfarin begun: possible better control of AC\par Chromagen 2 qd--> IV Iron , s/p IV Cu x 5, FA 1mg qd , B12 SL & IM\par Still requiring IV Iron and cu infusions prn \par most recent IV Iron  :  10/18 and 10.25/21  for Ferritin=38\par 2/22/21: X83=268, \par 6/4/21: Cu=91, Zinc=69, Ferritin=32,Iron=43, \par 7/12/21: Lt=869, Zinc=74, Ivpnpwux=136, Iron=35\par 11/15/21 : Hgb=12,  Ferritin =104, Ca=9, Mg=1.7\par \par B12 1cc IM ____R. delt--     given today, \par trend cbc, B12, FA, Iron panel \par Adj INR--closer to low 2's.    \par \par \par \par \par 3. Osteoporosis \par : Progression on BD from 5/5/16\par Crohns, h/o steroid use\par Calcium citrate & Vit D per Endo\par Forteo since 5/10/19 , then  Reclast          \par Repeat BD of 8/2018 --showed worsening to Osteoporosis from 2016\par Rec Endo consult w Dr. Akers :Osteoporosis center at [facility]--pt never went originally \par 9/21/18: Phos=2.4, Ca=8.7, Alb=3.4, Vit D=27, RSO=316, \par 10/16/18: Phos=2.8, Ca=8.7, Alb=3.5,\par 1/14/19: Phos=2.6,  Ca=8.7, Alb=3.6\par 2/28/19: Phos=1.8, Ca=8.9, Alb=3.7, VitD=39, AVC=102\par 3/18/19: Ca=8.5, Alb=3.7, Vit D=44.6, PTH=93\par 7/11/19: Ca=9.1, Alb=3.7, Phos=3.9, Vit D=40/ 306, FLL=401\par 8/15/19: Ca=9, Alb=4.2, Phos=4.4, VitD=59, JJP=898\par 10/17/19: Ca=9.6, Alb=3.9, Phos=3.5\par 11/6/19: Ca=9.1, Alb=3.9. Phos=3.7, VitD=50, PTH=80\par 1/13/20: ca=9.1\par 1/30/20: Ca=9.2, Alb=3.9, Phos=2.7, Mag=1.5, Vit D=103/41, PTH=87\par 2/18/20:ca=8.5, Alb=3.6, \par 3/2/20: Ca=8.5, Alb=3.6\par 3/17/20: Ca=9.1, Alb=3.7, Phos=3.4, Mag=1.7\par 10/17/20: Ca=8.9, Alb=4, Phos=2.3, Mag-2.0, Vit D=66, PTH=47\par 1/4/21 : Ca=9.4, Alb=4.2, Phos=2.7, Mag=2, Vit D=64, PTH=87.5\par 4/5/21: Ca=9.1, Alb=4, Phos=3.1, Mag=1.7, \par 6/4/21: ca=9, Alb=3.8, Phos=2.8, Mag=1.8\par 7/12/21: Ca=8.6, ALB=6, phosph=2.3, Mag=1.8\par 11/15/21: Ca=9, Alb=3.7, Mag=1.7\par \par Dr. Akers: Hyperparathyroidism-- probably secondary due to low Vit D/Ca & ? superimposed primary, \par [facility] ENT Consult init felt  Parathyroid scan showing activity in mediastinum is ectopic parathyroid, then felt sx not indicated at that time, elev PTH is secondary to low  Vit D/Ca\par Rx replete Vit D and current IV Calcium-as per endo \par trend Vit D, Ca, Phos, PTH,  \par \par BD--9/2020: incr in spine and hip , no change in wrist\par 10/5/20-s/p Reclast--repeat 9/2021 \par \par \par \par \par \par 4. GERD:  recently active by ENT , no ht burn,  dysphagia,  + throat clear\par               h/o  Dry cough, CXR:ana, saw ENT bergstein-->LPR\par * ++ LPR,  ++  Page’s w No Dysplasia,  + H/O  Esophagitis grade: A   was found \par \par Recommend: \par * Anti-reflux diet & life-style changes reviewed & re-emphasized.  ++  Bedge required\par * Weight reduction & regular exercise emphasized\par \par * need for  PPI:  Omep 40 BID ,  ++ H2B q HS:Zantac 300mg--> Pepcid 40mg\par No Carafate  1 gram:   --was emphasized\par I reviewed & summarized the prospective randomized & retrospective non-randomized studies\par looking at potential long term SE's of PPIs, w special attention to associations & actual cause\par as related to GI infections, bone loss, cognitive changes, KD, Covid, vitamin & electrolyte deficiencies\par questions were answered, pt advised that PPIs should be used when needed as indicated by their\par clinical indication and response and tapering off is always the goal if possible. pt understood.\par \par *  F/U  EGD:  2/2022--for Barretts screening / surveillance \par * No  need for pH Monitor,   Manometry,   Esophagram --\par *  ++  need for ENT  eval/F/U,  No  need for Surgical  eval  --  \par \par \par \par \par \par 5. Hemorrhoids:  well - controlled.  No pain,  swelling,  itch,  bleeding\par * Discussed previously the potential complications of thrombosis,  pain,  infection,  swelling, itching,  bleeding \par Reccomend: \par * currently Low   - Fiber Diet was emphasized\par * 6  --  8 cups of decaffeinated fluid daily was emphasized \par * Sitz Bathes prn,   No:  Anusol HC  Suppos / Cream  WI BID -- was needed\par * No:  Tucks BID,  Balneol Lotion,   Calmoseptine Oint -- was needed ;    ++ Prep H prn\par * No:  need for  Colorectal surgical evaluation for possible ablation\par \par \par \par \par \par \par \par 6. Barretts esophagus without dysplasia  \par Notes: see GERD.    \par \par \par \par 7. Hepatomegaly-->not confirmed by recent abd sono\par CTE w relative enlargemt, ? lobulation & enlarged portal/mesenteric veins\par LFTs-wnl, no ascites or edema\par R/O CLD, Hepatic vein thrombosis\par Abd sono w doppler--> Liver and spleen normal size, no thrombosis, normal portal and hepatic vein flow\par \par \par \par \par \par

## 2021-11-22 NOTE — HISTORY OF PRESENT ILLNESS
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' warfarin \par        GERD w Page's, Hemorrhoids, BPH, \par        S/P Ileocolonic resection 1998\par        Since then multiple SBOs\par \par \par        Got IV Iron x 2, since 10/2/17\par        10/19/17: feeling better, Ix=870 on prednisone 15mg x 3weeks, BMs Brown: #5-6, 1-2/D, occas 3-4/d\par        10/25/17: Hgb=10, ESR=5, CRP=5, Alb=3.6, Phos=2, Eqikocjh=271, O00=548\par        11/16/17: Hgb=11.2, ESR=14, CRP=12, \par        11/13/17: MRE-Chr mild distension of distal & vladislav-ileum s/o mild stricturing at anast, mild mural thick & mucosal enhancemt ~ 20cm; little change from 2015 c/w mild acute on chr ileitis, 2.1 cm Liver hemangioma\par        11/28/17: Entyvio\par        11/29/17: Hgb=9.6, ESR=10, CRP=5.8, Alb=3.8,Ferritin-19, Iron=14,Sat=4%, Phos=2.5, Ht=291, BMs:# 5, 1-2x/D, brown,\par        12/19;17: Hgb=10.1, ESR=12, CRP=6.5; 12/21/17: BMs:#3-5, 1-3x/D , brown, no blood, no pain, wr=532\par        1/3/18:Hgb=11.7, ESR=19, CRP=6.5, Alb=4.1, Gtysowqr=179, Iron=31, Sat%=9,Zinc=55\par        1/16/18: Entyvio, Hgb=11.4, ESR=10, CRP=6.9\par        1/18/18: Today: cellulitis R foot, saw dr KEITH--rx augmentum x 10D, Entyvio 1/9 to 1/16, fx=908 w clothes,BMs: # 4-5, 1-2x/D \par        2/14/18: Hgb=11.1, ESR=16, CRP=11.6, Alb=3.6, Iron=28, Ferritin=23, INR=1.5 \par        2/16/18: s/p Entyvio; Iron infusion, again on 2/27\par        2/20/18: Hgb=10.6, ESR=20, CRP=12, INR=1.7\par        2/27/18: s/p iron infusion\par        3/9/18: Dr. Burden for tenosynovitis, rx w Zorvolex: Diclofenac x 5 days--? response\par        3/14/18: Sd=541; BMs: # 5, 1-2x/D; Hgb=11, ESR=18, CRP=11,Irom=45,Swzfwvha=226,Alb=3.6, INR=1.5\par        3/19/18: s/p Entyvio\par        3/22/18: ak=540, BMs: # 5, 3-4 x/d\par        4/16/18: s/p Entyvio,Hgb=11.9, INR=1.3, ESR=18, CRP=8.4 \par        4/18/18 EGD: gastritis, no HP, no IM, 2+ Mucous, 0.5cm gastric polyp: fundic, 4cm HH, + Barretts:3cm, no dysplasia\par        4/25/18: Hgb=11.5, INR=1.6, Iron=40, Sat=13%, Ferritin=57, Alb=3.2, Phos=2.2, Mag=1.7\par        4/30/18: s/p Iron Infusion\par        5/14/18: s/p Entyvio \par        5/23/18: Hgb=11.5, ESR=16, CRP< 5\par        5/29/18: s/p Iron Infusion\par        6/6/18: Hgb=10.6, INR=1.7, Atthnhbz=778, Alb=3.5, Phos=2.1, L45=374, oh=273; BMs: # 5, 2-3x/D \par        6/19/18: Hgb=10.6, INR=1, CRP=15, ESR=23\par        6/21/18: R ankle is acting up, swollen, pain, having MRI done, took a couple of advil, on low carbs ,BMs: # 5, 1-2x/D, mq=565\par        6/26/18: MRI: fx/stress rxn-talar body/navicula; stress related changes--cuneform, cuboid,distal tibia; mod tibiotalar effusion, mild-mod peroneal tendinosis\par        7/19/18: entyvio 6/26/18, eliminated all carbs--anti inflammatory diet, gb=566, BMs: # 4-5, 1-3x/D \par        7/25/18: Hgb=10, INR=1.3, Alb=3.3, Phos=2.4, Sdbyewlg=579, sat%=12, Iron=35\par        8/2/18: BD--osteoporosis of hip/spine: sig decrease BD--17.6% of hip, 17% of spine, osteopenia of wrist: 6.4%\par        8/7/18: Entyvio\par        8/21/18: Hgb=11.1, INR=1.5, ESR=19, CRP=18.6\par        8/23/18: recently w tooth infection, old implant--loose and removed; rx w amox, and advil\par        eating almost no carbs, au=608, # 5-6, 2-3x/d \par        8/24/18: Stool fat--neg, Lrtihmgyxmqf=528\par        9/5/18: Hgb=11.4, INR=1.3, ESR=9, CRP=11.3, Iron=41, Bkfqpuzo=981, Alb=3.8,\par        9/17/18: entyvio, Hgb=10.7, INR=2.2, ESR=17, CRP=9.1, \par        9/20/18: gw=415, BMs: # 5-6, 1-2x/D \par        9/21/18: Phos=2.4, Ca=8.7, Alb=3.4, Vit D=27, JIH=783, \par        Dr. Akers: Hyperparathyroidism: probably secondary due to low Vit D/Ca & ? superimposed primary, rx replete Vit D and Calcium\par        10/9/18: Hgb=11.6, MCV=94, ESR=14, CRP=5.4, INR=2.2\par        10/10/18 MRE: stricturing of neoterminal ileum w worsened upstream dilatation, moderate length of upstream ileum w stricturing extending at least 20cm and more extensive then previous. suggestion of 2 adj extraluminal fluid collections which may be w/i the wall of strictured ileum near the ileocolonic anastomosis. surrounding spiculation and tethered appearance of bowel in this region.\par 10/16/18: entyvio, Hgb=11.7, MCV=94, ESR=14, CRP <5, Alb=3.5\par 10/18/18: Pj=192,   BMs: # 5-6, 3x/D , \par 11/13/18: Entyvio\par 11/21/18 CTE w C: limited 2/2 bowel underdistention, mucosal hyperenhancemt & extensive SM edema of distal ileum including vladislav-ileum, difficult to exclude a sml fluid collection, Liver w relative enlargement w suggestion of lobulation, enlargemt of PV,SMV,IMV,Spl V, rectal V. No ascites\par 11/28/18: Hgb=10, ESR=19, CRP=17,Alb=3.5,Iron=35, Sat%=11, Bcgubtkc=825, INR=1.3\par 11/29/18: na=761, BMs: # 5-6, 3-4x/D\par 12/3/18: Abd sono--Liver 14.8cm Incr heterogenicity, spleen--wnl\par 12/11/18 & 1/14/19: Entyvio\par 1/14/19:Entyvio,  Hgb=10.7, ESR=15, Alb=3.6, Iron=36, Ferritin=67, Sat%=11, INR=1.6\par 1/24/19:  rc=402, stools are # 4-6, 3-4x/d , no pain\par 2/5/19: Hgb=11.2, ESR=14, CRP=0.36\par 2/28/19: Hgb=11.5, ESR=12, CRP=6.5, Alb=3.7, Iron=69, Jkdrgkgy=364, Ca=8.9\par                Bms: # 4-5, 2-3x/D , im=677,  Entyvio : last 2/1519,\par                had parathyroid scan pre-op, still w elev PTH, + activity in mediastinum,? ectopic parathyroid tissue vs neoplasm.  To see ENT and have [facility] at [facility]\par 3/28/19: Bms: # 4-5 , 3x/d ;hw=106\par 4/11/19: Hgb-9.7, Iron=45, ESR=18, CRP=9.4, Alb=3.5, \par Saw Endo SX at [facility], felt hyperparathyroid is secondary to Low Ca/Vit D, no sx at this time\par 4/16/19: BMs: # 5-6, 3-4x/D, cc=648,  Entyvio : last 3/1519,  got IV iron 4/12/19 for Ferritin=53\par 4/27/19: s/p R Hip Nailing--missed 4/2019 2/2 fresh wound\par 5/16/19 & 6/18/19 Entyvio\par 7/2/19: Hgb=11.7, Ferritin=41,ESR=12, CRP=5.5, B6=2.8,Mag=1.8,Phos=3.9,Tk=917,Zinc=61\par 7/11/19: s/p IV iron\par 7/11/19: Alb=3.7, QHR=842, Ca=9.1, Vit D=40/306, \par 7/24/19: Entyvio, Alb=3.8, PAU=426, Ca=8.9, Vit D=128 \par 8/1/19: Bms: # 5-6, occas #4, 2-3x/D , yz=425\par 8/15/19: Hgb=12, ESR=10, CRP=5.2, Ferritin=68, Alb=3.4, Phos=4.4,Mg=1.7, Ca=8.5,Calprotectin=88,\par 8/20/19: THU=800, Ca=9, Alb=4.2\par 9/19/19: Hgb=12.8, ESR=9, CRP=5.5, Alb=3.7, Srznzygq=056, Mag=1.8, Ca=8.9, Phos=4.2\par 9/26/19: do=419, BMs: #5-6, 2-3x/D, no pain\par 10/17/19: zi=861, BMs: #4-6, 1-2x/D, no Pain; Hgb=14, ESR=7, CRP<5, Alb=3.9, Nmkxzelp=204, Mag=1.7, Ca=9.6\par 10/24/19: hb=042, Bms: # 4-6 , no pain,\par 11/6/19: ESR=6, CRP=6, PTH=80,Ca=9.1, VitD=50\par 11/14/19: nb=947, BMs: # : 4, 1-2, 0ccas #5\par  11/17/19: ESR=6, CRP<5, Axgkkwyv=973, \par  12/19/19: wj=586, BMs: #5-6, 2-3x/D\par 1/6/20: entyvio\par 1/13/20: ESR=7, CRP=0.2, Hgb=13.5, Ctipnbwa=372, J14=143, FA=10, Alb=4.1, Ca=9.1\par 1/16/20: wt = 127, BMs: # 5, 1-3x/d\par 1/30/20: ESR=9, CRP=11, Hgb=13.9, Pvyrcbpw=939,Iron=38, Alb=3.9,Ca=9.2, PTH=87,\par 2/3/20 EGD: gastritis, +MACKENZIE, No HP, +erosion w ooze -clipped, 0.5cm polyp-neg; 4cm HH, Esophagitis A, + Barretts, VC: 1+\par Colonoscopy: 05cm rectal polyp: HP, 2nd deg int hemorrhoids\par mild-mod activity: MARILY w cryptitis & mild archect distortion at ileocolonic anast: colon & ileal side, no stricture\par 2/4/20: Entyvio; 2/12/20: IV Iron, 3/3/20: Entyvio\par 2/25/20: wl=738,  BMs: # 4-5, 1-2x/ d\par 3/19/20: sf=293, BMs: #4-5, 1-2x/D\par 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\par 10/17/20 : Hgb=13, CRP< 5, ESR= 11, Iron=44, Ferritin=46, Alb=4, Phos=2.3, \par 11/5/20: xt=485; s/p IV Iron x 2 ,  11/4 and 1 week prior;   BMs:  # 4-5, 1-2x/D , no pain\par 11/18/20  Entyvio + IV Ca\par  1/4/21: Hgb=13.6, CRP<5, ESR=9, Ferritin=60, Alb=4.2, Phos=2.7\par 1/11/21: Entyvio & IV Ca\par 1/14/21: no pain, stool more formed ,  zk=302 - 137; BMs:  # 4-5, 1-2x/D \par 2/8/21: Entyvio & IV Ca\par 2/23/21 IV Ca\par 2/22/21: Hgb=12.7, CRP<5, ESR=8, Cnxmkrwkp=340, Phos=2.3, Mag=1.8, B16=989, Zinc=58, Ch=155\par 2/26/21: iu=709,  BMs:  # 4-5, 1-2x/D , no pain \par 3/8/21 & 4/8/21: Entyvio\par 3/9/21 & 3/24/21: IV Iron\par 4/5/21: Hgb=13, CRP<5, ESR=9, Ferritin=94, Phos=3.1, Mag=1.7,Ca=9.1/ Alb=4\par             \par Pt Ins co is refusing to cover Entyvio q 4wks, despite numerous attempts to appeal, they also refuse to set up a peer to peer discussion betw myself and another healthcare provider, even one that works for a third party.  They do acknowledge that there is literature supporting the successful use in individual cases who don’t respond to the q 8 wk interval and need\par less then q 8weeks , but state it had not in FDA approved at less then 8wks so they will not approve it.  I spoke to the ins co rep and discussed that fact that patients should not be  pigeon holed since practicing medicine is done on an individual basis.  Humans are not all the same.   Their  response didn’t change eventhough the pt clinically needed the medication and it  induced a beneficial clinical response towards remission.  Therefore the patient and I decided to slowly increase the interval since the insurance company will not pay for this benefit.  Hopefully he may be able to maintain his present clinical state.  If not we will return to q 4weeks and will again appeal using this patients real clinical data .\par \par 4/9/21:  ty=617,  no pain, stool more formed # 4, 1-2x/d \par 6/11/21: wt= 135,   no pain, stool more formed # 4, 1-2x/d \par              recently had an episode of abd distenstion, pain, N/V, no BM\par              eventually started having diarrhea and flatus, BMs returned to normal\par              lost 2-3 lbs but regained\par  Doesnt recall eating anything different, no fever, chills, brbpr, melena\par  entyvio was 6/3/21--which is almost 8 weeks, was supposed to be 5-6 weeks  to start\par               6/4/21 Hgb=12, CRP<5, Ferritin=32, pt to receive IV iron    \par  7/12/21 Labs: Hgb=13.6, ESR=10, CRP=<5, Zcelsht=961, Alb=3.7, BUN=16\par \par  7/16/21 MRE: limited to incomplete bowel distention, chr distal ileitis/probable inflammatory stricturing vs collapse of the vladislav-TI--but improved since 2018 , moderate proctitis\par 7/20/21:  Last entyvio was --7/1, next early august\par                 gc=260 ,  no pain, stool more formed # 4-5/6, 1-2x/d \par 7/23/21 Entyvio level= 39, Abs <1.6\par 8/23/21: Labs--Hgb=13.6, ESR=10, CRP=6.6, Jontopjg=537, Iron=26, Alb=3.9, BUN=16\par 8/26/21 p/w w N/V, abd pain/bloating  x 3 days, unable to keep things down\par Labs: WBC=5, Hgb=12, CRP=43, ESR=11, Alb=4.4, BUN=28, Creat=-1.6\par CT Abd/Pelvis: diffuse marked dilatation of SB Loops w transition pt in Rmid/lower abdomen\par ~ 5-6c, proximal to the ileocolonic anastomosis\par RX w IV solumedrol 20mg q8h, NGT, IVF, pt refused TPN, also w UTI from prostatitis\par 8/27 NGT was pulled, and clears started and advanced to LR,  was d/c home 8/30 on prednisone\par 40mg qd w taper by 10mg/wk--> to 20mg\par 10/15 Entyvio\par 10/25 Stelera # 1\par 11/3/21 [facility]: wt above 140, off pred x 2 weeks,  1-2 BMs qd\par  Discussed at IBD conference, reviewed previous colonoscopy, and recent imaging; consensus that due to malnutrition and steroid dependency, surgery is next best option however pt reports feeling great and wants to pursue medical treatment which is reasonable given he feels well \par repeat imaging in mid-January after 3 months of Stelara, and then consider referral to surgery vs improved candidacy for endoscopic manipulation (currently presumed one long stricture). \par \par 11/15/21 : Hgb=12, ESR=3, CRP <5, Ferritin =104, Ca=9, Mg=1.7, Alb=3.7 \par \par  Today:  Feeling well, no c/o , CP, SOB/ AMARO, Cough, Wheeze, Palpitations, edema\par              Most recent labs  reviewed w patient:11/15/21\par \par \par 11/22/21:  Last entyvios was --7/1, 8/5, 9/2, 10/15/21\par              Stelara # 1 IV--10/25/21 \par         Off prednisone ~ 5 weeks now \par         no pain, n/v,  stools # 4 qd \par         \par        zl=133\par        Abd pain--no \par        Nausea--no \par        Vomit--no \par        Early satiety-no \par        Belching-no \par        Regurgitation--no \par        Ht burn--no \par        Throat Clearing-no\par        Globus-no\par        Hoarseness--no \par        Dysphagia--no \par        BMs:  # 4., 1-2x d\par        Constipation--no \par        Diarrhea- intermittent:  no\par        Bloating--no \par        Flatulence-no\par        Gurgling--no \par        Melena--no \par        BRBPR--no \par        Anorexia-no \par        Wt Loss--stable\par \par \par \par        PRIOR HISTORY--2017\par \par        1/17/17: Hgb=9.2, ESR=6, CRP=5; 1/19/17: BMs: #4, 5-6, 2-3x/D, Tb=803, Started Creon 1 tid cc\par        3rd Entyvio begining Feb\par        2/14/17: Labs; Hgb=9.6, MCV=97, ESR=5, CRP< 5, H72=292, FA>23, Iron=59, Retic =3.2,\par        2/17/17 Fecal Calprotectin=16, Fats: Increased neutral & Split\par        3/13/17: Labs Hgb=9.5, MCV=95, ESR=7, CRP <5, ALB=3.1\par        3/16/17: lot of stress, to move -[place], had a job-fell thru, BMs: # 5, 2-4 x/D, wt= 131w clothes\par        4/19/17 EGD: gastritis, MACKENZIE, No HP, 2cm HH, +Barretts, No Dysplasia\par        Colonoscopy: Anastamosis: open w mild -mod active colitis, enteritis severe adj to anastomosis, mild more proximal, remainder of colon-wnl, 2-3 deg int hemorrhoids\par        4/26/17 : Hgb=7.3, MCV=95, ESR=13, CRP<5;Immediately post procedure stools very dark on eliquis/iron.\par        Admitted to PM: Hgb=8.3-->8.9 after 2 U, Alb 3.3, BUN=17, creat=1.3, Malina/Vfrol=606/460\par        4/27/17: Alb=2.3, BUN=12, creat=1.2, Malina/Lipase=209/863-No abd pain, N/V; 5/5/17: Hgb=10.7.\par        5/8/17 Entyvio iv. 5/8-5/9 w dark red diarrhea; 5/10/17 Hgb=7.8.\par        5/11/17 Adm [facility] w stools brown, Hgb=7.9, BUN=17, Creat=1.4, Alb=2.9; S/P 2 Units- Hgb=10.6, BUN=15/1.1.\par        Prednisone 40mg qd, entocort d/dee.; 5/18/17: Hgb-10.4, yh=184, BMs: #5, 1-2x/D, no pain\par        6/8/17: Hgb=7.4,MCV=98, CRP<5-ASA stopped, Pred20--> 30\par        6/10/17: Hgb=8.2, Alb=2.9, BUN=20/1.2 ; 6/12/17: Hgb=8.3, Retic=0.19, Iron=10, Sat%=3, Ferritin=57, FA=23,K13=145, 6/13/17: s/p 2 Unit PRBCs\par        6/15/17: started MCT oil, Hy=255 , BMs: # 5, 1-2x/D, stools are brownish/green \par        7/11/17: [facility] w unsteadiness. MRI Head: R Cortical Temporo-occipital encephalomalacia, MRA H&N-no sign lesions, PER-wnl.Hgb=9.9--> 8.4->9.7 after 1 Unit. Seen by Heme: received IV Iron \par        CRP<5, C40=894, NWQ=227, FA>23,Iron=22,Sat%=6, Ferritin=42, Alb=3.5. D/C on warfarin 2mg\par        7/20 Hgb=8.5, 7/28 Hgb=7.7, 7/31-s/p 1 Unit \par        As outpt seeing Heme, to get IV Iron and 5 IV Copper\par        Had 'FLU' Fever=101, chills, bloat, N/V/D, Bilious. no pain, melena,brbpr\par        Given anti-emetic by dr Butler,then able to keep things down\par        On prednisone 25, warfarin w INR bet 1.6-2\par        8/17/17: Hgb=9.9, ESR=20, CRP-P,Pc=338, BMs: # 5, 1-2x/D, stools are brownish/green\par        10/2/17: Hgb=8.8, MCV=89,Phos=1.7, K=3.9, Mag=1.9, Alb=3.6,Iron<10,Ferritin=21, INR=2\par        10/17/17: Hgb=7.9,ESR=6,CRP<5, INR=1.6\par        10/25/17: Hgb=10, ESR=5, CRP=5, Alb=3.6, Phos=2, Xbkfjobb=161, M65=405\par        11/16/17: Hgb=11.2, ESR=14, CRP=12, \par        11/13/17: MRE-Chr mild distension of distal & vladislav-ileum s/o mild stricturing at anast, mild mural thick & mucosal enhancemt ~ 20cm; little change from 2015 c/w mild acute on chr ileitis, 2.1 cm Liver hemangioma\par        11/28/17: Entyvio\par        11/29/17: Hgb=9.6, ESR=10, CRP=5.8, Alb=3.8,Ferritin-P, Iron=14,Sat=4%, Phos=2.5\par        12/19;17: Hgb=10.1, ESR=12, CRP=6.5; 12/21/17: BMs:#3-5, 1-3x/D , brown, no blood, no pain, xf=463\par        1/3/18:Hgb=11.7, ESR=19, CRP=6.5, Alb=4.1, Vedtnrrg=934, Iron=31, Sat%=9,Zinc=55\par \par \par        PRIOR HISTORY---2013:\par \par        2/20/13 CT markedly dilated sb loops ext to anast, colonoscopy w open anast ,mild-mod remy-anastamotic disease. quick response to entocort/humira--probably adhesive dis. \par        8/10/13 w GNR bacteremia, ADA 1.7 /KAYLAH 3.4, Humira 8/7, 8/13,8/18,9/15\par        CT- mucosal thickening and spiculation of the distal sb extending to the anastamosis, thickening and stranding of adj mesenteric fat.\par        Humira increased to 40mg weekly, entocort 4\par        9/2013 MRE--dilated loops in mid and distal ileum, markedly thickened and narrowed TI w decreased peristalsis of TI\par        11/15/13 ADA-24.9, KAYLAH-0\par \par \par        PRIOR HISTORY---2014:\par \par        2/15/14--CT dilated loops SB, loop of sb in mid abd 4.3cm w infiltrative changes in the mesentery, bowel tapers in the RLQ to the anastamosis w/o transition\par        WBC=15K, Rx w IV steroids and Abx \par        3/7/14 SBFT: last 10cm sb prox to anast mild distension and sl irregularity. In the mid portion of this loop there is a mild narrowing which appears to reopen but is some what narrowed.\par        3/20/14 ADA=33.1, KAYLAH=0, Lialda switch to Apriso 4 (2/2014)\par \par \par        PRIOR HISTORY--2015\par \par        1/11/15 Adm [facility] w 1 day N,Recurrent V, Abd pain/distension. CT-multiple distended & fluid-filled sb loops, mild wall thickening of ileum w No inflamm changes.\par        WBC=14.6, Hgb=17, RX w NGT suction, Levaquin iv, Solumedrol 40mg q 12( 1/12-->1/16) to prednisone 30mg BID w 5mg taper/wk\par        3/18/15 --Dr. Butler w edema /High BP, prednisone was tapered slowly to 2.5mg \par        switched to entocort 9mg qd, edema and bp improved w lasix 20mg \par        BMs:#4-5, 3x/D, Hgb=11, ESR=4, CRP<5\par        4/28/15 - 5/1/15: Adm [facility] w 1 day Abd pain, distension,N/V. 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 side of anast, opening appeared ulcerated,scarred & severely narrowed\par        6/11/15: [facility] w N/V x1, decr appetite, CT ABD: no obstruction, dilated thickened sb loops of distal jej and ileum\par        6/19/15 [facility]: s/p Lap w extensive lysis of adhesions, hepatic flex, sigmoid and sb anastamosis, s/p partial r colectomy and sb resection--side to side elisabeth: 8-10 inch from prior anast to TV colon.\par        7/17/15: BMs:#4-6, 4-5x/D, wt 131(from 126)\par        8/20/15: BMs: #4, 1-2x/D or #5, 2-3x/D, or=611, dry cough,Hgb=10, WBC=3, NTX=052, CRP=56, ESR=21\par        8/25/15 CT Abd/Pelv: Svl RLQ sb loops w wall thickening, mild nodularity & inflamm stranding in mesentery\par        Advised-restart Entocort 9mg qd, Apriso 4 qd, Maintain Humira but given RX to check drug/Ab levels\par        9/15/15: did nt restart meds,Hgb=9.9, WBC=4, ESR=19, CRP=5.8, ew=034; [facility] : ADA=8.5, Antibodies< 1.7\par        10/15/15: No pain, BMs:#4, 1-2x/D, #5-6, 3-4x/D, throat clearing/cough-better, tf=462\par        11/30/15: No pain, BMs:# 4, 5-6 intermittently, No throat clear, pf=518\par \par \par        PRIOR HISTORY-2016\par \par        1/22/16; 4/8/16; 6/6/16 : No pain, BMs:# 4-5 1-2x/D, also #5-6 3x/D for 2-3D/wk, uw=124\par        7/14/16: lk=715, 9/22/16: kx=358.5\par        11/10/16: 9.5lb wt loss, states BMs: 5-6, 5x/D--Taking Magnesium, No pain/anorexia\par        Labs: Stool Rsftvxcwksws=288, + Incr Fecal fat, Alb=3.4, FA =23, N76=796, Hgb=12, ESR=10, CRP <5\par        Magnesium-d/c, Obtain MRE asap--Start steroids and possibly switch to Entyvio\par        DDx discussed: active crohns--loss response to Humira, Malabsorption--loss Bile acids, Bile-induced diarrhea, SIBO\par        Pt wanted to wait for imaging-did not start rx\par        12/1//16 MRE: RUQ ileocolonic anastamosis--narrowed w upstream dilatation to 3.2 cm\par        Pt refused pred, Started Entocort 9mg qd and Flagyl 250mg qid about 7-10days ago \par        awaiting Entyvio load to start 12/22, then 1/5; had Cut back on iron bid\par        12/15/16: BMs:# 5, 2-3x/D, occas #6, No pain, Less bloat/flatulence, Qu=728.

## 2021-11-29 ENCOUNTER — RESULT REVIEW (OUTPATIENT)
Age: 56
End: 2021-11-29

## 2021-11-29 ENCOUNTER — APPOINTMENT (OUTPATIENT)
Dept: HEMATOLOGY ONCOLOGY | Facility: CLINIC | Age: 56
End: 2021-11-29
Payer: COMMERCIAL

## 2021-11-29 VITALS
TEMPERATURE: 97.6 F | RESPIRATION RATE: 16 BRPM | BODY MASS INDEX: 21.06 KG/M2 | SYSTOLIC BLOOD PRESSURE: 128 MMHG | WEIGHT: 142.19 LBS | DIASTOLIC BLOOD PRESSURE: 82 MMHG | HEIGHT: 69 IN | OXYGEN SATURATION: 97 % | HEART RATE: 79 BPM

## 2021-11-29 PROCEDURE — 99213 OFFICE O/P EST LOW 20 MIN: CPT | Mod: 25

## 2021-11-29 PROCEDURE — 36415 COLL VENOUS BLD VENIPUNCTURE: CPT

## 2021-11-29 NOTE — REVIEW OF SYSTEMS
[Negative] : Heme/Lymph [Fatigue] : fatigue [Eye Pain] : no eye pain [Vision Problems] : no vision problems [Dysphagia] : no dysphagia [Hoarseness] : no hoarseness [Chest Pain] : no chest pain [Lower Ext Edema] : no lower extremity edema [Shortness Of Breath] : no shortness of breath [Cough] : no cough [Vomiting] : no vomiting [Constipation] : no constipation [Diarrhea] : no diarrhea [Dysuria] : no dysuria [Joint Pain] : no joint pain [Skin Rash] : no skin rash [Dizziness] : no dizziness [Insomnia] : no insomnia [Anxiety] : no anxiety [Depression] : no depression [Muscle Weakness] : no muscle weakness [Easy Bleeding] : no tendency for easy bleeding [Easy Bruising] : no tendency for easy bruising

## 2021-11-29 NOTE — CONSULT LETTER
[Dear  ___] : Dear  [unfilled], [Consult Letter:] : I had the pleasure of evaluating your patient, [unfilled]. [Please see my note below.] : Please see my note below. [Consult Closing:] : Thank you very much for allowing me to participate in the care of this patient.  If you have any questions, please do not hesitate to contact me. [Sincerely,] : Sincerely, [DrMeron  ___] : Dr. REARDON [FreeTextEntry3] : Angie Biswas MD\par [facility] [place] [facility]\par

## 2021-11-29 NOTE — HISTORY OF PRESENT ILLNESS
[0 - No Distress] : Distress Level: 0 [de-identified] : Patient is a 52 year old who is referred for initial consultation for anemia secondary to Crohns/GI bleed vs Iron Deficiency/ Vit b12 def. 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 Patient is status post ileo-colonic resections for SBO  in 2016. In 2016 patient had complaints of 10 - 15 lb weight loss. Labs at that time showed Hgb of 12, steatorrhea and stool calprotectin = 236. In 2016 MRI/MRE with narrowing at ileocolonic anastomosis with upstream dilation. Pt refused bridge with  prednisone and Entocort / Flagyl. In 2017 patient Hgb dropped to 9.5. On 2017 patient underwent an EGD and Colonoscopy. Findings consistent with Page's and no dysplasia or AVMs. Colonoscopy showed an open anastomosis but active disease, moderate on the colonic side and limited to the anastomosis and moderate to severe enteritis, just proximal to the anastomosis. Pat had routine labs on 05/10/2017 Hgb: 8.3.  [FreeTextEntry1] : s/p injectafer, copper\par warfarin [de-identified] : Patient presents for follow up of DVT, anemia, MTHFR Gene mutation, colitis. Patient overall feels well. He states that his appetite is good and has been gaining weight. \par Patient denies nausea or vomiting, no abdominal pain or change in stool pattern. \par Patient denies any unusual bleeds or bruising. \par Denies numbness or tingling of the extremities. \par \par

## 2021-11-29 NOTE — ASSESSMENT
[FreeTextEntry1] : 1.  Anemia- Hgb 12.2 stable\par  -blood loss, anemia of chronic disease with need for intermittent iron  \par - copper deficiency - replaced, level WNL 5/2020\par -November 4 2020- Venofer 400 mg x 2- March 2021, Aug 2021, Nov 2021\par - hospitalized with flare up of colitis, got one iv iron in the hospital- started Straterra IV at the end of Oct- will begin self inj Dec 2021\par \par 2. Osteoporosis \par - left ankle- stress fx- advanced  osteoporosis, patient with h/o steroids use\par - completed Forteo 18/24 m\par - calcium level stable,IV calcium 1-2 times per month\par - PT for osteoporosis\par - dexa - Sep 2020- Left fem neck -2.8 \par \par  3.TIA with MTHFR and h/o DVT x 3 with cryptogenic CVA  \par not a candidate for DOAC b/o small bowel resection\par - INR home monitoring of warfarin \par - Warfarin 3 mg x 6, 1.5mg x 1, INR- INR 2.4- cont current dose \par \par 4. Hypogammaglobulinemia- no increased infection rate \par - s/p  Prevnar 13 12/2018 \par -  Pneumococcal vaccine 23 boost \par - s/p  Shingrex\par -  COVID vaccine - Pfizer x 3\par - Flu shot \par \par 5. Zinc deficiency\par - oral Zinc, level better - 72\par \par Dr. Luis Felipe Monsalve\par cell [phone]\par office [phone]\par \par Labs next visit- PT, INR, irons\par \par case and mgmt discussed with Dr. Biswas- return in 6 weeks cbc chem irons, zinc and copper\par \par \par

## 2021-12-17 ENCOUNTER — NON-APPOINTMENT (OUTPATIENT)
Age: 56
End: 2021-12-17

## 2021-12-20 ENCOUNTER — RX RENEWAL (OUTPATIENT)
Age: 56
End: 2021-12-20

## 2021-12-21 ENCOUNTER — RESULT REVIEW (OUTPATIENT)
Age: 56
End: 2021-12-21

## 2021-12-28 ENCOUNTER — RESULT REVIEW (OUTPATIENT)
Age: 56
End: 2021-12-28

## 2021-12-30 ENCOUNTER — APPOINTMENT (OUTPATIENT)
Dept: GASTROENTEROLOGY | Facility: HOSPITAL | Age: 56
End: 2021-12-30

## 2022-01-04 ENCOUNTER — NON-APPOINTMENT (OUTPATIENT)
Age: 57
End: 2022-01-04

## 2022-01-04 ENCOUNTER — APPOINTMENT (OUTPATIENT)
Dept: HEMATOLOGY ONCOLOGY | Facility: CLINIC | Age: 57
End: 2022-01-04
Payer: COMMERCIAL

## 2022-01-04 ENCOUNTER — RESULT REVIEW (OUTPATIENT)
Age: 57
End: 2022-01-04

## 2022-01-04 VITALS
DIASTOLIC BLOOD PRESSURE: 73 MMHG | RESPIRATION RATE: 16 BRPM | OXYGEN SATURATION: 97 % | HEIGHT: 68.98 IN | BODY MASS INDEX: 19.68 KG/M2 | HEART RATE: 98 BPM | WEIGHT: 132.9 LBS | TEMPERATURE: 99.2 F | SYSTOLIC BLOOD PRESSURE: 143 MMHG

## 2022-01-04 PROCEDURE — 99213 OFFICE O/P EST LOW 20 MIN: CPT | Mod: 25

## 2022-01-04 PROCEDURE — 36415 COLL VENOUS BLD VENIPUNCTURE: CPT

## 2022-01-10 ENCOUNTER — APPOINTMENT (OUTPATIENT)
Dept: GASTROENTEROLOGY | Facility: CLINIC | Age: 57
End: 2022-01-10
Payer: COMMERCIAL

## 2022-01-10 VITALS
BODY MASS INDEX: 19.7 KG/M2 | SYSTOLIC BLOOD PRESSURE: 110 MMHG | DIASTOLIC BLOOD PRESSURE: 60 MMHG | HEART RATE: 95 BPM | HEIGHT: 68 IN | WEIGHT: 130 LBS

## 2022-01-10 PROCEDURE — 96372 THER/PROPH/DIAG INJ SC/IM: CPT

## 2022-01-10 PROCEDURE — 99215 OFFICE O/P EST HI 40 MIN: CPT | Mod: 25

## 2022-01-10 RX ORDER — CYANOCOBALAMIN 1000 UG/ML
1000 INJECTION INTRAMUSCULAR; SUBCUTANEOUS
Qty: 0 | Refills: 0 | Status: COMPLETED | OUTPATIENT
Start: 2022-01-10

## 2022-01-10 RX ADMIN — CYANOCOBALAMIN 0 MCG/ML: 1000 INJECTION INTRAMUSCULAR; SUBCUTANEOUS at 00:00

## 2022-01-10 NOTE — ASSESSMENT
[FreeTextEntry1] : 1. CROHNS DISEASE   of both small and large intestine: s/p flare 8/25-->8/30/21, again early 12/2021-s/p pred taper over 4 weeks\par    s/p ileocolonic resection x 2--last 6/19/15 for recurrent sbos: \par prior was s/p 4 SBOs w/i 2 yrs--2/2 distal sb disease adj to anastamosis\par when on Humira weekly had an  ADA =33 (3/20/14), -but had sbo 2/2014 at ADA=25 and another sbo 4/28/15\par 6/10/2015 Colonoscopy: Inflamm ST mass on ileal side w ulceration/scarred/narrow\par 6/11/2015 CT: dilated thickened sb loops distal jej & ileum\par Post-op 6/2015 probably some malabsorption 2/2 to removal of R Colon and ileum: \par had WT. Loss-->r/o malabsorption:  ?bile-induced->-secretory vs decr micelles, active dis, PLE\par ?  SIBO--Elev FA, Alb=3.4-->3.1-->2.9--> (7/28/17)\par ?SIBO/Prevent post-op recurrence --> trial Flagyl 250 mg qid~12/7/16-->d/c: 5/11/17\par Also discussed trial of Cholestyramine/Xifaxin -wanted to wait\par 11/20/16 ACTIVE Crohn's-->  9.5lb wt loss, BMs: #5-6, 5x/D-- but taking Magnesium \par 12/1/16 MRE: RUQ ileocolonic anastamosis--narrowed w upstream dilatation to 3.2 cm\par Labs: Stool Hdnmquskclxs=353, + Incr Fecal fat, Alb=3.4, FA =23, I55=909, Hgb=12.3,ESR=10,CRP <5\par 4/19/17 :Colonoscopy: Anastamosis: open w mild -mod active colitis, enteritis severe adj to anastomosis, mild more proximal, remainder of colon=wnl\par 5/11/17- Adm PMHC w stools brown, but Hgb=7.9, BUN=17, Creat=1.4, Alb=2.9.\par S/P 2 Units w Hgb=10.6, BUN=15/1.1, Prednisone 40mg taper, entocort d/dee\par 6/8/17: Hgb=7.4, MCV=98, CRP<5--ASA stopped, Pred20--> 30mg, 6/13/17: s/p 2 Unit PRBCs\par 7/11/17 [facility] w unsteadiness. MRI Head: R Cortical Temporo-occipital encephalomalacia\par Hgb=9.9--> 8.4->9.7 after 1 Unit. Seen by Heme: received IV Iron \par CRP<5, C44=049, YTQ=730, FA>23,Iron=22,Sat%=6, Ferritin=42, Alb=3.5. D/C on warfarin 2mg\par 7/28/17 Hgb=7.7; 7/31/17-s/p 1 Unit \par Heme Consultation ~ 8/2017: started  IV Infusions of  Iron and  IV Copper\par 8/17/17: Hgb=9.9, ESR=20, Hw=454--Xylvrzuint s/p GI infection w N/V/D, no obstruction\par 10/17/17: Hgb=7.9,ESR=6,CRP<5, INR=1.6, Got IV Iron x 2, since 10/2/17 for Iron<10, Hgb=8.8\par 11/16/17: Hgb=11.2, ESR=14, CRP=12, 10/26/17 Stool fat-neg, calprotectin-30\par 11/13/17: MRE-Chr mild distension of distal & vladislav-ileum s/o mild stricturing at anast, mild mural thick & mucosal enhancemt ~ 20cm; little change from 2015 c/w mild acute on chr ileitis, 2.1 cm Liver hemangioma\par 10/9/18: Hgb=11.6, MCV=94, ESR=14, CRP=5.4, INR=2.2\par 10/10/18 MRE: stricturing of neoterminal ileum w worsened upstream dilatation, moderate length of upstream ileum w stricturing extending at least 20cm and more extensive then previous. suggestion of 2 adj extraluminal fluid collections which may be w/i the wall of strictured ileum near the ileocolonic anastamosis\par pt had declined to call numbers given for  IBD center at [facility] for new or investigational rx's--biologics.  \par later he felt  he was  stablizing w his  wt loss, and inflammatory markers\par \par 2/28/19 w ESR=12, CRP=6.5 & 2/21/19 stool calprotectin--> 232\par 8/15/19: ESR=10, CRP=5.2, Calprotectin--> 88\par 9/19/19: ESR=9, CRP=5.5\par 10/17/19: ESR=7, CRP< 5\par 11/6/19 : ESR=6, CRP=0.18\par 11/27/19: ESR=6, CRP <5\par 1/13/20: ESR=7, CRP=0.2\par 1/30/20: ESR=9, CRP=11\par \par 2/3/20 EGD: gastritis, +MACKENZIE, No HP, +erosion w ooze -clipped, 0.5cm polyp-neg; 4cm HH, Esophagitis A, + Barretts, VC: 1+\par Colonoscopy: 05cm rectal polyp: HP, 2nd deg int hemorrhoids\par mild-mod activity: MARILY w cryptitis & mild archect distortion at ileocolonic anast: colon & ileal side, no stricture\par \par 3/2/20:ESR=7, CRP=0.26\par 3/9/20: VDZ>40, Ab to VDZ <1.6\par 3/17/20: ESR=6, CRP=6.4\par 10/17/20: ESR=11, CRP <5\par 1/4/21:ESR=9, CRP<5\par 2/22/21: ESR=8, CRP<5\par 4/5/21: ESR=9, CRP<5\par 6/4/21: ESR=9, CRP<5  \par 6/11/21: wt= 135,   no pain, stool more formed # 4, 1-2x/d \par              s/p episode --abd distenstion, pain, N/V, no BM\par              eventually started having diarrhea and flatus, BMs returned to normal\par              lost 2-3 lbs but regained\par 7/4/21: CRP <5, Hgb=12\par  7/16/21 MRE: limited to incomplete bowel distention, chr distal ileitis/probable inflammatory stricturing vs collapse of the vladislav-TI--but improved since 2018 , moderate proctitis\par 7/12/21   --  ? flare --> entyvio should have been  q 5 wk , went q 8 wks\par                      next dose should be in 4 weeks x 2 then 5 weeks, to check levels\par 7/20/21: Cliically stable, unclear if MRE accurate 2/2 underdistension, but gained wt and CRP--normal\par 7/23/21 Entyvio level= 39, Abs <1.6\par 8/23/21: Labs--Hgb=13.6, ESR=10, CRP=6.6, Lclwvocj=917, Iron=26, Alb=3.9, BUN=16\par 8/26/21 p/w sbo  w N/V, abd pain/bloating  x 3 days, unable to keep things down\par Labs: WBC=5, Hgb=12, CRP=43, ESR=11, Alb=4.4, BUN=28, Creat=-1.6\par CT Abd/Pelvis: diffuse marked dilatation of SB Loops w transition pt in Rmid/lower abdomen\par ~ 5-6cm, proximal to the ileocolonic anastomosis\par RX w IV solumedrol 20mg q8h, NGT, IVF, pt refused TPN, also w UTI from prostatitis\par 8/27 NGT was pulled, and clears started and advanced to LR,  was d/c home 8/30 on prednisone\par 40mg qd w taper by 10mg/wk to '20mg qd \par \par (  **Entyvio's  :time 0=12/22/16 ( Humira 40mg QW); 1/5/17--2wks, s/p 3rd infusion at wk 6, then q 6weeks \par #6 :6/21/17-->held 2/2 Shingles, then  Infusions as follows=9/19/17 , 10/17/17, 11/28/17, 1/16/18 ,2/16/18, 3/19/18,4/16/18, 5/14/18, 6/25/18, 8/7/18, 9/17/18, 10/16/18, 11/13/18, 12/11/18, 1/14/19, 2/15/19, 3/15/19, 5/16/19, 6/18/19,7/24/19, 9/9/19, 10/2/19, 11/4/19, 12/12/19, 1/6/20, 2/4/20, 3/3/20, 9/17/20, 10/15/20, 11/18/20, 1/11/21, 2/8/21, 3/8/21, 4/8/21, 6/3/21, 7/1/21, 8/2/21, 9/2/21,10/25/21  )\par \par A / PLAN:  s/p sbo prox to anastamosis\par                  inflammatory vs fibrosis vs combination\par      Responded to  steroids iv--> po--d/c ~ 10/20/21,\par      tapered steroids from 40mg qd  to 20mg, [id number] qd and  taper 5mg/wk\par      then po taper again early 12/2021 40mg qd w 10mg taper/ wk--now off 2-3 weeks \par \par      Entyvio level was 39 w/o Abs~ 3weeks after 7/1/21, \par      speaks to inflammatory component & probably loss of response\par      Stelara # 1 dose on 10/25/21, #2 on 12/10/21, next 2/14/22\par \par       IBD consensus due to malnutrition /steroid dependency, surgery is next best option \par      pt reports feeling well and wants to pursue medical treatment \par      repeat imaging in mid-Feb after 3 months of Stelara, then consider surgery vs improved candidacy for      endoscopic manipulation (currently presumed one long stricture).\par        will f/u w  IBD consultant Dr. Heard at  imaging after 3 months of Stelera\par        for  Colonoscopy ( w possible balloon dilatation )-> last 1 3/4 yrs ago\par \par Probiotics 3 qd \par Diet:  LR, Lactose free, protein drinks tid\par MCT oil begun but never maintained \par **trend --cbc, esr, crp, albumin\par   will check  stool calprotectin\par **monitor wt--- usu bet 136-139, 7/20/21=136, 11/22/21=139, svwbh=473\par \par \par                                                                                                                                                                                                                                                                                                                                                                                                                                                                                                                                                                                                                                                                                                                                \par 2. Iron deficiency anemia : \par  had initially dropped , after clinical flare and post procedure bleeding\par Probably multifactorial: ACD, Blood loss, malabsorption/nutrient deficiencies\par Eliquis-->warfarin after CVA, On Entyvio \par 7/11/17 s/p  Adm for unsteady gait w MRI c/w CVA--warfarin begun: possible better control of AC\par Chromagen 2 qd--> IV Iron , s/p IV Cu x 5, FA 1mg qd , B12 SL & IM\par Still requiring IV Iron and cu infusions prn \par most recent IV Iron  :  12/10/21 for Ferritin=51 on 11/29/21\par 2/22/21: B82=724, \par 6/4/21: Cu=91, Zinc=69, Ferritin=32,Iron=43, \par 7/12/21: Ss=600, Zinc=74, Lytadnwz=120, Iron=35\par 11/15/21 : Hgb=12,  Ferritin =104, Ca=9, Mg=1.7\par 1/4/22: Hgb=11.5, Ca=8.8, Mg=2.9\par B12 1cc IM ____R. delt--  given today, \par trend cbc, B12, FA, Iron panel \par Adj INR--closer to low 2's.    \par \par \par \par \par 3. Osteoporosis \par : Progression on BD from 5/5/16\par Crohns, h/o steroid use\par Calcium citrate & Vit D per Endo\par Forteo since 5/10/19 , then  Reclast          \par Repeat BD of 8/2018 --showed worsening to Osteoporosis from 2016\par Rec Endo consult w Dr. Akers :Osteoporosis center at [facility]--pt never went originally \par 9/21/18: Phos=2.4, Ca=8.7, Alb=3.4, Vit D=27, DGT=471, \par 10/16/18: Phos=2.8, Ca=8.7, Alb=3.5,\par 1/14/19: Phos=2.6,  Ca=8.7, Alb=3.6\par 2/28/19: Phos=1.8, Ca=8.9, Alb=3.7, VitD=39, BXN=313\par 3/18/19: Ca=8.5, Alb=3.7, Vit D=44.6, PTH=93\par 7/11/19: Ca=9.1, Alb=3.7, Phos=3.9, Vit D=40/ 306, WXR=007\par 8/15/19: Ca=9, Alb=4.2, Phos=4.4, VitD=59, TKA=344\par 10/17/19: Ca=9.6, Alb=3.9, Phos=3.5\par 11/6/19: Ca=9.1, Alb=3.9. Phos=3.7, VitD=50, PTH=80\par 1/13/20: ca=9.1\par 1/30/20: Ca=9.2, Alb=3.9, Phos=2.7, Mag=1.5, Vit D=103/41, PTH=87\par 2/18/20:ca=8.5, Alb=3.6, \par 3/2/20: Ca=8.5, Alb=3.6\par 3/17/20: Ca=9.1, Alb=3.7, Phos=3.4, Mag=1.7\par 10/17/20: Ca=8.9, Alb=4, Phos=2.3, Mag-2.0, Vit D=66, PTH=47\par 1/4/21 : Ca=9.4, Alb=4.2, Phos=2.7, Mag=2, Vit D=64, PTH=87.5\par 4/5/21: Ca=9.1, Alb=4, Phos=3.1, Mag=1.7, \par 6/4/21: ca=9, Alb=3.8, Phos=2.8, Mag=1.8\par 7/12/21: Ca=8.6, ALB=6, phosph=2.3, Mag=1.8\par 11/15/21: Ca=9, Alb=3.7, Mag=1.7\par 1/4/22: ca=8.8, Alb=3.9, Mg=2.9, phos=2.9\par \par Dr. Akers: Hyperparathyroidism-- probably secondary due to low Vit D/Ca & ? superimposed primary, \par [facility] ENT Consult init felt  Parathyroid scan showing activity in mediastinum is ectopic parathyroid, then felt sx not indicated at that time, elev PTH is secondary to low  Vit D/Ca\par Rx replete Vit D and current IV Calcium-as per endo \par trend Vit D, Ca, Phos, PTH,  \par \par BD--9/2020: incr in spine and hip , no change in wrist\par 10/5/20-s/p Reclast--repeat 9/2021 \par \par \par \par \par \par 4. GERD:  recently active by ENT , no ht burn,  dysphagia,  + throat clear\par               h/o  Dry cough, CXR:ana, saw ENT eulogio-->LPR\par * ++ LPR,  ++  Page’s w No Dysplasia,  + H/O  Esophagitis grade: A   was found \par \par Recommend: \par * Anti-reflux diet & life-style changes reviewed & re-emphasized.  ++  Bedge required\par * Weight reduction & regular exercise emphasized\par \par * need for  PPI:  Omep 40 BID ,  ++ H2B q HS:Zantac 300mg--> Pepcid 40mg\par No Carafate  1 gram:   --was emphasized\par I reviewed & summarized the prospective randomized & retrospective non-randomized studies\par looking at potential long term SE's of PPIs, w special attention to associations & actual cause\par as related to GI infections, bone loss, cognitive changes, KD, Covid, vitamin & electrolyte deficiencies\par questions were answered, pt advised that PPIs should be used when needed as indicated by their\par clinical indication and response and tapering off is always the goal if possible. pt understood.\par \par *  F/U  EGD:  2/2022--for Barretts screening / surveillance \par * No  need for pH Monitor,   Manometry,   Esophagram --\par *  ++  need for ENT  eval/F/U,  No  need for Surgical  eval  --  \par \par \par \par \par \par 5. Hemorrhoids:  well - controlled.  No pain,  swelling,  itch,  bleeding\par * Discussed previously the potential complications of thrombosis,  pain,  infection,  swelling, itching,  bleeding \par Reccomend: \par * currently Low   - Fiber Diet was emphasized\par * 6  --  8 cups of decaffeinated fluid daily was emphasized \par * Sitz Bathes prn,   No:  Anusol HC  Suppos / Cream  AK BID -- was needed\par * No:  Tucks BID,  Balneol Lotion,   Calmoseptine Oint -- was needed ;    ++ Prep H prn\par * No:  need for  Colorectal surgical evaluation for possible ablation\par \par \par \par \par \par \par \par 6. Barretts esophagus without dysplasia  \par Notes: see GERD.    \par \par \par \par 7. Hepatomegaly-->not confirmed by recent abd sono\par CTE w relative enlargemt, ? lobulation & enlarged portal/mesenteric veins\par LFTs-wnl, no ascites or edema\par R/O CLD, Hepatic vein thrombosis\par Abd sono w doppler--> Liver and spleen normal size, no thrombosis, normal portal and hepatic vein flow\par \par \par \par \par \par

## 2022-01-10 NOTE — HISTORY OF PRESENT ILLNESS
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' warfarin \par        GERD w Page's, Hemorrhoids, BPH, \par        S/P Ileocolonic resection 1998\par        Since then multiple SBOs\par \par \par        Got IV Iron x 2, since 10/2/17\par        10/19/17: feeling better, Et=056 on prednisone 15mg x 3weeks, BMs Brown: #5-6, 1-2/D, occas 3-4/d\par        10/25/17: Hgb=10, ESR=5, CRP=5, Alb=3.6, Phos=2, Bysxysoa=424, X91=822\par        11/16/17: Hgb=11.2, ESR=14, CRP=12, \par        11/13/17: MRE-Chr mild distension of distal & vladislav-ileum s/o mild stricturing at anast, mild mural thick & mucosal enhancemt ~ 20cm; little change from 2015 c/w mild acute on chr ileitis, 2.1 cm Liver hemangioma\par        11/28/17: Entyvio\par        11/29/17: Hgb=9.6, ESR=10, CRP=5.8, Alb=3.8,Ferritin-19, Iron=14,Sat=4%, Phos=2.5, Bu=142, BMs:# 5, 1-2x/D, brown,\par        12/19;17: Hgb=10.1, ESR=12, CRP=6.5; 12/21/17: BMs:#3-5, 1-3x/D , brown, no blood, no pain, ys=654\par        1/3/18:Hgb=11.7, ESR=19, CRP=6.5, Alb=4.1, Kewczzew=620, Iron=31, Sat%=9,Zinc=55\par        1/16/18: Entyvio, Hgb=11.4, ESR=10, CRP=6.9\par        1/18/18: Today: cellulitis R foot, saw dr KEITH--rx augmentum x 10D, Entyvio 1/9 to 1/16, nk=670 w clothes,BMs: # 4-5, 1-2x/D \par        2/14/18: Hgb=11.1, ESR=16, CRP=11.6, Alb=3.6, Iron=28, Ferritin=23, INR=1.5 \par        2/16/18: s/p Entyvio; Iron infusion, again on 2/27\par        2/20/18: Hgb=10.6, ESR=20, CRP=12, INR=1.7\par        2/27/18: s/p iron infusion\par        3/9/18: Dr. Burden for tenosynovitis, rx w Zorvolex: Diclofenac x 5 days--? response\par        3/14/18: Fl=471; BMs: # 5, 1-2x/D; Hgb=11, ESR=18, CRP=11,Irom=45,Auejpuhs=473,Alb=3.6, INR=1.5\par        3/19/18: s/p Entyvio\par        3/22/18: hj=006, BMs: # 5, 3-4 x/d\par        4/16/18: s/p Entyvio,Hgb=11.9, INR=1.3, ESR=18, CRP=8.4 \par        4/18/18 EGD: gastritis, no HP, no IM, 2+ Mucous, 0.5cm gastric polyp: fundic, 4cm HH, + Barretts:3cm, no dysplasia\par        4/25/18: Hgb=11.5, INR=1.6, Iron=40, Sat=13%, Ferritin=57, Alb=3.2, Phos=2.2, Mag=1.7\par        4/30/18: s/p Iron Infusion\par        5/14/18: s/p Entyvio \par        5/23/18: Hgb=11.5, ESR=16, CRP< 5\par        5/29/18: s/p Iron Infusion\par        6/6/18: Hgb=10.6, INR=1.7, Yxjfxmji=095, Alb=3.5, Phos=2.1, I94=618, uu=485; BMs: # 5, 2-3x/D \par        6/19/18: Hgb=10.6, INR=1, CRP=15, ESR=23\par        6/21/18: R ankle is acting up, swollen, pain, having MRI done, took a couple of advil, on low carbs ,BMs: # 5, 1-2x/D, dz=105\par        6/26/18: MRI: fx/stress rxn-talar body/navicula; stress related changes--cuneform, cuboid,distal tibia; mod tibiotalar effusion, mild-mod peroneal tendinosis\par        7/19/18: entyvio 6/26/18, eliminated all carbs--anti inflammatory diet, wj=917, BMs: # 4-5, 1-3x/D \par        7/25/18: Hgb=10, INR=1.3, Alb=3.3, Phos=2.4, Hqrrzkgf=413, sat%=12, Iron=35\par        8/2/18: BD--osteoporosis of hip/spine: sig decrease BD--17.6% of hip, 17% of spine, osteopenia of wrist: 6.4%\par        8/7/18: Entyvio\par        8/21/18: Hgb=11.1, INR=1.5, ESR=19, CRP=18.6\par        8/23/18: recently w tooth infection, old implant--loose and removed; rx w amox, and advil\par        eating almost no carbs, wu=554, # 5-6, 2-3x/d \par        8/24/18: Stool fat--neg, Deaznorckcat=030\par        9/5/18: Hgb=11.4, INR=1.3, ESR=9, CRP=11.3, Iron=41, Vepbyudi=316, Alb=3.8,\par        9/17/18: entyvio, Hgb=10.7, INR=2.2, ESR=17, CRP=9.1, \par        9/20/18: hw=325, BMs: # 5-6, 1-2x/D \par        9/21/18: Phos=2.4, Ca=8.7, Alb=3.4, Vit D=27, SOL=240, \par        Dr. Akers: Hyperparathyroidism: probably secondary due to low Vit D/Ca & ? superimposed primary, rx replete Vit D and Calcium\par        10/9/18: Hgb=11.6, MCV=94, ESR=14, CRP=5.4, INR=2.2\par        10/10/18 MRE: stricturing of neoterminal ileum w worsened upstream dilatation, moderate length of upstream ileum w stricturing extending at least 20cm and more extensive then previous. suggestion of 2 adj extraluminal fluid collections which may be w/i the wall of strictured ileum near the ileocolonic anastomosis. surrounding spiculation and tethered appearance of bowel in this region.\par 10/16/18: entyvio, Hgb=11.7, MCV=94, ESR=14, CRP <5, Alb=3.5\par 10/18/18: Pl=802,   BMs: # 5-6, 3x/D , \par 11/13/18: Entyvio\par 11/21/18 CTE w C: limited 2/2 bowel underdistention, mucosal hyperenhancemt & extensive SM edema of distal ileum including vladislav-ileum, difficult to exclude a sml fluid collection, Liver w relative enlargement w suggestion of lobulation, enlargemt of PV,SMV,IMV,Spl V, rectal V. No ascites\par 11/28/18: Hgb=10, ESR=19, CRP=17,Alb=3.5,Iron=35, Sat%=11, Kuggnzie=965, INR=1.3\par 11/29/18: pt=794, BMs: # 5-6, 3-4x/D\par 12/3/18: Abd sono--Liver 14.8cm Incr heterogenicity, spleen--wnl\par 12/11/18 & 1/14/19: Entyvio\par 1/14/19:Entyvio,  Hgb=10.7, ESR=15, Alb=3.6, Iron=36, Ferritin=67, Sat%=11, INR=1.6\par 1/24/19:  ky=737, stools are # 4-6, 3-4x/d , no pain\par 2/5/19: Hgb=11.2, ESR=14, CRP=0.36\par 2/28/19: Hgb=11.5, ESR=12, CRP=6.5, Alb=3.7, Iron=69, Duoiqvyh=589, Ca=8.9\par                Bms: # 4-5, 2-3x/D , lu=523,  Entyvio : last 2/1519,\par                had parathyroid scan pre-op, still w elev PTH, + activity in mediastinum,? ectopic parathyroid tissue vs neoplasm.  To see ENT and have Imaging at [facility]\par 3/28/19: Bms: # 4-5 , 3x/d ;vr=009\par 4/11/19: Hgb-9.7, Iron=45, ESR=18, CRP=9.4, Alb=3.5, \par Saw Endo SX at [facility], felt hyperparathyroid is secondary to Low Ca/Vit D, no sx at this time\par 4/16/19: BMs: # 5-6, 3-4x/D, lw=984,  Entyvio : last 3/1519,  got IV iron 4/12/19 for Ferritin=53\par 4/27/19: s/p R Hip Nailing--missed 4/2019 2/2 fresh wound\par 5/16/19 & 6/18/19 Entyvio\par 7/2/19: Hgb=11.7, Ferritin=41,ESR=12, CRP=5.5, B6=2.8,Mag=1.8,Phos=3.9,Mz=972,Zinc=61\par 7/11/19: s/p IV iron\par 7/11/19: Alb=3.7, TRA=436, Ca=9.1, Vit D=40/306, \par 7/24/19: Entyvio, Alb=3.8, DRP=615, Ca=8.9, Vit D=128 \par 8/1/19: Bms: # 5-6, occas #4, 2-3x/D , ra=232\par 8/15/19: Hgb=12, ESR=10, CRP=5.2, Ferritin=68, Alb=3.4, Phos=4.4,Mg=1.7, Ca=8.5,Calprotectin=88,\par 8/20/19: JFI=689, Ca=9, Alb=4.2\par 9/19/19: Hgb=12.8, ESR=9, CRP=5.5, Alb=3.7, Scirokvu=321, Mag=1.8, Ca=8.9, Phos=4.2\par 9/26/19: yt=596, BMs: #5-6, 2-3x/D, no pain\par 10/17/19: jc=761, BMs: #4-6, 1-2x/D, no Pain; Hgb=14, ESR=7, CRP<5, Alb=3.9, Egcxpmdw=979, Mag=1.7, Ca=9.6\par 10/24/19: ir=689, Bms: # 4-6 , no pain,\par 11/6/19: ESR=6, CRP=6, PTH=80,Ca=9.1, VitD=50\par 11/14/19: eh=395, BMs: # : 4, 1-2, 0ccas #5\par  11/17/19: ESR=6, CRP<5, Pkaxyoxj=812, \par  12/19/19: ah=926, BMs: #5-6, 2-3x/D\par 1/6/20: entyvio\par 1/13/20: ESR=7, CRP=0.2, Hgb=13.5, Klstdxeb=342, J19=187, FA=10, Alb=4.1, Ca=9.1\par 1/16/20: wt = 127, BMs: # 5, 1-3x/d\par 1/30/20: ESR=9, CRP=11, Hgb=13.9, Jmfpbmvh=486,Iron=38, Alb=3.9,Ca=9.2, PTH=87,\par 2/3/20 EGD: gastritis, +MACKENZIE, No HP, +erosion w ooze -clipped, 0.5cm polyp-neg; 4cm HH, Esophagitis A, + Barretts, VC: 1+\par Colonoscopy: 05cm rectal polyp: HP, 2nd deg int hemorrhoids\par mild-mod activity: MARILY w cryptitis & mild archect distortion at ileocolonic anast: colon & ileal side, no stricture\par 2/4/20: Entyvio; 2/12/20: IV Iron, 3/3/20: Entyvio\par 2/25/20: yn=164,  BMs: # 4-5, 1-2x/ d\par 3/19/20: hn=468, BMs: #4-5, 1-2x/D\par 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\par 10/17/20 : Hgb=13, CRP< 5, ESR= 11, Iron=44, Ferritin=46, Alb=4, Phos=2.3, \par 11/5/20: ay=409; s/p IV Iron x 2 ,  11/4 and 1 week prior;   BMs:  # 4-5, 1-2x/D , no pain\par 11/18/20  Entyvio + IV Ca\par  1/4/21: Hgb=13.6, CRP<5, ESR=9, Ferritin=60, Alb=4.2, Phos=2.7\par 1/11/21: Entyvio & IV Ca\par 1/14/21: no pain, stool more formed ,  bo=242 - 137; BMs:  # 4-5, 1-2x/D \par 2/8/21: Entyvio & IV Ca\par 2/23/21 IV Ca\par 2/22/21: Hgb=12.7, CRP<5, ESR=8, Dltndnqsv=381, Phos=2.3, Mag=1.8, A98=450, Zinc=58, Ma=854\par 2/26/21: gh=177,  BMs:  # 4-5, 1-2x/D , no pain \par 3/8/21 & 4/8/21: Entyvio\par 3/9/21 & 3/24/21: IV Iron\par 4/5/21: Hgb=13, CRP<5, ESR=9, Ferritin=94, Phos=3.1, Mag=1.7,Ca=9.1/ Alb=4\par             \par Pt Ins co is refusing to cover Entyvio q 4wks, despite numerous attempts to appeal, they also refuse to set up a peer to peer discussion betw myself and another healthcare provider, even one that works for a third party.  They do acknowledge that there is literature supporting the successful use in individual cases who don’t respond to the q 8 wk interval and need\par less then q 8weeks , but state it had not in FDA approved at less then 8wks so they will not approve it.  I spoke to the ins co rep and discussed that fact that patients should not be  pigeon holed since practicing medicine is done on an individual basis.  Humans are not all the same.   Their  response didn’t change eventhough the pt clinically needed the medication and it  induced a beneficial clinical response towards remission.  Therefore the patient and I decided to slowly increase the interval since the insurance company will not pay for this benefit.  Hopefully he may be able to maintain his present clinical state.  If not we will return to q 4weeks and will again appeal using this patients real clinical data .\par \par 4/9/21:  rh=483,  no pain, stool more formed # 4, 1-2x/d \par 6/11/21: wt= 135,   no pain, stool more formed # 4, 1-2x/d \par              recently had an episode of abd distenstion, pain, N/V, no BM\par              eventually started having diarrhea and flatus, BMs returned to normal\par              lost 2-3 lbs but regained\par  Doesnt recall eating anything different, no fever, chills, brbpr, melena\par  entyvio was 6/3/21--which is almost 8 weeks, was supposed to be 5-6 weeks  to start\par               6/4/21 Hgb=12, CRP<5, Ferritin=32, pt to receive IV iron    \par  7/12/21 Labs: Hgb=13.6, ESR=10, CRP=<5, Hzidpgz=002, Alb=3.7, BUN=16\par \par  7/16/21 MRE: limited to incomplete bowel distention, chr distal ileitis/probable inflammatory stricturing vs collapse of the vladislav-TI--but improved since 2018 , moderate proctitis\par 7/20/21:  Last entyvio was --7/1, next early august\par                 jm=312 ,  no pain, stool more formed # 4-5/6, 1-2x/d \par 7/23/21 Entyvio level= 39, Abs <1.6\par 8/23/21: Labs--Hgb=13.6, ESR=10, CRP=6.6, Zftkjrba=076, Iron=26, Alb=3.9, BUN=16\par 8/26/21 p/w w N/V, abd pain/bloating  x 3 days, unable to keep things down\par Labs: WBC=5, Hgb=12, CRP=43, ESR=11, Alb=4.4, BUN=28, Creat=-1.6\par CT Abd/Pelvis: diffuse marked dilatation of SB Loops w transition pt in Rmid/lower abdomen\par ~ 5-6c, proximal to the ileocolonic anastomosis\par RX w IV solumedrol 20mg q8h, NGT, IVF, pt refused TPN, also w UTI from prostatitis\par 8/27 NGT was pulled, and clears started and advanced to LR,  was d/c home 8/30 on prednisone\par 40mg qd w taper by 10mg/wk--> to 20mg\par 10/15 Entyvio\par 10/25 Stelera (Ustekinumab)  # 1\par 11/3/21 [facility]: wt above 140, off pred x 2 weeks,  1-2 BMs qd\par  Discussed at IBD conference, reviewed previous colonoscopy, and recent imaging; consensus that due to malnutrition and steroid dependency, surgery is next best option however pt reports feeling great and wants to pursue medical treatment which is reasonable given he feels well \par repeat imaging in mid-January after 3 months of Stelara, and then consider referral to surgery vs improved candidacy for endoscopic manipulation (currently presumed one long stricture). \par \par 11/15/21 : wl=883, Hgb=12, ESR=3, CRP <5, Ferritin =104, Ca=9, Mg=1.7, Alb=3.7 \par 12/10/21 : iron infusion\par 1/4/22: Hgb=11.5, ESR=6, CRP <5, Ca=8.8, Mag=2.9, Alb=3.9\par \par 1/10/22 Today:  Feeling well, no c/o , CP, SOB/ AMARO, Cough, Wheeze, Palpitations, edema\par              Most recent labs  reviewed w patient:1/4/22\par \par \par 1/10/22: :  Last entyvios were -->7/1, 8/5, 9/2, 10/15/21\par              Stelara # 1 IV--10/25/21, second dose SC~ 12/10/21, next ~ 2/14/22\par \par              Early December had a " flare" loose BMs, N/V and bloat\par              Took Pred 40mg qd and tapered down 10mg / week , now off pred x 2-3 weeks\par         \par         no pain, n/v,  stools # 5, 1-2x/D  \par        wn=914\par        Abd pain--no \par        Nausea--no \par        Vomit--no \par        Early satiety-no \par        Belching-no \par        Regurgitation--no \par        Ht burn--no \par        Throat Clearing-no\par        Globus-no\par        Hoarseness--no \par        Dysphagia--no \par        BMs:  # 5, 1-2x d\par        Constipation--no \par        Diarrhea- intermittent:  no\par        Bloating--no \par        Flatulence-no\par        Gurgling--no \par        Melena--no \par        BRBPR--no \par        Anorexia-no \par        Wt Loss--> gained 2 lbs but down overall \par \par \par \par        PRIOR HISTORY--2017\par \par        1/17/17: Hgb=9.2, ESR=6, CRP=5; 1/19/17: BMs: #4, 5-6, 2-3x/D, Dm=501, Started Creon 1 tid cc\par        3rd Entyvio begining Feb\par        2/14/17: Labs; Hgb=9.6, MCV=97, ESR=5, CRP< 5, O59=078, FA>23, Iron=59, Retic =3.2,\par        2/17/17 Fecal Calprotectin=16, Fats: Increased neutral & Split\par        3/13/17: Labs Hgb=9.5, MCV=95, ESR=7, CRP <5, ALB=3.1\par        3/16/17: lot of stress, to move -[place], had a job-fell thru, BMs: # 5, 2-4 x/D, wt= 131w clothes\par        4/19/17 EGD: gastritis, MACKENZIE, No HP, 2cm HH, +Barretts, No Dysplasia\par        Colonoscopy: Anastamosis: open w mild -mod active colitis, enteritis severe adj to anastomosis, mild more proximal, remainder of colon-wnl, 2-3 deg int hemorrhoids\par        4/26/17 : Hgb=7.3, MCV=95, ESR=13, CRP<5;Immediately post procedure stools very dark on eliquis/iron.\par        Admitted to [facility]: Hgb=8.3-->8.9 after 2 U, Alb 3.3, BUN=17, creat=1.3, Malina/Vwcyn=300/460\par        4/27/17: Alb=2.3, BUN=12, creat=1.2, Malina/Lipase=209/863-No abd pain, N/V; 5/5/17: Hgb=10.7.\par        5/8/17 Entyvio iv. 5/8-5/9 w dark red diarrhea; 5/10/17 Hgb=7.8.\par        5/11/17 Adm [facility] w stools brown, Hgb=7.9, BUN=17, Creat=1.4, Alb=2.9; S/P 2 Units- Hgb=10.6, BUN=15/1.1.\par        Prednisone 40mg qd, entocort d/dee.; 5/18/17: Hgb-10.4, zf=426, BMs: #5, 1-2x/D, no pain\par        6/8/17: Hgb=7.4,MCV=98, CRP<5-ASA stopped, Pred20--> 30\par        6/10/17: Hgb=8.2, Alb=2.9, BUN=20/1.2 ; 6/12/17: Hgb=8.3, Retic=0.19, Iron=10, Sat%=3, Ferritin=57, FA=23,E56=825, 6/13/17: s/p 2 Unit PRBCs\par        6/15/17: started MCT oil, Hn=253 , BMs: # 5, 1-2x/D, stools are brownish/green \par        7/11/17: [facility] w unsteadiness. MRI Head: R Cortical Temporo-occipital encephalomalacia, MRA H&N-no sign lesions, PER-wnl.Hgb=9.9--> 8.4->9.7 after 1 Unit. Seen by Heme: received IV Iron \par        CRP<5, R99=638, KQH=649, FA>23,Iron=22,Sat%=6, Ferritin=42, Alb=3.5. D/C on warfarin 2mg\par        7/20 Hgb=8.5, 7/28 Hgb=7.7, 7/31-s/p 1 Unit \par        As outpt seeing Heme, to get IV Iron and 5 IV Copper\par        Had 'FLU' Fever=101, chills, bloat, N/V/D, Bilious. no pain, melena,brbpr\par        Given anti-emetic by dr Butler,then able to keep things down\par        On prednisone 25, warfarin w INR bet 1.6-2\par        8/17/17: Hgb=9.9, ESR=20, CRP-P,Ye=001, BMs: # 5, 1-2x/D, stools are brownish/green\par        10/2/17: Hgb=8.8, MCV=89,Phos=1.7, K=3.9, Mag=1.9, Alb=3.6,Iron<10,Ferritin=21, INR=2\par        10/17/17: Hgb=7.9,ESR=6,CRP<5, INR=1.6\par        10/25/17: Hgb=10, ESR=5, CRP=5, Alb=3.6, Phos=2, Uwipmdxt=049, F56=224\par        11/16/17: Hgb=11.2, ESR=14, CRP=12, \par        11/13/17: MRE-Chr mild distension of distal & vladislav-ileum s/o mild stricturing at anast, mild mural thick & mucosal enhancemt ~ 20cm; little change from 2015 c/w mild acute on chr ileitis, 2.1 cm Liver hemangioma\par        11/28/17: Entyvio\par        11/29/17: Hgb=9.6, ESR=10, CRP=5.8, Alb=3.8,Ferritin-P, Iron=14,Sat=4%, Phos=2.5\par        12/19;17: Hgb=10.1, ESR=12, CRP=6.5; 12/21/17: BMs:#3-5, 1-3x/D , brown, no blood, no pain, qe=430\par        1/3/18:Hgb=11.7, ESR=19, CRP=6.5, Alb=4.1, Guhmamhw=582, Iron=31, Sat%=9,Zinc=55\par \par \par        PRIOR HISTORY---2013:\par \par        2/20/13 CT markedly dilated sb loops ext to anast, colonoscopy w open anast ,mild-mod remy-anastamotic disease. quick response to entocort/humira--probably adhesive dis. \par        8/10/13 w GNR bacteremia, ADA 1.7 /KAYLAH 3.4, Humira 8/7, 8/13,8/18,9/15\par        CT- mucosal thickening and spiculation of the distal sb extending to the anastamosis, thickening and stranding of adj mesenteric fat.\par        Humira increased to 40mg weekly, entocort 4\par        9/2013 MRE--dilated loops in mid and distal ileum, markedly thickened and narrowed TI w decreased peristalsis of TI\par        11/15/13 ADA-24.9, KAYLAH-0\par \par \par        PRIOR HISTORY---2014:\par \par        2/15/14--CT dilated loops SB, loop of sb in mid abd 4.3cm w infiltrative changes in the mesentery, bowel tapers in the RLQ to the anastamosis w/o transition\par        WBC=15K, Rx w IV steroids and Abx \par        3/7/14 SBFT: last 10cm sb prox to anast mild distension and sl irregularity. In the mid portion of this loop there is a mild narrowing which appears to reopen but is some what narrowed.\par        3/20/14 ADA=33.1, KAYLAH=0, Lialda switch to Apriso 4 (2/2014)\par \par \par        PRIOR HISTORY--2015\par \par        1/11/15 Adm [facility] w 1 day N,Recurrent V, Abd pain/distension. CT-multiple distended & fluid-filled sb loops, mild wall thickening of ileum w No inflamm changes.\par        WBC=14.6, Hgb=17, RX w NGT suction, Levaquin iv, Solumedrol 40mg q 12( 1/12-->1/16) to prednisone 30mg BID w 5mg taper/wk\par        3/18/15 --Dr. Butler w edema /High BP, prednisone was tapered slowly to 2.5mg \par        switched to entocort 9mg qd, edema and bp improved w lasix 20mg \par        BMs:#4-5, 3x/D, Hgb=11, ESR=4, CRP<5\par        4/28/15 - 5/1/15: Adm [facility] w 1 day Abd pain, distension,N/V. 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 side of anast, opening appeared ulcerated,scarred & severely narrowed\par        6/11/15: [facility] w N/V x1, decr appetite, CT ABD: no obstruction, dilated thickened sb loops of distal jej and ileum\par        6/19/15 [facility]: s/p Lap w extensive lysis of adhesions, hepatic flex, sigmoid and sb anastamosis, s/p partial r colectomy and sb resection--side to side elisabeth: 8-10 inch from prior anast to TV colon.\par        7/17/15: BMs:#4-6, 4-5x/D, wt 131(from 126)\par        8/20/15: BMs: #4, 1-2x/D or #5, 2-3x/D, kn=634, dry cough,Hgb=10, WBC=3, NMG=129, CRP=56, ESR=21\par        8/25/15 CT Abd/Pelv: Svl RLQ sb loops w wall thickening, mild nodularity & inflamm stranding in mesentery\par        Advised-restart Entocort 9mg qd, Apriso 4 qd, Maintain Humira but given RX to check drug/Ab levels\par        9/15/15: did nt restart meds,Hgb=9.9, WBC=4, ESR=19, CRP=5.8, nc=417; [facility] : ADA=8.5, Antibodies< 1.7\par        10/15/15: No pain, BMs:#4, 1-2x/D, #5-6, 3-4x/D, throat clearing/cough-better, gf=338\par        11/30/15: No pain, BMs:# 4, 5-6 intermittently, No throat clear, ge=271\par \par \par        PRIOR HISTORY-2016\par \par        1/22/16; 4/8/16; 6/6/16 : No pain, BMs:# 4-5 1-2x/D, also #5-6 3x/D for 2-3D/wk, my=607\par        7/14/16: qy=812, 9/22/16: pi=974.5\par        11/10/16: 9.5lb wt loss, states BMs: 5-6, 5x/D--Taking Magnesium, No pain/anorexia\par        Labs: Stool Xgcjhomhdusv=683, + Incr Fecal fat, Alb=3.4, FA =23, D94=777, Hgb=12, ESR=10, CRP <5\par        Magnesium-d/c, Obtain MRE asap--Start steroids and possibly switch to Entyvio\par        DDx discussed: active crohns--loss response to Humira, Malabsorption--loss Bile acids, Bile-induced diarrhea, SIBO\par        Pt wanted to wait for imaging-did not start rx\par        12/1//16 MRE: RUQ ileocolonic anastamosis--narrowed w upstream dilatation to 3.2 cm\par        Pt refused pred, Started Entocort 9mg qd and Flagyl 250mg qid about 7-10days ago \par        awaiting Entyvio load to start 12/22, then 1/5; had Cut back on iron bid\par        12/15/16: BMs:# 5, 2-3x/D, occas #6, No pain, Less bloat/flatulence, Sd=806.

## 2022-01-21 ENCOUNTER — RESULT REVIEW (OUTPATIENT)
Age: 57
End: 2022-01-21

## 2022-02-02 ENCOUNTER — APPOINTMENT (OUTPATIENT)
Dept: ENDOCRINOLOGY | Facility: CLINIC | Age: 57
End: 2022-02-02
Payer: COMMERCIAL

## 2022-02-02 VITALS — HEIGHT: 69 IN | BODY MASS INDEX: 19.85 KG/M2 | WEIGHT: 134 LBS

## 2022-02-02 PROCEDURE — 99215 OFFICE O/P EST HI 40 MIN: CPT | Mod: 95

## 2022-02-02 RX ORDER — CYANOCOBALAMIN 1000 UG/ML
1000 INJECTION INTRAMUSCULAR; SUBCUTANEOUS
Qty: 6 | Refills: 3 | Status: DISCONTINUED | COMMUNITY
Start: 2017-07-20 | End: 2022-02-02

## 2022-02-02 NOTE — HISTORY OF PRESENT ILLNESS
[Home] : at home, [unfilled] , at the time of the visit. [Medical Office: ([facility])___] : at the medical office located in  [Verbal consent obtained from patient] : the patient, [unfilled] [FreeTextEntry1] : Mr. GUDELIA DUNN is a 56 year old male\par  coming for a f/u , for severe osteoporosis with bilateral hip fractures, hyperparathyroidism, low D and hypocalcemia secondary to Chron's disease\par on Vit D 50,000 IU 4 times a week alternating with 3 times a week every other week \par \par started Forteo May 10 2019\par last DEXA 9/2019 stable \par \par on IV calcium each Friday at the infusion center, last one a month ago \par labs done much improved \par \par Ca citrate 950mg 3 tab bid \par reports compliance \par \par 24hr urine low calcium while low D \par parathyroid scan reviewed parathyroid adenoma versu\par \par total thyroidectomy and central compartment  dissection 9/11/2020\par Pathology showing 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 No evidence of lymphatic or vascular invasion no extra thyroid extension. 8 of 10 lymph nodes positive for metastatic papillary thyroid carcinoma.\par \par Large test metastatic focus is 0.7 cm in greatest dimension extra low dose extension present. Thyroid gland left sided within normal limits. Tihymic tissue present.\par \par received iodine 131 at 29.3 mCi in October 2020\par Posterior ideation scan showed focal site of increased tracer localization in the neck to the right of midline as was evident on prior study on October 23, 1920.\par \par started Levothyroxine 125 mcg on 9/18/2020 no side effects dose increased to 137 mcg on November 2020\par \par last DEXA 9/2020\par As compared to the patient's most recent study dated 9/12/2019, there has been a statistically\par significant interval increase in bone density at both the lumbar spine and left hip with no s\par tatistically significant change noted at the left forearm.\par worse T score LFN -2.8

## 2022-02-02 NOTE — HISTORY OF PRESENT ILLNESS
[Home] : at home, [unfilled] , at the time of the visit. [Medical Office: ([facility])___] : at the medical office located in  [Verbal consent obtained from patient] : the patient, [unfilled] [FreeTextEntry1] : Mr. GUDELIA DUNN is a 56 year old male\par  coming for a f/u , for severe osteoporosis with bilateral hip fractures, hyperparathyroidism, low D and hypocalcemia secondary to Chron's disease\par on Vit D 50,000 IU 4 times a week alternating with 3 times a week every other week \par \par started Forteo May 10 2019\par last DEXA 9/2019 stable \par \par on IV calcium each Friday at the infusion center, last one a month ago \par labs done much improved \par \par Ca citrate 950mg 3 tab bid \par reports compliance \par \par 24hr urine low calcium while low D \par parathyroid scan reviewed parathyroid adenoma versu\par \par total thyroidectomy and central compartment  dissection 9/11/2020\par Pathology showing 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 No evidence of lymphatic or vascular invasion no extra thyroid extension. 8 of 10 lymph nodes positive for metastatic papillary thyroid carcinoma.\par \par Large test metastatic focus is 0.7 cm in greatest dimension extra low dose extension present. Thyroid gland left sided within normal limits. Tihymic tissue present.\par \par received iodine 131 at 29.3 mCi in October 2020\par Posterior ideation scan showed focal site of increased tracer localization in the neck to the right of midline as was evident on prior study on October 23, 1920.\par \par started Levothyroxine 125 mcg on 9/18/2020 no side effects dose increased to 137 mcg on November 2020\par \par last DEXA 9/2020\par As compared to the patient's most recent study dated 9/12/2019, there has been a statistically\par significant interval increase in bone density at both the lumbar spine and left hip with no s\par tatistically significant change noted at the left forearm.\par worse T score LFN -2.8

## 2022-02-02 NOTE — REASON FOR VISIT
[Follow - Up] : a follow-up visit [Osteoporosis] : osteoporosis [Hypothyroidism] : hypothyroidism [Thyroid Cancer] : thyroid cancer

## 2022-02-04 LAB — CALPROTECTIN FECAL: 84 UG/G

## 2022-02-08 NOTE — DOWNTIME INTERRUPTION NOTE - WHICH MANUAL FORMS INITIATED?
Triage Chief Complaint:   Pharyngitis and Nasal Congestion    [place]:  Hernandez Ramirez is a 58 y.o. male that presents complaining of nasal congestion, pharyngitis, cough with phlegm and general fatigue. No reported chest pain, abdominal pain, change in urine or bowel. Arrives tachycardic and febrile but no acute distress. Language line used for encounter in [place]ian    ROS:  At least 12 systems reviewed and otherwise acutely negative except as in the [address] No past medical history on file. No past surgical history on file. No family history on file. Social History     Socioeconomic History    Marital status:      Spouse name: Not on file    Number of children: Not on file    Years of education: Not on file    Highest education level: Not on file   Occupational History    Not on file   Tobacco Use    Smoking status: Not on file    Smokeless tobacco: Not on file   Substance and Sexual Activity    Alcohol use: Not on file    Drug use: Not on file    Sexual activity: Not on file   Other Topics Concern    Not on file   Social History Narrative    Not on file     Social Determinants of Health     Financial Resource Strain:     Difficulty of Paying Living Expenses: Not on file   Food Insecurity:     Worried About Running Out of Food in the Last Year: Not on file    Nathan of Food in the Last Year: Not on file   Transportation Needs:     Lack of Transportation (Medical): Not on file    Lack of Transportation (Non-Medical):  Not on file   Physical Activity:     Days of Exercise per Week: Not on file    Minutes of Exercise per Session: Not on file   Stress:     Feeling of Stress : Not on file   Social Connections:     Frequency of Communication with Friends and Family: Not on file    Frequency of Social Gatherings with Friends and Family: Not on file    Attends Hindu Services: Not on file    Active Member of Clubs or Organizations: Not on file    Attends Club or Organization Meetings: Not on file    Marital Status: Not on file   Intimate Partner Violence:     Fear of Current or Ex-Partner: Not on file    Emotionally Abused: Not on file    Physically Abused: Not on file    Sexually Abused: Not on file   Housing Stability:     Unable to Pay for Housing in the Last Year: Not on file    Number of [place] in the Last Year: Not on file    Unstable Housing in the Last Year: Not on file     Current Facility-Administered Medications   Medication Dose Route Frequency Provider Last Rate Last Admin    acetaminophen (TYLENOL) tablet 650 mg  650 mg Oral Once ADRIENNE&KELLY Boudreaux MD        0.9 % sodium chloride bolus  30 mL/kg (Ideal) IntraVENous Once KUMAR Boudreaux MD         No current outpatient medications on file. No Known Allergies    [unfilled]    Nursing Notes Reviewed    Physical Exam:  Vitals:    02/08/22 1335   BP: 109/80   Pulse: 117   Resp: 18   Temp: 101.9 °F (38.8 °C)   SpO2: 98%       GENERAL APPEARANCE: Awake and alert. Cooperative. No acute distress. HEAD: Normocephalic. Atraumatic. EYES: EOM's grossly intact. Sclera anicteric. ENT: Mucous membranes are moist. Tolerates saliva. No trismus. NECK: Supple. No meningismus. Trachea midline. HEART: tachy rate. Radial pulses 2+. LUNGS: Respirations unlabored. coarse bs b/l  ABDOMEN: Soft. Non-tender. No guarding or rebound. EXTREMITIES: No acute deformities. SKIN: Warm and dry. NEUROLOGICAL: No gross facial drooping. Moves all 4 extremities spontaneously. PSYCHIATRIC: Normal mood. I have reviewed and interpreted all of the currently available lab results from this visit (if applicable):  No results found for this visit on 02/08/22.      Radiographs (if obtained):  [] The following radiograph was interpreted by myself in the absence of a radiologist:  [x] Radiologist's Report Reviewed:       XR CHEST PORTABLE (Final result)  Result time 02/08/22 14:34:37  Final result by Karen Haley MD (02/08/22 Patient was discharged during [facility] Downtime; see paper record for d/c information 14:34:37)                Impression:    Findings are most consistent with pulmonary edema. Pneumonia is less likely. Narrative:    EXAMINATION:   ONE XRAY VIEW OF THE CHEST     2/8/2022 2:30 pm     COMPARISON:   None. HISTORY:   ORDERING SYSTEM PROVIDED HISTORY: cough   TECHNOLOGIST PROVIDED HISTORY:   Reason for exam:->cough   Reason for Exam: pt has nasal congestion and a sore throat     FINDINGS:   There is ill-defined perihilar opacity bilaterally.  The pulmonary vessels   are ill-defined.  There is mild peribronchial cuffing.  There is moderate   cardiomegaly. EKG (if obtained): (All EKG's are interpreted by myself in the absence of a cardiologist)  Initial EKG on my interpretation shows n/a    MDM:  Differential diagnosis: COVID, pneumonia, sepsis, flu, strep    Motrin/Tylenol along with IV fluid given. The patient's fever arc improved and is no longer tachycardic. Cultures taken from blood. CBC/chemistry/lactic acid shows no emergent process. Chest x-ray was reviewed personally by me and I believe is consistent with pneumonia especially in the context of clinical correlation. Patient will be given first dose of Levaquin here and discharged on a course of such. Discussed results, diagnosis and plan with patient and/or family. Questions addressed. Disposition and follow-up agreed upon. Specific discharge instructions explained. The patient and/or family and I have discussed the diagnosis and risks, and we agree with discharging home to follow-up with their primary care, specialist or referral doctor. In the event that medications were prescribed the risk profile of these medications were detailed expressly. We also discussed returning to the Emergency Department immediately if new or worsening symptoms occur. We have discussed the symptoms which are most concerning that necessitate immediate return. Old records reviewed.  Labs and imaging reviewed and results discussed with patient. .        Patient was given scripts for the following medications. I counseled patient how to take these medications. New Prescriptions    No medications on file         CRITICAL CARE TIME   Total Critical Care time was 30 minutes, excluding separately reportable procedures. There was a high probability of clinically significant/life threatening deterioration in the patient's condition which required my urgent intervention.       Clinical Impression:  Pneumonia  (Please note that portions of this note may have been completed with a voice recognition program. Efforts were made to edit the dictations but occasionally words are mis-transcribed.)    MD Teddy Buckley MD  02/08/22 [id number]

## 2022-02-12 NOTE — CONSULT LETTER
[Dear  ___] : Dear  [unfilled], [Consult Letter:] : I had the pleasure of evaluating your patient, [unfilled]. [Please see my note below.] : Please see my note below. [Consult Closing:] : Thank you very much for allowing me to participate in the care of this patient.  If you have any questions, please do not hesitate to contact me. [Sincerely,] : Sincerely, [DrMeron  ___] : Dr. REARDON [FreeTextEntry3] : Angie Biswas MD\par [facility] [place] [facility]\par

## 2022-02-12 NOTE — ASSESSMENT
[FreeTextEntry1] : 1.  Anemia- Hgb 12.2 stable\par  -blood loss, anemia of chronic disease with need for intermittent iron  \par - copper deficiency - replaced, level WNL 5/2020\par -November 4 2020- Venofer 400 mg x 2- March 2021, Aug 2021, Nov 2021\par - hospitalized with flare up of colitis, got one iv iron in the hospital- started Straterra IV at the end of Oct- will begin self inj Dec 2021\par \par 2. Osteoporosis \par - left ankle- stress fx- advanced  osteoporosis, patient with h/o steroids use\par - completed Forteo 18/24 m\par - calcium level stable,IV calcium 1-2 times per month\par - PT for osteoporosis\par - dexa - Sep 2020- Left fem neck -2.8 \par \par  3.TIA with MTHFR and h/o DVT x 3 with cryptogenic CVA  \par not a candidate for DOAC b/o small bowel resection\par - INR home monitoring of warfarin \par - Warfarin 3 mg x 6, 1.5mg x 1, INR- INR 2.4- cont current dose \par \par 4. Hypogammaglobulinemia- no increased infection rate \par - s/p  Prevnar 13 12/2018 \par -  Pneumococcal vaccine 23 boost \par - s/p  Shingrex\par -  COVID vaccine - Pfizer x 3\par - Flu shot \par \par 5. Zinc deficiency\par - oral Zinc, level better - 72\par \par Dr. Luis Felipe Monsalve\par cell [phone]\par office [phone]\par \par Labs next visit- PT, INR, irons\par \par case and mgmt discussed with Dr. Biswas- return in 6 weeks cbc chem irons, zinc and copper\par \par \par

## 2022-02-12 NOTE — HISTORY OF PRESENT ILLNESS
[0 - No Distress] : Distress Level: 0 [de-identified] : Patient is a 52 year old who is referred for initial consultation for anemia secondary to Crohns/GI bleed vs Iron Deficiency/ Vit b12 def. 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 Patient is status post ileo-colonic resections for SBO  in 2016. In 2016 patient had complaints of 10 - 15 lb weight loss. Labs at that time showed Hgb of 12, steatorrhea and stool calprotectin = 236. In 2016 MRI/MRE with narrowing at ileocolonic anastomosis with upstream dilation. Pt refused bridge with  prednisone and Entocort / Flagyl. In 2017 patient Hgb dropped to 9.5. On 2017 patient underwent an EGD and Colonoscopy. Findings consistent with Page's and no dysplasia or AVMs. Colonoscopy showed an open anastomosis but active disease, moderate on the colonic side and limited to the anastomosis and moderate to severe enteritis, just proximal to the anastomosis. Pat had routine labs on 05/10/2017 Hgb: 8.3.  [FreeTextEntry1] : s/p injectafer, copper\par warfarin [de-identified] : Patient presents for follow up of DVT, anemia, MTHFR Gene mutation, colitis. Patient overall feels well. He states that his appetite is good and has been gaining weight. \par Patient denies nausea or vomiting, no abdominal pain or change in stool pattern. \par Patient denies any unusual bleeds or bruising. \par Denies numbness or tingling of the extremities. \par \par

## 2022-02-14 ENCOUNTER — NON-APPOINTMENT (OUTPATIENT)
Age: 57
End: 2022-02-14

## 2022-02-15 ENCOUNTER — RESULT REVIEW (OUTPATIENT)
Age: 57
End: 2022-02-15

## 2022-02-15 ENCOUNTER — APPOINTMENT (OUTPATIENT)
Dept: HEMATOLOGY ONCOLOGY | Facility: CLINIC | Age: 57
End: 2022-02-15
Payer: COMMERCIAL

## 2022-02-15 VITALS
HEIGHT: 68.98 IN | RESPIRATION RATE: 18 BRPM | HEART RATE: 88 BPM | SYSTOLIC BLOOD PRESSURE: 134 MMHG | BODY MASS INDEX: 20.25 KG/M2 | OXYGEN SATURATION: 96 % | DIASTOLIC BLOOD PRESSURE: 83 MMHG | WEIGHT: 136.7 LBS | TEMPERATURE: 98.3 F

## 2022-02-15 PROCEDURE — 36415 COLL VENOUS BLD VENIPUNCTURE: CPT

## 2022-02-15 PROCEDURE — 99213 OFFICE O/P EST LOW 20 MIN: CPT | Mod: 25

## 2022-02-15 NOTE — REVIEW OF SYSTEMS
[Fatigue] : fatigue [Eye Pain] : no eye pain [Vision Problems] : no vision problems [Dysphagia] : no dysphagia [Hoarseness] : no hoarseness [Chest Pain] : no chest pain [Lower Ext Edema] : no lower extremity edema [Shortness Of Breath] : no shortness of breath [Cough] : no cough [Vomiting] : no vomiting [Constipation] : no constipation [Diarrhea] : no diarrhea [Dysuria] : no dysuria [Joint Pain] : no joint pain [Skin Rash] : no skin rash [Dizziness] : no dizziness [Insomnia] : no insomnia [Anxiety] : no anxiety [Depression] : no depression [Muscle Weakness] : no muscle weakness [Easy Bleeding] : no tendency for easy bleeding [Easy Bruising] : no tendency for easy bruising [Negative] : Allergic/Immunologic

## 2022-02-15 NOTE — CONSULT LETTER
[Dear  ___] : Dear  [unfilled], [Consult Letter:] : I had the pleasure of evaluating your patient, [unfilled]. [Please see my note below.] : Please see my note below. [Consult Closing:] : Thank you very much for allowing me to participate in the care of this patient.  If you have any questions, please do not hesitate to contact me. [Sincerely,] : Sincerely, [FreeTextEntry3] : Angie Biswas MD\par [facility] [place] [facility]\par  [DrMeron  ___] : Dr. REARDON

## 2022-02-15 NOTE — HISTORY OF PRESENT ILLNESS
[de-identified] : Patient is a 52 year old who is referred for initial consultation for anemia secondary to Crohns/GI bleed vs Iron Deficiency/ Vit b12 def. 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 Patient is status post ileo-colonic resections for SBO  in 2016. In 2016 patient had complaints of 10 - 15 lb weight loss. Labs at that time showed Hgb of 12, steatorrhea and stool calprotectin = 236. In 2016 MRI/MRE with narrowing at ileocolonic anastomosis with upstream dilation. Pt refused bridge with  prednisone and Entocort / Flagyl. In 2017 patient Hgb dropped to 9.5. On 2017 patient underwent an EGD and Colonoscopy. Findings consistent with Page's and no dysplasia or AVMs. Colonoscopy showed an open anastomosis but active disease, moderate on the colonic side and limited to the anastomosis and moderate to severe enteritis, just proximal to the anastomosis. Pat had routine labs on 05/10/2017 Hgb: 8.3.  [FreeTextEntry1] : s/p injectafer, copper\par warfarin [de-identified] : Patient presents for follow up of DVT, anemia, MTHFR Gene mutation, colitis. Patient overall feels well. He states that his appetite is good and has been gaining weight. \par Patient denies nausea or vomiting, no abdominal pain or change in stool pattern. \par Patient denies any unusual bleeds or bruising. \par Denies numbness or tingling of the extremities. \par \par  [0 - No Distress] : Distress Level: 0

## 2022-02-15 NOTE — ASSESSMENT
[FreeTextEntry1] : 1.  Anemia- Hgb 10.6- decline -c heck irons- will attempt Injectafor as venofer not holding \par  -blood loss, anemia of chronic disease with need for intermittent iron  \par - copper deficiency - replaced, level WNL 5/2020\par -November 4 2020- Venofer 400 mg x 2- March 2021, Aug 2021, Nov 2021\par - hospitalized with flare up of colitis, got one iv iron in the hospital- started Straterra IV at the end of Oct- will begin self inj Dec 2021\par \par 2. Osteoporosis \par - left ankle- stress fx- advanced  osteoporosis, patient with h/o steroids use\par - completed Forteo 18/24 m\par - calcium level stable,IV calcium 1-2 times per month\par - PT for osteoporosis\par - dexa - Sep 2020- Left fem neck -2.8 \par \par  3.TIA with MTHFR and h/o DVT x 3 with cryptogenic CVA  \par not a candidate for DOAC b/o small bowel resection\par - INR home monitoring of warfarin \par - Warfarin 3 mg x 6, 1.5mg x 1, INR- INR 2.4- cont current dose \par \par 4. Hypogammaglobulinemia- no increased infection rate \par - s/p  Prevnar 13 12/2018 \par -  Pneumococcal vaccine 23 boost \par - s/p  Shingrex\par -  COVID vaccine - Pfizer x 3\par - Flu shot \par \par 5. Zinc deficiency\par - oral Zinc, level better - 72\par \par 6. 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 No evidence of lymphatic invasion. 8/10 LN's for eli metastatic papillary thyroid cancer\par -received iodine 131 at 29.3 mCi in October 2020\par -On synthroid\par -Due for US Head and soft tissue neck May 2022- follows with Dr. Akers\par \par Dr. Luis Felipe Monsalve\par cell [phone]\par office [phone]\par \par Labs next visit- PT, INR, irons\par \par case and mgmt discussed with Dr. Biswas- return in 6 weeks cbc chem irons, zinc and copper\par \par \par

## 2022-02-22 ENCOUNTER — APPOINTMENT (OUTPATIENT)
Dept: GASTROENTEROLOGY | Facility: CLINIC | Age: 57
End: 2022-02-22
Payer: COMMERCIAL

## 2022-02-22 VITALS
BODY MASS INDEX: 19.77 KG/M2 | WEIGHT: 132 LBS | DIASTOLIC BLOOD PRESSURE: 78 MMHG | HEART RATE: 85 BPM | HEIGHT: 68.5 IN | SYSTOLIC BLOOD PRESSURE: 124 MMHG

## 2022-02-22 PROCEDURE — 96372 THER/PROPH/DIAG INJ SC/IM: CPT

## 2022-02-22 PROCEDURE — 99215 OFFICE O/P EST HI 40 MIN: CPT | Mod: 25

## 2022-02-22 RX ORDER — CYANOCOBALAMIN 1000 UG/ML
1000 INJECTION INTRAMUSCULAR; SUBCUTANEOUS
Qty: 0 | Refills: 0 | Status: COMPLETED | OUTPATIENT
Start: 2022-02-22

## 2022-02-22 RX ADMIN — CYANOCOBALAMIN 0 MCG/ML: 1000 INJECTION INTRAMUSCULAR; SUBCUTANEOUS at 00:00

## 2022-02-22 NOTE — HISTORY OF PRESENT ILLNESS
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-->Eliquis' warfarin \par        GERD w Page's, Hemorrhoids, BPH, \par        S/P Ileocolonic resection 1998\par        Since then multiple SBOs\par \par \par        Got IV Iron x 2, since 10/2/17\par        10/19/17: feeling better, Fj=554 on prednisone 15mg x 3weeks, BMs Brown: #5-6, 1-2/D, occas 3-4/d\par        10/25/17: Hgb=10, ESR=5, CRP=5, Alb=3.6, Phos=2, Sotzsdou=076, E33=912\par        11/16/17: Hgb=11.2, ESR=14, CRP=12, \par        11/13/17: MRE-Chr mild distension of distal & vladislav-ileum s/o mild stricturing at anast, mild mural thick & mucosal enhancemt ~ 20cm; little change from 2015 c/w mild acute on chr ileitis, 2.1 cm Liver hemangioma\par        11/28/17: Entyvio\par        11/29/17: Hgb=9.6, ESR=10, CRP=5.8, Alb=3.8,Ferritin-19, Iron=14,Sat=4%, Phos=2.5, Zv=116, BMs:# 5, 1-2x/D, brown,\par        12/19;17: Hgb=10.1, ESR=12, CRP=6.5; 12/21/17: BMs:#3-5, 1-3x/D , brown, no blood, no pain, qo=224\par        1/3/18:Hgb=11.7, ESR=19, CRP=6.5, Alb=4.1, Xsohzigt=201, Iron=31, Sat%=9,Zinc=55\par        1/16/18: Entyvio, Hgb=11.4, ESR=10, CRP=6.9\par        1/18/18: Today: cellulitis R foot, saw dr KEITH--rx augmentum x 10D, Entyvio 1/9 to 1/16, da=520 w clothes,BMs: # 4-5, 1-2x/D \par        2/14/18: Hgb=11.1, ESR=16, CRP=11.6, Alb=3.6, Iron=28, Ferritin=23, INR=1.5 \par        2/16/18: s/p Entyvio; Iron infusion, again on 2/27\par        2/20/18: Hgb=10.6, ESR=20, CRP=12, INR=1.7\par        2/27/18: s/p iron infusion\par        3/9/18: Dr. Burden for tenosynovitis, rx w Zorvolex: Diclofenac x 5 days--? response\par        3/14/18: Ef=941; BMs: # 5, 1-2x/D; Hgb=11, ESR=18, CRP=11,Irom=45,Gvsrgrpg=124,Alb=3.6, INR=1.5\par        3/19/18: s/p Entyvio\par        3/22/18: tu=793, BMs: # 5, 3-4 x/d\par        4/16/18: s/p Entyvio,Hgb=11.9, INR=1.3, ESR=18, CRP=8.4 \par        4/18/18 EGD: gastritis, no HP, no IM, 2+ Mucous, 0.5cm gastric polyp: fundic, 4cm HH, + Barretts:3cm, no dysplasia\par        4/25/18: Hgb=11.5, INR=1.6, Iron=40, Sat=13%, Ferritin=57, Alb=3.2, Phos=2.2, Mag=1.7\par        4/30/18: s/p Iron Infusion\par        5/14/18: s/p Entyvio \par        5/23/18: Hgb=11.5, ESR=16, CRP< 5\par        5/29/18: s/p Iron Infusion\par        6/6/18: Hgb=10.6, INR=1.7, Uxaskocs=531, Alb=3.5, Phos=2.1, W24=146, dh=252; BMs: # 5, 2-3x/D \par        6/19/18: Hgb=10.6, INR=1, CRP=15, ESR=23\par        6/21/18: R ankle is acting up, swollen, pain, having MRI done, took a couple of advil, on low carbs ,BMs: # 5, 1-2x/D, zk=962\par        6/26/18: MRI: fx/stress rxn-talar body/navicula; stress related changes--cuneform, cuboid,distal tibia; mod tibiotalar effusion, mild-mod peroneal tendinosis\par        7/19/18: entyvio 6/26/18, eliminated all carbs--anti inflammatory diet, zf=222, BMs: # 4-5, 1-3x/D \par        7/25/18: Hgb=10, INR=1.3, Alb=3.3, Phos=2.4, Cdxhvyki=832, sat%=12, Iron=35\par        8/2/18: BD--osteoporosis of hip/spine: sig decrease BD--17.6% of hip, 17% of spine, osteopenia of wrist: 6.4%\par        8/7/18: Entyvio\par        8/21/18: Hgb=11.1, INR=1.5, ESR=19, CRP=18.6\par        8/23/18: recently w tooth infection, old implant--loose and removed; rx w amox, and advil\par        eating almost no carbs, pr=823, # 5-6, 2-3x/d \par        8/24/18: Stool fat--neg, Wsndfmxccatd=024\par        9/5/18: Hgb=11.4, INR=1.3, ESR=9, CRP=11.3, Iron=41, Iirzcgzd=319, Alb=3.8,\par        9/17/18: entyvio, Hgb=10.7, INR=2.2, ESR=17, CRP=9.1, \par        9/20/18: pq=049, BMs: # 5-6, 1-2x/D \par        9/21/18: Phos=2.4, Ca=8.7, Alb=3.4, Vit D=27, IDU=495, \par        Dr. Akers: Hyperparathyroidism: probably secondary due to low Vit D/Ca & ? superimposed primary, rx replete Vit D and Calcium\par        10/9/18: Hgb=11.6, MCV=94, ESR=14, CRP=5.4, INR=2.2\par        10/10/18 MRE: stricturing of neoterminal ileum w worsened upstream dilatation, moderate length of upstream ileum w stricturing extending at least 20cm and more extensive then previous. suggestion of 2 adj extraluminal fluid collections which may be w/i the wall of strictured ileum near the ileocolonic anastomosis. surrounding spiculation and tethered appearance of bowel in this region.\par 10/16/18: entyvio, Hgb=11.7, MCV=94, ESR=14, CRP <5, Alb=3.5\par 10/18/18: Yv=857,   BMs: # 5-6, 3x/D , \par 11/13/18: Entyvio\par 11/21/18 CTE w C: limited 2/2 bowel underdistention, mucosal hyperenhancemt & extensive SM edema of distal ileum including vladislav-ileum, difficult to exclude a sml fluid collection, Liver w relative enlargement w suggestion of lobulation, enlargemt of PV,SMV,IMV,Spl V, rectal V. No ascites\par 11/28/18: Hgb=10, ESR=19, CRP=17,Alb=3.5,Iron=35, Sat%=11, Ikdaokkz=721, INR=1.3\par 11/29/18: ck=332, BMs: # 5-6, 3-4x/D\par 12/3/18: Abd sono--Liver 14.8cm Incr heterogenicity, spleen--wnl\par 12/11/18 & 1/14/19: Entyvio\par 1/14/19:Entyvio,  Hgb=10.7, ESR=15, Alb=3.6, Iron=36, Ferritin=67, Sat%=11, INR=1.6\par 1/24/19:  ly=487, stools are # 4-6, 3-4x/d , no pain\par 2/5/19: Hgb=11.2, ESR=14, CRP=0.36\par 2/28/19: Hgb=11.5, ESR=12, CRP=6.5, Alb=3.7, Iron=69, Mmdzonrl=838, Ca=8.9\par                Bms: # 4-5, 2-3x/D , zs=686,  Entyvio : last 2/1519,\par                had parathyroid scan pre-op, still w elev PTH, + activity in mediastinum,? ectopic parathyroid tissue vs neoplasm.  To see ENT and have Imaging at [facility]\par 3/28/19: Bms: # 4-5 , 3x/d ;rq=998\par 4/11/19: Hgb-9.7, Iron=45, ESR=18, CRP=9.4, Alb=3.5, \par Saw Endo SX at [facility], felt hyperparathyroid is secondary to Low Ca/Vit D, no sx at this time\par 4/16/19: BMs: # 5-6, 3-4x/D, dm=729,  Entyvio : last 3/1519,  got IV iron 4/12/19 for Ferritin=53\par 4/27/19: s/p R Hip Nailing--missed 4/2019 2/2 fresh wound\par 5/16/19 & 6/18/19 Entyvio\par 7/2/19: Hgb=11.7, Ferritin=41,ESR=12, CRP=5.5, B6=2.8,Mag=1.8,Phos=3.9,As=825,Zinc=61\par 7/11/19: s/p IV iron\par 7/11/19: Alb=3.7, WUL=969, Ca=9.1, Vit D=40/306, \par 7/24/19: Entyvio, Alb=3.8, BRC=110, Ca=8.9, Vit D=128 \par 8/1/19: Bms: # 5-6, occas #4, 2-3x/D , ex=239\par 8/15/19: Hgb=12, ESR=10, CRP=5.2, Ferritin=68, Alb=3.4, Phos=4.4,Mg=1.7, Ca=8.5,Calprotectin=88,\par 8/20/19: UJD=396, Ca=9, Alb=4.2\par 9/19/19: Hgb=12.8, ESR=9, CRP=5.5, Alb=3.7, Llarjewb=793, Mag=1.8, Ca=8.9, Phos=4.2\par 9/26/19: hf=932, BMs: #5-6, 2-3x/D, no pain\par 10/17/19: ve=050, BMs: #4-6, 1-2x/D, no Pain; Hgb=14, ESR=7, CRP<5, Alb=3.9, Nbgpjtlm=220, Mag=1.7, Ca=9.6\par 10/24/19: yg=632, Bms: # 4-6 , no pain,\par 11/6/19: ESR=6, CRP=6, PTH=80,Ca=9.1, VitD=50\par 11/14/19: wb=109, BMs: # : 4, 1-2, 0ccas #5\par  11/17/19: ESR=6, CRP<5, Juxndgvo=423, \par  12/19/19: wq=312, BMs: #5-6, 2-3x/D\par 1/6/20: entyvio\par 1/13/20: ESR=7, CRP=0.2, Hgb=13.5, Lkzxjwrz=192, I52=996, FA=10, Alb=4.1, Ca=9.1\par 1/16/20: wt = 127, BMs: # 5, 1-3x/d\par 1/30/20: ESR=9, CRP=11, Hgb=13.9, Xrpaxcha=124,Iron=38, Alb=3.9,Ca=9.2, PTH=87,\par 2/3/20 EGD: gastritis, +MACKENZIE, No HP, +erosion w ooze -clipped, 0.5cm polyp-neg; 4cm HH, Esophagitis A, + Barretts, VC: 1+\par Colonoscopy: 05cm rectal polyp: HP, 2nd deg int hemorrhoids\par mild-mod activity: MARILY w cryptitis & mild archect distortion at ileocolonic anast: colon & ileal side, no stricture\par 2/4/20: Entyvio; 2/12/20: IV Iron, 3/3/20: Entyvio\par 2/25/20: lk=116,  BMs: # 4-5, 1-2x/ d\par 3/19/20: ub=941, BMs: #4-5, 1-2x/D\par 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\par 10/17/20 : Hgb=13, CRP< 5, ESR= 11, Iron=44, Ferritin=46, Alb=4, Phos=2.3, \par 11/5/20: ux=460; s/p IV Iron x 2 ,  11/4 and 1 week prior;   BMs:  # 4-5, 1-2x/D , no pain\par 11/18/20  Entyvio + IV Ca\par  1/4/21: Hgb=13.6, CRP<5, ESR=9, Ferritin=60, Alb=4.2, Phos=2.7\par 1/11/21: Entyvio & IV Ca\par 1/14/21: no pain, stool more formed ,  oy=750 - 137; BMs:  # 4-5, 1-2x/D \par 2/8/21: Entyvio & IV Ca\par 2/23/21 IV Ca\par 2/22/21: Hgb=12.7, CRP<5, ESR=8, Xlqiytdsc=531, Phos=2.3, Mag=1.8, P67=760, Zinc=58, Ze=299\par 2/26/21: fb=492,  BMs:  # 4-5, 1-2x/D , no pain \par 3/8/21 & 4/8/21: Entyvio\par 3/9/21 & 3/24/21: IV Iron\par 4/5/21: Hgb=13, CRP<5, ESR=9, Ferritin=94, Phos=3.1, Mag=1.7,Ca=9.1/ Alb=4\par             \par Pt Ins co is refusing to cover Entyvio q 4wks, despite numerous attempts to appeal, they also refuse to set up a peer to peer discussion betw myself and another healthcare provider, even one that works for a third party.  They do acknowledge that there is literature supporting the successful use in individual cases who don’t respond to the q 8 wk interval and need\par less then q 8weeks , but state it had not in FDA approved at less then 8wks so they will not approve it.  I spoke to the ins co rep and discussed that fact that patients should not be  pigeon holed since practicing medicine is done on an individual basis.  Humans are not all the same.   Their  response didn’t change eventhough the pt clinically needed the medication and it  induced a beneficial clinical response towards remission.  Therefore the patient and I decided to slowly increase the interval since the insurance company will not pay for this benefit.  Hopefully he may be able to maintain his present clinical state.  If not we will return to q 4weeks and will again appeal using this patients real clinical data .\par \par 4/9/21:  rr=898,  no pain, stool more formed # 4, 1-2x/d \par 6/11/21: wt= 135,   no pain, stool more formed # 4, 1-2x/d \par              recently had an episode of abd distenstion, pain, N/V, no BM\par              eventually started having diarrhea and flatus, BMs returned to normal\par              lost 2-3 lbs but regained\par  Doesnt recall eating anything different, no fever, chills, brbpr, melena\par  entyvio was 6/3/21--which is almost 8 weeks, was supposed to be 5-6 weeks  to start\par               6/4/21 Hgb=12, CRP<5, Ferritin=32, pt to receive IV iron    \par  7/12/21 Labs: Hgb=13.6, ESR=10, CRP=<5, Fewolxg=964, Alb=3.7, BUN=16\par \par  7/16/21 MRE: limited to incomplete bowel distention, chr distal ileitis/probable inflammatory stricturing vs collapse of the vladislav-TI--but improved since 2018 , moderate proctitis\par 7/20/21:  Last entyvio was --7/1, next early august\par                 hy=915 ,  no pain, stool more formed # 4-5/6, 1-2x/d \par 7/23/21 Entyvio level= 39, Abs <1.6\par 8/23/21: Labs--Hgb=13.6, ESR=10, CRP=6.6, Lwsnqzxn=071, Iron=26, Alb=3.9, BUN=16\par 8/26/21 p/w w N/V, abd pain/bloating  x 3 days, unable to keep things down\par Labs: WBC=5, Hgb=12, CRP=43, ESR=11, Alb=4.4, BUN=28, Creat=-1.6\par CT Abd/Pelvis: diffuse marked dilatation of SB Loops w transition pt in Rmid/lower abdomen\par ~ 5-6c, proximal to the ileocolonic anastomosis\par RX w IV solumedrol 20mg q8h, NGT, IVF, pt refused TPN, also w UTI from prostatitis\par 8/27 NGT was pulled, and clears started and advanced to LR,  was d/c home 8/30 on prednisone\par 40mg qd w taper by 10mg/wk--> to 20mg\par 10/15 Entyvio\par 10/25 Stelera (Ustekinumab)  # 1\par 11/3/21 [facility]: wt above 140, off pred x 2 weeks,  1-2 BMs qd\par  Discussed at IBD conference, reviewed previous colonoscopy, and recent imaging; consensus that due to malnutrition and steroid dependency, surgery is next best option however pt reports feeling great and wants to pursue medical treatment which is reasonable given he feels well \par repeat imaging in mid-January after 3 months of Stelara, and then consider referral to surgery vs improved candidacy for endoscopic manipulation (currently presumed one long stricture). \par \par 11/15/21 : uw=177, Hgb=12, ESR=3, CRP <5, Ferritin =104, Ca=9, Mg=1.7, Alb=3.7 \par 12/10/21 : iron infusion\par 1/4/22: Hgb=11.5, ESR=6, CRP <5, Ca=8.8, Mag=2.9, Alb=3.9\par 1/10/22 : qd=735, stools # 5, 1-2x/D \par 1/10/22 Today:  Feeling well, no c/o , CP, SOB/ AMARO, Cough, Wheeze, Palpitations, edema\par              Most recent labs  reviewed w patient:1/4/22\par 1/31/22: calprotectin=84\par 2/2/22: kv=391\par 2/14/22: Hgb=10.6, ESR=9, CRP <5, Ca=8.3, Mag=1.6, Alb=3.8, Iron=25, Ferritin=15\par \par 2/22/22: :  Last entyvios were -->7/1, 8/5, 9/2, 10/15/21\par              Stelara # 1 IV--10/25/21, second dose SC~ 12/10/21, next ~ 2/14/22\par \par              Early December 2021  had a " flare" loose BMs, N/V and bloat\par              Took Pred 40mg qd and tapered down 10mg / week , now off pred x 7-8 weeks\par    2/22/22     \par         no pain, n/v,  Bms: #4-5, 1-2 x/day \par        yy=939\par        Abd pain--no \par        Nausea--no \par        Vomit--no \par        Early satiety-no \par        Belching-no \par        Regurgitation--no \par        Ht burn--no \par        Throat Clearing-no\par        Globus-no\par        Hoarseness--no \par        Dysphagia--no \par        Constipation--no \par        Diarrhea- intermittent:  no\par        Bloating--no \par        Flatulence-no\par        Gurgling--no \par        Melena--no \par        BRBPR--no \par        Anorexia-no \par        Wt Loss--> gained 2 lbs but down overall \par \par \par \par        PRIOR HISTORY--2017\par \par        1/17/17: Hgb=9.2, ESR=6, CRP=5; 1/19/17: BMs: #4, 5-6, 2-3x/D, Ln=047, Started Creon 1 tid cc\par        3rd Entyvio begining Feb\par        2/14/17: Labs; Hgb=9.6, MCV=97, ESR=5, CRP< 5, G29=719, FA>23, Iron=59, Retic =3.2,\par        2/17/17 Fecal Calprotectin=16, Fats: Increased neutral & Split\par        3/13/17: Labs Hgb=9.5, MCV=95, ESR=7, CRP <5, ALB=3.1\par        3/16/17: lot of stress, to move -[place], had a job-fell thru, BMs: # 5, 2-4 x/D, wt= 131w clothes\par        4/19/17 EGD: gastritis, MACKENZIE, No HP, 2cm HH, +Barretts, No Dysplasia\par        Colonoscopy: Anastamosis: open w mild -mod active colitis, enteritis severe adj to anastomosis, mild more proximal, remainder of colon-wnl, 2-3 deg int hemorrhoids\par        4/26/17 : Hgb=7.3, MCV=95, ESR=13, CRP<5;Immediately post procedure stools very dark on eliquis/iron.\par        Admitted to [facility]: Hgb=8.3-->8.9 after 2 U, Alb 3.3, BUN=17, creat=1.3, Malina/Ovzsy=705/460\par        4/27/17: Alb=2.3, BUN=12, creat=1.2, Malina/Lipase=209/863-No abd pain, N/V; 5/5/17: Hgb=10.7.\par        5/8/17 Entyvio iv. 5/8-5/9 w dark red diarrhea; 5/10/17 Hgb=7.8.\par        5/11/17 Adm [facility] w stools brown, Hgb=7.9, BUN=17, Creat=1.4, Alb=2.9; S/P 2 Units- Hgb=10.6, BUN=15/1.1.\par        Prednisone 40mg qd, entocort d/dee.; 5/18/17: Hgb-10.4, ju=187, BMs: #5, 1-2x/D, no pain\par        6/8/17: Hgb=7.4,MCV=98, CRP<5-ASA stopped, Pred20--> 30\par        6/10/17: Hgb=8.2, Alb=2.9, BUN=20/1.2 ; 6/12/17: Hgb=8.3, Retic=0.19, Iron=10, Sat%=3, Ferritin=57, FA=23,L57=549, 6/13/17: s/p 2 Unit PRBCs\par        6/15/17: started MCT oil, Nd=209 , BMs: # 5, 1-2x/D, stools are brownish/green \par        7/11/17: [facility] w unsteadiness. MRI Head: R Cortical Temporo-occipital encephalomalacia, MRA H&N-no sign lesions, PER-wnl.Hgb=9.9--> 8.4->9.7 after 1 Unit. Seen by Heme: received IV Iron \par        CRP<5, C78=616, MWO=453, FA>23,Iron=22,Sat%=6, Ferritin=42, Alb=3.5. D/C on warfarin 2mg\par        7/20 Hgb=8.5, 7/28 Hgb=7.7, 7/31-s/p 1 Unit \par        As outpt seeing Heme, to get IV Iron and 5 IV Copper\par        Had 'FLU' Fever=101, chills, bloat, N/V/D, Bilious. no pain, melena,brbpr\par        Given anti-emetic by dr Butler,then able to keep things down\par        On prednisone 25, warfarin w INR bet 1.6-2\par        8/17/17: Hgb=9.9, ESR=20, CRP-P,Rm=832, BMs: # 5, 1-2x/D, stools are brownish/green\par        10/2/17: Hgb=8.8, MCV=89,Phos=1.7, K=3.9, Mag=1.9, Alb=3.6,Iron<10,Ferritin=21, INR=2\par        10/17/17: Hgb=7.9,ESR=6,CRP<5, INR=1.6\par        10/25/17: Hgb=10, ESR=5, CRP=5, Alb=3.6, Phos=2, Blckcodz=172, G39=601\par        11/16/17: Hgb=11.2, ESR=14, CRP=12, \par        11/13/17: MRE-Chr mild distension of distal & vlaidslav-ileum s/o mild stricturing at anast, mild mural thick & mucosal enhancemt ~ 20cm; little change from 2015 c/w mild acute on chr ileitis, 2.1 cm Liver hemangioma\par        11/28/17: Entyvio\par        11/29/17: Hgb=9.6, ESR=10, CRP=5.8, Alb=3.8,Ferritin-P, Iron=14,Sat=4%, Phos=2.5\par        12/19;17: Hgb=10.1, ESR=12, CRP=6.5; 12/21/17: BMs:#3-5, 1-3x/D , brown, no blood, no pain, jw=939\par        1/3/18:Hgb=11.7, ESR=19, CRP=6.5, Alb=4.1, Gwtdogyo=976, Iron=31, Sat%=9,Zinc=55\par \par \par        PRIOR HISTORY---2013:\par \par        2/20/13 CT markedly dilated sb loops ext to anast, colonoscopy w open anast ,mild-mod remy-anastamotic disease. quick response to entocort/humira--probably adhesive dis. \par        8/10/13 w GNR bacteremia, ADA 1.7 /KAYLAH 3.4, Humira 8/7, 8/13,8/18,9/15\par        CT- mucosal thickening and spiculation of the distal sb extending to the anastamosis, thickening and stranding of adj mesenteric fat.\par        Humira increased to 40mg weekly, entocort 4\par        9/2013 MRE--dilated loops in mid and distal ileum, markedly thickened and narrowed TI w decreased peristalsis of TI\par        11/15/13 ADA-24.9, KAYLAH-0\par \par \par        PRIOR HISTORY---2014:\par \par        2/15/14--CT dilated loops SB, loop of sb in mid abd 4.3cm w infiltrative changes in the mesentery, bowel tapers in the RLQ to the anastamosis w/o transition\par        WBC=15K, Rx w IV steroids and Abx \par        3/7/14 SBFT: last 10cm sb prox to anast mild distension and sl irregularity. In the mid portion of this loop there is a mild narrowing which appears to reopen but is some what narrowed.\par        3/20/14 ADA=33.1, KAYLAH=0, Lialda switch to Apriso 4 (2/2014)\par \par \par        PRIOR HISTORY--2015\par \par        1/11/15 Adm [facility] w 1 day N,Recurrent V, Abd pain/distension. CT-multiple distended & fluid-filled sb loops, mild wall thickening of ileum w No inflamm changes.\par        WBC=14.6, Hgb=17, RX w NGT suction, Levaquin iv, Solumedrol 40mg q 12( 1/12-->1/16) to prednisone 30mg BID w 5mg taper/wk\par        3/18/15 --Dr. Butler w edema /High BP, prednisone was tapered slowly to 2.5mg \par        switched to entocort 9mg qd, edema and bp improved w lasix 20mg \par        BMs:#4-5, 3x/D, Hgb=11, ESR=4, CRP<5\par        4/28/15 - 5/1/15: Adm [facility] w 1 day Abd pain, distension,N/V. 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 side of anast, opening appeared ulcerated,scarred & severely narrowed\par        6/11/15: [facility] w N/V x1, decr appetite, CT ABD: no obstruction, dilated thickened sb loops of distal jej and ileum\par        6/19/15 [facility]: s/p Lap w extensive lysis of adhesions, hepatic flex, sigmoid and sb anastamosis, s/p partial r colectomy and sb resection--side to side elisabeth: 8-10 inch from prior anast to TV colon.\par        7/17/15: BMs:#4-6, 4-5x/D, wt 131(from 126)\par        8/20/15: BMs: #4, 1-2x/D or #5, 2-3x/D, cx=038, dry cough,Hgb=10, WBC=3, TOW=330, CRP=56, ESR=21\par        8/25/15 CT Abd/Pelv: Svl RLQ sb loops w wall thickening, mild nodularity & inflamm stranding in mesentery\par        Advised-restart Entocort 9mg qd, Apriso 4 qd, Maintain Humira but given RX to check drug/Ab levels\par        9/15/15: did nt restart meds,Hgb=9.9, WBC=4, ESR=19, CRP=5.8, st=268; [facility] : ADA=8.5, Antibodies< 1.7\par        10/15/15: No pain, BMs:#4, 1-2x/D, #5-6, 3-4x/D, throat clearing/cough-better, an=727\par        11/30/15: No pain, BMs:# 4, 5-6 intermittently, No throat clear, tu=929\par \par \par        PRIOR HISTORY-2016\par \par        1/22/16; 4/8/16; 6/6/16 : No pain, BMs:# 4-5 1-2x/D, also #5-6 3x/D for 2-3D/wk, pu=526\par        7/14/16: gy=787, 9/22/16: ra=244.5\par        11/10/16: 9.5lb wt loss, states BMs: 5-6, 5x/D--Taking Magnesium, No pain/anorexia\par        Labs: Stool Wiyqqceesyev=161, + Incr Fecal fat, Alb=3.4, FA =23, M17=558, Hgb=12, ESR=10, CRP <5\par        Magnesium-d/c, Obtain MRE asap--Start steroids and possibly switch to Entyvio\par        DDx discussed: active crohns--loss response to Humira, Malabsorption--loss Bile acids, Bile-induced diarrhea, SIBO\par        Pt wanted to wait for imaging-did not start rx\par        12/1//16 MRE: RUQ ileocolonic anastamosis--narrowed w upstream dilatation to 3.2 cm\par        Pt refused pred, Started Entocort 9mg qd and Flagyl 250mg qid about 7-10days ago \par        awaiting Entyvio load to start 12/22, then 1/5; had Cut back on iron bid\par        12/15/16: BMs:# 5, 2-3x/D, occas #6, No pain, Less bloat/flatulence, Pb=956.

## 2022-02-22 NOTE — ASSESSMENT
[FreeTextEntry1] : 1. CROHNS DISEASE   of both small and large intestine: s/p flare 8/25-->8/30/21, again early 12/2021-s/p pred taper over 4 weeks\par    s/p ileocolonic resection x 2--last 6/19/15 for recurrent sbos: \par prior was s/p 4 SBOs w/i 2 yrs--2/2 distal sb disease adj to anastamosis\par when on Humira weekly had an  ADA =33 (3/20/14), -but had sbo 2/2014 at ADA=25 and another sbo 4/28/15\par 6/10/2015 Colonoscopy: Inflamm ST mass on ileal side w ulceration/scarred/narrow\par 6/11/2015 CT: dilated thickened sb loops distal jej & ileum\par Post-op 6/2015 probably some malabsorption 2/2 to removal of R Colon and ileum: \par had WT. Loss-->r/o malabsorption:  ?bile-induced->-secretory vs decr micelles, active dis, PLE\par ?  SIBO--Elev FA, Alb=3.4-->3.1-->2.9--> (7/28/17)\par ?SIBO/Prevent post-op recurrence --> trial Flagyl 250 mg qid~12/7/16-->d/c: 5/11/17\par Also discussed trial of Cholestyramine/Xifaxin -wanted to wait\par 11/20/16 ACTIVE Crohn's-->  9.5lb wt loss, BMs: #5-6, 5x/D-- but taking Magnesium \par 12/1/16 MRE: RUQ ileocolonic anastamosis--narrowed w upstream dilatation to 3.2 cm\par Labs: Stool Cysamyszaqfj=335, + Incr Fecal fat, Alb=3.4, FA =23, V79=051, Hgb=12.3,ESR=10,CRP <5\par 4/19/17 :Colonoscopy: Anastamosis: open w mild -mod active colitis, enteritis severe adj to anastomosis, mild more proximal, remainder of colon=wnl\par 5/11/17- Adm PMHC w stools brown, but Hgb=7.9, BUN=17, Creat=1.4, Alb=2.9.\par S/P 2 Units w Hgb=10.6, BUN=15/1.1, Prednisone 40mg taper, entocort d/dee\par 6/8/17: Hgb=7.4, MCV=98, CRP<5--ASA stopped, Pred20--> 30mg, 6/13/17: s/p 2 Unit PRBCs\par 7/11/17 [facility] w unsteadiness. MRI Head: R Cortical Temporo-occipital encephalomalacia\par Hgb=9.9--> 8.4->9.7 after 1 Unit. Seen by Heme: received IV Iron \par CRP<5, B51=707, BNC=450, FA>23,Iron=22,Sat%=6, Ferritin=42, Alb=3.5. D/C on warfarin 2mg\par 7/28/17 Hgb=7.7; 7/31/17-s/p 1 Unit \par Heme Consultation ~ 8/2017: started  IV Infusions of  Iron and  IV Copper\par 8/17/17: Hgb=9.9, ESR=20, Bi=096--Qdqezmzqzn s/p GI infection w N/V/D, no obstruction\par 10/17/17: Hgb=7.9,ESR=6,CRP<5, INR=1.6, Got IV Iron x 2, since 10/2/17 for Iron<10, Hgb=8.8\par 11/16/17: Hgb=11.2, ESR=14, CRP=12, 10/26/17 Stool fat-neg, calprotectin-30\par 11/13/17: MRE-Chr mild distension of distal & vladislav-ileum s/o mild stricturing at anast, mild mural thick & mucosal enhancemt ~ 20cm; little change from 2015 c/w mild acute on chr ileitis, 2.1 cm Liver hemangioma\par 10/9/18: Hgb=11.6, MCV=94, ESR=14, CRP=5.4, INR=2.2\par 10/10/18 MRE: stricturing of neoterminal ileum w worsened upstream dilatation, moderate length of upstream ileum w stricturing extending at least 20cm and more extensive then previous. suggestion of 2 adj extraluminal fluid collections which may be w/i the wall of strictured ileum near the ileocolonic anastamosis\par pt had declined to call numbers given for  IBD center at [facility] for new or investigational rx's--biologics.  \par later he felt  he was  stablizing w his  wt loss, and inflammatory markers\par \par 2/28/19 w ESR=12, CRP=6.5 & 2/21/19 stool calprotectin--> 232\par 8/15/19: ESR=10, CRP=5.2, Calprotectin--> 88\par 9/19/19: ESR=9, CRP=5.5\par 10/17/19: ESR=7, CRP< 5\par 11/6/19 : ESR=6, CRP=0.18\par 11/27/19: ESR=6, CRP <5\par 1/13/20: ESR=7, CRP=0.2\par 1/30/20: ESR=9, CRP=11\par \par 2/3/20 EGD: gastritis, +MACKENZIE, No HP, +erosion w ooze -clipped, 0.5cm polyp-neg; 4cm HH, Esophagitis A, + Barretts, VC: 1+\par Colonoscopy: 05cm rectal polyp: HP, 2nd deg int hemorrhoids\par mild-mod activity: MARILY w cryptitis & mild archect distortion at ileocolonic anast: colon & ileal side, no stricture\par \par 3/2/20:ESR=7, CRP=0.26\par 3/9/20: VDZ>40, Ab to VDZ <1.6\par 3/17/20: ESR=6, CRP=6.4\par 10/17/20: ESR=11, CRP <5\par 1/4/21:ESR=9, CRP<5\par 2/22/21: ESR=8, CRP<5\par 4/5/21: ESR=9, CRP<5\par 6/4/21: ESR=9, CRP<5  \par 6/11/21: wt= 135,   no pain, stool more formed # 4, 1-2x/d \par              s/p episode --abd distenstion, pain, N/V, no BM\par              eventually started having diarrhea and flatus, BMs returned to normal\par              lost 2-3 lbs but regained\par 7/4/21: CRP <5, Hgb=12\par  7/16/21 MRE: limited to incomplete bowel distention, chr distal ileitis/probable inflammatory stricturing vs collapse of the vladislav-TI--but improved since 2018 , moderate proctitis\par 7/12/21   --  ? flare --> entyvio should have been  q 5 wk , went q 8 wks\par                      next dose should be in 4 weeks x 2 then 5 weeks, to check levels\par 7/20/21: Cliically stable, unclear if MRE accurate 2/2 underdistension, but gained wt and CRP--normal\par 7/23/21 Entyvio level= 39, Abs <1.6\par 8/23/21: Labs--Hgb=13.6, ESR=10, CRP=6.6, Hrxoavbl=734, Iron=26, Alb=3.9, BUN=16\par 8/26/21 p/w sbo  w N/V, abd pain/bloating  x 3 days, unable to keep things down\par Labs: WBC=5, Hgb=12, CRP=43, ESR=11, Alb=4.4, BUN=28, Creat=-1.6\par CT Abd/Pelvis: diffuse marked dilatation of SB Loops w transition pt in Rmid/lower abdomen\par ~ 5-6cm, proximal to the ileocolonic anastomosis\par RX w IV solumedrol 20mg q8h, NGT, IVF, pt refused TPN, also w UTI from prostatitis\par 8/27 NGT was pulled, and clears started and advanced to LR,  was d/c home 8/30 on prednisone\par 40mg qd w taper by 10mg/wk to '20mg qd \par \par (  **Entyvio's  :time 0=12/22/16 ( Humira 40mg QW); 1/5/17--2wks, s/p 3rd infusion at wk 6, then q 6weeks \par #6 :6/21/17-->held 2/2 Shingles, then  Infusions as follows=9/19/17 , 10/17/17, 11/28/17, 1/16/18 ,2/16/18, 3/19/18,4/16/18, 5/14/18, 6/25/18, 8/7/18, 9/17/18, 10/16/18, 11/13/18, 12/11/18, 1/14/19, 2/15/19, 3/15/19, 5/16/19, 6/18/19,7/24/19, 9/9/19, 10/2/19, 11/4/19, 12/12/19, 1/6/20, 2/4/20, 3/3/20, 9/17/20, 10/15/20, 11/18/20, 1/11/21, 2/8/21, 3/8/21, 4/8/21, 6/3/21, 7/1/21, 8/2/21, 9/2/21,10/25/21  )\par \par A / PLAN:  s/p sbo prox to anastamosis\par                  inflammatory vs fibrosis vs combination\par      Responded to  steroids iv--> po--d/c ~ 10/20/21,\par      tapered steroids from 40mg qd  to 20mg, [id number] qd and  taper 5mg/wk\par      then po taper again early 12/2021 40mg qd w 10mg taper/ wk--now off 7-8  weeks \par \par      Entyvio level was 39 w/o Abs~ 3weeks after 7/1/21, \par      speaks to inflammatory component & probably loss of response\par      Stelara # 1 dose on 10/25/21, #2 on 12/10/21, # 3on  2/11/22,  next  4/20/22\par \par       IBD consensus due to malnutrition /steroid dependency, surgery is next best option \par      pt reports feeling well and wants to pursue medical treatment \par      repeat imaging in mid-March after 3 months of Stelara, then consider surgery vs improved candidacy for      endoscopic manipulation (currently presumed one long stricture).\par        will f/u w  IBD consultant Dr. Heard at  imaging after 3 months of Stelera\par        for  Colonoscopy ( w possible balloon dilatation )-> last 1 3/4 yrs ago\par \par 1/4/22 Labs: Hgb=11.5, ESR=6, CRP<5, Alb=3.9\par 1/31/22 Calprotectin =84\par 2/14/22: Hgb=10.6, ESR=9, CRP <5, Ca=8.3, Mag=1.6, Alb=3.8, Iron=25, Ferritin=15\par \par Probiotics 3 qd \par Diet:  LR, Lactose free, protein drinks tid\par MCT oil begun but never maintained \par **trend --cbc, esr, crp, albumin\par   \par **monitor wt--- usu bet 136-139, 7/20/21=136, 11/22/21=139, 1/4/22=132, today= 132\par \par \par                                                                                                                                                                                                                                                                                                                                                                                                                                                                                                                                                                                                                                                                                                                                \par 2. Iron deficiency anemia : \par  had initially dropped , after clinical flare and post procedure bleeding\par Probably multifactorial: ACD, Blood loss, malabsorption/nutrient deficiencies\par Eliquis-->warfarin after CVA, On Entyvio \par 7/11/17 s/p  Adm for unsteady gait w MRI c/w CVA--warfarin begun: possible better control of AC\par Chromagen 2 qd--> IV Iron , s/p IV Cu x 5, FA 1mg qd , B12 SL & IM\par Still requiring IV Iron and cu infusions prn \par most recent IV Iron  :  12/10/21 for Ferritin=51 on 11/29/21\par 2/22/21: K11=255, \par 6/4/21: Cu=91, Zinc=69, Ferritin=32,Iron=43, \par 7/12/21: Wn=843, Zinc=74, Tmoujdmx=496, Iron=35\par 11/15/21 : Hgb=12,  Ferritin =104, Ca=9, Mg=1.7\par 1/4/22: Hgb=11.5, Ca=8.8, Mg=2.9, Inyrszb=799 \par 2/14/22: Hgb=10.6, Ca=8.3, Mag=1.6, Alb=3.8, Iron=25, Ferritin=15\par B12 1cc IM ____R. delt--  given today, \par trend cbc, B12, FA, Iron panel \par Adj INR--closer to low 2's.    \par \par \par \par \par 3. Osteoporosis \par : Progression on BD from 5/5/16\par Crohns, h/o steroid use\par Calcium citrate & Vit D per Endo\par Forteo since 5/10/19 , then  Reclast          \par Repeat BD of 8/2018 --showed worsening to Osteoporosis from 2016\par Rec Endo consult w Dr. Akers :Osteoporosis center at [facility]--pt never went originally \par 9/21/18: Phos=2.4, Ca=8.7, Alb=3.4, Vit D=27, VOT=386, \par 10/16/18: Phos=2.8, Ca=8.7, Alb=3.5,\par 1/14/19: Phos=2.6,  Ca=8.7, Alb=3.6\par 2/28/19: Phos=1.8, Ca=8.9, Alb=3.7, VitD=39, SEE=264\par 3/18/19: Ca=8.5, Alb=3.7, Vit D=44.6, PTH=93\par 7/11/19: Ca=9.1, Alb=3.7, Phos=3.9, Vit D=40/ 306, GTU=562\par 8/15/19: Ca=9, Alb=4.2, Phos=4.4, VitD=59, LIR=347\par 10/17/19: Ca=9.6, Alb=3.9, Phos=3.5\par 11/6/19: Ca=9.1, Alb=3.9. Phos=3.7, VitD=50, PTH=80\par 1/13/20: ca=9.1\par 1/30/20: Ca=9.2, Alb=3.9, Phos=2.7, Mag=1.5, Vit D=103/41, PTH=87\par 2/18/20:ca=8.5, Alb=3.6, \par 3/2/20: Ca=8.5, Alb=3.6\par 3/17/20: Ca=9.1, Alb=3.7, Phos=3.4, Mag=1.7\par 10/17/20: Ca=8.9, Alb=4, Phos=2.3, Mag-2.0, Vit D=66, PTH=47\par 1/4/21 : Ca=9.4, Alb=4.2, Phos=2.7, Mag=2, Vit D=64, PTH=87.5\par 4/5/21: Ca=9.1, Alb=4, Phos=3.1, Mag=1.7, \par 6/4/21: ca=9, Alb=3.8, Phos=2.8, Mag=1.8\par 7/12/21: Ca=8.6, ALB=6, phosph=2.3, Mag=1.8\par 11/15/21: Ca=9, Alb=3.7, Mag=1.7\par 1/4/22: ca=8.8, Alb=3.9, Mg=2.9, phos=2.9\par 1/21/22: Ca=8.8, Alb=3.9, Vit D=60, PTH=85\par 2/14/22:  Ca=8.3, Mag=1.6, Alb=3.8, \par Dr. Akers: Hyperparathyroidism-- probably secondary due to low Vit D/Ca & ? superimposed primary, \par [facility] ENT Consult init felt  Parathyroid scan showing activity in mediastinum is ectopic parathyroid, then felt sx not indicated at that time, elev PTH is secondary to low  Vit D/Ca\par Rx replete Vit D and current IV Calcium-as per endo \par trend Vit D, Ca, Phos, PTH,  \par \par BD--9/2020: incr in spine and hip , no change in wrist\par 10/5/20-s/p Reclast--repeat 9/2021 \par \par \par \par \par \par 4. GERD:  recently was  active by ENT , no ht burn,  dysphagia,  + throat clear\par               h/o  Dry cough, CXR:ana, saw ENT eulogio-->LPR\par * ++ LPR,  ++  Page’s w No Dysplasia,  + H/O  Esophagitis grade: A   was found \par \par Recommend: \par * Anti-reflux diet & life-style changes reviewed & re-emphasized.  ++  Bedge required\par * Weight reduction & regular exercise emphasized\par \par * need for  PPI:  Omep 40 BID ,  ++ H2B q HS:Zantac 300mg--> Pepcid 40mg\par No Carafate  1 gram:   --was emphasized\par I reviewed & summarized the prospective randomized & retrospective non-randomized studies\par looking at potential long term SE's of PPIs, w special attention to associations & actual cause\par as related to GI infections, bone loss, cognitive changes, KD, Covid, vitamin & electrolyte deficiencies\par questions were answered, pt advised that PPIs should be used when needed as indicated by their\par clinical indication and response and tapering off is always the goal if possible. pt understood.\par \par *  F/U  EGD:  2/2022--for Barretts screening / surveillance \par * No  need for pH Monitor,   Manometry,   Esophagram --\par *  ++  need for ENT  eval/F/U,  No  need for Surgical  eval  --  \par \par \par \par \par \par 5. Hemorrhoids:  well - controlled.  No pain,  swelling,  itch,  bleeding\par * Discussed previously the potential complications of thrombosis,  pain,  infection,  swelling, itching,  bleeding \par Reccomend: \par * currently Low   - Fiber Diet was emphasized\par * 6  --  8 cups of decaffeinated fluid daily was emphasized \par * Sitz Bathes prn,   No:  Anusol HC  Suppos / Cream  AR BID -- was needed\par * No:  Tucks BID,  Balneol Lotion,   Calmoseptine Oint -- was needed ;    ++ Prep H prn\par * No:  need for  Colorectal surgical evaluation for possible ablation\par \par \par \par \par \par \par \par 6. Barretts esophagus without dysplasia  \par Notes: see GERD.    \par \par \par \par 7. Hepatomegaly-->not confirmed by recent abd sono\par CTE w relative enlargemt, ? lobulation & enlarged portal/mesenteric veins\par LFTs-wnl, no ascites or edema\par R/O CLD, Hepatic vein thrombosis\par Abd sono w doppler--> Liver and spleen normal size, no thrombosis, normal portal and hepatic vein flow\par \par \par \par \par Informed Consent:\par * The risks & Benefits of EGD were discussed w patient.\par * This included but was not limited to perforation, bleeding, sedation /med rxns possibly requiring surgery, blood transfusions, antibiotics & CPR/Intubation.\par * Pt. understands & agrees to the procedures.\par The following instructions in regards to the prep and medically essential ( cardiac, pulmonary, sz, psych, endocrine)  pre-op medication administration\par was reviewed and emphasized with the patient . \par * Pt. advised to D/C  ASA/NSAIDs  7  Days   PTP.\par * [ +++ ]  Dulcolax / Miralax / Mag. Citrate ,  [     ] Prepopik/ Clenpiq ,  [     ] Osmo Prep,  [    ] GoLytely,  prep. reviewed w Pt.\par * Hold  [           ] AM of procedure.\par * Hold  [           ] PM  before procedure.\par * Take  [           ] PM  before procedure.\par * Take  [           ] AM of procedure.\par \par \par

## 2022-03-28 ENCOUNTER — NON-APPOINTMENT (OUTPATIENT)
Age: 57
End: 2022-03-28

## 2022-03-28 ENCOUNTER — RESULT REVIEW (OUTPATIENT)
Age: 57
End: 2022-03-28

## 2022-03-28 ENCOUNTER — APPOINTMENT (OUTPATIENT)
Dept: HEMATOLOGY ONCOLOGY | Facility: CLINIC | Age: 57
End: 2022-03-28
Payer: COMMERCIAL

## 2022-03-28 PROCEDURE — 36415 COLL VENOUS BLD VENIPUNCTURE: CPT

## 2022-03-28 PROCEDURE — 99213 OFFICE O/P EST LOW 20 MIN: CPT | Mod: 25

## 2022-03-28 NOTE — REVIEW OF SYSTEMS
[Negative] : Allergic/Immunologic [Fatigue] : fatigue [Eye Pain] : no eye pain [Vision Problems] : no vision problems [Dysphagia] : no dysphagia [Hoarseness] : no hoarseness [Chest Pain] : no chest pain [Lower Ext Edema] : no lower extremity edema [Shortness Of Breath] : no shortness of breath [Cough] : no cough [Vomiting] : no vomiting [Constipation] : no constipation [Diarrhea] : no diarrhea [Dysuria] : no dysuria [Joint Pain] : no joint pain [Skin Rash] : no skin rash [Insomnia] : no insomnia [Dizziness] : no dizziness [Anxiety] : no anxiety [Depression] : no depression [Muscle Weakness] : no muscle weakness [Easy Bleeding] : no tendency for easy bleeding [Easy Bruising] : no tendency for easy bruising

## 2022-03-28 NOTE — ASSESSMENT
[FreeTextEntry1] : 1.  Anemia- Hgb 10.6- decline -c heck irons- will attempt Injectafor as venofer not holding \par  -blood loss, anemia of chronic disease with need for intermittent iron  \par - copper deficiency - replaced, level WNL 5/2020\par -November 4 2020- Venofer 400 mg x 2- March 2021, Aug 2021, Nov 2021- Injectafor x 2 Feb 2022- markedly improved response to Injectafer- medically necessary for him to receive Injecatfor \par - hospitalized with flare up of colitis, got one iv iron in the hospital- started Straterra IV at the end of Oct- will begin self inj Dec 2021\par \par 2. 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 Hypogammaglobulinemia- no increased infection rate \par - s/p  Prevnar 13 12/2018 \par -  Pneumococcal vaccine 23 boost \par - s/p  Shingrex\par -  COVID vaccine - Pfizer x 3\par - Flu shot \par \par 5. Zinc deficiency\par - oral Zinc, level better - 72\par \par 6. 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 No evidence of lymphatic invasion. 8/10 LN's for eli metastatic papillary thyroid cancer\par -received iodine 131 at 29.3 mCi in October 2020\par -On synthroid\par -Due for US Head and soft tissue neck May 2022- follows with Dr. Akers\par \par Dr. Luis Felipe Monsalve\par cell [phone]\par office [phone]\par \par Labs next visit- PT, INR, irons, cbc and chem \par \par colonoscopy with dilation upcoming in May 2022\par \par case and mgmt discussed with Dr. Biswas- return in 6 weeks cbc chem irons, zinc and copper\par \par \par

## 2022-03-28 NOTE — HISTORY OF PRESENT ILLNESS
[0 - No Distress] : Distress Level: 0 [de-identified] : Patient is a 52 year old who is referred for initial consultation for anemia secondary to Crohns/GI bleed vs Iron Deficiency/ Vit b12 def. 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 Patient is status post ileo-colonic resections for SBO  in 2016. In 2016 patient had complaints of 10 - 15 lb weight loss. Labs at that time showed Hgb of 12, steatorrhea and stool calprotectin = 236. In 2016 MRI/MRE with narrowing at ileocolonic anastomosis with upstream dilation. Pt refused bridge with  prednisone and Entocort / Flagyl. In 2017 patient Hgb dropped to 9.5. On 2017 patient underwent an EGD and Colonoscopy. Findings consistent with Page's and no dysplasia or AVMs. Colonoscopy showed an open anastomosis but active disease, moderate on the colonic side and limited to the anastomosis and moderate to severe enteritis, just proximal to the anastomosis. Pat had routine labs on 05/10/2017 Hgb: 8.3.  [FreeTextEntry1] : s/p injectafer, copper\par warfarin [de-identified] : Patient presents for follow up of DVT, anemia, MTHFR Gene mutation, colitis. Patient overall feels well. he had two iron infusions since his last appointment.

## 2022-03-28 NOTE — CONSULT LETTER
[Dear  ___] : Dear  [unfilled], [Consult Letter:] : I had the pleasure of evaluating your patient, [unfilled]. [Please see my note below.] : Please see my note below. [Consult Closing:] : Thank you very much for allowing me to participate in the care of this patient.  If you have any questions, please do not hesitate to contact me. [Sincerely,] : Sincerely, [DrMeron  ___] : Dr. REARDON [FreeTextEntry3] : Angie Biswas MD\par [facility] [place] [facility]\par

## 2022-04-01 ENCOUNTER — NON-APPOINTMENT (OUTPATIENT)
Age: 57
End: 2022-04-01

## 2022-04-05 ENCOUNTER — APPOINTMENT (OUTPATIENT)
Dept: GASTROENTEROLOGY | Facility: CLINIC | Age: 57
End: 2022-04-05
Payer: COMMERCIAL

## 2022-04-05 VITALS
DIASTOLIC BLOOD PRESSURE: 60 MMHG | OXYGEN SATURATION: 98 % | WEIGHT: 131 LBS | SYSTOLIC BLOOD PRESSURE: 120 MMHG | HEART RATE: 84 BPM | HEIGHT: 68.5 IN | BODY MASS INDEX: 19.63 KG/M2

## 2022-04-05 PROCEDURE — 99215 OFFICE O/P EST HI 40 MIN: CPT

## 2022-04-05 NOTE — ASSESSMENT
[FreeTextEntry1] : 1. CROHNS DISEASE   of both small and large intestine: s/p flare 8/25-->8/30/21, again early 12/2021-s/p pred taper over 4 weeks\par    s/p ileocolonic resection x 2--last 6/19/15 for recurrent sbos: \par prior was s/p 4 SBOs w/i 2 yrs--2/2 distal sb disease adj to anastamosis\par when on Humira weekly had an  ADA =33 (3/20/14), -but had sbo 2/2014 at ADA=25 and another sbo 4/28/15\par 6/10/2015 Colonoscopy: Inflamm ST mass on ileal side w ulceration/scarred/narrow\par 6/11/2015 CT: dilated thickened sb loops distal jej & ileum\par Post-op 6/2015 probably some malabsorption 2/2 to removal of R Colon and ileum: \par had WT. Loss-->r/o malabsorption:  ?bile-induced->-secretory vs decr micelles, active dis, PLE\par ?  SIBO--Elev FA, Alb=3.4-->3.1-->2.9--> (7/28/17)\par ?SIBO/Prevent post-op recurrence --> trial Flagyl 250 mg qid~12/7/16-->d/c: 5/11/17\par Also discussed trial of Cholestyramine/Xifaxin -wanted to wait\par 11/20/16 ACTIVE Crohn's-->  9.5lb wt loss, BMs: #5-6, 5x/D-- but taking Magnesium \par 12/1/16 MRE: RUQ ileocolonic anastamosis--narrowed w upstream dilatation to 3.2 cm\par Labs: Stool Kxbkxepqyhwz=786, + Incr Fecal fat, Alb=3.4, FA =23, Q31=813, Hgb=12.3,ESR=10,CRP <5\par 4/19/17 :Colonoscopy: Anastamosis: open w mild -mod active colitis, enteritis severe adj to anastomosis, mild more proximal, remainder of colon=wnl\par 5/11/17- Adm PMHC w stools brown, but Hgb=7.9, BUN=17, Creat=1.4, Alb=2.9.\par S/P 2 Units w Hgb=10.6, BUN=15/1.1, Prednisone 40mg taper, entocort d/dee\par 6/8/17: Hgb=7.4, MCV=98, CRP<5--ASA stopped, Pred20--> 30mg, 6/13/17: s/p 2 Unit PRBCs\par 7/11/17 [facility] w unsteadiness. MRI Head: R Cortical Temporo-occipital encephalomalacia\par Hgb=9.9--> 8.4->9.7 after 1 Unit. Seen by Heme: received IV Iron \par CRP<5, W21=066, MPC=369, FA>23,Iron=22,Sat%=6, Ferritin=42, Alb=3.5. D/C on warfarin 2mg\par 7/28/17 Hgb=7.7; 7/31/17-s/p 1 Unit \par Heme Consultation ~ 8/2017: started  IV Infusions of  Iron and  IV Copper\par 8/17/17: Hgb=9.9, ESR=20, Lp=226--Vqkkrsjnsz s/p GI infection w N/V/D, no obstruction\par 10/17/17: Hgb=7.9,ESR=6,CRP<5, INR=1.6, Got IV Iron x 2, since 10/2/17 for Iron<10, Hgb=8.8\par 11/16/17: Hgb=11.2, ESR=14, CRP=12, 10/26/17 Stool fat-neg, calprotectin-30\par 11/13/17: MRE-Chr mild distension of distal & vladislav-ileum s/o mild stricturing at anast, mild mural thick & mucosal enhancemt ~ 20cm; little change from 2015 c/w mild acute on chr ileitis, 2.1 cm Liver hemangioma\par 10/9/18: Hgb=11.6, MCV=94, ESR=14, CRP=5.4, INR=2.2\par 10/10/18 MRE: stricturing of neoterminal ileum w worsened upstream dilatation, moderate length of upstream ileum w stricturing extending at least 20cm and more extensive then previous. suggestion of 2 adj extraluminal fluid collections which may be w/i the wall of strictured ileum near the ileocolonic anastamosis\par pt had declined to call numbers given for  IBD center at [facility] for new or investigational rx's--biologics.  \par later he felt  he was  stablizing w his  wt loss, and inflammatory markers\par \par 2/28/19 w ESR=12, CRP=6.5 & 2/21/19 stool calprotectin--> 232\par 8/15/19: ESR=10, CRP=5.2, Calprotectin--> 88\par 9/19/19: ESR=9, CRP=5.5\par 10/17/19: ESR=7, CRP< 5\par 11/6/19 : ESR=6, CRP=0.18\par 11/27/19: ESR=6, CRP <5\par 1/13/20: ESR=7, CRP=0.2\par 1/30/20: ESR=9, CRP=11\par \par 2/3/20 EGD: gastritis, +MACKENZIE, No HP, +erosion w ooze -clipped, 0.5cm polyp-neg; 4cm HH, Esophagitis A, + Barretts, VC: 1+\par Colonoscopy: 05cm rectal polyp: HP, 2nd deg int hemorrhoids\par mild-mod activity: MARILY w cryptitis & mild archect distortion at ileocolonic anast: colon & ileal side, no stricture\par \par 3/2/20:ESR=7, CRP=0.26\par 3/9/20: VDZ>40, Ab to VDZ <1.6\par 3/17/20: ESR=6, CRP=6.4\par 10/17/20: ESR=11, CRP <5\par 1/4/21:ESR=9, CRP<5\par 2/22/21: ESR=8, CRP<5\par 4/5/21: ESR=9, CRP<5\par 6/4/21: ESR=9, CRP<5  \par 6/11/21: wt= 135,   no pain, stool more formed # 4, 1-2x/d \par              s/p episode --abd distenstion, pain, N/V, no BM\par              eventually started having diarrhea and flatus, BMs returned to normal\par              lost 2-3 lbs but regained\par 7/4/21: CRP <5, Hgb=12\par  7/16/21 MRE: limited to incomplete bowel distention, chr distal ileitis/probable inflammatory stricturing vs collapse of the vladislav-TI--but improved since 2018 , moderate proctitis\par 7/12/21   --  ? flare --> entyvio should have been  q 5 wk , went q 8 wks\par                      next dose should be in 4 weeks x 2 then 5 weeks, to check levels\par 7/20/21: Cliically stable, unclear if MRE accurate 2/2 underdistension, but gained wt and CRP--normal\par 7/23/21 Entyvio level= 39, Abs <1.6\par 8/23/21: Labs--Hgb=13.6, ESR=10, CRP=6.6, Csjxlzhv=767, Iron=26, Alb=3.9, BUN=16\par 8/26/21 p/w sbo  w N/V, abd pain/bloating  x 3 days, unable to keep things down\par Labs: WBC=5, Hgb=12, CRP=43, ESR=11, Alb=4.4, BUN=28, Creat=-1.6\par CT Abd/Pelvis: diffuse marked dilatation of SB Loops w transition pt in R. mid/lower abdomen\par ~ 5-6cm, proximal to the ileocolonic anastomosis\par RX w IV solumedrol 20mg q8h, NGT, IVF, pt refused TPN, also w UTI from prostatitis\par 8/27 NGT was pulled, and clears started and advanced to LR,  was d/c home 8/30 on prednisone\par 40mg qd w taper by 10mg/wk to 20mg qd \par \par \par (  **Entyvio's  :time 0=12/22/16 ( Humira 40mg QW); 1/5/17--2wks, s/p 3rd infusion at wk 6, then q 6weeks \par #6 :6/21/17-->held 2/2 Shingles, then  Infusions as follows=9/19/17 , 10/17/17, 11/28/17, 1/16/18 ,2/16/18, 3/19/18,4/16/18, 5/14/18, 6/25/18, 8/7/18, 9/17/18, 10/16/18, 11/13/18, 12/11/18, 1/14/19, 2/15/19, 3/15/19, 5/16/19, 6/18/19,7/24/19, 9/9/19, 10/2/19, 11/4/19, 12/12/19, 1/6/20, 2/4/20, 3/3/20, 9/17/20, 10/15/20, 11/18/20, 1/11/21, 2/8/21, 3/8/21, 4/8/21, 6/3/21, 7/1/21, 8/2/21, 9/2/21,10/25/21  )\par \par 3/8/22 MRI Abd/Pelv: thickened distal Jejunal loops which are tethered; distal ileal segment  (10-12cm ) previously actively inflammed has decreased & now characterized by an area of stricture, however the vladislav-TI  5mm to the anastamosis is enhanced as well as narrowed.  Colon just  distal to the anastamosis has a focal segment of inflammation & stricture.  the recto-sigmoid appears inflammed \par 3/28/22: Hgb=14.4 , ESR=1, CRP <5, Ca=8.5, Mag=1.7,Alb=4.1, Iron=104, Wypqkjxr=183,\par \par A / PLAN:  s/p sbo prox to anastamosis\par                  inflammatory vs fibrosis vs combination: 3/2022 recent MRI shows improvement of inflammation w      possible residual stricture in the ileum and probable colitis near the anastamosis and distal colon \par      Responded to  steroids iv--> po--d/c ~ 10/20/21,\par      tapered steroids from 40mg qd  to 20mg, [id number] qd and  taper 5mg/wk\par      then po taper again early 12/2021 40mg qd w 10mg taper/ wk--now off \par \par      Entyvio level was 39 w/o Abs~ 3weeks after 7/1/21, \par      speaks to inflammatory component & probably loss of response\par      Stelara # 1 dose on 10/25/21, #2 on 12/10/21, # 3 on  2/11/22,  next ~ 4/20/22\par \par       IBD consensus : due to malnutrition /steroid dependency, surgery is next best option \par       but pt reported feeling well and wanted to pursue medical treatment \par      repeated  imaging in March after 3 months of Stelara, then consider surgery vs improved candidacy for                endoscopic manipulation\par        will f/u w  IBD consultant Dr. Heard at  to review imaging after 3 months of Stelera\par        for  Colonoscopy ( w possible balloon dilatation )-> last 1 3/4 yrs ago\par \par 1/4/22 Labs: Hgb=11.5, ESR=6, CRP<5, Alb=3.9\par 1/31/22 Calprotectin =84\par 2/14/22: Hgb=10.6, ESR=9, CRP <5, Ca=8.3, Mag=1.6, Alb=3.8, Iron=25, Ferritin=15\par 3/28/22: Hgb=14, ESR=1, CRP<5 , Ca=8.5, Mag=1.7, Alb=4, Iron=104, Eiwdxmmf=422\par Probiotics 3 qd \par Diet:  LR, Lactose free, protein drinks tid\par MCT oil begun but never maintained \par **trend --cbc, esr, crp, albumin, calprotectin \par   \par **monitor wt--- usu bet 136-139, 7/20/21=136, 11/22/21=139, 1/4/22=132, 2/22/22=132, eepgv=679\par \par \par                                                                                                                                                                                                                                                                                                                                                                                                                                                                                                                                                                                                                                                                                                                                \par 2. Iron deficiency anemia : \par  had initially dropped , after clinical flare and post procedure bleeding\par Probably multifactorial: ACD, Blood loss, malabsorption/nutrient deficiencies\par Eliquis-->warfarin after CVA, On Entyvio \par 7/11/17 s/p  Adm for unsteady gait w MRI c/w CVA--warfarin begun: possible better control of AC\par Chromagen 2 qd--> IV Iron , s/p IV Cu x 5, FA 1mg qd , B12 SL & IM\par Still requiring IV Iron and cu infusions prn \par most recent IV Iron  :  12/10/21 for Ferritin=51 on 11/29/21\par 2/22/21: W73=634, \par 6/4/21: Cu=91, Zinc=69, Ferritin=32,Iron=43, \par 7/12/21: Gj=618, Zinc=74, Yqcqyliu=911, Iron=35\par 11/15/21 : Hgb=12,  Ferritin =104, Ca=9, Mg=1.7\par 1/4/22: Hgb=11.5, Ca=8.8, Mg=2.9, Ddneqjn=139 \par 2/14/22: Hgb=10.6, Ca=8.3, Mag=1.6, Alb=3.8, Iron=25, Ferritin=15\par 3/28/22: Hgb=14.4 , Ca=8.5, Mag=1.7,Alb=4.1, Iron=104, Qtzqzymq=538,\par B12 1cc IM ____R. delt-- given today, \par trend cbc, B12, FA, Iron panel \par Adj INR--closer to low 2's.    \par \par \par \par \par 3. Osteoporosis \par : Progression on BD from 5/5/16\par Crohns, h/o steroid use\par Calcium citrate & Vit D per Endo\par Forteo since 5/10/19 , then  Reclast          \par Repeat BD of 8/2018 --showed worsening to Osteoporosis from 2016\par Rec Endo consult w Dr. Akers :Osteoporosis center at [facility]--pt never went originally \par 9/21/18: Phos=2.4, Ca=8.7, Alb=3.4, Vit D=27, ZJK=108, \par 10/16/18: Phos=2.8, Ca=8.7, Alb=3.5,\par 1/14/19: Phos=2.6,  Ca=8.7, Alb=3.6\par 2/28/19: Phos=1.8, Ca=8.9, Alb=3.7, VitD=39, CQH=057\par 3/18/19: Ca=8.5, Alb=3.7, Vit D=44.6, PTH=93\par 7/11/19: Ca=9.1, Alb=3.7, Phos=3.9, Vit D=40/ 306, MUK=253\par 8/15/19: Ca=9, Alb=4.2, Phos=4.4, VitD=59, GFB=435\par 10/17/19: Ca=9.6, Alb=3.9, Phos=3.5\par 11/6/19: Ca=9.1, Alb=3.9. Phos=3.7, VitD=50, PTH=80\par 1/13/20: ca=9.1\par 1/30/20: Ca=9.2, Alb=3.9, Phos=2.7, Mag=1.5, Vit D=103/41, PTH=87\par 2/18/20:ca=8.5, Alb=3.6, \par 3/2/20: Ca=8.5, Alb=3.6\par 3/17/20: Ca=9.1, Alb=3.7, Phos=3.4, Mag=1.7\par 10/17/20: Ca=8.9, Alb=4, Phos=2.3, Mag-2.0, Vit D=66, PTH=47\par 1/4/21 : Ca=9.4, Alb=4.2, Phos=2.7, Mag=2, Vit D=64, PTH=87.5\par 4/5/21: Ca=9.1, Alb=4, Phos=3.1, Mag=1.7, \par 6/4/21: ca=9, Alb=3.8, Phos=2.8, Mag=1.8\par 7/12/21: Ca=8.6, ALB=6, phosph=2.3, Mag=1.8\par 11/15/21: Ca=9, Alb=3.7, Mag=1.7\par 1/4/22: ca=8.8, Alb=3.9, Mg=2.9, phos=2.9\par 1/21/22: Ca=8.8, Alb=3.9, Vit D=60, PTH=85\par 2/14/22:  Ca=8.3, Mag=1.6, Alb=3.8, \par 3/28/22: Ca=8.5, Mag=1.7, Alb=4.1, Phos=1.2\par Dr. Akers: Hyperparathyroidism-- probably secondary due to low Vit D/Ca & ? superimposed primary, \par [facility] ENT Consult init felt  Parathyroid scan showing activity in mediastinum is ectopic parathyroid, then felt sx not indicated at that time, elev PTH is secondary to low  Vit D/Ca\par Rx replete Vit D and current IV Calcium-as per endo \par \par trend Vit D, Ca, Phos, PTH,  \par \par BD--9/2020: incr in spine and hip , no change in wrist, repeat 9/2022\par 10/5/20, 9/2021-s/p Reclast--repeat 2022\par \par \par \par \par \par 4. GERD:  recently less active by ENT , no ht burn,  dysphagia,  + throat clear\par               h/o  Dry cough, CXR:ana, saw ENT eulogio-->LPR\par * ++ LPR,  ++  Page’s w No Dysplasia,  + H/O  Esophagitis grade: A   was found \par \par Recommend: \par * Anti-reflux diet & life-style changes reviewed & re-emphasized.  ++  Bedge required\par * Weight reduction & regular exercise emphasized\par \par * need for  PPI:  Omep 40 BID ,  ++ H2B q HS:Zantac 300mg--> Pepcid 40mg\par No Carafate  1 gram:   --was emphasized\par I reviewed & summarized the prospective randomized & retrospective non-randomized studies\par looking at potential long term SE's of PPIs, w special attention to associations & actual cause\par as related to GI infections, bone loss, cognitive changes, KD, Covid, vitamin & electrolyte deficiencies\par questions were answered, pt advised that PPIs should be used when needed as indicated by their\par clinical indication and response and tapering off is always the goal if possible. pt understood.\par \par *  F/U  EGD: --> 2/2022--for Barretts screening / surveillance \par * No  need for pH Monitor,   Manometry,   Esophagram --\par *  ++  need for ENT  eval/F/U,  No  need for Surgical  eval  --  \par \par \par \par \par \par 5. Hemorrhoids:  well - controlled.  No pain,  swelling,  itch,  bleeding\par * Discussed previously the potential complications of thrombosis,  pain,  infection,  swelling, itching,  bleeding \par Reccomend: \par * currently Low   - Fiber Diet was emphasized\par * 6  --  8 cups of decaffeinated fluid daily was emphasized \par * Sitz Bathes prn,   No:  Anusol HC  Suppos / Cream  MA BID -- was needed\par * No:  Tucks BID,  Balneol Lotion,   Calmoseptine Oint -- was needed ;    ++ Prep H prn\par * No:  need for  Colorectal surgical evaluation for possible ablation\par \par \par \par \par \par \par \par 6. Barretts esophagus without dysplasia  \par Notes: see GERD.    \par \par \par \par 7. Hepatomegaly-->not confirmed by recent abd sono\par CTE w relative enlargemt, ? lobulation & enlarged portal/mesenteric veins\par LFTs-wnl, no ascites or edema\par R/O CLD, Hepatic vein thrombosis\par Abd sono w doppler--> Liver and spleen normal size, no thrombosis, normal portal and hepatic vein flow\par \par \par \par \par Informed Consent:\par * The risks & Benefits of EGD were discussed w patient.\par * This included but was not limited to perforation, bleeding, sedation /med rxns possibly requiring surgery, blood transfusions, antibiotics & CPR/Intubation.\par * Pt. understands & agrees to the procedures.\par The following instructions in regards to the prep and medically essential ( cardiac, pulmonary, sz, psych, endocrine)  pre-op medication administration\par was reviewed and emphasized with the patient . \par * Pt. advised to D/C  ASA/NSAIDs  7  Days   PTP.\par * [ +++ ]  Dulcolax / Miralax / Mag. Citrate ,  [     ] Prepopik/ Clenpiq ,  [     ] Osmo Prep,  [    ] GoLytely,  prep. reviewed w Pt.\par * Hold  [           ] AM of procedure.\par * Hold  [           ] PM  before procedure.\par * Take  [           ] PM  before procedure.\par * Take  [           ] AM of procedure.\par \par \par

## 2022-04-05 NOTE — HISTORY OF PRESENT ILLNESS
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-->Eliquis' warfarin \par        GERD w Page's, Hemorrhoids, BPH, \par        S/P Ileocolonic resection 1998\par        Since then multiple SBOs\par \par \par        Got IV Iron x 2, since 10/2/17\par        10/19/17: feeling better, Ia=819 on prednisone 15mg x 3weeks, BMs Brown: #5-6, 1-2/D, occas 3-4/d\par        10/25/17: Hgb=10, ESR=5, CRP=5, Alb=3.6, Phos=2, Eenupskl=691, T21=984\par        11/16/17: Hgb=11.2, ESR=14, CRP=12, \par        11/13/17: MRE-Chr mild distension of distal & vladislav-ileum s/o mild stricturing at anast, mild mural thick & mucosal enhancemt ~ 20cm; little change from 2015 c/w mild acute on chr ileitis, 2.1 cm Liver hemangioma\par        11/28/17: Entyvio\par        11/29/17: Hgb=9.6, ESR=10, CRP=5.8, Alb=3.8,Ferritin-19, Iron=14,Sat=4%, Phos=2.5, Wg=923, BMs:# 5, 1-2x/D, brown,\par        12/19;17: Hgb=10.1, ESR=12, CRP=6.5; 12/21/17: BMs:#3-5, 1-3x/D , brown, no blood, no pain, as=536\par        1/3/18:Hgb=11.7, ESR=19, CRP=6.5, Alb=4.1, Rbskprgt=514, Iron=31, Sat%=9,Zinc=55\par        1/16/18: Entyvio, Hgb=11.4, ESR=10, CRP=6.9\par        1/18/18: Today: cellulitis R foot, saw dr KEITH--rx augmentum x 10D, Entyvio 1/9 to 1/16, rm=035 w clothes,BMs: # 4-5, 1-2x/D \par        2/14/18: Hgb=11.1, ESR=16, CRP=11.6, Alb=3.6, Iron=28, Ferritin=23, INR=1.5 \par        2/16/18: s/p Entyvio; Iron infusion, again on 2/27\par        2/20/18: Hgb=10.6, ESR=20, CRP=12, INR=1.7\par        2/27/18: s/p iron infusion\par        3/9/18: Dr. Burden for tenosynovitis, rx w Zorvolex: Diclofenac x 5 days--? response\par        3/14/18: Py=580; BMs: # 5, 1-2x/D; Hgb=11, ESR=18, CRP=11,Irom=45,Rscomepa=466,Alb=3.6, INR=1.5\par        3/19/18: s/p Entyvio\par        3/22/18: sj=823, BMs: # 5, 3-4 x/d\par        4/16/18: s/p Entyvio,Hgb=11.9, INR=1.3, ESR=18, CRP=8.4 \par        4/18/18 EGD: gastritis, no HP, no IM, 2+ Mucous, 0.5cm gastric polyp: fundic, 4cm HH, + Barretts:3cm, no dysplasia\par        4/25/18: Hgb=11.5, INR=1.6, Iron=40, Sat=13%, Ferritin=57, Alb=3.2, Phos=2.2, Mag=1.7\par        4/30/18: s/p Iron Infusion\par        5/14/18: s/p Entyvio \par        5/23/18: Hgb=11.5, ESR=16, CRP< 5\par        5/29/18: s/p Iron Infusion\par        6/6/18: Hgb=10.6, INR=1.7, Msaxlzza=597, Alb=3.5, Phos=2.1, S36=945, dg=204; BMs: # 5, 2-3x/D \par        6/19/18: Hgb=10.6, INR=1, CRP=15, ESR=23\par        6/21/18: R ankle is acting up, swollen, pain, having MRI done, took a couple of advil, on low carbs ,BMs: # 5, 1-2x/D, cp=978\par        6/26/18: MRI: fx/stress rxn-talar body/navicula; stress related changes--cuneform, cuboid,distal tibia; mod tibiotalar effusion, mild-mod peroneal tendinosis\par        7/19/18: entyvio 6/26/18, eliminated all carbs--anti inflammatory diet, wo=828, BMs: # 4-5, 1-3x/D \par        7/25/18: Hgb=10, INR=1.3, Alb=3.3, Phos=2.4, Tvtcobra=420, sat%=12, Iron=35\par        8/2/18: BD--osteoporosis of hip/spine: sig decrease BD--17.6% of hip, 17% of spine, osteopenia of wrist: 6.4%\par        8/7/18: Entyvio\par        8/21/18: Hgb=11.1, INR=1.5, ESR=19, CRP=18.6\par        8/23/18: recently w tooth infection, old implant--loose and removed; rx w amox, and advil\par        eating almost no carbs, sj=026, # 5-6, 2-3x/d \par        8/24/18: Stool fat--neg, Scnvjpbttgan=748\par        9/5/18: Hgb=11.4, INR=1.3, ESR=9, CRP=11.3, Iron=41, Rolmsooz=560, Alb=3.8,\par        9/17/18: entyvio, Hgb=10.7, INR=2.2, ESR=17, CRP=9.1, \par        9/20/18: ol=535, BMs: # 5-6, 1-2x/D \par        9/21/18: Phos=2.4, Ca=8.7, Alb=3.4, Vit D=27, DDU=049, \par        Dr. Akers: Hyperparathyroidism: probably secondary due to low Vit D/Ca & ? superimposed primary, rx replete Vit D and Calcium\par        10/9/18: Hgb=11.6, MCV=94, ESR=14, CRP=5.4, INR=2.2\par        10/10/18 MRE: stricturing of neoterminal ileum w worsened upstream dilatation, moderate length of upstream ileum w stricturing extending at least 20cm and more extensive then previous. suggestion of 2 adj extraluminal fluid collections which may be w/i the wall of strictured ileum near the ileocolonic anastomosis. surrounding spiculation and tethered appearance of bowel in this region.\par 10/16/18: entyvio, Hgb=11.7, MCV=94, ESR=14, CRP <5, Alb=3.5\par 10/18/18: An=761,   BMs: # 5-6, 3x/D , \par 11/13/18: Entyvio\par 11/21/18 CTE w C: limited 2/2 bowel underdistention, mucosal hyperenhancemt & extensive SM edema of distal ileum including vladislav-ileum, difficult to exclude a sml fluid collection, Liver w relative enlargement w suggestion of lobulation, enlargemt of PV,SMV,IMV,Spl V, rectal V. No ascites\par 11/28/18: Hgb=10, ESR=19, CRP=17,Alb=3.5,Iron=35, Sat%=11, Gksdkclx=918, INR=1.3\par 11/29/18: jh=611, BMs: # 5-6, 3-4x/D\par 12/3/18: Abd sono--Liver 14.8cm Incr heterogenicity, spleen--wnl\par 12/11/18 & 1/14/19: Entyvio\par 1/14/19:Entyvio,  Hgb=10.7, ESR=15, Alb=3.6, Iron=36, Ferritin=67, Sat%=11, INR=1.6\par 1/24/19:  hw=300, stools are # 4-6, 3-4x/d , no pain\par 2/5/19: Hgb=11.2, ESR=14, CRP=0.36\par 2/28/19: Hgb=11.5, ESR=12, CRP=6.5, Alb=3.7, Iron=69, Weflgtth=183, Ca=8.9\par                Bms: # 4-5, 2-3x/D , zb=077,  Entyvio : last 2/1519,\par                had parathyroid scan pre-op, still w elev PTH, + activity in mediastinum,? ectopic parathyroid tissue vs neoplasm.  To see ENT and have Imaging at [facility]\par 3/28/19: Bms: # 4-5 , 3x/d ;mk=933\par 4/11/19: Hgb-9.7, Iron=45, ESR=18, CRP=9.4, Alb=3.5, \par Saw [facility] SX at [facility], felt hyperparathyroid is secondary to Low Ca/Vit D, no sx at this time\par 4/16/19: BMs: # 5-6, 3-4x/D, xw=316,  Entyvio : last 3/1519,  got IV iron 4/12/19 for Ferritin=53\par 4/27/19: s/p R Hip Nailing--missed 4/2019 2/2 fresh wound\par 5/16/19 & 6/18/19 Entyvio\par 7/2/19: Hgb=11.7, Ferritin=41,ESR=12, CRP=5.5, B6=2.8,Mag=1.8,Phos=3.9,Kx=777,Zinc=61\par 7/11/19: s/p IV iron\par 7/11/19: Alb=3.7, ZSY=441, Ca=9.1, Vit D=40/306, \par 7/24/19: Entyvio, Alb=3.8, EAB=967, Ca=8.9, Vit D=128 \par 8/1/19: Bms: # 5-6, occas #4, 2-3x/D , hs=117\par 8/15/19: Hgb=12, ESR=10, CRP=5.2, Ferritin=68, Alb=3.4, Phos=4.4,Mg=1.7, Ca=8.5,Calprotectin=88,\par 8/20/19: TWY=351, Ca=9, Alb=4.2\par 9/19/19: Hgb=12.8, ESR=9, CRP=5.5, Alb=3.7, Edgrznen=975, Mag=1.8, Ca=8.9, Phos=4.2\par 9/26/19: hj=969, BMs: #5-6, 2-3x/D, no pain\par 10/17/19: zi=602, BMs: #4-6, 1-2x/D, no Pain; Hgb=14, ESR=7, CRP<5, Alb=3.9, Mqqfaccx=654, Mag=1.7, Ca=9.6\par 10/24/19: hd=992, Bms: # 4-6 , no pain,\par 11/6/19: ESR=6, CRP=6, PTH=80,Ca=9.1, VitD=50\par 11/14/19: fj=501, BMs: # : 4, 1-2, 0ccas #5\par  11/17/19: ESR=6, CRP<5, Qndzjbgq=782, \par  12/19/19: yw=959, BMs: #5-6, 2-3x/D\par 1/6/20: entyvio\par 1/13/20: ESR=7, CRP=0.2, Hgb=13.5, Nzdhwipk=033, E22=906, FA=10, Alb=4.1, Ca=9.1\par 1/16/20: wt = 127, BMs: # 5, 1-3x/d\par 1/30/20: ESR=9, CRP=11, Hgb=13.9, Sofbihin=369,Iron=38, Alb=3.9,Ca=9.2, PTH=87,\par 2/3/20 EGD: gastritis, +MACKENZIE, No HP, +erosion w ooze -clipped, 0.5cm polyp-neg; 4cm HH, Esophagitis A, + Barretts, VC: 1+\par Colonoscopy: 05cm rectal polyp: HP, 2nd deg int hemorrhoids\par mild-mod activity: MARILY w cryptitis & mild archect distortion at ileocolonic anast: colon & ileal side, no stricture\par 2/4/20: Entyvio; 2/12/20: IV Iron, 3/3/20: Entyvio\par 2/25/20: un=666,  BMs: # 4-5, 1-2x/ d\par 3/19/20: nj=824, BMs: #4-5, 1-2x/D\par 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\par 10/17/20 : Hgb=13, CRP< 5, ESR= 11, Iron=44, Ferritin=46, Alb=4, Phos=2.3, \par 11/5/20: vr=149; s/p IV Iron x 2 ,  11/4 and 1 week prior;   BMs:  # 4-5, 1-2x/D , no pain\par 11/18/20  Entyvio + IV Ca\par  1/4/21: Hgb=13.6, CRP<5, ESR=9, Ferritin=60, Alb=4.2, Phos=2.7\par 1/11/21: Entyvio & IV Ca\par 1/14/21: no pain, stool more formed ,  vd=537 - 137; BMs:  # 4-5, 1-2x/D \par 2/8/21: Entyvio & IV Ca\par 2/23/21 IV Ca\par 2/22/21: Hgb=12.7, CRP<5, ESR=8, Vdaseuaxz=002, Phos=2.3, Mag=1.8, I44=412, Zinc=58, Bs=486\par 2/26/21: ia=605,  BMs:  # 4-5, 1-2x/D , no pain \par 3/8/21 & 4/8/21: Entyvio\par 3/9/21 & 3/24/21: IV Iron\par 4/5/21: Hgb=13, CRP<5, ESR=9, Ferritin=94, Phos=3.1, Mag=1.7,Ca=9.1/ Alb=4\par             \par Pt Ins co is refusing to cover Entyvio q 4wks, despite numerous attempts to appeal, they also refuse to set up a peer to peer discussion betw myself and another healthcare provider, even one that works for a third party.  They do acknowledge that there is literature supporting the successful use in individual cases who don’t respond to the q 8 wk interval and need\par less then q 8weeks , but state it had not in FDA approved at less then 8wks so they will not approve it.  I spoke to the ins co rep and discussed that fact that patients should not be  pigeon holed since practicing medicine is done on an individual basis.  Humans are not all the same.   Their  response didn’t change eventhough the pt clinically needed the medication and it  induced a beneficial clinical response towards remission.  Therefore the patient and I decided to slowly increase the interval since the insurance company will not pay for this benefit.  Hopefully he may be able to maintain his present clinical state.  If not we will return to q 4weeks and will again appeal using this patients real clinical data .\par \par 4/9/21:  ya=965,  no pain, stool more formed # 4, 1-2x/d \par 6/11/21: wt= 135,   no pain, stool more formed # 4, 1-2x/d \par              recently had an episode of abd distenstion, pain, N/V, no BM\par              eventually started having diarrhea and flatus, BMs returned to normal\par              lost 2-3 lbs but regained\par  Doesnt recall eating anything different, no fever, chills, brbpr, melena\par  entyvio was 6/3/21--which is almost 8 weeks, was supposed to be 5-6 weeks  to start\par               6/4/21 Hgb=12, CRP<5, Ferritin=32, pt to receive IV iron    \par  7/12/21 Labs: Hgb=13.6, ESR=10, CRP=<5, Nnjnyrv=576, Alb=3.7, BUN=16\par \par  7/16/21 MRE: limited to incomplete bowel distention, chr distal ileitis/probable inflammatory stricturing vs collapse of the vladislav-TI--but improved since 2018 , moderate proctitis\par 7/20/21:  Last entyvio was --7/1, next early august\par                 ra=738 ,  no pain, stool more formed # 4-5/6, 1-2x/d \par 7/23/21 Entyvio level= 39, Abs <1.6\par 8/23/21: Labs--Hgb=13.6, ESR=10, CRP=6.6, Lqsdqvlr=708, Iron=26, Alb=3.9, BUN=16\par 8/26/21 p/w w N/V, abd pain/bloating  x 3 days, unable to keep things down\par Labs: WBC=5, Hgb=12, CRP=43, ESR=11, Alb=4.4, BUN=28, Creat=-1.6\par CT Abd/Pelvis: diffuse marked dilatation of SB Loops w transition pt in Rmid/lower abdomen\par ~ 5-6c, proximal to the ileocolonic anastomosis\par RX w IV solumedrol 20mg q8h, NGT, IVF, pt refused TPN, also w UTI from prostatitis\par 8/27 NGT was pulled, and clears started and advanced to LR,  was d/c home 8/30 on prednisone\par 40mg qd w taper by 10mg/wk--> to 20mg\par 10/15 Entyvio\par 10/25 Stelera (Ustekinumab)  # 1\par 11/3/21 [facility]: wt above 140, off pred x 2 weeks,  1-2 BMs qd\par  Discussed at IBD conference, reviewed previous colonoscopy, and recent imaging; consensus that due to malnutrition and steroid dependency, surgery is next best option however pt reports feeling great and wants to pursue medical treatment which is reasonable given he feels well \par repeat imaging in mid-January after 3 months of Stelara, and then consider referral to surgery vs improved candidacy for endoscopic manipulation (currently presumed one long stricture). \par \par 11/15/21 : sy=530, Hgb=12, ESR=3, CRP <5, Ferritin =104, Ca=9, Mg=1.7, Alb=3.7 \par 12/10/21 : iron infusion\par 1/4/22: Hgb=11.5, ESR=6, CRP <5, Ca=8.8, Mag=2.9, Alb=3.9\par 1/10/22 : fv=969, stools # 5, 1-2x/D \par 1/10/22 Today:  Feeling well, no c/o , CP, SOB/ AMARO, Cough, Wheeze, Palpitations, edema\par              Most recent labs  reviewed w patient:1/4/22\par 1/31/22: calprotectin=84\par 2/2/22: jx=120\par 2/14/22: Hgb=10.6, ESR=9, CRP <5, Ca=8.3, Mag=1.6, Alb=3.8, Iron=25, Ferritin=15\par 3/3/22:  iv Iron infusion x 1\par 3/8/22 MRI Abd/Pelv: thickened distal Jejunal loops which are tethered; distal ileal segment  (10-12cm ) previously actively inflammed has decreased & now characterized by an area of stricture, however the vladislav-TI  5mm to the anastamosis is enhanced as well as narrowed.  colon just  distal to the anastamosis has a focal segment of inflammation & stricture.  the recto-sigmoid appears inflammed \par 3/28/22: Hgb=14.4 , ESR=1, CRP <5, Ca=8.5, Mag=1.7,Alb=4.1, Iron=104, Eeojskbl=625,\par \par \par 4/5/22 : :  Last entyvios were -->7/1, 8/5, 9/2, 10/15/21\par              Stelara # 1 IV--10/25/21, second dose SC~ 12/10/21, 3rd~ 2/14/22, next ~ mid April \par \par              Early December 2021  had a " flare" loose BMs, N/V and bloat\par              Took Pred 40mg qd and tapered down 10mg / week , now off pred x 7-8 weeks\par \par 4/5/22:   \par         no pain, n/v,  Bms: # 5-6 , 1-2 x/day \par        ex=213\par        Abd pain--no \par        Nausea--no \par        Vomit--no \par        Early satiety-no \par        Belching-no \par        Regurgitation--no \par        Ht burn--no \par        Throat Clearing-no\par        Globus-no\par        Hoarseness--no \par        Dysphagia--no \par        Constipation--no \par        Diarrhea- intermittent:  no\par        Bloating--no \par        Flatulence-no\par        Gurgling--no \par        Melena--no \par        BRBPR--no \par        Anorexia-no \par        Wt Loss--> gained 2 lbs but down overall \par \par \par \par        PRIOR HISTORY--2017\par \par        1/17/17: Hgb=9.2, ESR=6, CRP=5; 1/19/17: BMs: #4, 5-6, 2-3x/D, Ph=233, Started Creon 1 tid cc\par        3rd Entyvio begining Feb\par        2/14/17: Labs; Hgb=9.6, MCV=97, ESR=5, CRP< 5, D26=222, FA>23, Iron=59, Retic =3.2,\par        2/17/17 Fecal Calprotectin=16, Fats: Increased neutral & Split\par        3/13/17: Labs Hgb=9.5, MCV=95, ESR=7, CRP <5, ALB=3.1\par        3/16/17: lot of stress, to move -[place], had a job-fell thru, BMs: # 5, 2-4 x/D, wt= 131w clothes\par        4/19/17 EGD: gastritis, MACKENZIE, No HP, 2cm HH, +Barretts, No Dysplasia\par        Colonoscopy: Anastamosis: open w mild -mod active colitis, enteritis severe adj to anastomosis, mild more proximal, remainder of colon-wnl, 2-3 deg int hemorrhoids\par        4/26/17 : Hgb=7.3, MCV=95, ESR=13, CRP<5;Immediately post procedure stools very dark on eliquis/iron.\par        Admitted to [facility]: Hgb=8.3-->8.9 after 2 U, Alb 3.3, BUN=17, creat=1.3, Malina/Nhmci=332/460\par        4/27/17: Alb=2.3, BUN=12, creat=1.2, Malina/Lipase=209/863-No abd pain, N/V; 5/5/17: Hgb=10.7.\par        5/8/17 Entyvio iv. 5/8-5/9 w dark red diarrhea; 5/10/17 Hgb=7.8.\par        5/11/17 Adm [facility] w stools brown, Hgb=7.9, BUN=17, Creat=1.4, Alb=2.9; S/P 2 Units- Hgb=10.6, BUN=15/1.1.\par        Prednisone 40mg qd, entocort d/dee.; 5/18/17: Hgb-10.4, wy=657, BMs: #5, 1-2x/D, no pain\par        6/8/17: Hgb=7.4,MCV=98, CRP<5-ASA stopped, Pred20--> 30\par        6/10/17: Hgb=8.2, Alb=2.9, BUN=20/1.2 ; 6/12/17: Hgb=8.3, Retic=0.19, Iron=10, Sat%=3, Ferritin=57, FA=23,A24=599, 6/13/17: s/p 2 Unit PRBCs\par        6/15/17: started MCT oil, Gq=042 , BMs: # 5, 1-2x/D, stools are brownish/green \par        7/11/17: [facility] w unsteadiness. MRI Head: R Cortical Temporo-occipital encephalomalacia, MRA H&N-no sign lesions, PER-wnl.Hgb=9.9--> 8.4->9.7 after 1 Unit. Seen by Heme: received IV Iron \par        CRP<5, Y25=140, GDK=830, FA>23,Iron=22,Sat%=6, Ferritin=42, Alb=3.5. D/C on warfarin 2mg\par        7/20 Hgb=8.5, 7/28 Hgb=7.7, 7/31-s/p 1 Unit \par        As outpt seeing Heme, to get IV Iron and 5 IV Copper\par        Had 'FLU' Fever=101, chills, bloat, N/V/D, Bilious. no pain, melena,brbpr\par        Given anti-emetic by dr Butler,then able to keep things down\par        On prednisone 25, warfarin w INR bet 1.6-2\par        8/17/17: Hgb=9.9, ESR=20, CRP-P,It=837, BMs: # 5, 1-2x/D, stools are brownish/green\par        10/2/17: Hgb=8.8, MCV=89,Phos=1.7, K=3.9, Mag=1.9, Alb=3.6,Iron<10,Ferritin=21, INR=2\par        10/17/17: Hgb=7.9,ESR=6,CRP<5, INR=1.6\par        10/25/17: Hgb=10, ESR=5, CRP=5, Alb=3.6, Phos=2, Iytmgkao=591, F96=510\par        11/16/17: Hgb=11.2, ESR=14, CRP=12, \par        11/13/17: MRE-Chr mild distension of distal & vladislav-ileum s/o mild stricturing at anast, mild mural thick & mucosal enhancemt ~ 20cm; little change from 2015 c/w mild acute on chr ileitis, 2.1 cm Liver hemangioma\par        11/28/17: Entyvio\par        11/29/17: Hgb=9.6, ESR=10, CRP=5.8, Alb=3.8,Ferritin-P, Iron=14,Sat=4%, Phos=2.5\par        12/19;17: Hgb=10.1, ESR=12, CRP=6.5; 12/21/17: BMs:#3-5, 1-3x/D , brown, no blood, no pain, ba=601\par        1/3/18:Hgb=11.7, ESR=19, CRP=6.5, Alb=4.1, Gpsfvqyf=600, Iron=31, Sat%=9,Zinc=55\par \par \par        PRIOR HISTORY---2013:\par \par        2/20/13 CT markedly dilated sb loops ext to anast, colonoscopy w open anast ,mild-mod remy-anastamotic disease. quick response to entocort/humira--probably adhesive dis. \par        8/10/13 w GNR bacteremia, ADA 1.7 /KAYLAH 3.4, Humira 8/7, 8/13,8/18,9/15\par        CT- mucosal thickening and spiculation of the distal sb extending to the anastamosis, thickening and stranding of adj mesenteric fat.\par        Humira increased to 40mg weekly, entocort 4\par        9/2013 MRE--dilated loops in mid and distal ileum, markedly thickened and narrowed TI w decreased peristalsis of TI\par        11/15/13 ADA-24.9, KAYLAH-0\par \par \par        PRIOR HISTORY---2014:\par \par        2/15/14--CT dilated loops SB, loop of sb in mid abd 4.3cm w infiltrative changes in the mesentery, bowel tapers in the RLQ to the anastamosis w/o transition\par        WBC=15K, Rx w IV steroids and Abx \par        3/7/14 SBFT: last 10cm sb prox to anast mild distension and sl irregularity. In the mid portion of this loop there is a mild narrowing which appears to reopen but is some what narrowed.\par        3/20/14 ADA=33.1, KAYLAH=0, Lialda switch to Apriso 4 (2/2014)\par \par \par        PRIOR HISTORY--2015\par \par        1/11/15 Adm [facility] w 1 day N,Recurrent V, Abd pain/distension. CT-multiple distended & fluid-filled sb loops, mild wall thickening of ileum w No inflamm changes.\par        WBC=14.6, Hgb=17, RX w NGT suction, Levaquin iv, Solumedrol 40mg q 12( 1/12-->1/16) to prednisone 30mg BID w 5mg taper/wk\par        3/18/15 --Dr. Butler w edema /High BP, prednisone was tapered slowly to 2.5mg \par        switched to entocort 9mg qd, edema and bp improved w lasix 20mg \par        BMs:#4-5, 3x/D, Hgb=11, ESR=4, CRP<5\par        4/28/15 - 5/1/15: Adm [facility] w 1 day Abd pain, distension,N/V. 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 side of anast, opening appeared ulcerated,scarred & severely narrowed\par        6/11/15: [facility] w N/V x1, decr appetite, CT ABD: no obstruction, dilated thickened sb loops of distal jej and ileum\par        6/19/15 [facility]: s/p Lap w extensive lysis of adhesions, hepatic flex, sigmoid and sb anastamosis, s/p partial r colectomy and sb resection--side to side elisabeth: 8-10 inch from prior anast to TV colon.\par        7/17/15: BMs:#4-6, 4-5x/D, wt 131(from 126)\par        8/20/15: BMs: #4, 1-2x/D or #5, 2-3x/D, jd=981, dry cough,Hgb=10, WBC=3, PSJ=339, CRP=56, ESR=21\par        8/25/15 CT Abd/Pelv: Svl RLQ sb loops w wall thickening, mild nodularity & inflamm stranding in mesentery\par        Advised-restart Entocort 9mg qd, Apriso 4 qd, Maintain Humira but given RX to check drug/Ab levels\par        9/15/15: did nt restart meds,Hgb=9.9, WBC=4, ESR=19, CRP=5.8, fg=104; [facility] : ADA=8.5, Antibodies< 1.7\par        10/15/15: No pain, BMs:#4, 1-2x/D, #5-6, 3-4x/D, throat clearing/cough-better, py=089\par        11/30/15: No pain, BMs:# 4, 5-6 intermittently, No throat clear, xa=393\par \par \par        PRIOR HISTORY-2016\par \par        1/22/16; 4/8/16; 6/6/16 : No pain, BMs:# 4-5 1-2x/D, also #5-6 3x/D for 2-3D/wk, pf=820\par        7/14/16: aj=104, 9/22/16: ql=425.5\par        11/10/16: 9.5lb wt loss, states BMs: 5-6, 5x/D--Taking Magnesium, No pain/anorexia\par        Labs: Stool Xdmnipipxngm=942, + Incr Fecal fat, Alb=3.4, FA =23, N81=174, Hgb=12, ESR=10, CRP <5\par        Magnesium-d/c, Obtain MRE asap--Start steroids and possibly switch to Entyvio\par        DDx discussed: active crohns--loss response to Humira, Malabsorption--loss Bile acids, Bile-induced diarrhea, SIBO\par        Pt wanted to wait for imaging-did not start rx\par        12/1//16 MRE: RUQ ileocolonic anastamosis--narrowed w upstream dilatation to 3.2 cm\par        Pt refused pred, Started Entocort 9mg qd and Flagyl 250mg qid about 7-10days ago \par        awaiting Entyvio load to start 12/22, then 1/5; had Cut back on iron bid\par        12/15/16: BMs:# 5, 2-3x/D, occas #6, No pain, Less bloat/flatulence, Nx=562.

## 2022-04-13 ENCOUNTER — APPOINTMENT (OUTPATIENT)
Dept: GASTROENTEROLOGY | Facility: CLINIC | Age: 57
End: 2022-04-13
Payer: COMMERCIAL

## 2022-04-13 PROCEDURE — 99214 OFFICE O/P EST MOD 30 MIN: CPT | Mod: 95

## 2022-04-14 NOTE — ASSESSMENT
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  Patient preferred medical therapy - Started on UST, with the plan to follow up MRE.\par \par # Ileocolonic CD s/p resection x2 with recurrence , now flare versus partial obstruction\par Patient likely had loss of response to VDZ, now on Stelara (s/p 3 maintenance doses)\par Recent flare over the weekend, currently on steroids, but wants to taper off fast \par MRI done in  3/2022 -  s/p 2 UST maintenance doses - shows some active inflammation \par Will ask Radiology team at [facility] to review the MRI, inflammation and length of strictures, and if we can access with colonoscopy\par Will go ahead and book for Colonoscopy  for now, if long strictures, can re-discuss with surgical team for eval. \par Plan to taper steroids as symptoms improve \par Cont low fiber diet and avoiding caffeine \par  \par RTC after colonoscopy \par \par Jerri Nguyen MD\par Advacned IBD Fellow \par

## 2022-04-14 NOTE — HISTORY OF PRESENT ILLNESS
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 Same was discussed with the patient, he reported feeling great now and wanted to pursue medical treatment  - Started on UST, with the plan to follow up MRE. 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 \par \par For follow up today \par S/p UST - infusion 10/2021, followed by 12/2021, 2/2022, 4/2022\par \par MRI done 03/2022 - s/p 2 maintenance doses \par Active inflammation of distal jejunal loops with evidence of chronic scar/tethering in the left lower quadrant. \par \par Previous 10-12 cm inflammation of ileum upstream from the anastomosis with colon has decreased with a focal area of probable scarring followed by segment of ileum with decreased fold pattern.  The portion at the anastomosis however is actively inflamed (vladislav terminal ileum) \par \par large bowel segment distal to the anastomosis normal followed by focal colonic narrowing and active inflammation throughout the rectum, anal canal appears normal. the active inflammation upstream from the rectum appears increase since previous exam \par \par Currently he reports feeling better since starting UST \par Had a recent flare over the weekend - vomiting, distention. Started himself on Prednisone. Reports that he is feeling a little better but not back to his baseline,. Of note this flare was 1-2 days prior to his UST dose. His weight has been fluctuating, usually goes down with a flare. \par He is concerned about his steroids use as he has osteoporosis. \par \par Last visit \par Since last visit, pt reports he's actually felt much better. Best he's "ever felt since surgery." Reports weight above 140 lbs, on low fiber diet, and avoiding caffeine. Reports symptoms have been improving since hospitalization in August and steroids. Only recently received UST dose 1 week ago, and is looking forward to his next injection. \par \par HPI: Had flares in March and June of this year, managed at home, and then in August for which he was hospitalized. Typical flare symptoms are bloating, and diarrhea. The last episode he had vomiting and intolerance to PO intake. Noted to have partial SBO, managed conservatively with NG tube, IV fluids, Solu-Medrol with improvement, discharged on Prednisone 40 mg taper. He's been off Prednisone for at least 2 weeks. Reports that he is back to his base line now, feeling overall great with weight gain, 1-2 BMs per day, and no pain. \par \par Last Colon 2/2022 - Reports it was better than previous ones. \par

## 2022-04-26 ENCOUNTER — NON-APPOINTMENT (OUTPATIENT)
Age: 57
End: 2022-04-26

## 2022-05-09 ENCOUNTER — APPOINTMENT (OUTPATIENT)
Dept: HEMATOLOGY ONCOLOGY | Facility: CLINIC | Age: 57
End: 2022-05-09
Payer: COMMERCIAL

## 2022-05-09 ENCOUNTER — RESULT REVIEW (OUTPATIENT)
Age: 57
End: 2022-05-09

## 2022-05-09 VITALS
TEMPERATURE: 97.9 F | HEIGHT: 68.5 IN | HEART RATE: 85 BPM | OXYGEN SATURATION: 98 % | SYSTOLIC BLOOD PRESSURE: 131 MMHG | DIASTOLIC BLOOD PRESSURE: 86 MMHG | BODY MASS INDEX: 19.58 KG/M2 | RESPIRATION RATE: 16 BRPM | WEIGHT: 130.7 LBS

## 2022-05-09 PROCEDURE — 99214 OFFICE O/P EST MOD 30 MIN: CPT | Mod: 25

## 2022-05-09 PROCEDURE — 36415 COLL VENOUS BLD VENIPUNCTURE: CPT

## 2022-05-09 RX ORDER — PREDNISONE 10 MG/1
10 TABLET ORAL
Qty: 60 | Refills: 0 | Status: COMPLETED | COMMUNITY
Start: 2022-04-13 | End: 2022-05-09

## 2022-05-09 RX ORDER — POLYETHYLENE GLYCOL 3350 17 G/17G
17 POWDER, FOR SOLUTION ORAL
Qty: 1 | Refills: 0 | Status: COMPLETED | COMMUNITY
Start: 2021-11-03 | End: 2022-05-09

## 2022-05-09 NOTE — ASSESSMENT
[FreeTextEntry1] : 1.  Anemia- Hgb 13.6- s/p Injectafer x 2 -\par  -blood loss, anemia of chronic disease with need for intermittent iron  \par - copper deficiency - replaced, level WNL 5/2020\par - hospitalized with flare up of colitis, got one iv iron in the hospital- started Straterra IV at the end of Oct- will begin self inj Dec 2021\par - Injectafer - 3/2022 x 2 doses, doesn’t respond to Venofer \par \par 2. 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 Hypogammaglobulinemia- no increased infection rate \par - s/p  Prevnar 13 12/2018 \par -  Pneumococcal vaccine 23 boost \par - s/p  Shingrex\par -  COVID vaccine - Pfizer x 4\par - Flu shot \par \par 5. Zinc deficiency\par - oral Zinc, level better - 72\par \par 6. 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 No evidence of lymphatic invasion. 8/10 LN's for eli metastatic papillary thyroid cancer\par -received iodine 131 at 29.3 mCi in October 2020\par -On synthroid\par -Due for US Head and soft tissue neck May 2022- follows with Dr. Akers\par \par Dr. Luis Felipe Monsalve\par cell [phone]\par office [phone]\par \par Labs next visit- PT, INR, irons, cbc and chem \par Blood drawn in office. Ordered and reviewed.\par \par \par colonoscopy with dilation upcoming in May 2022\par \par case and mgmt discussed with Dr. Biswas- return in 6 weeks cbc chem irons, zinc and copper\par \par \par

## 2022-05-09 NOTE — HISTORY OF PRESENT ILLNESS
[0 - No Distress] : Distress Level: 0 [de-identified] : Patient is a 52 year old who is referred for initial consultation for anemia secondary to Crohns/GI bleed vs Iron Deficiency/ Vit b12 def. 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 Patient is status post ileo-colonic resections for SBO  in 2016. In 2016 patient had complaints of 10 - 15 lb weight loss. Labs at that time showed Hgb of 12, steatorrhea and stool calprotectin = 236. In 2016 MRI/MRE with narrowing at ileocolonic anastomosis with upstream dilation. Pt refused bridge with  prednisone and Entocort / Flagyl. In 2017 patient Hgb dropped to 9.5. On 2017 patient underwent an EGD and Colonoscopy. Findings consistent with Page's and no dysplasia or AVMs. Colonoscopy showed an open anastomosis but active disease, moderate on the colonic side and limited to the anastomosis and moderate to severe enteritis, just proximal to the anastomosis. Pat had routine labs on 05/10/2017 Hgb: 8.3.  [FreeTextEntry1] : s/p injectafer, copper\par warfarin [de-identified] : Patient presents for follow up of DVT, anemia, MTHFR Gene mutation, colitis.  Patient overall feels well.  His weight is down, he had a fair-up in April where he needed to take prednisone, but has since recovered.  Patient had Injectafer x2 since last visit.

## 2022-05-09 NOTE — CONSULT LETTER
[Dear  ___] : Dear  [unfilled], [Consult Letter:] : I had the pleasure of evaluating your patient, [unfilled]. [Please see my note below.] : Please see my note below. [Consult Closing:] : Thank you very much for allowing me to participate in the care of this patient.  If you have any questions, please do not hesitate to contact me. [Sincerely,] : Sincerely, [DrMeron  ___] : Dr. REARDON [FreeTextEntry3] : Angie Biswas MD\par [facility] [place] [facility]\par

## 2022-05-10 ENCOUNTER — APPOINTMENT (OUTPATIENT)
Dept: GASTROENTEROLOGY | Facility: CLINIC | Age: 57
End: 2022-05-10
Payer: COMMERCIAL

## 2022-05-10 VITALS
HEIGHT: 68.5 IN | BODY MASS INDEX: 19.48 KG/M2 | DIASTOLIC BLOOD PRESSURE: 74 MMHG | SYSTOLIC BLOOD PRESSURE: 114 MMHG | HEART RATE: 76 BPM | WEIGHT: 130 LBS

## 2022-05-10 PROCEDURE — 99215 OFFICE O/P EST HI 40 MIN: CPT

## 2022-05-10 NOTE — HISTORY OF PRESENT ILLNESS
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' warfarin \par        GERD w Page's, Hemorrhoids, BPH, \par        S/P Ileocolonic resection 1998\par        Since then multiple SBOs\par \par \par        Got IV Iron x 2, since 10/2/17\par        10/19/17: feeling better, Dp=713 on prednisone 15mg x 3weeks, BMs Brown: #5-6, 1-2/D, occas 3-4/d\par        10/25/17: Hgb=10, ESR=5, CRP=5, Alb=3.6, Phos=2, Ctqddehf=525, K81=304\par        11/16/17: Hgb=11.2, ESR=14, CRP=12, \par        11/13/17: MRE-Chr mild distension of distal & vladislav-ileum s/o mild stricturing at anast, mild mural thick & mucosal enhancemt ~ 20cm; little change from 2015 c/w mild acute on chr ileitis, 2.1 cm Liver hemangioma\par        11/28/17: Entyvio\par        11/29/17: Hgb=9.6, ESR=10, CRP=5.8, Alb=3.8,Ferritin-19, Iron=14,Sat=4%, Phos=2.5, Gm=143, BMs:# 5, 1-2x/D, brown,\par        12/19;17: Hgb=10.1, ESR=12, CRP=6.5; 12/21/17: BMs:#3-5, 1-3x/D , brown, no blood, no pain, ot=130\par        1/3/18:Hgb=11.7, ESR=19, CRP=6.5, Alb=4.1, Fnqawrpb=024, Iron=31, Sat%=9,Zinc=55\par        1/16/18: Entyvio, Hgb=11.4, ESR=10, CRP=6.9\par        1/18/18: Today: cellulitis R foot, saw dr KEITH--rx augmentum x 10D, Entyvio 1/9 to 1/16, zv=018 w clothes,BMs: # 4-5, 1-2x/D \par        2/14/18: Hgb=11.1, ESR=16, CRP=11.6, Alb=3.6, Iron=28, Ferritin=23, INR=1.5 \par        2/16/18: s/p Entyvio; Iron infusion, again on 2/27\par        2/20/18: Hgb=10.6, ESR=20, CRP=12, INR=1.7\par        2/27/18: s/p iron infusion\par        3/9/18: Dr. Burden for tenosynovitis, rx w Zorvolex: Diclofenac x 5 days--? response\par        3/14/18: Uu=442; BMs: # 5, 1-2x/D; Hgb=11, ESR=18, CRP=11,Irom=45,Qozmzgcm=590,Alb=3.6, INR=1.5\par        3/19/18: s/p Entyvio\par        3/22/18: mt=059, BMs: # 5, 3-4 x/d\par        4/16/18: s/p Entyvio,Hgb=11.9, INR=1.3, ESR=18, CRP=8.4 \par        4/18/18 EGD: gastritis, no HP, no IM, 2+ Mucous, 0.5cm gastric polyp: fundic, 4cm HH, + Barretts:3cm, no dysplasia\par        4/25/18: Hgb=11.5, INR=1.6, Iron=40, Sat=13%, Ferritin=57, Alb=3.2, Phos=2.2, Mag=1.7\par        4/30/18: s/p Iron Infusion\par        5/14/18: s/p Entyvio \par        5/23/18: Hgb=11.5, ESR=16, CRP< 5\par        5/29/18: s/p Iron Infusion\par        6/6/18: Hgb=10.6, INR=1.7, Ftkvryma=391, Alb=3.5, Phos=2.1, D26=099, am=365; BMs: # 5, 2-3x/D \par        6/19/18: Hgb=10.6, INR=1, CRP=15, ESR=23\par        6/21/18: R ankle is acting up, swollen, pain, having MRI done, took a couple of advil, on low carbs ,BMs: # 5, 1-2x/D, jj=238\par        6/26/18: MRI: fx/stress rxn-talar body/navicula; stress related changes--cuneform, cuboid,distal tibia; mod tibiotalar effusion, mild-mod peroneal tendinosis\par        7/19/18: entyvio 6/26/18, eliminated all carbs--anti inflammatory diet, nq=188, BMs: # 4-5, 1-3x/D \par        7/25/18: Hgb=10, INR=1.3, Alb=3.3, Phos=2.4, Ziodvzps=200, sat%=12, Iron=35\par        8/2/18: BD--osteoporosis of hip/spine: sig decrease BD--17.6% of hip, 17% of spine, osteopenia of wrist: 6.4%\par        8/7/18: Entyvio\par        8/21/18: Hgb=11.1, INR=1.5, ESR=19, CRP=18.6\par        8/23/18: recently w tooth infection, old implant--loose and removed; rx w amox, and advil\par        eating almost no carbs, we=801, # 5-6, 2-3x/d \par        8/24/18: Stool fat--neg, Pucjhuszhvgw=601\par        9/5/18: Hgb=11.4, INR=1.3, ESR=9, CRP=11.3, Iron=41, Bwgfdcpv=997, Alb=3.8,\par        9/17/18: entyvio, Hgb=10.7, INR=2.2, ESR=17, CRP=9.1, \par        9/20/18: cg=088, BMs: # 5-6, 1-2x/D \par        9/21/18: Phos=2.4, Ca=8.7, Alb=3.4, Vit D=27, YLE=609, \par        Dr. Akers: Hyperparathyroidism: probably secondary due to low Vit D/Ca & ? superimposed primary, rx replete Vit D and Calcium\par        10/9/18: Hgb=11.6, MCV=94, ESR=14, CRP=5.4, INR=2.2\par        10/10/18 MRE: stricturing of neoterminal ileum w worsened upstream dilatation, moderate length of upstream ileum w stricturing extending at least 20cm and more extensive then previous. suggestion of 2 adj extraluminal fluid collections which may be w/i the wall of strictured ileum near the ileocolonic anastomosis. surrounding spiculation and tethered appearance of bowel in this region.\par 10/16/18: entyvio, Hgb=11.7, MCV=94, ESR=14, CRP <5, Alb=3.5\par 10/18/18: Ho=466,   BMs: # 5-6, 3x/D , \par 11/13/18: Entyvio\par 11/21/18 CTE w C: limited 2/2 bowel underdistention, mucosal hyperenhancemt & extensive SM edema of distal ileum including vladislav-ileum, difficult to exclude a sml fluid collection, Liver w relative enlargement w suggestion of lobulation, enlargemt of PV,SMV,IMV,Spl V, rectal V. No ascites\par 11/28/18: Hgb=10, ESR=19, CRP=17,Alb=3.5,Iron=35, Sat%=11, Vslezavv=591, INR=1.3\par 11/29/18: pw=724, BMs: # 5-6, 3-4x/D\par 12/3/18: Abd sono--Liver 14.8cm Incr heterogenicity, spleen--wnl\par 12/11/18 & 1/14/19: Entyvio\par 1/14/19:Entyvio,  Hgb=10.7, ESR=15, Alb=3.6, Iron=36, Ferritin=67, Sat%=11, INR=1.6\par 1/24/19:  dc=952, stools are # 4-6, 3-4x/d , no pain\par 2/5/19: Hgb=11.2, ESR=14, CRP=0.36\par 2/28/19: Hgb=11.5, ESR=12, CRP=6.5, Alb=3.7, Iron=69, Cpecwikg=491, Ca=8.9\par                Bms: # 4-5, 2-3x/D , vz=961,  Entyvio : last 2/1519,\par                had parathyroid scan pre-op, still w elev PTH, + activity in mediastinum,? ectopic parathyroid tissue vs neoplasm.  To see ENT and have Imaging at [facility]\par 3/28/19: Bms: # 4-5 , 3x/d ;zp=456\par 4/11/19: Hgb-9.7, Iron=45, ESR=18, CRP=9.4, Alb=3.5, \par Saw Endo SX at [facility], felt hyperparathyroid is secondary to Low Ca/Vit D, no sx at this time\par 4/16/19: BMs: # 5-6, 3-4x/D, gv=172,  Entyvio : last 3/1519,  got IV iron 4/12/19 for Ferritin=53\par 4/27/19: s/p R Hip Nailing--missed 4/2019 2/2 fresh wound\par 5/16/19 & 6/18/19 Entyvio\par 7/2/19: Hgb=11.7, Ferritin=41,ESR=12, CRP=5.5, B6=2.8,Mag=1.8,Phos=3.9,Vn=137,Zinc=61\par 7/11/19: s/p IV iron\par 7/11/19: Alb=3.7, YTA=385, Ca=9.1, Vit D=40/306, \par 7/24/19: Entyvio, Alb=3.8, ATR=036, Ca=8.9, Vit D=128 \par 8/1/19: Bms: # 5-6, occas #4, 2-3x/D , us=875\par 8/15/19: Hgb=12, ESR=10, CRP=5.2, Ferritin=68, Alb=3.4, Phos=4.4,Mg=1.7, Ca=8.5,Calprotectin=88,\par 8/20/19: VCA=112, Ca=9, Alb=4.2\par 9/19/19: Hgb=12.8, ESR=9, CRP=5.5, Alb=3.7, Gxrqirld=557, Mag=1.8, Ca=8.9, Phos=4.2\par 9/26/19: qn=472, BMs: #5-6, 2-3x/D, no pain\par 10/17/19: uj=609, BMs: #4-6, 1-2x/D, no Pain; Hgb=14, ESR=7, CRP<5, Alb=3.9, Tdcilnmb=376, Mag=1.7, Ca=9.6\par 10/24/19: wd=286, Bms: # 4-6 , no pain,\par 11/6/19: ESR=6, CRP=6, PTH=80,Ca=9.1, VitD=50\par 11/14/19: ls=976, BMs: # : 4, 1-2, 0ccas #5\par  11/17/19: ESR=6, CRP<5, Ujxnplbd=161, \par  12/19/19: kc=302, BMs: #5-6, 2-3x/D\par 1/6/20: entyvio\par 1/13/20: ESR=7, CRP=0.2, Hgb=13.5, Ceituamh=832, X09=589, FA=10, Alb=4.1, Ca=9.1\par 1/16/20: wt = 127, BMs: # 5, 1-3x/d\par 1/30/20: ESR=9, CRP=11, Hgb=13.9, Xzppfwik=351,Iron=38, Alb=3.9,Ca=9.2, PTH=87,\par 2/3/20 EGD: gastritis, +MACKENZIE, No HP, +erosion w ooze -clipped, 0.5cm polyp-neg; 4cm HH, Esophagitis A, + Barretts, VC: 1+\par Colonoscopy: 05cm rectal polyp: HP, 2nd deg int hemorrhoids\par mild-mod activity: MARILY w cryptitis & mild archect distortion at ileocolonic anast: colon & ileal side, no stricture\par 2/4/20: Entyvio; 2/12/20: IV Iron, 3/3/20: Entyvio\par 2/25/20: ap=747,  BMs: # 4-5, 1-2x/ d\par 3/19/20: ec=125, BMs: #4-5, 1-2x/D\par 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\par 10/17/20 : Hgb=13, CRP< 5, ESR= 11, Iron=44, Ferritin=46, Alb=4, Phos=2.3, \par 11/5/20: zd=575; s/p IV Iron x 2 ,  11/4 and 1 week prior;   BMs:  # 4-5, 1-2x/D , no pain\par 11/18/20  Entyvio + IV Ca\par  1/4/21: Hgb=13.6, CRP<5, ESR=9, Ferritin=60, Alb=4.2, Phos=2.7\par 1/11/21: Entyvio & IV Ca\par 1/14/21: no pain, stool more formed ,  zb=226 - 137; BMs:  # 4-5, 1-2x/D \par 2/8/21: Entyvio & IV Ca\par 2/23/21 IV Ca\par 2/22/21: Hgb=12.7, CRP<5, ESR=8, Vxyinqmtk=471, Phos=2.3, Mag=1.8, V86=486, Zinc=58, Gi=438\par 2/26/21: fl=696,  BMs:  # 4-5, 1-2x/D , no pain \par 3/8/21 & 4/8/21: Entyvio\par 3/9/21 & 3/24/21: IV Iron\par 4/5/21: Hgb=13, CRP<5, ESR=9, Ferritin=94, Phos=3.1, Mag=1.7,Ca=9.1/ Alb=4\par             \par Pt Ins co is refusing to cover Entyvio q 4wks, despite numerous attempts to appeal, they also refuse to set up a peer to peer discussion betw myself and another healthcare provider, even one that works for a third party.  They do acknowledge that there is literature supporting the successful use in individual cases who don’t respond to the q 8 wk interval and need\par less then q 8weeks , but state it had not in FDA approved at less then 8wks so they will not approve it.  I spoke to the ins co rep and discussed that fact that patients should not be  pigeon holed since practicing medicine is done on an individual basis.  Humans are not all the same.   Their  response didn’t change eventhough the pt clinically needed the medication and it  induced a beneficial clinical response towards remission.  Therefore the patient and I decided to slowly increase the interval since the insurance company will not pay for this benefit.  Hopefully he may be able to maintain his present clinical state.  If not we will return to q 4weeks and will again appeal using this patients real clinical data .\par \par 4/9/21:  ql=941,  no pain, stool more formed # 4, 1-2x/d \par 6/11/21: wt= 135,   no pain, stool more formed # 4, 1-2x/d \par              recently had an episode of abd distenstion, pain, N/V, no BM\par              eventually started having diarrhea and flatus, BMs returned to normal\par              lost 2-3 lbs but regained\par  Doesnt recall eating anything different, no fever, chills, brbpr, melena\par  entyvio was 6/3/21--which is almost 8 weeks, was supposed to be 5-6 weeks  to start\par               6/4/21 Hgb=12, CRP<5, Ferritin=32, pt to receive IV iron    \par  7/12/21 Labs: Hgb=13.6, ESR=10, CRP=<5, Fmcvfpk=243, Alb=3.7, BUN=16\par \par  7/16/21 MRE: limited to incomplete bowel distention, chr distal ileitis/probable inflammatory stricturing vs collapse of the vladislav-TI--but improved since 2018 , moderate proctitis\par 7/20/21:  Last entyvio was --7/1, next early august\par                 jp=546 ,  no pain, stool more formed # 4-5/6, 1-2x/d \par 7/23/21 Entyvio level= 39, Abs <1.6\par 8/23/21: Labs--Hgb=13.6, ESR=10, CRP=6.6, Atvlksjx=147, Iron=26, Alb=3.9, BUN=16\par 8/26/21 p/w w N/V, abd pain/bloating  x 3 days, unable to keep things down\par Labs: WBC=5, Hgb=12, CRP=43, ESR=11, Alb=4.4, BUN=28, Creat=-1.6\par CT Abd/Pelvis: diffuse marked dilatation of SB Loops w transition pt in Rmid/lower abdomen\par ~ 5-6c, proximal to the ileocolonic anastomosis\par RX w IV solumedrol 20mg q8h, NGT, IVF, pt refused TPN, also w UTI from prostatitis\par 8/27 NGT was pulled, and clears started and advanced to LR,  was d/c home 8/30 on prednisone\par 40mg qd w taper by 10mg/wk--> to 20mg\par 10/15 Entyvio\par 10/25 Stelera (Ustekinumab)  # 1\par 11/3/21 [facility]: wt above 140, off pred x 2 weeks,  1-2 BMs qd\par  Discussed at IBD conference, reviewed previous colonoscopy, and recent imaging; consensus that due to malnutrition and steroid dependency, surgery is next best option however pt reports feeling great and wants to pursue medical treatment which is reasonable given he feels well \par repeat imaging in mid-January after 3 months of Stelara, and then consider referral to surgery vs improved candidacy for endoscopic manipulation (currently presumed one long stricture). \par \par 11/15/21 : ex=528, Hgb=12, ESR=3, CRP <5, Ferritin =104, Ca=9, Mg=1.7, Alb=3.7 \par 12/10/21 : iron infusion\par 1/4/22: Hgb=11.5, ESR=6, CRP <5, Ca=8.8, Mag=2.9, Alb=3.9\par 1/10/22 : cc=954, stools # 5, 1-2x/D \par 1/10/22 Today:  Feeling well, no c/o , CP, SOB/ AMARO, Cough, Wheeze, Palpitations, edema\par              Most recent labs  reviewed w patient:1/4/22\par 1/31/22: calprotectin=84\par 2/2/22: vr=047\par 2/14/22: Hgb=10.6, ESR=9, CRP <5, Ca=8.3, Mag=1.6, Alb=3.8, Iron=25, Ferritin=15\par 3/3/22:  iv Iron infusion x 1\par 3/8/22 MRI Abd/Pelv: thickened distal Jejunal loops which are tethered; distal ileal segment  (10-12cm ) previously actively inflammed has decreased & now characterized by an area of stricture, however the vladislav-TI  5mm to the anastamosis is enhanced as well as narrowed.  colon just  distal to the anastamosis has a focal segment of inflammation & stricture.  the recto-sigmoid appears inflammed \par 3/28/22: Hgb=14.4 , ESR=1, CRP <5, Ca=8.5, Mag=1.7,Alb=4.1, Iron=104, Nqfkdpvs=887,\par 4/5/22: un=899, Bms: # 5-6 , 1-2 x/day \par \par \par 5/10/22  :  Last entyvios were -->7/1, 8/5, 9/2, 10/15/21\par              Stelara # 1 IV--10/25/21, second dose SC~ 12/10/21, 3rd~ 2/14/22,  4th-- mid April , 5th--mid june \par \par              Early December 2021  had a " flare" loose BMs, N/V and bloat\par              Took Pred 40mg qd and tapered down 10mg / week , now off pred x 7-8 weeks\par \par 4/13/22 Dr. Heard:  pt states he had a flare the weekend of 4/9/22 w vomiting/distension\par                pt self-started prednisone 40mg , this was just before his april stelara dose\par                claims he was feeling better\par               Dr. Heard: sched colonoscopy w ? dilatation \par \par 5/10/22: tapered of pred 40mg , 10mg/wk over 1 month, now off 2weeks \par         no pain, n/v,  BMs: # 4-5, 1-2 x Day \par        um=504\par        Abd pain--no \par        Nausea--no \par        Vomit--no \par        Early satiety-no \par        Belching-no \par        Regurgitation--no \par        Ht burn--no \par        Throat Clearing-no\par        Globus-no\par        Hoarseness--no \par        Dysphagia--no \par        Constipation--no \par        Diarrhea- intermittent:  no\par        Bloating--no \par        Flatulence-no\par        Gurgling--no \par        Melena--no \par        BRBPR--no \par        Anorexia-no \par        Wt Loss--> steady \par \par \par \par        PRIOR HISTORY--2017\par \par        1/17/17: Hgb=9.2, ESR=6, CRP=5; 1/19/17: BMs: #4, 5-6, 2-3x/D, Bt=929, Started Creon 1 tid cc\par        3rd Entyvio begining Feb\par        2/14/17: Labs; Hgb=9.6, MCV=97, ESR=5, CRP< 5, F12=457, FA>23, Iron=59, Retic =3.2,\par        2/17/17 Fecal Calprotectin=16, Fats: Increased neutral & Split\par        3/13/17: Labs Hgb=9.5, MCV=95, ESR=7, CRP <5, ALB=3.1\par        3/16/17: lot of stress, to move -[place], had a job-fell thru, BMs: # 5, 2-4 x/D, wt= 131w clothes\par        4/19/17 EGD: gastritis, MACKENZIE, No HP, 2cm HH, +Barretts, No Dysplasia\par        Colonoscopy: Anastamosis: open w mild -mod active colitis, enteritis severe adj to anastomosis, mild more proximal, remainder of colon-wnl, 2-3 deg int hemorrhoids\par        4/26/17 : Hgb=7.3, MCV=95, ESR=13, CRP<5;Immediately post procedure stools very dark on eliquis/iron.\par        Admitted to PM: Hgb=8.3-->8.9 after 2 U, Alb 3.3, BUN=17, creat=1.3, Malina/Cjyei=317/460\par        4/27/17: Alb=2.3, BUN=12, creat=1.2, Malina/Lipase=209/863-No abd pain, N/V; 5/5/17: Hgb=10.7.\par        5/8/17 Entyvio iv. 5/8-5/9 w dark red diarrhea; 5/10/17 Hgb=7.8.\par        5/11/17 Adm [facility] w stools brown, Hgb=7.9, BUN=17, Creat=1.4, Alb=2.9; S/P 2 Units- Hgb=10.6, BUN=15/1.1.\par        Prednisone 40mg qd, entocort d/dee.; 5/18/17: Hgb-10.4, wc=647, BMs: #5, 1-2x/D, no pain\par        6/8/17: Hgb=7.4,MCV=98, CRP<5-ASA stopped, Pred20--> 30\par        6/10/17: Hgb=8.2, Alb=2.9, BUN=20/1.2 ; 6/12/17: Hgb=8.3, Retic=0.19, Iron=10, Sat%=3, Ferritin=57, FA=23,F02=898, 6/13/17: s/p 2 Unit PRBCs\par        6/15/17: started MCT oil, Gj=544 , BMs: # 5, 1-2x/D, stools are brownish/green \par        7/11/17: [facility] w unsteadiness. MRI Head: R Cortical Temporo-occipital encephalomalacia, MRA H&N-no sign lesions, PER-wnl.Hgb=9.9--> 8.4->9.7 after 1 Unit. Seen by Heme: received IV Iron \par        CRP<5, M24=695, VSW=957, FA>23,Iron=22,Sat%=6, Ferritin=42, Alb=3.5. D/C on warfarin 2mg\par        7/20 Hgb=8.5, 7/28 Hgb=7.7, 7/31-s/p 1 Unit \par        As outpt seeing Heme, to get IV Iron and 5 IV Copper\par        Had 'FLU' Fever=101, chills, bloat, N/V/D, Bilious. no pain, melena,brbpr\par        Given anti-emetic by dr Butler,then able to keep things down\par        On prednisone 25, warfarin w INR bet 1.6-2\par        8/17/17: Hgb=9.9, ESR=20, CRP-P,Oa=461, BMs: # 5, 1-2x/D, stools are brownish/green\par        10/2/17: Hgb=8.8, MCV=89,Phos=1.7, K=3.9, Mag=1.9, Alb=3.6,Iron<10,Ferritin=21, INR=2\par        10/17/17: Hgb=7.9,ESR=6,CRP<5, INR=1.6\par        10/25/17: Hgb=10, ESR=5, CRP=5, Alb=3.6, Phos=2, Reprvnaj=795, I79=937\par        11/16/17: Hgb=11.2, ESR=14, CRP=12, \par        11/13/17: MRE-Chr mild distension of distal & vladislav-ileum s/o mild stricturing at anast, mild mural thick & mucosal enhancemt ~ 20cm; little change from 2015 c/w mild acute on chr ileitis, 2.1 cm Liver hemangioma\par        11/28/17: Entyvio\par        11/29/17: Hgb=9.6, ESR=10, CRP=5.8, Alb=3.8,Ferritin-P, Iron=14,Sat=4%, Phos=2.5\par        12/19;17: Hgb=10.1, ESR=12, CRP=6.5; 12/21/17: BMs:#3-5, 1-3x/D , brown, no blood, no pain, ec=739\par        1/3/18:Hgb=11.7, ESR=19, CRP=6.5, Alb=4.1, Lxejigft=886, Iron=31, Sat%=9,Zinc=55\par \par \par        PRIOR HISTORY---2013:\par \par        2/20/13 CT markedly dilated sb loops ext to anast, colonoscopy w open anast ,mild-mod remy-anastamotic disease. quick response to entocort/humira--probably adhesive dis. \par        8/10/13 w GNR bacteremia, ADA 1.7 /KAYLAH 3.4, Humira 8/7, 8/13,8/18,9/15\par        CT- mucosal thickening and spiculation of the distal sb extending to the anastamosis, thickening and stranding of adj mesenteric fat.\par        Humira increased to 40mg weekly, entocort 4\par        9/2013 MRE--dilated loops in mid and distal ileum, markedly thickened and narrowed TI w decreased peristalsis of TI\par        11/15/13 ADA-24.9, KAYLAH-0\par \par \par        PRIOR HISTORY---2014:\par \par        2/15/14--CT dilated loops SB, loop of sb in mid abd 4.3cm w infiltrative changes in the mesentery, bowel tapers in the RLQ to the anastamosis w/o transition\par        WBC=15K, Rx w IV steroids and Abx \par        3/7/14 SBFT: last 10cm sb prox to anast mild distension and sl irregularity. In the mid portion of this loop there is a mild narrowing which appears to reopen but is some what narrowed.\par        3/20/14 ADA=33.1, KAYLAH=0, Lialda switch to Apriso 4 (2/2014)\par \par \par        PRIOR HISTORY--2015\par \par        1/11/15 Adm [facility] w 1 day N,Recurrent V, Abd pain/distension. CT-multiple distended & fluid-filled sb loops, mild wall thickening of ileum w No inflamm changes.\par        WBC=14.6, Hgb=17, RX w NGT suction, Levaquin iv, Solumedrol 40mg q 12( 1/12-->1/16) to prednisone 30mg BID w 5mg taper/wk\par        3/18/15 --Dr. Butler w edema /High BP, prednisone was tapered slowly to 2.5mg \par        switched to entocort 9mg qd, edema and bp improved w lasix 20mg \par        BMs:#4-5, 3x/D, Hgb=11, ESR=4, CRP<5\par        4/28/15 - 5/1/15: Adm [facility] w 1 day Abd pain, distension,N/V. 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 side of anast, opening appeared ulcerated,scarred & severely narrowed\par        6/11/15: [facility] w N/V x1, decr appetite, CT ABD: no obstruction, dilated thickened sb loops of distal jej and ileum\par        6/19/15 [facility]: s/p Lap w extensive lysis of adhesions, hepatic flex, sigmoid and sb anastamosis, s/p partial r colectomy and sb resection--side to side elisabeth: 8-10 inch from prior anast to TV colon.\par        7/17/15: BMs:#4-6, 4-5x/D, wt 131(from 126)\par        8/20/15: BMs: #4, 1-2x/D or #5, 2-3x/D, nt=554, dry cough,Hgb=10, WBC=3, PAO=103, CRP=56, ESR=21\par        8/25/15 CT Abd/Pelv: Svl RLQ sb loops w wall thickening, mild nodularity & inflamm stranding in mesentery\par        Advised-restart Entocort 9mg qd, Apriso 4 qd, Maintain Humira but given RX to check drug/Ab levels\par        9/15/15: did nt restart meds,Hgb=9.9, WBC=4, ESR=19, CRP=5.8, bo=188; [facility] : ADA=8.5, Antibodies< 1.7\par        10/15/15: No pain, BMs:#4, 1-2x/D, #5-6, 3-4x/D, throat clearing/cough-better, kt=130\par        11/30/15: No pain, BMs:# 4, 5-6 intermittently, No throat clear, zc=811\par \par \par        PRIOR HISTORY-2016\par \par        1/22/16; 4/8/16; 6/6/16 : No pain, BMs:# 4-5 1-2x/D, also #5-6 3x/D for 2-3D/wk, iy=202\par        7/14/16: qg=724, 9/22/16: om=445.5\par        11/10/16: 9.5lb wt loss, states BMs: 5-6, 5x/D--Taking Magnesium, No pain/anorexia\par        Labs: Stool Psqmxccvlhha=751, + Incr Fecal fat, Alb=3.4, FA =23, K02=548, Hgb=12, ESR=10, CRP <5\par        Magnesium-d/c, Obtain MRE asap--Start steroids and possibly switch to Entyvio\par        DDx discussed: active crohns--loss response to Humira, Malabsorption--loss Bile acids, Bile-induced diarrhea, SIBO\par        Pt wanted to wait for imaging-did not start rx\par        12/1//16 MRE: RUQ ileocolonic anastamosis--narrowed w upstream dilatation to 3.2 cm\par        Pt refused pred, Started Entocort 9mg qd and Flagyl 250mg qid about 7-10days ago \par        awaiting Entyvio load to start 12/22, then 1/5; had Cut back on iron bid\par        12/15/16: BMs:# 5, 2-3x/D, occas #6, No pain, Less bloat/flatulence, Ut=162. 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 Same was discussed with the patient, he reported feeling great now and wanted to pursue medical treatment  - Started on UST, with the plan to follow up MRE. 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 \par \par For follow up today \par S/p UST - infusion 10/2021, followed by 12/2021, 2/2022, 4/2022\par \par MRI done 03/2022 - s/p 2 maintenance doses \par Active inflammation of distal jejunal loops with evidence of chronic scar/tethering in the left lower quadrant. \par \par Previous 10-12 cm inflammation of ileum upstream from the anastomosis with colon has decreased with a focal area of probable scarring followed by segment of ileum with decreased fold pattern.  The portion at the anastomosis however is actively inflamed (vladislav terminal ileum) \par \par large bowel segment distal to the anastomosis normal followed by focal colonic narrowing and active inflammation throughout the rectum, anal canal appears normal. the active inflammation upstream from the rectum appears increase since previous exam \par \par Currently he reports feeling better since starting UST \par Had a recent flare over the weekend - vomiting, distention. Started himself on Prednisone. Reports that he is feeling a little better but not back to his baseline,. Of note this flare was 1-2 days prior to his UST dose. His weight has been fluctuating, usually goes down with a flare. \par He is concerned about his steroids use as he has osteoporosis. \par \par Last visit \par Since last visit, pt reports he's actually felt much better. Best he's "ever felt since surgery." Reports weight above 140 lbs, on low fiber diet, and avoiding caffeine. Reports symptoms have been improving since hospitalization in August and steroids. Only recently received UST dose 1 week ago, and is looking forward to his next injection. \par \par HPI: Had flares in March and June of this year, managed at home, and then in August for which he was hospitalized. Typical flare symptoms are bloating, and diarrhea. The last episode he had vomiting and intolerance to PO intake. Noted to have partial SBO, managed conservatively with NG tube, IV fluids, Solu-Medrol with improvement, discharged on Prednisone 40 mg taper. He's been off Prednisone for at least 2 weeks. Reports that he is back to his base line now, feeling overall great with weight gain, 1-2 BMs per day, and no pain. \par \par Last Colon 2/2022 - Reports it was better than previous ones. \par

## 2022-05-10 NOTE — ASSESSMENT
[FreeTextEntry1] : 1. CROHNS DISEASE   of both small and large intestine: s/p flare 8/25-->8/30/21, then  early 12/2021-s/p pred tapered over 4 weeks, again the weekend 4/9/22 rx w prednisone 40_  mg ( just before last Stelera dose) -->\par now off fpr last week \par    s/p ileocolonic resection x 2--last 6/19/15 for recurrent sbos: \par prior was s/p 4 SBOs w/i 2 yrs--2/2 distal sb disease adj to anastamosis\par when on Humira weekly had an  ADA =33 (3/20/14), -but had sbo 2/2014 at ADA=25 and another sbo 4/28/15\par 6/10/2015 Colonoscopy: Inflamm ST mass on ileal side w ulceration/scarred/narrow\par 6/11/2015 CT: dilated thickened sb loops distal jej & ileum\par Post-op 6/2015 probably some malabsorption 2/2 to removal of R Colon and ileum: \par had WT. Loss-->r/o malabsorption:  ?bile-induced->-secretory vs decr micelles, active dis, PLE\par ?  SIBO--Elev FA, Alb=3.4-->3.1-->2.9--> (7/28/17)\par ?SIBO/Prevent post-op recurrence --> trial Flagyl 250 mg qid~12/7/16-->d/c: 5/11/17\par Also discussed trial of Cholestyramine/Xifaxin -wanted to wait\par 11/20/16 ACTIVE Crohn's-->  9.5lb wt loss, BMs: #5-6, 5x/D-- but taking Magnesium \par 12/1/16 MRE: RUQ ileocolonic anastamosis--narrowed w upstream dilatation to 3.2 cm\par Labs: Stool Fhjnibwgrpgs=104, + Incr Fecal fat, Alb=3.4, FA =23, N08=167, Hgb=12.3,ESR=10,CRP <5\par 4/19/17 :Colonoscopy: Anastamosis: open w mild -mod active colitis, enteritis severe adj to anastomosis, mild more proximal, remainder of colon=wnl\par 5/11/17- Adm PMHC w stools brown, but Hgb=7.9, BUN=17, Creat=1.4, Alb=2.9.\par S/P 2 Units w Hgb=10.6, BUN=15/1.1, Prednisone 40mg taper, entocort d/dee\par 6/8/17: Hgb=7.4, MCV=98, CRP<5--ASA stopped, Pred20--> 30mg, 6/13/17: s/p 2 Unit PRBCs\par 7/11/17 [facility] w unsteadiness. MRI Head: R Cortical Temporo-occipital encephalomalacia\par Hgb=9.9--> 8.4->9.7 after 1 Unit. Seen by Heme: received IV Iron \par CRP<5, R96=020, TPU=457, FA>23,Iron=22,Sat%=6, Ferritin=42, Alb=3.5. D/C on warfarin 2mg\par 7/28/17 Hgb=7.7; 7/31/17-s/p 1 Unit \par Heme Consultation ~ 8/2017: started  IV Infusions of  Iron and  IV Copper\par 8/17/17: Hgb=9.9, ESR=20, Io=118--Afdaeeczwv s/p GI infection w N/V/D, no obstruction\par 10/17/17: Hgb=7.9,ESR=6,CRP<5, INR=1.6, Got IV Iron x 2, since 10/2/17 for Iron<10, Hgb=8.8\par 11/16/17: Hgb=11.2, ESR=14, CRP=12, 10/26/17 Stool fat-neg, calprotectin-30\par 11/13/17: MRE-Chr mild distension of distal & vladislav-ileum s/o mild stricturing at anast, mild mural thick & mucosal enhancemt ~ 20cm; little change from 2015 c/w mild acute on chr ileitis, 2.1 cm Liver hemangioma\par 10/9/18: Hgb=11.6, MCV=94, ESR=14, CRP=5.4, INR=2.2\par 10/10/18 MRE: stricturing of neoterminal ileum w worsened upstream dilatation, moderate length of upstream ileum w stricturing extending at least 20cm and more extensive then previous. suggestion of 2 adj extraluminal fluid collections which may be w/i the wall of strictured ileum near the ileocolonic anastamosis\par pt had declined to call numbers given for  [facility] new or investigational rx's--biologics.  \par later he felt  he was  stablizing w his  wt loss, and inflammatory markers\par \par 2/28/19 w ESR=12, CRP=6.5 & 2/21/19 stool calprotectin--> 232\par 8/15/19: ESR=10, CRP=5.2, Calprotectin--> 88\par 9/19/19: ESR=9, CRP=5.5\par 10/17/19: ESR=7, CRP< 5\par 11/6/19 : ESR=6, CRP=0.18\par 11/27/19: ESR=6, CRP <5\par 1/13/20: ESR=7, CRP=0.2\par 1/30/20: ESR=9, CRP=11\par \par 2/3/20 EGD: gastritis, +MACKENZIE, No HP, +erosion w ooze -clipped, 0.5cm polyp-neg; 4cm HH, Esophagitis A, + Barretts, VC: 1+\par Colonoscopy: 05cm rectal polyp: HP, 2nd deg int hemorrhoids\par mild-mod activity: MARILY w cryptitis & mild archect distortion at ileocolonic anast: colon & ileal side, no stricture\par \par 3/2/20:ESR=7, CRP=0.26\par 3/9/20: VDZ>40, Ab to VDZ <1.6\par 3/17/20: ESR=6, CRP=6.4\par 10/17/20: ESR=11, CRP <5\par 1/4/21:ESR=9, CRP<5\par 2/22/21: ESR=8, CRP<5\par 4/5/21: ESR=9, CRP<5\par 6/4/21: ESR=9, CRP<5  \par 6/11/21: wt= 135,   no pain, stool more formed # 4, 1-2x/d \par              s/p episode --abd distenstion, pain, N/V, no BM\par              eventually started having diarrhea and flatus, BMs returned to normal\par              lost 2-3 lbs but regained\par 7/4/21: CRP <5, Hgb=12\par  7/16/21 MRE: limited to incomplete bowel distention, chr distal ileitis/probable inflammatory stricturing vs collapse of the vladislav-TI--but improved since 2018 , moderate proctitis\par 7/12/21   --  ? flare --> entyvio should have been  q 5 wk , went q 8 wks\par                      next dose should be in 4 weeks x 2 then 5 weeks, to check levels\par 7/20/21: Cliically stable, unclear if MRE accurate 2/2 underdistension, but gained wt and CRP--normal\par 7/23/21 Entyvio level= 39, Abs <1.6\par 8/23/21: Labs--Hgb=13.6, ESR=10, CRP=6.6, Dqmpnwsp=571, Iron=26, Alb=3.9, BUN=16\par 8/26/21 p/w sbo  w N/V, abd pain/bloating  x 3 days, unable to keep things down\par Labs: WBC=5, Hgb=12, CRP=43, ESR=11, Alb=4.4, BUN=28, Creat=-1.6\par CT Abd/Pelvis: diffuse marked dilatation of SB Loops w transition pt in R. mid/lower abdomen\par ~ 5-6cm, proximal to the ileocolonic anastomosis\par RX w IV solumedrol 20mg q8h, NGT, IVF, pt refused TPN, also w UTI from prostatitis\par 8/27 NGT was pulled, and clears started and advanced to LR,  was d/c home 8/30 on prednisone\par 40mg qd w taper by 10mg/wk to 20mg qd \par \par \par (  **Entyvio's  :time 0=12/22/16 ( Humira 40mg QW); 1/5/17--2wks, s/p 3rd infusion at wk 6, then q 6weeks \par #6 :6/21/17-->held 2/2 Shingles, then  Infusions as follows=9/19/17 , 10/17/17, 11/28/17, 1/16/18 ,2/16/18, 3/19/18,4/16/18, 5/14/18, 6/25/18, 8/7/18, 9/17/18, 10/16/18, 11/13/18, 12/11/18, 1/14/19, 2/15/19, 3/15/19, 5/16/19, 6/18/19,7/24/19, 9/9/19, 10/2/19, 11/4/19, 12/12/19, 1/6/20, 2/4/20, 3/3/20, 9/17/20, 10/15/20, 11/18/20, 1/11/21, 2/8/21, 3/8/21, 4/8/21, 6/3/21, 7/1/21, 8/2/21, 9/2/21,10/25/21  )\par \par 3/8/22 MRI Abd/Pelv: thickened distal Jejunal loops which are tethered; distal ileal segment  (10-12cm ) previously actively inflammed has decreased & now characterized by an area of stricture, however the vladislav-TI  5mm to the anastamosis is enhanced as well as narrowed.  Colon just  distal to the anastamosis has a focal segment of inflammation & stricture.  the recto-sigmoid appears inflammed \par 3/28/22: Hgb=14.4 , ESR=1, CRP <5, Ca=8.5, Mag=1.7,Alb=4.1, Iron=104, Imsxwbzv=495,\par  5/9//22: Hgb=13.8 , ESR=2, CRP <5, Ca=8.8, Mag=1.6, Alb=4, Iron=64, Xeiboyei=835  \par \par A / PLAN:  s/p sbo prox to anastamosis\par                  inflammatory vs fibrosis vs combination: 3/2022 recent MRI shows improvement of inflammation w      possible residual stricture in the ileum and probable colitis near the anastamosis and distal colon \par      Responded to  steroids iv--> po--d/c ~ 10/20/21,\par      tapered steroids from 40mg qd  to 20mg, [id number] qd and  taper 5mg/wk\par      then po taper again early 12/2021 40mg qd w 10mg taper/ wk--\par      PO prednisone 40mg mg qd started on 4/9/22 ,tapered off ~ 1 week ago\par \par      Entyvio level was 39 w/o Abs~ 3weeks after 7/1/21, \par      speaks to inflammatory component & probably loss of response\par      Stelara # 1 dose on 10/25/21, #2 on 12/10/21, # 3 on  2/11/22,  # 4 on 4/20/22 , next  mid June 2022\par \par       IBD consensus : due to malnutrition /steroid dependency, surgery is next best option \par       but pt reported feeling well and wanted to pursue medical treatment \par      repeated  imaging in March after 3 months of Stelara, then consider surgery vs improved candidacy for                endoscopic manipulation\par        will f/u w  IBD consultant Dr. Heard at  to review imaging after 3 months of Stelera\par        for  Colonoscopy ( w possible balloon dilatation )-> last 1 3/4 yrs ago\par \par 1/4/22 Labs: Hgb=11.5, ESR=6, CRP<5, Alb=3.9\par 1/31/22 Calprotectin =84\par 2/14/22: Hgb=10.6, ESR=9, CRP <5, Ca=8.3, Mag=1.6, Alb=3.8, Iron=25, Ferritin=15\par 3/28/22: Hgb=14, ESR=1, CRP<5 , Ca=8.5, Mag=1.7, Alb=4, Iron=104, Emgejhux=919\par 5/9//22: Hgb=13.8 , ESR=2, CRP <5, Ca=8.8, Mag=1.6, Alb=4, Iron=64, Npwakjxs=130  \par Probiotics 3 qd \par Diet:  LR, Lactose free, protein drinks tid\par MCT oil begun but never maintained \par **trend --cbc, esr, crp, albumin, calprotectin \par   \par **monitor wt--- usu bet 136-139, 7/20/21=136, 11/22/21=139, 1/4/22=132, 2/22/22=132, 4/5/22=131,  otexy=610\par \par \par                                                                                                                                                                                                                                                                                                                                                                                                                                                                                                                                                                                                                                                                                                                            \par 2. Iron deficiency anemia : \par  had initially dropped , after clinical flare and post procedure bleeding\par Probably multifactorial: ACD, Blood loss, malabsorption/nutrient deficiencies\par Eliquis-->warfarin after CVA, On Entyvio \par 7/11/17 s/p  Adm for unsteady gait w MRI c/w CVA--warfarin begun: possible better control of AC\par Chromagen 2 qd--> IV Iron , s/p IV Cu x 5, FA 1mg qd , B12 SL & IM\par Still requiring IV Iron and cu infusions prn \par most recent IV Iron  :  12/10/21 for Ferritin=51 on 11/29/21\par 2/22/21: X07=074, \par 6/4/21: Cu=91, Zinc=69, Ferritin=32,Iron=43, \par 7/12/21: Pa=246, Zinc=74, Zotdgebo=849, Iron=35\par 11/15/21 : Hgb=12,  Ferritin =104, Ca=9, Mg=1.7\par 1/4/22: Hgb=11.5, Ca=8.8, Mg=2.9, Niixwgl=212 \par 2/14/22: Hgb=10.6, Ca=8.3, Mag=1.6, Alb=3.8, Iron=25, Ferritin=15\par 3/28/22: Hgb=14.4 , Ca=8.5, Mag=1.7,Alb=4.1, Iron=104, Qikztfyo=193,\par  5/9//22: Hgb=13.8 , Ca=8.8, Mag=1.6, Alb=4, Iron=64, Axhejbsf=024  \par B12 1cc IM ____R. delt-- given today, \par trend cbc, B12, FA, Iron panel \par Adj INR--closer to low 2's.    \par \par \par \par \par 3. Osteoporosis \par : Progression on BD from 5/5/16\par Crohns, h/o steroid use\par Calcium citrate & Vit D per Endo\par Forteo since 5/10/19 , then  Reclast          \par Repeat BD of 8/2018 --showed worsening to Osteoporosis from 2016\par Rec Endo consult w Dr. Akers :Osteoporosis center at [facility]--pt never went originally \par 9/21/18: Phos=2.4, Ca=8.7, Alb=3.4, Vit D=27, WNJ=293, \par 10/16/18: Phos=2.8, Ca=8.7, Alb=3.5,\par 1/14/19: Phos=2.6,  Ca=8.7, Alb=3.6\par 2/28/19: Phos=1.8, Ca=8.9, Alb=3.7, VitD=39, KKF=035\par 3/18/19: Ca=8.5, Alb=3.7, Vit D=44.6, PTH=93\par 7/11/19: Ca=9.1, Alb=3.7, Phos=3.9, Vit D=40/ 306, HSZ=507\par 8/15/19: Ca=9, Alb=4.2, Phos=4.4, VitD=59, SPY=772\par 10/17/19: Ca=9.6, Alb=3.9, Phos=3.5\par 11/6/19: Ca=9.1, Alb=3.9. Phos=3.7, VitD=50, PTH=80\par 1/13/20: ca=9.1\par 1/30/20: Ca=9.2, Alb=3.9, Phos=2.7, Mag=1.5, Vit D=103/41, PTH=87\par 2/18/20:ca=8.5, Alb=3.6, \par 3/2/20: Ca=8.5, Alb=3.6\par 3/17/20: Ca=9.1, Alb=3.7, Phos=3.4, Mag=1.7\par 10/17/20: Ca=8.9, Alb=4, Phos=2.3, Mag-2.0, Vit D=66, PTH=47\par 1/4/21 : Ca=9.4, Alb=4.2, Phos=2.7, Mag=2, Vit D=64, PTH=87.5\par 4/5/21: Ca=9.1, Alb=4, Phos=3.1, Mag=1.7, \par 6/4/21: ca=9, Alb=3.8, Phos=2.8, Mag=1.8\par 7/12/21: Ca=8.6, ALB=6, phosph=2.3, Mag=1.8\par 11/15/21: Ca=9, Alb=3.7, Mag=1.7\par 1/4/22: ca=8.8, Alb=3.9, Mg=2.9, phos=2.9\par 1/21/22: Ca=8.8, Alb=3.9, Vit D=60, PTH=85\par 2/14/22:  Ca=8.3, Mag=1.6, Alb=3.8, \par 3/28/22: Ca=8.5, Mag=1.7, Alb=4.1, Phos=1.2\par  5/9//22:  Ca=8.8, Mag=1.6, Alb=4, Phos=1.6 \par \par Dr. Akers: Hyperparathyroidism-- probably secondary due to low Vit D/Ca & ? superimposed primary, \par [facility] init felt  Parathyroid scan showing activity in mediastinum is ectopic parathyroid, then felt sx not indicated at that time, elev PTH is secondary to low  Vit D/Ca\par Rx replete Vit D and current IV Calcium-as per endo \par \par trend Vit D, Ca, Phos, PTH,  \par \par BD--9/2020: incr in spine and hip , no change in wrist, repeat 9/2022\par 10/5/20, 9/2021-s/p Reclast--repeat 2022\par \par \par \par \par \par 4. GERD:  recently less active by ENT , no ht burn,  dysphagia,  + throat clear\par               h/o  Dry cough, CXR:ana, saw ENT bergstein-->LPR\par * ++ LPR,  ++  Page’s w No Dysplasia,  + H/O  Esophagitis grade: A   was found \par \par Recommend: \par * Anti-reflux diet & life-style changes reviewed & re-emphasized.  ++  Bedge required\par * Weight reduction & regular exercise emphasized\par \par * need for  PPI:  Omep 40 BID ,  ++ H2B q HS:Zantac 300mg--> Pepcid 40mg\par No Carafate  1 gram:   --was emphasized\par I reviewed & summarized the prospective randomized & retrospective non-randomized studies\par looking at potential long term SE's of PPIs, w special attention to associations & actual cause\par as related to GI infections, bone loss, cognitive changes, KD, Covid, vitamin & electrolyte deficiencies\par questions were answered, pt advised that PPIs should be used when needed as indicated by their\par clinical indication and response and tapering off is always the goal if possible. pt understood.\par \par *  F/U  EGD: --> 2/2022--for Barretts screening / surveillance \par * No  need for pH Monitor,   Manometry,   Esophagram --\par *  ++  need for ENT  eval/F/U,  No  need for Surgical  eval  --  \par \par \par \par \par \par 5. Hemorrhoids:  well - controlled.  No pain,  swelling,  itch,  bleeding\par * Discussed previously the potential complications of thrombosis,  pain,  infection,  swelling, itching,  bleeding \par Reccomend: \par * currently Low   - Fiber Diet was emphasized\par * 6  --  8 cups of decaffeinated fluid daily was emphasized \par * Sitz Bathes prn,   No:  Anusol HC  Suppos / Cream  [place] BID -- was needed\par * No:  Tucks BID,  Balneol Lotion,   Calmoseptine Oint -- was needed ;    ++ Prep H prn\par * No:  need for  Colorectal surgical evaluation for possible ablation\par \par \par \par \par \par \par \par 6. Barretts esophagus without dysplasia  \par Notes: see GERD.    \par \par \par \par 7. Hepatomegaly-->not confirmed by recent abd sono\par CTE w relative enlargemt, ? lobulation & enlarged portal/mesenteric veins\par LFTs-wnl, no ascites or edema\par R/O CLD, Hepatic vein thrombosis\par Abd sono w doppler--> Liver and spleen normal size, no thrombosis, normal portal and hepatic vein flow\par \par \par \par \par Informed Consent:\par * The risks & Benefits of EGD were discussed w patient.\par * This included but was not limited to perforation, bleeding, sedation /med rxns possibly requiring surgery, blood transfusions, antibiotics & CPR/Intubation.\par * Pt. understands & agrees to the procedures.\par The following instructions in regards to the prep and medically essential ( cardiac, pulmonary, sz, psych, endocrine)  pre-op medication administration\par was reviewed and emphasized with the patient . \par * Pt. advised to D/C  ASA/NSAIDs  7  Days   PTP.\par * [ +++ ]  Dulcolax / Miralax / Mag. Citrate ,  [     ] Prepopik/ Clenpiq ,  [     ] Osmo Prep,  [    ] GoLytely,  prep. reviewed w Pt.\par * Hold  [           ] AM of procedure.\par * Hold  [           ] PM  before procedure.\par * Take  [           ] PM  before procedure.\par * Take  [           ] AM of procedure.\par \par \par

## 2022-05-14 ENCOUNTER — RESULT REVIEW (OUTPATIENT)
Age: 57
End: 2022-05-14

## 2022-05-17 ENCOUNTER — RX RENEWAL (OUTPATIENT)
Age: 57
End: 2022-05-17

## 2022-05-17 ENCOUNTER — TRANSCRIPTION ENCOUNTER (OUTPATIENT)
Age: 57
End: 2022-05-17

## 2022-05-17 ENCOUNTER — RESULT REVIEW (OUTPATIENT)
Age: 57
End: 2022-05-17

## 2022-05-17 ENCOUNTER — APPOINTMENT (OUTPATIENT)
Dept: GASTROENTEROLOGY | Facility: HOSPITAL | Age: 57
End: 2022-05-17
Payer: COMMERCIAL

## 2022-05-17 ENCOUNTER — OUTPATIENT (OUTPATIENT)
Dept: OUTPATIENT SERVICES | Facility: HOSPITAL | Age: 57
LOS: 1 days | Discharge: HOME | End: 2022-05-17
Payer: COMMERCIAL

## 2022-05-17 DIAGNOSIS — Z41.9 ENCOUNTER FOR PROCEDURE FOR PURPOSES OTHER THAN REMEDYING HEALTH STATE, UNSPECIFIED: Chronic | ICD-10-CM

## 2022-05-17 PROCEDURE — 88305 TISSUE EXAM BY PATHOLOGIST: CPT | Mod: 26

## 2022-05-17 PROCEDURE — 45385 COLONOSCOPY W/LESION REMOVAL: CPT

## 2022-05-17 PROCEDURE — 88305 TISSUE EXAM BY PATHOLOGIST: CPT

## 2022-05-17 PROCEDURE — 45380 COLONOSCOPY AND BIOPSY: CPT | Mod: 59

## 2022-05-17 PROCEDURE — 45380 COLONOSCOPY AND BIOPSY: CPT

## 2022-05-17 PROCEDURE — C1889: CPT

## 2022-05-17 DEVICE — CLIP RESOLUTION 360 ULTRA 235CM 20/BX: Type: IMPLANTABLE DEVICE | Status: FUNCTIONAL

## 2022-05-19 LAB — SURGICAL PATHOLOGY STUDY: SIGNIFICANT CHANGE UP

## 2022-05-24 DIAGNOSIS — Z09 ENCOUNTER FOR FOLLOW-UP EXAMINATION AFTER COMPLETED TREATMENT FOR CONDITIONS OTHER THAN MALIGNANT NEOPLASM: ICD-10-CM

## 2022-05-24 DIAGNOSIS — K64.1 SECOND DEGREE HEMORRHOIDS: ICD-10-CM

## 2022-05-24 DIAGNOSIS — K21.9 GASTRO-ESOPHAGEAL REFLUX DISEASE WITHOUT ESOPHAGITIS: ICD-10-CM

## 2022-05-24 DIAGNOSIS — Z98.0 INTESTINAL BYPASS AND ANASTOMOSIS STATUS: ICD-10-CM

## 2022-05-24 DIAGNOSIS — Z87.19 PERSONAL HISTORY OF OTHER DISEASES OF THE DIGESTIVE SYSTEM: ICD-10-CM

## 2022-05-24 DIAGNOSIS — Z86.718 PERSONAL HISTORY OF OTHER VENOUS THROMBOSIS AND EMBOLISM: ICD-10-CM

## 2022-05-24 DIAGNOSIS — Z79.01 LONG TERM (CURRENT) USE OF ANTICOAGULANTS: ICD-10-CM

## 2022-05-24 DIAGNOSIS — Z86.73 PERSONAL HISTORY OF TRANSIENT ISCHEMIC ATTACK (TIA), AND CEREBRAL INFARCTION WITHOUT RESIDUAL DEFICITS: ICD-10-CM

## 2022-05-27 ENCOUNTER — RESULT REVIEW (OUTPATIENT)
Age: 57
End: 2022-05-27

## 2022-06-01 ENCOUNTER — APPOINTMENT (OUTPATIENT)
Dept: GASTROENTEROLOGY | Facility: CLINIC | Age: 57
End: 2022-06-01
Payer: COMMERCIAL

## 2022-06-01 ENCOUNTER — RESULT REVIEW (OUTPATIENT)
Age: 57
End: 2022-06-01

## 2022-06-01 PROCEDURE — 99214 OFFICE O/P EST MOD 30 MIN: CPT | Mod: 95

## 2022-06-02 NOTE — REASON FOR VISIT
[Home] : at home, [unfilled] , at the time of the visit. [Medical Office: ([facility])___] : at the medical office located in  [Patient] : the patient [Self] : self [This encounter was initiated by telehealth (audio with video) and converted to telephone (audio only) due to technical difficulties.] : This encounter was initiated by telehealth (audio with video) and converted to telephone (audio only) due to technical difficulties. [Follow-Up: _____] : a [unfilled] follow-up visit

## 2022-06-03 NOTE — CONSULT LETTER
[Dear  ___] : Dear  [unfilled], [Courtesy Letter:] : I had the pleasure of seeing your patient, [unfilled], in my office today. [Please see my note below.] : Please see my note below. [Sincerely,] : Sincerely, [FreeTextEntry3] : Ricky Heard MD\par Associate Professor of Medicine\par Chief of GI\par Director IBD Program\par [facility]\par

## 2022-06-03 NOTE — ASSESSMENT
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 Same was discussed with the patient wanted to pursue medical treatment - Started on UST, MRE done after  2 UST maintenance doses - shows some active inflammation; for follow up today after colonoscopy.  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 \par \par # Ileocolonic CD s/p resection x2 with recurrence\par -The patient underwent colonoscopy recently and did not show any active disease or strictures despite the read on the MRI.  This is on his current therapy of ustekinumab which she is now into maintenance therapy.\par -This finding leads me to believe that what have been potentially described to be flares of the disease causing partial bowel obstruction are really related to adhesive disease.\par -I would recommend that he avoid steroids unless there is true evidence of flare and unfortunately imaging may consistently show wall thickening in the terminal ileum.\par  -If he experiences partial small bowel obstruction symptoms such as vomiting/distention, he should go on a liquid diet for 24 hours. If symptoms continue beyond then he should go to the ER for hydration and expectant management. \par Cont low fiber diet and avoiding caffeine \par -Continue current therapy with ustekinumab maintenance every 8 weeks\par  \par The patient will follow up with his primary GI Dr. Lugo\par \par Jerri Nguyen MD\par Advanced IBD Fellow \par

## 2022-06-03 NOTE — HISTORY OF PRESENT ILLNESS
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 Same was discussed with the patient, he reported feeling great now and wanted to pursue medical treatment - Started on UST, MRE done after  2 UST maintenance doses - shows some active inflammation; for follow up today after colonoscopy.  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 \par \par Reports compliance with UST, started on 10/2021\par No SBO episodes since last visit. \par His usual flares has vomiting, distention. Respond to Prednisone\par \par Colon 05/2022\par The colon was fairly unremarkable to the level of the ileocolonic side to side anastomosis. There was no resistance to passage of pediatric colonoscope into neoterminal ileum and single apthae along with some exudate and pseudopolyp was noted. A biopsy caused some bleeding, so a clip was deployed (pt on coumadin). \par Internal hemorrhoid was noted on retroflexion.\par \par Path - Neoterminal biopsy - Small intestinal mucosa with acute inflammation \par \par MRI done 03/2022 - s/p 2 maintenance doses \par Active inflammation of distal jejunal loops with evidence of chronic scar/tethering in the left lower quadrant. \par \par Previous 10-12 cm inflammation of ileum upstream from the anastomosis with colon has decreased with a focal area of probable scarring followed by segment of ileum with decreased fold pattern. The portion at the anastomosis however is actively inflamed (vladislav terminal ileum) \par \par large bowel segment distal to the anastomosis normal followed by focal colonic narrowing and active inflammation throughout the rectum, anal canal appears normal. the active inflammation upstream from the rectum appears increase since previous exam \par \par Last visit \par Had a recent flare over the weekend - vomiting, distention. Started himself on Prednisone. Reports that he is feeling a little better but not back to his baseline,. Of note this flare was 1-2 days prior to his UST dose. His weight has been fluctuating, usually goes down with a flare. \par He is concerned about his steroids use as he has osteoporosis. \par \par Last visit \par Since last visit, pt reports he's actually felt much better. Best he's "ever felt since surgery." Reports weight above 140 lbs, on low fiber diet, and avoiding caffeine. Reports symptoms have been improving since hospitalization in August and steroids. Only recently received UST dose 1 week ago, and is looking forward to his next injection. \par \par HPI: Had flares in March and June of this year, managed at home, and then in August for which he was hospitalized. Typical flare symptoms are bloating, and diarrhea. The last episode he had vomiting and intolerance to PO intake. Noted to have partial SBO, managed conservatively with NG tube, IV fluids, Solu-Medrol with improvement, discharged on Prednisone 40 mg taper. He's been off Prednisone for at least 2 weeks. Reports that he is back to his base line now, feeling overall great with weight gain, 1-2 BMs per day, and no pain. \par \par Last Colon 2/2022 - Reports it was better than previous ones.

## 2022-06-06 ENCOUNTER — RX RENEWAL (OUTPATIENT)
Age: 57
End: 2022-06-06

## 2022-06-07 ENCOUNTER — APPOINTMENT (OUTPATIENT)
Dept: GASTROENTEROLOGY | Facility: CLINIC | Age: 57
End: 2022-06-07

## 2022-06-09 ENCOUNTER — APPOINTMENT (OUTPATIENT)
Dept: ENDOCRINOLOGY | Facility: CLINIC | Age: 57
End: 2022-06-09
Payer: COMMERCIAL

## 2022-06-09 VITALS
WEIGHT: 132 LBS | OXYGEN SATURATION: 97 % | DIASTOLIC BLOOD PRESSURE: 70 MMHG | HEIGHT: 68.5 IN | BODY MASS INDEX: 19.77 KG/M2 | SYSTOLIC BLOOD PRESSURE: 118 MMHG | HEART RATE: 84 BPM

## 2022-06-09 PROCEDURE — 99215 OFFICE O/P EST HI 40 MIN: CPT

## 2022-06-09 NOTE — HISTORY OF PRESENT ILLNESS
[Home] : at home, [unfilled] , at the time of the visit. [Medical Office: ([facility])___] : at the medical office located in  [Verbal consent obtained from patient] : the patient, [unfilled] [FreeTextEntry1] : Mr. GUDELIA DUNN is a 57 year old male\par  coming for a f/u , for severe osteoporosis with h/o bilateral hip fractures, hyperparathyroidism, low D and hypocalcemia secondary to Chron's disease on Stelara shot every other month Dr Morton \par on Vit D 50,000 IU 4 times a week alternating with 3 times a week every other week \par \par started Forteo May 10 2019 then first Reclast infusion in May 2021 with no side effects\par last DEXA 9/2019 stable \par \par on IV calcium each Friday at the infusion center, last one a month ago \par labs done much improved \par \par Ca citrate 950mg 3 tab bid \par Clacium IV every other week\par \par also iron IV every 6 weeks \par reports compliance \par \par 24hr urine low calcium while low D \par parathyroid scan reviewed parathyroid adenoma versu\par \par total thyroidectomy and central compartment  dissection 9/11/2020\par Pathology showing 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 No evidence of lymphatic or vascular invasion no extra thyroid extension. 8 of 10 lymph nodes positive for metastatic papillary thyroid carcinoma.\par \par Large test metastatic focus is 0.7 cm in greatest dimension extra low dose extension present. Thyroid gland left sided within normal limits. Tihymic tissue present.\par \par received iodine 131 at 29.3 mCi in October 2020\par Posterior ideation scan showed focal site of increased tracer localization in the neck to the right of midline as was evident on prior study on October 23, 1920.\par \par started Levothyroxine 125 mcg on 9/18/2020 no side effects dose increased to 137 mcg on November 2020\par \par last DEXA 9/2020\par As compared to the patient's most recent study dated 9/12/2019, there has been a statistically\par significant interval increase in bone density at both the lumbar spine and left hip with no s\par tatistically significant change noted at the left forearm.\par worse T score LFN -2.8

## 2022-06-14 ENCOUNTER — NON-APPOINTMENT (OUTPATIENT)
Age: 57
End: 2022-06-14

## 2022-06-20 ENCOUNTER — RESULT REVIEW (OUTPATIENT)
Age: 57
End: 2022-06-20

## 2022-06-20 ENCOUNTER — APPOINTMENT (OUTPATIENT)
Dept: HEMATOLOGY ONCOLOGY | Facility: CLINIC | Age: 57
End: 2022-06-20
Payer: COMMERCIAL

## 2022-06-20 VITALS
BODY MASS INDEX: 20.03 KG/M2 | WEIGHT: 133.7 LBS | HEIGHT: 68.5 IN | HEART RATE: 75 BPM | DIASTOLIC BLOOD PRESSURE: 80 MMHG | RESPIRATION RATE: 16 BRPM | TEMPERATURE: 97.7 F | SYSTOLIC BLOOD PRESSURE: 119 MMHG | OXYGEN SATURATION: 99 %

## 2022-06-20 PROCEDURE — 99214 OFFICE O/P EST MOD 30 MIN: CPT | Mod: 25

## 2022-06-20 PROCEDURE — 36415 COLL VENOUS BLD VENIPUNCTURE: CPT

## 2022-06-20 NOTE — REVIEW OF SYSTEMS
[Negative] : Allergic/Immunologic [Eye Pain] : no eye pain [Vision Problems] : no vision problems [Dysphagia] : no dysphagia [Hoarseness] : no hoarseness [Chest Pain] : no chest pain [Lower Ext Edema] : no lower extremity edema [Shortness Of Breath] : no shortness of breath [Cough] : no cough [Vomiting] : no vomiting [Constipation] : no constipation [Diarrhea] : no diarrhea [Dysuria] : no dysuria [Joint Pain] : no joint pain [Skin Rash] : no skin rash [Dizziness] : no dizziness [Insomnia] : no insomnia [Anxiety] : no anxiety [Depression] : no depression [Muscle Weakness] : no muscle weakness [Easy Bleeding] : no tendency for easy bleeding [Easy Bruising] : no tendency for easy bruising

## 2022-06-20 NOTE — CONSULT LETTER
[Dear  ___] : Dear  [unfilled], [Consult Letter:] : I had the pleasure of evaluating your patient, [unfilled]. [Please see my note below.] : Please see my note below. [Consult Closing:] : Thank you very much for allowing me to participate in the care of this patient.  If you have any questions, please do not hesitate to contact me. [Sincerely,] : Sincerely, [DrMeron  ___] : Dr. REARDON [FreeTextEntry3] : Angie Biswas MD\par [facility]\par

## 2022-06-20 NOTE — ASSESSMENT
[FreeTextEntry1] : 1.  Anemia- Hgb 13.6- s/p Injectafer x 2 -\par  -blood loss, anemia of chronic disease with need for intermittent iron  \par - copper deficiency - replaced, level WNL 5/2020\par - hospitalized with flare up of colitis, got one iv iron in the hospital- started Straterra IV at the end of Oct- will begin self inj Dec 2021\par - Injectafer - 3/2022 x 2 doses, doesn’t respond to Venofer \par - Injectafer 5/22\par - check iron today \par \par 2. 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 Hypogammaglobulinemia- no increased infection rate \par - s/p  Prevnar 13 12/2018 \par -  Pneumococcal vaccine 23 boost \par - s/p  Shingrex\par -  COVID vaccine - Pfizer x 4\par - Flu shot \par \par 5. Zinc deficiency\par - oral Zinc, level better - 72\par \par 6. 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 No evidence of lymphatic invasion. 8/10 LN's for eli metastatic papillary thyroid cancer\par -received iodine 131 at 29.3 mCi in October 2020\par -On synthroid\par -Due for US Head and soft tissue neck May 2022- follows with Dr. Akers\par \par Dr. Luis Felipe Monsalve\par cell [phone]\par office [phone]\par \par Labs next visit- PT, INR, irons, cbc and chem \par Blood drawn in office. Ordered and reviewed.\par \par \par colonoscopy with dilation upcoming in May 2022\par \par  return in 8 weeks cbc chem irons, zinc and copper\par \par \par

## 2022-06-20 NOTE — HISTORY OF PRESENT ILLNESS
[0 - No Distress] : Distress Level: 0 [de-identified] : Patient is a 52 year old who is referred for initial consultation for anemia secondary to Crohns/GI bleed vs Iron Deficiency/ Vit b12 def. 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 Patient is status post ileo-colonic resections for SBO  in 2016. In 2016 patient had complaints of 10 - 15 lb weight loss. Labs at that time showed Hgb of 12, steatorrhea and stool calprotectin = 236. In 2016 MRI/MRE with narrowing at ileocolonic anastomosis with upstream dilation. Pt refused bridge with  prednisone and Entocort / Flagyl. In 2017 patient Hgb dropped to 9.5. On 2017 patient underwent an EGD and Colonoscopy. Findings consistent with Page's and no dysplasia or AVMs. Colonoscopy showed an open anastomosis but active disease, moderate on the colonic side and limited to the anastomosis and moderate to severe enteritis, just proximal to the anastomosis. Pat had routine labs on 05/10/2017 Hgb: 8.3.  [FreeTextEntry1] : s/p injectafer, copper\par warfarin [de-identified] : Patient presents for follow up of DVT, anemia, MTHFR Gene mutation, colitis.  Patient overall feels well.

## 2022-06-21 ENCOUNTER — APPOINTMENT (OUTPATIENT)
Dept: GASTROENTEROLOGY | Facility: CLINIC | Age: 57
End: 2022-06-21
Payer: COMMERCIAL

## 2022-06-21 ENCOUNTER — MED ADMIN CHARGE (OUTPATIENT)
Age: 57
End: 2022-06-21

## 2022-06-21 PROCEDURE — 99215 OFFICE O/P EST HI 40 MIN: CPT

## 2022-06-21 RX ORDER — CYANOCOBALAMIN 1000 UG/ML
1000 INJECTION INTRAMUSCULAR; SUBCUTANEOUS
Qty: 0 | Refills: 0 | Status: COMPLETED | OUTPATIENT
Start: 2022-06-21

## 2022-06-21 NOTE — ASSESSMENT
[FreeTextEntry1] : 1. CROHNS DISEASE   of both small and large intestine: s/p flare 8/25-->8/30/21, then  early 12/2021-s/p pred tapered over 4 weeks, again the weekend 4/9/22 rx w prednisone 40_  mg ( just before last Stelera dose) -->\par now off since early 5/2022\par    s/p ileocolonic resection x 2--last 6/19/15 for recurrent sbos: \par prior was s/p 4 SBOs w/i 2 yrs--2/2 distal sb disease adj to anastamosis\par when on Humira weekly had an  ADA =33 (3/20/14), -but had sbo 2/2014 at ADA=25 and another sbo 4/28/15\par 6/10/2015 Colonoscopy: Inflamm ST mass on ileal side w ulceration/scarred/narrow\par 6/11/2015 CT: dilated thickened sb loops distal jej & ileum\par Post-op 6/2015 probably some malabsorption 2/2 to removal of R Colon and ileum: \par had WT. Loss-->r/o malabsorption:  ?bile-induced->-secretory vs decr micelles, active dis, PLE\par ?  SIBO--Elev FA, Alb=3.4-->3.1-->2.9--> (7/28/17)\par ?SIBO/Prevent post-op recurrence --> trial Flagyl 250 mg qid~12/7/16-->d/c: 5/11/17\par Also discussed trial of Cholestyramine/Xifaxin -wanted to wait\par 11/20/16 ACTIVE Crohn's-->  9.5lb wt loss, BMs: #5-6, 5x/D-- but taking Magnesium \par 12/1/16 MRE: RUQ ileocolonic anastamosis--narrowed w upstream dilatation to 3.2 cm\par Labs: Stool Nttslapqqcqo=226, + Incr Fecal fat, Alb=3.4, FA =23, N06=461, Hgb=12.3,ESR=10,CRP <5\par 4/19/17 :Colonoscopy: Anastamosis: open w mild -mod active colitis, enteritis severe adj to anastomosis, mild more proximal, remainder of colon=wnl\par 5/11/17- Adm PMHC w stools brown, but Hgb=7.9, BUN=17, Creat=1.4, Alb=2.9.\par S/P 2 Units w Hgb=10.6, BUN=15/1.1, Prednisone 40mg taper, entocort d/dee\par 6/8/17: Hgb=7.4, MCV=98, CRP<5--ASA stopped, Pred20--> 30mg, 6/13/17: s/p 2 Unit PRBCs\par 7/11/17 [facility] w unsteadiness. MRI Head: R Cortical Temporo-occipital encephalomalacia\par Hgb=9.9--> 8.4->9.7 after 1 Unit. Seen by Heme: received IV Iron \par CRP<5, X85=709, KAJ=029, FA>23,Iron=22,Sat%=6, Ferritin=42, Alb=3.5. D/C on warfarin 2mg\par 7/28/17 Hgb=7.7; 7/31/17-s/p 1 Unit \par Heme Consultation ~ 8/2017: started  IV Infusions of  Iron and  IV Copper\par 8/17/17: Hgb=9.9, ESR=20, Au=173--Hsekrcrdcv s/p GI infection w N/V/D, no obstruction\par 10/17/17: Hgb=7.9,ESR=6,CRP<5, INR=1.6, Got IV Iron x 2, since 10/2/17 for Iron<10, Hgb=8.8\par 11/16/17: Hgb=11.2, ESR=14, CRP=12, 10/26/17 Stool fat-neg, calprotectin-30\par 11/13/17: MRE-Chr mild distension of distal & alfredito-ileum s/o mild stricturing at anast, mild mural thick & mucosal enhancemt ~ 20cm; little change from 2015 c/w mild acute on chr ileitis, 2.1 cm Liver hemangioma\par 10/9/18: Hgb=11.6, MCV=94, ESR=14, CRP=5.4, INR=2.2\par 10/10/18 MRE: stricturing of neoterminal ileum w worsened upstream dilatation, moderate length of upstream ileum w stricturing extending at least 20cm and more extensive then previous. suggestion of 2 adj extraluminal fluid collections which may be w/i the wall of strictured ileum near the ileocolonic anastamosis\par pt had declined to call numbers given for  IBD center at [facility] for new or investigational rx's--biologics.  \par later he felt  he was  stablizing w his  wt loss, and inflammatory markers\par \par 2/28/19 w ESR=12, CRP=6.5 & 2/21/19 stool calprotectin--> 232\par 8/15/19: ESR=10, CRP=5.2, Calprotectin--> 88\par 9/19/19: ESR=9, CRP=5.5\par 10/17/19: ESR=7, CRP< 5\par 11/6/19 : ESR=6, CRP=0.18\par 11/27/19: ESR=6, CRP <5\par 1/13/20: ESR=7, CRP=0.2\par 1/30/20: ESR=9, CRP=11\par \par 2/3/20 EGD: gastritis, +MACKENZIE, No HP, +erosion w ooze -clipped, 0.5cm polyp-neg; 4cm HH, Esophagitis A, + Barretts, VC: 1+\par Colonoscopy: 05cm rectal polyp: HP, 2nd deg int hemorrhoids\par mild-mod activity: MARILY w cryptitis & mild archect distortion at ileocolonic anast: colon & ileal side, no stricture\par \par 3/2/20:ESR=7, CRP=0.26\par 3/9/20: VDZ>40, Ab to VDZ <1.6\par 3/17/20: ESR=6, CRP=6.4\par 10/17/20: ESR=11, CRP <5\par 1/4/21:ESR=9, CRP<5\par 2/22/21: ESR=8, CRP<5\par 4/5/21: ESR=9, CRP<5\par 6/4/21: ESR=9, CRP<5  \par 6/11/21: wt= 135,   no pain, stool more formed # 4, 1-2x/d \par              s/p episode --abd distenstion, pain, N/V, no BM\par              eventually started having diarrhea and flatus, BMs returned to normal\par              lost 2-3 lbs but regained\par 7/4/21: CRP <5, Hgb=12\par  7/16/21 MRE: limited to incomplete bowel distention, chr distal ileitis/probable inflammatory stricturing vs collapse of the alfredito-TI--but improved since 2018 , moderate proctitis\par 7/12/21   --  ? flare --> entyvio should have been  q 5 wk , went q 8 wks\par                      next dose should be in 4 weeks x 2 then 5 weeks, to check levels\par 7/20/21: Cliically stable, unclear if MRE accurate 2/2 underdistension, but gained wt and CRP--normal\par 7/23/21 Entyvio level= 39, Abs <1.6\par 8/23/21: Labs--Hgb=13.6, ESR=10, CRP=6.6, Uxxdpkzh=236, Iron=26, Alb=3.9, BUN=16\par 8/26/21 p/w sbo  w N/V, abd pain/bloating  x 3 days, unable to keep things down\par Labs: WBC=5, Hgb=12, CRP=43, ESR=11, Alb=4.4, BUN=28, Creat=-1.6\par CT Abd/Pelvis: diffuse marked dilatation of SB Loops w transition pt in R. mid/lower abdomen\par ~ 5-6cm, proximal to the ileocolonic anastomosis\par RX w IV solumedrol 20mg q8h, NGT, IVF, pt refused TPN, also w UTI from prostatitis\par 8/27 NGT was pulled, and clears started and advanced to LR,  was d/c home 8/30 on prednisone\par 40mg qd w taper by 10mg/wk to 20mg qd \par \par \par (  **Entyvio's  :time 0=12/22/16 ( Humira 40mg QW); 1/5/17--2wks, s/p 3rd infusion at wk 6, then q 6weeks \par #6 :6/21/17-->held 2/2 Shingles, then  Infusions as follows=9/19/17 , 10/17/17, 11/28/17, 1/16/18 ,2/16/18, 3/19/18,4/16/18, 5/14/18, 6/25/18, 8/7/18, 9/17/18, 10/16/18, 11/13/18, 12/11/18, 1/14/19, 2/15/19, 3/15/19, 5/16/19, 6/18/19,7/24/19, 9/9/19, 10/2/19, 11/4/19, 12/12/19, 1/6/20, 2/4/20, 3/3/20, 9/17/20, 10/15/20, 11/18/20, 1/11/21, 2/8/21, 3/8/21, 4/8/21, 6/3/21, 7/1/21, 8/2/21, 9/2/21,10/25/21  )\par \par 3/8/22 MRI Abd/Pelv: thickened distal Jejunal loops which are tethered; distal ileal segment  (10-12cm ) previously actively inflammed has decreased & now characterized by an area of stricture, however the alfredito-TI  5mm to the anastamosis is enhanced as well as narrowed.  Colon just  distal to the anastamosis has a focal segment of inflammation & stricture.  the recto-sigmoid appears inflammed \par 3/28/22: Hgb=14.4 , ESR=1, CRP <5, Ca=8.5, Mag=1.7,Alb=4.1, Iron=104, Epshloud=347,\par  5/9//22: Hgb=13.8 , ESR=2, CRP <5, Ca=8.8, Mag=1.6, Alb=4, Iron=64, Hnnzykag=535  \par 5/17/22 Dr. Heard Colonoscopy: ped scope passed w/o resist in alfredito-TI, one single apthae w psuedopolyp.\par  6/21/22 Labs: Hgb=14, ESR=2, CRP<5,  Alb=4.5, Phos=0.8, Mag=1.8, Ca=8.5,Jbnybyil=434, Iron=86\par                          \par A / PLAN:  s/p sbo prox to anastamosis\par                  inflammatory vs fibrosis vs combination: 3/2022 recent MRI shows improvement of inflammation w      possible residual stricture in the ileum and probable colitis near the anastamosis and distal colon \par      Responded to  steroids iv--> po--d/c ~ 10/20/21,\par      tapered steroids from 40mg qd  to 20mg, [id number] qd and  taper 5mg/wk\par      then po taper again early 12/2021 40mg qd w 10mg taper/ wk--\par      PO prednisone 40mg mg qd started on 4/9/22 ,tapered off ~ early 5/2022\par \par      Entyvio level was 39 w/o Abs~ 3weeks after 7/1/21, \par      speaks to inflammatory component & probably loss of response\par      Stelara # 1 dose on 10/25/21, #2 on 12/10/21, # 3 on  2/11/22,  # 4 on 4/20/22 , #5 mid June 2022\par \par       IBD consensus : due to malnutrition /steroid dependency, surgery is next best option \par       but pt reported feeling well and wanted to pursue medical treatment \par      repeated  imaging in March after 3 months of Stelara, then consider surgery vs improved candidacy for                endoscopic manipulation\par         f/u w  IBD consultant Dr. Heard at   & reviewed imaging after 3 months of Stelera\par        for  Colonoscopy ( w possible balloon dilatation )-> last 1 3/4 yrs ago\par    5/17/22 Dr. Heard Colonoscopy: ped scope passed w/o resist in alfredito-TI, one single apthae w psuedopolyp.\par     No Dilatation need, very mild dis at Alfredito-TI, MRE may show wall thickening, sbo's probably adhesive dis\par \par 1/4/22 Labs: Hgb=11.5, ESR=6, CRP<5, Alb=3.9\par 1/31/22 Calprotectin =84\par 2/14/22: Hgb=10.6, ESR=9, CRP <5, Ca=8.3, Mag=1.6, Alb=3.8, Iron=25, Ferritin=15\par 3/28/22: Hgb=14, ESR=1, CRP<5 , Ca=8.5, Mag=1.7, Alb=4, Iron=104, Zmddasxk=111\par 5/9//22: Hgb=13.8 , ESR=2, CRP <5, Ca=8.8, Mag=1.6, Alb=4, Iron=64, Idbsmqyf=891  \par  6/21/22 : Hgb=14, ESR=2, CRP<5,  Ca=8.5, ,Alb=4.5,  Mag=1.8, Iron=86,Ccdzeedv=151,\par \par Probiotics 3 qd \par Diet:  LR, Lactose free, protein drinks tid\par MCT oil begun but never maintained \par **trend --cbc, esr, crp, albumin, calprotectin \par   \par **monitor wt--- usu bet 136-139, 7/20/21=136, 11/22/21=139, 1/4/22=132, 2/22/22=132, 4/5/22=131,5/10/61=352  ymbai=898\par \par \par                                                                                                                                                                                                                                                                                                                                                                                                                                                                                                                                                                                                                                                                                                                            \par 2. Iron deficiency anemia : \par  had initially dropped , after clinical flare and post procedure bleeding\par Probably multifactorial: ACD, Blood loss, malabsorption/nutrient deficiencies\par Eliquis-->warfarin after CVA, On Entyvio \par 7/11/17 s/p  Adm for unsteady gait w MRI c/w CVA--warfarin begun: possible better control of AC\par Chromagen 2 qd--> IV Iron , s/p IV Cu x 5, FA 1mg qd , B12 SL & IM\par Still requiring IV Iron and cu infusions prn \par most recent IV Iron infusion  :  5/2022 for  Iwquwenh=756 on 5/9/22 \par 2/22/21: K95=215, \par 6/4/21: Cu=91, Zinc=69, Ferritin=32,Iron=43, \par 7/12/21: Zo=400, Zinc=74, Gjigmowy=933, Iron=35\par 11/15/21 : Hgb=12,  Ferritin =104, Ca=9, Mg=1.7\par 1/4/22: Hgb=11.5, Ca=8.8, Mg=2.9, Otmzmfk=069 \par 2/14/22: Hgb=10.6, Ca=8.3, Mag=1.6, Alb=3.8, Iron=25, Ferritin=15\par 3/28/22: Hgb=14.4 , Ca=8.5, Mag=1.7,Alb=4.1, Iron=104, Gdxthnxt=316,\par  5/9//22: Hgb=13.8 , Ca=8.8, Mag=1.6, Alb=4, Iron=64, Ufpemwjr=689  \par 6/20/22: Hgb=14.4  , Ca=8.5, Mag=1.8, Alb=4.5, Iron=96, Hizkqnoc=614\par B12 1cc IM ____L. delt--  given today, \par trend cbc, B12, FA, Iron panel \par Adj INR--closer to low 2's.    \par \par \par \par \par 3. Osteoporosis \par : Progression on BD from 5/5/16\par Crohns, h/o steroid use\par Calcium citrate & Vit D per Endo\par Forteo since 5/10/19 , then  Reclast          \par Repeat BD of 8/2018 --showed worsening to Osteoporosis from 2016\par Rec Endo consult w Dr. Akers :Osteoporosis center at [facility]--pt never went originally \par 9/21/18: Phos=2.4, Ca=8.7, Alb=3.4, Vit D=27, TUG=442, \par 10/16/18: Phos=2.8, Ca=8.7, Alb=3.5,\par 1/14/19: Phos=2.6,  Ca=8.7, Alb=3.6\par 2/28/19: Phos=1.8, Ca=8.9, Alb=3.7, VitD=39, XJA=367\par 3/18/19: Ca=8.5, Alb=3.7, Vit D=44.6, PTH=93\par 7/11/19: Ca=9.1, Alb=3.7, Phos=3.9, Vit D=40/ 306, AWX=723\par 8/15/19: Ca=9, Alb=4.2, Phos=4.4, VitD=59, IYT=547\par 10/17/19: Ca=9.6, Alb=3.9, Phos=3.5\par 11/6/19: Ca=9.1, Alb=3.9. Phos=3.7, VitD=50, PTH=80\par 1/13/20: ca=9.1\par 1/30/20: Ca=9.2, Alb=3.9, Phos=2.7, Mag=1.5, Vit D=103/41, PTH=87\par 2/18/20:ca=8.5, Alb=3.6, \par 3/2/20: Ca=8.5, Alb=3.6\par 3/17/20: Ca=9.1, Alb=3.7, Phos=3.4, Mag=1.7\par 10/17/20: Ca=8.9, Alb=4, Phos=2.3, Mag-2.0, Vit D=66, PTH=47\par 1/4/21 : Ca=9.4, Alb=4.2, Phos=2.7, Mag=2, Vit D=64, PTH=87.5\par 4/5/21: Ca=9.1, Alb=4, Phos=3.1, Mag=1.7, \par 6/4/21: ca=9, Alb=3.8, Phos=2.8, Mag=1.8\par 7/12/21: Ca=8.6, ALB=6, phosph=2.3, Mag=1.8\par 11/15/21: Ca=9, Alb=3.7, Mag=1.7\par 1/4/22: ca=8.8, Alb=3.9, Mg=2.9, phos=2.9\par 1/21/22: Ca=8.8, Alb=3.9, Vit D=60, PTH=85\par 2/14/22:  Ca=8.3, Mag=1.6, Alb=3.8, \par 3/28/22: Ca=8.5, Mag=1.7, Alb=4.1, Phos=1.2\par  5/9//22:  Ca=8.8, Mag=1.6, Alb=4, Phos=1.6 \par   5/27/22: Ca=9.2 , Mag=1.9, Alb=3.8 , Phos=2.7, PTH=72, VitD=105\par   6/20/ 22: Ca=8.5  , Mag=1.8, Alb=4.5 , Phos=0.8, IMH=494, \par \par Dr. Akers: Hyperparathyroidism-- probably secondary due to low Vit D/Ca & ? superimposed primary, \par [facility] Consult init felt  Parathyroid scan showing activity in mediastinum is ectopic parathyroid, then felt sx not indicated at that time, elev PTH is secondary to low  Vit D/Ca\par Rx replete Vit D and current IV Calcium-as per endo \par \par trend Vit D, Ca, Phos, PTH,  \par \par BD--9/2020: incr in spine and hip , no change in wrist, repeat 9/2022\par 10/5/20, 9/2021-s/p Reclast--repeated 6/17/ 2022\par \par \par \par \par \par 4. GERD:  recently less active by ENT , no ht burn,  dysphagia,  + throat clear\par               h/o  Dry cough, CXR:ana, saw ENT eulogio-->LPR\par * ++ LPR,  ++  Page’s w No Dysplasia,  + H/O  Esophagitis grade: A   was found \par \par Recommend: \par * Anti-reflux diet & life-style changes reviewed & re-emphasized.  ++  Bedge required\par * Weight reduction & regular exercise emphasized\par \par * need for  PPI:  Omep 40 BID ,  ++ H2B q HS:Zantac 300mg--> Pepcid 40mg\par No Carafate  1 gram:   --was emphasized\par I reviewed & summarized the prospective randomized & retrospective non-randomized studies\par looking at potential long term SE's of PPIs, w special attention to associations & actual cause\par as related to GI infections, bone loss, cognitive changes, KD, Covid, vitamin & electrolyte deficiencies\par questions were answered, pt advised that PPIs should be used when needed as indicated by their\par clinical indication and response and tapering off is always the goal if possible. pt understood.\par \par *  F/U  EGD: --> 2/2022--for Barretts screening / surveillance \par * No  need for pH Monitor,   Manometry,   Esophagram --\par *  ++  need for ENT  eval/F/U,  No  need for Surgical  eval  --  \par \par \par \par \par \par 5. Hemorrhoids:  well - controlled.  No pain,  swelling,  itch,  bleeding\par * Discussed previously the potential complications of thrombosis,  pain,  infection,  swelling, itching,  bleeding \par Reccomend: \par * currently Low   - Fiber Diet was emphasized\par * 6  --  8 cups of decaffeinated fluid daily was emphasized \par * Sitz Bathes prn,   No:  Anusol HC  Suppos / Cream  AZ BID -- was needed\par * No:  Tucks BID,  Balneol Lotion,   Calmoseptine Oint -- was needed ;    ++ Prep H prn\par * No:  need for  Colorectal surgical evaluation for possible ablation\par \par \par \par \par \par \par \par 6. Barretts esophagus without dysplasia  \par Notes: see GERD.    \par \par \par \par 7. Hepatomegaly-->not confirmed by recent abd sono\par CTE w relative enlargemt, ? lobulation & enlarged portal/mesenteric veins\par LFTs-wnl, no ascites or edema\par R/O CLD, Hepatic vein thrombosis\par Abd sono w doppler--> Liver and spleen normal size, no thrombosis, normal portal and hepatic vein flow\par \par \par \par \par Informed Consent:\par * The risks & Benefits of EGD were discussed w patient.\par * This included but was not limited to perforation, bleeding, sedation /med rxns possibly requiring surgery, blood transfusions, antibiotics & CPR/Intubation.\par * Pt. understands & agrees to the procedures.\par The following instructions in regards to the prep and medically essential ( cardiac, pulmonary, sz, psych, endocrine)  pre-op medication administration\par was reviewed and emphasized with the patient . \par * Pt. advised to D/C  ASA/NSAIDs  7  Days   PTP.\par * [ +++ ]  Dulcolax / Miralax / Mag. Citrate ,  [     ] Prepopik/ Clenpiq ,  [     ] Osmo Prep,  [    ] GoLytely,  prep. reviewed w Pt.\par * Hold  [           ] AM of procedure.\par * Hold  [           ] PM  before procedure.\par * Take  [           ] PM  before procedure.\par * Take  [           ] AM of procedure.\par \par \par

## 2022-06-21 NOTE — HISTORY OF PRESENT ILLNESS
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-->Eliquis' warfarin \par        GERD w Page's, Hemorrhoids, BPH, \par        S/P Ileocolonic resection 1998\par        Since then multiple SBOs\par \par \par        Got IV Iron x 2, since 10/2/17\par        10/19/17: feeling better, Jh=256 on prednisone 15mg x 3weeks, BMs Brown: #5-6, 1-2/D, occas 3-4/d\par        10/25/17: Hgb=10, ESR=5, CRP=5, Alb=3.6, Phos=2, Zbtitovi=772, O90=836\par        11/16/17: Hgb=11.2, ESR=14, CRP=12, \par        11/13/17: MRE-Chr mild distension of distal & vladislav-ileum s/o mild stricturing at anast, mild mural thick & mucosal enhancemt ~ 20cm; little change from 2015 c/w mild acute on chr ileitis, 2.1 cm Liver hemangioma\par        11/28/17: Entyvio\par        11/29/17: Hgb=9.6, ESR=10, CRP=5.8, Alb=3.8,Ferritin-19, Iron=14,Sat=4%, Phos=2.5, Zo=624, BMs:# 5, 1-2x/D, brown,\par        12/19;17: Hgb=10.1, ESR=12, CRP=6.5; 12/21/17: BMs:#3-5, 1-3x/D , brown, no blood, no pain, ah=832\par        1/3/18:Hgb=11.7, ESR=19, CRP=6.5, Alb=4.1, Rkntavpf=382, Iron=31, Sat%=9,Zinc=55\par        1/16/18: Entyvio, Hgb=11.4, ESR=10, CRP=6.9\par        1/18/18: Today: cellulitis R foot, saw dr EKITH--rx augmentum x 10D, Entyvio 1/9 to 1/16, bo=755 w clothes,BMs: # 4-5, 1-2x/D \par        2/14/18: Hgb=11.1, ESR=16, CRP=11.6, Alb=3.6, Iron=28, Ferritin=23, INR=1.5 \par        2/16/18: s/p Entyvio; Iron infusion, again on 2/27\par        2/20/18: Hgb=10.6, ESR=20, CRP=12, INR=1.7\par        2/27/18: s/p iron infusion\par        3/9/18: Dr. Burden for tenosynovitis, rx w Zorvolex: Diclofenac x 5 days--? response\par        3/14/18: Np=063; BMs: # 5, 1-2x/D; Hgb=11, ESR=18, CRP=11,Irom=45,Smazyqri=865,Alb=3.6, INR=1.5\par        3/19/18: s/p Entyvio\par        3/22/18: pt=183, BMs: # 5, 3-4 x/d\par        4/16/18: s/p Entyvio,Hgb=11.9, INR=1.3, ESR=18, CRP=8.4 \par        4/18/18 EGD: gastritis, no HP, no IM, 2+ Mucous, 0.5cm gastric polyp: fundic, 4cm HH, + Barretts:3cm, no dysplasia\par        4/25/18: Hgb=11.5, INR=1.6, Iron=40, Sat=13%, Ferritin=57, Alb=3.2, Phos=2.2, Mag=1.7\par        4/30/18: s/p Iron Infusion\par        5/14/18: s/p Entyvio \par        5/23/18: Hgb=11.5, ESR=16, CRP< 5\par        5/29/18: s/p Iron Infusion\par        6/6/18: Hgb=10.6, INR=1.7, Jzfgsahj=827, Alb=3.5, Phos=2.1, P07=498, ai=681; BMs: # 5, 2-3x/D \par        6/19/18: Hgb=10.6, INR=1, CRP=15, ESR=23\par        6/21/18: R ankle is acting up, swollen, pain, having MRI done, took a couple of advil, on low carbs ,BMs: # 5, 1-2x/D, ba=504\par        6/26/18: MRI: fx/stress rxn-talar body/navicula; stress related changes--cuneform, cuboid,distal tibia; mod tibiotalar effusion, mild-mod peroneal tendinosis\par        7/19/18: entyvio 6/26/18, eliminated all carbs--anti inflammatory diet, do=675, BMs: # 4-5, 1-3x/D \par        7/25/18: Hgb=10, INR=1.3, Alb=3.3, Phos=2.4, Ikxamwkg=605, sat%=12, Iron=35\par        8/2/18: BD--osteoporosis of hip/spine: sig decrease BD--17.6% of hip, 17% of spine, osteopenia of wrist: 6.4%\par        8/7/18: Entyvio\par        8/21/18: Hgb=11.1, INR=1.5, ESR=19, CRP=18.6\par        8/23/18: recently w tooth infection, old implant--loose and removed; rx w amox, and advil\par        eating almost no carbs, sk=279, # 5-6, 2-3x/d \par        8/24/18: Stool fat--neg, Bqhilcgdypcs=073\par        9/5/18: Hgb=11.4, INR=1.3, ESR=9, CRP=11.3, Iron=41, Ddxjdmuv=766, Alb=3.8,\par        9/17/18: entyvio, Hgb=10.7, INR=2.2, ESR=17, CRP=9.1, \par        9/20/18: dk=466, BMs: # 5-6, 1-2x/D \par        9/21/18: Phos=2.4, Ca=8.7, Alb=3.4, Vit D=27, ZQT=320, \par        Dr. Akers: Hyperparathyroidism: probably secondary due to low Vit D/Ca & ? superimposed primary, rx replete Vit D and Calcium\par        10/9/18: Hgb=11.6, MCV=94, ESR=14, CRP=5.4, INR=2.2\par        10/10/18 MRE: stricturing of neoterminal ileum w worsened upstream dilatation, moderate length of upstream ileum w stricturing extending at least 20cm and more extensive then previous. suggestion of 2 adj extraluminal fluid collections which may be w/i the wall of strictured ileum near the ileocolonic anastomosis. surrounding spiculation and tethered appearance of bowel in this region.\par 10/16/18: entyvio, Hgb=11.7, MCV=94, ESR=14, CRP <5, Alb=3.5\par 10/18/18: Pd=617,   BMs: # 5-6, 3x/D , \par 11/13/18: Entyvio\par 11/21/18 CTE w C: limited 2/2 bowel underdistention, mucosal hyperenhancemt & extensive SM edema of distal ileum including vladislav-ileum, difficult to exclude a sml fluid collection, Liver w relative enlargement w suggestion of lobulation, enlargemt of PV,SMV,IMV,Spl V, rectal V. No ascites\par 11/28/18: Hgb=10, ESR=19, CRP=17,Alb=3.5,Iron=35, Sat%=11, Cqkpqfle=416, INR=1.3\par 11/29/18: jm=690, BMs: # 5-6, 3-4x/D\par 12/3/18: Abd sono--Liver 14.8cm Incr heterogenicity, spleen--wnl\par 12/11/18 & 1/14/19: Entyvio\par 1/14/19:Entyvio,  Hgb=10.7, ESR=15, Alb=3.6, Iron=36, Ferritin=67, Sat%=11, INR=1.6\par 1/24/19:  jy=904, stools are # 4-6, 3-4x/d , no pain\par 2/5/19: Hgb=11.2, ESR=14, CRP=0.36\par 2/28/19: Hgb=11.5, ESR=12, CRP=6.5, Alb=3.7, Iron=69, Jemxevsy=493, Ca=8.9\par                Bms: # 4-5, 2-3x/D , vs=761,  Entyvio : last 2/1519,\par                had parathyroid scan pre-op, still w elev PTH, + activity in mediastinum,? ectopic parathyroid tissue vs neoplasm.  To see ENT and have Imaging at [facility]\par 3/28/19: Bms: # 4-5 , 3x/d ;fr=983\par 4/11/19: Hgb-9.7, Iron=45, ESR=18, CRP=9.4, Alb=3.5, \par Saw Endo SX at [facility], felt hyperparathyroid is secondary to Low Ca/Vit D, no sx at this time\par 4/16/19: BMs: # 5-6, 3-4x/D, yd=079,  Entyvio : last 3/1519,  got IV iron 4/12/19 for Ferritin=53\par 4/27/19: s/p R Hip Nailing--missed 4/2019 2/2 fresh wound\par 5/16/19 & 6/18/19 Entyvio\par 7/2/19: Hgb=11.7, Ferritin=41,ESR=12, CRP=5.5, B6=2.8,Mag=1.8,Phos=3.9,Rr=774,Zinc=61\par 7/11/19: s/p IV iron\par 7/11/19: Alb=3.7, AZC=266, Ca=9.1, Vit D=40/306, \par 7/24/19: Entyvio, Alb=3.8, LQL=605, Ca=8.9, Vit D=128 \par 8/1/19: Bms: # 5-6, occas #4, 2-3x/D , qo=253\par 8/15/19: Hgb=12, ESR=10, CRP=5.2, Ferritin=68, Alb=3.4, Phos=4.4,Mg=1.7, Ca=8.5,Calprotectin=88,\par 8/20/19: TZO=525, Ca=9, Alb=4.2\par 9/19/19: Hgb=12.8, ESR=9, CRP=5.5, Alb=3.7, Wiwcyrzq=524, Mag=1.8, Ca=8.9, Phos=4.2\par 9/26/19: at=400, BMs: #5-6, 2-3x/D, no pain\par 10/17/19: hi=609, BMs: #4-6, 1-2x/D, no Pain; Hgb=14, ESR=7, CRP<5, Alb=3.9, Mzcmhrxv=354, Mag=1.7, Ca=9.6\par 10/24/19: vk=875, Bms: # 4-6 , no pain,\par 11/6/19: ESR=6, CRP=6, PTH=80,Ca=9.1, VitD=50\par 11/14/19: tb=004, BMs: # : 4, 1-2, 0ccas #5\par  11/17/19: ESR=6, CRP<5, Nyqwcfum=135, \par  12/19/19: vh=626, BMs: #5-6, 2-3x/D\par 1/6/20: entyvio\par 1/13/20: ESR=7, CRP=0.2, Hgb=13.5, Llrfhbux=813, W88=702, FA=10, Alb=4.1, Ca=9.1\par 1/16/20: wt = 127, BMs: # 5, 1-3x/d\par 1/30/20: ESR=9, CRP=11, Hgb=13.9, Jjvpseat=237,Iron=38, Alb=3.9,Ca=9.2, PTH=87,\par 2/3/20 EGD: gastritis, +MACKENZIE, No HP, +erosion w ooze -clipped, 0.5cm polyp-neg; 4cm HH, Esophagitis A, + Barretts, VC: 1+\par Colonoscopy: 05cm rectal polyp: HP, 2nd deg int hemorrhoids\par mild-mod activity: MARILY w cryptitis & mild archect distortion at ileocolonic anast: colon & ileal side, no stricture\par 2/4/20: Entyvio; 2/12/20: IV Iron, 3/3/20: Entyvio\par 2/25/20: mu=334,  BMs: # 4-5, 1-2x/ d\par 3/19/20: ju=778, BMs: #4-5, 1-2x/D\par 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\par 10/17/20 : Hgb=13, CRP< 5, ESR= 11, Iron=44, Ferritin=46, Alb=4, Phos=2.3, \par 11/5/20: zz=492; s/p IV Iron x 2 ,  11/4 and 1 week prior;   BMs:  # 4-5, 1-2x/D , no pain\par 11/18/20  Entyvio + IV Ca\par  1/4/21: Hgb=13.6, CRP<5, ESR=9, Ferritin=60, Alb=4.2, Phos=2.7\par 1/11/21: Entyvio & IV Ca\par 1/14/21: no pain, stool more formed ,  oj=786 - 137; BMs:  # 4-5, 1-2x/D \par 2/8/21: Entyvio & IV Ca\par 2/23/21 IV Ca\par 2/22/21: Hgb=12.7, CRP<5, ESR=8, Vpawihibq=514, Phos=2.3, Mag=1.8, Z91=382, Zinc=58, At=431\par 2/26/21: td=259,  BMs:  # 4-5, 1-2x/D , no pain \par 3/8/21 & 4/8/21: Entyvio\par 3/9/21 & 3/24/21: IV Iron\par 4/5/21: Hgb=13, CRP<5, ESR=9, Ferritin=94, Phos=3.1, Mag=1.7,Ca=9.1/ Alb=4\par             \par Pt Ins co is refusing to cover Entyvio q 4wks, despite numerous attempts to appeal, they also refuse to set up a peer to peer discussion betw myself and another healthcare provider, even one that works for a third party.  They do acknowledge that there is literature supporting the successful use in individual cases who don’t respond to the q 8 wk interval and need\par less then q 8weeks , but state it had not in FDA approved at less then 8wks so they will not approve it.  I spoke to the ins co rep and discussed that fact that patients should not be  pigeon holed since practicing medicine is done on an individual basis.  Humans are not all the same.   Their  response didn’t change eventhough the pt clinically needed the medication and it  induced a beneficial clinical response towards remission.  Therefore the patient and I decided to slowly increase the interval since the insurance company will not pay for this benefit.  Hopefully he may be able to maintain his present clinical state.  If not we will return to q 4weeks and will again appeal using this patients real clinical data .\par \par 4/9/21:  yt=587,  no pain, stool more formed # 4, 1-2x/d \par 6/11/21: wt= 135,   no pain, stool more formed # 4, 1-2x/d \par              recently had an episode of abd distenstion, pain, N/V, no BM\par              eventually started having diarrhea and flatus, BMs returned to normal\par              lost 2-3 lbs but regained\par  Doesnt recall eating anything different, no fever, chills, brbpr, melena\par  entyvio was 6/3/21--which is almost 8 weeks, was supposed to be 5-6 weeks  to start\par               6/4/21 Hgb=12, CRP<5, Ferritin=32, pt to receive IV iron    \par  7/12/21 Labs: Hgb=13.6, ESR=10, CRP=<5, Qjottxb=601, Alb=3.7, BUN=16\par \par  7/16/21 MRE: limited to incomplete bowel distention, chr distal ileitis/probable inflammatory stricturing vs collapse of the vladislav-TI--but improved since 2018 , moderate proctitis\par 7/20/21:  Last entyvio was --7/1, next early august\par                 xi=348 ,  no pain, stool more formed # 4-5/6, 1-2x/d \par 7/23/21 Entyvio level= 39, Abs <1.6\par 8/23/21: Labs--Hgb=13.6, ESR=10, CRP=6.6, Fndljzuu=018, Iron=26, Alb=3.9, BUN=16\par 8/26/21 p/w w N/V, abd pain/bloating  x 3 days, unable to keep things down\par Labs: WBC=5, Hgb=12, CRP=43, ESR=11, Alb=4.4, BUN=28, Creat=-1.6\par CT Abd/Pelvis: diffuse marked dilatation of SB Loops w transition pt in Rmid/lower abdomen\par ~ 5-6c, proximal to the ileocolonic anastomosis\par RX w IV solumedrol 20mg q8h, NGT, IVF, pt refused TPN, also w UTI from prostatitis\par 8/27 NGT was pulled, and clears started and advanced to LR,  was d/c home 8/30 on prednisone\par 40mg qd w taper by 10mg/wk--> to 20mg\par 10/15 Entyvio\par 10/25 Stelera (Ustekinumab)  # 1\par 11/3/21 [facility]: wt above 140, off pred x 2 weeks,  1-2 BMs qd\par  Discussed at IBD conference, reviewed previous colonoscopy, and recent imaging; consensus that due to malnutrition and steroid dependency, surgery is next best option however pt reports feeling great and wants to pursue medical treatment which is reasonable given he feels well \par repeat imaging in mid-January after 3 months of Stelara, and then consider referral to surgery vs improved candidacy for endoscopic manipulation (currently presumed one long stricture). \par \par 11/15/21 : ho=963, Hgb=12, ESR=3, CRP <5, Ferritin =104, Ca=9, Mg=1.7, Alb=3.7 \par 12/10/21 : iron infusion\par 1/4/22: Hgb=11.5, ESR=6, CRP <5, Ca=8.8, Mag=2.9, Alb=3.9\par 1/10/22 : sk=053, stools # 5, 1-2x/D \par 1/10/22 Today:  Feeling well, no c/o , CP, SOB/ AMARO, Cough, Wheeze, Palpitations, edema\par              Most recent labs  reviewed w patient:1/4/22\par 1/31/22: calprotectin=84\par 2/2/22: gz=387\par 2/14/22: Hgb=10.6, ESR=9, CRP <5, Ca=8.3, Mag=1.6, Alb=3.8, Iron=25, Ferritin=15\par 3/3/22:  iv Iron infusion x 1\par 3/8/22 MRI Abd/Pelv: thickened distal Jejunal loops which are tethered; distal ileal segment  (10-12cm ) previously actively inflammed has decreased & now characterized by an area of stricture, however the vladislav-TI  5mm to the anastamosis is enhanced as well as narrowed.  colon just  distal to the anastamosis has a focal segment of inflammation & stricture.  the recto-sigmoid appears inflammed \par 3/28/22: Hgb=14.4 , ESR=1, CRP <5, Ca=8.5, Mag=1.7,Alb=4.1, Iron=104, Mxbfoivm=066,\par 4/5/22: gr=666, Bms: # 5-6 , 1-2 x/day \par \par \par 5/10/22  :  Last entyvios were -->7/1, 8/5, 9/2, 10/15/21\par              Stelara # 1 IV--10/25/21, second dose SC~ 12/10/21, 3rd~ 2/14/22,  4th-- mid April , 5th--mid june \par \par              Early December 2021  had a " flare" loose BMs, N/V and bloat\par              Took Pred 40mg qd and tapered down 10mg / week , now off pred x 7-8 weeks\par \par 4/13/22 Dr. Heard:  pt states he had a flare the weekend of 4/9/22 w vomiting/distension\par                pt self-started prednisone 40mg , this was just before his april stelara dose\par                claims he was feeling better\par               Dr. Heard: sched colonoscopy w ? dilatation \par \par 5/10/22: tapered of pred 40mg , 10mg/wk over 1 month, now off 2weeks \par         no pain, n/v,  BMs: # 4-5, 1-2 x Day , wt=-132\par 5/17/22 Dr. Heard Colonoscopy: ped scope passed w/o resist in vladislav-TI, one single apthae w psuedopolyp.  Flares probably 2/2 to adhesive dis, imaging may consistently show wall thickening  5/27/22 Labs:  Alb=3.8. Phos=2.7, Mag=1.9, Ca=9.2, PTH=72, Vit D=105, ALP=76\par                + IV Iron\par  6/20/22 Labs:  Alb=4.5, Phos=0.8, Mag=1.8, Ca=8.5, VHM=331, ALP=84\par                          Hgb=14, ESR=2, CRP<5, Pfrbcwsm=640, Iron=86\par 6/21/22:  no pain, n/v,  BMs: # 4-5, 1-2 x Day ,\par        ck=629\par        Abd pain--no \par        Nausea--no \par        Vomit--no \par        Early satiety-no \par        Belching-no \par        Regurgitation--no \par        Ht burn--no \par        Throat Clearing-no\par        Globus-no\par        Hoarseness--no \par        Dysphagia--no \par        Constipation--no \par        Diarrhea- intermittent:  no\par        Bloating--no \par        Flatulence-no\par        Gurgling--no \par        Melena--no \par        BRBPR--no \par        Anorexia-no \par        Wt Loss--> steady \par \par \par \par        PRIOR HISTORY--2017\par \par        1/17/17: Hgb=9.2, ESR=6, CRP=5; 1/19/17: BMs: #4, 5-6, 2-3x/D, Jv=772, Started Creon 1 tid cc\par        3rd Entyvio begining Feb\par        2/14/17: Labs; Hgb=9.6, MCV=97, ESR=5, CRP< 5, H21=802, FA>23, Iron=59, Retic =3.2,\par        2/17/17 Fecal Calprotectin=16, Fats: Increased neutral & Split\par        3/13/17: Labs Hgb=9.5, MCV=95, ESR=7, CRP <5, ALB=3.1\par        3/16/17: lot of stress, to move -[place], had a job-fell thru, BMs: # 5, 2-4 x/D, wt= 131w clothes\par        4/19/17 EGD: gastritis, MACKENZIE, No HP, 2cm HH, +Barretts, No Dysplasia\par        Colonoscopy: Anastamosis: open w mild -mod active colitis, enteritis severe adj to anastomosis, mild more proximal, remainder of colon-wnl, 2-3 deg int hemorrhoids\par        4/26/17 : Hgb=7.3, MCV=95, ESR=13, CRP<5;Immediately post procedure stools very dark on eliquis/iron.\par        Admitted to [facility]: Hgb=8.3-->8.9 after 2 U, Alb 3.3, BUN=17, creat=1.3, Malina/Zpgrk=378/460\par        4/27/17: Alb=2.3, BUN=12, creat=1.2, Malina/Lipase=209/863-No abd pain, N/V; 5/5/17: Hgb=10.7.\par        5/8/17 Entyvio iv. 5/8-5/9 w dark red diarrhea; 5/10/17 Hgb=7.8.\par        5/11/17 Adm [facility] w stools brown, Hgb=7.9, BUN=17, Creat=1.4, Alb=2.9; S/P 2 Units- Hgb=10.6, BUN=15/1.1.\par        Prednisone 40mg qd, entocort d/dee.; 5/18/17: Hgb-10.4, if=944, BMs: #5, 1-2x/D, no pain\par        6/8/17: Hgb=7.4,MCV=98, CRP<5-ASA stopped, Pred20--> 30\par        6/10/17: Hgb=8.2, Alb=2.9, BUN=20/1.2 ; 6/12/17: Hgb=8.3, Retic=0.19, Iron=10, Sat%=3, Ferritin=57, FA=23,L23=936, 6/13/17: s/p 2 Unit PRBCs\par        6/15/17: started MCT oil, Gv=860 , BMs: # 5, 1-2x/D, stools are brownish/green \par        7/11/17: [facility] w unsteadiness. MRI Head: R Cortical Temporo-occipital encephalomalacia, MRA H&N-no sign lesions, PER-wnl.Hgb=9.9--> 8.4->9.7 after 1 Unit. Seen by Heme: received IV Iron \par        CRP<5, M67=136, WZZ=585, FA>23,Iron=22,Sat%=6, Ferritin=42, Alb=3.5. D/C on warfarin 2mg\par        7/20 Hgb=8.5, 7/28 Hgb=7.7, 7/31-s/p 1 Unit \par        As outpt seeing Heme, to get IV Iron and 5 IV Copper\par        Had 'FLU' Fever=101, chills, bloat, N/V/D, Bilious. no pain, melena,brbpr\par        Given anti-emetic by dr Butler,then able to keep things down\par        On prednisone 25, warfarin w INR bet 1.6-2\par        8/17/17: Hgb=9.9, ESR=20, CRP-P,Kw=252, BMs: # 5, 1-2x/D, stools are brownish/green\par        10/2/17: Hgb=8.8, MCV=89,Phos=1.7, K=3.9, Mag=1.9, Alb=3.6,Iron<10,Ferritin=21, INR=2\par        10/17/17: Hgb=7.9,ESR=6,CRP<5, INR=1.6\par        10/25/17: Hgb=10, ESR=5, CRP=5, Alb=3.6, Phos=2, Cgihawvc=276, W56=897\par        11/16/17: Hgb=11.2, ESR=14, CRP=12, \par        11/13/17: MRE-Chr mild distension of distal & vladislav-ileum s/o mild stricturing at anast, mild mural thick & mucosal enhancemt ~ 20cm; little change from 2015 c/w mild acute on chr ileitis, 2.1 cm Liver hemangioma\par        11/28/17: Entyvio\par        11/29/17: Hgb=9.6, ESR=10, CRP=5.8, Alb=3.8,Ferritin-P, Iron=14,Sat=4%, Phos=2.5\par        12/19;17: Hgb=10.1, ESR=12, CRP=6.5; 12/21/17: BMs:#3-5, 1-3x/D , brown, no blood, no pain, gs=649\par        1/3/18:Hgb=11.7, ESR=19, CRP=6.5, Alb=4.1, Nzotuhwn=328, Iron=31, Sat%=9,Zinc=55\par \par \par        PRIOR HISTORY---2013:\par \par        2/20/13 CT markedly dilated sb loops ext to anast, colonoscopy w open anast ,mild-mod remy-anastamotic disease. quick response to entocort/humira--probably adhesive dis. \par        8/10/13 w GNR bacteremia, ADA 1.7 /KAYLAH 3.4, Humira 8/7, 8/13,8/18,9/15\par        CT- mucosal thickening and spiculation of the distal sb extending to the anastamosis, thickening and stranding of adj mesenteric fat.\par        Humira increased to 40mg weekly, entocort 4\par        9/2013 MRE--dilated loops in mid and distal ileum, markedly thickened and narrowed TI w decreased peristalsis of TI\par        11/15/13 ADA-24.9, KAYLAH-0\par \par \par        PRIOR HISTORY---2014:\par \par        2/15/14--CT dilated loops SB, loop of sb in mid abd 4.3cm w infiltrative changes in the mesentery, bowel tapers in the RLQ to the anastamosis w/o transition\par        WBC=15K, Rx w IV steroids and Abx \par        3/7/14 SBFT: last 10cm sb prox to anast mild distension and sl irregularity. In the mid portion of this loop there is a mild narrowing which appears to reopen but is some what narrowed.\par        3/20/14 ADA=33.1, KAYLAH=0, Lialda switch to Apriso 4 (2/2014)\par \par \par        PRIOR HISTORY--2015\par \par        1/11/15 Adm [facility] w 1 day N,Recurrent V, Abd pain/distension. CT-multiple distended & fluid-filled sb loops, mild wall thickening of ileum w No inflamm changes.\par        WBC=14.6, Hgb=17, RX w NGT suction, Levaquin iv, Solumedrol 40mg q 12( 1/12-->1/16) to prednisone 30mg BID w 5mg taper/wk\par        3/18/15 --Dr. Butler w edema /High BP, prednisone was tapered slowly to 2.5mg \par        switched to entocort 9mg qd, edema and bp improved w lasix 20mg \par        BMs:#4-5, 3x/D, Hgb=11, ESR=4, CRP<5\par        4/28/15 - 5/1/15: Adm [facility] w 1 day Abd pain, distension,N/V. 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 side of anast, opening appeared ulcerated,scarred & severely narrowed\par        6/11/15: [facility] w N/V x1, decr appetite, CT ABD: no obstruction, dilated thickened sb loops of distal jej and ileum\par        6/19/15 [facility]: s/p Lap w extensive lysis of adhesions, hepatic flex, sigmoid and sb anastamosis, s/p partial r colectomy and sb resection--side to side elisabeth: 8-10 inch from prior anast to TV colon.\par        7/17/15: BMs:#4-6, 4-5x/D, wt 131(from 126)\par        8/20/15: BMs: #4, 1-2x/D or #5, 2-3x/D, mc=281, dry cough,Hgb=10, WBC=3, XTW=253, CRP=56, ESR=21\par        8/25/15 CT Abd/Pelv: Svl RLQ sb loops w wall thickening, mild nodularity & inflamm stranding in mesentery\par        Advised-restart Entocort 9mg qd, Apriso 4 qd, Maintain Humira but given RX to check drug/Ab levels\par        9/15/15: did nt restart meds,Hgb=9.9, WBC=4, ESR=19, CRP=5.8, ft=542; [facility] : ADA=8.5, Antibodies< 1.7\par        10/15/15: No pain, BMs:#4, 1-2x/D, #5-6, 3-4x/D, throat clearing/cough-better, gc=773\par        11/30/15: No pain, BMs:# 4, 5-6 intermittently, No throat clear, ck=043\par \par \par        PRIOR HISTORY-2016\par \par        1/22/16; 4/8/16; 6/6/16 : No pain, BMs:# 4-5 1-2x/D, also #5-6 3x/D for 2-3D/wk, ml=123\par        7/14/16: be=502, 9/22/16: ub=720.5\par        11/10/16: 9.5lb wt loss, states BMs: 5-6, 5x/D--Taking Magnesium, No pain/anorexia\par        Labs: Stool Qrvirvqeylrs=171, + Incr Fecal fat, Alb=3.4, FA =23, W53=708, Hgb=12, ESR=10, CRP <5\par        Magnesium-d/c, Obtain MRE asap--Start steroids and possibly switch to Entyvio\par        DDx discussed: active crohns--loss response to Humira, Malabsorption--loss Bile acids, Bile-induced diarrhea, SIBO\par        Pt wanted to wait for imaging-did not start rx\par        12/1//16 MRE: RUQ ileocolonic anastamosis--narrowed w upstream dilatation to 3.2 cm\par        Pt refused pred, Started Entocort 9mg qd and Flagyl 250mg qid about 7-10days ago \par        awaiting Entyvio load to start 12/22, then 1/5; had Cut back on iron bid\par        12/15/16: BMs:# 5, 2-3x/D, occas #6, No pain, Less bloat/flatulence, Re=529. 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 Same was discussed with the patient, he reported feeling great now and wanted to pursue medical treatment  - Started on UST, with the plan to follow up MRE. 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 \par \par For follow up today \par S/p UST - infusion 10/2021, followed by 12/2021, 2/2022, 4/2022\par \par MRI done 03/2022 - s/p 2 maintenance doses \par Active inflammation of distal jejunal loops with evidence of chronic scar/tethering in the left lower quadrant. \par \par Previous 10-12 cm inflammation of ileum upstream from the anastomosis with colon has decreased with a focal area of probable scarring followed by segment of ileum with decreased fold pattern.  The portion at the anastomosis however is actively inflamed (vladislav terminal ileum) \par \par large bowel segment distal to the anastomosis normal followed by focal colonic narrowing and active inflammation throughout the rectum, anal canal appears normal. the active inflammation upstream from the rectum appears increase since previous exam \par \par Currently he reports feeling better since starting UST \par Had a recent flare over the weekend - vomiting, distention. Started himself on Prednisone. Reports that he is feeling a little better but not back to his baseline,. Of note this flare was 1-2 days prior to his UST dose. His weight has been fluctuating, usually goes down with a flare. \par He is concerned about his steroids use as he has osteoporosis. \par \par Last visit \par Since last visit, pt reports he's actually felt much better. Best he's "ever felt since surgery." Reports weight above 140 lbs, on low fiber diet, and avoiding caffeine. Reports symptoms have been improving since hospitalization in August and steroids. Only recently received UST dose 1 week ago, and is looking forward to his next injection. \par \par HPI: Had flares in March and June of this year, managed at home, and then in August for which he was hospitalized. Typical flare symptoms are bloating, and diarrhea. The last episode he had vomiting and intolerance to PO intake. Noted to have partial SBO, managed conservatively with NG tube, IV fluids, Solu-Medrol with improvement, discharged on Prednisone 40 mg taper. He's been off Prednisone for at least 2 weeks. Reports that he is back to his base line now, feeling overall great with weight gain, 1-2 BMs per day, and no pain. \par \par Last Colon 2/2022 - Reports it was better than previous ones. \par

## 2022-06-24 ENCOUNTER — NON-APPOINTMENT (OUTPATIENT)
Age: 57
End: 2022-06-24

## 2022-07-05 ENCOUNTER — APPOINTMENT (OUTPATIENT)
Dept: GASTROENTEROLOGY | Facility: CLINIC | Age: 57
End: 2022-07-05

## 2022-07-25 ENCOUNTER — RESULT REVIEW (OUTPATIENT)
Age: 57
End: 2022-07-25

## 2022-07-25 ENCOUNTER — APPOINTMENT (OUTPATIENT)
Dept: HEMATOLOGY ONCOLOGY | Facility: CLINIC | Age: 57
End: 2022-07-25

## 2022-07-25 VITALS
TEMPERATURE: 97.4 F | RESPIRATION RATE: 16 BRPM | HEIGHT: 68.5 IN | BODY MASS INDEX: 20.22 KG/M2 | DIASTOLIC BLOOD PRESSURE: 78 MMHG | HEART RATE: 78 BPM | WEIGHT: 135 LBS | OXYGEN SATURATION: 99 % | SYSTOLIC BLOOD PRESSURE: 119 MMHG

## 2022-07-26 ENCOUNTER — APPOINTMENT (OUTPATIENT)
Dept: GASTROENTEROLOGY | Facility: CLINIC | Age: 57
End: 2022-07-26

## 2022-07-26 VITALS
HEIGHT: 68.5 IN | HEART RATE: 75 BPM | DIASTOLIC BLOOD PRESSURE: 62 MMHG | BODY MASS INDEX: 20.38 KG/M2 | SYSTOLIC BLOOD PRESSURE: 102 MMHG | WEIGHT: 136 LBS

## 2022-07-26 PROCEDURE — 99215 OFFICE O/P EST HI 40 MIN: CPT | Mod: 25

## 2022-07-26 PROCEDURE — 96372 THER/PROPH/DIAG INJ SC/IM: CPT

## 2022-07-26 RX ORDER — CYANOCOBALAMIN 1000 UG/ML
1000 INJECTION INTRAMUSCULAR; SUBCUTANEOUS
Qty: 0 | Refills: 0 | Status: COMPLETED | OUTPATIENT
Start: 2022-07-26

## 2022-07-26 RX ADMIN — CYANOCOBALAMIN 0 MCG/ML: 1000 INJECTION INTRAMUSCULAR; SUBCUTANEOUS at 00:00

## 2022-07-26 NOTE — HISTORY OF PRESENT ILLNESS
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 \par        GERD w Page's, Hemorrhoids, BPH, \par        S/P Ileocolonic resection 1998\par        Since then multiple SBOs\par \par \par        Got IV Iron x 2, since 10/2/17\par        10/19/17: feeling better, Jy=987 on prednisone 15mg x 3weeks, BMs Brown: #5-6, 1-2/D, occas 3-4/d\par        10/25/17: Hgb=10, ESR=5, CRP=5, Alb=3.6, Phos=2, Iklvfaze=776, D30=384\par        11/16/17: Hgb=11.2, ESR=14, CRP=12, \par        11/13/17: MRE-Chr mild distension of distal & vladislav-ileum s/o mild stricturing at anast, mild mural thick & mucosal enhancemt ~ 20cm; little change from 2015 c/w mild acute on chr ileitis, 2.1 cm Liver hemangioma\par        11/28/17: Entyvio\par        11/29/17: Hgb=9.6, ESR=10, CRP=5.8, Alb=3.8,Ferritin-19, Iron=14,Sat=4%, Phos=2.5, Ek=772, BMs:# 5, 1-2x/D, brown,\par        12/19;17: Hgb=10.1, ESR=12, CRP=6.5; 12/21/17: BMs:#3-5, 1-3x/D , brown, no blood, no pain, np=451\par        1/3/18:Hgb=11.7, ESR=19, CRP=6.5, Alb=4.1, Vkhnkhws=820, Iron=31, Sat%=9,Zinc=55\par        1/16/18: Entyvio, Hgb=11.4, ESR=10, CRP=6.9\par        1/18/18: Today: cellulitis R foot, saw dr KEITH--rx augmentum x 10D, Entyvio 1/9 to 1/16, av=345 w clothes,BMs: # 4-5, 1-2x/D \par        2/14/18: Hgb=11.1, ESR=16, CRP=11.6, Alb=3.6, Iron=28, Ferritin=23, INR=1.5 \par        2/16/18: s/p Entyvio; Iron infusion, again on 2/27\par        2/20/18: Hgb=10.6, ESR=20, CRP=12, INR=1.7\par        2/27/18: s/p iron infusion\par        3/9/18: Dr. Burden for tenosynovitis, rx w Zorvolex: Diclofenac x 5 days--? response\par        3/14/18: Mp=625; BMs: # 5, 1-2x/D; Hgb=11, ESR=18, CRP=11,Irom=45,Xxtjaecf=972,Alb=3.6, INR=1.5\par        3/19/18: s/p Entyvio\par        3/22/18: lj=259, BMs: # 5, 3-4 x/d\par        4/16/18: s/p Entyvio,Hgb=11.9, INR=1.3, ESR=18, CRP=8.4 \par        4/18/18 EGD: gastritis, no HP, no IM, 2+ Mucous, 0.5cm gastric polyp: fundic, 4cm HH, + Barretts:3cm, no dysplasia\par        4/25/18: Hgb=11.5, INR=1.6, Iron=40, Sat=13%, Ferritin=57, Alb=3.2, Phos=2.2, Mag=1.7\par        4/30/18: s/p Iron Infusion\par        5/14/18: s/p Entyvio \par        5/23/18: Hgb=11.5, ESR=16, CRP< 5\par        5/29/18: s/p Iron Infusion\par        6/6/18: Hgb=10.6, INR=1.7, Djgmupkn=992, Alb=3.5, Phos=2.1, K96=244, wh=079; BMs: # 5, 2-3x/D \par        6/19/18: Hgb=10.6, INR=1, CRP=15, ESR=23\par        6/21/18: R ankle is acting up, swollen, pain, having MRI done, took a couple of advil, on low carbs ,BMs: # 5, 1-2x/D, cu=612\par        6/26/18: MRI: fx/stress rxn-talar body/navicula; stress related changes--cuneform, cuboid,distal tibia; mod tibiotalar effusion, mild-mod peroneal tendinosis\par        7/19/18: entyvio 6/26/18, eliminated all carbs--anti inflammatory diet, on=916, BMs: # 4-5, 1-3x/D \par        7/25/18: Hgb=10, INR=1.3, Alb=3.3, Phos=2.4, Tqgfxkxt=784, sat%=12, Iron=35\par        8/2/18: BD--osteoporosis of hip/spine: sig decrease BD--17.6% of hip, 17% of spine, osteopenia of wrist: 6.4%\par        8/7/18: Entyvio\par        8/21/18: Hgb=11.1, INR=1.5, ESR=19, CRP=18.6\par        8/23/18: recently w tooth infection, old implant--loose and removed; rx w amox, and advil\par        eating almost no carbs, sv=756, # 5-6, 2-3x/d \par        8/24/18: Stool fat--neg, Pmxellacwcbz=703\par        9/5/18: Hgb=11.4, INR=1.3, ESR=9, CRP=11.3, Iron=41, Vfqjbdtu=055, Alb=3.8,\par        9/17/18: entyvio, Hgb=10.7, INR=2.2, ESR=17, CRP=9.1, \par        9/20/18: cl=208, BMs: # 5-6, 1-2x/D \par        9/21/18: Phos=2.4, Ca=8.7, Alb=3.4, Vit D=27, BEO=543, \par        Dr. Akers: Hyperparathyroidism: probably secondary due to low Vit D/Ca & ? superimposed primary, rx replete Vit D and Calcium\par        10/9/18: Hgb=11.6, MCV=94, ESR=14, CRP=5.4, INR=2.2\par        10/10/18 MRE: stricturing of neoterminal ileum w worsened upstream dilatation, moderate length of upstream ileum w stricturing extending at least 20cm and more extensive then previous. suggestion of 2 adj extraluminal fluid collections which may be w/i the wall of strictured ileum near the ileocolonic anastomosis. surrounding spiculation and tethered appearance of bowel in this region.\par 10/16/18: entyvio, Hgb=11.7, MCV=94, ESR=14, CRP <5, Alb=3.5\par 10/18/18: Qf=587,   BMs: # 5-6, 3x/D , \par 11/13/18: Entyvio\par 11/21/18 CTE w C: limited 2/2 bowel underdistention, mucosal hyperenhancemt & extensive SM edema of distal ileum including vladislav-ileum, difficult to exclude a sml fluid collection, Liver w relative enlargement w suggestion of lobulation, enlargemt of PV,SMV,IMV,Spl V, rectal V. No ascites\par 11/28/18: Hgb=10, ESR=19, CRP=17,Alb=3.5,Iron=35, Sat%=11, Qomfjrgl=807, INR=1.3\par 11/29/18: ex=279, BMs: # 5-6, 3-4x/D\par 12/3/18: Abd sono--Liver 14.8cm Incr heterogenicity, spleen--wnl\par 12/11/18 & 1/14/19: Entyvio\par 1/14/19:Entyvio,  Hgb=10.7, ESR=15, Alb=3.6, Iron=36, Ferritin=67, Sat%=11, INR=1.6\par 1/24/19:  pl=161, stools are # 4-6, 3-4x/d , no pain\par 2/5/19: Hgb=11.2, ESR=14, CRP=0.36\par 2/28/19: Hgb=11.5, ESR=12, CRP=6.5, Alb=3.7, Iron=69, Pkdqytjt=121, Ca=8.9\par                Bms: # 4-5, 2-3x/D , zy=237,  Entyvio : last 2/1519,\par                had parathyroid scan pre-op, still w elev PTH, + activity in mediastinum,? ectopic parathyroid tissue vs neoplasm.  To see ENT and have Imaging at [facility]\par 3/28/19: Bms: # 4-5 , 3x/d ;mf=979\par 4/11/19: Hgb-9.7, Iron=45, ESR=18, CRP=9.4, Alb=3.5, \par Saw Endo SX at [facility], felt hyperparathyroid is secondary to Low Ca/Vit D, no sx at this time\par 4/16/19: BMs: # 5-6, 3-4x/D, xi=756,  Entyvio : last 3/1519,  got IV iron 4/12/19 for Ferritin=53\par 4/27/19: s/p R Hip Nailing--missed 4/2019 2/2 fresh wound\par 5/16/19 & 6/18/19 Entyvio\par 7/2/19: Hgb=11.7, Ferritin=41,ESR=12, CRP=5.5, B6=2.8,Mag=1.8,Phos=3.9,Jq=912,Zinc=61\par 7/11/19: s/p IV iron\par 7/11/19: Alb=3.7, KUE=853, Ca=9.1, Vit D=40/306, \par 7/24/19: Entyvio, Alb=3.8, TEQ=135, Ca=8.9, Vit D=128 \par 8/1/19: Bms: # 5-6, occas #4, 2-3x/D , ce=513\par 8/15/19: Hgb=12, ESR=10, CRP=5.2, Ferritin=68, Alb=3.4, Phos=4.4,Mg=1.7, Ca=8.5,Calprotectin=88,\par 8/20/19: AIU=918, Ca=9, Alb=4.2\par 9/19/19: Hgb=12.8, ESR=9, CRP=5.5, Alb=3.7, Nqsznzmc=375, Mag=1.8, Ca=8.9, Phos=4.2\par 9/26/19: wf=532, BMs: #5-6, 2-3x/D, no pain\par 10/17/19: ju=447, BMs: #4-6, 1-2x/D, no Pain; Hgb=14, ESR=7, CRP<5, Alb=3.9, Wuzfxxtz=825, Mag=1.7, Ca=9.6\par 10/24/19: ft=465, Bms: # 4-6 , no pain,\par 11/6/19: ESR=6, CRP=6, PTH=80,Ca=9.1, VitD=50\par 11/14/19: jr=258, BMs: # : 4, 1-2, 0ccas #5\par  11/17/19: ESR=6, CRP<5, Pufgxhqc=396, \par  12/19/19: zf=641, BMs: #5-6, 2-3x/D\par 1/6/20: entyvio\par 1/13/20: ESR=7, CRP=0.2, Hgb=13.5, Tqpbnker=419, B70=312, FA=10, Alb=4.1, Ca=9.1\par 1/16/20: wt = 127, BMs: # 5, 1-3x/d\par 1/30/20: ESR=9, CRP=11, Hgb=13.9, Dxjxqqzo=566,Iron=38, Alb=3.9,Ca=9.2, PTH=87,\par 2/3/20 EGD: gastritis, +MACKENZIE, No HP, +erosion w ooze -clipped, 0.5cm polyp-neg; 4cm HH, Esophagitis A, + Barretts, VC: 1+\par Colonoscopy: 05cm rectal polyp: HP, 2nd deg int hemorrhoids\par mild-mod activity: MARILY w cryptitis & mild archect distortion at ileocolonic anast: colon & ileal side, no stricture\par 2/4/20: Entyvio; 2/12/20: IV Iron, 3/3/20: Entyvio\par 2/25/20: dq=967,  BMs: # 4-5, 1-2x/ d\par 3/19/20: hz=452, BMs: #4-5, 1-2x/D\par 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\par 10/17/20 : Hgb=13, CRP< 5, ESR= 11, Iron=44, Ferritin=46, Alb=4, Phos=2.3, \par 11/5/20: ut=637; s/p IV Iron x 2 ,  11/4 and 1 week prior;   BMs:  # 4-5, 1-2x/D , no pain\par 11/18/20  Entyvio + IV Ca\par  1/4/21: Hgb=13.6, CRP<5, ESR=9, Ferritin=60, Alb=4.2, Phos=2.7\par 1/11/21: Entyvio & IV Ca\par 1/14/21: no pain, stool more formed ,  uf=743 - 137; BMs:  # 4-5, 1-2x/D \par 2/8/21: Entyvio & IV Ca\par 2/23/21 IV Ca\par 2/22/21: Hgb=12.7, CRP<5, ESR=8, Sqtoqzgmv=824, Phos=2.3, Mag=1.8, L65=257, Zinc=58, Vq=863\par 2/26/21: dn=629,  BMs:  # 4-5, 1-2x/D , no pain \par 3/8/21 & 4/8/21: Entyvio\par 3/9/21 & 3/24/21: IV Iron\par 4/5/21: Hgb=13, CRP<5, ESR=9, Ferritin=94, Phos=3.1, Mag=1.7,Ca=9.1/ Alb=4\par             \par Pt Ins co is refusing to cover Entyvio q 4wks, despite numerous attempts to appeal, they also refuse to set up a peer to peer discussion betw myself and another healthcare provider, even one that works for a third party.  They do acknowledge that there is literature supporting the successful use in individual cases who don’t respond to the q 8 wk interval and need\par less then q 8weeks , but state it had not in FDA approved at less then 8wks so they will not approve it.  I spoke to the ins co rep and discussed that fact that patients should not be  pigeon holed since practicing medicine is done on an individual basis.  Humans are not all the same.   Their  response didn’t change eventhough the pt clinically needed the medication and it  induced a beneficial clinical response towards remission.  Therefore the patient and I decided to slowly increase the interval since the insurance company will not pay for this benefit.  Hopefully he may be able to maintain his present clinical state.  If not we will return to q 4weeks and will again appeal using this patients real clinical data .\par \par 4/9/21:  rf=598,  no pain, stool more formed # 4, 1-2x/d \par 6/11/21: wt= 135,   no pain, stool more formed # 4, 1-2x/d \par              recently had an episode of abd distenstion, pain, N/V, no BM\par              eventually started having diarrhea and flatus, BMs returned to normal\par              lost 2-3 lbs but regained\par  Doesnt recall eating anything different, no fever, chills, brbpr, melena\par  entyvio was 6/3/21--which is almost 8 weeks, was supposed to be 5-6 weeks  to start\par               6/4/21 Hgb=12, CRP<5, Ferritin=32, pt to receive IV iron    \par  7/12/21 Labs: Hgb=13.6, ESR=10, CRP=<5, Kbbaree=390, Alb=3.7, BUN=16\par \par  7/16/21 MRE: limited to incomplete bowel distention, chr distal ileitis/probable inflammatory stricturing vs collapse of the vladislav-TI--but improved since 2018 , moderate proctitis\par 7/20/21:  Last entyvio was --7/1, next early august\par                 lj=261 ,  no pain, stool more formed # 4-5/6, 1-2x/d \par 7/23/21 Entyvio level= 39, Abs <1.6\par 8/23/21: Labs--Hgb=13.6, ESR=10, CRP=6.6, Breihhnd=764, Iron=26, Alb=3.9, BUN=16\par 8/26/21 p/w w N/V, abd pain/bloating  x 3 days, unable to keep things down\par Labs: WBC=5, Hgb=12, CRP=43, ESR=11, Alb=4.4, BUN=28, Creat=-1.6\par CT Abd/Pelvis: diffuse marked dilatation of SB Loops w transition pt in Rmid/lower abdomen\par ~ 5-6c, proximal to the ileocolonic anastomosis\par RX w IV solumedrol 20mg q8h, NGT, IVF, pt refused TPN, also w UTI from prostatitis\par 8/27 NGT was pulled, and clears started and advanced to LR,  was d/c home 8/30 on prednisone\par 40mg qd w taper by 10mg/wk--> to 20mg\par 10/15 Entyvio\par 10/25 Stelera (Ustekinumab)  # 1\par 11/3/21 [facility]: wt above 140, off pred x 2 weeks,  1-2 BMs qd\par  Discussed at IBD conference, reviewed previous colonoscopy, and recent imaging; consensus that due to malnutrition and steroid dependency, surgery is next best option however pt reports feeling great and wants to pursue medical treatment which is reasonable given he feels well \par repeat imaging in mid-January after 3 months of Stelara, and then consider referral to surgery vs improved candidacy for endoscopic manipulation (currently presumed one long stricture). \par \par 11/15/21 : fg=024, Hgb=12, ESR=3, CRP <5, Ferritin =104, Ca=9, Mg=1.7, Alb=3.7 \par 12/10/21 : iron infusion\par 1/4/22: Hgb=11.5, ESR=6, CRP <5, Ca=8.8, Mag=2.9, Alb=3.9\par 1/10/22 : ya=813, stools # 5, 1-2x/D \par 1/10/22 Today:  Feeling well, no c/o , CP, SOB/ AMARO, Cough, Wheeze, Palpitations, edema\par              Most recent labs  reviewed w patient:1/4/22\par 1/31/22: calprotectin=84\par 2/2/22: ej=378\par 2/14/22: Hgb=10.6, ESR=9, CRP <5, Ca=8.3, Mag=1.6, Alb=3.8, Iron=25, Ferritin=15\par 3/3/22:  iv Iron infusion x 1\par 3/8/22 MRI Abd/Pelv: thickened distal Jejunal loops which are tethered; distal ileal segment  (10-12cm ) previously actively inflammed has decreased & now characterized by an area of stricture, however the vladislav-TI  5mm to the anastamosis is enhanced as well as narrowed.  colon just  distal to the anastamosis has a focal segment of inflammation & stricture.  the recto-sigmoid appears inflammed \par 3/28/22: Hgb=14.4 , ESR=1, CRP <5, Ca=8.5, Mag=1.7,Alb=4.1, Iron=104, Yzahryoi=351,\par 4/5/22: ci=119, Bms: # 5-6 , 1-2 x/day \par \par \par 5/10/22  :  Last entyvios were -->7/1, 8/5, 9/2, 10/15/21\par              Stelara # 1 IV--10/25/21, second dose SC~ 12/10/21, 3rd~ 2/14/22,  4th-- mid April , 5th--mid june \par \par              Early December 2021  had a " flare" loose BMs, N/V and bloat\par              Took Pred 40mg qd and tapered down 10mg / week , now off pred x 7-8 weeks\par \par 4/13/22 Dr. Heard:  pt states he had a flare the weekend of 4/9/22 w vomiting/distension\par                pt self-started prednisone 40mg , this was just before his april stelara dose\par                claims he was feeling better\par               Dr. Heard: sched colonoscopy w ? dilatation \par \par 5/10/22: tapered of pred 40mg , 10mg/wk over 1 month, now off 2weeks \par         no pain, n/v,  BMs: # 4-5, 1-2 x Day , wt=-132\par 5/17/22 Dr. Heard Colonoscopy: ped scope passed w/o resist in vladislav-TI, one single apthae w psuedopolyp.  Flares probably 2/2 to adhesive dis, imaging may consistently show wall thickening  5/27/22 Labs:  Alb=3.8. Phos=2.7, Mag=1.9, Ca=9.2, PTH=72, Vit D=105, ALP=76\par                + IV Iron\par  6/20/22 Labs:  Alb=4.5, Phos=0.8, Mag=1.8, Ca=8.5, PQD=083, ALP=83\par                          Hgb=14, ESR=2, CRP<5, Wimhfomf=029, Iron=86\par 6/21/22:  no pain, n/v,  BMs: # 4-5, 1-2 x Day ,\par                qs=400\par \par \par \par 7/26/22: \par        Abd pain--no \par        Nausea--no \par        Vomit--no \par        Early satiety-no \par        Belching-no \par        Regurgitation--no \par        Ht burn--no \par        Throat Clearing-no\par        Globus-no\par        Hoarseness--no \par        Dysphagia--no \par        Constipation--no \par        Diarrhea- intermittent:  no\par        Bloating--no \par        Flatulence-no\par        Gurgling--no \par        Melena--no \par        BRBPR--no \par        Anorexia-no \par        Wt Loss--> steady \par \par \par \par        PRIOR HISTORY--2017\par \par        1/17/17: Hgb=9.2, ESR=6, CRP=5; 1/19/17: BMs: #4, 5-6, 2-3x/D, Tb=771, Started Creon 1 tid cc\par        3rd Entyvio begining Feb\par        2/14/17: Labs; Hgb=9.6, MCV=97, ESR=5, CRP< 5, Z80=175, FA>23, Iron=59, Retic =3.2,\par        2/17/17 Fecal Calprotectin=16, Fats: Increased neutral & Split\par        3/13/17: Labs Hgb=9.5, MCV=95, ESR=7, CRP <5, ALB=3.1\par        3/16/17: lot of stress, to move -[place], had a job-fell thru, BMs: # 5, 2-4 x/D, wt= 131w clothes\par        4/19/17 EGD: gastritis, MACKENZIE, No HP, 2cm HH, +Barretts, No Dysplasia\par        Colonoscopy: Anastamosis: open w mild -mod active colitis, enteritis severe adj to anastomosis, mild more proximal, remainder of colon-wnl, 2-3 deg int hemorrhoids\par        4/26/17 : Hgb=7.3, MCV=95, ESR=13, CRP<5;Immediately post procedure stools very dark on eliquis/iron.\par        Admitted to [facility]: Hgb=8.3-->8.9 after 2 U, Alb 3.3, BUN=17, creat=1.3, Malina/Utqfn=245/460\par        4/27/17: Alb=2.3, BUN=12, creat=1.2, Malina/Lipase=209/863-No abd pain, N/V; 5/5/17: Hgb=10.7.\par        5/8/17 Entyvio iv. 5/8-5/9 w dark red diarrhea; 5/10/17 Hgb=7.8.\par        5/11/17 Adm [facility] w stools brown, Hgb=7.9, BUN=17, Creat=1.4, Alb=2.9; S/P 2 Units- Hgb=10.6, BUN=15/1.1.\par        Prednisone 40mg qd, entocort d/dee.; 5/18/17: Hgb-10.4, ls=776, BMs: #5, 1-2x/D, no pain\par        6/8/17: Hgb=7.4,MCV=98, CRP<5-ASA stopped, Pred20--> 30\par        6/10/17: Hgb=8.2, Alb=2.9, BUN=20/1.2 ; 6/12/17: Hgb=8.3, Retic=0.19, Iron=10, Sat%=3, Ferritin=57, FA=23,Q77=253, 6/13/17: s/p 2 Unit PRBCs\par        6/15/17: started MCT oil, Je=024 , BMs: # 5, 1-2x/D, stools are brownish/green \par        7/11/17: [facility] w unsteadiness. MRI Head: R Cortical Temporo-occipital encephalomalacia, MRA H&N-no sign lesions, PER-wnl.Hgb=9.9--> 8.4->9.7 after 1 Unit. Seen by Heme: received IV Iron \par        CRP<5, Q17=248, KGU=299, FA>23,Iron=22,Sat%=6, Ferritin=42, Alb=3.5. D/C on warfarin 2mg\par        7/20 Hgb=8.5, 7/28 Hgb=7.7, 7/31-s/p 1 Unit \par        As outpt seeing Heme, to get IV Iron and 5 IV Copper\par        Had 'FLU' Fever=101, chills, bloat, N/V/D, Bilious. no pain, melena,brbpr\par        Given anti-emetic by dr Butler,then able to keep things down\par        On prednisone 25, warfarin w INR bet 1.6-2\par        8/17/17: Hgb=9.9, ESR=20, CRP-P,Gf=900, BMs: # 5, 1-2x/D, stools are brownish/green\par        10/2/17: Hgb=8.8, MCV=89,Phos=1.7, K=3.9, Mag=1.9, Alb=3.6,Iron<10,Ferritin=21, INR=2\par        10/17/17: Hgb=7.9,ESR=6,CRP<5, INR=1.6\par        10/25/17: Hgb=10, ESR=5, CRP=5, Alb=3.6, Phos=2, Buscvjsx=972, P24=893\par        11/16/17: Hgb=11.2, ESR=14, CRP=12, \par        11/13/17: MRE-Chr mild distension of distal & vladislav-ileum s/o mild stricturing at anast, mild mural thick & mucosal enhancemt ~ 20cm; little change from 2015 c/w mild acute on chr ileitis, 2.1 cm Liver hemangioma\par        11/28/17: Entyvio\par        11/29/17: Hgb=9.6, ESR=10, CRP=5.8, Alb=3.8,Ferritin-P, Iron=14,Sat=4%, Phos=2.5\par        12/19;17: Hgb=10.1, ESR=12, CRP=6.5; 12/21/17: BMs:#3-5, 1-3x/D , brown, no blood, no pain, hr=486\par        1/3/18:Hgb=11.7, ESR=19, CRP=6.5, Alb=4.1, Idpdzywl=251, Iron=31, Sat%=9,Zinc=55\par \par \par        PRIOR HISTORY---2013:\par \par        2/20/13 CT markedly dilated sb loops ext to anast, colonoscopy w open anast ,mild-mod remy-anastamotic disease. quick response to entocort/humira--probably adhesive dis. \par        8/10/13 w GNR bacteremia, ADA 1.7 /KAYLAH 3.4, Humira 8/7, 8/13,8/18,9/15\par        CT- mucosal thickening and spiculation of the distal sb extending to the anastamosis, thickening and stranding of adj mesenteric fat.\par        Humira increased to 40mg weekly, entocort 4\par        9/2013 MRE--dilated loops in mid and distal ileum, markedly thickened and narrowed TI w decreased peristalsis of TI\par        11/15/13 ADA-24.9, KAYLAH-0\par \par \par        PRIOR HISTORY---2014:\par \par        2/15/14--CT dilated loops SB, loop of sb in mid abd 4.3cm w infiltrative changes in the mesentery, bowel tapers in the RLQ to the anastamosis w/o transition\par        WBC=15K, Rx w IV steroids and Abx \par        3/7/14 SBFT: last 10cm sb prox to anast mild distension and sl irregularity. In the mid portion of this loop there is a mild narrowing which appears to reopen but is some what narrowed.\par        3/20/14 ADA=33.1, KAYLAH=0, Lialda switch to Apriso 4 (2/2014)\par \par \par        PRIOR HISTORY--2015\par \par        1/11/15 Adm [facility] w 1 day N,Recurrent V, Abd pain/distension. CT-multiple distended & fluid-filled sb loops, mild wall thickening of ileum w No inflamm changes.\par        WBC=14.6, Hgb=17, RX w NGT suction, Levaquin iv, Solumedrol 40mg q 12( 1/12-->1/16) to prednisone 30mg BID w 5mg taper/wk\par        3/18/15 --Dr. Butler w edema /High BP, prednisone was tapered slowly to 2.5mg \par        switched to entocort 9mg qd, edema and bp improved w lasix 20mg \par        BMs:#4-5, 3x/D, Hgb=11, ESR=4, CRP<5\par        4/28/15 - 5/1/15: Adm [facility] w 1 day Abd pain, distension,N/V. 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 side of anast, opening appeared ulcerated,scarred & severely narrowed\par        6/11/15: [facility] w N/V x1, decr appetite, CT ABD: no obstruction, dilated thickened sb loops of distal jej and ileum\par        6/19/15 [facility]: s/p Lap w extensive lysis of adhesions, hepatic flex, sigmoid and sb anastamosis, s/p partial r colectomy and sb resection--side to side elisabeth: 8-10 inch from prior anast to TV colon.\par        7/17/15: BMs:#4-6, 4-5x/D, wt 131(from 126)\par        8/20/15: BMs: #4, 1-2x/D or #5, 2-3x/D, zt=692, dry cough,Hgb=10, WBC=3, BRG=296, CRP=56, ESR=21\par        8/25/15 CT Abd/Pelv: Svl RLQ sb loops w wall thickening, mild nodularity & inflamm stranding in mesentery\par        Advised-restart Entocort 9mg qd, Apriso 4 qd, Maintain Humira but given RX to check drug/Ab levels\par        9/15/15: did nt restart meds,Hgb=9.9, WBC=4, ESR=19, CRP=5.8, nq=401; [facility] : ADA=8.5, Antibodies< 1.7\par        10/15/15: No pain, BMs:#4, 1-2x/D, #5-6, 3-4x/D, throat clearing/cough-better, hg=030\par        11/30/15: No pain, BMs:# 4, 5-6 intermittently, No throat clear, mf=615\par \par \par        PRIOR HISTORY-2016\par \par        1/22/16; 4/8/16; 6/6/16 : No pain, BMs:# 4-5 1-2x/D, also #5-6 3x/D for 2-3D/wk, es=994\par        7/14/16: oq=980, 9/22/16: mx=936.5\par        11/10/16: 9.5lb wt loss, states BMs: 5-6, 5x/D--Taking Magnesium, No pain/anorexia\par        Labs: Stool Hfwnsiwvwohr=817, + Incr Fecal fat, Alb=3.4, FA =23, L63=971, Hgb=12, ESR=10, CRP <5\par        Magnesium-d/c, Obtain MRE asap--Start steroids and possibly switch to Entyvio\par        DDx discussed: active crohns--loss response to Humira, Malabsorption--loss Bile acids, Bile-induced diarrhea, SIBO\par        Pt wanted to wait for imaging-did not start rx\par        12/1//16 MRE: RUQ ileocolonic anastamosis--narrowed w upstream dilatation to 3.2 cm\par        Pt refused pred, Started Entocort 9mg qd and Flagyl 250mg qid about 7-10days ago \par        awaiting Entyvio load to start 12/22, then 1/5; had Cut back on iron bid\par        12/15/16: BMs:# 5, 2-3x/D, occas #6, No pain, Less bloat/flatulence, Yy=738. 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 Same was discussed with the patient, he reported feeling great now and wanted to pursue medical treatment  - Started on UST, with the plan to follow up MRE. 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 \par \par For follow up today \par S/p UST - infusion 10/2021, followed by 12/2021, 2/2022, 4/2022\par \par MRI done 03/2022 - s/p 2 maintenance doses \par Active inflammation of distal jejunal loops with evidence of chronic scar/tethering in the left lower quadrant. \par \par Previous 10-12 cm inflammation of ileum upstream from the anastomosis with colon has decreased with a focal area of probable scarring followed by segment of ileum with decreased fold pattern.  The portion at the anastomosis however is actively inflamed (vladislav terminal ileum) \par \par large bowel segment distal to the anastomosis normal followed by focal colonic narrowing and active inflammation throughout the rectum, anal canal appears normal. the active inflammation upstream from the rectum appears increase since previous exam \par \par Currently he reports feeling better since starting UST \par Had a recent flare over the weekend - vomiting, distention. Started himself on Prednisone. Reports that he is feeling a little better but not back to his baseline,. Of note this flare was 1-2 days prior to his UST dose. His weight has been fluctuating, usually goes down with a flare. \par He is concerned about his steroids use as he has osteoporosis. \par \par Last visit \par Since last visit, pt reports he's actually felt much better. Best he's "ever felt since surgery." Reports weight above 140 lbs, on low fiber diet, and avoiding caffeine. Reports symptoms have been improving since hospitalization in August and steroids. Only recently received UST dose 1 week ago, and is looking forward to his next injection. \par \par HPI: Had flares in March and June of this year, managed at home, and then in August for which he was hospitalized. Typical flare symptoms are bloating, and diarrhea. The last episode he had vomiting and intolerance to PO intake. Noted to have partial SBO, managed conservatively with NG tube, IV fluids, Solu-Medrol with improvement, discharged on Prednisone 40 mg taper. He's been off Prednisone for at least 2 weeks. Reports that he is back to his base line now, feeling overall great with weight gain, 1-2 BMs per day, and no pain. \par \par Last Colon 2/2022 - Reports it was better than previous ones. \par

## 2022-07-26 NOTE — ASSESSMENT
[FreeTextEntry1] : 1. CROHNS DISEASE   of both small and large intestine: s/p flare 8/25-->8/30/21, then  early 12/2021-s/p pred tapered over 4 weeks, again the weekend 4/9/22 rx w prednisone 40_  mg ( just before last Stelera dose) -->\par now off since early 5/2022\par    s/p ileocolonic resection x 2--last 6/19/15 for recurrent sbos: \par prior was s/p 4 SBOs w/i 2 yrs--2/2 distal sb disease adj to anastamosis\par when on Humira weekly had an  ADA =33 (3/20/14), -but had sbo 2/2014 at ADA=25 and another sbo 4/28/15\par 6/10/2015 Colonoscopy: Inflamm ST mass on ileal side w ulceration/scarred/narrow\par 6/11/2015 CT: dilated thickened sb loops distal jej & ileum\par Post-op 6/2015 probably some malabsorption 2/2 to removal of R Colon and ileum: \par had WT. Loss-->r/o malabsorption:  ?bile-induced->-secretory vs decr micelles, active dis, PLE\par ?  SIBO--Elev FA, Alb=3.4-->3.1-->2.9--> (7/28/17)\par ?SIBO/Prevent post-op recurrence --> trial Flagyl 250 mg qid~12/7/16-->d/c: 5/11/17\par Also discussed trial of Cholestyramine/Xifaxin -wanted to wait\par 11/20/16 ACTIVE Crohn's-->  9.5lb wt loss, BMs: #5-6, 5x/D-- but taking Magnesium \par 12/1/16 MRE: RUQ ileocolonic anastamosis--narrowed w upstream dilatation to 3.2 cm\par Labs: Stool Bvkgufahswjb=507, + Incr Fecal fat, Alb=3.4, FA =23, C41=337, Hgb=12.3,ESR=10,CRP <5\par 4/19/17 :Colonoscopy: Anastamosis: open w mild -mod active colitis, enteritis severe adj to anastomosis, mild more proximal, remainder of colon=wnl\par 5/11/17- Adm PMHC w stools brown, but Hgb=7.9, BUN=17, Creat=1.4, Alb=2.9.\par S/P 2 Units w Hgb=10.6, BUN=15/1.1, Prednisone 40mg taper, entocort d/dee\par 6/8/17: Hgb=7.4, MCV=98, CRP<5--ASA stopped, Pred20--> 30mg, 6/13/17: s/p 2 Unit PRBCs\par 7/11/17 [facility] w unsteadiness. MRI Head: R Cortical Temporo-occipital encephalomalacia\par Hgb=9.9--> 8.4->9.7 after 1 Unit. Seen by Heme: received IV Iron \par CRP<5, E67=887, OYK=822, FA>23,Iron=22,Sat%=6, Ferritin=42, Alb=3.5. D/C on warfarin 2mg\par 7/28/17 Hgb=7.7; 7/31/17-s/p 1 Unit \par Heme Consultation ~ 8/2017: started  IV Infusions of  Iron and  IV Copper\par 8/17/17: Hgb=9.9, ESR=20, Yq=774--Fufwivwzqm s/p GI infection w N/V/D, no obstruction\par 10/17/17: Hgb=7.9,ESR=6,CRP<5, INR=1.6, Got IV Iron x 2, since 10/2/17 for Iron<10, Hgb=8.8\par 11/16/17: Hgb=11.2, ESR=14, CRP=12, 10/26/17 Stool fat-neg, calprotectin-30\par 11/13/17: MRE-Chr mild distension of distal & alfredito-ileum s/o mild stricturing at anast, mild mural thick & mucosal enhancemt ~ 20cm; little change from 2015 c/w mild acute on chr ileitis, 2.1 cm Liver hemangioma\par 10/9/18: Hgb=11.6, MCV=94, ESR=14, CRP=5.4, INR=2.2\par 10/10/18 MRE: stricturing of neoterminal ileum w worsened upstream dilatation, moderate length of upstream ileum w stricturing extending at least 20cm and more extensive then previous. suggestion of 2 adj extraluminal fluid collections which may be w/i the wall of strictured ileum near the ileocolonic anastamosis\par pt had declined to call numbers given for  IBD center at [facility] for new or investigational rx's--biologics.  \par later he felt  he was  stablizing w his  wt loss, and inflammatory markers\par \par 2/28/19 w ESR=12, CRP=6.5 & 2/21/19 stool calprotectin--> 232\par 8/15/19: ESR=10, CRP=5.2, Calprotectin--> 88\par 9/19/19: ESR=9, CRP=5.5\par 10/17/19: ESR=7, CRP< 5\par 11/6/19 : ESR=6, CRP=0.18\par 11/27/19: ESR=6, CRP <5\par 1/13/20: ESR=7, CRP=0.2\par 1/30/20: ESR=9, CRP=11\par \par 2/3/20 EGD: gastritis, +MACKENZIE, No HP, +erosion w ooze -clipped, 0.5cm polyp-neg; 4cm HH, Esophagitis A, + Barretts, VC: 1+\par Colonoscopy: 05cm rectal polyp: HP, 2nd deg int hemorrhoids\par mild-mod activity: MARILY w cryptitis & mild archect distortion at ileocolonic anast: colon & ileal side, no stricture\par \par 3/2/20:ESR=7, CRP=0.26\par 3/9/20: VDZ>40, Ab to VDZ <1.6\par 3/17/20: ESR=6, CRP=6.4\par 10/17/20: ESR=11, CRP <5\par 1/4/21:ESR=9, CRP<5\par 2/22/21: ESR=8, CRP<5\par 4/5/21: ESR=9, CRP<5\par 6/4/21: ESR=9, CRP<5  \par 6/11/21: wt= 135,   no pain, stool more formed # 4, 1-2x/d \par              s/p episode --abd distenstion, pain, N/V, no BM\par              eventually started having diarrhea and flatus, BMs returned to normal\par              lost 2-3 lbs but regained\par 7/4/21: CRP <5, Hgb=12\par  7/16/21 MRE: limited to incomplete bowel distention, chr distal ileitis/probable inflammatory stricturing vs collapse of the alfredito-TI--but improved since 2018 , moderate proctitis\par 7/12/21   --  ? flare --> entyvio should have been  q 5 wk , went q 8 wks\par                      next dose should be in 4 weeks x 2 then 5 weeks, to check levels\par 7/20/21: Cliically stable, unclear if MRE accurate 2/2 underdistension, but gained wt and CRP--normal\par 7/23/21 Entyvio level= 39, Abs <1.6\par 8/23/21: Labs--Hgb=13.6, ESR=10, CRP=6.6, Aapblgtk=533, Iron=26, Alb=3.9, BUN=16\par 8/26/21 p/w sbo  w N/V, abd pain/bloating  x 3 days, unable to keep things down\par Labs: WBC=5, Hgb=12, CRP=43, ESR=11, Alb=4.4, BUN=28, Creat=-1.6\par CT Abd/Pelvis: diffuse marked dilatation of SB Loops w transition pt in R. mid/lower abdomen\par ~ 5-6cm, proximal to the ileocolonic anastomosis\par RX w IV solumedrol 20mg q8h, NGT, IVF, pt refused TPN, also w UTI from prostatitis\par 8/27 NGT was pulled, and clears started and advanced to LR,  was d/c home 8/30 on prednisone\par 40mg qd w taper by 10mg/wk to 20mg qd \par \par \par (  **Entyvio's  :time 0=12/22/16 ( Humira 40mg QW); 1/5/17--2wks, s/p 3rd infusion at wk 6, then q 6weeks \par #6 :6/21/17-->held 2/2 Shingles, then  Infusions as follows=9/19/17 , 10/17/17, 11/28/17, 1/16/18 ,2/16/18, 3/19/18,4/16/18, 5/14/18, 6/25/18, 8/7/18, 9/17/18, 10/16/18, 11/13/18, 12/11/18, 1/14/19, 2/15/19, 3/15/19, 5/16/19, 6/18/19,7/24/19, 9/9/19, 10/2/19, 11/4/19, 12/12/19, 1/6/20, 2/4/20, 3/3/20, 9/17/20, 10/15/20, 11/18/20, 1/11/21, 2/8/21, 3/8/21, 4/8/21, 6/3/21, 7/1/21, 8/2/21, 9/2/21,10/25/21  )\par \par 3/8/22 MRI Abd/Pelv: thickened distal Jejunal loops which are tethered; distal ileal segment  (10-12cm ) previously actively inflammed has decreased & now characterized by an area of stricture, however the alfredito-TI  5mm to the anastamosis is enhanced as well as narrowed.  Colon just  distal to the anastamosis has a focal segment of inflammation & stricture.  the recto-sigmoid appears inflammed \par 3/28/22: Hgb=14.4 , ESR=1, CRP <5, Ca=8.5, Mag=1.7,Alb=4.1, Iron=104, Uecoxpve=704,\par  5/9//22: Hgb=13.8 , ESR=2, CRP <5, Ca=8.8, Mag=1.6, Alb=4, Iron=64, Humgxzzf=195  \par 5/17/22 Dr. Heard Colonoscopy: ped scope passed w/o resist in alfredito-TI, one single apthae w psuedopolyp.\par  6/21/22 Labs: Hgb=14, ESR=2, CRP<5,  Alb=4.5, Phos=0.8, Mag=1.8, Ca=8.5,Tncqipbf=003, Iron=86\par  7/25/22 Labs:  Hgb=14, ESR=2, CRP<5, Alb=4.2, Phos=1.5, Mag=1.8, Ca=8.6, Bqzzpxvl=516, Iron=57, PT=24, INR=2.1                      \par A / PLAN:  s/p sbo prox to anastamosis\par                  inflammatory vs fibrosis vs combination: 3/2022 recent MRI shows improvement of inflammation w      possible residual stricture in the ileum and probable colitis near the anastamosis and distal colon \par      Responded to  steroids iv--> po--d/c ~ 10/20/21,\par      tapered steroids from 40mg qd  to 20mg, jpsk19gs qd and  taper 5mg/wk\par      then po taper again early 12/2021 40mg qd w 10mg taper/ wk--\par      PO prednisone 40mg mg qd started on 4/9/22 ,tapered off ~ early 5/2022\par \par      Entyvio level was 39 w/o Abs~ 3weeks after 7/1/21, \par      speaks to inflammatory component & probably loss of response\par      Stelara # 1 dose on 10/25/21, #2 on 12/10/21, # 3 on  2/11/22,  # 4 on 4/20/22 , #5 mid June 2022, \par                  next mid 8/2022\par \par       IBD consensus : due to malnutrition /steroid dependency, surgery is next best option \par       but pt reported feeling well and wanted to pursue medical treatment \par      repeated  imaging in March after 3 months of Stelara, then consider surgery vs improved candidacy for                endoscopic manipulation\par         f/u w  IBD consultant Dr. Heard at   & reviewed imaging after 3 months of Stelera\par        for  Colonoscopy ( w possible balloon dilatation )-> last 1 3/4 yrs ago\par    5/17/22 Dr. Heard Colonoscopy: ped scope passed w/o resist in alfredito-TI, one single apthae w psuedopolyp.\par     No Dilatation need, very mild dis at Alfredito-TI, MRE may show wall thickening, sbo's probably adhesive dis\par \par 1/4/22 Labs: Hgb=11.5, ESR=6, CRP<5, Alb=3.9\par 1/31/22 Calprotectin =84\par 2/14/22: Hgb=10.6, ESR=9, CRP <5, Ca=8.3, Mag=1.6, Alb=3.8, Iron=25, Ferritin=15\par 3/28/22: Hgb=14, ESR=1, CRP<5 , Ca=8.5, Mag=1.7, Alb=4, Iron=104, Isnkqvej=891\par 5/9//22: Hgb=13.8 , ESR=2, CRP <5, Ca=8.8, Mag=1.6, Alb=4, Iron=64, Ugxfzimv=919  \par  6/21/22 : Hgb=14, ESR=2, CRP<5,  Ca=8.5, ,Alb=4.5,  Mag=1.8, Iron=86,Wgzbghug=555,\par  7/25/22 Labs:  Hgb=14, ESR=2, CRP<5, Alb=4.2, Phos=1.5, Mag=1.8, Ca=8.6, Fgcwxpvh=788, Iron=57\par Probiotics 3 qd \par Diet:  LR, Lactose free, protein drinks tid\par MCT oil begun but never maintained \par **trend --cbc, esr, crp, albumin, calprotectin \par   \par **monitor wt--- usu bet 136-139, 7/20/21=136, 11/22/21=139, 1/4/22=132, 2/22/22=132, 4/5/22=131,5/10/65=107,\par                         6/21/22=133,  lbxmq=310\par \par \par                                                                                                                                                                                                                                                                                                                                                                                                                                                                                                                                                                                                                                                                                                                            \par 2. Iron deficiency anemia : \par  had initially dropped , after clinical flare and post procedure bleeding\par Probably multifactorial: ACD, Blood loss, malabsorption/nutrient deficiencies\par Eliquis-->warfarin after CVA, On Entyvio \par 7/11/17 s/p  Adm for unsteady gait w MRI c/w CVA--warfarin begun: possible better control of AC\par Chromagen 2 qd--> IV Iron , s/p IV Cu x 5, FA 1mg qd , B12 SL & IM\par Still requiring IV Iron and cu infusions prn \par most recent IV Iron infusion  :  5/2022 for  Llwwgpim=551 on 5/9/22 \par 2/22/21: C03=476, \par 6/4/21: Cu=91, Zinc=69, Ferritin=32,Iron=43, \par 7/12/21: Cz=511, Zinc=74, Bbzvltgm=264, Iron=35\par 11/15/21 : Hgb=12,  Ferritin =104, Ca=9, Mg=1.7\par 1/4/22: Hgb=11.5, Ca=8.8, Mg=2.9, Uybbwte=853 \par 2/14/22: Hgb=10.6, Ca=8.3, Mag=1.6, Alb=3.8, Iron=25, Ferritin=15\par 3/28/22: Hgb=14.4 , Ca=8.5, Mag=1.7,Alb=4.1, Iron=104, Cwzuvqkz=213,\par  5/9//22: Hgb=13.8 , Ca=8.8, Mag=1.6, Alb=4, Iron=64, Zaitfaem=390  \par 6/20/22: Hgb=14.4  , Ca=8.5, Mag=1.8, Alb=4.5, Iron=96, Kjlyykwo=042\par 7/25/22 Labs:  Hgb=14,Ca=8.6,  Mag=1.8,Alb=4.2,Iron=57 Tcsdxrjh=166, , PT=24, INR=2.1\par B12 1cc IM ____L. delt--  given today, \par trend cbc, B12, FA, Iron panel \par Adj INR--closer to low 2's.    \par \par \par \par \par 3. Osteoporosis \par : Progression on BD from 5/5/16\par Crohns, h/o steroid use\par Calcium citrate & Vit D per Endo\par Forteo since 5/10/19 , then  Reclast          \par Repeat BD of 8/2018 --showed worsening to Osteoporosis from 2016\par Rec Endo consult w Dr. Akers :Osteoporosis center at [facility]--pt never went originally \par 9/21/18: Phos=2.4, Ca=8.7, Alb=3.4, Vit D=27, KEA=083, \par 10/16/18: Phos=2.8, Ca=8.7, Alb=3.5,\par 1/14/19: Phos=2.6,  Ca=8.7, Alb=3.6\par 2/28/19: Phos=1.8, Ca=8.9, Alb=3.7, VitD=39, HWW=532\par 3/18/19: Ca=8.5, Alb=3.7, Vit D=44.6, PTH=93\par 7/11/19: Ca=9.1, Alb=3.7, Phos=3.9, Vit D=40/ 306, OFK=738\par 8/15/19: Ca=9, Alb=4.2, Phos=4.4, VitD=59, FYA=457\par 10/17/19: Ca=9.6, Alb=3.9, Phos=3.5\par 11/6/19: Ca=9.1, Alb=3.9. Phos=3.7, VitD=50, PTH=80\par 1/13/20: ca=9.1\par 1/30/20: Ca=9.2, Alb=3.9, Phos=2.7, Mag=1.5, Vit D=103/41, PTH=87\par 2/18/20:ca=8.5, Alb=3.6, \par 3/2/20: Ca=8.5, Alb=3.6\par 3/17/20: Ca=9.1, Alb=3.7, Phos=3.4, Mag=1.7\par 10/17/20: Ca=8.9, Alb=4, Phos=2.3, Mag-2.0, Vit D=66, PTH=47\par 1/4/21 : Ca=9.4, Alb=4.2, Phos=2.7, Mag=2, Vit D=64, PTH=87.5\par 4/5/21: Ca=9.1, Alb=4, Phos=3.1, Mag=1.7, \par 6/4/21: ca=9, Alb=3.8, Phos=2.8, Mag=1.8\par 7/12/21: Ca=8.6, ALB=6, phosph=2.3, Mag=1.8\par 11/15/21: Ca=9, Alb=3.7, Mag=1.7\par 1/4/22: ca=8.8, Alb=3.9, Mg=2.9, phos=2.9\par 1/21/22: Ca=8.8, Alb=3.9, Vit D=60, PTH=85\par 2/14/22:  Ca=8.3, Mag=1.6, Alb=3.8, \par 3/28/22: Ca=8.5, Mag=1.7, Alb=4.1, Phos=1.2\par  5/9//22:  Ca=8.8, Mag=1.6, Alb=4, Phos=1.6 \par   5/27/22: Ca=9.2 , Mag=1.9, Alb=3.8 , Phos=2.7, PTH=72, VitD=105\par   6/20/ 22: Ca=8.5  , Mag=1.8, Alb=4.5 , Phos=0.8, LUA=087, \par   7/25/22 Labs: Ca=8.6, Mag=1.8 , Alb=4.2, Phos=1.5, \par \par Dr. Akers: Hyperparathyroidism-- probably secondary due to low Vit D/Ca & ? superimposed primary, \par [facility] ENT Consult init felt  Parathyroid scan showing activity in mediastinum is ectopic parathyroid, then felt sx not indicated at that time, elev PTH is secondary to low  Vit D/Ca\par Rx replete Vit D and current IV Calcium-as per endo \par \par trend Vit D, Ca, Phos, PTH,  \par \par BD--9/2020: incr in spine and hip , no change in wrist, repeat 9/2022\par 10/5/20, 9/2021-s/p Reclast--repeated 6/17/ 2022\par \par \par \par \par \par 4. GERD:  recently less active by ENT , no ht burn,  dysphagia,  + throat clear\par               h/o  Dry cough, CXR:ana, saw ENT bergstein-->LPR\par * ++ LPR,  ++  Page’s w No Dysplasia,  + H/O  Esophagitis grade: A   was found \par \par Recommend: \par * Anti-reflux diet & life-style changes reviewed & re-emphasized.  ++  Bedge required\par * Weight reduction & regular exercise emphasized\par \par * need for  PPI:  Omep 40 BID ,  ++ H2B q HS:Zantac 300mg--> Pepcid 40mg\par No Carafate  1 gram:   --was emphasized\par I reviewed & summarized the prospective randomized & retrospective non-randomized studies\par looking at potential long term SE's of PPIs, w special attention to associations & actual cause\par as related to GI infections, bone loss, cognitive changes, KD, Covid, vitamin & electrolyte deficiencies\par questions were answered, pt advised that PPIs should be used when needed as indicated by their\par clinical indication and response and tapering off is always the goal if possible. pt understood.\par \par *  F/U  EGD: --> 9/2022--for Barretts screening / surveillance \par * No  need for pH Monitor,   Manometry,   Esophagram --\par *  ++  need for ENT  eval/F/U,  No  need for Surgical  eval  --  \par \par \par \par \par \par 5. Hemorrhoids:  well - controlled.  No pain,  swelling,  itch,  bleeding\par * Discussed previously the potential complications of thrombosis,  pain,  infection,  swelling, itching,  bleeding \par Reccomend: \par * currently Low   - Fiber Diet was emphasized\par * 6  --  8 cups of decaffeinated fluid daily was emphasized \par * Sitz Bathes prn,   No:  Anusol HC  Suppos / Cream  NV BID -- was needed\par * No:  Tucks BID,  Balneol Lotion,   Calmoseptine Oint -- was needed ;    ++ Prep H prn\par * No:  need for  Colorectal surgical evaluation for possible ablation\par \par \par \par \par \par \par \par 6. Barretts esophagus without dysplasia  \par Notes: see GERD.    \par \par \par \par 7. Hepatomegaly-->not confirmed by recent abd sono\par CTE w relative enlargemt, ? lobulation & enlarged portal/mesenteric veins\par LFTs-wnl, no ascites or edema\par R/O CLD, Hepatic vein thrombosis\par Abd sono w doppler--> Liver and spleen normal size, no thrombosis, normal portal and hepatic vein flow\par \par \par \par \par Informed Consent:\par * The risks & Benefits of EGD were discussed w patient.\par * This included but was not limited to perforation, bleeding, sedation /med rxns possibly requiring surgery, blood transfusions, antibiotics & CPR/Intubation.\par * Pt. understands & agrees to the procedures.\par The following instructions in regards to the prep and medically essential ( cardiac, pulmonary, sz, psych, endocrine)  pre-op medication administration\par was reviewed and emphasized with the patient . \par * Pt. advised to D/C  ASA/NSAIDs  7  Days   PTP.\par * [ +++ ]  Dulcolax / Miralax / Mag. Citrate ,  [     ] Prepopik/ Clenpiq ,  [     ] Osmo Prep,  [    ] GoLytely,  prep. reviewed w Pt.\par * Hold  [           ] AM of procedure.\par * Hold  [           ] PM  before procedure.\par * Take  [           ] PM  before procedure.\par * Take  [           ] AM of procedure.\par \par \par

## 2022-09-06 ENCOUNTER — RESULT REVIEW (OUTPATIENT)
Age: 57
End: 2022-09-06

## 2022-09-06 ENCOUNTER — APPOINTMENT (OUTPATIENT)
Dept: HEMATOLOGY ONCOLOGY | Facility: CLINIC | Age: 57
End: 2022-09-06

## 2022-09-06 VITALS
BODY MASS INDEX: 20.73 KG/M2 | WEIGHT: 138.38 LBS | HEART RATE: 93 BPM | HEIGHT: 68.5 IN | DIASTOLIC BLOOD PRESSURE: 77 MMHG | OXYGEN SATURATION: 96 % | SYSTOLIC BLOOD PRESSURE: 115 MMHG | TEMPERATURE: 97.3 F | RESPIRATION RATE: 16 BRPM

## 2022-09-27 ENCOUNTER — RESULT REVIEW (OUTPATIENT)
Age: 57
End: 2022-09-27

## 2022-09-27 NOTE — ASU PATIENT PROFILE, ADULT - BILL OF RIGHTS/ADMISSION INFORMATION PROVIDED TO:
LVM for patient to remind him of lab order. Patient received blood work orders at 5/11/2022 appointment. Patient

## 2022-09-27 NOTE — ADDENDUM
Renal cysts are not that uncommon.  However she can see Urology if she would like for evaluation.   [FreeTextEntry1] : Imaging\par \par 10/10/18 MRE: stricturing of neoterminal ileum w worsened upstream dilatation, moderate length of upstream ileum w stricturing extending at least 20cm and more extensive then previous. suggestionof 2 adj extraluminal fluid collections which may be w/i the wall of strictured ileum near the ileocolonic anastomosis. surrounding spiculation and tethered appearance of bowel in this region.\par \par 11/13/17: MRE-Chr mild distension of distal & vladislav-ileum s/o mild stricturing at anast, mild mural thick & mucosal enhancemt ~ 20cm; little change from 2015 c/w mild acute on chr ileitis, 2.1 cm Liver hemangioma\par 12/1//16 MRE: RUQ ileocolonic anastamosis--narrowed w upstream dilatation to 3.2 cm\par 5/5/16 BD: Osteoporotic, sig decr all areas: Lumb T=2.6, L Hip=2.1, L arm=2.5\par 3/10/15 BD: Osteopeneia, sig decr since 1/2013 Lumbar T=2.2, L Hip=1.9, Larm=1.6\par 6/11/15: CT ABD/Pelv: no obstruction, dilated thickened sb loops of distal jej and ileum\par 4/28/15 CT Abd/Pelv: Diffusely dilated SB loops, thick walled ileum\par 1/11/15 CT ABD/PEL w po C: multiple distended and fluid-filled sb loops, mild wall thickening of ileum--no inflammatory changes seen\par 3/7/14 SBFT: last 10cm prox to anast w mild distension, sl irregular. In the mid portion of this loop + mild narrowing but appears to open \par 2/15/14 CT ABD/PEL w po C: Loop in mid abd dilated to 4.3cm, bowel wall mildly thickened. No abrupt transition, bowel tapers in the RLQ to level of anastamosis. \par 9/9/13 MRE: Dilatation of loops mid and distal ileum. Distal ileum w marked thickening and decr peristalsis. Mild thickening in sigmoid colon\par 8/11/13 Ct ABD/PEL w po C: Diffusely abnl sb w marked distension extending to anastamosis. Mucoasl Thickening and spiculation of distal sb extending to anastamosis, there is thickening and stranding of adj mesenteric fat \par 1/24/13 BD: Osteopenia of the wrist, spine and hip--sig decr in the wrist \par \par Procedures:\par \par 4/19/17 EGD: gastritis, MACKENZIE, No HP\par 2cm HH, +Barretts, No Dysplasia\par Colonoscopy: Anastamosis: open w mild -mod active colitis, enteritis severe adj to anastomosis, mild more proximal, remainder of colon-wnl, 2-3 deg int hemorrhoids\par 6/10/15 Colonoscopy: Inflammatory ST mass on the ileal side of anast, opening appeared ulcerated,scarred & severely narrowed\par 8/3/10 Colonoscopy: active dz at anastamosis,ulcerated/edema/narrowed\par 8/20/14 EGD: Gastritis, mild, No HP, No IM\par GE wide open at 40cm, 5 cm seg Barretts, No Dysplasia.

## 2022-09-29 ENCOUNTER — RESULT REVIEW (OUTPATIENT)
Age: 57
End: 2022-09-29

## 2022-09-30 ENCOUNTER — APPOINTMENT (OUTPATIENT)
Dept: GASTROENTEROLOGY | Facility: HOSPITAL | Age: 57
End: 2022-09-30

## 2022-10-14 ENCOUNTER — RESULT REVIEW (OUTPATIENT)
Age: 57
End: 2022-10-14

## 2022-10-17 ENCOUNTER — RESULT REVIEW (OUTPATIENT)
Age: 57
End: 2022-10-17

## 2022-10-17 ENCOUNTER — APPOINTMENT (OUTPATIENT)
Dept: HEMATOLOGY ONCOLOGY | Facility: CLINIC | Age: 57
End: 2022-10-17

## 2022-10-17 VITALS
BODY MASS INDEX: 20.83 KG/M2 | RESPIRATION RATE: 18 BRPM | TEMPERATURE: 97.3 F | DIASTOLIC BLOOD PRESSURE: 78 MMHG | SYSTOLIC BLOOD PRESSURE: 120 MMHG | HEIGHT: 68.5 IN | WEIGHT: 139 LBS | HEART RATE: 77 BPM | OXYGEN SATURATION: 97 %

## 2022-10-17 PROCEDURE — 99213 OFFICE O/P EST LOW 20 MIN: CPT | Mod: 25

## 2022-10-17 PROCEDURE — 36415 COLL VENOUS BLD VENIPUNCTURE: CPT

## 2022-10-17 NOTE — ASSESSMENT
[FreeTextEntry1] : 1.  Anemia- Hgb 12.7- s/p Injectafer x 2 -\par  -blood loss, anemia of chronic disease with need for intermittent iron  \par - copper deficiency - replaced, level WNL 5/2020\par - hospitalized with flare up of colitis, got one iv iron in the hospital- started Straterra IV at the end of Oct- will begin self inj Dec 2021\par - Injectafer - 3/2022 x 2 doses, doesn’t respond to Venofer \par - Injectafer 5/22\par - check iron today \par -EGD Sept 2022 \par \par 2. 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 Hypogammaglobulinemia- no increased infection rate \par - s/p  Prevnar 13 12/2018 \par -  Pneumococcal vaccine 23 boost \par - s/p  Shingrex\par -  COVID vaccine - Pfizer x 4\par - Flu shot \par \par 5. Zinc deficiency\par - oral Zinc, level better - 72\par \par 6. 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 No evidence of lymphatic invasion. 8/10 LN's for eli metastatic papillary thyroid cancer\par -received iodine 131 at 29.3 mCi in October 2020\par -On synthroid\par -Due for US Head and soft tissue neck May 2022- follows with Dr. Akers\par \par Dr. Luis Felipe Monsalve\par cell [phone]\par office [phone]\par \par Labs next visit- PT, INR, irons, cbc and chem \par Blood drawn in office. Ordered and reviewed.\par \par \par  return in 8 weeks cbc chem irons, INR- case and mgmt discussed with Dr. Biswas \par \par \par

## 2022-10-17 NOTE — HISTORY OF PRESENT ILLNESS
[0 - No Distress] : Distress Level: 0 [de-identified] : Patient is a 52 year old who is referred for initial consultation for anemia secondary to Crohns/GI bleed vs Iron Deficiency/ Vit b12 def. 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 Patient is status post ileo-colonic resections for SBO  in 2016. In 2016 patient had complaints of 10 - 15 lb weight loss. Labs at that time showed Hgb of 12, steatorrhea and stool calprotectin = 236. In 2016 MRI/MRE with narrowing at ileocolonic anastomosis with upstream dilation. Pt refused bridge with  prednisone and Entocort / Flagyl. In 2017 patient Hgb dropped to 9.5. On 2017 patient underwent an EGD and Colonoscopy. Findings consistent with Page's and no dysplasia or AVMs. Colonoscopy showed an open anastomosis but active disease, moderate on the colonic side and limited to the anastomosis and moderate to severe enteritis, just proximal to the anastomosis. Pat had routine labs on 05/10/2017 Hgb: 8.3.  [FreeTextEntry1] : s/p injectafer, copper\par warfarin [de-identified] : Patient presents for follow up of DVT, anemia, MTHFR Gene mutation, colitis.  Patient overall feels well.

## 2022-10-17 NOTE — CONSULT LETTER
[Dear  ___] : Dear  [unfilled], [Consult Letter:] : I had the pleasure of evaluating your patient, [unfilled]. [Please see my note below.] : Please see my note below. [Consult Closing:] : Thank you very much for allowing me to participate in the care of this patient.  If you have any questions, please do not hesitate to contact me. [Sincerely,] : Sincerely, [DrMeron  ___] : Dr. REARDON [FreeTextEntry3] : Angie Biswas MD\par [facility] [place] [facility]\par

## 2022-10-27 ENCOUNTER — APPOINTMENT (OUTPATIENT)
Dept: ENDOCRINOLOGY | Facility: CLINIC | Age: 57
End: 2022-10-27

## 2022-10-27 VITALS
OXYGEN SATURATION: 98 % | WEIGHT: 138 LBS | BODY MASS INDEX: 20.92 KG/M2 | DIASTOLIC BLOOD PRESSURE: 70 MMHG | HEIGHT: 68 IN | SYSTOLIC BLOOD PRESSURE: 122 MMHG | HEART RATE: 105 BPM

## 2022-10-27 PROCEDURE — 99215 OFFICE O/P EST HI 40 MIN: CPT

## 2022-10-27 RX ORDER — DIBASIC SODIUM PHOSPHATE, MONOBASIC POTASSIUM PHOSPHATE AND MONOBASIC SODIUM PHOSPHATE 852; 155; 130 MG/1; MG/1; MG/1
155-852-130 TABLET ORAL
Qty: 14 | Refills: 4 | Status: DISCONTINUED | COMMUNITY
Start: 2022-07-25 | End: 2022-10-27

## 2022-10-27 NOTE — HISTORY OF PRESENT ILLNESS
[FreeTextEntry1] : Last Reclast 6/17/2022\par Mr. GUDELIA DUNN is a 57 year old male\par  coming for a f/u , for severe osteoporosis with h/o bilateral hip fractures, hyperparathyroidism, low D and hypocalcemia secondary to Chron's disease on Stelara shot every other month Dr Morton \par on Vit D 50,000 IU 4 times a week alternating with 3 times a week every other week \par \par started Forteo May 10 2019 then first Reclast infusion in May 2021 with no side effects\par last DEXA 10/2022 mildy worse after 9/21 much improved likely due to not enough calcium\par \par on IV calcium each Friday at the infusion center, last one a month ago \par labs done much improved \par \par Ca citrate 950mg 3 tab bid \par Clacium IV every other week\par \par also iron IV every 6 weeks \par reports compliance \par \par 24hr urine low calcium while low D \par \par \par total thyroidectomy and central compartment  dissection 9/11/2020\par Pathology showing 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 No evidence of lymphatic or vascular invasion no extra thyroid extension. 8 of 10 lymph nodes positive for metastatic papillary thyroid carcinoma.\par \par Large test metastatic focus is 0.7 cm in greatest dimension extra low dose extension present. Thyroid gland left sided within normal limits. Tihymic tissue present.\par \par received iodine 131 at 29.3 mCi in October 2020\par Posterior ideation scan showed focal site of increased tracer localization in the neck to the right of midline as was evident on prior study on October 23, 1920.\par \par started Levothyroxine 125 mcg on 9/18/2020 no side effects dose increased to 137 mcg on November 2020\par \par last DEXA 9/2020\par As compared to the patient's most recent study dated 9/12/2019, there has been a statistically\par significant interval increase in bone density at both the lumbar spine and left hip with no s\par tatistically significant change noted at the left forearm.\par worse T score LFN -2.8

## 2022-10-27 NOTE — PHYSICAL EXAM
[Alert] : alert [Well Nourished] : well nourished [No Acute Distress] : no acute distress [Well Developed] : well developed [Normal Sclera/Conjunctiva] : normal sclera/conjunctiva [EOMI] : extra ocular movement intact [No Proptosis] : no proptosis [Normal Oropharynx] : the oropharynx was normal [No Neck Mass] : no neck mass was observed [Supple] : the neck was supple [Well Healed Scar] : well healed scar [No Respiratory Distress] : no respiratory distress [No Accessory Muscle Use] : no accessory muscle use [Normal S1, S2] : normal S1 and S2 [Normal Rate] : heart rate was normal [Regular Rhythm] : with a regular rhythm [No Edema] : no peripheral edema [Pedal Pulses Normal] : the pedal pulses are present [Normal Anterior Cervical Nodes] : no anterior cervical lymphadenopathy [Normal Posterior Cervical Nodes] : no posterior cervical lymphadenopathy [No Spinal Tenderness] : no spinal tenderness [Spine Straight] : spine straight [No Stigmata of Cushings Syndrome] : no stigmata of Cushings Syndrome [Normal Gait] : normal gait [Normal Strength/Tone] : muscle strength and tone were normal [No Rash] : no rash [Normal Reflexes] : deep tendon reflexes were 2+ and symmetric [No Tremors] : no tremors [Oriented x3] : oriented to person, place, and time [Acanthosis Nigricans] : no acanthosis nigricans

## 2022-11-01 ENCOUNTER — RX RENEWAL (OUTPATIENT)
Age: 57
End: 2022-11-01

## 2022-11-21 ENCOUNTER — RX RENEWAL (OUTPATIENT)
Age: 57
End: 2022-11-21

## 2022-12-07 ENCOUNTER — NON-APPOINTMENT (OUTPATIENT)
Age: 57
End: 2022-12-07

## 2022-12-07 NOTE — ASSESSMENT
[FreeTextEntry1] : 1. CROHNS DISEASE   of both small and large intestine: s/p flare 8/25-->8/30/21, then  early 12/2021-s/p pred tapered over 4 weeks, again the weekend 4/9/22 rx w prednisone 40_  mg ( just before last Stelera dose) -->\par now off since early 5/2022\par    s/p ileocolonic resection x 2--last 6/19/15 for recurrent sbos: \par prior was s/p 4 SBOs w/i 2 yrs--2/2 distal sb disease adj to anastamosis\par when on Humira weekly had an  ADA =33 (3/20/14), -but had sbo 2/2014 at ADA=25 and another sbo 4/28/15\par 6/10/2015 Colonoscopy: Inflamm ST mass on ileal side w ulceration/scarred/narrow\par 6/11/2015 CT: dilated thickened sb loops distal jej & ileum\par Post-op 6/2015 probably some malabsorption 2/2 to removal of R Colon and ileum: \par had WT. Loss-->r/o malabsorption:  ?bile-induced->-secretory vs decr micelles, active dis, PLE\par ?  SIBO--Elev FA, Alb=3.4-->3.1-->2.9--> (7/28/17)\par ?SIBO/Prevent post-op recurrence --> trial Flagyl 250 mg qid~12/7/16-->d/c: 5/11/17\par Also discussed trial of Cholestyramine/Xifaxin -wanted to wait\par 11/20/16 ACTIVE Crohn's-->  9.5lb wt loss, BMs: #5-6, 5x/D-- but taking Magnesium \par 12/1/16 MRE: RUQ ileocolonic anastamosis--narrowed w upstream dilatation to 3.2 cm\par Labs: Stool Ocbjglaftect=312, + Incr Fecal fat, Alb=3.4, FA =23, Y99=451, Hgb=12.3,ESR=10,CRP <5\par 4/19/17 :Colonoscopy: Anastamosis: open w mild -mod active colitis, enteritis severe adj to anastomosis, mild more proximal, remainder of colon=wnl\par 5/11/17- Adm PMHC w stools brown, but Hgb=7.9, BUN=17, Creat=1.4, Alb=2.9.\par S/P 2 Units w Hgb=10.6, BUN=15/1.1, Prednisone 40mg taper, entocort d/dee\par 6/8/17: Hgb=7.4, MCV=98, CRP<5--ASA stopped, Pred20--> 30mg, 6/13/17: s/p 2 Unit PRBCs\par 7/11/17 [facility] w unsteadiness. MRI Head: R Cortical Temporo-occipital encephalomalacia\par Hgb=9.9--> 8.4->9.7 after 1 Unit. Seen by Heme: received IV Iron \par CRP<5, A46=809, DDQ=766, FA>23,Iron=22,Sat%=6, Ferritin=42, Alb=3.5. D/C on warfarin 2mg\par 7/28/17 Hgb=7.7; 7/31/17-s/p 1 Unit \par Heme Consultation ~ 8/2017: started  IV Infusions of  Iron and  IV Copper\par 8/17/17: Hgb=9.9, ESR=20, Mj=218--Nzoeqoyejj s/p GI infection w N/V/D, no obstruction\par 10/17/17: Hgb=7.9,ESR=6,CRP<5, INR=1.6, Got IV Iron x 2, since 10/2/17 for Iron<10, Hgb=8.8\par 11/16/17: Hgb=11.2, ESR=14, CRP=12, 10/26/17 Stool fat-neg, calprotectin-30\par 11/13/17: MRE-Chr mild distension of distal & alfredito-ileum s/o mild stricturing at anast, mild mural thick & mucosal enhancemt ~ 20cm; little change from 2015 c/w mild acute on chr ileitis, 2.1 cm Liver hemangioma\par 10/9/18: Hgb=11.6, MCV=94, ESR=14, CRP=5.4, INR=2.2\par 10/10/18 MRE: stricturing of neoterminal ileum w worsened upstream dilatation, moderate length of upstream ileum w stricturing extending at least 20cm and more extensive then previous. suggestion of 2 adj extraluminal fluid collections which may be w/i the wall of strictured ileum near the ileocolonic anastamosis\par pt had declined to call numbers given for  IBD center at [facility] [place] for new or investigational rx's--biologics.  \par later he felt  he was  stablizing w his  wt loss, and inflammatory markers\par \par 2/28/19 w ESR=12, CRP=6.5 & 2/21/19 stool calprotectin--> 232\par 8/15/19: ESR=10, CRP=5.2, Calprotectin--> 88\par 9/19/19: ESR=9, CRP=5.5\par 10/17/19: ESR=7, CRP< 5\par 11/6/19 : ESR=6, CRP=0.18\par 11/27/19: ESR=6, CRP <5\par 1/13/20: ESR=7, CRP=0.2\par 1/30/20: ESR=9, CRP=11\par \par 2/3/20 EGD: gastritis, +MACKENZIE, No HP, +erosion w ooze -clipped, 0.5cm polyp-neg; 4cm HH, Esophagitis A, + Barretts, VC: 1+\par Colonoscopy: 05cm rectal polyp: HP, 2nd deg int hemorrhoids\par mild-mod activity: MARILY w cryptitis & mild archect distortion at ileocolonic anast: colon & ileal side, no stricture\par \par 3/2/20:ESR=7, CRP=0.26\par 3/9/20: VDZ>40, Ab to VDZ <1.6\par 3/17/20: ESR=6, CRP=6.4\par 10/17/20: ESR=11, CRP <5\par 1/4/21:ESR=9, CRP<5\par 2/22/21: ESR=8, CRP<5\par 4/5/21: ESR=9, CRP<5\par 6/4/21: ESR=9, CRP<5  \par 6/11/21: wt= 135,   no pain, stool more formed # 4, 1-2x/d \par              s/p episode --abd distenstion, pain, N/V, no BM\par              eventually started having diarrhea and flatus, BMs returned to normal\par              lost 2-3 lbs but regained\par 7/4/21: CRP <5, Hgb=12\par  7/16/21 MRE: limited to incomplete bowel distention, chr distal ileitis/probable inflammatory stricturing vs collapse of the alfredito-TI--but improved since 2018 , moderate proctitis\par 7/12/21   --  ? flare --> entyvio should have been  q 5 wk , went q 8 wks\par                      next dose should be in 4 weeks x 2 then 5 weeks, to check levels\par 7/20/21: Cliically stable, unclear if MRE accurate 2/2 underdistension, but gained wt and CRP--normal\par 7/23/21 Entyvio level= 39, Abs <1.6\par 8/23/21: Labs--Hgb=13.6, ESR=10, CRP=6.6, Pnfrtiel=775, Iron=26, Alb=3.9, BUN=16\par 8/26/21 p/w sbo  w N/V, abd pain/bloating  x 3 days, unable to keep things down\par Labs: WBC=5, Hgb=12, CRP=43, ESR=11, Alb=4.4, BUN=28, Creat=-1.6\par CT Abd/Pelvis: diffuse marked dilatation of SB Loops w transition pt in R. mid/lower abdomen\par ~ 5-6cm, proximal to the ileocolonic anastomosis\par RX w IV solumedrol 20mg q8h, NGT, IVF, pt refused TPN, also w UTI from prostatitis\par 8/27 NGT was pulled, and clears started and advanced to LR,  was d/c home 8/30 on prednisone\par 40mg qd w taper by 10mg/wk to 20mg qd \par \par \par (  **Entyvio's  :time 0=12/22/16 ( Humira 40mg QW); 1/5/17--2wks, s/p 3rd infusion at wk 6, then q 6weeks \par #6 :6/21/17-->held 2/2 Shingles, then  Infusions as follows=9/19/17 , 10/17/17, 11/28/17, 1/16/18 ,2/16/18, 3/19/18,4/16/18, 5/14/18, 6/25/18, 8/7/18, 9/17/18, 10/16/18, 11/13/18, 12/11/18, 1/14/19, 2/15/19, 3/15/19, 5/16/19, 6/18/19,7/24/19, 9/9/19, 10/2/19, 11/4/19, 12/12/19, 1/6/20, 2/4/20, 3/3/20, 9/17/20, 10/15/20, 11/18/20, 1/11/21, 2/8/21, 3/8/21, 4/8/21, 6/3/21, 7/1/21, 8/2/21, 9/2/21,10/25/21  )\par \par 3/8/22 MRI Abd/Pelv: thickened distal Jejunal loops which are tethered; distal ileal segment  (10-12cm ) previously actively inflammed has decreased & now characterized by an area of stricture, however the alfredito-TI  5mm to the anastamosis is enhanced as well as narrowed.  Colon just  distal to the anastamosis has a focal segment of inflammation & stricture.  the recto-sigmoid appears inflammed \par 3/28/22: Hgb=14.4 , ESR=1, CRP <5, Ca=8.5, Mag=1.7,Alb=4.1, Iron=104, Iuauewpy=104,\par  5/9//22: Hgb=13.8 , ESR=2, CRP <5, Ca=8.8, Mag=1.6, Alb=4, Iron=64, Alikmdij=212  \par 5/17/22 Dr. Heard Colonoscopy: ped scope passed w/o resist in alfredito-TI, one single apthae w psuedopolyp.\par  6/21/22 Labs: Hgb=14, ESR=2, CRP<5,  Alb=4.5, Phos=0.8, Mag=1.8, Ca=8.5,Yfemkjsy=218, Iron=86\par  7/25/22 Labs:  Hgb=14, ESR=2, CRP<5, Alb=4.2, Phos=1.5, Mag=1.8, Ca=8.6, Yauxucbi=780, Iron=57, PT=24, INR=2.1                      \par A / PLAN:  s/p sbo prox to anastamosis\par                  inflammatory vs fibrosis vs combination: 3/2022 recent MRI shows improvement of inflammation w      possible residual stricture in the ileum and probable colitis near the anastamosis and distal colon \par      Responded to  steroids iv--> po--d/c ~ 10/20/21,\par      tapered steroids from 40mg qd  to 20mg, mjlz23me qd and  taper 5mg/wk\par      then po taper again early 12/2021 40mg qd w 10mg taper/ wk--\par      PO prednisone 40mg mg qd started on 4/9/22 ,tapered off ~ early 5/2022\par \par      Entyvio level was 39 w/o Abs~ 3weeks after 7/1/21, \par      speaks to inflammatory component & probably loss of response\par      Stelara # 1 dose on 10/25/21, #2 on 12/10/21, # 3 on  2/11/22,  # 4 on 4/20/22 , #5 mid June 2022, \par                  next mid 8/2022\par \par       IBD consensus : due to malnutrition /steroid dependency, surgery is next best option \par       but pt reported feeling well and wanted to pursue medical treatment \par      repeated  imaging in March after 3 months of Stelara, then consider surgery vs improved candidacy for                endoscopic manipulation\par         f/u w  IBD consultant Dr. Heard at   & reviewed imaging after 3 months of Stelera\par        for  Colonoscopy ( w possible balloon dilatation )-> last 1 3/4 yrs ago\par    5/17/22 Dr. Heard Colonoscopy: ped scope passed w/o resist in alfredito-TI, one single apthae w psuedopolyp.\par     No Dilatation need, very mild dis at Alfredito-TI, MRE may show wall thickening, sbo's probably adhesive dis\par \par 1/4/22 Labs: Hgb=11.5, ESR=6, CRP<5, Alb=3.9\par 1/31/22 Calprotectin =84\par 2/14/22: Hgb=10.6, ESR=9, CRP <5, Ca=8.3, Mag=1.6, Alb=3.8, Iron=25, Ferritin=15\par 3/28/22: Hgb=14, ESR=1, CRP<5 , Ca=8.5, Mag=1.7, Alb=4, Iron=104, Fcsbvyzm=287\par 5/9//22: Hgb=13.8 , ESR=2, CRP <5, Ca=8.8, Mag=1.6, Alb=4, Iron=64, Rqflodjx=355  \par  6/21/22 : Hgb=14, ESR=2, CRP<5,  Ca=8.5, ,Alb=4.5,  Mag=1.8, Iron=86,Yslymzul=408,\par  7/25/22 Labs:  Hgb=14, ESR=2, CRP<5, Alb=4.2, Phos=1.5, Mag=1.8, Ca=8.6, Dkovjbef=745, Iron=57\par Probiotics 3 qd \par Diet:  LR, Lactose free, protein drinks tid\par MCT oil begun but never maintained \par **trend --cbc, esr, crp, albumin, calprotectin \par   \par **monitor wt--- usu bet 136-139, 7/20/21=136, 11/22/21=139, 1/4/22=132, 2/22/22=132, 4/5/22=131,5/10/69=264,\par                         6/21/22=133,  ctobq=685\par \par \par                                                                                                                                                                                                                                                                                                                                                                                                                                                                                                                                                                                                                                                                                                                            \par 2. Iron deficiency anemia : \par  had initially dropped , after clinical flare and post procedure bleeding\par Probably multifactorial: ACD, Blood loss, malabsorption/nutrient deficiencies\par Eliquis-->warfarin after CVA, On Entyvio \par 7/11/17 s/p  Adm for unsteady gait w MRI c/w CVA--warfarin begun: possible better control of AC\par Chromagen 2 qd--> IV Iron , s/p IV Cu x 5, FA 1mg qd , B12 SL & IM\par Still requiring IV Iron and cu infusions prn \par most recent IV Iron infusion  :  5/2022 for  Sbkszjye=515 on 5/9/22 \par 2/22/21: N72=199, \par 6/4/21: Cu=91, Zinc=69, Ferritin=32,Iron=43, \par 7/12/21: Dq=890, Zinc=74, Tjqoqxbk=655, Iron=35\par 11/15/21 : Hgb=12,  Ferritin =104, Ca=9, Mg=1.7\par 1/4/22: Hgb=11.5, Ca=8.8, Mg=2.9, Qevvcfe=231 \par 2/14/22: Hgb=10.6, Ca=8.3, Mag=1.6, Alb=3.8, Iron=25, Ferritin=15\par 3/28/22: Hgb=14.4 , Ca=8.5, Mag=1.7,Alb=4.1, Iron=104, Rtzzugng=427,\par  5/9//22: Hgb=13.8 , Ca=8.8, Mag=1.6, Alb=4, Iron=64, Owlgyryr=269  \par 6/20/22: Hgb=14.4  , Ca=8.5, Mag=1.8, Alb=4.5, Iron=96, Vgzdyeej=452\par 7/25/22 Labs:  Hgb=14,Ca=8.6,  Mag=1.8,Alb=4.2,Iron=57 Luimpvzt=185, , PT=24, INR=2.1\par B12 1cc IM ____L. delt--  given today, \par trend cbc, B12, FA, Iron panel \par Adj INR--closer to low 2's.    \par \par \par \par \par 3. Osteoporosis \par : Progression on BD from 5/5/16\par Crohns, h/o steroid use\par Calcium citrate & Vit D per Endo\par Forteo since 5/10/19 , then  Reclast          \par Repeat BD of 8/2018 --showed worsening to Osteoporosis from 2016\par Rec Endo consult w Dr. Akers :Osteoporosis center at [facility]--pt never went originally \par 9/21/18: Phos=2.4, Ca=8.7, Alb=3.4, Vit D=27, FMR=123, \par 10/16/18: Phos=2.8, Ca=8.7, Alb=3.5,\par 1/14/19: Phos=2.6,  Ca=8.7, Alb=3.6\par 2/28/19: Phos=1.8, Ca=8.9, Alb=3.7, VitD=39, LXQ=965\par 3/18/19: Ca=8.5, Alb=3.7, Vit D=44.6, PTH=93\par 7/11/19: Ca=9.1, Alb=3.7, Phos=3.9, Vit D=40/ 306, CFL=449\par 8/15/19: Ca=9, Alb=4.2, Phos=4.4, VitD=59, MNR=353\par 10/17/19: Ca=9.6, Alb=3.9, Phos=3.5\par 11/6/19: Ca=9.1, Alb=3.9. Phos=3.7, VitD=50, PTH=80\par 1/13/20: ca=9.1\par 1/30/20: Ca=9.2, Alb=3.9, Phos=2.7, Mag=1.5, Vit D=103/41, PTH=87\par 2/18/20:ca=8.5, Alb=3.6, \par 3/2/20: Ca=8.5, Alb=3.6\par 3/17/20: Ca=9.1, Alb=3.7, Phos=3.4, Mag=1.7\par 10/17/20: Ca=8.9, Alb=4, Phos=2.3, Mag-2.0, Vit D=66, PTH=47\par 1/4/21 : Ca=9.4, Alb=4.2, Phos=2.7, Mag=2, Vit D=64, PTH=87.5\par 4/5/21: Ca=9.1, Alb=4, Phos=3.1, Mag=1.7, \par 6/4/21: ca=9, Alb=3.8, Phos=2.8, Mag=1.8\par 7/12/21: Ca=8.6, ALB=6, phosph=2.3, Mag=1.8\par 11/15/21: Ca=9, Alb=3.7, Mag=1.7\par 1/4/22: ca=8.8, Alb=3.9, Mg=2.9, phos=2.9\par 1/21/22: Ca=8.8, Alb=3.9, Vit D=60, PTH=85\par 2/14/22:  Ca=8.3, Mag=1.6, Alb=3.8, \par 3/28/22: Ca=8.5, Mag=1.7, Alb=4.1, Phos=1.2\par  5/9//22:  Ca=8.8, Mag=1.6, Alb=4, Phos=1.6 \par   5/27/22: Ca=9.2 , Mag=1.9, Alb=3.8 , Phos=2.7, PTH=72, VitD=105\par   6/20/ 22: Ca=8.5  , Mag=1.8, Alb=4.5 , Phos=0.8, KJU=536, \par   7/25/22 Labs: Ca=8.6, Mag=1.8 , Alb=4.2, Phos=1.5, \par \par Dr. Akers: Hyperparathyroidism-- probably secondary due to low Vit D/Ca & ? superimposed primary, \par [facility] ENT Consult init felt  Parathyroid scan showing activity in mediastinum is ectopic parathyroid, then felt sx not indicated at that time, elev PTH is secondary to low  Vit D/Ca\par Rx replete Vit D and current IV Calcium-as per endo \par \par trend Vit D, Ca, Phos, PTH,  \par \par BD--9/2020: incr in spine and hip , no change in wrist, repeat 9/2022\par 10/5/20, 9/2021-s/p Reclast--repeated 6/17/ 2022\par \par \par \par \par \par 4. GERD:  recently less active by ENT , no ht burn,  dysphagia,  + throat clear\par               h/o  Dry cough, CXR:ana, saw ENT bergstein-->LPR\par * ++ LPR,  ++  Page’s w No Dysplasia,  + H/O  Esophagitis grade: A   was found \par \par Recommend: \par * Anti-reflux diet & life-style changes reviewed & re-emphasized.  ++  Bedge required\par * Weight reduction & regular exercise emphasized\par \par * need for  PPI:  Omep 40 BID ,  ++ H2B q HS:Zantac 300mg--> Pepcid 40mg\par No Carafate  1 gram:   --was emphasized\par I reviewed & summarized the prospective randomized & retrospective non-randomized studies\par looking at potential long term SE's of PPIs, w special attention to associations & actual cause\par as related to GI infections, bone loss, cognitive changes, KD, Covid, vitamin & electrolyte deficiencies\par questions were answered, pt advised that PPIs should be used when needed as indicated by their\par clinical indication and response and tapering off is always the goal if possible. pt understood.\par \par *  F/U  EGD: --> 9/2022--for Barretts screening / surveillance \par * No  need for pH Monitor,   Manometry,   Esophagram --\par *  ++  need for ENT  eval/F/U,  No  need for Surgical  eval  --  \par \par \par \par \par \par 5. Hemorrhoids:  well - controlled.  No pain,  swelling,  itch,  bleeding\par * Discussed previously the potential complications of thrombosis,  pain,  infection,  swelling, itching,  bleeding \par Reccomend: \par * currently Low   - Fiber Diet was emphasized\par * 6  --  8 cups of decaffeinated fluid daily was emphasized \par * Sitz Bathes prn,   No:  Anusol HC  Suppos / Cream  ID BID -- was needed\par * No:  Tucks BID,  Balneol Lotion,   Calmoseptine Oint -- was needed ;    ++ Prep H prn\par * No:  need for  Colorectal surgical evaluation for possible ablation\par \par \par \par \par \par \par \par 6. Barretts esophagus without dysplasia  \par Notes: see GERD.    \par \par \par \par 7. Hepatomegaly-->not confirmed by recent abd sono\par CTE w relative enlargemt, ? lobulation & enlarged portal/mesenteric veins\par LFTs-wnl, no ascites or edema\par R/O CLD, Hepatic vein thrombosis\par Abd sono w doppler--> Liver and spleen normal size, no thrombosis, normal portal and hepatic vein flow\par \par \par \par \par Informed Consent:\par * The risks & Benefits of EGD were discussed w patient.\par * This included but was not limited to perforation, bleeding, sedation /med rxns possibly requiring surgery, blood transfusions, antibiotics & CPR/Intubation.\par * Pt. understands & agrees to the procedures.\par The following instructions in regards to the prep and medically essential ( cardiac, pulmonary, sz, psych, endocrine)  pre-op medication administration\par was reviewed and emphasized with the patient . \par * Pt. advised to D/C  ASA/NSAIDs  7  Days   PTP.\par * [ +++ ]  Dulcolax / Miralax / Mag. Citrate ,  [     ] Prepopik/ Clenpiq ,  [     ] Osmo Prep,  [    ] GoLytely,  prep. reviewed w Pt.\par * Hold  [           ] AM of procedure.\par * Hold  [           ] PM  before procedure.\par * Take  [           ] PM  before procedure.\par * Take  [           ] AM of procedure.\par \par \par

## 2022-12-07 NOTE — REVIEW OF SYSTEMS
[As Noted in HPI] : as noted in HPI [Confused] : no confusion [Easy Bruising] : no tendency for easy bruising [Negative] : Respiratory

## 2022-12-07 NOTE — PHYSICAL EXAM
[General Appearance - Alert] : alert [General Appearance - In No Acute Distress] : in no acute distress [Sclera] : the sclera and conjunctiva were normal [] : no respiratory distress [Auscultation Breath Sounds / Voice Sounds] : lungs were clear to auscultation bilaterally [Heart Rate And Rhythm] : heart rate was normal and rhythm regular [Heart Sounds] : normal S1 and S2 [Heart Sounds Gallop] : no gallops [Murmurs] : no murmurs [Heart Sounds Pericardial Friction Rub] : no pericardial rub [Edema] : there was no peripheral edema [Skin Color & Pigmentation] : normal skin color and pigmentation [Oriented To Time, Place, And Person] : oriented to person, place, and time [Flat] : flat [Normal] : normal [Soft, Nontender] : the abdomen was soft and nontender [No Mass] : no masses were palpated [No HSM] : no hepatosplenomegaly noted

## 2022-12-07 NOTE — HISTORY OF PRESENT ILLNESS
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 Same was discussed with the patient, he reported feeling great now and wanted to pursue medical treatment  - Started on UST, with the plan to follow up MRE. 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 \par \par For follow up today \par S/p UST - infusion 10/2021, followed by 12/2021, 2/2022, 4/2022\par \par MRI done 03/2022 - s/p 2 maintenance doses \par Active inflammation of distal jejunal loops with evidence of chronic scar/tethering in the left lower quadrant. \par \par Previous 10-12 cm inflammation of ileum upstream from the anastomosis with colon has decreased with a focal area of probable scarring followed by segment of ileum with decreased fold pattern.  The portion at the anastomosis however is actively inflamed (vladislav terminal ileum) \par \par large bowel segment distal to the anastomosis normal followed by focal colonic narrowing and active inflammation throughout the rectum, anal canal appears normal. the active inflammation upstream from the rectum appears increase since previous exam \par \par Currently he reports feeling better since starting UST \par Had a recent flare over the weekend - vomiting, distention. Started himself on Prednisone. Reports that he is feeling a little better but not back to his baseline,. Of note this flare was 1-2 days prior to his UST dose. His weight has been fluctuating, usually goes down with a flare. \par He is concerned about his steroids use as he has osteoporosis. \par \par Last visit \par Since last visit, pt reports he's actually felt much better. Best he's "ever felt since surgery." Reports weight above 140 lbs, on low fiber diet, and avoiding caffeine. Reports symptoms have been improving since hospitalization in August and steroids. Only recently received UST dose 1 week ago, and is looking forward to his next injection. \par \par HPI: Had flares in March and June of this year, managed at home, and then in August for which he was hospitalized. Typical flare symptoms are bloating, and diarrhea. The last episode he had vomiting and intolerance to PO intake. Noted to have partial SBO, managed conservatively with NG tube, IV fluids, Solu-Medrol with improvement, discharged on Prednisone 40 mg taper. He's been off Prednisone for at least 2 weeks. Reports that he is back to his base line now, feeling overall great with weight gain, 1-2 BMs per day, and no pain. \par \par Last Colon 2/2022 - Reports it was better than previous ones. 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 \par        GERD w Page's, Hemorrhoids, BPH, \par        S/P Ileocolonic resection 1998\par        Since then multiple SBOs\par \par \par        Got IV Iron x 2, since 10/2/17\par        10/19/17: feeling better, Vt=347 on prednisone 15mg x 3weeks, BMs Brown: #5-6, 1-2/D, occas 3-4/d\par        10/25/17: Hgb=10, ESR=5, CRP=5, Alb=3.6, Phos=2, Jszhzypz=630, K11=442\par        11/16/17: Hgb=11.2, ESR=14, CRP=12, \par        11/13/17: MRE-Chr mild distension of distal & vladislav-ileum s/o mild stricturing at anast, mild mural thick & mucosal enhancemt ~ 20cm; little change from 2015 c/w mild acute on chr ileitis, 2.1 cm Liver hemangioma\par        11/28/17: Entyvio\par        11/29/17: Hgb=9.6, ESR=10, CRP=5.8, Alb=3.8,Ferritin-19, Iron=14,Sat=4%, Phos=2.5, Nf=835, BMs:# 5, 1-2x/D, brown,\par        12/19;17: Hgb=10.1, ESR=12, CRP=6.5; 12/21/17: BMs:#3-5, 1-3x/D , brown, no blood, no pain, sf=194\par        1/3/18:Hgb=11.7, ESR=19, CRP=6.5, Alb=4.1, Ucsfcdbd=593, Iron=31, Sat%=9,Zinc=55\par        1/16/18: Entyvio, Hgb=11.4, ESR=10, CRP=6.9\par        1/18/18: Today: cellulitis R foot, saw dr KEITH--rx augmentum x 10D, Entyvio 1/9 to 1/16, wj=286 w clothes,BMs: # 4-5, 1-2x/D \par        2/14/18: Hgb=11.1, ESR=16, CRP=11.6, Alb=3.6, Iron=28, Ferritin=23, INR=1.5 \par        2/16/18: s/p Entyvio; Iron infusion, again on 2/27\par        2/20/18: Hgb=10.6, ESR=20, CRP=12, INR=1.7\par        2/27/18: s/p iron infusion\par        3/9/18: Dr. Burden for tenosynovitis, rx w Zorvolex: Diclofenac x 5 days--? response\par        3/14/18: Xc=168; BMs: # 5, 1-2x/D; Hgb=11, ESR=18, CRP=11,Irom=45,Rczwldve=146,Alb=3.6, INR=1.5\par        3/19/18: s/p Entyvio\par        3/22/18: qd=820, BMs: # 5, 3-4 x/d\par        4/16/18: s/p Entyvio,Hgb=11.9, INR=1.3, ESR=18, CRP=8.4 \par        4/18/18 EGD: gastritis, no HP, no IM, 2+ Mucous, 0.5cm gastric polyp: fundic, 4cm HH, + Barretts:3cm, no dysplasia\par        4/25/18: Hgb=11.5, INR=1.6, Iron=40, Sat=13%, Ferritin=57, Alb=3.2, Phos=2.2, Mag=1.7\par        4/30/18: s/p Iron Infusion\par        5/14/18: s/p Entyvio \par        5/23/18: Hgb=11.5, ESR=16, CRP< 5\par        5/29/18: s/p Iron Infusion\par        6/6/18: Hgb=10.6, INR=1.7, Hvlyqhfb=925, Alb=3.5, Phos=2.1, V26=279, vk=079; BMs: # 5, 2-3x/D \par        6/19/18: Hgb=10.6, INR=1, CRP=15, ESR=23\par        6/21/18: R ankle is acting up, swollen, pain, having MRI done, took a couple of advil, on low carbs ,BMs: # 5, 1-2x/D, ax=926\par        6/26/18: MRI: fx/stress rxn-talar body/navicula; stress related changes--cuneform, cuboid,distal tibia; mod tibiotalar effusion, mild-mod peroneal tendinosis\par        7/19/18: entyvio 6/26/18, eliminated all carbs--anti inflammatory diet, za=555, BMs: # 4-5, 1-3x/D \par        7/25/18: Hgb=10, INR=1.3, Alb=3.3, Phos=2.4, Agjymqaw=217, sat%=12, Iron=35\par        8/2/18: BD--osteoporosis of hip/spine: sig decrease BD--17.6% of hip, 17% of spine, osteopenia of wrist: 6.4%\par        8/7/18: Entyvio\par        8/21/18: Hgb=11.1, INR=1.5, ESR=19, CRP=18.6\par        8/23/18: recently w tooth infection, old implant--loose and removed; rx w amox, and advil\par        eating almost no carbs, jc=705, # 5-6, 2-3x/d \par        8/24/18: Stool fat--neg, Mtuhrhuqfosx=930\par        9/5/18: Hgb=11.4, INR=1.3, ESR=9, CRP=11.3, Iron=41, Betvlhrb=231, Alb=3.8,\par        9/17/18: entyvio, Hgb=10.7, INR=2.2, ESR=17, CRP=9.1, \par        9/20/18: ff=162, BMs: # 5-6, 1-2x/D \par        9/21/18: Phos=2.4, Ca=8.7, Alb=3.4, Vit D=27, PMB=302, \par        Dr. Akers: Hyperparathyroidism: probably secondary due to low Vit D/Ca & ? superimposed primary, rx replete Vit D and Calcium\par        10/9/18: Hgb=11.6, MCV=94, ESR=14, CRP=5.4, INR=2.2\par        10/10/18 MRE: stricturing of neoterminal ileum w worsened upstream dilatation, moderate length of upstream ileum w stricturing extending at least 20cm and more extensive then previous. suggestion of 2 adj extraluminal fluid collections which may be w/i the wall of strictured ileum near the ileocolonic anastomosis. surrounding spiculation and tethered appearance of bowel in this region.\par 10/16/18: entyvio, Hgb=11.7, MCV=94, ESR=14, CRP <5, Alb=3.5\par 10/18/18: Iu=146,   BMs: # 5-6, 3x/D , \par 11/13/18: Entyvio\par 11/21/18 CTE w C: limited 2/2 bowel underdistention, mucosal hyperenhancemt & extensive SM edema of distal ileum including vladislav-ileum, difficult to exclude a sml fluid collection, Liver w relative enlargement w suggestion of lobulation, enlargemt of PV,SMV,IMV,Spl V, rectal V. No ascites\par 11/28/18: Hgb=10, ESR=19, CRP=17,Alb=3.5,Iron=35, Sat%=11, Htglqazm=262, INR=1.3\par 11/29/18: gy=861, BMs: # 5-6, 3-4x/D\par 12/3/18: Abd sono--Liver 14.8cm Incr heterogenicity, spleen--wnl\par 12/11/18 & 1/14/19: Entyvio\par 1/14/19:Entyvio,  Hgb=10.7, ESR=15, Alb=3.6, Iron=36, Ferritin=67, Sat%=11, INR=1.6\par 1/24/19:  qj=692, stools are # 4-6, 3-4x/d , no pain\par 2/5/19: Hgb=11.2, ESR=14, CRP=0.36\par 2/28/19: Hgb=11.5, ESR=12, CRP=6.5, Alb=3.7, Iron=69, Tdphgwzi=947, Ca=8.9\par                Bms: # 4-5, 2-3x/D , ge=090,  Entyvio : last 2/1519,\par                had parathyroid scan pre-op, still w elev PTH, + activity in mediastinum,? ectopic parathyroid tissue vs neoplasm.  To see ENT and have [facility] at [facility]\par 3/28/19: Bms: # 4-5 , 3x/d ;rc=060\par 4/11/19: Hgb-9.7, Iron=45, ESR=18, CRP=9.4, Alb=3.5, \par Saw Endo SX at [facility], felt hyperparathyroid is secondary to Low Ca/Vit D, no sx at this time\par 4/16/19: BMs: # 5-6, 3-4x/D, zh=046,  Entyvio : last 3/1519,  got IV iron 4/12/19 for Ferritin=53\par 4/27/19: s/p R Hip Nailing--missed 4/2019 2/2 fresh wound\par 5/16/19 & 6/18/19 Entyvio\par 7/2/19: Hgb=11.7, Ferritin=41,ESR=12, CRP=5.5, B6=2.8,Mag=1.8,Phos=3.9,Xc=735,Zinc=61\par 7/11/19: s/p IV iron\par 7/11/19: Alb=3.7, VES=531, Ca=9.1, Vit D=40/306, \par 7/24/19: Entyvio, Alb=3.8, TDD=578, Ca=8.9, Vit D=128 \par 8/1/19: Bms: # 5-6, occas #4, 2-3x/D , tk=724\par 8/15/19: Hgb=12, ESR=10, CRP=5.2, Ferritin=68, Alb=3.4, Phos=4.4,Mg=1.7, Ca=8.5,Calprotectin=88,\par 8/20/19: UCJ=334, Ca=9, Alb=4.2\par 9/19/19: Hgb=12.8, ESR=9, CRP=5.5, Alb=3.7, Hgxhzxnf=197, Mag=1.8, Ca=8.9, Phos=4.2\par 9/26/19: kh=632, BMs: #5-6, 2-3x/D, no pain\par 10/17/19: hp=894, BMs: #4-6, 1-2x/D, no Pain; Hgb=14, ESR=7, CRP<5, Alb=3.9, Plmnxtgc=456, Mag=1.7, Ca=9.6\par 10/24/19: rt=940, Bms: # 4-6 , no pain,\par 11/6/19: ESR=6, CRP=6, PTH=80,Ca=9.1, VitD=50\par 11/14/19: tq=320, BMs: # : 4, 1-2, 0ccas #5\par  11/17/19: ESR=6, CRP<5, Yaqrpbvu=314, \par  12/19/19: fx=253, BMs: #5-6, 2-3x/D\par 1/6/20: entyvio\par 1/13/20: ESR=7, CRP=0.2, Hgb=13.5, Kcvswtcn=755, B45=159, FA=10, Alb=4.1, Ca=9.1\par 1/16/20: wt = 127, BMs: # 5, 1-3x/d\par 1/30/20: ESR=9, CRP=11, Hgb=13.9, Cnenrzvm=802,Iron=38, Alb=3.9,Ca=9.2, PTH=87,\par 2/3/20 EGD: gastritis, +MACKENZIE, No HP, +erosion w ooze -clipped, 0.5cm polyp-neg; 4cm HH, Esophagitis A, + Barretts, VC: 1+\par Colonoscopy: 05cm rectal polyp: HP, 2nd deg int hemorrhoids\par mild-mod activity: MARILY w cryptitis & mild archect distortion at ileocolonic anast: colon & ileal side, no stricture\par 2/4/20: Entyvio; 2/12/20: IV Iron, 3/3/20: Entyvio\par 2/25/20: eg=499,  BMs: # 4-5, 1-2x/ d\par 3/19/20: rb=564, BMs: #4-5, 1-2x/D\par 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\par 10/17/20 : Hgb=13, CRP< 5, ESR= 11, Iron=44, Ferritin=46, Alb=4, Phos=2.3, \par 11/5/20: yi=099; s/p IV Iron x 2 ,  11/4 and 1 week prior;   BMs:  # 4-5, 1-2x/D , no pain\par 11/18/20  Entyvio + IV Ca\par  1/4/21: Hgb=13.6, CRP<5, ESR=9, Ferritin=60, Alb=4.2, Phos=2.7\par 1/11/21: Entyvio & IV Ca\par 1/14/21: no pain, stool more formed ,  pv=170 - 137; BMs:  # 4-5, 1-2x/D \par 2/8/21: Entyvio & IV Ca\par 2/23/21 IV Ca\par 2/22/21: Hgb=12.7, CRP<5, ESR=8, Cczgvcieh=924, Phos=2.3, Mag=1.8, S62=520, Zinc=58, Ea=142\par 2/26/21: ot=672,  BMs:  # 4-5, 1-2x/D , no pain \par 3/8/21 & 4/8/21: Entyvio\par 3/9/21 & 3/24/21: IV Iron\par 4/5/21: Hgb=13, CRP<5, ESR=9, Ferritin=94, Phos=3.1, Mag=1.7,Ca=9.1/ Alb=4\par             \par Pt Ins co is refusing to cover Entyvio q 4wks, despite numerous attempts to appeal, they also refuse to set up a peer to peer discussion betw myself and another healthcare provider, even one that works for a third party.  They do acknowledge that there is literature supporting the successful use in individual cases who don’t respond to the q 8 wk interval and need\par less then q 8weeks , but state it had not in FDA approved at less then 8wks so they will not approve it.  I spoke to the ins co rep and discussed that fact that patients should not be  pigeon holed since practicing medicine is done on an individual basis.  Humans are not all the same.   Their  response didn’t change eventhough the pt clinically needed the medication and it  induced a beneficial clinical response towards remission.  Therefore the patient and I decided to slowly increase the interval since the insurance company will not pay for this benefit.  Hopefully he may be able to maintain his present clinical state.  If not we will return to q 4weeks and will again appeal using this patients real clinical data .\par \par 4/9/21:  is=347,  no pain, stool more formed # 4, 1-2x/d \par 6/11/21: wt= 135,   no pain, stool more formed # 4, 1-2x/d \par              recently had an episode of abd distenstion, pain, N/V, no BM\par              eventually started having diarrhea and flatus, BMs returned to normal\par              lost 2-3 lbs but regained\par  Doesnt recall eating anything different, no fever, chills, brbpr, melena\par  entyvio was 6/3/21--which is almost 8 weeks, was supposed to be 5-6 weeks  to start\par               6/4/21 Hgb=12, CRP<5, Ferritin=32, pt to receive IV iron    \par  7/12/21 Labs: Hgb=13.6, ESR=10, CRP=<5, Qyjwxxz=989, Alb=3.7, BUN=16\par \par  7/16/21 MRE: limited to incomplete bowel distention, chr distal ileitis/probable inflammatory stricturing vs collapse of the vladislav-TI--but improved since 2018 , moderate proctitis\par 7/20/21:  Last entyvio was --7/1, next early august\par                 ia=503 ,  no pain, stool more formed # 4-5/6, 1-2x/d \par 7/23/21 Entyvio level= 39, Abs <1.6\par 8/23/21: Labs--Hgb=13.6, ESR=10, CRP=6.6, Vcyqlkdf=152, Iron=26, Alb=3.9, BUN=16\par 8/26/21 p/w w N/V, abd pain/bloating  x 3 days, unable to keep things down\par Labs: WBC=5, Hgb=12, CRP=43, ESR=11, Alb=4.4, BUN=28, Creat=-1.6\par CT Abd/Pelvis: diffuse marked dilatation of SB Loops w transition pt in Rmid/lower abdomen\par ~ 5-6c, proximal to the ileocolonic anastomosis\par RX w IV solumedrol 20mg q8h, NGT, IVF, pt refused TPN, also w UTI from prostatitis\par 8/27 NGT was pulled, and clears started and advanced to LR,  was d/c home 8/30 on prednisone\par 40mg qd w taper by 10mg/wk--> to 20mg\par 10/15 Entyvio\par 10/25 Stelera (Ustekinumab)  # 1\par 11/3/21 [facility]: wt above 140, off pred x 2 weeks,  1-2 BMs qd\par  Discussed at IBD conference, reviewed previous colonoscopy, and recent imaging; consensus that due to malnutrition and steroid dependency, surgery is next best option however pt reports feeling great and wants to pursue medical treatment which is reasonable given he feels well \par repeat imaging in mid-January after 3 months of Stelara, and then consider referral to surgery vs improved candidacy for endoscopic manipulation (currently presumed one long stricture). \par \par 11/15/21 : mq=990, Hgb=12, ESR=3, CRP <5, Ferritin =104, Ca=9, Mg=1.7, Alb=3.7 \par 12/10/21 : iron infusion\par 1/4/22: Hgb=11.5, ESR=6, CRP <5, Ca=8.8, Mag=2.9, Alb=3.9\par 1/10/22 : qu=813, stools # 5, 1-2x/D \par 1/10/22 Today:  Feeling well, no c/o , CP, SOB/ AMARO, Cough, Wheeze, Palpitations, edema\par              Most recent labs  reviewed w patient:1/4/22\par 1/31/22: calprotectin=84\par 2/2/22: tq=463\par 2/14/22: Hgb=10.6, ESR=9, CRP <5, Ca=8.3, Mag=1.6, Alb=3.8, Iron=25, Ferritin=15\par 3/3/22:  iv Iron infusion x 1\par 3/8/22 MRI Abd/Pelv: thickened distal Jejunal loops which are tethered; distal ileal segment  (10-12cm ) previously actively inflammed has decreased & now characterized by an area of stricture, however the vladislav-TI  5mm to the anastamosis is enhanced as well as narrowed.  colon just  distal to the anastamosis has a focal segment of inflammation & stricture.  the recto-sigmoid appears inflammed \par 3/28/22: Hgb=14.4 , ESR=1, CRP <5, Ca=8.5, Mag=1.7,Alb=4.1, Iron=104, Aknzztdn=857,\par 4/5/22: hw=708, Bms: # 5-6 , 1-2 x/day \par \par \par 5/10/22  :  Last entyvios were -->7/1, 8/5, 9/2, 10/15/21\par              Stelara # 1 IV--10/25/21, second dose SC~ 12/10/21, 3rd~ 2/14/22,  4th-- mid April , 5th--mid june \par \par              Early December 2021  had a " flare" loose BMs, N/V and bloat\par              Took Pred 40mg qd and tapered down 10mg / week , now off pred x 7-8 weeks\par \par 4/13/22 Dr. Heard:  pt states he had a flare the weekend of 4/9/22 w vomiting/distension\par                pt self-started prednisone 40mg , this was just before his april stelara dose\par                claims he was feeling better\par               Dr. Heard: sched colonoscopy w ? dilatation \par \par 5/10/22: tapered of pred 40mg , 10mg/wk over 1 month, now off 2weeks \par         no pain, n/v,  BMs: # 4-5, 1-2 x Day , wt=-132\par 5/17/22 Dr. [facility]ard [facility]: ped scope passed w/o resist in vladislav-TI, one single apthae w psuedopolyp.  Flares probably 2/2 to adhesive dis, imaging may consistently show wall thickening  5/27/22 Labs:  Alb=3.8. Phos=2.7, Mag=1.9, Ca=9.2, PTH=72, Vit D=105, ALP=76\par                + IV Iron\par  6/20/22 Labs:  Alb=4.5, Phos=0.8, Mag=1.8, Ca=8.5, VDZ=216, ALP=83\par                          Hgb=14, ESR=2, CRP<5, Jqadwimx=349, Iron=86\par 6/21/22:  no pain, n/v,  BMs: # 4-5, 1-2 x Day ,\par                we=715\par \par \par \par 7/26/22: \par        Abd pain--no \par        Nausea--no \par        Vomit--no \par        Early satiety-no \par        Belching-no \par        Regurgitation--no \par        Ht burn--no \par        Throat Clearing-no\par        Globus-no\par        Hoarseness--no \par        Dysphagia--no \par        Constipation--no \par        Diarrhea- intermittent:  no\par        Bloating--no \par        Flatulence-no\par        Gurgling--no \par        Melena--no \par        BRBPR--no \par        Anorexia-no \par        Wt Loss--> steady \par \par \par \par        PRIOR HISTORY--2017\par \par        1/17/17: Hgb=9.2, ESR=6, CRP=5; 1/19/17: BMs: #4, 5-6, 2-3x/D, Ol=853, Started Creon 1 tid cc\par        3rd Entyvio begining Feb\par        2/14/17: Labs; Hgb=9.6, MCV=97, ESR=5, CRP< 5, J38=435, FA>23, Iron=59, Retic =3.2,\par        2/17/17 Fecal Calprotectin=16, Fats: Increased neutral & Split\par        3/13/17: Labs Hgb=9.5, MCV=95, ESR=7, CRP <5, ALB=3.1\par        3/16/17: lot of stress, to move -[place], had a job-fell thru, BMs: # 5, 2-4 x/D, wt= 131w clothes\par        4/19/17 EGD: gastritis, MACKENZIE, No HP, 2cm HH, +Barretts, No Dysplasia\par        Colonoscopy: Anastamosis: open w mild -mod active colitis, enteritis severe adj to anastomosis, mild more proximal, remainder of colon-wnl, 2-3 deg int hemorrhoids\par        4/26/17 : Hgb=7.3, MCV=95, ESR=13, CRP<5;Immediately post procedure stools very dark on eliquis/iron.\par        Admitted to [facility]: Hgb=8.3-->8.9 after 2 U, Alb 3.3, BUN=17, creat=1.3, Malina/Wfbpz=544/460\par        4/27/17: Alb=2.3, BUN=12, creat=1.2, Malina/Lipase=209/863-No abd pain, N/V; 5/5/17: Hgb=10.7.\par        5/8/17 Entyvio iv. 5/8-5/9 w dark red diarrhea; 5/10/17 Hgb=7.8.\par        5/11/17 Adm [facility] w stools brown, Hgb=7.9, BUN=17, Creat=1.4, Alb=2.9; S/P 2 Units- Hgb=10.6, BUN=15/1.1.\par        Prednisone 40mg qd, entocort d/dee.; 5/18/17: Hgb-10.4, xk=171, BMs: #5, 1-2x/D, no pain\par        6/8/17: Hgb=7.4,MCV=98, CRP<5-ASA stopped, Pred20--> 30\par        6/10/17: Hgb=8.2, Alb=2.9, BUN=20/1.2 ; 6/12/17: Hgb=8.3, Retic=0.19, Iron=10, Sat%=3, Ferritin=57, FA=23,T39=824, 6/13/17: s/p 2 Unit PRBCs\par        6/15/17: started MCT oil, Ac=494 , BMs: # 5, 1-2x/D, stools are brownish/green \par        7/11/17: [facility] w unsteadiness. MRI Head: R Cortical Temporo-occipital encephalomalacia, MRA H&N-no sign lesions, PER-wnl.Hgb=9.9--> 8.4->9.7 after 1 Unit. Seen by Heme: received IV Iron \par        CRP<5, N25=528, IRM=198, FA>23,Iron=22,Sat%=6, Ferritin=42, Alb=3.5. D/C on warfarin 2mg\par        7/20 Hgb=8.5, 7/28 Hgb=7.7, 7/31-s/p 1 Unit \par        As outpt seeing Heme, to get IV Iron and 5 IV Copper\par        Had 'FLU' Fever=101, chills, bloat, N/V/D, Bilious. no pain, melena,brbpr\par        Given anti-emetic by dr Butler,then able to keep things down\par        On prednisone 25, warfarin w INR bet 1.6-2\par        8/17/17: Hgb=9.9, ESR=20, CRP-P,Wh=911, BMs: # 5, 1-2x/D, stools are brownish/green\par        10/2/17: Hgb=8.8, MCV=89,Phos=1.7, K=3.9, Mag=1.9, Alb=3.6,Iron<10,Ferritin=21, INR=2\par        10/17/17: Hgb=7.9,ESR=6,CRP<5, INR=1.6\par        10/25/17: Hgb=10, ESR=5, CRP=5, Alb=3.6, Phos=2, Thpjqvfp=671, Z06=843\par        11/16/17: Hgb=11.2, ESR=14, CRP=12, \par        11/13/17: MRE-Chr mild distension of distal & vladislav-ileum s/o mild stricturing at anast, mild mural thick & mucosal enhancemt ~ 20cm; little change from 2015 c/w mild acute on chr ileitis, 2.1 cm Liver hemangioma\par        11/28/17: Entyvio\par        11/29/17: Hgb=9.6, ESR=10, CRP=5.8, Alb=3.8,Ferritin-P, Iron=14,Sat=4%, Phos=2.5\par        12/19;17: Hgb=10.1, ESR=12, CRP=6.5; 12/21/17: BMs:#3-5, 1-3x/D , brown, no blood, no pain, kn=523\par        1/3/18:Hgb=11.7, ESR=19, CRP=6.5, Alb=4.1, Bmcakzos=048, Iron=31, Sat%=9,Zinc=55\par \par \par        PRIOR HISTORY---2013:\par \par        2/20/13 CT markedly dilated sb loops ext to anast, colonoscopy w open anast ,mild-mod remy-anastamotic disease. quick response to entocort/humira--probably adhesive dis. \par        8/10/13 w GNR bacteremia, ADA 1.7 /KAYLAH 3.4, Humira 8/7, 8/13,8/18,9/15\par        CT- mucosal thickening and spiculation of the distal sb extending to the anastamosis, thickening and stranding of adj mesenteric fat.\par        Humira increased to 40mg weekly, entocort 4\par        9/2013 MRE--dilated loops in mid and distal ileum, markedly thickened and narrowed TI w decreased peristalsis of TI\par        11/15/13 ADA-24.9, KAYLAH-0\par \par \par        PRIOR HISTORY---2014:\par \par        2/15/14--CT dilated loops SB, loop of sb in mid abd 4.3cm w infiltrative changes in the mesentery, bowel tapers in the RLQ to the anastamosis w/o transition\par        WBC=15K, Rx w IV steroids and Abx \par        3/7/14 SBFT: last 10cm sb prox to anast mild distension and sl irregularity. In the mid portion of this loop there is a mild narrowing which appears to reopen but is some what narrowed.\par        3/20/14 ADA=33.1, KAYLAH=0, Lialda switch to Apriso 4 (2/2014)\par \par \par        PRIOR HISTORY--2015\par \par        1/11/15 Adm [facility] w 1 day N,Recurrent V, Abd pain/distension. CT-multiple distended & fluid-filled sb loops, mild wall thickening of ileum w No inflamm changes.\par        WBC=14.6, Hgb=17, RX w NGT suction, Levaquin iv, Solumedrol 40mg q 12( 1/12-->1/16) to prednisone 30mg BID w 5mg taper/wk\par        3/18/15 --Dr. Butler w edema /High BP, prednisone was tapered slowly to 2.5mg \par        switched to entocort 9mg qd, edema and bp improved w lasix 20mg \par        BMs:#4-5, 3x/D, Hgb=11, ESR=4, CRP<5\par        4/28/15 - 5/1/15: Adm [facility] w 1 day Abd pain, distension,N/V. 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 side of anast, opening appeared ulcerated,scarred & severely narrowed\par        6/11/15: [facility] w N/V x1, decr appetite, CT ABD: no obstruction, dilated thickened sb loops of distal jej and ileum\par        6/19/15 [facility]: s/p Lap w extensive lysis of adhesions, hepatic flex, sigmoid and sb anastamosis, s/p partial r colectomy and sb resection--side to side elisabeth: 8-10 inch from prior anast to TV colon.\par        7/17/15: BMs:#4-6, 4-5x/D, wt 131(from 126)\par        8/20/15: BMs: #4, 1-2x/D or #5, 2-3x/D, ek=798, dry cough,Hgb=10, WBC=3, VEA=673, CRP=56, ESR=21\par        8/25/15 CT Abd/Pelv: Svl RLQ sb loops w wall thickening, mild nodularity & inflamm stranding in mesentery\par        Advised-restart Entocort 9mg qd, Apriso 4 qd, Maintain Humira but given RX to check drug/Ab levels\par        9/15/15: did nt restart meds,Hgb=9.9, WBC=4, ESR=19, CRP=5.8, tt=569; [facility] : ADA=8.5, Antibodies< 1.7\par        10/15/15: No pain, BMs:#4, 1-2x/D, #5-6, 3-4x/D, throat clearing/cough-better, kh=571\par        11/30/15: No pain, BMs:# 4, 5-6 intermittently, No throat clear, iw=305\par \par \par        PRIOR HISTORY-2016\par \par        1/22/16; 4/8/16; 6/6/16 : No pain, BMs:# 4-5 1-2x/D, also #5-6 3x/D for 2-3D/wk, lx=139\par        7/14/16: xo=520, 9/22/16: cz=929.5\par        11/10/16: 9.5lb wt loss, states BMs: 5-6, 5x/D--Taking Magnesium, No pain/anorexia\par        Labs: Stool Dlevqadkjxge=068, + Incr Fecal fat, Alb=3.4, FA =23, R78=569, Hgb=12, ESR=10, CRP <5\par        Magnesium-d/c, Obtain MRE asap--Start steroids and possibly switch to Entyvio\par        DDx discussed: active crohns--loss response to Humira, Malabsorption--loss Bile acids, Bile-induced diarrhea, SIBO\par        Pt wanted to wait for imaging-did not start rx\par        12/1//16 MRE: RUQ ileocolonic anastamosis--narrowed w upstream dilatation to 3.2 cm\par        Pt refused pred, Started Entocort 9mg qd and Flagyl 250mg qid about 7-10days ago \par        awaiting Entyvio load to start 12/22, then 1/5; had Cut back on iron bid\par        12/15/16: BMs:# 5, 2-3x/D, occas #6, No pain, Less bloat/flatulence, Iw=799.

## 2022-12-11 ENCOUNTER — NON-APPOINTMENT (OUTPATIENT)
Age: 57
End: 2022-12-11

## 2022-12-16 ENCOUNTER — RESULT REVIEW (OUTPATIENT)
Age: 57
End: 2022-12-16

## 2022-12-19 ENCOUNTER — APPOINTMENT (OUTPATIENT)
Dept: GASTROENTEROLOGY | Facility: CLINIC | Age: 57
End: 2022-12-19

## 2022-12-19 ENCOUNTER — RESULT REVIEW (OUTPATIENT)
Age: 57
End: 2022-12-19

## 2022-12-19 ENCOUNTER — APPOINTMENT (OUTPATIENT)
Dept: HEMATOLOGY ONCOLOGY | Facility: CLINIC | Age: 57
End: 2022-12-19

## 2022-12-19 VITALS
HEIGHT: 68 IN | DIASTOLIC BLOOD PRESSURE: 84 MMHG | RESPIRATION RATE: 16 BRPM | SYSTOLIC BLOOD PRESSURE: 125 MMHG | WEIGHT: 140.19 LBS | HEART RATE: 90 BPM | BODY MASS INDEX: 21.25 KG/M2 | OXYGEN SATURATION: 97 % | TEMPERATURE: 97.5 F

## 2022-12-19 VITALS
HEART RATE: 91 BPM | HEIGHT: 68 IN | SYSTOLIC BLOOD PRESSURE: 120 MMHG | DIASTOLIC BLOOD PRESSURE: 78 MMHG | WEIGHT: 138 LBS | BODY MASS INDEX: 20.92 KG/M2

## 2022-12-19 PROCEDURE — 99213 OFFICE O/P EST LOW 20 MIN: CPT | Mod: 25

## 2022-12-19 PROCEDURE — 99215 OFFICE O/P EST HI 40 MIN: CPT

## 2022-12-19 PROCEDURE — 36415 COLL VENOUS BLD VENIPUNCTURE: CPT

## 2022-12-19 NOTE — REVIEW OF SYSTEMS
[Fatigue] : fatigue [Eye Pain] : no eye pain [Vision Problems] : no vision problems [Dysphagia] : no dysphagia [Hoarseness] : no hoarseness [Chest Pain] : no chest pain [Lower Ext Edema] : no lower extremity edema [Shortness Of Breath] : no shortness of breath [Cough] : no cough [Vomiting] : no vomiting [Constipation] : no constipation [Diarrhea] : no diarrhea [Dysuria] : no dysuria [Joint Pain] : no joint pain [Skin Rash] : no skin rash [Dizziness] : no dizziness [Insomnia] : no insomnia [Anxiety] : no anxiety [Depression] : no depression [Muscle Weakness] : no muscle weakness [Easy Bleeding] : no tendency for easy bleeding [Easy Bruising] : no tendency for easy bruising

## 2022-12-19 NOTE — HISTORY OF PRESENT ILLNESS
[de-identified] : Patient is a 52 year old who is referred for initial consultation for anemia secondary to Crohns/GI bleed vs Iron Deficiency/ Vit b12 def. 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 Patient is status post ileo-colonic resections for SBO  in 2016. In 2016 patient had complaints of 10 - 15 lb weight loss. Labs at that time showed Hgb of 12, steatorrhea and stool calprotectin = 236. In 2016 MRI/MRE with narrowing at ileocolonic anastomosis with upstream dilation. Pt refused bridge with  prednisone and Entocort / Flagyl. In 2017 patient Hgb dropped to 9.5. On 2017 patient underwent an EGD and Colonoscopy. Findings consistent with Page's and no dysplasia or AVMs. Colonoscopy showed an open anastomosis but active disease, moderate on the colonic side and limited to the anastomosis and moderate to severe enteritis, just proximal to the anastomosis. Pat had routine labs on 05/10/2017 Hgb: 8.3.  [FreeTextEntry1] : s/p injectafer, copper\par warfarin [de-identified] : Patient presents for follow up of DVT, anemia, MTHFR Gene mutation, colitis.  Patient overall feels well.

## 2022-12-19 NOTE — ASSESSMENT
[FreeTextEntry1] : 1. CROHNS DISEASE   of both small and large intestine: s/p flare 8/25-->8/30/21, then  early 12/2021-s/p pred tapered over 4 weeks, again the weekend 4/9/22 rx w prednisone 40_  mg ( just before last Stelera dose) -->\par now off since early 5/2022\par    s/p ileocolonic resection x 2--last 6/19/15 for recurrent sbos: \par prior was s/p 4 SBOs w/i 2 yrs--2/2 distal sb disease adj to anastamosis\par when on Humira weekly had an  ADA =33 (3/20/14), -but had sbo 2/2014 at ADA=25 and another sbo 4/28/15\par 6/10/2015 Colonoscopy: Inflamm ST mass on ileal side w ulceration/scarred/narrow\par 6/11/2015 CT: dilated thickened sb loops distal jej & ileum\par Post-op 6/2015 probably some malabsorption 2/2 to removal of R Colon and ileum: \par had WT. Loss-->r/o malabsorption:  ?bile-induced->-secretory vs decr micelles, active dis, PLE\par ?  SIBO--Elev FA, Alb=3.4-->3.1-->2.9--> (7/28/17)\par ?SIBO/Prevent post-op recurrence --> trial Flagyl 250 mg qid~12/7/16-->d/c: 5/11/17\par Also discussed trial of Cholestyramine/Xifaxin -wanted to wait\par 11/20/16 ACTIVE Crohn's-->  9.5lb wt loss, BMs: #5-6, 5x/D-- but taking Magnesium \par 12/1/16 MRE: RUQ ileocolonic anastamosis--narrowed w upstream dilatation to 3.2 cm\par Labs: Stool Ervbqghysptn=632, + Incr Fecal fat, Alb=3.4, FA =23, A22=150, Hgb=12.3,ESR=10,CRP <5\par 4/19/17 :Colonoscopy: Anastamosis: open w mild -mod active colitis, enteritis severe adj to anastomosis, mild more proximal, remainder of colon=wnl\par 5/11/17- Adm PMHC w stools brown, but Hgb=7.9, BUN=17, Creat=1.4, Alb=2.9.\par S/P 2 Units w Hgb=10.6, BUN=15/1.1, Prednisone 40mg taper, entocort d/dee\par 6/8/17: Hgb=7.4, MCV=98, CRP<5--ASA stopped, Pred20--> 30mg, 6/13/17: s/p 2 Unit PRBCs\par 7/11/17 [facility] w unsteadiness. MRI Head: R Cortical Temporo-occipital encephalomalacia\par Hgb=9.9--> 8.4->9.7 after 1 Unit. Seen by Heme: received IV Iron \par CRP<5, W33=851, IPW=572, FA>23,Iron=22,Sat%=6, Ferritin=42, Alb=3.5. D/C on warfarin 2mg\par 7/28/17 Hgb=7.7; 7/31/17-s/p 1 Unit \par Heme Consultation ~ 8/2017: started  IV Infusions of  Iron and  IV Copper\par 8/17/17: Hgb=9.9, ESR=20, Fn=779--Lxmyxditvg s/p GI infection w N/V/D, no obstruction\par 10/17/17: Hgb=7.9,ESR=6,CRP<5, INR=1.6, Got IV Iron x 2, since 10/2/17 for Iron<10, Hgb=8.8\par 11/16/17: Hgb=11.2, ESR=14, CRP=12, 10/26/17 Stool fat-neg, calprotectin-30\par 11/13/17: MRE-Chr mild distension of distal & alfredito-ileum s/o mild stricturing at anast, mild mural thick & mucosal enhancemt ~ 20cm; little change from 2015 c/w mild acute on chr ileitis, 2.1 cm Liver hemangioma\par 10/9/18: Hgb=11.6, MCV=94, ESR=14, CRP=5.4, INR=2.2\par 10/10/18 MRE: stricturing of neoterminal ileum w worsened upstream dilatation, moderate length of upstream ileum w stricturing extending at least 20cm and more extensive then previous. suggestion of 2 adj extraluminal fluid collections which may be w/i the wall of strictured ileum near the ileocolonic anastamosis\par pt had declined to call numbers given for  IBD center at [facility] for new or investigational rx's--biologics.  \par later he felt  he was  stablizing w his  wt loss, and inflammatory markers\par \par 2/28/19 w ESR=12, CRP=6.5 & 2/21/19 stool calprotectin--> 232\par 8/15/19: ESR=10, CRP=5.2, Calprotectin--> 88\par 9/19/19: ESR=9, CRP=5.5\par 10/17/19: ESR=7, CRP< 5\par 11/6/19 : ESR=6, CRP=0.18\par 11/27/19: ESR=6, CRP <5\par 1/13/20: ESR=7, CRP=0.2\par 1/30/20: ESR=9, CRP=11\par \par 2/3/20 EGD: gastritis, +MACKENZIE, No HP, +erosion w ooze -clipped, 0.5cm polyp-neg; 4cm HH, Esophagitis A, + Barretts, VC: 1+\par Colonoscopy: 05cm rectal polyp: HP, 2nd deg int hemorrhoids\par mild-mod activity: MARILY w cryptitis & mild archect distortion at ileocolonic anast: colon & ileal side, no stricture\par \par 3/2/20:ESR=7, CRP=0.26\par 3/9/20: VDZ>40, Ab to VDZ <1.6\par 3/17/20: ESR=6, CRP=6.4\par 10/17/20: ESR=11, CRP <5\par 1/4/21:ESR=9, CRP<5\par 2/22/21: ESR=8, CRP<5\par 4/5/21: ESR=9, CRP<5\par 6/4/21: ESR=9, CRP<5  \par 6/11/21: wt= 135,   no pain, stool more formed # 4, 1-2x/d \par              s/p episode --abd distenstion, pain, N/V, no BM\par              eventually started having diarrhea and flatus, BMs returned to normal\par              lost 2-3 lbs but regained\par 7/4/21: CRP <5, Hgb=12\par  7/16/21 MRE: limited to incomplete bowel distention, chr distal ileitis/probable inflammatory stricturing vs collapse of the alfredito-TI--but improved since 2018 , moderate proctitis\par 7/12/21   --  ? flare --> entyvio should have been  q 5 wk , went q 8 wks\par                      next dose should be in 4 weeks x 2 then 5 weeks, to check levels\par 7/20/21: Cliically stable, unclear if MRE accurate 2/2 underdistension, but gained wt and CRP--normal\par 7/23/21 Entyvio level= 39, Abs <1.6\par 8/23/21: Labs--Hgb=13.6, ESR=10, CRP=6.6, Kxpbwluk=970, Iron=26, Alb=3.9, BUN=16\par 8/26/21 p/w sbo  w N/V, abd pain/bloating  x 3 days, unable to keep things down\par Labs: WBC=5, Hgb=12, CRP=43, ESR=11, Alb=4.4, BUN=28, Creat=-1.6\par CT Abd/Pelvis: diffuse marked dilatation of SB Loops w transition pt in R. mid/lower abdomen\par ~ 5-6cm, proximal to the ileocolonic anastomosis\par RX w IV solumedrol 20mg q8h, NGT, IVF, pt refused TPN, also w UTI from prostatitis\par 8/27 NGT was pulled, and clears started and advanced to LR,  was d/c home 8/30 on prednisone\par 40mg qd w taper by 10mg/wk to 20mg qd \par \par \par (  **Entyvio's  :time 0=12/22/16 ( Humira 40mg QW); 1/5/17--2wks, s/p 3rd infusion at wk 6, then q 6weeks \par #6 :6/21/17-->held 2/2 Shingles, then  Infusions as follows=9/19/17 , 10/17/17, 11/28/17, 1/16/18 ,2/16/18, 3/19/18,4/16/18, 5/14/18, 6/25/18, 8/7/18, 9/17/18, 10/16/18, 11/13/18, 12/11/18, 1/14/19, 2/15/19, 3/15/19, 5/16/19, 6/18/19,7/24/19, 9/9/19, 10/2/19, 11/4/19, 12/12/19, 1/6/20, 2/4/20, 3/3/20, 9/17/20, 10/15/20, 11/18/20, 1/11/21, 2/8/21, 3/8/21, 4/8/21, 6/3/21, 7/1/21, 8/2/21, 9/2/21,10/25/21  )\par \par 3/8/22 MRI Abd/Pelv: thickened distal Jejunal loops which are tethered; distal ileal segment  (10-12cm ) previously actively inflammed has decreased & now characterized by an area of stricture, however the alfredito-TI  5mm to the anastamosis is enhanced as well as narrowed.  Colon just  distal to the anastamosis has a focal segment of inflammation & stricture.  the recto-sigmoid appears inflammed \par 3/28/22: Hgb=14.4 , ESR=1, CRP <5, Ca=8.5, Mag=1.7,Alb=4.1, Iron=104, Jbvwvash=669,\par  5/9//22: Hgb=13.8 , ESR=2, CRP <5, Ca=8.8, Mag=1.6, Alb=4, Iron=64, Izkafuuj=093  \par 5/17/22 Dr. Heard Colonoscopy: ped scope passed w/o resist in alfredito-TI, one single apthae w psuedopolyp.\par  6/21/22 Labs: Hgb=14, ESR=2, CRP<5,  Alb=4.5, Phos=0.8, Mag=1.8, Ca=8.5,Zodzqcqg=844, Iron=86\par  7/25/22 Labs:  Hgb=14, ESR=2, CRP<5, Alb=4.2, Phos=1.5, Mag=1.8, Ca=8.6, Wmqohqjb=651, Iron=57, PT=24, INR=2.1  10/17/22 Labs: Hgb=12.7, ESR=2, CRP<5, Alb=4.3, Phos=2.1, Mag=1.8, Ca=9, Ferritin=57, Iron=85, PT=23, INR=2     12/16/22 : Labs: Hgb=10.8 , ESR=1, Alb=3.8, Phos=1.6, Mag=1.7, Ca=8.9 , ALP=68, Mdkmmjhx=891, Iron=67, INR=2.3             \par A / PLAN:  s/p sbo prox to anastamosis\par                  inflammatory vs fibrosis vs combination: 3/2022 recent MRI shows improvement of inflammation w      possible residual stricture in the ileum and probable colitis near the anastamosis and distal colon \par      Responded to  steroids iv--> po--d/c ~ 10/20/21,\par      tapered steroids from 40mg qd  to 20mg, [id number] qd and  taper 5mg/wk\par      then po taper again early 12/2021 40mg qd w 10mg taper/ wk--\par      PO prednisone 40mg mg qd started on 4/9/22 ,tapered off ~ early 5/2022\par \par      Entyvio level was 39 w/o Abs~ 3weeks after 7/1/21, \par      speaks to inflammatory component & probably loss of response\par      Stelara # 1 dose on 10/25/21, #2 on 12/10/21, # 3 on  2/11/22,  # 4 on 4/20/22 , #5 mid June 2022, \par                   #6 mid 8/2022, last dose--> beging 12/2022\par \par       IBD consensus : due to malnutrition /steroid dependency, surgery is next best option \par       but pt reported feeling well and wanted to pursue medical treatment \par      repeated  imaging in March after 3 months of Stelara, then consider surgery vs improved candidacy for                endoscopic manipulation\par         f/u w  IBD consultant Dr. Heard at   & reviewed imaging after 3 months of Stelera\par        for  Colonoscopy ( w possible balloon dilatation )-> last 1 3/4 yrs ago\par    5/17/22 Dr. Heard Colonoscopy: ped scope passed w/o resist in alfredito-TI, one single apthae w psuedopolyp.\par     No Dilatation need, very mild dis at Alfredito-TI, MRE may show wall thickening, sbo's probably adhesive dis\par \par 1/4/22 Labs: Hgb=11.5, ESR=6, CRP<5, Alb=3.9\par 1/31/22 Calprotectin =84\par 2/14/22: Hgb=10.6, ESR=9, CRP <5, Ca=8.3, Mag=1.6, Alb=3.8, Iron=25, Ferritin=15\par 3/28/22: Hgb=14, ESR=1, CRP<5 , Ca=8.5, Mag=1.7, Alb=4, Iron=104, Jonwsnxd=178\par 5/9//22: Hgb=13.8 , ESR=2, CRP <5, Ca=8.8, Mag=1.6, Alb=4, Iron=64, Kzxlimed=556  \par  6/21/22 : Hgb=14, ESR=2, CRP<5,  Ca=8.5, ,Alb=4.5,  Mag=1.8, Iron=86,Optasqap=597,\par  7/25/22 :  Hgb=14, ESR=2, CRP<5, Alb=4.2, Phos=1.5, Mag=1.8, Ca=8.6, Enggrtfl=201, Iron=57\par 10/17/22:  Hgb=12.7 , ESR=2, CRP<5, Alb=4.3, Phos=2.1, Mag=1.8, Ca=9, Ferritin=57, Iron=85\par  12/16/22 : Labs: Hgb=10.8 , ESR=1, Alb=3.8, Phos=1.6, Mag=1.7, Ca=8.9 , ALP=68, Anflnezi=234, Iron=67,\par \par Probiotics 3 qd \par Diet:  LR, Lactose free, protein drinks tid\par MCT oil begun but never maintained \par **trend --cbc, esr, crp, albumin, calprotectin \par   \par **monitor wt--- usu bet 136-139, 7/20/21=136, 11/22/21=139, 1/4/22=132, 2/22/22=132, 4/5/22=131,5/10/64=475,\par                         6/21/22=133, 7/27/22=136 ,  qnjmu=417\par \par \par                                                                                                                                                                                                                                                                                                                                                                                                                                                                                                                                                                                                                                                                                                                            \par 2. Iron deficiency anemia : \par  had initially dropped , after clinical flare and post procedure bleeding\par Probably multifactorial: ACD, Blood loss, malabsorption/nutrient deficiencies\par Eliquis-->warfarin after CVA, On Entyvio \par 7/11/17 s/p  Adm for unsteady gait w MRI c/w CVA--warfarin begun: possible better control of AC\par Chromagen 2 qd--> IV Iron , s/p IV Cu x 5, FA 1mg qd , B12 SL & IM\par Still requiring IV Iron and cu infusions prn \par most recent IV Iron infusion  :  5/2022 for  Iamaejcu=493 on 5/9/22 \par 2/22/21: W47=545, \par 6/4/21: Cu=91, Zinc=69, Ferritin=32,Iron=43, \par 7/12/21: Do=252, Zinc=74, Oqzytdpp=379, Iron=35\par 11/15/21 : Hgb=12,  Ferritin =104, Ca=9, Mg=1.7\par 1/4/22: Hgb=11.5, Ca=8.8, Mg=2.9, Saippqp=863 \par 2/14/22: Hgb=10.6, Ca=8.3, Mag=1.6, Alb=3.8, Iron=25, Ferritin=15\par 3/28/22: Hgb=14.4 , Ca=8.5, Mag=1.7,Alb=4.1, Iron=104, Luwdcgha=317,\par  5/9//22: Hgb=13.8 , Ca=8.8, Mag=1.6, Alb=4, Iron=64, Klslgggu=552  \par 6/20/22: Hgb=14.4  , Ca=8.5, Mag=1.8, Alb=4.5, Iron=96, Qyesvupb=219\par 7/25/22 Labs:  Hgb=14,Ca=8.6,  Mag=1.8,Alb=4.2,Iron=57 Hcnufine=882, , PT=24, INR=2.1\par 10/17/22 Labs: Hgb=12.7,Ca=9,  Mag=1.8,Alb=4.3,Iron=85 Ferritin=57, , PT=23, INR=2\par 12/16/22 : Labs: Hgb=10.8 , Alb=3.8, Iron=67,Saxjdnlj=345, , INR=2.3   \par B12 1cc IM ____L. delt--  given today, \par trend cbc, B12, FA, Iron panel \par Adj INR--closer to low 2's.    \par \par \par \par \par 3. Osteoporosis \par : Progression on BD from 5/5/16\par Crohns, h/o steroid use\par Calcium citrate & Vit D per Endo\par Forteo since 5/10/19 , then  Reclast          \par Repeat BD of 8/2018 --showed worsening to Osteoporosis from 2016\par Rec Endo consult w Dr. Akers :Osteoporosis center at [facility]--pt never went originally \par 9/21/18: Phos=2.4, Ca=8.7, Alb=3.4, Vit D=27, ULU=246, \par 10/16/18: Phos=2.8, Ca=8.7, Alb=3.5,\par 1/14/19: Phos=2.6,  Ca=8.7, Alb=3.6\par 2/28/19: Phos=1.8, Ca=8.9, Alb=3.7, VitD=39, YDO=161\par 3/18/19: Ca=8.5, Alb=3.7, Vit D=44.6, PTH=93\par 7/11/19: Ca=9.1, Alb=3.7, Phos=3.9, Vit D=40/ 306, GNS=153\par 8/15/19: Ca=9, Alb=4.2, Phos=4.4, VitD=59, QHV=881\par 10/17/19: Ca=9.6, Alb=3.9, Phos=3.5\par 11/6/19: Ca=9.1, Alb=3.9. Phos=3.7, VitD=50, PTH=80\par 1/13/20: ca=9.1\par 1/30/20: Ca=9.2, Alb=3.9, Phos=2.7, Mag=1.5, Vit D=103/41, PTH=87\par 2/18/20:ca=8.5, Alb=3.6, \par 3/2/20: Ca=8.5, Alb=3.6\par 3/17/20: Ca=9.1, Alb=3.7, Phos=3.4, Mag=1.7\par 10/17/20: Ca=8.9, Alb=4, Phos=2.3, Mag-2.0, Vit D=66, PTH=47\par 1/4/21 : Ca=9.4, Alb=4.2, Phos=2.7, Mag=2, Vit D=64, PTH=87.5\par 4/5/21: Ca=9.1, Alb=4, Phos=3.1, Mag=1.7, \par 6/4/21: ca=9, Alb=3.8, Phos=2.8, Mag=1.8\par 7/12/21: Ca=8.6, ALB=6, phosph=2.3, Mag=1.8\par 11/15/21: Ca=9, Alb=3.7, Mag=1.7\par 1/4/22: ca=8.8, Alb=3.9, Mg=2.9, phos=2.9\par 1/21/22: Ca=8.8, Alb=3.9, Vit D=60, PTH=85\par 2/14/22:  Ca=8.3, Mag=1.6, Alb=3.8, \par 3/28/22: Ca=8.5, Mag=1.7, Alb=4.1, Phos=1.2\par  5/9//22:  Ca=8.8, Mag=1.6, Alb=4, Phos=1.6 \par   5/27/22: Ca=9.2 , Mag=1.9, Alb=3.8 , Phos=2.7, PTH=72, VitD=105\par   6/20/ 22: Ca=8.5  , Mag=1.8, Alb=4.5 , Phos=0.8, PJA=842, \par   7/25/22 :  Ca=8.6, Mag=1.8 , Alb=4.2, Phos=1.5, \par   10/17/22: Ca=9, Mag=1.8 , Alb=4.3, Phos=2.1, PTH=95, Vit D=25\par  12/16/22 : Labs: Ca=8.9, Mag=1.7,  Alb=3.8, Phos=1.6, \par Dr. Akers: Hyperparathyroidism-- probably secondary due to low Vit D/Ca & ? superimposed primary, \par [facility] ENT Consult init felt  Parathyroid scan showing activity in mediastinum is ectopic parathyroid, then felt sx not indicated at that time, elev PTH is secondary to low  Vit D/Ca\par Rx replete Vit D and current IV Calcium-as per endo \par \par trend Vit D, Ca, Phos, PTH,  \par \par BD--9/2020: incr in spine and hip , no change in wrist, repeat 9/2022\par 10/5/20, 9/2021,6/17/ 2022-s/p Reclast--repeat--> 6/2023 \par \par \par \par \par \par 4. GERD:  recently less active by ENT , no ht burn,  dysphagia,  + throat clear\par               h/o  Dry cough, CXR:ana, saw ENT bergstein-->LPR\par * ++ LPR,  ++  Page’s w No Dysplasia,  + H/O  Esophagitis grade: A   was found \par \par Recommend: \par * Anti-reflux diet & life-style changes reviewed & re-emphasized.  ++  Bedge required\par * Weight reduction & regular exercise emphasized\par \par * need for  PPI:  Omep 40 BID--> 40mg qd  ,  ++ H2B q HS:Zantac 300mg--> Pepcid 40mg\par No Carafate  1 gram:   --was emphasized\par I reviewed & summarized the prospective randomized & retrospective non-randomized studies\par looking at potential long term SE's of PPIs, w special attention to associations & actual cause\par as related to GI infections, bone loss, cognitive changes, KD, Covid, vitamin & electrolyte deficiencies\par questions were answered, pt advised that PPIs should be used when needed as indicated by their\par clinical indication and response and tapering off is always the goal if possible. pt understood.\par \par *  F/U  EGD: --> 2024--for Barretts screening / surveillance \par * No  need for pH Monitor,   Manometry,   Esophagram --\par *  ++  need for ENT  eval/F/U,  No  need for Surgical  eval  --  \par \par \par \par \par \par 5. Hemorrhoids:  well - controlled.  No pain,  swelling,  itch,  bleeding\par * Discussed previously the potential complications of thrombosis,  pain,  infection,  swelling, itching,  bleeding \par Reccomend: \par * currently Low   - Fiber Diet was emphasized\par * 6  --  8 cups of decaffeinated fluid daily was emphasized \par * Sitz Bathes prn,   No:  Anusol HC  Suppos / Cream  IL BID -- was needed\par * No:  Tucks BID,  Balneol Lotion,   Calmoseptine Oint -- was needed ;    ++ Prep H prn\par * No:  need for  Colorectal surgical evaluation for possible ablation\par \par \par \par \par \par \par \par 6. Barretts esophagus without dysplasia  \par Notes: see GERD.    \par \par \par \par 7. Hepatomegaly-->not confirmed by recent abd sono\par CTE w relative enlargemt, ? lobulation & enlarged portal/mesenteric veins\par LFTs-wnl, no ascites or edema\par R/O CLD, Hepatic vein thrombosis\par Abd sono w doppler--> Liver and spleen normal size, no thrombosis, normal portal and hepatic vein flow\par \par \par \par \par .\par \par \par

## 2022-12-19 NOTE — ASSESSMENT
[FreeTextEntry1] : 1.  Anemia- Hgb 12.7- s/p Injectafer x 2 -\par  -blood loss, anemia of chronic disease with need for intermittent iron  \par - copper deficiency - replaced, level WNL 5/2020\par - hospitalized with flare up of colitis, got one iv iron in the hospital- started Straterra IV at the end of Oct- will begin self inj Dec 2021\par - Injectafer - 3/2022 x 2 doses, doesn’t respond to Venofer \par - Injectafer October 2022\par - ferritin 197\par -EGD Sept 2022 \par \par 2. Osteoporosis- having BMD today \par - left ankle- stress fx- advanced  osteoporosis, patient with h/o steroids use\par - completed Forteo 18/24 m\par - calcium level stable,IV calcium 1-2 times per month\par \par  3.TIA with MTHFR and h/o DVT x 3 with cryptogenic CVA  \par not a candidate for DOAC b/o small bowel resection\par - INR home monitoring of warfarin \par - Warfarin 3 mg x 6, 1.5mg x 1, INR- INR 2.3  - continue \par \par 4. Hypogammaglobulinemia- no increased infection rate \par - s/p  Prevnar 13 12/2018 \par -  Pneumococcal vaccine 23 boost \par - s/p  Shingrex\par -  COVID vaccine - Pfizer x 4\par - Flu shot \par \par 5. Zinc deficiency\par - oral Zinc, level better - 72\par \par 6. 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 No evidence of lymphatic invasion. 8/10 LN's for eli metastatic papillary thyroid cancer\par -received iodine 131 at 29.3 mCi in October 2020\par -On synthroid\par -Due for US Head and soft tissue neck May 2022- follows with Dr. Akers\par \par # hypophosphatemia- replace with oral phos \par Dr. Luis Felipe Monsalve\par cell [phone]\par office [phone]\par \par Labs next visit- PT, INR, irons, cbc and chem \par Blood drawn in office. Ordered and reviewed.\par \par \par  return in 8 weeks cbc chem irons, INR- case and mgmt discussed with Dr. Biswas \par \par \par

## 2022-12-19 NOTE — HISTORY OF PRESENT ILLNESS
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' warfarin \par        GERD w Page's, Hemorrhoids, BPH, \par        S/P Ileocolonic resection 1998\par        Since then multiple SBOs\par \par \par        Got IV Iron x 2, since 10/2/17\par        10/19/17: feeling better, Zx=340 on prednisone 15mg x 3weeks, BMs Brown: #5-6, 1-2/D, occas 3-4/d\par        10/25/17: Hgb=10, ESR=5, CRP=5, Alb=3.6, Phos=2, Tbtvkwhz=881, A76=536\par        11/16/17: Hgb=11.2, ESR=14, CRP=12, \par        11/13/17: MRE-Chr mild distension of distal & vladislav-ileum s/o mild stricturing at anast, mild mural thick & mucosal enhancemt ~ 20cm; little change from 2015 c/w mild acute on chr ileitis, 2.1 cm Liver hemangioma\par        11/28/17: Entyvio\par        11/29/17: Hgb=9.6, ESR=10, CRP=5.8, Alb=3.8,Ferritin-19, Iron=14,Sat=4%, Phos=2.5, Uo=892, BMs:# 5, 1-2x/D, brown,\par        12/19;17: Hgb=10.1, ESR=12, CRP=6.5; 12/21/17: BMs:#3-5, 1-3x/D , brown, no blood, no pain, up=240\par        1/3/18:Hgb=11.7, ESR=19, CRP=6.5, Alb=4.1, Bykzprfo=459, Iron=31, Sat%=9,Zinc=55\par        1/16/18: Entyvio, Hgb=11.4, ESR=10, CRP=6.9\par        1/18/18: Today: cellulitis R foot, saw dr KEITH--rx augmentum x 10D, Entyvio 1/9 to 1/16, la=314 w clothes,BMs: # 4-5, 1-2x/D \par        2/14/18: Hgb=11.1, ESR=16, CRP=11.6, Alb=3.6, Iron=28, Ferritin=23, INR=1.5 \par        2/16/18: s/p Entyvio; Iron infusion, again on 2/27\par        2/20/18: Hgb=10.6, ESR=20, CRP=12, INR=1.7\par        2/27/18: s/p iron infusion\par        3/9/18: Dr. Burden for tenosynovitis, rx w Zorvolex: Diclofenac x 5 days--? response\par        3/14/18: Bh=905; BMs: # 5, 1-2x/D; Hgb=11, ESR=18, CRP=11,Irom=45,Tljmwhcp=280,Alb=3.6, INR=1.5\par        3/19/18: s/p Entyvio\par        3/22/18: hl=939, BMs: # 5, 3-4 x/d\par        4/16/18: s/p Entyvio,Hgb=11.9, INR=1.3, ESR=18, CRP=8.4 \par        4/18/18 EGD: gastritis, no HP, no IM, 2+ Mucous, 0.5cm gastric polyp: fundic, 4cm HH, + Barretts:3cm, no dysplasia\par        4/25/18: Hgb=11.5, INR=1.6, Iron=40, Sat=13%, Ferritin=57, Alb=3.2, Phos=2.2, Mag=1.7\par        4/30/18: s/p Iron Infusion\par        5/14/18: s/p Entyvio \par        5/23/18: Hgb=11.5, ESR=16, CRP< 5\par        5/29/18: s/p Iron Infusion\par        6/6/18: Hgb=10.6, INR=1.7, Sgbadlvk=633, Alb=3.5, Phos=2.1, D72=487, kq=200; BMs: # 5, 2-3x/D \par        6/19/18: Hgb=10.6, INR=1, CRP=15, ESR=23\par        6/21/18: R ankle is acting up, swollen, pain, having MRI done, took a couple of advil, on low carbs ,BMs: # 5, 1-2x/D, pw=331\par        6/26/18: MRI: fx/stress rxn-talar body/navicula; stress related changes--cuneform, cuboid,distal tibia; mod tibiotalar effusion, mild-mod peroneal tendinosis\par        7/19/18: entyvio 6/26/18, eliminated all carbs--anti inflammatory diet, fg=767, BMs: # 4-5, 1-3x/D \par        7/25/18: Hgb=10, INR=1.3, Alb=3.3, Phos=2.4, Rhylwtqt=389, sat%=12, Iron=35\par        8/2/18: BD--osteoporosis of hip/spine: sig decrease BD--17.6% of hip, 17% of spine, osteopenia of wrist: 6.4%\par        8/7/18: Entyvio\par        8/21/18: Hgb=11.1, INR=1.5, ESR=19, CRP=18.6\par        8/23/18: recently w tooth infection, old implant--loose and removed; rx w amox, and advil\par        eating almost no carbs, zy=862, # 5-6, 2-3x/d \par        8/24/18: Stool fat--neg, Pydslvdmpiit=819\par        9/5/18: Hgb=11.4, INR=1.3, ESR=9, CRP=11.3, Iron=41, Utuhyztl=578, Alb=3.8,\par        9/17/18: entyvio, Hgb=10.7, INR=2.2, ESR=17, CRP=9.1, \par        9/20/18: oh=232, BMs: # 5-6, 1-2x/D \par        9/21/18: Phos=2.4, Ca=8.7, Alb=3.4, Vit D=27, JAU=545, \par        Dr. Akers: Hyperparathyroidism: probably secondary due to low Vit D/Ca & ? superimposed primary, rx replete Vit D and Calcium\par        10/9/18: Hgb=11.6, MCV=94, ESR=14, CRP=5.4, INR=2.2\par        10/10/18 MRE: stricturing of neoterminal ileum w worsened upstream dilatation, moderate length of upstream ileum w stricturing extending at least 20cm and more extensive then previous. suggestion of 2 adj extraluminal fluid collections which may be w/i the wall of strictured ileum near the ileocolonic anastomosis. surrounding spiculation and tethered appearance of bowel in this region.\par 10/16/18: entyvio, Hgb=11.7, MCV=94, ESR=14, CRP <5, Alb=3.5\par 10/18/18: Hi=560,   BMs: # 5-6, 3x/D , \par 11/13/18: Entyvio\par 11/21/18 CTE w C: limited 2/2 bowel underdistention, mucosal hyperenhancemt & extensive SM edema of distal ileum including vlaidslav-ileum, difficult to exclude a sml fluid collection, Liver w relative enlargement w suggestion of lobulation, enlargemt of PV,SMV,IMV,Spl V, rectal V. No ascites\par 11/28/18: Hgb=10, ESR=19, CRP=17,Alb=3.5,Iron=35, Sat%=11, Jtupydbx=382, INR=1.3\par 11/29/18: fw=630, BMs: # 5-6, 3-4x/D\par 12/3/18: Abd sono--Liver 14.8cm Incr heterogenicity, spleen--wnl\par 12/11/18 & 1/14/19: Entyvio\par 1/14/19:Entyvio,  Hgb=10.7, ESR=15, Alb=3.6, Iron=36, Ferritin=67, Sat%=11, INR=1.6\par 1/24/19:  fo=136, stools are # 4-6, 3-4x/d , no pain\par 2/5/19: Hgb=11.2, ESR=14, CRP=0.36\par 2/28/19: Hgb=11.5, ESR=12, CRP=6.5, Alb=3.7, Iron=69, Exzlhcsa=385, Ca=8.9\par                Bms: # 4-5, 2-3x/D , se=753,  Entyvio : last 2/1519,\par                had parathyroid scan pre-op, still w elev PTH, + activity in mediastinum,? ectopic parathyroid tissue vs neoplasm.  To see ENT and have Imaging at [facility]\par 3/28/19: Bms: # 4-5 , 3x/d ;tw=470\par 4/11/19: Hgb-9.7, Iron=45, ESR=18, CRP=9.4, Alb=3.5, \par Saw Endo SX at [facility], felt hyperparathyroid is secondary to Low Ca/Vit D, no sx at this time\par 4/16/19: BMs: # 5-6, 3-4x/D, sp=937,  Entyvio : last 3/1519,  got IV iron 4/12/19 for Ferritin=53\par 4/27/19: s/p R Hip Nailing--missed 4/2019 2/2 fresh wound\par 5/16/19 & 6/18/19 Entyvio\par 7/2/19: Hgb=11.7, Ferritin=41,ESR=12, CRP=5.5, B6=2.8,Mag=1.8,Phos=3.9,Rx=153,Zinc=61\par 7/11/19: s/p IV iron\par 7/11/19: Alb=3.7, JFC=932, Ca=9.1, Vit D=40/306, \par 7/24/19: Entyvio, Alb=3.8, RHZ=689, Ca=8.9, Vit D=128 \par 8/1/19: Bms: # 5-6, occas #4, 2-3x/D , zm=980\par 8/15/19: Hgb=12, ESR=10, CRP=5.2, Ferritin=68, Alb=3.4, Phos=4.4,Mg=1.7, Ca=8.5,Calprotectin=88,\par 8/20/19: IOV=292, Ca=9, Alb=4.2\par 9/19/19: Hgb=12.8, ESR=9, CRP=5.5, Alb=3.7, Hhosqzga=944, Mag=1.8, Ca=8.9, Phos=4.2\par 9/26/19: mp=327, BMs: #5-6, 2-3x/D, no pain\par 10/17/19: po=747, BMs: #4-6, 1-2x/D, no Pain; Hgb=14, ESR=7, CRP<5, Alb=3.9, Dduraxhm=196, Mag=1.7, Ca=9.6\par 10/24/19: dc=477, Bms: # 4-6 , no pain,\par 11/6/19: ESR=6, CRP=6, PTH=80,Ca=9.1, VitD=50\par 11/14/19: oc=365, BMs: # : 4, 1-2, 0ccas #5\par  11/17/19: ESR=6, CRP<5, Aaoaadfp=927, \par  12/19/19: rj=555, BMs: #5-6, 2-3x/D\par 1/6/20: entyvio\par 1/13/20: ESR=7, CRP=0.2, Hgb=13.5, Vqgoyzpk=909, J77=321, FA=10, Alb=4.1, Ca=9.1\par 1/16/20: wt = 127, BMs: # 5, 1-3x/d\par 1/30/20: ESR=9, CRP=11, Hgb=13.9, Vzfwnbox=630,Iron=38, Alb=3.9,Ca=9.2, PTH=87,\par 2/3/20 EGD: gastritis, +MACEKNZIE, No HP, +erosion w ooze -clipped, 0.5cm polyp-neg; 4cm HH, Esophagitis A, + Barretts, VC: 1+\par Colonoscopy: 05cm rectal polyp: HP, 2nd deg int hemorrhoids\par mild-mod activity: MARILY w cryptitis & mild archect distortion at ileocolonic anast: colon & ileal side, no stricture\par 2/4/20: Entyvio; 2/12/20: IV Iron, 3/3/20: Entyvio\par 2/25/20: oo=011,  BMs: # 4-5, 1-2x/ d\par 3/19/20: nn=837, BMs: #4-5, 1-2x/D\par 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\par 10/17/20 : Hgb=13, CRP< 5, ESR= 11, Iron=44, Ferritin=46, Alb=4, Phos=2.3, \par 11/5/20: kp=596; s/p IV Iron x 2 ,  11/4 and 1 week prior;   BMs:  # 4-5, 1-2x/D , no pain\par 11/18/20  Entyvio + IV Ca\par  1/4/21: Hgb=13.6, CRP<5, ESR=9, Ferritin=60, Alb=4.2, Phos=2.7\par 1/11/21: Entyvio & IV Ca\par 1/14/21: no pain, stool more formed ,  gp=112 - 137; BMs:  # 4-5, 1-2x/D \par 2/8/21: Entyvio & IV Ca\par 2/23/21 IV Ca\par 2/22/21: Hgb=12.7, CRP<5, ESR=8, Jzokigsmh=019, Phos=2.3, Mag=1.8, N89=363, Zinc=58, Tv=724\par 2/26/21: gw=927,  BMs:  # 4-5, 1-2x/D , no pain \par 3/8/21 & 4/8/21: Entyvio\par 3/9/21 & 3/24/21: IV Iron\par 4/5/21: Hgb=13, CRP<5, ESR=9, Ferritin=94, Phos=3.1, Mag=1.7,Ca=9.1/ Alb=4\par             \par Pt Ins co is refusing to cover Entyvio q 4wks, despite numerous attempts to appeal, they also refuse to set up a peer to peer discussion betw myself and another healthcare provider, even one that works for a third party.  They do acknowledge that there is literature supporting the successful use in individual cases who don’t respond to the q 8 wk interval and need\par less then q 8weeks , but state it had not in FDA approved at less then 8wks so they will not approve it.  I spoke to the ins co rep and discussed that fact that patients should not be  pigeon holed since practicing medicine is done on an individual basis.  Humans are not all the same.   Their  response didn’t change eventhough the pt clinically needed the medication and it  induced a beneficial clinical response towards remission.  Therefore the patient and I decided to slowly increase the interval since the insurance company will not pay for this benefit.  Hopefully he may be able to maintain his present clinical state.  If not we will return to q 4weeks and will again appeal using this patients real clinical data .\par \par 4/9/21:  oq=581,  no pain, stool more formed # 4, 1-2x/d \par 6/11/21: wt= 135,   no pain, stool more formed # 4, 1-2x/d \par              recently had an episode of abd distenstion, pain, N/V, no BM\par              eventually started having diarrhea and flatus, BMs returned to normal\par              lost 2-3 lbs but regained\par  Doesnt recall eating anything different, no fever, chills, brbpr, melena\par  entyvio was 6/3/21--which is almost 8 weeks, was supposed to be 5-6 weeks  to start\par               6/4/21 Hgb=12, CRP<5, Ferritin=32, pt to receive IV iron    \par  7/12/21 Labs: Hgb=13.6, ESR=10, CRP=<5, Ytlbkiu=535, Alb=3.7, BUN=16\par \par  7/16/21 MRE: limited to incomplete bowel distention, chr distal ileitis/probable inflammatory stricturing vs collapse of the vladislav-TI--but improved since 2018 , moderate proctitis\par 7/20/21:  Last entyvio was --7/1, next early august\par                 ic=149 ,  no pain, stool more formed # 4-5/6, 1-2x/d \par 7/23/21 Entyvio level= 39, Abs <1.6\par 8/23/21: Labs--Hgb=13.6, ESR=10, CRP=6.6, Owbemrhm=265, Iron=26, Alb=3.9, BUN=16\par 8/26/21 p/w w N/V, abd pain/bloating  x 3 days, unable to keep things down\par Labs: WBC=5, Hgb=12, CRP=43, ESR=11, Alb=4.4, BUN=28, Creat=-1.6\par CT Abd/Pelvis: diffuse marked dilatation of SB Loops w transition pt in Rmid/lower abdomen\par ~ 5-6c, proximal to the ileocolonic anastomosis\par RX w IV solumedrol 20mg q8h, NGT, IVF, pt refused TPN, also w UTI from prostatitis\par 8/27 NGT was pulled, and clears started and advanced to LR,  was d/c home 8/30 on prednisone\par 40mg qd w taper by 10mg/wk--> to 20mg\par 10/15 Entyvio\par 10/25 Stelera (Ustekinumab)  # 1\par 11/3/21 [facility]: wt above 140, off pred x 2 weeks,  1-2 BMs qd\par  Discussed at IBD conference, reviewed previous colonoscopy, and recent imaging; consensus that due to malnutrition and steroid dependency, surgery is next best option however pt reports feeling great and wants to pursue medical treatment which is reasonable given he feels well \par repeat imaging in mid-January after 3 months of Stelara, and then consider referral to surgery vs improved candidacy for endoscopic manipulation (currently presumed one long stricture). \par \par 11/15/21 : ji=938, Hgb=12, ESR=3, CRP <5, Ferritin =104, Ca=9, Mg=1.7, Alb=3.7 \par 12/10/21 : iron infusion\par 1/4/22: Hgb=11.5, ESR=6, CRP <5, Ca=8.8, Mag=2.9, Alb=3.9\par 1/10/22 : sw=366, stools # 5, 1-2x/D \par 1/10/22 Today:  Feeling well, no c/o , CP, SOB/ AMARO, Cough, Wheeze, Palpitations, edema\par              Most recent labs  reviewed w patient:1/4/22\par 1/31/22: calprotectin=84\par 2/2/22: da=753\par 2/14/22: Hgb=10.6, ESR=9, CRP <5, Ca=8.3, Mag=1.6, Alb=3.8, Iron=25, Ferritin=15\par 3/3/22:  iv Iron infusion x 1\par 3/8/22 MRI Abd/Pelv: thickened distal Jejunal loops which are tethered; distal ileal segment  (10-12cm ) previously actively inflammed has decreased & now characterized by an area of stricture, however the vladislav-TI  5mm to the anastamosis is enhanced as well as narrowed.  colon just  distal to the anastamosis has a focal segment of inflammation & stricture.  the recto-sigmoid appears inflammed \par 3/28/22: Hgb=14.4 , ESR=1, CRP <5, Ca=8.5, Mag=1.7,Alb=4.1, Iron=104, Jnfazroi=493,\par 4/5/22: wf=513, Bms: # 5-6 , 1-2 x/day \par \par \par 5/10/22  :  Last entyvios were -->7/1, 8/5, 9/2, 10/15/21\par              Stelara # 1 IV--10/25/21, second dose SC~ 12/10/21, 3rd~ 2/14/22,  4th-- mid April , 5th--mid june \par \par              Early December 2021  had a " flare" loose BMs, N/V and bloat\par              Took Pred 40mg qd and tapered down 10mg / week , now off pred x 7-8 weeks\par \par 4/13/22 Dr. Heard:  pt states he had a flare the weekend of 4/9/22 w vomiting/distension\par                pt self-started prednisone 40mg , this was just before his april stelara dose\par                claims he was feeling better\par               Dr. Heard: sched colonoscopy w ? dilatation \par \par 5/10/22: tapered of pred 40mg , 10mg/wk over 1 month, now off 2weeks \par         no pain, n/v,  BMs: # 4-5, 1-2 x Day , wt=-132\par 5/17/22 Dr. [facility]ard Colonoscopy: ped scope passed w/o resist in vladislav-TI, one single apthae w psuedopolyp.  Flares probably 2/2 to adhesive dis, imaging may consistently show wall thickening  5/27/22 Labs:  Alb=3.8. Phos=2.7, Mag=1.9, Ca=9.2, PTH=72, Vit D=105, ALP=76\par                + IV Iron\par  6/20/22 Labs:  Alb=4.5, Phos=0.8, Mag=1.8, Ca=8.5, QAZ=524, ALP=83\par                          Hgb=14, ESR=2, CRP<5, Rzhkrgps=906, Iron=86\par 6/21/22:  no pain, n/v,  BMs: # 4-5, 1-2 x Day ,\par                qb=191\par 7/26/22:  no pain, n/v,  BMs: # 4-5, 1-2 x Day ,\par                ie=618\par 9/30/22 EGD: mild gastritis, no HP; 0.75cm gastric polyp:fundic;\par                4cm HH, Esophagitis A--, + Barretts\par 10/17/22 : Alb=4.3, Phos=2.1, Mag=1.8, Ca=9,  PTH=95, ALP=80, TSH=12, Vit D=69\par                          Hgb=12.7 , ESR=2, CRP<5, Ferritin=57, Iron=85\par 12/16/22 : Alb=3.8, Phos=1.6, Mag=1.7, Ca=8.9 , ALP=68, \par                          Hgb=10.8 , ESR=1, Urhglzyr=927, Iron=67, INR=2.3 \par \par 12/19/22: s/p 2 iron infusions in October, last Stelera beginning 12/2022, next beginning of 2/2023\par \par          Today: mz=196, had some N and distension the day after Thanksgiving\par                      Was having BMs and passing gas, went of liquids x 1-2 days--> resolved\par \par * Abd pain-->no\par * Nausea--> no\par * Vomit--> no\par * Early satiety--> no\par * Belching--> no\par * Hiccups--> no\par * Regurgitation--> no\par * Acid Taste / Water Brash--> no\par * Ht burn--> no\par * Dysphagia--> no\par * Throat Clearing--> no\par * Hoarseness--> no\par * Post-Nasal Drip--> no\par * Congestion--> no\par * Globus--> no\par * Cough--> no\par * Wheeze / PC-> -no\par * BMs: # 4 qd\par * Constipation--> no\par * Diarrhea--> no\par * Bloating--> no\par * Strain on Defecation--> no\par * Incompl Evac--> no\par * Flatulence--> no\par * Gurgling--> no\par * Melena--> no\par * BPBPR-> -no\par * Anorexia--> no\par * Wt. Loss--> no\par \par \par   \par \par \par \par \par \par        PRIOR HISTORY--2017\par \par        1/17/17: Hgb=9.2, ESR=6, CRP=5; 1/19/17: BMs: #4, 5-6, 2-3x/D, Nr=589, Started Creon 1 tid cc\par        3rd Entyvio beginning Feb\par        2/14/17: Labs; Hgb=9.6, MCV=97, ESR=5, CRP< 5, C19=861, FA>23, Iron=59, Retic =3.2,\par        2/17/17 Fecal Calprotectin=16, Fats: Increased neutral & Split\par        3/13/17: Labs Hgb=9.5, MCV=95, ESR=7, CRP <5, ALB=3.1\par        3/16/17: lot of stress, to move -[place], had a job-fell thru, BMs: # 5, 2-4 x/D, wt= 131w clothes\par        4/19/17 EGD: gastritis, MACKENZIE, No HP, 2cm HH, +Barretts, No Dysplasia\par        Colonoscopy: Anastamosis: open w mild -mod active colitis, enteritis severe adj to anastomosis, mild more proximal, remainder of colon-wnl, 2-3 deg int hemorrhoids\par        4/26/17 : Hgb=7.3, MCV=95, ESR=13, CRP<5;Immediately post procedure stools very dark on eliquis/iron.\par        Admitted to [facility]: Hgb=8.3-->8.9 after 2 U, Alb 3.3, BUN=17, creat=1.3, Malina/Shhlv=109/460\par        4/27/17: Alb=2.3, BUN=12, creat=1.2, Malina/Lipase=209/863-No abd pain, N/V; 5/5/17: Hgb=10.7.\par        5/8/17 Entyvio iv. 5/8-5/9 w dark red diarrhea; 5/10/17 Hgb=7.8.\par        5/11/17 Adm [facility] w stools brown, Hgb=7.9, BUN=17, Creat=1.4, Alb=2.9; S/P 2 Units- Hgb=10.6, BUN=15/1.1.\par        Prednisone 40mg qd, entocort d/dee.; 5/18/17: Hgb-10.4, hl=810, BMs: #5, 1-2x/D, no pain\par        6/8/17: Hgb=7.4,MCV=98, CRP<5-ASA stopped, Pred20--> 30\par        6/10/17: Hgb=8.2, Alb=2.9, BUN=20/1.2 ; 6/12/17: Hgb=8.3, Retic=0.19, Iron=10, Sat%=3, Ferritin=57, FA=23,C07=677, 6/13/17: s/p 2 Unit PRBCs\par        6/15/17: started MCT oil, Qe=381 , BMs: # 5, 1-2x/D, stools are brownish/green \par        7/11/17: [facility] w unsteadiness. MRI Head: R Cortical Temporo-occipital encephalomalacia, MRA H&N-no sign lesions, PER-wnl.Hgb=9.9--> 8.4->9.7 after 1 Unit. Seen by Heme: received IV Iron \par        CRP<5, J66=008, TFJ=653, FA>23,Iron=22,Sat%=6, Ferritin=42, Alb=3.5. D/C on warfarin 2mg\par        7/20 Hgb=8.5, 7/28 Hgb=7.7, 7/31-s/p 1 Unit \par        As outpt seeing Heme, to get IV Iron and 5 IV Copper\par        Had 'FLU' Fever=101, chills, bloat, N/V/D, Bilious. no pain, melena,brbpr\par        Given anti-emetic by dr Butler,then able to keep things down\par        On prednisone 25, warfarin w INR bet 1.6-2\par        8/17/17: Hgb=9.9, ESR=20, CRP-P,Ko=560, BMs: # 5, 1-2x/D, stools are brownish/green\par        10/2/17: Hgb=8.8, MCV=89,Phos=1.7, K=3.9, Mag=1.9, Alb=3.6,Iron<10,Ferritin=21, INR=2\par        10/17/17: Hgb=7.9,ESR=6,CRP<5, INR=1.6\par        10/25/17: Hgb=10, ESR=5, CRP=5, Alb=3.6, Phos=2, Uwddstrh=635, P37=127\par        11/16/17: Hgb=11.2, ESR=14, CRP=12, \par        11/13/17: MRE-Chr mild distension of distal & vladislav-ileum s/o mild stricturing at anast, mild mural thick & mucosal enhancemt ~ 20cm; little change from 2015 c/w mild acute on chr ileitis, 2.1 cm Liver hemangioma\par        11/28/17: Entyvio\par        11/29/17: Hgb=9.6, ESR=10, CRP=5.8, Alb=3.8,Ferritin-P, Iron=14,Sat=4%, Phos=2.5\par        12/19;17: Hgb=10.1, ESR=12, CRP=6.5; 12/21/17: BMs:#3-5, 1-3x/D , brown, no blood, no pain, vo=349\par        1/3/18:Hgb=11.7, ESR=19, CRP=6.5, Alb=4.1, Fpidxcmx=299, Iron=31, Sat%=9,Zinc=55\par \par \par        PRIOR HISTORY---2013:\par \par        2/20/13 CT markedly dilated sb loops ext to anast, colonoscopy w open anast ,mild-mod remy-anastamotic disease. quick response to entocort/humira--probably adhesive dis. \par        8/10/13 w GNR bacteremia, ADA 1.7 /KAYLAH 3.4, Humira 8/7, 8/13,8/18,9/15\par        CT- mucosal thickening and spiculation of the distal sb extending to the anastamosis, thickening and stranding of adj mesenteric fat.\par        Humira increased to 40mg weekly, entocort 4\par        9/2013 MRE--dilated loops in mid and distal ileum, markedly thickened and narrowed TI w decreased peristalsis of TI\par        11/15/13 ADA-24.9, KAYLAH-0\par \par \par        PRIOR HISTORY---2014:\par \par        2/15/14--CT dilated loops SB, loop of sb in mid abd 4.3cm w infiltrative changes in the mesentery, bowel tapers in the RLQ to the anastamosis w/o transition\par        WBC=15K, Rx w IV steroids and Abx \par        3/7/14 SBFT: last 10cm sb prox to anast mild distension and sl irregularity. In the mid portion of this loop there is a mild narrowing which appears to reopen but is some what narrowed.\par        3/20/14 ADA=33.1, KAYLAH=0, Lialda switch to Apriso 4 (2/2014)\par \par \par        PRIOR HISTORY--2015\par \par        1/11/15 Adm [facility] w 1 day N,Recurrent V, Abd pain/distension. CT-multiple distended & fluid-filled sb loops, mild wall thickening of ileum w No inflamm changes.\par        WBC=14.6, Hgb=17, RX w NGT suction, Levaquin iv, Solumedrol 40mg q 12( 1/12-->1/16) to prednisone 30mg BID w 5mg taper/wk\par        3/18/15 --Dr. Butler w edema /High BP, prednisone was tapered slowly to 2.5mg \par        switched to entocort 9mg qd, edema and bp improved w lasix 20mg \par        BMs:#4-5, 3x/D, Hgb=11, ESR=4, CRP<5\par        4/28/15 - 5/1/15: Adm [facility] w 1 day Abd pain, distension,N/V. 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 side of anast, opening appeared ulcerated,scarred & severely narrowed\par        6/11/15: [facility] w N/V x1, decr appetite, CT ABD: no obstruction, dilated thickened sb loops of distal jej and ileum\par        6/19/15 [facility]: s/p Lap w extensive lysis of adhesions, hepatic flex, sigmoid and sb anastamosis, s/p partial r colectomy and sb resection--side to side elisabeth: 8-10 inch from prior anast to TV colon.\par        7/17/15: BMs:#4-6, 4-5x/D, wt 131(from 126)\par        8/20/15: BMs: #4, 1-2x/D or #5, 2-3x/D, zn=027, dry cough,Hgb=10, WBC=3, DBM=869, CRP=56, ESR=21\par        8/25/15 CT Abd/Pelv: Svl RLQ sb loops w wall thickening, mild nodularity & inflamm stranding in mesentery\par        Advised-restart Entocort 9mg qd, Apriso 4 qd, Maintain Humira but given RX to check drug/Ab levels\par        9/15/15: did nt restart meds,Hgb=9.9, WBC=4, ESR=19, CRP=5.8, fi=911; [facility] : ADA=8.5, Antibodies< 1.7\par        10/15/15: No pain, BMs:#4, 1-2x/D, #5-6, 3-4x/D, throat clearing/cough-better, ua=234\par        11/30/15: No pain, BMs:# 4, 5-6 intermittently, No throat clear, wg=553\par \par \par        PRIOR HISTORY-2016\par \par        1/22/16; 4/8/16; 6/6/16 : No pain, BMs:# 4-5 1-2x/D, also #5-6 3x/D for 2-3D/wk, jn=885\par        7/14/16: nu=087, 9/22/16: gf=911.5\par        11/10/16: 9.5lb wt loss, states BMs: 5-6, 5x/D--Taking Magnesium, No pain/anorexia\par        Labs: Stool Rynzhhgwtpxq=039, + Incr Fecal fat, Alb=3.4, FA =23, K96=165, Hgb=12, ESR=10, CRP <5\par        Magnesium-d/c, Obtain MRE asap--Start steroids and possibly switch to Entyvio\par        DDx discussed: active crohns--loss response to Humira, Malabsorption--loss Bile acids, Bile-induced diarrhea, SIBO\par        Pt wanted to wait for imaging-did not start rx\par        12/1//16 MRE: RUQ ileocolonic anastamosis--narrowed w upstream dilatation to 3.2 cm\par        Pt refused pred, Started Entocort 9mg qd and Flagyl 250mg qid about 7-10days ago \par        awaiting Entyvio load to start 12/22, then 1/5; had Cut back on iron bid\par        12/15/16: BMs:# 5, 2-3x/D, occas #6, No pain, Less bloat/flatulence, Fi=477.

## 2022-12-19 NOTE — CONSULT LETTER
[FreeTextEntry3] : Angie Biswas MD\par [facility] [place] [facility]\par

## 2023-01-26 ENCOUNTER — LABORATORY RESULT (OUTPATIENT)
Age: 58
End: 2023-01-26

## 2023-01-30 ENCOUNTER — RESULT REVIEW (OUTPATIENT)
Age: 58
End: 2023-01-30

## 2023-01-30 ENCOUNTER — APPOINTMENT (OUTPATIENT)
Dept: ENDOCRINOLOGY | Facility: CLINIC | Age: 58
End: 2023-01-30
Payer: COMMERCIAL

## 2023-01-30 ENCOUNTER — APPOINTMENT (OUTPATIENT)
Dept: HEMATOLOGY ONCOLOGY | Facility: CLINIC | Age: 58
End: 2023-01-30
Payer: COMMERCIAL

## 2023-01-30 VITALS
OXYGEN SATURATION: 98 % | HEART RATE: 87 BPM | BODY MASS INDEX: 21.1 KG/M2 | SYSTOLIC BLOOD PRESSURE: 125 MMHG | DIASTOLIC BLOOD PRESSURE: 85 MMHG | TEMPERATURE: 97.3 F | WEIGHT: 139.2 LBS | HEIGHT: 67.99 IN | RESPIRATION RATE: 16 BRPM

## 2023-01-30 VITALS
HEART RATE: 84 BPM | DIASTOLIC BLOOD PRESSURE: 80 MMHG | BODY MASS INDEX: 20.92 KG/M2 | SYSTOLIC BLOOD PRESSURE: 132 MMHG | OXYGEN SATURATION: 99 % | WEIGHT: 138 LBS | HEIGHT: 68 IN

## 2023-01-30 LAB
24R-OH-CALCIDIOL SERPL-MCNC: 85.7 PG/ML
25(OH)D3 SERPL-MCNC: 144 NG/ML
CALCIUM SERPL-MCNC: 9.2 MG/DL
MAGNESIUM SERPL-MCNC: 1.8 MG/DL
PARATHYROID HORMONE INTACT: 96 PG/ML
PHOSPHATE SERPL-MCNC: 3.1 MG/DL
T4 FREE SERPL-MCNC: 1.5 NG/DL
THYROGLOB AB SERPL-ACNC: <20 IU/ML
THYROGLOB SERPL-MCNC: <0.2 NG/ML
TSH SERPL-ACNC: 1.5 UIU/ML

## 2023-01-30 PROCEDURE — 99215 OFFICE O/P EST HI 40 MIN: CPT

## 2023-01-30 PROCEDURE — 99214 OFFICE O/P EST MOD 30 MIN: CPT | Mod: 25

## 2023-01-30 PROCEDURE — 36415 COLL VENOUS BLD VENIPUNCTURE: CPT

## 2023-01-30 NOTE — ASSESSMENT
[FreeTextEntry1] : 1.  Anemia- Hg 13.6\par  -blood loss, anemia of chronic disease with need for intermittent iron  \par - copper deficiency - replaced, level WNL 5/2020\par - hospitalized with flare up of colitis, got one iv iron in the hospital- started Straterra IV at the end of Oct- will begin self inj Dec 2021\par - Injectafer in 2022- x 6 ( March, June , November) \par - ferritin -Dec 197,  repeat today \par -EGD Sept 2022 \par \par 2. Osteoporosis- having BMD today \par - left ankle- stress fx- advanced  osteoporosis, patient with h/o steroids use\par - completed Forteo 18/24 m\par - calcium level stable,IV calcium 1-2 times per month\par \par  3.TIA with MTHFR and h/o DVT x 3 with cryptogenic CVA  \par not a candidate for DOAC b/o small bowel resection\par - INR home monitoring of warfarin \par - Warfarin 3 mg x 6, 1.5mg x 1, INR- INR 2.3  - continue \par \par 4. Hypogammaglobulinemia- no increased infection rate \par - s/p  Prevnar 13 12/2018 \par -  Pneumococcal vaccine 23 boost \par - s/p  Shingrex\par -  COVID vaccine - Pfizer x 4\par - Flu shot \par \par 5. Zinc deficiency\par - oral Zinc, level better - 72\par \par 6. 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 No evidence of lymphatic invasion. 8/10 LN's for eli metastatic papillary thyroid cancer\par -received iodine 131 at 29.3 mCi in October 2020\par -On synthroid\par -Due for US Head and soft tissue neck May 2022- follows with Dr. Akers\par \par Labs next visit- PT, INR, irons, cbc and chem \par Blood drawn in office. Ordered and reviewed.\par \par \par  return in 8 weeks cbc chem irons, INR- case and mgmt discussed with Dr. Biswas \par \par \par

## 2023-01-30 NOTE — PHYSICAL EXAM
[Alert] : alert [Well Nourished] : well nourished [No Acute Distress] : no acute distress [Well Developed] : well developed [Normal Sclera/Conjunctiva] : normal sclera/conjunctiva [EOMI] : extra ocular movement intact [No Proptosis] : no proptosis [Normal Oropharynx] : the oropharynx was normal [No Neck Mass] : no neck mass was observed [Supple] : the neck was supple [Well Healed Scar] : well healed scar [No Respiratory Distress] : no respiratory distress [No Accessory Muscle Use] : no accessory muscle use [Normal S1, S2] : normal S1 and S2 [Normal Rate] : heart rate was normal [Regular Rhythm] : with a regular rhythm [No Edema] : no peripheral edema [Pedal Pulses Normal] : the pedal pulses are present [Normal Anterior Cervical Nodes] : no anterior cervical lymphadenopathy [No Spinal Tenderness] : no spinal tenderness [Spine Straight] : spine straight [No Stigmata of Cushings Syndrome] : no stigmata of Cushings Syndrome [Normal Gait] : normal gait [Normal Strength/Tone] : muscle strength and tone were normal [No Rash] : no rash [Normal Reflexes] : deep tendon reflexes were 2+ and symmetric [No Tremors] : no tremors [Oriented x3] : oriented to person, place, and time [Acanthosis Nigricans] : no acanthosis nigricans

## 2023-01-30 NOTE — CONSULT LETTER
[Dear  ___] : Dear  [unfilled], [Consult Letter:] : I had the pleasure of evaluating your patient, [unfilled]. [Please see my note below.] : Please see my note below. [Consult Closing:] : Thank you very much for allowing me to participate in the care of this patient.  If you have any questions, please do not hesitate to contact me. [Sincerely,] : Sincerely, [DrMeron  ___] : Dr. REARDON [FreeTextEntry3] : Angie Biswas MD\par [facility] [place] [facility]\par

## 2023-01-30 NOTE — HISTORY OF PRESENT ILLNESS
[0 - No Distress] : Distress Level: 0 [de-identified] : Patient is a 52 year old who is referred for initial consultation for anemia secondary to Crohns/GI bleed vs Iron Deficiency/ Vit b12 def. 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 Patient is status post ileo-colonic resections for SBO  in 2016. In 2016 patient had complaints of 10 - 15 lb weight loss. Labs at that time showed Hgb of 12, steatorrhea and stool calprotectin = 236. In 2016 MRI/MRE with narrowing at ileocolonic anastomosis with upstream dilation. Pt refused bridge with  prednisone and Entocort / Flagyl. In 2017 patient Hgb dropped to 9.5. On 2017 patient underwent an EGD and Colonoscopy. Findings consistent with Page's and no dysplasia or AVMs. Colonoscopy showed an open anastomosis but active disease, moderate on the colonic side and limited to the anastomosis and moderate to severe enteritis, just proximal to the anastomosis. Pat had routine labs on 05/10/2017 Hgb: 8.3.  [FreeTextEntry1] : s/p injectafer, copper\par warfarin [de-identified] : Patient presents for follow up of DVT, anemia, MTHFR Gene mutation, colitis.  Patient overall feels well.

## 2023-01-30 NOTE — DISCUSSION/SUMMARY
Radiation Oncology Follow-Up Visit    Date of Visit: 10/9/2019    Associated Physician: Dr. Joiner    Cancer Diagnosis:  [id number] daksha 7 prostate cancer s/p prostate fossa radiation in 2015, now with biochemical progression and PET avid pelvic LN. PSA 1.8 ng/ml    History of Prior Radiation Therapy:    Course Start / End Dates:      7/25/2019 - 8/28/2019          Course Prescriptions:     Course ID Plan ID Rx Dose (cGy) Fraction   C1 PrstBedLN* 5,625 25 / 25         Prostate Fossa RT completed 3/25/15, total dose 6600 cGy/33 fractions       Interval History:  Pleasant 66 year old who returns for follow up s/p pelvic radiation therapy for biochemical progression of prostate adenocarcinoma and PET avid pelvic lymph node. In the interval time, he is doing well.  No diarrhea or urinary symptoms.    No skin concerns.  His fatigue is resolving.  His post treatment PSA is:  PSA, Total (ng/mL)   Date Value   10/05/2019 0.58     His last Lupron injection was 8/12/19.  He will have this repeated in November with PSA prior to his office visit with Dr. Cunningham.  His anticipated lupron duration is 6-12 months per Dr. Cunningham's note.  His current AUA score is 8, but patient notes he empties his bladder frequently on purpose, not due to side effects or urinary issues.    He underwent genetic testing and a pathogenic variant in BRCA2 WAS DETECTED, c.6444dupT, ([id number]).  He denies any bone pain, specifically no pain in the left femur or hip.    Past Medical History:  Past Medical History:   Diagnosis Date   • BRCA2 positive 8/19/2019   • Chronic foot pain    • Elevated prostate specific antigen (PSA) 3/17/2014   • Epigastric hernia 10/2011   • Gout    • History of blood transfusion ~2006    knee surgery   • Hyperlipidemia    • Hypertension    • Kidney stone    • Malignant neoplasm (CMS/HCC)     prostate   • MRSA infection 11/4/2014    skin    • Neuritis of foot    • Obstructive sleep apnea on CPAP     uses CPAP   • Old  on the PSA value.  His Lupron injection was 8/12/19 and he will be seeing Dr. Cunningham in follow up in November with PSA prior.     He underwent genetic testing and a pathogenic variant in BRCA2 WAS DETECTED, c.6444dupT, ([id number]).  Breast exam today revealed mild tenderness in the left breast, no discrete masses.  I suspect this is due to Lupron.  We reviewed NCCN guidelines and discussed self breast exams.  I demonstrated the technique.  I discussed a referral to Dr. Booker, but patient declined.   His PET scan 5/1/19 did demonstrate low level uptake within the left femur, however, there were not correlating CT findings and the bone scan was negative in that region. Discussed with Dr. Joiner and at this time will continue to monitor PSA and consider further imaging if PSA rises.  Follow up in 6 months with me with serum PSA  Survivorship visit performed today as well.    .At least 20 minutes were spent face-to-face with the patient with greater than 50% of the time dedicated to education, counseling, reviewing the patient's medical record and/or coordination of care.     [FreeTextEntry1] : Patients ferritin dropped to 60 from 148- Hgb 13.6- to receive venofer 400 mg x 2 per Dr. Evans

## 2023-01-30 NOTE — END OF VISIT
[Time Spent: ___ minutes] : I have spent [unfilled] minutes of time on the encounter. patient representative

## 2023-02-06 ENCOUNTER — APPOINTMENT (OUTPATIENT)
Dept: GASTROENTEROLOGY | Facility: CLINIC | Age: 58
End: 2023-02-06
Payer: COMMERCIAL

## 2023-02-06 VITALS
HEART RATE: 91 BPM | DIASTOLIC BLOOD PRESSURE: 78 MMHG | BODY MASS INDEX: 20.92 KG/M2 | HEIGHT: 68 IN | SYSTOLIC BLOOD PRESSURE: 116 MMHG | WEIGHT: 138 LBS

## 2023-02-06 PROCEDURE — 99215 OFFICE O/P EST HI 40 MIN: CPT

## 2023-02-06 NOTE — ASSESSMENT
[FreeTextEntry1] : 1. CROHNS DISEASE   of both small and large intestine: s/p flare 8/25-->8/30/21, then  early 12/2021-s/p pred tapered over 4 weeks, again the weekend 4/9/22 rx w prednisone 40_  mg ( just before last Stelera dose) -->\par now off since early 5/2022\par    s/p ileocolonic resection x 2--last 6/19/15 for recurrent sbos: \par prior was s/p 4 SBOs w/i 2 yrs--2/2 distal sb disease adj to anastamosis\par when on Humira weekly had an  ADA =33 (3/20/14), -but had sbo 2/2014 at ADA=25 and another sbo 4/28/15\par 6/10/2015 Colonoscopy: Inflamm ST mass on ileal side w ulceration/scarred/narrow\par 6/11/2015 CT: dilated thickened sb loops distal jej & ileum\par Post-op 6/2015 probably some malabsorption 2/2 to removal of R Colon and ileum: \par had WT. Loss-->r/o malabsorption:  ?bile-induced->-secretory vs decr micelles, active dis, PLE\par ?  SIBO--Elev FA, Alb=3.4-->3.1-->2.9--> (7/28/17)\par ?SIBO/Prevent post-op recurrence --> trial Flagyl 250 mg qid~12/7/16-->d/c: 5/11/17\par Also discussed trial of Cholestyramine/Xifaxin -wanted to wait\par 11/20/16 ACTIVE Crohn's-->  9.5lb wt loss, BMs: #5-6, 5x/D-- but taking Magnesium \par 12/1/16 MRE: RUQ ileocolonic anastamosis--narrowed w upstream dilatation to 3.2 cm\par Labs: Stool Xpxolghwqpxw=588, + Incr Fecal fat, Alb=3.4, FA =23, P06=084, Hgb=12.3,ESR=10,CRP <5\par 4/19/17 :Colonoscopy: Anastamosis: open w mild -mod active colitis, enteritis severe adj to anastomosis, mild more proximal, remainder of colon=wnl\par 5/11/17- Adm PMHC w stools brown, but Hgb=7.9, BUN=17, Creat=1.4, Alb=2.9.\par S/P 2 Units w Hgb=10.6, BUN=15/1.1, Prednisone 40mg taper, entocort d/dee\par 6/8/17: Hgb=7.4, MCV=98, CRP<5--ASA stopped, Pred20--> 30mg, 6/13/17: s/p 2 Unit PRBCs\par 7/11/17 [facility] w unsteadiness. MRI Head: R Cortical Temporo-occipital encephalomalacia\par Hgb=9.9--> 8.4->9.7 after 1 Unit. Seen by Heme: received IV Iron \par CRP<5, O22=166, PJF=056, FA>23,Iron=22,Sat%=6, Ferritin=42, Alb=3.5. D/C on warfarin 2mg\par 7/28/17 Hgb=7.7; 7/31/17-s/p 1 Unit \par Heme Consultation ~ 8/2017: started  IV Infusions of  Iron and  IV Copper\par 8/17/17: Hgb=9.9, ESR=20, Hw=348--Tmaleacyqe s/p GI infection w N/V/D, no obstruction\par 10/17/17: Hgb=7.9,ESR=6,CRP<5, INR=1.6, Got IV Iron x 2, since 10/2/17 for Iron<10, Hgb=8.8\par 11/16/17: Hgb=11.2, ESR=14, CRP=12, 10/26/17 Stool fat-neg, calprotectin-30\par 11/13/17: MRE-Chr mild distension of distal & alfredito-ileum s/o mild stricturing at anast, mild mural thick & mucosal enhancemt ~ 20cm; little change from 2015 c/w mild acute on chr ileitis, 2.1 cm Liver hemangioma\par 10/9/18: Hgb=11.6, MCV=94, ESR=14, CRP=5.4, INR=2.2\par 10/10/18 MRE: stricturing of neoterminal ileum w worsened upstream dilatation, moderate length of upstream ileum w stricturing extending at least 20cm and more extensive then previous. suggestion of 2 adj extraluminal fluid collections which may be w/i the wall of strictured ileum near the ileocolonic anastamosis\par pt had declined to call numbers given for  IBD center at [facility] for new or investigational rx's--biologics.  \par later he felt  he was  stablizing w his  wt loss, and inflammatory markers\par \par 2/28/19 w ESR=12, CRP=6.5 & 2/21/19 stool calprotectin--> 232\par 8/15/19: ESR=10, CRP=5.2, Calprotectin--> 88\par 9/19/19: ESR=9, CRP=5.5\par 10/17/19: ESR=7, CRP< 5\par 11/6/19 : ESR=6, CRP=0.18\par 11/27/19: ESR=6, CRP <5\par 1/13/20: ESR=7, CRP=0.2\par 1/30/20: ESR=9, CRP=11\par \par 2/3/20 EGD: gastritis, +MACKENZIE, No HP, +erosion w ooze -clipped, 0.5cm polyp-neg; 4cm HH, Esophagitis A, + Barretts, VC: 1+\par Colonoscopy: 05cm rectal polyp: HP, 2nd deg int hemorrhoids\par mild-mod activity: MARILY w cryptitis & mild archect distortion at ileocolonic anast: colon & ileal side, no stricture\par \par 3/2/20:ESR=7, CRP=0.26\par 3/9/20: VDZ>40, Ab to VDZ <1.6\par 3/17/20: ESR=6, CRP=6.4\par 10/17/20: ESR=11, CRP <5\par 1/4/21:ESR=9, CRP<5\par 2/22/21: ESR=8, CRP<5\par 4/5/21: ESR=9, CRP<5\par 6/4/21: ESR=9, CRP<5  \par 6/11/21: wt= 135,   no pain, stool more formed # 4, 1-2x/d \par              s/p episode --abd distenstion, pain, N/V, no BM\par              eventually started having diarrhea and flatus, BMs returned to normal\par              lost 2-3 lbs but regained\par 7/4/21: CRP <5, Hgb=12\par  7/16/21 MRE: limited to incomplete bowel distention, chr distal ileitis/probable inflammatory stricturing vs collapse of the alfredito-TI--but improved since 2018 , moderate proctitis\par 7/12/21   --  ? flare --> entyvio should have been  q 5 wk , went q 8 wks\par                      next dose should be in 4 weeks x 2 then 5 weeks, to check levels\par 7/20/21: Cliically stable, unclear if MRE accurate 2/2 underdistension, but gained wt and CRP--normal\par 7/23/21 Entyvio level= 39, Abs <1.6\par 8/23/21: Labs--Hgb=13.6, ESR=10, CRP=6.6, Bgedehrj=416, Iron=26, Alb=3.9, BUN=16\par 8/26/21 p/w sbo  w N/V, abd pain/bloating  x 3 days, unable to keep things down\par Labs: WBC=5, Hgb=12, CRP=43, ESR=11, Alb=4.4, BUN=28, Creat=-1.6\par CT Abd/Pelvis: diffuse marked dilatation of SB Loops w transition pt in R. mid/lower abdomen\par ~ 5-6cm, proximal to the ileocolonic anastomosis\par RX w IV solumedrol 20mg q8h, NGT, IVF, pt refused TPN, also w UTI from prostatitis\par 8/27 NGT was pulled, and clears started and advanced to LR,  was d/c home 8/30 on prednisone\par 40mg qd w taper by 10mg/wk to 20mg qd \par \par \par (  **Entyvio's  :time 0=12/22/16 ( Humira 40mg QW); 1/5/17--2wks, s/p 3rd infusion at wk 6, then q 6weeks \par #6 :6/21/17-->held 2/2 Shingles, then  Infusions as follows=9/19/17 , 10/17/17, 11/28/17, 1/16/18 ,2/16/18, 3/19/18,4/16/18, 5/14/18, 6/25/18, 8/7/18, 9/17/18, 10/16/18, 11/13/18, 12/11/18, 1/14/19, 2/15/19, 3/15/19, 5/16/19, 6/18/19,7/24/19, 9/9/19, 10/2/19, 11/4/19, 12/12/19, 1/6/20, 2/4/20, 3/3/20, 9/17/20, 10/15/20, 11/18/20, 1/11/21, 2/8/21, 3/8/21, 4/8/21, 6/3/21, 7/1/21, 8/2/21, 9/2/21,10/25/21  )\par \par 3/8/22 MRI Abd/Pelv: thickened distal Jejunal loops which are tethered; distal ileal segment  (10-12cm ) previously actively inflammed has decreased & now characterized by an area of stricture, however the alfredito-TI  5mm to the anastamosis is enhanced as well as narrowed.  Colon just  distal to the anastamosis has a focal segment of inflammation & stricture.  the recto-sigmoid appears inflammed \par 3/28/22: Hgb=14.4 , ESR=1, CRP <5, Ca=8.5, Mag=1.7,Alb=4.1, Iron=104, Pvykipnv=915,\par  5/9//22: Hgb=13.8 , ESR=2, CRP <5, Ca=8.8, Mag=1.6, Alb=4, Iron=64, Dqclaviz=804  \par 5/17/22 Dr. Heard Colonoscopy: ped scope passed w/o resist in alfredito-TI, one single apthae w psuedopolyp.\par  6/21/22 Labs: Hgb=14, ESR=2, CRP<5,  Alb=4.5, Phos=0.8, Mag=1.8, Ca=8.5,Afcgpvbe=170, Iron=86\par  7/25/22 Labs:  Hgb=14, ESR=2, CRP<5, Alb=4.2, Phos=1.5, Mag=1.8, Ca=8.6, Agkgfnwr=779, Iron=57, PT=24, INR=2.1  10/17/22 Labs: Hgb=12.7, ESR=2, CRP<5, Alb=4.3, Phos=2.1, Mag=1.8, Ca=9, Ferritin=57, Iron=85, PT=23, INR=2     12/16/22 : Labs: Hgb=10.8 , ESR=1, Alb=3.8, Phos=1.6, Mag=1.7, Ca=8.9 , ALP=68, Bcyiwakh=955, Iron=67, INR=2.3     1/30/23: Labs:   Hgb=13.6 , ESR=5, CRP<5,  Alb=4.4, Phos=3, Mag=1.8, Ca=9.5, ALP=79,Ferritin=55, Iron=56,  \par    \par A / PLAN:  s/p sbo prox to anastamosis\par                  inflammatory vs fibrosis vs combination: 3/2022 recent MRI shows improvement of inflammation w      possible residual stricture in the ileum and probable colitis near the anastamosis and distal colon \par      Responded to  steroids iv--> po--d/c ~ 10/20/21,\par      tapered steroids from 40mg qd  to 20mg, [id number] qd and  taper 5mg/wk\par      then po taper again early 12/2021 40mg qd w 10mg taper/ wk--\par      PO prednisone 40mg mg qd started on 4/9/22 ,tapered off ~ early 5/2022\par \par      Entyvio level was 39 w/o Abs~ 3weeks after 7/1/21 dose , \par      speaks to inflammatory component & probably loss of response\par      Stelara # 1 dose on 10/25/21, #2 on 12/10/21, # 3 on  2/11/22,  # 4 on 4/20/22 , #5 mid June 2022, \par                   #6 mid 8/2022, #7 beginning 12/2022;   last dose--> \par \par       IBD consensus : due to malnutrition /steroid dependency, surgery is next best option \par       but pt reported feeling well and wanted to pursue medical treatment \par      repeated  imaging in March after 3 months of Stelara, then consider surgery vs improved candidacy for                endoscopic manipulation\par         f/u w  IBD consultant Dr. Heard at   & reviewed imaging after 3 months of Stelera\par        for  Colonoscopy ( w possible balloon dilatation )-> last 1 3/4 yrs ago\par    5/17/22 Dr. Heard Colonoscopy: ped scope passed w/o resist in alfredito-TI, one single apthae w psuedopolyp.\par     No Dilatation need, very mild dis at Alfredito-TI, MRE may show wall thickening, sbo's probably adhesive dis\par \par 1/4/22 Labs: Hgb=11.5, ESR=6, CRP<5, Alb=3.9\par 1/31/22 Calprotectin =84\par 2/14/22: Hgb=10.6, ESR=9, CRP <5, Ca=8.3, Mag=1.6, Alb=3.8, Iron=25, Ferritin=15\par 3/28/22: Hgb=14, ESR=1, CRP<5 , Ca=8.5, Mag=1.7, Alb=4, Iron=104, Wwxuzkkp=534\par 5/9//22: Hgb=13.8 , ESR=2, CRP <5, Ca=8.8, Mag=1.6, Alb=4, Iron=64, Txoqbygx=810  \par  6/21/22 : Hgb=14, ESR=2, CRP<5,  Ca=8.5, ,Alb=4.5,  Mag=1.8, Iron=86,Cbrvnvsg=710,\par  7/25/22 :  Hgb=14, ESR=2, CRP<5, Alb=4.2, Phos=1.5, Mag=1.8, Ca=8.6, Slxufucw=565, Iron=57\par 10/17/22:  Hgb=12.7 , ESR=2, CRP<5, Alb=4.3, Phos=2.1, Mag=1.8, Ca=9, Ferritin=57, Iron=85\par  12/16/22 : Labs: Hgb=10.8 , ESR=1, Alb=3.8, Phos=1.6, Mag=1.7, Ca=8.9 , ALP=68, Idhisyor=374, Iron=67,\par  1/30/23: Labs:   Hgb=13.6 , ESR=5, CRP<5,  Alb=4.4, Phos=3, Mag=1.8, Ca=9.5, ALP=79,Ferritin=55, Iron=56,  \par \par Probiotics 3 qd \par Diet:  LR, Lactose free, protein drinks tid\par MCT oil begun but never maintained \par **trend --cbc, esr, crp, albumin, calprotectin \par   \par **monitor wt--- usu bet 136-139, 7/20/21=136, 11/22/21=139, 1/4/22=132, 2/22/22=132, 4/5/22=131,5/10/08=937,\par                         6/21/22=133, 7/27/22=136, 12/19/22=138;   ymnok=024\par \par \par                                                                                                                                                                                                                                                                                                                                                                                                                                                                                                                                                                                                                                                                                                                            \par 2. Iron deficiency anemia : \par  had initially dropped , after clinical flare and post procedure bleeding\par Probably multifactorial: ACD, Blood loss, malabsorption/nutrient deficiencies\par Eliquis-->warfarin after CVA, On Entyvio \par 7/11/17 s/p  Adm for unsteady gait w MRI c/w CVA--warfarin begun: possible better control of AC\par Chromagen 2 qd--> IV Iron , s/p IV Cu x 5, FA 1mg qd , B12 SL & IM\par Still requiring IV Iron and cu infusions prn \par most recent IV Iron infusion  :  5/2022 for  Wndzvscr=564 on 5/9/22 \par 2/22/21: N81=761, \par 6/4/21: Cu=91, Zinc=69, Ferritin=32,Iron=43, \par 7/12/21: Uj=664, Zinc=74, Ftijwmwk=979, Iron=35\par 11/15/21 : Hgb=12,  Ferritin =104, Ca=9, Mg=1.7\par 1/4/22: Hgb=11.5, Ca=8.8, Mg=2.9, Gfsyvtk=761 \par 2/14/22: Hgb=10.6, Ca=8.3, Mag=1.6, Alb=3.8, Iron=25, Ferritin=15\par 3/28/22: Hgb=14.4 , Ca=8.5, Mag=1.7,Alb=4.1, Iron=104, Zvrygmte=255,\par  5/9//22: Hgb=13.8 , Ca=8.8, Mag=1.6, Alb=4, Iron=64, Nsnggmzz=953  \par 6/20/22: Hgb=14.4  , Ca=8.5, Mag=1.8, Alb=4.5, Iron=96, Kyphnnnr=817\par 7/25/22 Labs:  Hgb=14,Ca=8.6,  Mag=1.8,Alb=4.2,Iron=57 Ffmqintv=481, , PT=24, INR=2.1\par 10/17/22 Labs: Hgb=12.7,Ca=9,  Mag=1.8,Alb=4.3,Iron=85 Ferritin=57, , PT=23, INR=2\par 12/16/22 : Labs: Hgb=10.8 , Alb=3.8, Iron=67,Zfmhtdjr=317, , INR=2.3  \par 1/30/23: Labs:   Hgb=13.6 , Ca=9.5, Mag=1.8, Alb=4.4,,Ferritin=55, Iron=56, INR=1.6 \par B12 1cc IM ____L. delt--  given today, \par trend cbc, B12, FA, Iron panel \par Adj INR--closer to low 2's.    \par \par \par \par \par 3. Osteoporosis \par : Progression on BD from 5/5/16\par Crohns, h/o steroid use\par Calcium citrate & Vit D per Endo\par Forteo since 5/10/19 , then  Reclast          \par Repeat BD of 8/2018 --showed worsening to Osteoporosis from 2016\par Rec Endo consult w Dr. Akers :Osteoporosis center at [facility]--pt never went originally \par 9/21/18: Phos=2.4, Ca=8.7, Alb=3.4, Vit D=27, DXI=736, \par 10/16/18: Phos=2.8, Ca=8.7, Alb=3.5,\par 1/14/19: Phos=2.6,  Ca=8.7, Alb=3.6\par 2/28/19: Phos=1.8, Ca=8.9, Alb=3.7, VitD=39, BFM=009\par 3/18/19: Ca=8.5, Alb=3.7, Vit D=44.6, PTH=93\par 7/11/19: Ca=9.1, Alb=3.7, Phos=3.9, Vit D=40/ 306, HOH=033\par 8/15/19: Ca=9, Alb=4.2, Phos=4.4, VitD=59, TLQ=078\par 10/17/19: Ca=9.6, Alb=3.9, Phos=3.5\par 11/6/19: Ca=9.1, Alb=3.9. Phos=3.7, VitD=50, PTH=80\par 1/13/20: ca=9.1\par 1/30/20: Ca=9.2, Alb=3.9, Phos=2.7, Mag=1.5, Vit D=103/41, PTH=87\par 2/18/20:ca=8.5, Alb=3.6, \par 3/2/20: Ca=8.5, Alb=3.6\par 3/17/20: Ca=9.1, Alb=3.7, Phos=3.4, Mag=1.7\par 10/17/20: Ca=8.9, Alb=4, Phos=2.3, Mag-2.0, Vit D=66, PTH=47\par 1/4/21 : Ca=9.4, Alb=4.2, Phos=2.7, Mag=2, Vit D=64, PTH=87.5\par 4/5/21: Ca=9.1, Alb=4, Phos=3.1, Mag=1.7, \par 6/4/21: ca=9, Alb=3.8, Phos=2.8, Mag=1.8\par 7/12/21: Ca=8.6, ALB=6, phosph=2.3, Mag=1.8\par 11/15/21: Ca=9, Alb=3.7, Mag=1.7\par 1/4/22: ca=8.8, Alb=3.9, Mg=2.9, phos=2.9\par 1/21/22: Ca=8.8, Alb=3.9, Vit D=60, PTH=85\par 2/14/22:  Ca=8.3, Mag=1.6, Alb=3.8, \par 3/28/22: Ca=8.5, Mag=1.7, Alb=4.1, Phos=1.2\par  5/9//22:  Ca=8.8, Mag=1.6, Alb=4, Phos=1.6 \par   5/27/22: Ca=9.2 , Mag=1.9, Alb=3.8 , Phos=2.7, PTH=72, VitD=105\par   6/20/ 22: Ca=8.5  , Mag=1.8, Alb=4.5 , Phos=0.8, COW=011, \par   7/25/22 :  Ca=8.6, Mag=1.8 , Alb=4.2, Phos=1.5, \par   10/17/22: Ca=9, Mag=1.8 , Alb=4.3, Phos=2.1, PTH=95, Vit D=25\par  12/16/22 : Labs: Ca=8.9, Mag=1.7,  Alb=3.8, Phos=1.6, \par   1/30/23 Labs:   Ca=9.5, Mag=1.8, ALP=79, Alb=4.4, Phos=3, PTH=96, Vit D =144\par Dr. Akers: Hyperparathyroidism-- probably secondary due to low Vit D/Ca & ? superimposed primary, \par [facility] ENT Consult init felt  Parathyroid scan showing activity in mediastinum is ectopic parathyroid, then felt sx not indicated at that time, elev PTH is secondary to low  Vit D/Ca\par Rx replete Vit D and current IV Calcium-as per endo \par \par trend Vit D, Ca, Phos, PTH,  \par \par BD--9/2020: incr in spine and hip , no change in wrist,\par BD--10/17/22: Decr in spine & hip, incr in wrist, repeat--> \par 10/5/20, 9/2021,6/2022-s/p Reclast--repeat--> 6/2023 \par \par \par \par \par \par 4. GERD:  recently less active by ENT , no ht burn,  dysphagia,  + throat clear\par               h/o  Dry cough, CXR:ana, saw ENT bergstein-->LPR\par * ++ LPR,  ++  Page’s w No Dysplasia,  + H/O  Esophagitis grade: A   was found \par \par Recommend: \par * Anti-reflux diet & life-style changes reviewed & re-emphasized.  ++  Bedge required\par * Weight reduction & regular exercise emphasized\par \par * need for  PPI:  Omep 40 BID--> 40mg qd  ,  ++ H2B q HS:Zantac 300mg--> Pepcid 40mg\par No Carafate  1 gram:   --was emphasized\par I reviewed & summarized the prospective randomized & retrospective non-randomized studies\par looking at potential long term SE's of PPIs, w special attention to associations & actual cause\par as related to GI infections, bone loss, cognitive changes, KD, Covid, vitamin & electrolyte deficiencies\par questions were answered, pt advised that PPIs should be used when needed as indicated by their\par clinical indication and response and tapering off is always the goal if possible. pt understood.\par \par *  F/U  EGD: --> 2024--for Barretts screening / surveillance \par * No  need for pH Monitor,   Manometry,   Esophagram --\par *  ++  need for ENT  eval/F/U,  No  need for Surgical  eval  --  \par \par \par \par \par \par 5. Hemorrhoids:  well - controlled.  No pain,  swelling,  itch,  bleeding\par * Discussed previously the potential complications of thrombosis,  pain,  infection,  swelling, itching,  bleeding \par Reccomend: \par * currently Low   - Fiber Diet was emphasized\par * 6  --  8 cups of decaffeinated fluid daily was emphasized \par * Sitz Bathes prn,   No:  Anusol HC  Suppos / Cream  MI BID -- was needed\par * No:  Tucks BID,  Balneol Lotion,   Calmoseptine Oint -- was needed ;    ++ Prep H prn\par * No:  need for  Colorectal surgical evaluation for possible ablation\par \par \par \par \par \par \par \par 6. Barretts esophagus without dysplasia  \par Notes: see GERD.    \par \par \par \par 7. Hepatomegaly-->not confirmed by recent abd sono\par CTE w relative enlargemt, ? lobulation & enlarged portal/mesenteric veins\par LFTs-wnl, no ascites or edema\par R/O CLD, Hepatic vein thrombosis\par Abd sono w doppler--> Liver and spleen normal size, no thrombosis, normal portal and hepatic vein flow\par \par \par \par \par .\par \par \par

## 2023-02-06 NOTE — HISTORY OF PRESENT ILLNESS
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 \par        GERD w Page's, Hemorrhoids, BPH, \par        S/P Ileocolonic resection 1998\par        Since then multiple SBOs\par \par \par        Got IV Iron x 2, since 10/2/17\par        10/19/17: feeling better, Kv=120 on prednisone 15mg x 3weeks, BMs Brown: #5-6, 1-2/D, occas 3-4/d\par        10/25/17: Hgb=10, ESR=5, CRP=5, Alb=3.6, Phos=2, Yoksxtdt=117, N48=982\par        11/16/17: Hgb=11.2, ESR=14, CRP=12, \par        11/13/17: MRE-Chr mild distension of distal & vladislav-ileum s/o mild stricturing at anast, mild mural thick & mucosal enhancemt ~ 20cm; little change from 2015 c/w mild acute on chr ileitis, 2.1 cm Liver hemangioma\par        11/28/17: Entyvio\par        11/29/17: Hgb=9.6, ESR=10, CRP=5.8, Alb=3.8,Ferritin-19, Iron=14,Sat=4%, Phos=2.5, Cl=616, BMs:# 5, 1-2x/D, brown,\par        12/19;17: Hgb=10.1, ESR=12, CRP=6.5; 12/21/17: BMs:#3-5, 1-3x/D , brown, no blood, no pain, yk=144\par        1/3/18:Hgb=11.7, ESR=19, CRP=6.5, Alb=4.1, Falwdetp=521, Iron=31, Sat%=9,Zinc=55\par        1/16/18: Entyvio, Hgb=11.4, ESR=10, CRP=6.9\par        1/18/18: Today: cellulitis R foot, saw dr KEITH--rx augmentum x 10D, Entyvio 1/9 to 1/16, yl=355 w clothes,BMs: # 4-5, 1-2x/D \par        2/14/18: Hgb=11.1, ESR=16, CRP=11.6, Alb=3.6, Iron=28, Ferritin=23, INR=1.5 \par        2/16/18: s/p Entyvio; Iron infusion, again on 2/27\par        2/20/18: Hgb=10.6, ESR=20, CRP=12, INR=1.7\par        2/27/18: s/p iron infusion\par        3/9/18: Dr. Burden for tenosynovitis, rx w Zorvolex: Diclofenac x 5 days--? response\par        3/14/18: Lm=974; BMs: # 5, 1-2x/D; Hgb=11, ESR=18, CRP=11,Irom=45,Svmgises=203,Alb=3.6, INR=1.5\par        3/19/18: s/p Entyvio\par        3/22/18: wd=138, BMs: # 5, 3-4 x/d\par        4/16/18: s/p Entyvio,Hgb=11.9, INR=1.3, ESR=18, CRP=8.4 \par        4/18/18 EGD: gastritis, no HP, no IM, 2+ Mucous, 0.5cm gastric polyp: fundic, 4cm HH, + Barretts:3cm, no dysplasia\par        4/25/18: Hgb=11.5, INR=1.6, Iron=40, Sat=13%, Ferritin=57, Alb=3.2, Phos=2.2, Mag=1.7\par        4/30/18: s/p Iron Infusion\par        5/14/18: s/p Entyvio \par        5/23/18: Hgb=11.5, ESR=16, CRP< 5\par        5/29/18: s/p Iron Infusion\par        6/6/18: Hgb=10.6, INR=1.7, Lqfqbiai=558, Alb=3.5, Phos=2.1, C62=331, cj=133; BMs: # 5, 2-3x/D \par        6/19/18: Hgb=10.6, INR=1, CRP=15, ESR=23\par        6/21/18: R ankle is acting up, swollen, pain, having MRI done, took a couple of advil, on low carbs ,BMs: # 5, 1-2x/D, qc=232\par        6/26/18: MRI: fx/stress rxn-talar body/navicula; stress related changes--cuneform, cuboid,distal tibia; mod tibiotalar effusion, mild-mod peroneal tendinosis\par        7/19/18: entyvio 6/26/18, eliminated all carbs--anti inflammatory diet, de=362, BMs: # 4-5, 1-3x/D \par        7/25/18: Hgb=10, INR=1.3, Alb=3.3, Phos=2.4, Kwzwejmh=866, sat%=12, Iron=35\par        8/2/18: BD--osteoporosis of hip/spine: sig decrease BD--17.6% of hip, 17% of spine, osteopenia of wrist: 6.4%\par        8/7/18: Entyvio\par        8/21/18: Hgb=11.1, INR=1.5, ESR=19, CRP=18.6\par        8/23/18: recently w tooth infection, old implant--loose and removed; rx w amox, and advil\par        eating almost no carbs, me=518, # 5-6, 2-3x/d \par        8/24/18: Stool fat--neg, Yabiwqvvcfwh=798\par        9/5/18: Hgb=11.4, INR=1.3, ESR=9, CRP=11.3, Iron=41, Cebirpuw=132, Alb=3.8,\par        9/17/18: entyvio, Hgb=10.7, INR=2.2, ESR=17, CRP=9.1, \par        9/20/18: mn=284, BMs: # 5-6, 1-2x/D \par        9/21/18: Phos=2.4, Ca=8.7, Alb=3.4, Vit D=27, YSA=415, \par        Dr. Akers: Hyperparathyroidism: probably secondary due to low Vit D/Ca & ? superimposed primary, rx replete Vit D and Calcium\par        10/9/18: Hgb=11.6, MCV=94, ESR=14, CRP=5.4, INR=2.2\par        10/10/18 MRE: stricturing of neoterminal ileum w worsened upstream dilatation, moderate length of upstream ileum w stricturing extending at least 20cm and more extensive then previous. suggestion of 2 adj extraluminal fluid collections which may be w/i the wall of strictured ileum near the ileocolonic anastomosis. surrounding spiculation and tethered appearance of bowel in this region.\par 10/16/18: entyvio, Hgb=11.7, MCV=94, ESR=14, CRP <5, Alb=3.5\par 10/18/18: Jg=696,   BMs: # 5-6, 3x/D , \par 11/13/18: Entyvio\par 11/21/18 CTE w C: limited 2/2 bowel underdistention, mucosal hyperenhancemt & extensive SM edema of distal ileum including vladislav-ileum, difficult to exclude a sml fluid collection, Liver w relative enlargement w suggestion of lobulation, enlargemt of PV,SMV,IMV,Spl V, rectal V. No ascites\par 11/28/18: Hgb=10, ESR=19, CRP=17,Alb=3.5,Iron=35, Sat%=11, Igtaewqe=737, INR=1.3\par 11/29/18: hp=887, BMs: # 5-6, 3-4x/D\par 12/3/18: Abd sono--Liver 14.8cm Incr heterogenicity, spleen--wnl\par 12/11/18 & 1/14/19: Entyvio\par 1/14/19:Entyvio,  Hgb=10.7, ESR=15, Alb=3.6, Iron=36, Ferritin=67, Sat%=11, INR=1.6\par 1/24/19:  co=331, stools are # 4-6, 3-4x/d , no pain\par 2/5/19: Hgb=11.2, ESR=14, CRP=0.36\par 2/28/19: Hgb=11.5, ESR=12, CRP=6.5, Alb=3.7, Iron=69, Quaizzog=835, Ca=8.9\par                Bms: # 4-5, 2-3x/D , qa=301,  Entyvio : last 2/1519,\par                had parathyroid scan pre-op, still w elev PTH, + activity in mediastinum,? ectopic parathyroid tissue vs neoplasm.  To see ENT and have Imaging at [facility]\par 3/28/19: Bms: # 4-5 , 3x/d ;gp=031\par 4/11/19: Hgb-9.7, Iron=45, ESR=18, CRP=9.4, Alb=3.5, \par Saw Endo SX at [facility], felt hyperparathyroid is secondary to Low Ca/Vit D, no sx at this time\par 4/16/19: BMs: # 5-6, 3-4x/D, zn=427,  Entyvio : last 3/1519,  got IV iron 4/12/19 for Ferritin=53\par 4/27/19: s/p R Hip Nailing--missed 4/2019 2/2 fresh wound\par 5/16/19 & 6/18/19 Entyvio\par 7/2/19: Hgb=11.7, Ferritin=41,ESR=12, CRP=5.5, B6=2.8,Mag=1.8,Phos=3.9,En=841,Zinc=61\par 7/11/19: s/p IV iron\par 7/11/19: Alb=3.7, EKR=488, Ca=9.1, Vit D=40/306, \par 7/24/19: Entyvio, Alb=3.8, OAI=051, Ca=8.9, Vit D=128 \par 8/1/19: Bms: # 5-6, occas #4, 2-3x/D , gn=554\par 8/15/19: Hgb=12, ESR=10, CRP=5.2, Ferritin=68, Alb=3.4, Phos=4.4,Mg=1.7, Ca=8.5,Calprotectin=88,\par 8/20/19: HBC=605, Ca=9, Alb=4.2\par 9/19/19: Hgb=12.8, ESR=9, CRP=5.5, Alb=3.7, Amcfgnlh=403, Mag=1.8, Ca=8.9, Phos=4.2\par 9/26/19: wa=153, BMs: #5-6, 2-3x/D, no pain\par 10/17/19: nk=039, BMs: #4-6, 1-2x/D, no Pain; Hgb=14, ESR=7, CRP<5, Alb=3.9, Gyjrvbwf=675, Mag=1.7, Ca=9.6\par 10/24/19: qu=573, Bms: # 4-6 , no pain,\par 11/6/19: ESR=6, CRP=6, PTH=80,Ca=9.1, VitD=50\par 11/14/19: ya=482, BMs: # : 4, 1-2, 0ccas #5\par  11/17/19: ESR=6, CRP<5, Bzwazrcc=776, \par  12/19/19: hh=917, BMs: #5-6, 2-3x/D\par 1/6/20: entyvio\par 1/13/20: ESR=7, CRP=0.2, Hgb=13.5, Pemynwcg=724, R62=061, FA=10, Alb=4.1, Ca=9.1\par 1/16/20: wt = 127, BMs: # 5, 1-3x/d\par 1/30/20: ESR=9, CRP=11, Hgb=13.9, Xmjwbxqa=405,Iron=38, Alb=3.9,Ca=9.2, PTH=87,\par 2/3/20 EGD: gastritis, +MACKENZIE, No HP, +erosion w ooze -clipped, 0.5cm polyp-neg; 4cm HH, Esophagitis A, + Barretts, VC: 1+\par Colonoscopy: 05cm rectal polyp: HP, 2nd deg int hemorrhoids\par mild-mod activity: MARILY w cryptitis & mild archect distortion at ileocolonic anast: colon & ileal side, no stricture\par 2/4/20: Entyvio; 2/12/20: IV Iron, 3/3/20: Entyvio\par 2/25/20: ky=952,  BMs: # 4-5, 1-2x/ d\par 3/19/20: sm=392, BMs: #4-5, 1-2x/D\par 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\par 10/17/20 : Hgb=13, CRP< 5, ESR= 11, Iron=44, Ferritin=46, Alb=4, Phos=2.3, \par 11/5/20: vp=599; s/p IV Iron x 2 ,  11/4 and 1 week prior;   BMs:  # 4-5, 1-2x/D , no pain\par 11/18/20  Entyvio + IV Ca\par  1/4/21: Hgb=13.6, CRP<5, ESR=9, Ferritin=60, Alb=4.2, Phos=2.7\par 1/11/21: Entyvio & IV Ca\par 1/14/21: no pain, stool more formed ,  mm=188 - 137; BMs:  # 4-5, 1-2x/D \par 2/8/21: Entyvio & IV Ca\par 2/23/21 IV Ca\par 2/22/21: Hgb=12.7, CRP<5, ESR=8, Dwmtkmiax=522, Phos=2.3, Mag=1.8, Y65=674, Zinc=58, Ru=095\par 2/26/21: zi=467,  BMs:  # 4-5, 1-2x/D , no pain \par 3/8/21 & 4/8/21: Entyvio\par 3/9/21 & 3/24/21: IV Iron\par 4/5/21: Hgb=13, CRP<5, ESR=9, Ferritin=94, Phos=3.1, Mag=1.7,Ca=9.1/ Alb=4\par             \par Pt Ins co is refusing to cover Entyvio q 4wks, despite numerous attempts to appeal, they also refuse to set up a peer to peer discussion betw myself and another healthcare provider, even one that works for a third party.  They do acknowledge that there is literature supporting the successful use in individual cases who don’t respond to the q 8 wk interval and need\par less then q 8weeks , but state it had not in FDA approved at less then 8wks so they will not approve it.  I spoke to the ins co rep and discussed that fact that patients should not be  pigeon holed since practicing medicine is done on an individual basis.  Humans are not all the same.   Their  response didn’t change eventhough the pt clinically needed the medication and it  induced a beneficial clinical response towards remission.  Therefore the patient and I decided to slowly increase the interval since the insurance company will not pay for this benefit.  Hopefully he may be able to maintain his present clinical state.  If not we will return to q 4weeks and will again appeal using this patients real clinical data .\par \par 4/9/21:  xj=687,  no pain, stool more formed # 4, 1-2x/d \par 6/11/21: wt= 135,   no pain, stool more formed # 4, 1-2x/d \par              recently had an episode of abd distenstion, pain, N/V, no BM\par              eventually started having diarrhea and flatus, BMs returned to normal\par              lost 2-3 lbs but regained\par  Doesnt recall eating anything different, no fever, chills, brbpr, melena\par  entyvio was 6/3/21--which is almost 8 weeks, was supposed to be 5-6 weeks  to start\par               6/4/21 Hgb=12, CRP<5, Ferritin=32, pt to receive IV iron    \par  7/12/21 Labs: Hgb=13.6, ESR=10, CRP=<5, Fpwtrmc=307, Alb=3.7, BUN=16\par \par  7/16/21 MRE: limited to incomplete bowel distention, chr distal ileitis/probable inflammatory stricturing vs collapse of the vladislav-TI--but improved since 2018 , moderate proctitis\par 7/20/21:  Last entyvio was --7/1, next early august\par                 ds=753 ,  no pain, stool more formed # 4-5/6, 1-2x/d \par 7/23/21 Entyvio level= 39, Abs <1.6\par 8/23/21: Labs--Hgb=13.6, ESR=10, CRP=6.6, Rjbmtojy=933, Iron=26, Alb=3.9, BUN=16\par 8/26/21 p/w w N/V, abd pain/bloating  x 3 days, unable to keep things down\par Labs: WBC=5, Hgb=12, CRP=43, ESR=11, Alb=4.4, BUN=28, Creat=-1.6\par CT Abd/Pelvis: diffuse marked dilatation of SB Loops w transition pt in Rmid/lower abdomen\par ~ 5-6c, proximal to the ileocolonic anastomosis\par RX w IV solumedrol 20mg q8h, NGT, IVF, pt refused TPN, also w UTI from prostatitis\par 8/27 NGT was pulled, and clears started and advanced to LR,  was d/c home 8/30 on prednisone\par 40mg qd w taper by 10mg/wk--> to 20mg\par 10/15 Entyvio\par 10/25 Stelera (Ustekinumab)  # 1\par 11/3/21 [facility]: wt above 140, off pred x 2 weeks,  1-2 BMs qd\par  Discussed at IBD conference, reviewed previous colonoscopy, and recent imaging; consensus that due to malnutrition and steroid dependency, surgery is next best option however pt reports feeling great and wants to pursue medical treatment which is reasonable given he feels well \par repeat imaging in mid-January after 3 months of Stelara, and then consider referral to surgery vs improved candidacy for endoscopic manipulation (currently presumed one long stricture). \par \par 11/15/21 : hg=010, Hgb=12, ESR=3, CRP <5, Ferritin =104, Ca=9, Mg=1.7, Alb=3.7 \par 12/10/21 : iron infusion\par 1/4/22: Hgb=11.5, ESR=6, CRP <5, Ca=8.8, Mag=2.9, Alb=3.9\par 1/10/22 : ha=860, stools # 5, 1-2x/D \par 1/10/22 Today:  Feeling well, no c/o , CP, SOB/ AMARO, Cough, Wheeze, Palpitations, edema\par              Most recent labs  reviewed w patient:1/4/22\par 1/31/22: calprotectin=84\par 2/2/22: qk=603\par 2/14/22: Hgb=10.6, ESR=9, CRP <5, Ca=8.3, Mag=1.6, Alb=3.8, Iron=25, Ferritin=15\par 3/3/22:  iv Iron infusion x 1\par 3/8/22 MRI Abd/Pelv: thickened distal Jejunal loops which are tethered; distal ileal segment  (10-12cm ) previously actively inflammed has decreased & now characterized by an area of stricture, however the vladislav-TI  5mm to the anastamosis is enhanced as well as narrowed.  colon just  distal to the anastamosis has a focal segment of inflammation & stricture.  the recto-sigmoid appears inflammed \par 3/28/22: Hgb=14.4 , ESR=1, CRP <5, Ca=8.5, Mag=1.7,Alb=4.1, Iron=104, Xjrkpkdh=468,\par 4/5/22: gj=529, Bms: # 5-6 , 1-2 x/day \par \par \par 5/10/22  :  Last entyvios were -->7/1, 8/5, 9/2, 10/15/21\par              Stelara # 1 IV--10/25/21, second dose SC~ 12/10/21, 3rd~ 2/14/22,  4th-- mid April , 5th--mid june \par \par              Early December 2021  had a " flare" loose BMs, N/V and bloat\par              Took Pred 40mg qd and tapered down 10mg / week , now off pred x 7-8 weeks\par \par 4/13/22 Dr. Heard:  pt states he had a flare the weekend of 4/9/22 w vomiting/distension\par                pt self-started prednisone 40mg , this was just before his april stelara dose\par                claims he was feeling better\par               Dr. Heard: sched colonoscopy w ? dilatation \par \par 5/10/22: tapered of pred 40mg , 10mg/wk over 1 month, now off 2weeks \par         no pain, n/v,  BMs: # 4-5, 1-2 x Day , wt=-132\par 5/17/22 Dr. Heard Colonoscopy: ped scope passed w/o resist in vladislav-TI, one single apthae w psuedopolyp.  Flares probably 2/2 to adhesive dis, imaging may consistently show wall thickening  5/27/22 Labs:  Alb=3.8. Phos=2.7, Mag=1.9, Ca=9.2, PTH=72, Vit D=105, ALP=76\par                + IV Iron\par  6/20/22 Labs:  Alb=4.5, Phos=0.8, Mag=1.8, Ca=8.5, POP=723, ALP=83\par                          Hgb=14, ESR=2, CRP<5, Ahmevrka=998, Iron=86\par 6/21/22:  no pain, n/v,  BMs: # 4-5, 1-2 x Day ,\par                is=600\par 7/26/22:  no pain, n/v,  BMs: # 4-5, 1-2 x Day ,\par                mt=507\par 9/30/22 EGD: mild gastritis, no HP; 0.75cm gastric polyp:fundic;\par                4cm HH, Esophagitis A--, + Barretts\par 10/17/22 : Alb=4.3, Phos=2.1, Mag=1.8, Ca=9,  PTH=95, ALP=80, TSH=12, Vit D=69\par                          Hgb=12.7 , ESR=2, CRP<5, Ferritin=57, Iron=85\par 12/16/22 : Alb=3.8, Phos=1.6, Mag=1.7, Ca=8.9 , ALP=68, \par                          Hgb=10.8 , ESR=1, Ktyaecxa=614, Iron=67, INR=2.3 \par 12/19/22: wv=239, had some N and distension the day after Thanksgiving\par                      Was having BMs and passing gas, went of liquids x 1-2 days--> resolved\par 1/30/23: Alb=4.4, Phos=3, Mag=1.8, Ca=9.5,  PTH=96, ALP=79,  Vit D=144\par                          Hgb=13.6 , ESR=5, CRP<5, Ferritin=55, Iron=56, INR=1.6 \par \par 2/6/23 :  s/p 2 iron infusions in October, last Stelera beginning 2/2/23, next beginning of 4/2023\par \par          Today: bu=178 \par \par * Abd pain-->no\par * Nausea--> no\par * Vomit--> no\par * Early satiety--> no\par * Belching--> no\par * Hiccups--> no\par * Regurgitation--> no\par * Acid Taste / Water Brash--> no\par * Ht burn--> no\par * Dysphagia--> no\par * Throat Clearing--> no\par * Hoarseness--> no\par * Post-Nasal Drip--> no\par * Congestion--> no\par * Globus--> no\par * Cough--> no\par * Wheeze / PC-> -no\par * BMs: # 4 qd\par * Constipation--> no\par * Diarrhea--> no\par * Bloating--> no\par * Strain on Defecation--> no\par * Incompl Evac--> no\par * Flatulence--> no\par * Gurgling--> no\par * Melena--> no\par * BPBPR-> -no\par * Anorexia--> no\par * Wt. Loss--> no\par \par \par   \par \par \par \par \par \par        PRIOR HISTORY--2017\par \par        1/17/17: Hgb=9.2, ESR=6, CRP=5; 1/19/17: BMs: #4, 5-6, 2-3x/D, Yz=605, Started Creon 1 tid cc\par        3rd Entyvio beginning Feb\par        2/14/17: Labs; Hgb=9.6, MCV=97, ESR=5, CRP< 5, P23=394, FA>23, Iron=59, Retic =3.2,\par        2/17/17 Fecal Calprotectin=16, Fats: Increased neutral & Split\par        3/13/17: Labs Hgb=9.5, MCV=95, ESR=7, CRP <5, ALB=3.1\par        3/16/17: lot of stress, to move -[place], had a job-fell thru, BMs: # 5, 2-4 x/D, wt= 131w clothes\par        4/19/17 EGD: gastritis, MACKENZIE, No HP, 2cm HH, +Barretts, No Dysplasia\par        Colonoscopy: Anastamosis: open w mild -mod active colitis, enteritis severe adj to anastomosis, mild more proximal, remainder of colon-wnl, 2-3 deg int hemorrhoids\par        4/26/17 : Hgb=7.3, MCV=95, ESR=13, CRP<5;Immediately post procedure stools very dark on eliquis/iron.\par        Admitted to [facility]: Hgb=8.3-->8.9 after 2 U, Alb 3.3, BUN=17, creat=1.3, Malina/Kzpvn=174/460\par        4/27/17: Alb=2.3, BUN=12, creat=1.2, Malina/Lipase=209/863-No abd pain, N/V; 5/5/17: Hgb=10.7.\par        5/8/17 Entyvio iv. 5/8-5/9 w dark red diarrhea; 5/10/17 Hgb=7.8.\par        5/11/17 Adm [facility] w stools brown, Hgb=7.9, BUN=17, Creat=1.4, Alb=2.9; S/P 2 Units- Hgb=10.6, BUN=15/1.1.\par        Prednisone 40mg qd, entocort d/dee.; 5/18/17: Hgb-10.4, kx=730, BMs: #5, 1-2x/D, no pain\par        6/8/17: Hgb=7.4,MCV=98, CRP<5-ASA stopped, Pred20--> 30\par        6/10/17: Hgb=8.2, Alb=2.9, BUN=20/1.2 ; 6/12/17: Hgb=8.3, Retic=0.19, Iron=10, Sat%=3, Ferritin=57, FA=23,I39=557, 6/13/17: s/p 2 Unit PRBCs\par        6/15/17: started MCT oil, Ah=134 , BMs: # 5, 1-2x/D, stools are brownish/green \par        7/11/17: [facility] w unsteadiness. MRI Head: R Cortical Temporo-occipital encephalomalacia, MRA H&N-no sign lesions, PER-wnl.Hgb=9.9--> 8.4->9.7 after 1 Unit. Seen by Heme: received IV Iron \par        CRP<5, K27=718, IEP=340, FA>23,Iron=22,Sat%=6, Ferritin=42, Alb=3.5. D/C on warfarin 2mg\par        7/20 Hgb=8.5, 7/28 Hgb=7.7, 7/31-s/p 1 Unit \par        As outpt seeing Heme, to get IV Iron and 5 IV Copper\par        Had 'FLU' Fever=101, chills, bloat, N/V/D, Bilious. no pain, melena,brbpr\par        Given anti-emetic by dr Butler,then able to keep things down\par        On prednisone 25, warfarin w INR bet 1.6-2\par        8/17/17: Hgb=9.9, ESR=20, CRP-P,Lu=086, BMs: # 5, 1-2x/D, stools are brownish/green\par        10/2/17: Hgb=8.8, MCV=89,Phos=1.7, K=3.9, Mag=1.9, Alb=3.6,Iron<10,Ferritin=21, INR=2\par        10/17/17: Hgb=7.9,ESR=6,CRP<5, INR=1.6\par        10/25/17: Hgb=10, ESR=5, CRP=5, Alb=3.6, Phos=2, Bdreattw=223, L21=385\par        11/16/17: Hgb=11.2, ESR=14, CRP=12, \par        11/13/17: MRE-Chr mild distension of distal & vladislav-ileum s/o mild stricturing at anast, mild mural thick & mucosal enhancemt ~ 20cm; little change from 2015 c/w mild acute on chr ileitis, 2.1 cm Liver hemangioma\par        11/28/17: Entyvio\par        11/29/17: Hgb=9.6, ESR=10, CRP=5.8, Alb=3.8,Ferritin-P, Iron=14,Sat=4%, Phos=2.5\par        12/19;17: Hgb=10.1, ESR=12, CRP=6.5; 12/21/17: BMs:#3-5, 1-3x/D , brown, no blood, no pain, sb=075\par        1/3/18:Hgb=11.7, ESR=19, CRP=6.5, Alb=4.1, Qbdlxivq=456, Iron=31, Sat%=9,Zinc=55\par \par \par        PRIOR HISTORY---2013:\par \par        2/20/13 CT markedly dilated sb loops ext to anast, colonoscopy w open anast ,mild-mod remy-anastamotic disease. quick response to entocort/humira--probably adhesive dis. \par        8/10/13 w GNR bacteremia, ADA 1.7 /KAYLAH 3.4, Humira 8/7, 8/13,8/18,9/15\par        CT- mucosal thickening and spiculation of the distal sb extending to the anastamosis, thickening and stranding of adj mesenteric fat.\par        Humira increased to 40mg weekly, entocort 4\par        9/2013 MRE--dilated loops in mid and distal ileum, markedly thickened and narrowed TI w decreased peristalsis of TI\par        11/15/13 ADA-24.9, KAYLAH-0\par \par \par        PRIOR HISTORY---2014:\par \par        2/15/14--CT dilated loops SB, loop of sb in mid abd 4.3cm w infiltrative changes in the mesentery, bowel tapers in the RLQ to the anastamosis w/o transition\par        WBC=15K, Rx w IV steroids and Abx \par        3/7/14 SBFT: last 10cm sb prox to anast mild distension and sl irregularity. In the mid portion of this loop there is a mild narrowing which appears to reopen but is some what narrowed.\par        3/20/14 ADA=33.1, KAYLAH=0, Lialda switch to Apriso 4 (2/2014)\par \par \par        PRIOR HISTORY--2015\par \par        1/11/15 Adm [facility] w 1 day N,Recurrent V, Abd pain/distension. CT-multiple distended & fluid-filled sb loops, mild wall thickening of ileum w No inflamm changes.\par        WBC=14.6, Hgb=17, RX w NGT suction, Levaquin iv, Solumedrol 40mg q 12( 1/12-->1/16) to prednisone 30mg BID w 5mg taper/wk\par        3/18/15 --Dr. Butler w edema /High BP, prednisone was tapered slowly to 2.5mg \par        switched to entocort 9mg qd, edema and bp improved w lasix 20mg \par        BMs:#4-5, 3x/D, Hgb=11, ESR=4, CRP<5\par        4/28/15 - 5/1/15: Adm [facility] w 1 day Abd pain, distension,N/V. 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 side of anast, opening appeared ulcerated,scarred & severely narrowed\par        6/11/15: [facility] w N/V x1, decr appetite, CT ABD: no obstruction, dilated thickened sb loops of distal jej and ileum\par        6/19/15 [facility]: s/p Lap w extensive lysis of adhesions, hepatic flex, sigmoid and sb anastamosis, s/p partial r colectomy and sb resection--side to side elisabeth: 8-10 inch from prior anast to TV colon.\par        7/17/15: BMs:#4-6, 4-5x/D, wt 131(from 126)\par        8/20/15: BMs: #4, 1-2x/D or #5, 2-3x/D, ld=284, dry cough,Hgb=10, WBC=3, NDM=921, CRP=56, ESR=21\par        8/25/15 CT Abd/Pelv: Svl RLQ sb loops w wall thickening, mild nodularity & inflamm stranding in mesentery\par        Advised-restart Entocort 9mg qd, Apriso 4 qd, Maintain Humira but given RX to check drug/Ab levels\par        9/15/15: did nt restart meds,Hgb=9.9, WBC=4, ESR=19, CRP=5.8, sc=255; [facility] : ADA=8.5, Antibodies< 1.7\par        10/15/15: No pain, BMs:#4, 1-2x/D, #5-6, 3-4x/D, throat clearing/cough-better, we=306\par        11/30/15: No pain, BMs:# 4, 5-6 intermittently, No throat clear, ux=963\par \par \par        PRIOR HISTORY-2016\par \par        1/22/16; 4/8/16; 6/6/16 : No pain, BMs:# 4-5 1-2x/D, also #5-6 3x/D for 2-3D/wk, dz=958\par        7/14/16: th=463, 9/22/16: mv=198.5\par        11/10/16: 9.5lb wt loss, states BMs: 5-6, 5x/D--Taking Magnesium, No pain/anorexia\par        Labs: Stool Ffnalbhxyngm=286, + Incr Fecal fat, Alb=3.4, FA =23, X71=875, Hgb=12, ESR=10, CRP <5\par        Magnesium-d/c, Obtain MRE asap--Start steroids and possibly switch to Entyvio\par        DDx discussed: active crohns--loss response to Humira, Malabsorption--loss Bile acids, Bile-induced diarrhea, SIBO\par        Pt wanted to wait for imaging-did not start rx\par        12/1//16 MRE: RUQ ileocolonic anastamosis--narrowed w upstream dilatation to 3.2 cm\par        Pt refused pred, Started Entocort 9mg qd and Flagyl 250mg qid about 7-10days ago \par        awaiting Entyvio load to start 12/22, then 1/5; had Cut back on iron bid\par        12/15/16: BMs:# 5, 2-3x/D, occas #6, No pain, Less bloat/flatulence, Dy=989.
normal...

## 2023-02-22 ENCOUNTER — NON-APPOINTMENT (OUTPATIENT)
Age: 58
End: 2023-02-22

## 2023-03-18 ENCOUNTER — NON-APPOINTMENT (OUTPATIENT)
Age: 58
End: 2023-03-18

## 2023-03-19 ENCOUNTER — APPOINTMENT (OUTPATIENT)
Dept: AFTER HOURS CARE | Facility: EMERGENCY ROOM | Age: 58
End: 2023-03-19
Payer: COMMERCIAL

## 2023-03-19 ENCOUNTER — TRANSCRIPTION ENCOUNTER (OUTPATIENT)
Age: 58
End: 2023-03-19

## 2023-03-19 VITALS — WEIGHT: 138 LBS | BODY MASS INDEX: 20.98 KG/M2

## 2023-03-19 PROCEDURE — 99205 OFFICE O/P NEW HI 60 MIN: CPT | Mod: 95

## 2023-03-19 NOTE — PLAN
[FreeTextEntry1] : Long discussion with patient regarding treatment options.  Technically Stelara and Coumadin can be coadministered with Paxlovid as per [employer] guidelines.  Discussed this with him; he is concerned that he is likely not going to be able to follow-up with his primary physician in the next few days or go for an outpatient INR check while he is sick.  He is also concerned about GI absorption of Paxlovid in the context of his Crohn's disease, we discussed the risks, benefits and alternatives at length; he understands that his Crohn's likely will not affect GI absorption of Paxlovid but his potential inability to his INR closely monitored limits the utility and prescribing Paxlovid.  Will go forward with remdesivir infusion; discussed with him what to expect with infusion at length

## 2023-03-19 NOTE — HISTORY OF PRESENT ILLNESS
[Home] : at home, [unfilled] , at the time of the visit. [Other Location: e.g. Home (Enter Location, City,State)___] : at [unfilled] [Verbal consent obtained from patient] : the patient, [unfilled] [FreeTextEntry8] : Remdesivir Infusion Assessment\par \par CC/Reason for Visit:\par \par Patient has been referred for Remdesivir IV Treatment for Covid-19\par \par  \par \par History of Present Illness:\par \par Clinical history indicating high risk for progression to severe disease with Covid-19 confirmed and includes ***Cough, congestion, fever, malaise; no SOB/CP/lightheadedness/nvd***\par \par < https://www.cdc.gov/coronavirus/2019-ncov/hcp/clinical-care/underlyingconditions.html>\par \par  \par \par Positive direct covid test confirmed with patient. Date of positive test ***3/19/23***\par \par Patient symptom start date confirmed as ***3/17/23*** which is within the 7 day window of symptoms for treatment with Remdesivir\par \par Patient has kidney disease Y/N.  **N***\par \par Patient has liver disease Y/N ***N***\par \par Patient is Pregnant Y/N  ***N***\par \par Other symptoms: see above\par \par  \par \par Exam:\par \par see below\par \par  \par \par Assessment:\par \par Mild to Moderate COVID 19 with clinical risk factor(s) for progression to severe disease\par \par  \par \par Plan:\par \par - Confirmed patient is not eligible for Paxlovid Y/N ****Y****\par \par < [email or web address]>\par \par - Drug-Drug interactions were checked via [facility] Covid 19 Interactions  < [email or web address]\par \par - Based on high-risk status (kidney disease, liver disease or pregnancy) in regards to Remdesivir infusion, confirmed with patient that they have had a conversation with their referring provider regarding the risks and benefits of Remdesivir Infusion. All questions answered.\par \par - Patient oriented to Remdesivir treatment as well as the need to draw and follow up labs. Initial verbal consent for treatment was obtained.  Patient will sign written consent and receive Remdesivir ([facility]) Patient Information Sheet < [email or web address]; [facility] ([facility]) [email or web address]> when RN is in home to administer infusion.\par \par - Patient informed they will be scheduled for 3 days of infusion in the home and will be contacted to confirm appointment date and time.\par \par - Visit Completed. Confirmed patient is eligible and orders placed for Remdesivir, anaphylactic PRNs (Epipen and Benadryl) and Day 1 labs CMP and PT/INR \par \par  \par \par - Patient referred to CROWN program < [email or web address] if appropriate (high risk with history of pulmonary disease and/or concerning pulmonary symptoms). Y/N ***N****  Patient is aware this service is in addition to the Home Remdesivir.\par \par See below for details.

## 2023-03-20 ENCOUNTER — LABORATORY RESULT (OUTPATIENT)
Age: 58
End: 2023-03-20

## 2023-03-20 ENCOUNTER — APPOINTMENT (OUTPATIENT)
Dept: CARE COORDINATION | Facility: HOME HEALTH | Age: 58
End: 2023-03-20
Payer: COMMERCIAL

## 2023-03-20 VITALS
RESPIRATION RATE: 17 BRPM | DIASTOLIC BLOOD PRESSURE: 87 MMHG | HEART RATE: 71 BPM | TEMPERATURE: 97 F | SYSTOLIC BLOOD PRESSURE: 142 MMHG | OXYGEN SATURATION: 98 %

## 2023-03-20 VITALS
DIASTOLIC BLOOD PRESSURE: 86 MMHG | TEMPERATURE: 97 F | HEART RATE: 82 BPM | RESPIRATION RATE: 16 BRPM | OXYGEN SATURATION: 97 % | SYSTOLIC BLOOD PRESSURE: 142 MMHG

## 2023-03-20 VITALS
RESPIRATION RATE: 17 BRPM | OXYGEN SATURATION: 98 % | TEMPERATURE: 97.1 F | SYSTOLIC BLOOD PRESSURE: 135 MMHG | HEART RATE: 81 BPM | DIASTOLIC BLOOD PRESSURE: 85 MMHG

## 2023-03-20 VITALS
DIASTOLIC BLOOD PRESSURE: 89 MMHG | HEART RATE: 82 BPM | TEMPERATURE: 97.2 F | OXYGEN SATURATION: 98 % | RESPIRATION RATE: 16 BRPM | SYSTOLIC BLOOD PRESSURE: 130 MMHG

## 2023-03-20 PROCEDURE — 96365 THER/PROPH/DIAG IV INF INIT: CPT

## 2023-03-21 ENCOUNTER — APPOINTMENT (OUTPATIENT)
Dept: CARE COORDINATION | Facility: HOME HEALTH | Age: 58
End: 2023-03-21
Payer: COMMERCIAL

## 2023-03-21 VITALS
SYSTOLIC BLOOD PRESSURE: 123 MMHG | HEART RATE: 84 BPM | TEMPERATURE: 97 F | OXYGEN SATURATION: 99 % | DIASTOLIC BLOOD PRESSURE: 83 MMHG | RESPIRATION RATE: 16 BRPM

## 2023-03-21 VITALS
DIASTOLIC BLOOD PRESSURE: 83 MMHG | OXYGEN SATURATION: 96 % | RESPIRATION RATE: 17 BRPM | TEMPERATURE: 97.6 F | SYSTOLIC BLOOD PRESSURE: 130 MMHG | HEART RATE: 86 BPM

## 2023-03-21 VITALS
TEMPERATURE: 96.5 F | DIASTOLIC BLOOD PRESSURE: 88 MMHG | HEART RATE: 82 BPM | SYSTOLIC BLOOD PRESSURE: 132 MMHG | RESPIRATION RATE: 16 BRPM | OXYGEN SATURATION: 97 %

## 2023-03-21 VITALS
DIASTOLIC BLOOD PRESSURE: 87 MMHG | RESPIRATION RATE: 17 BRPM | OXYGEN SATURATION: 97 % | TEMPERATURE: 97 F | SYSTOLIC BLOOD PRESSURE: 115 MMHG | HEART RATE: 82 BPM

## 2023-03-21 PROCEDURE — 96365 THER/PROPH/DIAG IV INF INIT: CPT

## 2023-03-21 NOTE — DISCUSSION/SUMMARY
[FreeTextEntry1] : PIV placed. CMP, PT/INR drawn. Remdesivir day 1 infusion tolerated well. No distress noted.\par

## 2023-03-21 NOTE — REASON FOR VISIT
[Confirmed Patient identity(Name, , Weight) and absence of prior allergies to Remdesivir. Confirmed prior verbal consent] : Confirmed Patient identity(Name, , Weight) and absence of prior allergies to Remdesivir. Confirmed prior verbal consent obtained by NETS provider and signed written copy obtained. Written consent for Remdesivir Treatment obtained and Remdesivir Patient Information given to patient and/or caregiver. [Day 2] : Day 2: Patient assessed, including vital signs. Peripheral IV placed. Patient infused Remdesivir over 30-minute period, and vital signs checked during and post-infusion as per ordering physician protocol. [Patient determined to be clinically stable, with no acute respiratory distress, for infusion on this day.] : Patient determined to be clinically stable, with no acute respiratory distress. Patient tolerated infusion on this day and given post infusion information including number to call with any concerning symptoms. [] : I was available to the registered nurse in person or via audio/visual technology during the time of the service performed by the registered nurse. [FreeTextEntry1] : none

## 2023-03-21 NOTE — REASON FOR VISIT
[Confirmed Patient identity(Name, , Weight) and absence of prior allergies to Remdesivir. Confirmed prior verbal consent] : Confirmed Patient identity(Name, , Weight) and absence of prior allergies to Remdesivir. Confirmed prior verbal consent obtained by NETS provider and signed written copy obtained. Written consent for Remdesivir Treatment obtained and Remdesivir Patient Information given to patient and/or caregiver. [Day 1] : Day 1: Patient assessed, including vital signs. Peripheral IV placed and, Day 1 specimens collected and labelled. Patient infused Remdesivir over 30-minute period, and vital signs checked during and post-infusion as per ordering physician protocol. [Patient determined to be clinically stable, with no acute respiratory distress, for infusion on this day.] : Patient determined to be clinically stable, with no acute respiratory distress. Patient tolerated infusion on this day and given post infusion information including number to call with any concerning symptoms. [] : I was available to the registered nurse in person or via audio/visual technology during the time of the service performed by the registered nurse. [FreeTextEntry1] : none

## 2023-03-22 ENCOUNTER — NON-APPOINTMENT (OUTPATIENT)
Age: 58
End: 2023-03-22

## 2023-03-22 ENCOUNTER — APPOINTMENT (OUTPATIENT)
Dept: CARE COORDINATION | Facility: HOME HEALTH | Age: 58
End: 2023-03-22
Payer: COMMERCIAL

## 2023-03-22 PROCEDURE — 96365 THER/PROPH/DIAG IV INF INIT: CPT

## 2023-03-27 ENCOUNTER — RESULT REVIEW (OUTPATIENT)
Age: 58
End: 2023-03-27

## 2023-03-27 ENCOUNTER — APPOINTMENT (OUTPATIENT)
Dept: HEMATOLOGY ONCOLOGY | Facility: CLINIC | Age: 58
End: 2023-03-27
Payer: COMMERCIAL

## 2023-03-27 VITALS
RESPIRATION RATE: 16 BRPM | OXYGEN SATURATION: 98 % | HEIGHT: 68 IN | BODY MASS INDEX: 20.35 KG/M2 | HEART RATE: 87 BPM | DIASTOLIC BLOOD PRESSURE: 76 MMHG | SYSTOLIC BLOOD PRESSURE: 111 MMHG | TEMPERATURE: 97.5 F | WEIGHT: 134.31 LBS

## 2023-03-27 DIAGNOSIS — G45.9 TRANSIENT CEREBRAL ISCHEMIC ATTACK, UNSPECIFIED: ICD-10-CM

## 2023-03-27 PROCEDURE — 99213 OFFICE O/P EST LOW 20 MIN: CPT | Mod: 25

## 2023-03-27 PROCEDURE — 36415 COLL VENOUS BLD VENIPUNCTURE: CPT

## 2023-03-27 NOTE — ASSESSMENT
[FreeTextEntry1] : 1.  Anemia- Hg 13.8\par  -blood loss, anemia of chronic disease with need for intermittent iron  \par - copper deficiency - replaced, level WNL 5/2020\par - hospitalized with flare up of colitis, got one iv iron in the hospital- started Straterra IV at the end of Oct- will begin self inj Dec 2021\par - Injectafer in 2023 Jan) \par - ferritin -Jan 55 repeat today \par -EGD Sept 2022 \par \par 2. 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 Hypogammaglobulinemia- no increased infection rate \par - s/p  Prevnar 13 12/2018 \par -  Pneumococcal vaccine 23 boost \par - s/p  Shingrex\par -  COVID vaccine - Pfizer x 4\par - Flu shot \par \par 5. Zinc deficiency\par - oral Zinc, level better - 72\par \par 6. 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 No evidence of lymphatic invasion. 8/10 LN's for eli metastatic papillary thyroid cancer\par -received iodine 131 at 29.3 mCi in October 2020\par -On synthroid\par -Due for US Head and soft tissue neck May 2022- follows with Dr. Akers\par \par patient s/p COVID infection- received IV Remdesivir in the home last week, feeling fatigued, mild leukopenia\par \par Labs next visit- PT, INR, irons, cbc and chem \par Blood drawn in office. Ordered and reviewed.\par \par \par  return in 8-12 weeks cbc chem irons, INR- case and mgmt discussed with Dr. Biswas \par \par \par

## 2023-03-27 NOTE — HISTORY OF PRESENT ILLNESS
[0 - No Distress] : Distress Level: 0 [de-identified] : Patient is a 52 year old who is referred for initial consultation for anemia secondary to Crohns/GI bleed vs Iron Deficiency/ Vit b12 def. 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 Patient is status post ileo-colonic resections for SBO  in 2016. In 2016 patient had complaints of 10 - 15 lb weight loss. Labs at that time showed Hgb of 12, steatorrhea and stool calprotectin = 236. In 2016 MRI/MRE with narrowing at ileocolonic anastomosis with upstream dilation. Pt refused bridge with  prednisone and Entocort / Flagyl. In 2017 patient Hgb dropped to 9.5. On 2017 patient underwent an EGD and Colonoscopy. Findings consistent with Page's and no dysplasia or AVMs. Colonoscopy showed an open anastomosis but active disease, moderate on the colonic side and limited to the anastomosis and moderate to severe enteritis, just proximal to the anastomosis. Pat had routine labs on 05/10/2017 Hgb: 8.3.  [FreeTextEntry1] : s/p injectafer, copper\par warfarin [de-identified] : Patient presents for follow up of DVT, anemia, MTHFR Gene mutation, colitis.  Patient overall feels well, he is fatigued- recently recovered from COVID infection

## 2023-03-27 NOTE — CONSULT LETTER
[Dear  ___] : Dear  [unfilled], [Consult Letter:] : I had the pleasure of evaluating your patient, [unfilled]. [Please see my note below.] : Please see my note below. [Consult Closing:] : Thank you very much for allowing me to participate in the care of this patient.  If you have any questions, please do not hesitate to contact me. [Sincerely,] : Sincerely, [DrMeron  ___] : Dr. REARDON [FreeTextEntry3] : Angie Biswas MD\par [facility] [facility]\par

## 2023-03-28 ENCOUNTER — APPOINTMENT (OUTPATIENT)
Dept: GASTROENTEROLOGY | Facility: CLINIC | Age: 58
End: 2023-03-28
Payer: COMMERCIAL

## 2023-03-28 PROCEDURE — 99215 OFFICE O/P EST HI 40 MIN: CPT | Mod: 25

## 2023-03-28 PROCEDURE — 96372 THER/PROPH/DIAG INJ SC/IM: CPT

## 2023-03-28 RX ORDER — CYANOCOBALAMIN 1000 UG/ML
1000 INJECTION INTRAMUSCULAR; SUBCUTANEOUS
Qty: 0 | Refills: 0 | Status: COMPLETED | OUTPATIENT
Start: 2023-03-28

## 2023-03-28 RX ADMIN — CYANOCOBALAMIN 0 MCG/ML: 1000 INJECTION INTRAMUSCULAR; SUBCUTANEOUS at 00:00

## 2023-03-28 NOTE — HISTORY OF PRESENT ILLNESS
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 \par        GERD w Page's, Hemorrhoids, BPH, \par        S/P Ileocolonic resection 1998\par        Since then multiple SBOs\par \par \par        Got IV Iron x 2, since 10/2/17\par        10/19/17: feeling better, No=072 on prednisone 15mg x 3weeks, BMs Brown: #5-6, 1-2/D, occas 3-4/d\par        10/25/17: Hgb=10, ESR=5, CRP=5, Alb=3.6, Phos=2, Jsvmctbf=086, K65=803\par        11/16/17: Hgb=11.2, ESR=14, CRP=12, \par        11/13/17: MRE-Chr mild distension of distal & vladislav-ileum s/o mild stricturing at anast, mild mural thick & mucosal enhancemt ~ 20cm; little change from 2015 c/w mild acute on chr ileitis, 2.1 cm Liver hemangioma\par        11/28/17: Entyvio\par        11/29/17: Hgb=9.6, ESR=10, CRP=5.8, Alb=3.8,Ferritin-19, Iron=14,Sat=4%, Phos=2.5, Wt=585, BMs:# 5, 1-2x/D, brown,\par        12/19;17: Hgb=10.1, ESR=12, CRP=6.5; 12/21/17: BMs:#3-5, 1-3x/D , brown, no blood, no pain, gb=189\par        1/3/18:Hgb=11.7, ESR=19, CRP=6.5, Alb=4.1, Dmmiqvwx=799, Iron=31, Sat%=9,Zinc=55\par        1/16/18: Entyvio, Hgb=11.4, ESR=10, CRP=6.9\par        1/18/18: Today: cellulitis R foot, saw dr KEITH--rx augmentum x 10D, Entyvio 1/9 to 1/16, jl=024 w clothes,BMs: # 4-5, 1-2x/D \par        2/14/18: Hgb=11.1, ESR=16, CRP=11.6, Alb=3.6, Iron=28, Ferritin=23, INR=1.5 \par        2/16/18: s/p Entyvio; Iron infusion, again on 2/27\par        2/20/18: Hgb=10.6, ESR=20, CRP=12, INR=1.7\par        2/27/18: s/p iron infusion\par        3/9/18: Dr. Burden for tenosynovitis, rx w Zorvolex: Diclofenac x 5 days--? response\par        3/14/18: Yk=752; BMs: # 5, 1-2x/D; Hgb=11, ESR=18, CRP=11,Irom=45,Tyisuehf=145,Alb=3.6, INR=1.5\par        3/19/18: s/p Entyvio\par        3/22/18: cv=345, BMs: # 5, 3-4 x/d\par        4/16/18: s/p Entyvio,Hgb=11.9, INR=1.3, ESR=18, CRP=8.4 \par        4/18/18 EGD: gastritis, no HP, no IM, 2+ Mucous, 0.5cm gastric polyp: fundic, 4cm HH, + Barretts:3cm, no dysplasia\par        4/25/18: Hgb=11.5, INR=1.6, Iron=40, Sat=13%, Ferritin=57, Alb=3.2, Phos=2.2, Mag=1.7\par        4/30/18: s/p Iron Infusion\par        5/14/18: s/p Entyvio \par        5/23/18: Hgb=11.5, ESR=16, CRP< 5\par        5/29/18: s/p Iron Infusion\par        6/6/18: Hgb=10.6, INR=1.7, Xdudngbg=505, Alb=3.5, Phos=2.1, G75=019, zg=461; BMs: # 5, 2-3x/D \par        6/19/18: Hgb=10.6, INR=1, CRP=15, ESR=23\par        6/21/18: R ankle is acting up, swollen, pain, having MRI done, took a couple of advil, on low carbs ,BMs: # 5, 1-2x/D, cd=650\par        6/26/18: MRI: fx/stress rxn-talar body/navicula; stress related changes--cuneform, cuboid,distal tibia; mod tibiotalar effusion, mild-mod peroneal tendinosis\par        7/19/18: entyvio 6/26/18, eliminated all carbs--anti inflammatory diet, im=035, BMs: # 4-5, 1-3x/D \par        7/25/18: Hgb=10, INR=1.3, Alb=3.3, Phos=2.4, Rrhvktil=740, sat%=12, Iron=35\par        8/2/18: BD--osteoporosis of hip/spine: sig decrease BD--17.6% of hip, 17% of spine, osteopenia of wrist: 6.4%\par        8/7/18: Entyvio\par        8/21/18: Hgb=11.1, INR=1.5, ESR=19, CRP=18.6\par        8/23/18: recently w tooth infection, old implant--loose and removed; rx w amox, and advil\par        eating almost no carbs, hk=197, # 5-6, 2-3x/d \par        8/24/18: Stool fat--neg, Iftnhtxzhxud=523\par        9/5/18: Hgb=11.4, INR=1.3, ESR=9, CRP=11.3, Iron=41, Asfepcfe=147, Alb=3.8,\par        9/17/18: entyvio, Hgb=10.7, INR=2.2, ESR=17, CRP=9.1, \par        9/20/18: ac=659, BMs: # 5-6, 1-2x/D \par        9/21/18: Phos=2.4, Ca=8.7, Alb=3.4, Vit D=27, AJR=236, \par        Dr. Akers: Hyperparathyroidism: probably secondary due to low Vit D/Ca & ? superimposed primary, rx replete Vit D and Calcium\par        10/9/18: Hgb=11.6, MCV=94, ESR=14, CRP=5.4, INR=2.2\par        10/10/18 MRE: stricturing of neoterminal ileum w worsened upstream dilatation, moderate length of upstream ileum w stricturing extending at least 20cm and more extensive then previous. suggestion of 2 adj extraluminal fluid collections which may be w/i the wall of strictured ileum near the ileocolonic anastomosis. surrounding spiculation and tethered appearance of bowel in this region.\par 10/16/18: entyvio, Hgb=11.7, MCV=94, ESR=14, CRP <5, Alb=3.5\par 10/18/18: Js=759,   BMs: # 5-6, 3x/D , \par 11/13/18: Entyvio\par 11/21/18 CTE w C: limited 2/2 bowel underdistention, mucosal hyperenhancemt & extensive SM edema of distal ileum including vladislav-ileum, difficult to exclude a sml fluid collection, Liver w relative enlargement w suggestion of lobulation, enlargemt of PV,SMV,IMV,Spl V, rectal V. No ascites\par 11/28/18: Hgb=10, ESR=19, CRP=17,Alb=3.5,Iron=35, Sat%=11, Upnukmge=977, INR=1.3\par 11/29/18: or=848, BMs: # 5-6, 3-4x/D\par 12/3/18: Abd sono--Liver 14.8cm Incr heterogenicity, spleen--wnl\par 12/11/18 & 1/14/19: Entyvio\par 1/14/19:Entyvio,  Hgb=10.7, ESR=15, Alb=3.6, Iron=36, Ferritin=67, Sat%=11, INR=1.6\par 1/24/19:  kc=240, stools are # 4-6, 3-4x/d , no pain\par 2/5/19: Hgb=11.2, ESR=14, CRP=0.36\par 2/28/19: Hgb=11.5, ESR=12, CRP=6.5, Alb=3.7, Iron=69, Tdkrpump=925, Ca=8.9\par                Bms: # 4-5, 2-3x/D , cq=566,  Entyvio : last 2/1519,\par                had parathyroid scan pre-op, still w elev PTH, + activity in mediastinum,? ectopic parathyroid tissue vs neoplasm.  To see ENT and have Imaging at [facility]\par 3/28/19: Bms: # 4-5 , 3x/d ;zk=162\par 4/11/19: Hgb-9.7, Iron=45, ESR=18, CRP=9.4, Alb=3.5, \par Saw Endo SX at [facility], felt hyperparathyroid is secondary to Low Ca/Vit D, no sx at this time\par 4/16/19: BMs: # 5-6, 3-4x/D, pz=610,  Entyvio : last 3/1519,  got IV iron 4/12/19 for Ferritin=53\par 4/27/19: s/p R Hip Nailing--missed 4/2019 2/2 fresh wound\par 5/16/19 & 6/18/19 Entyvio\par 7/2/19: Hgb=11.7, Ferritin=41,ESR=12, CRP=5.5, B6=2.8,Mag=1.8,Phos=3.9,Gr=014,Zinc=61\par 7/11/19: s/p IV iron\par 7/11/19: Alb=3.7, RHI=152, Ca=9.1, Vit D=40/306, \par 7/24/19: Entyvio, Alb=3.8, SFU=038, Ca=8.9, Vit D=128 \par 8/1/19: Bms: # 5-6, occas #4, 2-3x/D , cu=298\par 8/15/19: Hgb=12, ESR=10, CRP=5.2, Ferritin=68, Alb=3.4, Phos=4.4,Mg=1.7, Ca=8.5,Calprotectin=88,\par 8/20/19: OBA=767, Ca=9, Alb=4.2\par 9/19/19: Hgb=12.8, ESR=9, CRP=5.5, Alb=3.7, Fformicj=157, Mag=1.8, Ca=8.9, Phos=4.2\par 9/26/19: kc=035, BMs: #5-6, 2-3x/D, no pain\par 10/17/19: fe=915, BMs: #4-6, 1-2x/D, no Pain; Hgb=14, ESR=7, CRP<5, Alb=3.9, Nowzwugq=167, Mag=1.7, Ca=9.6\par 10/24/19: ov=937, Bms: # 4-6 , no pain,\par 11/6/19: ESR=6, CRP=6, PTH=80,Ca=9.1, VitD=50\par 11/14/19: tf=791, BMs: # : 4, 1-2, 0ccas #5\par  11/17/19: ESR=6, CRP<5, Yripbmlb=909, \par  12/19/19: zx=306, BMs: #5-6, 2-3x/D\par 1/6/20: entyvio\par 1/13/20: ESR=7, CRP=0.2, Hgb=13.5, Mkzixevg=132, M09=183, FA=10, Alb=4.1, Ca=9.1\par 1/16/20: wt = 127, BMs: # 5, 1-3x/d\par 1/30/20: ESR=9, CRP=11, Hgb=13.9, Kwdcixih=872,Iron=38, Alb=3.9,Ca=9.2, PTH=87,\par 2/3/20 EGD: gastritis, +MACKENZIE, No HP, +erosion w ooze -clipped, 0.5cm polyp-neg; 4cm HH, Esophagitis A, + Barretts, VC: 1+\par Colonoscopy: 05cm rectal polyp: HP, 2nd deg int hemorrhoids\par mild-mod activity: MARILY w cryptitis & mild archect distortion at ileocolonic anast: colon & ileal side, no stricture\par 2/4/20: Entyvio; 2/12/20: IV Iron, 3/3/20: Entyvio\par 2/25/20: jm=250,  BMs: # 4-5, 1-2x/ d\par 3/19/20: us=885, BMs: #4-5, 1-2x/D\par 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\par 10/17/20 : Hgb=13, CRP< 5, ESR= 11, Iron=44, Ferritin=46, Alb=4, Phos=2.3, \par 11/5/20: mr=948; s/p IV Iron x 2 ,  11/4 and 1 week prior;   BMs:  # 4-5, 1-2x/D , no pain\par 11/18/20  Entyvio + IV Ca\par  1/4/21: Hgb=13.6, CRP<5, ESR=9, Ferritin=60, Alb=4.2, Phos=2.7\par 1/11/21: Entyvio & IV Ca\par 1/14/21: no pain, stool more formed ,  ap=658 - 137; BMs:  # 4-5, 1-2x/D \par 2/8/21: Entyvio & IV Ca\par 2/23/21 IV Ca\par 2/22/21: Hgb=12.7, CRP<5, ESR=8, Wnarmxuzo=540, Phos=2.3, Mag=1.8, U74=567, Zinc=58, Qt=139\par 2/26/21: hi=930,  BMs:  # 4-5, 1-2x/D , no pain \par 3/8/21 & 4/8/21: Entyvio\par 3/9/21 & 3/24/21: IV Iron\par 4/5/21: Hgb=13, CRP<5, ESR=9, Ferritin=94, Phos=3.1, Mag=1.7,Ca=9.1/ Alb=4\par             \par Pt Ins co is refusing to cover Entyvio q 4wks, despite numerous attempts to appeal, they also refuse to set up a peer to peer discussion betw myself and another healthcare provider, even one that works for a third party.  They do acknowledge that there is literature supporting the successful use in individual cases who don’t respond to the q 8 wk interval and need\par less then q 8weeks , but state it had not in FDA approved at less then 8wks so they will not approve it.  I spoke to the ins co rep and discussed that fact that patients should not be  pigeon holed since practicing medicine is done on an individual basis.  Humans are not all the same.   Their  response didn’t change eventhough the pt clinically needed the medication and it  induced a beneficial clinical response towards remission.  Therefore the patient and I decided to slowly increase the interval since the insurance company will not pay for this benefit.  Hopefully he may be able to maintain his present clinical state.  If not we will return to q 4weeks and will again appeal using this patients real clinical data .\par \par 4/9/21:  to=862,  no pain, stool more formed # 4, 1-2x/d \par 6/11/21: wt= 135,   no pain, stool more formed # 4, 1-2x/d \par              recently had an episode of abd distenstion, pain, N/V, no BM\par              eventually started having diarrhea and flatus, BMs returned to normal\par              lost 2-3 lbs but regained\par  Doesnt recall eating anything different, no fever, chills, brbpr, melena\par  entyvio was 6/3/21--which is almost 8 weeks, was supposed to be 5-6 weeks  to start\par               6/4/21 Hgb=12, CRP<5, Ferritin=32, pt to receive IV iron    \par  7/12/21 Labs: Hgb=13.6, ESR=10, CRP=<5, Lokkgsf=800, Alb=3.7, BUN=16\par \par  7/16/21 MRE: limited to incomplete bowel distention, chr distal ileitis/probable inflammatory stricturing vs collapse of the vladislav-TI--but improved since 2018 , moderate proctitis\par 7/20/21:  Last entyvio was --7/1, next early august\par                 we=630 ,  no pain, stool more formed # 4-5/6, 1-2x/d \par 7/23/21 Entyvio level= 39, Abs <1.6\par 8/23/21: Labs--Hgb=13.6, ESR=10, CRP=6.6, Oucjnsmn=498, Iron=26, Alb=3.9, BUN=16\par 8/26/21 p/w w N/V, abd pain/bloating  x 3 days, unable to keep things down\par Labs: WBC=5, Hgb=12, CRP=43, ESR=11, Alb=4.4, BUN=28, Creat=-1.6\par CT Abd/Pelvis: diffuse marked dilatation of SB Loops w transition pt in Rmid/lower abdomen\par ~ 5-6c, proximal to the ileocolonic anastomosis\par RX w IV solumedrol 20mg q8h, NGT, IVF, pt refused TPN, also w UTI from prostatitis\par 8/27 NGT was pulled, and clears started and advanced to LR,  was d/c home 8/30 on prednisone\par 40mg qd w taper by 10mg/wk--> to 20mg\par 10/15 Entyvio\par 10/25 Stelera (Ustekinumab)  # 1\par 11/3/21 [facility]: wt above 140, off pred x 2 weeks,  1-2 BMs qd\par  Discussed at IBD conference, reviewed previous colonoscopy, and recent imaging; consensus that due to malnutrition and steroid dependency, surgery is next best option however pt reports feeling great and wants to pursue medical treatment which is reasonable given he feels well \par repeat imaging in mid-January after 3 months of Stelara, and then consider referral to surgery vs improved candidacy for endoscopic manipulation (currently presumed one long stricture). \par \par 11/15/21 : zv=753, Hgb=12, ESR=3, CRP <5, Ferritin =104, Ca=9, Mg=1.7, Alb=3.7 \par 12/10/21 : iron infusion\par 1/4/22: Hgb=11.5, ESR=6, CRP <5, Ca=8.8, Mag=2.9, Alb=3.9\par 1/10/22 : ok=750, stools # 5, 1-2x/D \par 1/10/22 Today:  Feeling well, no c/o , CP, SOB/ AMARO, Cough, Wheeze, Palpitations, edema\par              Most recent labs  reviewed w patient:1/4/22\par 1/31/22: calprotectin=84\par 2/2/22: uh=327\par 2/14/22: Hgb=10.6, ESR=9, CRP <5, Ca=8.3, Mag=1.6, Alb=3.8, Iron=25, Ferritin=15\par 3/3/22:  iv Iron infusion x 1\par 3/8/22 MRI Abd/Pelv: thickened distal Jejunal loops which are tethered; distal ileal segment  (10-12cm ) previously actively inflammed has decreased & now characterized by an area of stricture, however the vladislav-TI  5mm to the anastamosis is enhanced as well as narrowed.  colon just  distal to the anastamosis has a focal segment of inflammation & stricture.  the recto-sigmoid appears inflammed \par 3/28/22: Hgb=14.4 , ESR=1, CRP <5, Ca=8.5, Mag=1.7,Alb=4.1, Iron=104, Ltcuvpgh=151,\par 4/5/22: kt=593, Bms: # 5-6 , 1-2 x/day \par \par \par 5/10/22  :  Last entyvios were -->7/1, 8/5, 9/2, 10/15/21\par              Stelara # 1 IV--10/25/21, second dose SC~ 12/10/21, 3rd~ 2/14/22,  4th-- mid April , 5th--mid june \par \par              Early December 2021  had a " flare" loose BMs, N/V and bloat\par              Took Pred 40mg qd and tapered down 10mg / week , now off pred x 7-8 weeks\par \par 4/13/22 Dr. Heard:  pt states he had a flare the weekend of 4/9/22 w vomiting/distension\par                pt self-started prednisone 40mg , this was just before his april stelara dose\par                claims he was feeling better\par               Dr. Heard: sched colonoscopy w ? dilatation \par \par 5/10/22: tapered of pred 40mg , 10mg/wk over 1 month, now off 2weeks \par         no pain, n/v,  BMs: # 4-5, 1-2 x Day , wt=-132\par 5/17/22 Dr. [facility]ard [facility]: ped scope passed w/o resist in vladislav-TI, one single apthae w psuedopolyp.  Flares probably 2/2 to adhesive dis, imaging may consistently show wall thickening  5/27/22 Labs:  Alb=3.8. Phos=2.7, Mag=1.9, Ca=9.2, PTH=72, Vit D=105, ALP=76\par                + IV Iron\par  6/20/22 Labs:  Alb=4.5, Phos=0.8, Mag=1.8, Ca=8.5, WDQ=348, ALP=83\par                          Hgb=14, ESR=2, CRP<5, Lataodno=373, Iron=86\par 6/21/22:  no pain, n/v,  BMs: # 4-5, 1-2 x Day ,\par                dm=153\par 7/26/22:  no pain, n/v,  BMs: # 4-5, 1-2 x Day ,\par                nx=752\par 9/30/22 EGD: mild gastritis, no HP; 0.75cm gastric polyp:fundic;\par                4cm HH, Esophagitis A--, + Barretts\par 10/17/22 : Alb=4.3, Phos=2.1, Mag=1.8, Ca=9,  PTH=95, ALP=80, TSH=12, Vit D=69\par                          Hgb=12.7 , ESR=2, CRP<5, Ferritin=57, Iron=85\par 12/16/22 : Alb=3.8, Phos=1.6, Mag=1.7, Ca=8.9 , ALP=68, \par                          Hgb=10.8 , ESR=1, Sndfbybc=086, Iron=67, INR=2.3 \par 12/19/22: ix=331, had some N and distension the day after Thanksgiving\par                      Was having BMs and passing gas, went of liquids x 1-2 days--> resolved\par 1/30/23: Alb=4.4, Phos=3, Mag=1.8, Ca=9.5,  PTH=96, ALP=79,  Vit D=144\par                          Hgb=13.6 , ESR=5, CRP<5, Ferritin=55, Iron=56, INR=1.6 \par \par 2/6/23 : pg=571, no abd pain, BMs: # 4 qd \par \par 3/27/23 :  s/p 2 iron infusions in October, last Stelera beginning 2/2/23, next beginning of 4/2023\par                 3/17/23: developed cugh,congection, fever, malaise\par                 3/19/23 + Covid\par                 Received Iv Remdesivir x 3 at home\par 3/27/ 23: Alb=4 , Phos=1, Mag=1.8, Ca=8.5 , ALP=94\par                          Hgb=13.8, ESR=5, CRP<5, Mysazjvw=303, Iron=78, INR=5.9 \par          Today: ko=372, BMs: #4-5,  1-2x/ D \par \par * Abd pain-->no\par * Nausea--> no\par * Vomit--> no\par * Early satiety--> no\par * Belching--> no\par * Hiccups--> no\par * Regurgitation--> no\par * Acid Taste / Water Brash--> no\par * Ht burn--> no\par * Dysphagia--> no\par * Throat Clearing--> no\par * Hoarseness--> no\par * Post-Nasal Drip--> no\par * Congestion--> no\par * Globus--> no\par * Cough--> no\par * Wheeze / PC-> -no\par * Constipation--> no\par * Diarrhea--> no\par * Bloating--> no\par * Strain on Defecation--> no\par * Incompl Evac--> no\par * Flatulence--> no\par * Gurgling--> no\par * Melena--> no\par * BPBPR-> -no\par * Anorexia--> no\par * Wt. Loss--> no\par \par \par   \par \par \par \par \par \par        PRIOR HISTORY--2017\par \par        1/17/17: Hgb=9.2, ESR=6, CRP=5; 1/19/17: BMs: #4, 5-6, 2-3x/D, Md=960, Started Creon 1 tid cc\par        3rd Entyvio beginning Feb\par        2/14/17: Labs; Hgb=9.6, MCV=97, ESR=5, CRP< 5, G29=505, FA>23, Iron=59, Retic =3.2,\par        2/17/17 Fecal Calprotectin=16, Fats: Increased neutral & Split\par        3/13/17: Labs Hgb=9.5, MCV=95, ESR=7, CRP <5, ALB=3.1\par        3/16/17: lot of stress, to move -[place], had a job-fell thru, BMs: # 5, 2-4 x/D, wt= 131w clothes\par        4/19/17 EGD: gastritis, MACKENZIE, No HP, 2cm HH, +Barretts, No Dysplasia\par        Colonoscopy: Anastamosis: open w mild -mod active colitis, enteritis severe adj to anastomosis, mild more proximal, remainder of colon-wnl, 2-3 deg int hemorrhoids\par        4/26/17 : Hgb=7.3, MCV=95, ESR=13, CRP<5;Immediately post procedure stools very dark on eliquis/iron.\par        Admitted to [facility]: Hgb=8.3-->8.9 after 2 U, Alb 3.3, BUN=17, creat=1.3, Malina/Hqsbu=520/460\par        4/27/17: Alb=2.3, BUN=12, creat=1.2, Malina/Lipase=209/863-No abd pain, N/V; 5/5/17: Hgb=10.7.\par        5/8/17 Entyvio iv. 5/8-5/9 w dark red diarrhea; 5/10/17 Hgb=7.8.\par        5/11/17 Adm [facility] w stools brown, Hgb=7.9, BUN=17, Creat=1.4, Alb=2.9; S/P 2 Units- Hgb=10.6, BUN=15/1.1.\par        Prednisone 40mg qd, entocort d/dee.; 5/18/17: Hgb-10.4, qe=537, BMs: #5, 1-2x/D, no pain\par        6/8/17: Hgb=7.4,MCV=98, CRP<5-ASA stopped, Pred20--> 30\par        6/10/17: Hgb=8.2, Alb=2.9, BUN=20/1.2 ; 6/12/17: Hgb=8.3, Retic=0.19, Iron=10, Sat%=3, Ferritin=57, FA=23,X78=852, 6/13/17: s/p 2 Unit PRBCs\par        6/15/17: started MCT oil, Wc=534 , BMs: # 5, 1-2x/D, stools are brownish/green \par        7/11/17: [facility] w unsteadiness. MRI Head: R Cortical Temporo-occipital encephalomalacia, MRA H&N-no sign lesions, PER-wnl.Hgb=9.9--> 8.4->9.7 after 1 Unit. Seen by Heme: received IV Iron \par        CRP<5, J37=090, ZQW=627, FA>23,Iron=22,Sat%=6, Ferritin=42, Alb=3.5. D/C on warfarin 2mg\par        7/20 Hgb=8.5, 7/28 Hgb=7.7, 7/31-s/p 1 Unit \par        As outpt seeing Heme, to get IV Iron and 5 IV Copper\par        Had 'FLU' Fever=101, chills, bloat, N/V/D, Bilious. no pain, melena,brbpr\par        Given anti-emetic by dr Butler,then able to keep things down\par        On prednisone 25, warfarin w INR bet 1.6-2\par        8/17/17: Hgb=9.9, ESR=20, CRP-P,Da=849, BMs: # 5, 1-2x/D, stools are brownish/green\par        10/2/17: Hgb=8.8, MCV=89,Phos=1.7, K=3.9, Mag=1.9, Alb=3.6,Iron<10,Ferritin=21, INR=2\par        10/17/17: Hgb=7.9,ESR=6,CRP<5, INR=1.6\par        10/25/17: Hgb=10, ESR=5, CRP=5, Alb=3.6, Phos=2, Wbhztbck=708, K74=512\par        11/16/17: Hgb=11.2, ESR=14, CRP=12, \par        11/13/17: MRE-Chr mild distension of distal & vladislav-ileum s/o mild stricturing at anast, mild mural thick & mucosal enhancemt ~ 20cm; little change from 2015 c/w mild acute on chr ileitis, 2.1 cm Liver hemangioma\par        11/28/17: Entyvio\par        11/29/17: Hgb=9.6, ESR=10, CRP=5.8, Alb=3.8,Ferritin-P, Iron=14,Sat=4%, Phos=2.5\par        12/19;17: Hgb=10.1, ESR=12, CRP=6.5; 12/21/17: BMs:#3-5, 1-3x/D , brown, no blood, no pain, mi=857\par        1/3/18:Hgb=11.7, ESR=19, CRP=6.5, Alb=4.1, Kwsolehw=892, Iron=31, Sat%=9,Zinc=55\par \par \par        PRIOR HISTORY---2013:\par \par        2/20/13 CT markedly dilated sb loops ext to anast, colonoscopy w open anast ,mild-mod remy-anastamotic disease. quick response to entocort/humira--probably adhesive dis. \par        8/10/13 w GNR bacteremia, ADA 1.7 /KAYLAH 3.4, Humira 8/7, 8/13,8/18,9/15\par        CT- mucosal thickening and spiculation of the distal sb extending to the anastamosis, thickening and stranding of adj mesenteric fat.\par        Humira increased to 40mg weekly, entocort 4\par        9/2013 MRE--dilated loops in mid and distal ileum, markedly thickened and narrowed TI w decreased peristalsis of TI\par        11/15/13 ADA-24.9, KAYLAH-0\par \par \par        PRIOR HISTORY---2014:\par \par        2/15/14--CT dilated loops SB, loop of sb in mid abd 4.3cm w infiltrative changes in the mesentery, bowel tapers in the RLQ to the anastamosis w/o transition\par        WBC=15K, Rx w IV steroids and Abx \par        3/7/14 SBFT: last 10cm sb prox to anast mild distension and sl irregularity. In the mid portion of this loop there is a mild narrowing which appears to reopen but is some what narrowed.\par        3/20/14 ADA=33.1, KAYLAH=0, Lialda switch to Apriso 4 (2/2014)\par \par \par        PRIOR HISTORY--2015\par \par        1/11/15 Adm [facility] w 1 day N,Recurrent V, Abd pain/distension. CT-multiple distended & fluid-filled sb loops, mild wall thickening of ileum w No inflamm changes.\par        WBC=14.6, Hgb=17, RX w NGT suction, Levaquin iv, Solumedrol 40mg q 12( 1/12-->1/16) to prednisone 30mg BID w 5mg taper/wk\par        3/18/15 --Dr. Butler w edema /High BP, prednisone was tapered slowly to 2.5mg \par        switched to entocort 9mg qd, edema and bp improved w lasix 20mg \par        BMs:#4-5, 3x/D, Hgb=11, ESR=4, CRP<5\par        4/28/15 - 5/1/15: Adm [facility] w 1 day Abd pain, distension,N/V. 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 side of anast, opening appeared ulcerated,scarred & severely narrowed\par        6/11/15: [facility] w N/V x1, decr appetite, CT ABD: no obstruction, dilated thickened sb loops of distal jej and ileum\par        6/19/15 [facility]: s/p Lap w extensive lysis of adhesions, hepatic flex, sigmoid and sb anastamosis, s/p partial r colectomy and sb resection--side to side elisabeth: 8-10 inch from prior anast to TV colon.\par        7/17/15: BMs:#4-6, 4-5x/D, wt 131(from 126)\par        8/20/15: BMs: #4, 1-2x/D or #5, 2-3x/D, qp=256, dry cough,Hgb=10, WBC=3, CHS=635, CRP=56, ESR=21\par        8/25/15 CT Abd/Pelv: Svl RLQ sb loops w wall thickening, mild nodularity & inflamm stranding in mesentery\par        Advised-restart Entocort 9mg qd, Apriso 4 qd, Maintain Humira but given RX to check drug/Ab levels\par        9/15/15: did nt restart meds,Hgb=9.9, WBC=4, ESR=19, CRP=5.8, yr=344; [facility] : ADA=8.5, Antibodies< 1.7\par        10/15/15: No pain, BMs:#4, 1-2x/D, #5-6, 3-4x/D, throat clearing/cough-better, ll=733\par        11/30/15: No pain, BMs:# 4, 5-6 intermittently, No throat clear, ou=184\par \par \par        PRIOR HISTORY-2016\par \par        1/22/16; 4/8/16; 6/6/16 : No pain, BMs:# 4-5 1-2x/D, also #5-6 3x/D for 2-3D/wk, iq=515\par        7/14/16: xd=626, 9/22/16: fo=172.5\par        11/10/16: 9.5lb wt loss, states BMs: 5-6, 5x/D--Taking Magnesium, No pain/anorexia\par        Labs: Stool Mmabtvxhxeds=419, + Incr Fecal fat, Alb=3.4, FA =23, J38=044, Hgb=12, ESR=10, CRP <5\par        Magnesium-d/c, Obtain MRE asap--Start steroids and possibly switch to Entyvio\par        DDx discussed: active crohns--loss response to Humira, Malabsorption--loss Bile acids, Bile-induced diarrhea, SIBO\par        Pt wanted to wait for imaging-did not start rx\par        12/1//16 MRE: RUQ ileocolonic anastamosis--narrowed w upstream dilatation to 3.2 cm\par        Pt refused pred, Started Entocort 9mg qd and Flagyl 250mg qid about 7-10days ago \par        awaiting Entyvio load to start 12/22, then 1/5; had Cut back on iron bid\par        12/15/16: BMs:# 5, 2-3x/D, occas #6, No pain, Less bloat/flatulence, Jt=761.

## 2023-03-28 NOTE — ASSESSMENT
[FreeTextEntry1] : 1. CROHNS DISEASE   of both small and large intestine: s/p flare 8/25-->8/30/21, then  early 12/2021-s/p pred tapered over 4 weeks, again the weekend 4/9/22 rx w prednisone 40_  mg ( just before last Stelera dose) -->\par now off since early 5/2022\par    s/p ileocolonic resection x 2--last 6/19/15 for recurrent sbos: \par prior was s/p 4 SBOs w/i 2 yrs--2/2 distal sb disease adj to anastamosis\par when on Humira weekly had an  ADA =33 (3/20/14), -but had sbo 2/2014 at ADA=25 and another sbo 4/28/15\par 6/10/2015 Colonoscopy: Inflamm ST mass on ileal side w ulceration/scarred/narrow\par 6/11/2015 CT: dilated thickened sb loops distal jej & ileum\par Post-op 6/2015 probably some malabsorption 2/2 to removal of R Colon and ileum: \par had WT. Loss-->r/o malabsorption:  ?bile-induced->-secretory vs decr micelles, active dis, PLE\par ?  SIBO--Elev FA, Alb=3.4-->3.1-->2.9--> (7/28/17)\par ?SIBO/Prevent post-op recurrence --> trial Flagyl 250 mg qid~12/7/16-->d/c: 5/11/17\par Also discussed trial of Cholestyramine/Xifaxin -wanted to wait\par 11/20/16 ACTIVE Crohn's-->  9.5lb wt loss, BMs: #5-6, 5x/D-- but taking Magnesium \par 12/1/16 MRE: RUQ ileocolonic anastamosis--narrowed w upstream dilatation to 3.2 cm\par Labs: Stool Ngsgxduvdymn=423, + Incr Fecal fat, Alb=3.4, FA =23, C03=410, Hgb=12.3,ESR=10,CRP <5\par 4/19/17 :Colonoscopy: Anastamosis: open w mild -mod active colitis, enteritis severe adj to anastomosis, mild more proximal, remainder of colon=wnl\par 5/11/17- Adm PMHC w stools brown, but Hgb=7.9, BUN=17, Creat=1.4, Alb=2.9.\par S/P 2 Units w Hgb=10.6, BUN=15/1.1, Prednisone 40mg taper, entocort d/dee\par 6/8/17: Hgb=7.4, MCV=98, CRP<5--ASA stopped, Pred20--> 30mg, 6/13/17: s/p 2 Unit PRBCs\par 7/11/17 [facility] w unsteadiness. MRI Head: R Cortical Temporo-occipital encephalomalacia\par Hgb=9.9--> 8.4->9.7 after 1 Unit. Seen by Heme: received IV Iron \par CRP<5, A54=463, DXQ=897, FA>23,Iron=22,Sat%=6, Ferritin=42, Alb=3.5. D/C on warfarin 2mg\par 7/28/17 Hgb=7.7; 7/31/17-s/p 1 Unit \par Heme Consultation ~ 8/2017: started  IV Infusions of  Iron and  IV Copper\par 8/17/17: Hgb=9.9, ESR=20, Qf=891--Tgstggohzu s/p GI infection w N/V/D, no obstruction\par 10/17/17: Hgb=7.9,ESR=6,CRP<5, INR=1.6, Got IV Iron x 2, since 10/2/17 for Iron<10, Hgb=8.8\par 11/16/17: Hgb=11.2, ESR=14, CRP=12, 10/26/17 Stool fat-neg, calprotectin-30\par 11/13/17: MRE-Chr mild distension of distal & alfredito-ileum s/o mild stricturing at anast, mild mural thick & mucosal enhancemt ~ 20cm; little change from 2015 c/w mild acute on chr ileitis, 2.1 cm Liver hemangioma\par 10/9/18: Hgb=11.6, MCV=94, ESR=14, CRP=5.4, INR=2.2\par 10/10/18 MRE: stricturing of neoterminal ileum w worsened upstream dilatation, moderate length of upstream ileum w stricturing extending at least 20cm and more extensive then previous. suggestion of 2 adj extraluminal fluid collections which may be w/i the wall of strictured ileum near the ileocolonic anastamosis\par pt had declined to call numbers given for  IBD center at [facility] for new or investigational rx's--biologics.  \par later he felt  he was  stablizing w his  wt loss, and inflammatory markers\par \par 2/28/19 w ESR=12, CRP=6.5 & 2/21/19 stool calprotectin--> 232\par 8/15/19: ESR=10, CRP=5.2, Calprotectin--> 88\par 9/19/19: ESR=9, CRP=5.5\par 10/17/19: ESR=7, CRP< 5\par 11/6/19 : ESR=6, CRP=0.18\par 11/27/19: ESR=6, CRP <5\par 1/13/20: ESR=7, CRP=0.2\par 1/30/20: ESR=9, CRP=11\par \par 2/3/20 EGD: gastritis, +MACKENZIE, No HP, +erosion w ooze -clipped, 0.5cm polyp-neg; 4cm HH, Esophagitis A, + Barretts, VC: 1+\par Colonoscopy: 05cm rectal polyp: HP, 2nd deg int hemorrhoids\par mild-mod activity: MARILY w cryptitis & mild archect distortion at ileocolonic anast: colon & ileal side, no stricture\par \par 3/2/20:ESR=7, CRP=0.26\par 3/9/20: VDZ>40, Ab to VDZ <1.6\par 3/17/20: ESR=6, CRP=6.4\par 10/17/20: ESR=11, CRP <5\par 1/4/21:ESR=9, CRP<5\par 2/22/21: ESR=8, CRP<5\par 4/5/21: ESR=9, CRP<5\par 6/4/21: ESR=9, CRP<5  \par 6/11/21: wt= 135,   no pain, stool more formed # 4, 1-2x/d \par              s/p episode --abd distenstion, pain, N/V, no BM\par              eventually started having diarrhea and flatus, BMs returned to normal\par              lost 2-3 lbs but regained\par 7/4/21: CRP <5, Hgb=12\par  7/16/21 MRE: limited to incomplete bowel distention, chr distal ileitis/probable inflammatory stricturing vs collapse of the alfredito-TI--but improved since 2018 , moderate proctitis\par 7/12/21   --  ? flare --> entyvio should have been  q 5 wk , went q 8 wks\par                      next dose should be in 4 weeks x 2 then 5 weeks, to check levels\par 7/20/21: Cliically stable, unclear if MRE accurate 2/2 underdistension, but gained wt and CRP--normal\par 7/23/21 Entyvio level= 39, Abs <1.6\par 8/23/21: Labs--Hgb=13.6, ESR=10, CRP=6.6, Xxrvcbyj=354, Iron=26, Alb=3.9, BUN=16\par 8/26/21 p/w sbo  w N/V, abd pain/bloating  x 3 days, unable to keep things down\par Labs: WBC=5, Hgb=12, CRP=43, ESR=11, Alb=4.4, BUN=28, Creat=-1.6\par CT Abd/Pelvis: diffuse marked dilatation of SB Loops w transition pt in R. mid/lower abdomen\par ~ 5-6cm, proximal to the ileocolonic anastomosis\par RX w IV solumedrol 20mg q8h, NGT, IVF, pt refused TPN, also w UTI from prostatitis\par 8/27 NGT was pulled, and clears started and advanced to LR,  was d/c home 8/30 on prednisone\par 40mg qd w taper by 10mg/wk to 20mg qd \par \par \par (  **Entyvio's  :time 0=12/22/16 ( Humira 40mg QW); 1/5/17--2wks, s/p 3rd infusion at wk 6, then q 6weeks \par #6 :6/21/17-->held 2/2 Shingles, then  Infusions as follows=9/19/17 , 10/17/17, 11/28/17, 1/16/18 ,2/16/18, 3/19/18,4/16/18, 5/14/18, 6/25/18, 8/7/18, 9/17/18, 10/16/18, 11/13/18, 12/11/18, 1/14/19, 2/15/19, 3/15/19, 5/16/19, 6/18/19,7/24/19, 9/9/19, 10/2/19, 11/4/19, 12/12/19, 1/6/20, 2/4/20, 3/3/20, 9/17/20, 10/15/20, 11/18/20, 1/11/21, 2/8/21, 3/8/21, 4/8/21, 6/3/21, 7/1/21, 8/2/21, 9/2/21,10/25/21  )\par \par 3/8/22 MRI Abd/Pelv: thickened distal Jejunal loops which are tethered; distal ileal segment  (10-12cm ) previously actively inflammed has decreased & now characterized by an area of stricture, however the alfredito-TI  5mm to the anastamosis is enhanced as well as narrowed.  Colon just  distal to the anastamosis has a focal segment of inflammation & stricture.  the recto-sigmoid appears inflammed \par 3/28/22: Hgb=14.4 , ESR=1, CRP <5, Ca=8.5, Mag=1.7,Alb=4.1, Iron=104, Yquzqeyp=504,\par  5/9//22: Hgb=13.8 , ESR=2, CRP <5, Ca=8.8, Mag=1.6, Alb=4, Iron=64, Gmetrmch=613  \par 5/17/22 Dr. Heard Colonoscopy: ped scope passed w/o resist in alfredito-TI, one single apthae w psuedopolyp.\par  6/21/22 Labs: Hgb=14, ESR=2, CRP<5,  Alb=4.5, Phos=0.8, Mag=1.8, Ca=8.5,Dfkcndgd=183, Iron=86\par  7/25/22 Labs:  Hgb=14, ESR=2, CRP<5, Alb=4.2, Phos=1.5, Mag=1.8, Ca=8.6, Wunulayp=040, Iron=57, PT=24, INR=2.1  10/17/22 Labs: Hgb=12.7, ESR=2, CRP<5, Alb=4.3, Phos=2.1, Mag=1.8, Ca=9, Ferritin=57, Iron=85, PT=23, INR=2     12/16/22 : Labs: Hgb=10.8 , ESR=1, Alb=3.8, Phos=1.6, Mag=1.7, Ca=8.9 , ALP=68, Rdtoxjlc=228, Iron=67, INR=2.3     1/30/23: Labs:   Hgb=13.6 , ESR=5, CRP<5,  Alb=4.4, Phos=3, Mag=1.8, Ca=9.5, ALP=79,Ferritin=55, Iron=56,  \par  3/27/ 23: Labs:  Hgb=13.8, ESR=5, CRP<5 ,Alb=4 , Phos=1, Mag=1.8, Ca=8.5 , ALP=94, Orvazbra=691, Iron=78,                 INR=5.9  \par                          \par A / PLAN:  s/p sbo prox to anastamosis\par                  inflammatory vs fibrosis vs combination: 3/2022 recent MRI shows improvement of inflammation w      possible residual stricture in the ileum and probable colitis near the anastamosis and distal colon \par      Responded to  steroids iv--> po--d/c ~ 10/20/21,\par      tapered steroids from 40mg qd  to 20mg, bmzk22um qd and  taper 5mg/wk\par      then po taper again early 12/2021 40mg qd w 10mg taper/ wk--\par      PO prednisone 40mg mg qd started on 4/9/22 ,tapered off ~ early 5/2022\par \par      Entyvio level was 39 w/o Abs~ 3weeks after 7/1/21 dose , \par      speaks to inflammatory component & probably loss of response\par      Stelara # 1 dose on 10/25/21, #2 on 12/10/21, # 3 on  2/11/22,  # 4 on 4/20/22 , #5 mid June 2022, \par                   #6 mid 8/2022, #7 beginning 12/2022;  #8 on 2/2/23,  last dose--> \par \par       IBD consensus : due to malnutrition /steroid dependency, surgery is next best option \par       but pt reported feeling well and wanted to pursue medical treatment \par      repeated  imaging in March after 3 months of Stelara, then consider surgery vs improved candidacy for                endoscopic manipulation\par         f/u w  IBD consultant Dr. Heard at   & reviewed imaging after 3 months of Stelera\par        for  Colonoscopy ( w possible balloon dilatation )-> last 1 3/4 yrs ago\par    5/17/22 Dr. Heard Colonoscopy: ped scope passed w/o resist in alfredito-TI, one single apthae w psuedopolyp.\par     No Dilatation need, very mild dis at Alfredito-TI, MRE may show wall thickening, sbo's probably adhesive dis\par \par 1/4/22 Labs: Hgb=11.5, ESR=6, CRP<5, Alb=3.9\par 1/31/22 Calprotectin =84\par 2/14/22: Hgb=10.6, ESR=9, CRP <5, Ca=8.3, Mag=1.6, Alb=3.8, Iron=25, Ferritin=15\par 3/28/22: Hgb=14, ESR=1, CRP<5 , Ca=8.5, Mag=1.7, Alb=4, Iron=104, Atxfgoig=228\par 5/9//22: Hgb=13.8 , ESR=2, CRP <5, Ca=8.8, Mag=1.6, Alb=4, Iron=64, Keeobtel=934  \par  6/21/22 : Hgb=14, ESR=2, CRP<5,  Ca=8.5, ,Alb=4.5,  Mag=1.8, Iron=86,Numupqiv=829,\par  7/25/22 :  Hgb=14, ESR=2, CRP<5, Alb=4.2, Phos=1.5, Mag=1.8, Ca=8.6, Zeeizpyu=084, Iron=57\par 10/17/22:  Hgb=12.7 , ESR=2, CRP<5, Alb=4.3, Phos=2.1, Mag=1.8, Ca=9, Ferritin=57, Iron=85\par  12/16/22 : Labs: Hgb=10.8 , ESR=1, Alb=3.8, Phos=1.6, Mag=1.7, Ca=8.9 , ALP=68, Dgcgkaow=944, Iron=67,\par  1/30/23: Labs:   Hgb=13.6 , ESR=5, CRP<5,  Alb=4.4, Phos=3, Mag=1.8, Ca=9.5, ALP=79,Ferritin=55, Iron=56,  \par  3/27/ 23: Labs:  Hgb=13.8, ESR=5, CRP<5 ,Alb=4 , Phos=1, Mag=1.8, Ca=8.5 , ALP=94, Uuledpty=620, Iron=78,                 INR=5.9  \par Probiotics 3 qd \par Diet:  LR, Lactose free, protein drinks tid\par MCT oil begun but never maintained \par **trend --cbc, esr, crp, albumin, calprotectin \par   \par **monitor wt--- usu bet 136-139, 7/20/21=136, 11/22/21=139, 1/4/22=132, 2/22/22=132, 4/5/22=131,5/10/93=064,\par                         6/21/22=133, 7/27/22=136, 12/19/22=138; 2/6/23=140,  vtqra=290\par \par \par                                                                                                                                                                                                                                                                                                                                                                                                                                                                                                                                                                                                                                                                                                                            \par 2. Iron deficiency anemia : \par  had initially dropped , after clinical flare and post procedure bleeding\par Probably multifactorial: ACD, Blood loss, malabsorption/nutrient deficiencies\par Eliquis-->warfarin after CVA, On Entyvio \par 7/11/17 s/p  Adm for unsteady gait w MRI c/w CVA--warfarin begun: possible better control of AC\par Chromagen 2 qd--> IV Iron , s/p IV Cu x 5, FA 1mg qd , B12 SL & IM\par Still requiring IV Iron and cu infusions prn \par most recent IV Iron infusion  :  5/2022 for  Nyfngtch=511 on 5/9/22 \par 2/22/21: A82=839, \par 6/4/21: Cu=91, Zinc=69, Ferritin=32,Iron=43, \par 7/12/21: Ix=433, Zinc=74, Nxpiudsy=199, Iron=35\par 11/15/21 : Hgb=12,  Ferritin =104, Ca=9, Mg=1.7\par 1/4/22: Hgb=11.5, Ca=8.8, Mg=2.9, Hkebusx=457 \par 2/14/22: Hgb=10.6, Ca=8.3, Mag=1.6, Alb=3.8, Iron=25, Ferritin=15\par 3/28/22: Hgb=14.4 , Ca=8.5, Mag=1.7,Alb=4.1, Iron=104, Eezpcjqx=315,\par  5/9//22: Hgb=13.8 , Ca=8.8, Mag=1.6, Alb=4, Iron=64, Ssbvxplr=813  \par 6/20/22: Hgb=14.4  , Ca=8.5, Mag=1.8, Alb=4.5, Iron=96, Ojfakway=754\par 7/25/22 Labs:  Hgb=14,Ca=8.6,  Mag=1.8,Alb=4.2,Iron=57 Ankjvwyn=042, , PT=24, INR=2.1\par 10/17/22 Labs: Hgb=12.7,Ca=9,  Mag=1.8,Alb=4.3,Iron=85 Ferritin=57, , PT=23, INR=2\par 12/16/22 : Labs: Hgb=10.8 , Alb=3.8, Iron=67,Zwxaaezs=445, , INR=2.3  \par 1/30/23: Labs:   Hgb=13.6 , Ca=9.5, Mag=1.8, Alb=4.4,,Ferritin=55, Iron=56, INR=1.6 \par  3/27/ 23: Labs:  Hgb=13.8, ESR=5, CRP<5 , Mag=1.8, Alb=4, Ncqybhtm=061, Iron=78, INR=5.9  \par B12 1cc IM ____L. delt-- given today, \par trend cbc, B12, FA, Iron panel \par Adj INR--closer to low 2's.    \par \par \par \par \par 3. Osteoporosis \par : Progression on BD from 5/5/16\par Crohns, h/o steroid use\par Calcium citrate & Vit D per Endo\par Forteo since 5/10/19 , then  Reclast          \par Repeat BD of 8/2018 --showed worsening to Osteoporosis from 2016\par Rec Endo consult w Dr. Akers :[facility]steoporosis center at [facility]--pt never went originally \par 9/21/18: Phos=2.4, Ca=8.7, Alb=3.4, Vit D=27, WQF=708, \par 10/16/18: Phos=2.8, Ca=8.7, Alb=3.5,\par 1/14/19: Phos=2.6,  Ca=8.7, Alb=3.6\par 2/28/19: Phos=1.8, Ca=8.9, Alb=3.7, VitD=39, XYS=086\par 3/18/19: Ca=8.5, Alb=3.7, Vit D=44.6, PTH=93\par 7/11/19: Ca=9.1, Alb=3.7, Phos=3.9, Vit D=40/ 306, DFQ=619\par 8/15/19: Ca=9, Alb=4.2, Phos=4.4, VitD=59, GNR=608\par 10/17/19: Ca=9.6, Alb=3.9, Phos=3.5\par 11/6/19: Ca=9.1, Alb=3.9. Phos=3.7, VitD=50, PTH=80\par 1/13/20: ca=9.1\par 1/30/20: Ca=9.2, Alb=3.9, Phos=2.7, Mag=1.5, Vit D=103/41, PTH=87\par 2/18/20:ca=8.5, Alb=3.6, \par 3/2/20: Ca=8.5, Alb=3.6\par 3/17/20: Ca=9.1, Alb=3.7, Phos=3.4, Mag=1.7\par 10/17/20: Ca=8.9, Alb=4, Phos=2.3, Mag-2.0, Vit D=66, PTH=47\par 1/4/21 : Ca=9.4, Alb=4.2, Phos=2.7, Mag=2, Vit D=64, PTH=87.5\par 4/5/21: Ca=9.1, Alb=4, Phos=3.1, Mag=1.7, \par 6/4/21: ca=9, Alb=3.8, Phos=2.8, Mag=1.8\par 7/12/21: Ca=8.6, ALB=6, phosph=2.3, Mag=1.8\par 11/15/21: Ca=9, Alb=3.7, Mag=1.7\par 1/4/22: ca=8.8, Alb=3.9, Mg=2.9, phos=2.9\par 1/21/22: Ca=8.8, Alb=3.9, Vit D=60, PTH=85\par 2/14/22:  Ca=8.3, Mag=1.6, Alb=3.8, \par 3/28/22: Ca=8.5, Mag=1.7, Alb=4.1, Phos=1.2\par  5/9//22:  Ca=8.8, Mag=1.6, Alb=4, Phos=1.6 \par   5/27/22: Ca=9.2 , Mag=1.9, Alb=3.8 , Phos=2.7, PTH=72, VitD=105\par   6/20/ 22: Ca=8.5  , Mag=1.8, Alb=4.5 , Phos=0.8, XYT=993, \par   7/25/22 :  Ca=8.6, Mag=1.8 , Alb=4.2, Phos=1.5, \par   10/17/22: Ca=9, Mag=1.8 , Alb=4.3, Phos=2.1, PTH=95, Vit D=25\par  12/16/22 : Labs: Ca=8.9, Mag=1.7,  Alb=3.8, Phos=1.6, \par   1/30/23 Labs:   Ca=9.5, Mag=1.8, ALP=79, Alb=4.4, Phos=3, PTH=96, Vit D =144\par   3/27/ 23: Labs: Ca=8.5, Mag=1.8 , ALP=94,  Alb=4 , Phos=1, \par \par \par Dr. Akers: Hyperparathyroidism-- probably secondary due to low Vit D/Ca & ? superimposed primary, \par [facility] ENT Consult init felt  Parathyroid scan showing activity in mediastinum is ectopic parathyroid, then felt sx not indicated at that time, elev PTH is secondary to low  Vit D/Ca\par Rx replete Vit D and current IV Calcium-as per endo \par \par trend Vit D, Ca, Phos, PTH,  \par \par BD--9/2020: incr in spine and hip , no change in wrist,\par BD--10/17/22: Decr in spine & hip, incr in wrist, repeat--> \par 10/5/20, 9/2021,6/2022-s/p Reclast--repeat--> 6/2023 \par \par \par \par \par \par 4. GERD:  recently less active by ENT , no ht burn,  dysphagia,  + throat clear\par               h/o  Dry cough, CXR:ana, saw ENT bergstein-->LPR\par * ++ LPR,  ++  Page’s w No Dysplasia,  + H/O  Esophagitis grade: A   was found \par \par Recommend: \par * Anti-reflux diet & life-style changes reviewed & re-emphasized.  ++  Bedge required\par * Weight reduction & regular exercise emphasized\par \par * need for  PPI:  Omep 40 BID--> 40mg qd  ,  ++ H2B q HS:Zantac 300mg--> Pepcid 40mg\par No Carafate  1 gram:   --was emphasized\par I reviewed & summarized the prospective randomized & retrospective non-randomized studies\par looking at potential long term SE's of PPIs, w special attention to associations & actual cause\par as related to GI infections, bone loss, cognitive changes, KD, Covid, vitamin & electrolyte deficiencies\par questions were answered, pt advised that PPIs should be used when needed as indicated by their\par clinical indication and response and tapering off is always the goal if possible. pt understood.\par \par *  F/U  EGD: --> 2024--for Barretts screening / surveillance \par * No  need for pH Monitor,   Manometry,   Esophagram --\par *  ++  need for ENT  eval/F/U,  No  need for Surgical  eval  --  \par \par \par \par \par \par 5. Hemorrhoids:  well - controlled.  No pain,  swelling,  itch,  bleeding\par * Discussed previously the potential complications of thrombosis,  pain,  infection,  swelling, itching,  bleeding \par Reccomend: \par * currently Low   - Fiber Diet was emphasized\par * 6  --  8 cups of decaffeinated fluid daily was emphasized \par * Sitz Bathes prn,   No:  Anusol HC  Suppos / Cream  RI BID -- was needed\par * No:  Tucks BID,  Balneol Lotion,   Calmoseptine Oint -- was needed ;    ++ Prep H prn\par * No:  need for  Colorectal surgical evaluation for possible ablation\par \par \par \par \par \par \par \par 6. Barretts esophagus without dysplasia  \par Notes: see GERD.    \par \par \par \par 7. Hepatomegaly-->not confirmed by recent abd sono\par CTE w relative enlargemt, ? lobulation & enlarged portal/mesenteric veins\par LFTs-wnl, no ascites or edema\par R/O CLD, Hepatic vein thrombosis\par Abd sono w doppler--> Liver and spleen normal size, no thrombosis, normal portal and hepatic vein flow\par \par \par \par \par .\par \par \par

## 2023-03-29 ENCOUNTER — RESULT REVIEW (OUTPATIENT)
Age: 58
End: 2023-03-29

## 2023-03-29 ENCOUNTER — APPOINTMENT (OUTPATIENT)
Dept: HEMATOLOGY ONCOLOGY | Facility: CLINIC | Age: 58
End: 2023-03-29

## 2023-03-29 VITALS
HEIGHT: 68 IN | WEIGHT: 135 LBS | TEMPERATURE: 97.5 F | HEART RATE: 102 BPM | BODY MASS INDEX: 20.46 KG/M2 | OXYGEN SATURATION: 99 % | RESPIRATION RATE: 16 BRPM | SYSTOLIC BLOOD PRESSURE: 114 MMHG | DIASTOLIC BLOOD PRESSURE: 77 MMHG

## 2023-03-29 RX ORDER — REMDESIVIR 100 MG/1
100 INJECTION, POWDER, LYOPHILIZED, FOR SOLUTION INTRAVENOUS
Qty: 1 | Refills: 0 | Status: DISCONTINUED | COMMUNITY
Start: 2023-03-19 | End: 2023-03-29

## 2023-03-29 NOTE — REASON FOR VISIT
[Confirmed Patient identity(Name, , Weight) and absence of prior allergies to Remdesivir. Confirmed prior verbal consent] : Confirmed Patient identity(Name, , Weight) and absence of prior allergies to Remdesivir. Confirmed prior verbal consent obtained by NETS provider and signed written copy obtained. Written consent for Remdesivir Treatment obtained and Remdesivir Patient Information given to patient and/or caregiver. [Day 3] : Day 3: Patient assessed, including vital signs. Peripheral IV placed. Patient infused Remdesivir over 30-minute period, and vital signs checked during and post-infusion as per ordering physician protocol. [Patient determined to be clinically stable, with no acute respiratory distress, for infusion on this day.] : Patient determined to be clinically stable, with no acute respiratory distress. Patient tolerated infusion on this day and given post infusion information including number to call with any concerning symptoms. [] : I was available to the registered nurse in person or via audio/visual technology during the time of the service performed by the registered nurse.

## 2023-03-29 NOTE — DISCUSSION/SUMMARY
[FreeTextEntry1] : VS @1015 /80, 98% o2, 78 HR, 18 RR,  96.9F\par VS @ 1030 /81 99% o2, 83 HR, 18 RR,  96.9F \par VS @ 1045 BP  110/81, 96 %o2, 81 HR 18 RR 97.0 F\par VS @ 1130

## 2023-03-31 ENCOUNTER — APPOINTMENT (OUTPATIENT)
Dept: HEMATOLOGY ONCOLOGY | Facility: CLINIC | Age: 58
End: 2023-03-31

## 2023-03-31 ENCOUNTER — RESULT REVIEW (OUTPATIENT)
Age: 58
End: 2023-03-31

## 2023-03-31 VITALS
BODY MASS INDEX: 20.93 KG/M2 | WEIGHT: 138.13 LBS | RESPIRATION RATE: 18 BRPM | HEART RATE: 93 BPM | OXYGEN SATURATION: 98 % | SYSTOLIC BLOOD PRESSURE: 108 MMHG | TEMPERATURE: 97.5 F | DIASTOLIC BLOOD PRESSURE: 76 MMHG | HEIGHT: 68 IN

## 2023-04-21 ENCOUNTER — RESULT REVIEW (OUTPATIENT)
Age: 58
End: 2023-04-21

## 2023-04-24 ENCOUNTER — NON-APPOINTMENT (OUTPATIENT)
Age: 58
End: 2023-04-24

## 2023-04-27 ENCOUNTER — APPOINTMENT (OUTPATIENT)
Dept: ENDOCRINOLOGY | Facility: CLINIC | Age: 58
End: 2023-04-27
Payer: COMMERCIAL

## 2023-04-27 ENCOUNTER — RESULT REVIEW (OUTPATIENT)
Age: 58
End: 2023-04-27

## 2023-04-27 ENCOUNTER — APPOINTMENT (OUTPATIENT)
Dept: HEMATOLOGY ONCOLOGY | Facility: CLINIC | Age: 58
End: 2023-04-27

## 2023-04-27 VITALS
SYSTOLIC BLOOD PRESSURE: 132 MMHG | DIASTOLIC BLOOD PRESSURE: 90 MMHG | OXYGEN SATURATION: 98 % | HEART RATE: 91 BPM | TEMPERATURE: 97.9 F | RESPIRATION RATE: 17 BRPM

## 2023-04-27 VITALS
HEART RATE: 97 BPM | SYSTOLIC BLOOD PRESSURE: 112 MMHG | HEIGHT: 69 IN | BODY MASS INDEX: 20.14 KG/M2 | DIASTOLIC BLOOD PRESSURE: 70 MMHG | WEIGHT: 136 LBS | OXYGEN SATURATION: 99 %

## 2023-04-27 LAB — COLLAGEN CTX SERPL-MCNC: 188 PG/ML

## 2023-04-27 PROCEDURE — 99215 OFFICE O/P EST HI 40 MIN: CPT

## 2023-04-27 RX ORDER — DIBASIC SODIUM PHOSPHATE, MONOBASIC POTASSIUM PHOSPHATE AND MONOBASIC SODIUM PHOSPHATE 852; 155; 130 MG/1; MG/1; MG/1
155-852-130 TABLET ORAL
Qty: 28 | Refills: 0 | Status: DISCONTINUED | COMMUNITY
Start: 2022-12-19 | End: 2023-04-27

## 2023-04-27 RX ORDER — ZOLEDRONIC ACID 5 MG/100ML
5 INJECTION, SOLUTION INTRAVENOUS
Qty: 1 | Refills: 0 | Status: ACTIVE | COMMUNITY
Start: 2023-04-27

## 2023-04-27 NOTE — ASSESSMENT
[0] : 2) Feeling down, depressed, or hopeless: Not at all (0) [PHQ-2 Negative - No further assessment needed] : PHQ-2 Negative - No further assessment needed [[id number]] : 0

## 2023-04-28 ENCOUNTER — TRANSCRIPTION ENCOUNTER (OUTPATIENT)
Age: 58
End: 2023-04-28

## 2023-05-22 ENCOUNTER — RESULT REVIEW (OUTPATIENT)
Age: 58
End: 2023-05-22

## 2023-05-22 ENCOUNTER — APPOINTMENT (OUTPATIENT)
Dept: HEMATOLOGY ONCOLOGY | Facility: CLINIC | Age: 58
End: 2023-05-22

## 2023-05-22 VITALS
HEART RATE: 82 BPM | BODY MASS INDEX: 20.11 KG/M2 | HEIGHT: 69 IN | OXYGEN SATURATION: 97 % | DIASTOLIC BLOOD PRESSURE: 87 MMHG | TEMPERATURE: 97.3 F | WEIGHT: 135.8 LBS | SYSTOLIC BLOOD PRESSURE: 134 MMHG | RESPIRATION RATE: 16 BRPM

## 2023-05-23 ENCOUNTER — APPOINTMENT (OUTPATIENT)
Dept: GASTROENTEROLOGY | Facility: CLINIC | Age: 58
End: 2023-05-23
Payer: COMMERCIAL

## 2023-05-23 PROCEDURE — 99214 OFFICE O/P EST MOD 30 MIN: CPT

## 2023-05-23 NOTE — ASSESSMENT
[FreeTextEntry1] : 1. CROHNS DISEASE   of both small and large intestine: s/p flare 8/25-->8/30/21, then  early 12/2021-s/p pred tapered over 4 weeks, again the weekend 4/9/22 rx w prednisone 40_  mg ( just before last Stelera dose) -->\par now off since early 5/2022\par    s/p ileocolonic resection x 2--last 6/19/15 for recurrent sbos: \par prior was s/p 4 SBOs w/i 2 yrs--2/2 distal sb disease adj to anastamosis\par when on Humira weekly had an  ADA =33 (3/20/14), -but had sbo 2/2014 at ADA=25 and another sbo 4/28/15\par 6/10/2015 Colonoscopy: Inflamm ST mass on ileal side w ulceration/scarred/narrow\par 6/11/2015 CT: dilated thickened sb loops distal jej & ileum\par Post-op 6/2015 probably some malabsorption 2/2 to removal of R Colon and ileum: \par had WT. Loss-->r/o malabsorption:  ?bile-induced->-secretory vs decr micelles, active dis, PLE\par ?  SIBO--Elev FA, Alb=3.4-->3.1-->2.9--> (7/28/17)\par ?SIBO/Prevent post-op recurrence --> trial Flagyl 250 mg qid~12/7/16-->d/c: 5/11/17\par Also discussed trial of Cholestyramine/Xifaxin -wanted to wait\par 11/20/16 ACTIVE Crohn's-->  9.5lb wt loss, BMs: #5-6, 5x/D-- but taking Magnesium \par 12/1/16 MRE: RUQ ileocolonic anastamosis--narrowed w upstream dilatation to 3.2 cm\par Labs: Stool Abcsyvzyekvd=176, + Incr Fecal fat, Alb=3.4, FA =23, U16=813, Hgb=12.3,ESR=10,CRP <5\par 4/19/17 :Colonoscopy: Anastamosis: open w mild -mod active colitis, enteritis severe adj to anastomosis, mild more proximal, remainder of colon=wnl\par 5/11/17- Adm PMHC w stools brown, but Hgb=7.9, BUN=17, Creat=1.4, Alb=2.9.\par S/P 2 Units w Hgb=10.6, BUN=15/1.1, Prednisone 40mg taper, entocort d/dee\par 6/8/17: Hgb=7.4, MCV=98, CRP<5--ASA stopped, Pred20--> 30mg, 6/13/17: s/p 2 Unit PRBCs\par 7/11/17 [facility] w unsteadiness. MRI Head: R Cortical Temporo-occipital encephalomalacia\par Hgb=9.9--> 8.4->9.7 after 1 Unit. Seen by Heme: received IV Iron \par CRP<5, F22=762, LFK=412, FA>23,Iron=22,Sat%=6, Ferritin=42, Alb=3.5. D/C on warfarin 2mg\par 7/28/17 Hgb=7.7; 7/31/17-s/p 1 Unit \par Heme Consultation ~ 8/2017: started  IV Infusions of  Iron and  IV Copper\par 8/17/17: Hgb=9.9, ESR=20, Wy=609--Zehjejafdz s/p GI infection w N/V/D, no obstruction\par 10/17/17: Hgb=7.9,ESR=6,CRP<5, INR=1.6, Got IV Iron x 2, since 10/2/17 for Iron<10, Hgb=8.8\par 11/16/17: Hgb=11.2, ESR=14, CRP=12, 10/26/17 Stool fat-neg, calprotectin-30\par 11/13/17: MRE-Chr mild distension of distal & alfredito-ileum s/o mild stricturing at anast, mild mural thick & mucosal enhancemt ~ 20cm; little change from 2015 c/w mild acute on chr ileitis, 2.1 cm Liver hemangioma\par 10/9/18: Hgb=11.6, MCV=94, ESR=14, CRP=5.4, INR=2.2\par 10/10/18 MRE: stricturing of neoterminal ileum w worsened upstream dilatation, moderate length of upstream ileum w stricturing extending at least 20cm and more extensive then previous. suggestion of 2 adj extraluminal fluid collections which may be w/i the wall of strictured ileum near the ileocolonic anastamosis\par pt had declined to call numbers given for  IBD center at [facility] for new or investigational rx's--biologics.  \par later he felt  he was  stablizing w his  wt loss, and inflammatory markers\par \par 2/28/19 w ESR=12, CRP=6.5 & 2/21/19 stool calprotectin--> 232\par 8/15/19: ESR=10, CRP=5.2, Calprotectin--> 88\par 9/19/19: ESR=9, CRP=5.5\par 10/17/19: ESR=7, CRP< 5\par 11/6/19 : ESR=6, CRP=0.18\par 11/27/19: ESR=6, CRP <5\par 1/13/20: ESR=7, CRP=0.2\par 1/30/20: ESR=9, CRP=11\par \par 2/3/20 EGD: gastritis, +MACKENZIE, No HP, +erosion w ooze -clipped, 0.5cm polyp-neg; 4cm HH, Esophagitis A, + Barretts, VC: 1+\par Colonoscopy: 05cm rectal polyp: HP, 2nd deg int hemorrhoids\par mild-mod activity: MARILY w cryptitis & mild archect distortion at ileocolonic anast: colon & ileal side, no stricture\par \par 3/2/20:ESR=7, CRP=0.26\par 3/9/20: VDZ>40, Ab to VDZ <1.6\par 3/17/20: ESR=6, CRP=6.4\par 10/17/20: ESR=11, CRP <5\par 1/4/21:ESR=9, CRP<5\par 2/22/21: ESR=8, CRP<5\par 4/5/21: ESR=9, CRP<5\par 6/4/21: ESR=9, CRP<5  \par 6/11/21: wt= 135,   no pain, stool more formed # 4, 1-2x/d \par              s/p episode --abd distenstion, pain, N/V, no BM\par              eventually started having diarrhea and flatus, BMs returned to normal\par              lost 2-3 lbs but regained\par 7/4/21: CRP <5, Hgb=12\par  7/16/21 MRE: limited to incomplete bowel distention, chr distal ileitis/probable inflammatory stricturing vs collapse of the alfredito-TI--but improved since 2018 , moderate proctitis\par 7/12/21   --  ? flare --> entyvio should have been  q 5 wk , went q 8 wks\par                      next dose should be in 4 weeks x 2 then 5 weeks, to check levels\par 7/20/21: Cliically stable, unclear if MRE accurate 2/2 underdistension, but gained wt and CRP--normal\par 7/23/21 Entyvio level= 39, Abs <1.6\par 8/23/21: Labs--Hgb=13.6, ESR=10, CRP=6.6, Vcuiclpe=685, Iron=26, Alb=3.9, BUN=16\par 8/26/21 p/w sbo  w N/V, abd pain/bloating  x 3 days, unable to keep things down\par Labs: WBC=5, Hgb=12, CRP=43, ESR=11, Alb=4.4, BUN=28, Creat=-1.6\par CT Abd/Pelvis: diffuse marked dilatation of SB Loops w transition pt in R. mid/lower abdomen\par ~ 5-6cm, proximal to the ileocolonic anastomosis\par RX w IV solumedrol 20mg q8h, NGT, IVF, pt refused TPN, also w UTI from prostatitis\par 8/27 NGT was pulled, and clears started and advanced to LR,  was d/c home 8/30 on prednisone\par 40mg qd w taper by 10mg/wk to 20mg qd \par \par \par (  **Entyvio's  :time 0=12/22/16 ( Humira 40mg QW); 1/5/17--2wks, s/p 3rd infusion at wk 6, then q 6weeks \par #6 :6/21/17-->held 2/2 Shingles, then  Infusions as follows=9/19/17 , 10/17/17, 11/28/17, 1/16/18 ,2/16/18, 3/19/18,4/16/18, 5/14/18, 6/25/18, 8/7/18, 9/17/18, 10/16/18, 11/13/18, 12/11/18, 1/14/19, 2/15/19, 3/15/19, 5/16/19, 6/18/19,7/24/19, 9/9/19, 10/2/19, 11/4/19, 12/12/19, 1/6/20, 2/4/20, 3/3/20, 9/17/20, 10/15/20, 11/18/20, 1/11/21, 2/8/21, 3/8/21, 4/8/21, 6/3/21, 7/1/21, 8/2/21, 9/2/21,10/25/21  )\par \par 3/8/22 MRI Abd/Pelv: thickened distal Jejunal loops which are tethered; distal ileal segment  (10-12cm ) previously actively inflammed has decreased & now characterized by an area of stricture, however the alfredito-TI  5mm to the anastamosis is enhanced as well as narrowed.  Colon just  distal to the anastamosis has a focal segment of inflammation & stricture.  the recto-sigmoid appears inflammed \par 3/28/22: Hgb=14.4 , ESR=1, CRP <5, Ca=8.5, Mag=1.7,Alb=4.1, Iron=104, Flyzannl=617,\par  5/9//22: Hgb=13.8 , ESR=2, CRP <5, Ca=8.8, Mag=1.6, Alb=4, Iron=64, Wbzvrxtp=124  \par 5/17/22 Dr. Heard Colonoscopy: ped scope passed w/o resist in alfredito-TI, one single apthae w psuedopolyp.\par  6/21/22 Labs: Hgb=14, ESR=2, CRP<5,  Alb=4.5, Phos=0.8, Mag=1.8, Ca=8.5,Sgpdfvkn=135, Iron=86\par  7/25/22 Labs:  Hgb=14, ESR=2, CRP<5, Alb=4.2, Phos=1.5, Mag=1.8, Ca=8.6, Rjcodzdk=757, Iron=57, PT=24, INR=2.1  10/17/22 Labs: Hgb=12.7, ESR=2, CRP<5, Alb=4.3, Phos=2.1, Mag=1.8, Ca=9, Ferritin=57, Iron=85, PT=23, INR=2     12/16/22 : Labs: Hgb=10.8 , ESR=1, Alb=3.8, Phos=1.6, Mag=1.7, Ca=8.9 , ALP=68, Zaxomdhb=071, Iron=67, INR=2.3     1/30/23: Labs:   Hgb=13.6 , ESR=5, CRP<5,  Alb=4.4, Phos=3, Mag=1.8, Ca=9.5, ALP=79,Ferritin=55, Iron=56,  \par  3/27/ 23: Labs:  Hgb=13.8, ESR=5, CRP<5 ,Alb=4 , Phos=1, Mag=1.8, Ca=8.5 , ALP=94, Ccpkmrcy=890, Iron=78,                                      INR=5.9  \par  5/22/23 Labs: Hgb=12.7,ESR=2,CRP<5, Alb=4.2, Phos=2.4, Mag=1.8, Ca=9.1, ALP=68,Ferritin=19, Iron=42, INR=2.4                 \par A / PLAN:  s/p sbo prox to anastamosis\par                  inflammatory vs fibrosis vs combination: 3/2022 recent MRI shows improvement of inflammation w      possible residual stricture in the ileum and probable colitis near the anastamosis and distal colon \par      Responded to  steroids iv--> po--d/c ~ 10/20/21,\par      tapered steroids from 40mg qd  to 20mg, [id number] qd and  taper 5mg/wk\par      then po taper again early 12/2021 40mg qd w 10mg taper/ wk--\par      PO prednisone 40mg mg qd started on 4/9/22 ,tapered off ~ early 5/2022\par \par      Entyvio level was 39 w/o Abs~ 3weeks after 7/1/21 dose , \par      speaks to inflammatory component & probably loss of response\par      Stelara # 1 dose on 10/25/21, #2 on 12/10/21, # 3 on  2/11/22,  # 4 on 4/20/22 , #5 mid June 2022, \par                   #6 mid 8/2022, #7 beginning 12/2022;  #8 on 2/2/23,  last dose--> 5/8/23 \par \par       IBD consensus : due to malnutrition /steroid dependency, surgery is next best option \par       but pt reported feeling well and wanted to pursue medical treatment \par      repeated  imaging in March after 3 months of Stelara, then consider surgery vs improved candidacy for                endoscopic manipulation\par         f/u w  IBD consultant Dr. Heard at   & reviewed imaging after 3 months of Stelera\par        for  Colonoscopy ( w possible balloon dilatation )-> last 1 3/4 yrs ago\par    5/17/22 Dr. Heard Colonoscopy: ped scope passed w/o resist in alfredito-TI, one single apthae w psuedopolyp.\par     No Dilatation need, very mild dis at Alfredito-TI, MRE may show wall thickening, sbo's probably adhesive dis\par \par 1/4/22 Labs: Hgb=11.5, ESR=6, CRP<5, Alb=3.9\par 1/31/22 Calprotectin =84\par 2/14/22: Hgb=10.6, ESR=9, CRP <5, Ca=8.3, Mag=1.6, Alb=3.8, Iron=25, Ferritin=15\par 3/28/22: Hgb=14, ESR=1, CRP<5 , Ca=8.5, Mag=1.7, Alb=4, Iron=104, Lenolwzk=623\par 5/9//22: Hgb=13.8 , ESR=2, CRP <5, Ca=8.8, Mag=1.6, Alb=4, Iron=64, Ajtfzqqz=754  \par  6/21/22 : Hgb=14, ESR=2, CRP<5,  Ca=8.5, ,Alb=4.5,  Mag=1.8, Iron=86,Qtsfuyrt=957,\par  7/25/22 :  Hgb=14, ESR=2, CRP<5, Alb=4.2, Phos=1.5, Mag=1.8, Ca=8.6, Oyintvkq=638, Iron=57\par 10/17/22:  Hgb=12.7 , ESR=2, CRP<5, Alb=4.3, Phos=2.1, Mag=1.8, Ca=9, Ferritin=57, Iron=85\par  12/16/22 : Labs: Hgb=10.8 , ESR=1, Alb=3.8, Phos=1.6, Mag=1.7, Ca=8.9 , ALP=68, Ganvtkjb=197, Iron=67,\par  1/30/23: Labs:   Hgb=13.6 , ESR=5, CRP<5,  Alb=4.4, Phos=3, Mag=1.8, Ca=9.5, ALP=79,Ferritin=55, Iron=56,  \par  3/27/ 23: Labs:  Hgb=13.8, ESR=5, CRP<5 ,Alb=4 , Phos=1, Mag=1.8, Ca=8.5 , ALP=94, Juqlopvl=777, Iron=78,                 INR=5.9 \par  5/22/23 Labs: Hgb=12.7,ESR=2,CRP<5, Alb=4.2, Phos=2.4, Mag=1.8, Ca=9.1, ALP=68,Ferritin=19, Iron=42, INR=2.4\par                                 \par  \par Probiotics 3 qd \par Diet:  LR, Lactose free, protein drinks tid\par MCT oil begun but never maintained \par **trend --cbc, esr, crp, albumin, calprotectin \par   \par **monitor wt--- usu bet 136-139, 7/20/21=136, 11/22/21=139, 1/4/22=132, 2/22/22=132, 4/5/22=131,5/10/67=053,\par                         6/21/22=133, 7/27/22=136, 12/19/22=138; 2/6/23=140, 3/28/81=278 jkbcp=327\par \par \par                                                                                                                                                                                                                                                                                                                                                                                                                                                                                                                                                                                                                                                                                                                            \par 2. Iron deficiency anemia : \par  had initially dropped , after clinical flare and post procedure bleeding\par Probably multifactorial: ACD, Blood loss, malabsorption/nutrient deficiencies\par Eliquis-->warfarin after CVA, On Entyvio \par 7/11/17 s/p  Adm for unsteady gait w MRI c/w CVA--warfarin begun: possible better control of AC\par Chromagen 2 qd--> IV Iron , s/p IV Cu x 5, FA 1mg qd , B12 SL & IM\par Still requiring IV Iron and cu infusions prn \par most recent IV Iron infusion  :  5/2022 for  Nfaokoyn=849 on 5/9/22 \par 2/22/21: T67=349, \par 6/4/21: Cu=91, Zinc=69, Ferritin=32,Iron=43, \par 7/12/21: At=208, Zinc=74, Cmqwbxhp=723, Iron=35\par 11/15/21 : Hgb=12,  Ferritin =104, Ca=9, Mg=1.7\par 1/4/22: Hgb=11.5, Ca=8.8, Mg=2.9, Pdefuxl=165 \par 2/14/22: Hgb=10.6, Ca=8.3, Mag=1.6, Alb=3.8, Iron=25, Ferritin=15\par 3/28/22: Hgb=14.4 , Ca=8.5, Mag=1.7,Alb=4.1, Iron=104, Wrjvpvwl=374,\par  5/9//22: Hgb=13.8 , Ca=8.8, Mag=1.6, Alb=4, Iron=64, Swqjyxzo=811  \par 6/20/22: Hgb=14.4  , Ca=8.5, Mag=1.8, Alb=4.5, Iron=96, Ljyfnutp=120\par 7/25/22 Labs:  Hgb=14,Ca=8.6,  Mag=1.8,Alb=4.2,Iron=57 Xblisfdq=921, , PT=24, INR=2.1\par 10/17/22 Labs: Hgb=12.7,Ca=9,  Mag=1.8,Alb=4.3,Iron=85 Ferritin=57, , PT=23, INR=2\par 12/16/22 : Labs: Hgb=10.8 , Alb=3.8, Iron=67,Vfgkikmh=917, , INR=2.3  \par 1/30/23: Labs:   Hgb=13.6 , Ca=9.5, Mag=1.8, Alb=4.4,,Ferritin=55, Iron=56, INR=1.6 \par  3/27/ 23: Labs:  Hgb=13.8, ESR=5, CRP<5 , Mag=1.8, Alb=4, Tjrkxcby=745, Iron=78, INR=5.9  \par  5/22/23 Labs: Hgb=12.7,ESR=2,CRP<5, Alb=4.2,Ferritin=19, Iron=42, INR=2.4\par B12 1cc IM ____L. delt-- given today, \par trend cbc, B12, FA, Iron panel \par Adj INR--closer to low 2's.    \par \par \par \par \par 3. Osteoporosis \par : Progression on BD from 5/5/16\par Crohns, h/o steroid use\par Calcium citrate & Vit D per Endo\par Forteo since 5/10/19 , then  Reclast          \par Repeat BD of 8/2018 --showed worsening to Osteoporosis from 2016\par Rec Endo consult w Dr. Akers :[facility]oporosis center at [facility]--pt never went originally \par 9/21/18: Phos=2.4, Ca=8.7, Alb=3.4, Vit D=27, AHP=552, \par 10/16/18: Phos=2.8, Ca=8.7, Alb=3.5,\par 1/14/19: Phos=2.6,  Ca=8.7, Alb=3.6\par 2/28/19: Phos=1.8, Ca=8.9, Alb=3.7, VitD=39, EAV=073\par 3/18/19: Ca=8.5, Alb=3.7, Vit D=44.6, PTH=93\par 7/11/19: Ca=9.1, Alb=3.7, Phos=3.9, Vit D=40/ 306, BSB=491\par 8/15/19: Ca=9, Alb=4.2, Phos=4.4, VitD=59, XXF=371\par 10/17/19: Ca=9.6, Alb=3.9, Phos=3.5\par 11/6/19: Ca=9.1, Alb=3.9. Phos=3.7, VitD=50, PTH=80\par 1/13/20: ca=9.1\par 1/30/20: Ca=9.2, Alb=3.9, Phos=2.7, Mag=1.5, Vit D=103/41, PTH=87\par 2/18/20:ca=8.5, Alb=3.6, \par 3/2/20: Ca=8.5, Alb=3.6\par 3/17/20: Ca=9.1, Alb=3.7, Phos=3.4, Mag=1.7\par 10/17/20: Ca=8.9, Alb=4, Phos=2.3, Mag-2.0, Vit D=66, PTH=47\par 1/4/21 : Ca=9.4, Alb=4.2, Phos=2.7, Mag=2, Vit D=64, PTH=87.5\par 4/5/21: Ca=9.1, Alb=4, Phos=3.1, Mag=1.7, \par 6/4/21: ca=9, Alb=3.8, Phos=2.8, Mag=1.8\par 7/12/21: Ca=8.6, ALB=6, phosph=2.3, Mag=1.8\par 11/15/21: Ca=9, Alb=3.7, Mag=1.7\par 1/4/22: ca=8.8, Alb=3.9, Mg=2.9, phos=2.9\par 1/21/22: Ca=8.8, Alb=3.9, Vit D=60, PTH=85\par 2/14/22:  Ca=8.3, Mag=1.6, Alb=3.8, \par 3/28/22: Ca=8.5, Mag=1.7, Alb=4.1, Phos=1.2\par  5/9//22:  Ca=8.8, Mag=1.6, Alb=4, Phos=1.6 \par   5/27/22: Ca=9.2 , Mag=1.9, Alb=3.8 , Phos=2.7, PTH=72, VitD=105\par   6/20/ 22: Ca=8.5  , Mag=1.8, Alb=4.5 , Phos=0.8, ABX=615, \par   7/25/22 :  Ca=8.6, Mag=1.8 , Alb=4.2, Phos=1.5, \par   10/17/22: Ca=9, Mag=1.8 , Alb=4.3, Phos=2.1, PTH=95, Vit D=25\par  12/16/22 : Labs: Ca=8.9, Mag=1.7,  Alb=3.8, Phos=1.6, \par   1/30/23 Labs:   Ca=9.5, Mag=1.8, ALP=79, Alb=4.4, Phos=3, PTH=96, Vit D =144\par   3/27/ 23: Labs: Ca=8.5, Mag=1.8 , ALP=94,  Alb=4 , Phos=1, \par   4/21/23 Labs:   Ca=8.9, Mag=1.9, ALP=75, Alb=4., Phos=2.9, PTH=89, Vit D =108\par   5/22/23 Labs:Ca=9.1,Mag=1.8 , ALP=68,Alb=4.2, Phos=2.4, \par Dr. Akers: Hyperparathyroidism-- probably secondary due to low Vit D/Ca & ? superimposed primary, \par [facility] ENT Consult init felt  Parathyroid scan showing activity in mediastinum is ectopic parathyroid, then felt sx not indicated at that time, elev PTH is secondary to low  Vit D/Ca\par Rx replete Vit D and current IV Calcium-as per endo \par \par trend Vit D, Ca, Phos, PTH,  \par \par BD--9/2020: incr in spine and hip , no change in wrist,\par BD--10/17/22: Decr in spine & hip, incr in wrist, repeat--> \par 10/5/20, 9/2021,6/2022-s/p Reclast--repeat--> 6/2023 \par \par \par \par \par \par 4. GERD:  recently less active by ENT , no ht burn,  dysphagia,  + throat clear\par               h/o  Dry cough, CXR:ana, saw ENT bergstein-->LPR\par * ++ LPR,  ++  Page’s w No Dysplasia,  + H/O  Esophagitis grade: A   was found \par \par Recommend: \par * Anti-reflux diet & life-style changes reviewed & re-emphasized.  ++  Bedge required\par * Weight reduction & regular exercise emphasized\par \par * need for  PPI:  Omep 40 BID--> 40mg qd  ,  ++ H2B q HS:Zantac 300mg--> Pepcid 40mg\par No Carafate  1 gram:   --was emphasized\par I reviewed & summarized the prospective randomized & retrospective non-randomized studies\par looking at potential long term SE's of PPIs, w special attention to associations & actual cause\par as related to GI infections, bone loss, cognitive changes, KD, Covid, vitamin & electrolyte deficiencies\par questions were answered, pt advised that PPIs should be used when needed as indicated by their\par clinical indication and response and tapering off is always the goal if possible. pt understood.\par \par *  F/U  EGD: --> 2024--for Barretts screening / surveillance \par * No  need for pH Monitor,   Manometry,   Esophagram --\par *  ++  need for ENT  eval/F/U,  No  need for Surgical  eval  --  \par \par \par \par \par \par 5. Hemorrhoids:  well - controlled.  No pain,  swelling,  itch,  bleeding\par * Discussed previously the potential complications of thrombosis,  pain,  infection,  swelling, itching,  bleeding \par Reccomend: \par * currently Low   - Fiber Diet was emphasized\par * 6  --  8 cups of decaffeinated fluid daily was emphasized \par * Sitz Bathes prn,   No:  Anusol HC  Suppos / Cream  UT BID -- was needed\par * No:  Tucks BID,  Balneol Lotion,   Calmoseptine Oint -- was needed ;    ++ Prep H prn\par * No:  need for  Colorectal surgical evaluation for possible ablation\par \par \par \par \par \par \par \par 6. Barretts esophagus without dysplasia  \par Notes: see GERD.    \par \par \par \par 7. Hepatomegaly-->not confirmed by recent abd sono\par CTE w relative enlargemt, ? lobulation & enlarged portal/mesenteric veins\par LFTs-wnl, no ascites or edema\par R/O CLD, Hepatic vein thrombosis\par Abd sono w doppler--> Liver and spleen normal size, no thrombosis, normal portal and hepatic vein flow\par \par \par \par \par .\par \par \par

## 2023-05-23 NOTE — HISTORY OF PRESENT ILLNESS
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-->Eliquis' warfarin \par        GERD w Page's, Hemorrhoids, BPH, \par        S/P Ileocolonic resection 1998\par        Since then multiple SBOs\par \par \par        Got IV Iron x 2, since 10/2/17\par        10/19/17: feeling better, Zo=198 on prednisone 15mg x 3weeks, BMs Brown: #5-6, 1-2/D, occas 3-4/d\par        10/25/17: Hgb=10, ESR=5, CRP=5, Alb=3.6, Phos=2, Lwcmdcuq=994, K33=623\par        11/16/17: Hgb=11.2, ESR=14, CRP=12, \par        11/13/17: MRE-Chr mild distension of distal & vladislav-ileum s/o mild stricturing at anast, mild mural thick & mucosal enhancemt ~ 20cm; little change from 2015 c/w mild acute on chr ileitis, 2.1 cm Liver hemangioma\par        11/28/17: Entyvio\par        11/29/17: Hgb=9.6, ESR=10, CRP=5.8, Alb=3.8,Ferritin-19, Iron=14,Sat=4%, Phos=2.5, Kq=454, BMs:# 5, 1-2x/D, brown,\par        12/19;17: Hgb=10.1, ESR=12, CRP=6.5; 12/21/17: BMs:#3-5, 1-3x/D , brown, no blood, no pain, gi=215\par        1/3/18:Hgb=11.7, ESR=19, CRP=6.5, Alb=4.1, Btjqmvob=115, Iron=31, Sat%=9,Zinc=55\par        1/16/18: Entyvio, Hgb=11.4, ESR=10, CRP=6.9\par        1/18/18: Today: cellulitis R foot, saw dr KEITH--rx augmentum x 10D, Entyvio 1/9 to 1/16, dk=729 w clothes,BMs: # 4-5, 1-2x/D \par        2/14/18: Hgb=11.1, ESR=16, CRP=11.6, Alb=3.6, Iron=28, Ferritin=23, INR=1.5 \par        2/16/18: s/p Entyvio; Iron infusion, again on 2/27\par        2/20/18: Hgb=10.6, ESR=20, CRP=12, INR=1.7\par        2/27/18: s/p iron infusion\par        3/9/18: Dr. Burden for tenosynovitis, rx w Zorvolex: Diclofenac x 5 days--? response\par        3/14/18: Cm=755; BMs: # 5, 1-2x/D; Hgb=11, ESR=18, CRP=11,Irom=45,Nlajvlfk=530,Alb=3.6, INR=1.5\par        3/19/18: s/p Entyvio\par        3/22/18: ez=859, BMs: # 5, 3-4 x/d\par        4/16/18: s/p Entyvio,Hgb=11.9, INR=1.3, ESR=18, CRP=8.4 \par        4/18/18 EGD: gastritis, no HP, no IM, 2+ Mucous, 0.5cm gastric polyp: fundic, 4cm HH, + Barretts:3cm, no dysplasia\par        4/25/18: Hgb=11.5, INR=1.6, Iron=40, Sat=13%, Ferritin=57, Alb=3.2, Phos=2.2, Mag=1.7\par        4/30/18: s/p Iron Infusion\par        5/14/18: s/p Entyvio \par        5/23/18: Hgb=11.5, ESR=16, CRP< 5\par        5/29/18: s/p Iron Infusion\par        6/6/18: Hgb=10.6, INR=1.7, Pnnudytt=161, Alb=3.5, Phos=2.1, R45=693, gb=835; BMs: # 5, 2-3x/D \par        6/19/18: Hgb=10.6, INR=1, CRP=15, ESR=23\par        6/21/18: R ankle is acting up, swollen, pain, having MRI done, took a couple of advil, on low carbs ,BMs: # 5, 1-2x/D, xw=258\par        6/26/18: MRI: fx/stress rxn-talar body/navicula; stress related changes--cuneform, cuboid,distal tibia; mod tibiotalar effusion, mild-mod peroneal tendinosis\par        7/19/18: entyvio 6/26/18, eliminated all carbs--anti inflammatory diet, aw=573, BMs: # 4-5, 1-3x/D \par        7/25/18: Hgb=10, INR=1.3, Alb=3.3, Phos=2.4, Tcjulpof=909, sat%=12, Iron=35\par        8/2/18: BD--osteoporosis of hip/spine: sig decrease BD--17.6% of hip, 17% of spine, osteopenia of wrist: 6.4%\par        8/7/18: Entyvio\par        8/21/18: Hgb=11.1, INR=1.5, ESR=19, CRP=18.6\par        8/23/18: recently w tooth infection, old implant--loose and removed; rx w amox, and advil\par        eating almost no carbs, cb=340, # 5-6, 2-3x/d \par        8/24/18: Stool fat--neg, Dinemsvifkuj=481\par        9/5/18: Hgb=11.4, INR=1.3, ESR=9, CRP=11.3, Iron=41, Nemsxvwx=087, Alb=3.8,\par        9/17/18: entyvio, Hgb=10.7, INR=2.2, ESR=17, CRP=9.1, \par        9/20/18: ie=731, BMs: # 5-6, 1-2x/D \par        9/21/18: Phos=2.4, Ca=8.7, Alb=3.4, Vit D=27, VQJ=409, \par        Dr. Akers: Hyperparathyroidism: probably secondary due to low Vit D/Ca & ? superimposed primary, rx replete Vit D and Calcium\par        10/9/18: Hgb=11.6, MCV=94, ESR=14, CRP=5.4, INR=2.2\par        10/10/18 MRE: stricturing of neoterminal ileum w worsened upstream dilatation, moderate length of upstream ileum w stricturing extending at least 20cm and more extensive then previous. suggestion of 2 adj extraluminal fluid collections which may be w/i the wall of strictured ileum near the ileocolonic anastomosis. surrounding spiculation and tethered appearance of bowel in this region.\par 10/16/18: entyvio, Hgb=11.7, MCV=94, ESR=14, CRP <5, Alb=3.5\par 10/18/18: Wp=944,   BMs: # 5-6, 3x/D , \par 11/13/18: Entyvio\par 11/21/18 CTE w C: limited 2/2 bowel underdistention, mucosal hyperenhancemt & extensive SM edema of distal ileum including vladislav-ileum, difficult to exclude a sml fluid collection, Liver w relative enlargement w suggestion of lobulation, enlargemt of PV,SMV,IMV,Spl V, rectal V. No ascites\par 11/28/18: Hgb=10, ESR=19, CRP=17,Alb=3.5,Iron=35, Sat%=11, Ivuwyfwh=819, INR=1.3\par 11/29/18: dq=283, BMs: # 5-6, 3-4x/D\par 12/3/18: Abd sono--Liver 14.8cm Incr heterogenicity, spleen--wnl\par 12/11/18 & 1/14/19: Entyvio\par 1/14/19:Entyvio,  Hgb=10.7, ESR=15, Alb=3.6, Iron=36, Ferritin=67, Sat%=11, INR=1.6\par 1/24/19:  gx=515, stools are # 4-6, 3-4x/d , no pain\par 2/5/19: Hgb=11.2, ESR=14, CRP=0.36\par 2/28/19: Hgb=11.5, ESR=12, CRP=6.5, Alb=3.7, Iron=69, Mejgqmxz=459, Ca=8.9\par                Bms: # 4-5, 2-3x/D , qs=208,  Entyvio : last 2/1519,\par                had parathyroid scan pre-op, still w elev PTH, + activity in mediastinum,? ectopic parathyroid tissue vs neoplasm.  To see ENT and have Imaging at [facility]\par 3/28/19: Bms: # 4-5 , 3x/d ;ij=597\par 4/11/19: Hgb-9.7, Iron=45, ESR=18, CRP=9.4, Alb=3.5, \par Saw Endo SX at [facility], felt hyperparathyroid is secondary to Low Ca/Vit D, no sx at this time\par 4/16/19: BMs: # 5-6, 3-4x/D, cu=821,  Entyvio : last 3/1519,  got IV iron 4/12/19 for Ferritin=53\par 4/27/19: s/p R Hip Nailing--missed 4/2019 2/2 fresh wound\par 5/16/19 & 6/18/19 Entyvio\par 7/2/19: Hgb=11.7, Ferritin=41,ESR=12, CRP=5.5, B6=2.8,Mag=1.8,Phos=3.9,Zp=506,Zinc=61\par 7/11/19: s/p IV iron\par 7/11/19: Alb=3.7, VLA=923, Ca=9.1, Vit D=40/306, \par 7/24/19: Entyvio, Alb=3.8, SQR=000, Ca=8.9, Vit D=128 \par 8/1/19: Bms: # 5-6, occas #4, 2-3x/D , mv=786\par 8/15/19: Hgb=12, ESR=10, CRP=5.2, Ferritin=68, Alb=3.4, Phos=4.4,Mg=1.7, Ca=8.5,Calprotectin=88,\par 8/20/19: KNK=402, Ca=9, Alb=4.2\par 9/19/19: Hgb=12.8, ESR=9, CRP=5.5, Alb=3.7, Lklpjhhk=153, Mag=1.8, Ca=8.9, Phos=4.2\par 9/26/19: yb=152, BMs: #5-6, 2-3x/D, no pain\par 10/17/19: yz=350, BMs: #4-6, 1-2x/D, no Pain; Hgb=14, ESR=7, CRP<5, Alb=3.9, Jnrrgbib=625, Mag=1.7, Ca=9.6\par 10/24/19: vt=698, Bms: # 4-6 , no pain,\par 11/6/19: ESR=6, CRP=6, PTH=80,Ca=9.1, VitD=50\par 11/14/19: wc=175, BMs: # : 4, 1-2, 0ccas #5\par  11/17/19: ESR=6, CRP<5, Jnjlujir=196, \par  12/19/19: qx=153, BMs: #5-6, 2-3x/D\par 1/6/20: entyvio\par 1/13/20: ESR=7, CRP=0.2, Hgb=13.5, Mcguilmt=899, W67=376, FA=10, Alb=4.1, Ca=9.1\par 1/16/20: wt = 127, BMs: # 5, 1-3x/d\par 1/30/20: ESR=9, CRP=11, Hgb=13.9, Dhbmezlt=981,Iron=38, Alb=3.9,Ca=9.2, PTH=87,\par 2/3/20 EGD: gastritis, +MACKENZIE, No HP, +erosion w ooze -clipped, 0.5cm polyp-neg; 4cm HH, Esophagitis A, + Barretts, VC: 1+\par Colonoscopy: 05cm rectal polyp: HP, 2nd deg int hemorrhoids\par mild-mod activity: MARILY w cryptitis & mild archect distortion at ileocolonic anast: colon & ileal side, no stricture\par 2/4/20: Entyvio; 2/12/20: IV Iron, 3/3/20: Entyvio\par 2/25/20: mx=651,  BMs: # 4-5, 1-2x/ d\par 3/19/20: jm=581, BMs: #4-5, 1-2x/D\par 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\par 10/17/20 : Hgb=13, CRP< 5, ESR= 11, Iron=44, Ferritin=46, Alb=4, Phos=2.3, \par 11/5/20: lv=437; s/p IV Iron x 2 ,  11/4 and 1 week prior;   BMs:  # 4-5, 1-2x/D , no pain\par 11/18/20  Entyvio + IV Ca\par  1/4/21: Hgb=13.6, CRP<5, ESR=9, Ferritin=60, Alb=4.2, Phos=2.7\par 1/11/21: Entyvio & IV Ca\par 1/14/21: no pain, stool more formed ,  kx=653 - 137; BMs:  # 4-5, 1-2x/D \par 2/8/21: Entyvio & IV Ca\par 2/23/21 IV Ca\par 2/22/21: Hgb=12.7, CRP<5, ESR=8, Vvfcpesjh=134, Phos=2.3, Mag=1.8, A77=909, Zinc=58, Uz=918\par 2/26/21: uj=622,  BMs:  # 4-5, 1-2x/D , no pain \par 3/8/21 & 4/8/21: Entyvio\par 3/9/21 & 3/24/21: IV Iron\par 4/5/21: Hgb=13, CRP<5, ESR=9, Ferritin=94, Phos=3.1, Mag=1.7,Ca=9.1/ Alb=4\par             \par Pt Ins co is refusing to cover Entyvio q 4wks, despite numerous attempts to appeal, they also refuse to set up a peer to peer discussion betw myself and another healthcare provider, even one that works for a third party.  They do acknowledge that there is literature supporting the successful use in individual cases who don’t respond to the q 8 wk interval and need\par less then q 8weeks , but state it had not in FDA approved at less then 8wks so they will not approve it.  I spoke to the ins co rep and discussed that fact that patients should not be  pigeon holed since practicing medicine is done on an individual basis.  Humans are not all the same.   Their  response didn’t change eventhough the pt clinically needed the medication and it  induced a beneficial clinical response towards remission.  Therefore the patient and I decided to slowly increase the interval since the insurance company will not pay for this benefit.  Hopefully he may be able to maintain his present clinical state.  If not we will return to q 4weeks and will again appeal using this patients real clinical data .\par \par 4/9/21:  oi=567,  no pain, stool more formed # 4, 1-2x/d \par 6/11/21: wt= 135,   no pain, stool more formed # 4, 1-2x/d \par              recently had an episode of abd distenstion, pain, N/V, no BM\par              eventually started having diarrhea and flatus, BMs returned to normal\par              lost 2-3 lbs but regained\par  Doesnt recall eating anything different, no fever, chills, brbpr, melena\par  entyvio was 6/3/21--which is almost 8 weeks, was supposed to be 5-6 weeks  to start\par               6/4/21 Hgb=12, CRP<5, Ferritin=32, pt to receive IV iron    \par  7/12/21 Labs: Hgb=13.6, ESR=10, CRP=<5, Kwkooup=718, Alb=3.7, BUN=16\par \par  7/16/21 MRE: limited to incomplete bowel distention, chr distal ileitis/probable inflammatory stricturing vs collapse of the vladislav-TI--but improved since 2018 , moderate proctitis\par 7/20/21:  Last entyvio was --7/1, next early august\par                 sv=644 ,  no pain, stool more formed # 4-5/6, 1-2x/d \par 7/23/21 Entyvio level= 39, Abs <1.6\par 8/23/21: Labs--Hgb=13.6, ESR=10, CRP=6.6, Pynzqyvv=708, Iron=26, Alb=3.9, BUN=16\par 8/26/21 p/w w N/V, abd pain/bloating  x 3 days, unable to keep things down\par Labs: WBC=5, Hgb=12, CRP=43, ESR=11, Alb=4.4, BUN=28, Creat=-1.6\par CT Abd/Pelvis: diffuse marked dilatation of SB Loops w transition pt in Rmid/lower abdomen\par ~ 5-6c, proximal to the ileocolonic anastomosis\par RX w IV solumedrol 20mg q8h, NGT, IVF, pt refused TPN, also w UTI from prostatitis\par 8/27 NGT was pulled, and clears started and advanced to LR,  was d/c home 8/30 on prednisone\par 40mg qd w taper by 10mg/wk--> to 20mg\par 10/15 Entyvio\par 10/25 Stelera (Ustekinumab)  # 1\par 11/3/21 [facility]: wt above 140, off pred x 2 weeks,  1-2 BMs qd\par  Discussed at IBD conference, reviewed previous colonoscopy, and recent imaging; consensus that due to malnutrition and steroid dependency, surgery is next best option however pt reports feeling great and wants to pursue medical treatment which is reasonable given he feels well \par repeat imaging in mid-January after 3 months of Stelara, and then consider referral to surgery vs improved candidacy for endoscopic manipulation (currently presumed one long stricture). \par \par 11/15/21 : dx=610, Hgb=12, ESR=3, CRP <5, Ferritin =104, Ca=9, Mg=1.7, Alb=3.7 \par 12/10/21 : iron infusion\par 1/4/22: Hgb=11.5, ESR=6, CRP <5, Ca=8.8, Mag=2.9, Alb=3.9\par 1/10/22 : tv=145, stools # 5, 1-2x/D \par 1/10/22 Today:  Feeling well, no c/o , CP, SOB/ AMARO, Cough, Wheeze, Palpitations, edema\par              Most recent labs  reviewed w patient:1/4/22\par 1/31/22: calprotectin=84\par 2/2/22: lj=140\par 2/14/22: Hgb=10.6, ESR=9, CRP <5, Ca=8.3, Mag=1.6, Alb=3.8, Iron=25, Ferritin=15\par 3/3/22:  iv Iron infusion x 1\par 3/8/22 MRI Abd/Pelv: thickened distal Jejunal loops which are tethered; distal ileal segment  (10-12cm ) previously actively inflammed has decreased & now characterized by an area of stricture, however the vladislav-TI  5mm to the anastamosis is enhanced as well as narrowed.  colon just  distal to the anastamosis has a focal segment of inflammation & stricture.  the recto-sigmoid appears inflammed \par 3/28/22: Hgb=14.4 , ESR=1, CRP <5, Ca=8.5, Mag=1.7,Alb=4.1, Iron=104, Ddjbsyrx=589,\par 4/5/22: hj=030, Bms: # 5-6 , 1-2 x/day \par \par \par 5/10/22  :  Last entyvios were -->7/1, 8/5, 9/2, 10/15/21\par              Stelara # 1 IV--10/25/21, second dose SC~ 12/10/21, 3rd~ 2/14/22,  4th-- mid April , 5th--mid june \par \par              Early December 2021  had a " flare" loose BMs, N/V and bloat\par              Took Pred 40mg qd and tapered down 10mg / week , now off pred x 7-8 weeks\par \par 4/13/22 Dr. Heard:  pt states he had a flare the weekend of 4/9/22 w vomiting/distension\par                pt self-started prednisone 40mg , this was just before his april stelara dose\par                claims he was feeling better\par               Dr. Heard: sched colonoscopy w ? dilatation \par \par 5/10/22: tapered of pred 40mg , 10mg/wk over 1 month, now off 2weeks \par         no pain, n/v,  BMs: # 4-5, 1-2 x Day , wt=-132\par 5/17/22 Dr. Heard Colonoscopy: ped scope passed w/o resist in vladislav-TI, one single apthae w psuedopolyp.  Flares probably 2/2 to adhesive dis, imaging may consistently show wall thickening  5/27/22 Labs:  Alb=3.8. Phos=2.7, Mag=1.9, Ca=9.2, PTH=72, Vit D=105, ALP=76\par                + IV Iron\par  6/20/22 Labs:  Alb=4.5, Phos=0.8, Mag=1.8, Ca=8.5, EVN=403, ALP=83\par                          Hgb=14, ESR=2, CRP<5, Azgnjepx=816, Iron=86\par 6/21/22:  no pain, n/v,  BMs: # 4-5, 1-2 x Day ,\par                co=376\par 7/26/22:  no pain, n/v,  BMs: # 4-5, 1-2 x Day ,\par                gw=682\par 9/30/22 EGD: mild gastritis, no HP; 0.75cm gastric polyp:fundic;\par                4cm HH, Esophagitis A--, + Barretts\par 10/17/22 : Alb=4.3, Phos=2.1, Mag=1.8, Ca=9,  PTH=95, ALP=80, TSH=12, Vit D=69\par                          Hgb=12.7 , ESR=2, CRP<5, Ferritin=57, Iron=85\par 12/16/22 : Alb=3.8, Phos=1.6, Mag=1.7, Ca=8.9 , ALP=68, \par                          Hgb=10.8 , ESR=1, Onmdgmfz=836, Iron=67, INR=2.3 \par 12/19/22: nm=531, had some N and distension the day after Thanksgiving\par                      Was having BMs and passing gas, went of liquids x 1-2 days--> resolved\par 1/30/23: Alb=4.4, Phos=3, Mag=1.8, Ca=9.5,  PTH=96, ALP=79,  Vit D=144\par                          Hgb=13.6 , ESR=5, CRP<5, Ferritin=55, Iron=56, INR=1.6 \par \par  2/6/23 : jf=529, no abd pain, BMs: # 4 qd \par  3/17/23: developed cugh,congection, fever, malaise\par  3/19/23 + Covid; Received Iv Remdesivir x 3 at home\par 3/27/ 23: ob=893, BMs: #4-5,  1-2x/ D \par  Alb=4 , Phos=1, Mag=1.8, Ca=8.5 , ALP=94\par  Hgb=13.8, ESR=5, CRP<5, Dniwzerk=451, Iron=78, INR=5.9 \par 4/21/23 : Alb=4, Phos=2.9, Mag=1.9, Ca=8.9,  PTH=89, ALP=75,  Vit D=105/ 80, INR=3.2\par  5/22/23 Hgb=12.7, ESR=2, CRP<5, Ferritin=19, Iron=42, INR=2.4\par               Alb=4.2, Phos=2.4, Mag=1.8, Ca=9.1, ALP=68,                     \par \par 5/23/23 :  s/p 2 iron infusions in October, last Stelera beginning 4/2023, next beginning of 6/2023\par                 Today: yv=586     , BMs: #4-5,  1-2x/ D \par \par * Abd pain-->no\par * Nausea--> no\par * Vomit--> no\par * Early satiety--> no\par * Belching--> no\par * Hiccups--> no\par * Regurgitation--> no\par * Acid Taste / Water Brash--> no\par * Ht burn--> no\par * Dysphagia--> no\par * Throat Clearing--> no\par * Hoarseness--> no\par * Post-Nasal Drip--> no\par * Congestion--> no\par * Globus--> no\par * Cough--> no\par * Wheeze / PC-> -no\par * Constipation--> no\par * Diarrhea--> no\par * Bloating--> no\par * Strain on Defecation--> no\par * Incompl Evac--> no\par * Flatulence--> no\par * Gurgling--> no\par * Melena--> no\par * BPBPR-> -no\par * Anorexia--> no\par * Wt. Loss--> no\par \par \par   \par \par \par \par \par \par        PRIOR HISTORY--2017\par \par        1/17/17: Hgb=9.2, ESR=6, CRP=5; 1/19/17: BMs: #4, 5-6, 2-3x/D, Zs=951, Started Creon 1 tid cc\par        3rd Entyv beginning Feb\par        2/14/17: Labs; Hgb=9.6, MCV=97, ESR=5, CRP< 5, S31=060, FA>23, Iron=59, Retic =3.2,\par        2/17/17 Fecal Calprotectin=16, Fats: Increased neutral & Split\par        3/13/17: Labs Hgb=9.5, MCV=95, ESR=7, CRP <5, ALB=3.1\par        3/16/17: lot of stress, to move -[place], had a job-fell thru, BMs: # 5, 2-4 x/D, wt= 131w clothes\par        4/19/17 EGD: gastritis, MACKENZIE, No HP, 2cm HH, +Barretts, No Dysplasia\par        Colonoscopy: Anastamosis: open w mild -mod active colitis, enteritis severe adj to anastomosis, mild more proximal, remainder of colon-wnl, 2-3 deg int hemorrhoids\par        4/26/17 : Hgb=7.3, MCV=95, ESR=13, CRP<5;Immediately post procedure stools very dark on eliquis/iron.\par        Admitted to PM: Hgb=8.3-->8.9 after 2 U, Alb 3.3, BUN=17, creat=1.3, Malina/Rvomj=427/460\par        4/27/17: Alb=2.3, BUN=12, creat=1.2, Malina/Lipase=209/863-No abd pain, N/V; 5/5/17: Hgb=10.7.\par        5/8/17 Entyvio iv. 5/8-5/9 w dark red diarrhea; 5/10/17 Hgb=7.8.\par        5/11/17 Adm [facility] w stools brown, Hgb=7.9, BUN=17, Creat=1.4, Alb=2.9; S/P 2 Units- Hgb=10.6, BUN=15/1.1.\par        Prednisone 40mg qd, entocort d/dee.; 5/18/17: Hgb-10.4, we=948, BMs: #5, 1-2x/D, no pain\par        6/8/17: Hgb=7.4,MCV=98, CRP<5-ASA stopped, Pred20--> 30\par        6/10/17: Hgb=8.2, Alb=2.9, BUN=20/1.2 ; 6/12/17: Hgb=8.3, Retic=0.19, Iron=10, Sat%=3, Ferritin=57, FA=23,N42=944, 6/13/17: s/p 2 Unit PRBCs\par        6/15/17: started MCT oil, Ug=318 , BMs: # 5, 1-2x/D, stools are brownish/green \par        7/11/17: [facility] w unsteadiness. MRI Head: R Cortical Temporo-occipital encephalomalacia, MRA H&N-no sign lesions, PER-wnl.Hgb=9.9--> 8.4->9.7 after 1 Unit. Seen by Torri: received IV Iron \par        CRP<5, S80=166, EDX=357, FA>23,Iron=22,Sat%=6, Ferritin=42, Alb=3.5. D/C on warfarin 2mg\par        7/20 Hgb=8.5, 7/28 Hgb=7.7, 7/31-s/p 1 Unit \par        As outpt seeing Heme, to get IV Iron and 5 IV Copper\par        Had 'FLU' Fever=101, chills, bloat, N/V/D, Bilious. no pain, melena,brbpr\par        Given anti-emetic by dr Butler,then able to keep things down\par        On prednisone 25, warfarin w INR bet 1.6-2\par        8/17/17: Hgb=9.9, ESR=20, CRP-P,Um=021, BMs: # 5, 1-2x/D, stools are brownish/green\par        10/2/17: Hgb=8.8, MCV=89,Phos=1.7, K=3.9, Mag=1.9, Alb=3.6,Iron<10,Ferritin=21, INR=2\par        10/17/17: Hgb=7.9,ESR=6,CRP<5, INR=1.6\par        10/25/17: Hgb=10, ESR=5, CRP=5, Alb=3.6, Phos=2, Ivegeoqq=389, C95=986\par        11/16/17: Hgb=11.2, ESR=14, CRP=12, \par        11/13/17: MRE-Chr mild distension of distal & vladislav-ileum s/o mild stricturing at anast, mild mural thick & mucosal enhancemt ~ 20cm; little change from 2015 c/w mild acute on chr ileitis, 2.1 cm Liver hemangioma\par        11/28/17: Entyvio\par        11/29/17: Hgb=9.6, ESR=10, CRP=5.8, Alb=3.8,Ferritin-P, Iron=14,Sat=4%, Phos=2.5\par        12/19;17: Hgb=10.1, ESR=12, CRP=6.5; 12/21/17: BMs:#3-5, 1-3x/D , brown, no blood, no pain, dy=476\par        1/3/18:Hgb=11.7, ESR=19, CRP=6.5, Alb=4.1, Zpownhjw=999, Iron=31, Sat%=9,Zinc=55\par \par \par        PRIOR HISTORY---2013:\par \par        2/20/13 CT markedly dilated sb loops ext to anast, colonoscopy w open anast ,mild-mod remy-anastamotic disease. quick response to entocort/humira--probably adhesive dis. \par        8/10/13 w GNR bacteremia, ADA 1.7 /KAYLAH 3.4, Humira 8/7, 8/13,8/18,9/15\par        CT- mucosal thickening and spiculation of the distal sb extending to the anastamosis, thickening and stranding of adj mesenteric fat.\par        Humira increased to 40mg weekly, entocort 4\par        9/2013 MRE--dilated loops in mid and distal ileum, markedly thickened and narrowed TI w decreased peristalsis of TI\par        11/15/13 ADA-24.9, KAYLAH-0\par \par \par        PRIOR HISTORY---2014:\par \par        2/15/14--CT dilated loops SB, loop of sb in mid abd 4.3cm w infiltrative changes in the mesentery, bowel tapers in the RLQ to the anastamosis w/o transition\par        WBC=15K, Rx w IV steroids and Abx \par        3/7/14 SBFT: last 10cm sb prox to anast mild distension and sl irregularity. In the mid portion of this loop there is a mild narrowing which appears to reopen but is some what narrowed.\par        3/20/14 ADA=33.1, KAYLAH=0, Lialda switch to Apriso 4 (2/2014)\par \par \par        PRIOR HISTORY--2015\par \par        1/11/15 Adm [facility] w 1 day N,Recurrent V, Abd pain/distension. CT-multiple distended & fluid-filled sb loops, mild wall thickening of ileum w No inflamm changes.\par        WBC=14.6, Hgb=17, RX w NGT suction, Levaquin iv, Solumedrol 40mg q 12( 1/12-->1/16) to prednisone 30mg BID w 5mg taper/wk\par        3/18/15 --Dr. Butler w edema /High BP, prednisone was tapered slowly to 2.5mg \par        switched to entocort 9mg qd, edema and bp improved w lasix 20mg \par        BMs:#4-5, 3x/D, Hgb=11, ESR=4, CRP<5\par        4/28/15 - 5/1/15: Adm [facility] w 1 day Abd pain, distension,N/V. 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 side of anast, opening appeared ulcerated,scarred & severely narrowed\par        6/11/15: [facility] w N/V x1, decr appetite, CT ABD: no obstruction, dilated thickened sb loops of distal jej and ileum\par        6/19/15 [facility]: s/p Lap w extensive lysis of adhesions, hepatic flex, sigmoid and sb anastamosis, s/p partial r colectomy and sb resection--side to side elisabeth: 8-10 inch from prior anast to TV colon.\par        7/17/15: BMs:#4-6, 4-5x/D, wt 131(from 126)\par        8/20/15: BMs: #4, 1-2x/D or #5, 2-3x/D, dd=997, dry cough,Hgb=10, WBC=3, PNW=126, CRP=56, ESR=21\par        8/25/15 CT Abd/Pelv: Svl RLQ sb loops w wall thickening, mild nodularity & inflamm stranding in mesentery\par        Advised-restart Entocort 9mg qd, Apriso 4 qd, Maintain Humira but given RX to check drug/Ab levels\par        9/15/15: did nt restart meds,Hgb=9.9, WBC=4, ESR=19, CRP=5.8, gp=328; [facility] : ADA=8.5, Antibodies< 1.7\par        10/15/15: No pain, BMs:#4, 1-2x/D, #5-6, 3-4x/D, throat clearing/cough-better, hi=285\par        11/30/15: No pain, BMs:# 4, 5-6 intermittently, No throat clear, de=381\par \par \par        PRIOR HISTORY-2016\par \par        1/22/16; 4/8/16; 6/6/16 : No pain, BMs:# 4-5 1-2x/D, also #5-6 3x/D for 2-3D/wk, ai=237\par        7/14/16: fn=378, 9/22/16: dl=828.5\par        11/10/16: 9.5lb wt loss, states BMs: 5-6, 5x/D--Taking Magnesium, No pain/anorexia\par        Labs: Stool Ibwztnwfznnc=546, + Incr Fecal fat, Alb=3.4, FA =23, Y84=300, Hgb=12, ESR=10, CRP <5\par        Magnesium-d/c, Obtain MRE asap--Start steroids and possibly switch to Entyvio\par        DDx discussed: active crohns--loss response to Humira, Malabsorption--loss Bile acids, Bile-induced diarrhea, SIBO\par        Pt wanted to wait for imaging-did not start rx\par        12/1//16 MRE: RUQ ileocolonic anastamosis--narrowed w upstream dilatation to 3.2 cm\par        Pt refused pred, Started Entocort 9mg qd and Flagyl 250mg qid about 7-10days ago \par        awaiting Entyvio load to start 12/22, then 1/5; had Cut back on iron bid\par        12/15/16: BMs:# 5, 2-3x/D, occas #6, No pain, Less bloat/flatulence, Ag=925.

## 2023-07-12 ENCOUNTER — APPOINTMENT (OUTPATIENT)
Dept: INTERNAL MEDICINE | Facility: CLINIC | Age: 58
End: 2023-07-12
Payer: COMMERCIAL

## 2023-07-12 VITALS
OXYGEN SATURATION: 98 % | SYSTOLIC BLOOD PRESSURE: 124 MMHG | HEIGHT: 69 IN | HEART RATE: 91 BPM | TEMPERATURE: 97.9 F | WEIGHT: 135 LBS | BODY MASS INDEX: 19.99 KG/M2 | DIASTOLIC BLOOD PRESSURE: 80 MMHG

## 2023-07-12 DIAGNOSIS — S90.212A CONTUSION OF LEFT GREAT TOE WITH DAMAGE TO NAIL, INITIAL ENCOUNTER: ICD-10-CM

## 2023-07-12 DIAGNOSIS — U07.1 COVID-19: ICD-10-CM

## 2023-07-12 DIAGNOSIS — Z85.850 PERSONAL HISTORY OF MALIGNANT NEOPLASM OF THYROID: ICD-10-CM

## 2023-07-12 DIAGNOSIS — D50.0 IRON DEFICIENCY ANEMIA SECONDARY TO BLOOD LOSS (CHRONIC): ICD-10-CM

## 2023-07-12 DIAGNOSIS — Z87.898 PERSONAL HISTORY OF OTHER SPECIFIED CONDITIONS: ICD-10-CM

## 2023-07-12 DIAGNOSIS — Z87.442 PERSONAL HISTORY OF URINARY CALCULI: ICD-10-CM

## 2023-07-12 DIAGNOSIS — S90.211A CONTUSION OF RIGHT GREAT TOE WITH DAMAGE TO NAIL, INITIAL ENCOUNTER: ICD-10-CM

## 2023-07-12 DIAGNOSIS — Z01.818 ENCOUNTER FOR OTHER PREPROCEDURAL EXAMINATION: ICD-10-CM

## 2023-07-12 DIAGNOSIS — K64.8 OTHER HEMORRHOIDS: ICD-10-CM

## 2023-07-12 DIAGNOSIS — Z12.5 ENCOUNTER FOR SCREENING FOR MALIGNANT NEOPLASM OF PROSTATE: ICD-10-CM

## 2023-07-12 DIAGNOSIS — I63.9 CEREBRAL INFARCTION, UNSPECIFIED: ICD-10-CM

## 2023-07-12 DIAGNOSIS — Z76.89 PERSONS ENCOUNTERING HEALTH SERVICES IN OTHER SPECIFIED CIRCUMSTANCES: ICD-10-CM

## 2023-07-12 DIAGNOSIS — S99.922A UNSPECIFIED INJURY OF LEFT FOOT, INITIAL ENCOUNTER: ICD-10-CM

## 2023-07-12 DIAGNOSIS — S99.921A UNSPECIFIED INJURY OF RIGHT FOOT, INITIAL ENCOUNTER: ICD-10-CM

## 2023-07-12 DIAGNOSIS — Z12.11 ENCOUNTER FOR SCREENING FOR MALIGNANT NEOPLASM OF COLON: ICD-10-CM

## 2023-07-12 DIAGNOSIS — Z86.39 PERSONAL HISTORY OF OTHER ENDOCRINE, NUTRITIONAL AND METABOLIC DISEASE: ICD-10-CM

## 2023-07-12 DIAGNOSIS — Z79.01 LONG TERM (CURRENT) USE OF ANTICOAGULANTS: ICD-10-CM

## 2023-07-12 DIAGNOSIS — K21.00 GASTRO-ESOPHAGEAL REFLUX DISEASE WITH ESOPHAGITIS, WITHOUT BLEEDING: ICD-10-CM

## 2023-07-12 PROCEDURE — 99214 OFFICE O/P EST MOD 30 MIN: CPT

## 2023-07-12 RX ORDER — OMEPRAZOLE 40 MG/1
40 CAPSULE, DELAYED RELEASE ORAL
Qty: 90 | Refills: 3 | Status: DISCONTINUED | COMMUNITY
Start: 1900-01-01 | End: 2023-07-12

## 2023-07-12 RX ORDER — USTEKINUMAB 130 MG/26ML
130 SOLUTION INTRAVENOUS
Qty: 3 | Refills: 0 | Status: DISCONTINUED | COMMUNITY
Start: 2021-10-01 | End: 2023-07-12

## 2023-07-12 NOTE — REVIEW OF SYSTEMS
[Negative] : Neurological [Abdominal Pain] : no abdominal pain [Nausea] : no nausea [Diarrhea] : no diarrhea

## 2023-07-12 NOTE — HISTORY OF PRESENT ILLNESS
[de-identified] : Comes in for medical follow-up.  Last time seen in the office was a preop evaluation for total thyroidectomy and parathyroidectomy in August 2020.  Since then he has been followed by endocrinology and he receives calcium infusion every other week, and by hematology for iron deficiency anemia secondary to Crohn's disease and he receives IV iron as needed.  His Crohn's been under control with Stelara.  As per patient he feels the best he is felt in the last 3 years

## 2023-07-14 ENCOUNTER — RESULT REVIEW (OUTPATIENT)
Age: 58
End: 2023-07-14

## 2023-07-20 ENCOUNTER — RX RENEWAL (OUTPATIENT)
Age: 58
End: 2023-07-20

## 2023-07-21 ENCOUNTER — RESULT REVIEW (OUTPATIENT)
Age: 58
End: 2023-07-21

## 2023-07-27 ENCOUNTER — APPOINTMENT (OUTPATIENT)
Dept: ENDOCRINOLOGY | Facility: CLINIC | Age: 58
End: 2023-07-27
Payer: COMMERCIAL

## 2023-07-27 VITALS — WEIGHT: 135 LBS | BODY MASS INDEX: 19.99 KG/M2 | HEIGHT: 69 IN

## 2023-07-27 PROCEDURE — 99215 OFFICE O/P EST HI 40 MIN: CPT | Mod: 25,95

## 2023-07-27 NOTE — PHYSICAL EXAM
[Alert] : alert [Well Nourished] : well nourished [No Acute Distress] : no acute distress [Well Developed] : well developed [Normal Sclera/Conjunctiva] : normal sclera/conjunctiva [EOMI] : extra ocular movement intact [No Proptosis] : no proptosis [Normal Oropharynx] : the oropharynx was normal [No Neck Mass] : no neck mass was observed [Supple] : the neck was supple [Well Healed Scar] : well healed scar [No Respiratory Distress] : no respiratory distress [No Accessory Muscle Use] : no accessory muscle use [Normal S1, S2] : normal S1 and S2 [Regular Rhythm] : with a regular rhythm [Normal Rate] : heart rate was normal [No Edema] : no peripheral edema [Pedal Pulses Normal] : the pedal pulses are present [Normal Anterior Cervical Nodes] : no anterior cervical lymphadenopathy [No Spinal Tenderness] : no spinal tenderness [Spine Straight] : spine straight [No Stigmata of Cushings Syndrome] : no stigmata of Cushings Syndrome [Normal Gait] : normal gait [Normal Strength/Tone] : muscle strength and tone were normal [No Rash] : no rash [Normal Reflexes] : deep tendon reflexes were 2+ and symmetric [No Tremors] : no tremors [Oriented x3] : oriented to person, place, and time [Acanthosis Nigricans] : no acanthosis nigricans

## 2023-07-27 NOTE — HISTORY OF PRESENT ILLNESS
[Home] : at home, [unfilled] , at the time of the visit. [Medical Office: ([facility])___] : at the medical office located in  [Verbal consent obtained from patient] : the patient, [unfilled] [FreeTextEntry1] : Last Reclast 6/17/2022\par Mr. GUDELIA DUNN is a 58 year old male\par  coming for a f/u , for severe osteoporosis with h/o bilateral hip fractures, hyperparathyroidism, low D and hypocalcemia secondary to Chron's disease on Stelara shot every other month Dr Morton \par on Vit D 50,000 IU 3 times a week alternating with 2 times a week every other week , he held it for a month of May only, levels high 4/23\par \par started Forteo May 10 2019 then first Reclast infusion in May 2021 with no side effects\par last DEXA 10/2022 mildy worse after 9/21 much improved likely due to not enough calcium\par \par on IV calcium each Friday at the infusion center, last one a month ago \par labs done much improved \par \par Ca citrate 950mg 3 tab bid \par Clacium IV every other week\par \par also iron IV every 6 weeks \par reports compliance \par \par 24hr urine low calcium while low D \par \par \par total thyroidectomy and central compartment  dissection 9/11/2020\par Pathology showing 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 No evidence of lymphatic or vascular invasion no extra thyroid extension. 8 of 10 lymph nodes positive for metastatic papillary thyroid carcinoma.\par \par Large test metastatic focus is 0.7 cm in greatest dimension extra low dose extension present. Thyroid gland left sided within normal limits. Tihymic tissue present.\par \par received iodine 131 at 29.3 mCi in October 2020\par Posterior ideation scan showed focal site of increased tracer localization in the neck to the right of midline as was evident on prior study on October 23, 1920.\par \par started Levothyroxine 125 mcg on 9/18/2020 no side effects dose increased to 137 mcg on November 2020\par \par last DEXA 9/2020\par As compared to the patient's most recent study dated 9/12/2019, there has been a statistically\par significant interval increase in bone density at both the lumbar spine and left hip with no s\par tatistically significant change noted at the left forearm.\par worse T score LFN -2.8

## 2023-07-27 NOTE — ASSESSMENT
[0] : 2) Feeling down, depressed, or hopeless: Not at all (0) [PHQ-2 Negative - No further assessment needed] : PHQ-2 Negative - No further assessment needed [[id number]] : 0

## 2023-08-04 ENCOUNTER — LABORATORY RESULT (OUTPATIENT)
Age: 58
End: 2023-08-04

## 2023-08-04 ENCOUNTER — RESULT REVIEW (OUTPATIENT)
Age: 58
End: 2023-08-04

## 2023-08-07 ENCOUNTER — NON-APPOINTMENT (OUTPATIENT)
Age: 58
End: 2023-08-07

## 2023-08-14 NOTE — CONSULT LETTER
[Dear  ___] : Dear  [unfilled], [Consult Letter:] : I had the pleasure of evaluating your patient, [unfilled]. [Please see my note below.] : Please see my note below. [Consult Closing:] : Thank you very much for allowing me to participate in the care of this patient.  If you have any questions, please do not hesitate to contact me. [Sincerely,] : Sincerely, [FreeTextEntry3] : Angie Biswas MD\par  [facility] [place] [facility]\par   [DrMeron  ___] : Dr. REARDON

## 2023-08-14 NOTE — HISTORY OF PRESENT ILLNESS
[de-identified] : Patient is a 52 year old who is referred for initial consultation for anemia secondary to Crohns/GI bleed vs Iron Deficiency/ Vit b12 def. 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 Patient is status post ileo-colonic resections for SBO  in 2016. In 2016 patient had complaints of 10 - 15 lb weight loss. Labs at that time showed Hgb of 12, steatorrhea and stool calprotectin = 236. In 2016 MRI/MRE with narrowing at ileocolonic anastomosis with upstream dilation. Pt refused bridge with  prednisone and Entocort / Flagyl. In 2017 patient Hgb dropped to 9.5. On 2017 patient underwent an EGD and Colonoscopy. Findings consistent with Page's and no dysplasia or AVMs. Colonoscopy showed an open anastomosis but active disease, moderate on the colonic side and limited to the anastomosis and moderate to severe enteritis, just proximal to the anastomosis. Pat had routine labs on 05/10/2017 Hgb: 8.3.  [FreeTextEntry1] : s/p injectafer, copper\par  warfarin [de-identified] : Patient presents for follow up of DVT, anemia, MTHFR Gene mutation, colitis.  Patient overall feels well, he is fatigued- recently recovered from COVID infection  [0 - No Distress] : Distress Level: 0

## 2023-08-14 NOTE — REVIEW OF SYSTEMS
[Fatigue] : fatigue [Eye Pain] : no eye pain [Vision Problems] : no vision problems [Dysphagia] : no dysphagia [Hoarseness] : no hoarseness [Chest Pain] : no chest pain [Lower Ext Edema] : no lower extremity edema [Shortness Of Breath] : no shortness of breath [Cough] : no cough [Vomiting] : no vomiting [Constipation] : no constipation [Dysuria] : no dysuria [Joint Pain] : no joint pain [Skin Rash] : no skin rash [Dizziness] : no dizziness [Insomnia] : no insomnia [Anxiety] : no anxiety [Depression] : no depression [Muscle Weakness] : no muscle weakness [Easy Bleeding] : no tendency for easy bleeding [Easy Bruising] : no tendency for easy bruising [Negative] : Allergic/Immunologic

## 2023-08-15 LAB
25(OH)D3 SERPL-MCNC: 78.9 NG/ML
ANION GAP SERPL CALC-SCNC: 11 MMOL/L
BUN SERPL-MCNC: 15 MG/DL
CA-I SERPL-SCNC: 4.8 MG/DL
CALCIUM SERPL-MCNC: 8.7 MG/DL
CALCIUM SERPL-MCNC: 8.7 MG/DL
CHLORIDE SERPL-SCNC: 109 MMOL/L
CO2 SERPL-SCNC: 23 MMOL/L
CREAT SERPL-MCNC: 1.17 MG/DL
EGFR: 72 ML/MIN/1.73M2
ESTIMATED AVERAGE GLUCOSE: 91 MG/DL
GLUCOSE SERPL-MCNC: 113 MG/DL
HBA1C MFR BLD HPLC: 4.8 %
PARATHYROID HORMONE INTACT: 133 PG/ML
PHOSPHATE SERPL-MCNC: 2.1 MG/DL
POTASSIUM SERPL-SCNC: 4.5 MMOL/L
PSA SERPL-MCNC: 2.22 NG/ML
SODIUM SERPL-SCNC: 143 MMOL/L

## 2023-08-21 ENCOUNTER — RESULT REVIEW (OUTPATIENT)
Age: 58
End: 2023-08-21

## 2023-08-21 ENCOUNTER — APPOINTMENT (OUTPATIENT)
Dept: HEMATOLOGY ONCOLOGY | Facility: CLINIC | Age: 58
End: 2023-08-21

## 2023-08-21 VITALS
RESPIRATION RATE: 16 BRPM | HEIGHT: 69 IN | BODY MASS INDEX: 19.86 KG/M2 | WEIGHT: 134.06 LBS | HEART RATE: 75 BPM | OXYGEN SATURATION: 98 % | SYSTOLIC BLOOD PRESSURE: 118 MMHG | DIASTOLIC BLOOD PRESSURE: 77 MMHG | TEMPERATURE: 97.5 F

## 2023-08-22 ENCOUNTER — APPOINTMENT (OUTPATIENT)
Dept: GASTROENTEROLOGY | Facility: CLINIC | Age: 58
End: 2023-08-22
Payer: COMMERCIAL

## 2023-08-22 VITALS
BODY MASS INDEX: 19.85 KG/M2 | DIASTOLIC BLOOD PRESSURE: 72 MMHG | SYSTOLIC BLOOD PRESSURE: 122 MMHG | HEART RATE: 74 BPM | HEIGHT: 69 IN | WEIGHT: 134 LBS

## 2023-08-22 PROCEDURE — 99214 OFFICE O/P EST MOD 30 MIN: CPT

## 2023-08-22 NOTE — HISTORY OF PRESENT ILLNESS
[de-identified] :    This HPI  reflects a summary and review of records : including previous and most recent  Labs, body imaging, consults and progress notes, operative and pathology reports, EKG reports, ED records, found in [facility], [facility],  [facility] and any additional records brought in by  the patient at the time of the visit.            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-->Eliquis' warfarin         GERD w Page's, Hemorrhoids, BPH,         S/P Ileocolonic resection 1998        Since then multiple SBOs          Got IV Iron x 2, since 10/2/17        10/19/17: feeling better, Cp=085 on prednisone 15mg x 3weeks, BMs Brown: #5-6, 1-2/D, occas 3-4/d        10/25/17: Hgb=10, ESR=5, CRP=5, Alb=3.6, Phos=2, Zkjajmyj=843, V31=217        11/16/17: Hgb=11.2, ESR=14, CRP=12,         11/13/17: MRE-Chr mild distension of distal & vladislav-ileum s/o mild stricturing at anast, mild mural thick & mucosal enhancemt ~ 20cm; little change from 2015 c/w mild acute on chr ileitis, 2.1 cm Liver hemangioma        11/28/17: Entyvio        11/29/17: Hgb=9.6, ESR=10, CRP=5.8, Alb=3.8,Ferritin-19, Iron=14,Sat=4%, Phos=2.5, Sm=874, BMs:# 5, 1-2x/D, brown,        12/19;17: Hgb=10.1, ESR=12, CRP=6.5; 12/21/17: BMs:#3-5, 1-3x/D , brown, no blood, no pain, ot=258        1/3/18:Hgb=11.7, ESR=19, CRP=6.5, Alb=4.1, Gcqfrpgb=549, Iron=31, Sat%=9,Zinc=55        1/16/18: Entyvio, Hgb=11.4, ESR=10, CRP=6.9        1/18/18: Today: cellulitis R foot, saw dr KEITH--rx augmentum x 10D, Entyvio 1/9 to 1/16, rr=403 w clothes,BMs: # 4-5, 1-2x/D         2/14/18: Hgb=11.1, ESR=16, CRP=11.6, Alb=3.6, Iron=28, Ferritin=23, INR=1.5         2/16/18: s/p Entyvio; Iron infusion, again on 2/27 2/20/18: Hgb=10.6, ESR=20, CRP=12, INR=1.7        2/27/18: s/p iron infusion        3/9/18: Dr. Burden for tenosynovitis, rx w Zorvolex: Diclofenac x 5 days--? response        3/14/18: Sa=591; BMs: # 5, 1-2x/D; Hgb=11, ESR=18, CRP=11,Irom=45,Uolkcmlp=322,Alb=3.6, INR=1.5        3/19/18: s/p Entyvio        3/22/18: yj=423, BMs: # 5, 3-4 x/d        4/16/18: s/p Entyvio,Hgb=11.9, INR=1.3, ESR=18, CRP=8.4         4/18/18 EGD: gastritis, no HP, no IM, 2+ Mucous, 0.5cm gastric polyp: fundic, 4cm HH, + Barretts:3cm, no dysplasia        4/25/18: Hgb=11.5, INR=1.6, Iron=40, Sat=13%, Ferritin=57, Alb=3.2, Phos=2.2, Mag=1.7        4/30/18: s/p Iron Infusion        5/14/18: s/p Entyvio         5/23/18: Hgb=11.5, ESR=16, CRP< 5        5/29/18: s/p Iron Infusion        6/6/18: Hgb=10.6, INR=1.7, Wvkpxpkg=927, Alb=3.5, Phos=2.1, T94=889, sx=886; BMs: # 5, 2-3x/D         6/19/18: Hgb=10.6, INR=1, CRP=15, ESR=23        6/21/18: R ankle is acting up, swollen, pain, having MRI done, took a couple of advil, on low carbs ,BMs: # 5, 1-2x/D, xk=915        6/26/18: MRI: fx/stress rxn-talar body/navicula; stress related changes--cuneform, cuboid,distal tibia; mod tibiotalar effusion, mild-mod peroneal tendinosis        7/19/18: entyvio 6/26/18, eliminated all carbs--anti inflammatory diet, ui=840, BMs: # 4-5, 1-3x/D         7/25/18: Hgb=10, INR=1.3, Alb=3.3, Phos=2.4, Cipqzmof=238, sat%=12, Iron=35        8/2/18: BD--osteoporosis of hip/spine: sig decrease BD--17.6% of hip, 17% of spine, osteopenia of wrist: 6.4%        8/7/18: Entyvio        8/21/18: Hgb=11.1, INR=1.5, ESR=19, CRP=18.6        8/23/18: recently w tooth infection, old implant--loose and removed; rx w amox, and advil        eating almost no carbs, rs=027, # 5-6, 2-3x/d         8/24/18: Stool fat--neg, Fmclbtlljvoq=116        9/5/18: Hgb=11.4, INR=1.3, ESR=9, CRP=11.3, Iron=41, Fdrnslkh=638, Alb=3.8,        9/17/18: entyvio, Hgb=10.7, INR=2.2, ESR=17, CRP=9.1,         9/20/18: av=401, BMs: # 5-6, 1-2x/D         9/21/18: Phos=2.4, Ca=8.7, Alb=3.4, Vit D=27, HOX=439,         Dr. Akers: Hyperparathyroidism: probably secondary due to low Vit D/Ca & ? superimposed primary, rx replete Vit D and Calcium        10/9/18: Hgb=11.6, MCV=94, ESR=14, CRP=5.4, INR=2.2        10/10/18 MRE: stricturing of neoterminal ileum w worsened upstream dilatation, moderate length of upstream ileum w stricturing extending at least 20cm and more extensive then previous. suggestion of 2 adj extraluminal fluid collections which may be w/i the wall of strictured ileum near the ileocolonic anastomosis. surrounding spiculation and tethered appearance of bowel in this region. 10/16/18: entyvio, Hgb=11.7, MCV=94, ESR=14, CRP <5, Alb=3.5 10/18/18: Sm=343,   BMs: # 5-6, 3x/D ,  11/13/18: Entyvio 11/21/18 CTE w C: limited 2/2 bowel underdistention, mucosal hyperenhancemt & extensive SM edema of distal ileum including vladislav-ileum, difficult to exclude a sml fluid collection, Liver w relative enlargement w suggestion of lobulation, enlargemt of PV,SMV,IMV,Spl V, rectal V. No ascites 11/28/18: Hgb=10, ESR=19, CRP=17,Alb=3.5,Iron=35, Sat%=11, Mxiifzvc=462, INR=1.3 11/29/18: eb=159, BMs: # 5-6, 3-4x/D 12/3/18: Abd sono--Liver 14.8cm Incr heterogenicity, spleen--wnl 12/11/18 & 1/14/19: Entyvio 1/14/19:Entyvio,  Hgb=10.7, ESR=15, Alb=3.6, Iron=36, Ferritin=67, Sat%=11, INR=1.6 1/24/19:  rf=016, stools are # 4-6, 3-4x/d , no pain 2/5/19: Hgb=11.2, ESR=14, CRP=0.36 2/28/19: Hgb=11.5, ESR=12, CRP=6.5, Alb=3.7, Iron=69, Pacnbvfy=758, Ca=8.9                Bms: # 4-5, 2-3x/D , kl=636,  Entyvio : last 2/1519,                had parathyroid scan pre-op, still w elev PTH, + activity in mediastinum,? ectopic parathyroid tissue vs neoplasm.  To see ENT and have Imaging at [facility] 3/28/19: Bms: # 4-5 , 3x/d ;io=922 4/11/19: Hgb-9.7, Iron=45, ESR=18, CRP=9.4, Alb=3.5,  Saw Endo SX at [facility], felt hyperparathyroid is secondary to Low Ca/Vit D, no sx at this time 4/16/19: BMs: # 5-6, 3-4x/D, bn=939,  Entyvio : last 3/1519,  got IV iron 4/12/19 for Ferritin=53 4/27/19: s/p R Hip Nailing--missed 4/2019 2/2 fresh wound 5/16/19 & 6/18/19 Entyvio 7/2/19: Hgb=11.7, Ferritin=41,ESR=12, CRP=5.5, B6=2.8,Mag=1.8,Phos=3.9,Yf=966,Zinc=61 7/11/19: s/p IV iron 7/11/19: Alb=3.7, BQY=265, Ca=9.1, Vit D=40/306,  7/24/19: Entyvio, Alb=3.8, LFM=628, Ca=8.9, Vit D=128  8/1/19: Bms: # 5-6, occas #4, 2-3x/D , vr=621 8/15/19: Hgb=12, ESR=10, CRP=5.2, Ferritin=68, Alb=3.4, Phos=4.4,Mg=1.7, Ca=8.5,Calprotectin=88, 8/20/19: KOF=858, Ca=9, Alb=4.2 9/19/19: Hgb=12.8, ESR=9, CRP=5.5, Alb=3.7, Pzrmqgca=001, Mag=1.8, Ca=8.9, Phos=4.2 9/26/19: cd=560, BMs: #5-6, 2-3x/D, no pain 10/17/19: zg=358, BMs: #4-6, 1-2x/D, no Pain; Hgb=14, ESR=7, CRP<5, Alb=3.9, Vzvxkxtv=067, Mag=1.7, Ca=9.6 10/24/19: xs=899, Bms: # 4-6 , no pain, 11/6/19: ESR=6, CRP=6, PTH=80,Ca=9.1, VitD=50 11/14/19: bn=490, BMs: # : 4, 1-2, 0ccas #5  11/17/19: ESR=6, CRP<5, Wtvimolc=897,   12/19/19: si=520, BMs: #5-6, 2-3x/D 1/6/20: entyvio 1/13/20: ESR=7, CRP=0.2, Hgb=13.5, Xeebsaxl=645, V19=788, FA=10, Alb=4.1, Ca=9.1 1/16/20: wt = 127, BMs: # 5, 1-3x/d 1/30/20: ESR=9, CRP=11, Hgb=13.9, Lveutkov=518,Iron=38, Alb=3.9,Ca=9.2, PTH=87, 2/3/20 EGD: gastritis, +MACKENZIE, No HP, +erosion w ooze -clipped, 0.5cm polyp-neg; 4cm HH, Esophagitis A, + Barretts, VC: 1+ Colonoscopy: 05cm rectal polyp: HP, 2nd deg int hemorrhoids mild-mod activity: MARILY w cryptitis & mild archect distortion at ileocolonic anast: colon & ileal side, no stricture 2/4/20: Entyvio; 2/12/20: IV Iron, 3/3/20: Entyvio 2/25/20: fe=344,  BMs: # 4-5, 1-2x/ d 3/19/20: sg=601, BMs: #4-5, 1-2x/D 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 10/17/20 : Hgb=13, CRP< 5, ESR= 11, Iron=44, Ferritin=46, Alb=4, Phos=2.3,  11/5/20: wo=412; s/p IV Iron x 2 ,  11/4 and 1 week prior;   BMs:  # 4-5, 1-2x/D , no pain 11/18/20  Entyvio + IV Ca  1/4/21: Hgb=13.6, CRP<5, ESR=9, Ferritin=60, Alb=4.2, Phos=2.7 1/11/21: Entyvio & IV Ca 1/14/21: no pain, stool more formed ,  cc=402 - 137; BMs:  # 4-5, 1-2x/D  2/8/21: Entyvio & IV Ca 2/23/21 IV Ca 2/22/21: Hgb=12.7, CRP<5, ESR=8, Khoigzbhx=993, Phos=2.3, Mag=1.8, G62=557, Zinc=58, Fa=322 2/26/21: xq=394,  BMs:  # 4-5, 1-2x/D , no pain  3/8/21 & 4/8/21: Entyvio 3/9/21 & 3/24/21: IV Iron 4/5/21: Hgb=13, CRP<5, ESR=9, Ferritin=94, Phos=3.1, Mag=1.7,Ca=9.1/ Alb=4              Pt Ins co is refusing to cover Entyvio q 4wks, despite numerous attempts to appeal, they also refuse to set up a peer to peer discussion betw myself and another healthcare provider, even one that works for a third party.  They do acknowledge that there is literature supporting the successful use in individual cases who don't respond to the q 8 wk interval and need less then q 8weeks , but state it had not in FDA approved at less then 8wks so they will not approve it.  I spoke to the ins co rep and discussed that fact that patients should not be  pigeon holed since practicing medicine is done on an individual basis.  Humans are not all the same.   Their  response didn't change eventhough the pt clinically needed the medication and it  induced a beneficial clinical response towards remission.  Therefore the patient and I decided to slowly increase the interval since the insurance company will not pay for this benefit.  Hopefully he may be able to maintain his present clinical state.  If not we will return to q 4weeks and will again appeal using this patients real clinical data .  4/9/21:  td=565,  no pain, stool more formed # 4, 1-2x/d  6/11/21: wt= 135,   no pain, stool more formed # 4, 1-2x/d               recently had an episode of abd distenstion, pain, N/V, no BM              eventually started having diarrhea and flatus, BMs returned to normal              lost 2-3 lbs but regained  Doesnt recall eating anything different, no fever, chills, brbpr, melena  entyvio was 6/3/21--which is almost 8 weeks, was supposed to be 5-6 weeks  to start               6/4/21 Hgb=12, CRP<5, Ferritin=32, pt to receive IV iron      7/12/21 Labs: Hgb=13.6, ESR=10, CRP=<5, Iunnlqy=261, Alb=3.7, BUN=16   7/16/21 MRE: limited to incomplete bowel distention, chr distal ileitis/probable inflammatory stricturing vs collapse of the vladislav-TI--but improved since 2018 , moderate proctitis 7/20/21:  Last entyvio was --7/1, next early august                 qr=762 ,  no pain, stool more formed # 4-5/6, 1-2x/d  7/23/21 Entyvio level= 39, Abs <1.6 8/23/21: Labs--Hgb=13.6, ESR=10, CRP=6.6, Hvycipgz=205, Iron=26, Alb=3.9, BUN=16 8/26/21 p/w w N/V, abd pain/bloating  x 3 days, unable to keep things down Labs: WBC=5, Hgb=12, CRP=43, ESR=11, Alb=4.4, BUN=28, Creat=-1.6 CT Abd/Pelvis: diffuse marked dilatation of SB Loops w transition pt in Rmid/lower abdomen ~ 5-6c, proximal to the ileocolonic anastomosis RX w IV solumedrol 20mg q8h, NGT, IVF, pt refused TPN, also w UTI from prostatitis 8/27 NGT was pulled, and clears started and advanced to LR,  was d/c home 8/30 on prednisone 40mg qd w taper by 10mg/wk--> to 20mg 10/15 Entyvio 10/25 Stelera (Ustekinumab)  # 1 11/3/21 [facility]: wt above 140, off pred x 2 weeks,  1-2 BMs qd  Discussed at IBD conference, reviewed previous colonoscopy, and recent imaging; consensus that due to malnutrition and steroid dependency, surgery is next best option however pt reports feeling great and wants to pursue medical treatment which is reasonable given he feels well  repeat imaging in mid-January after 3 months of Stelara, and then consider referral to surgery vs improved candidacy for endoscopic manipulation (currently presumed one long stricture).   11/15/21 : qs=981, Hgb=12, ESR=3, CRP <5, Ferritin =104, Ca=9, Mg=1.7, Alb=3.7  12/10/21 : iron infusion 1/4/22: Hgb=11.5, ESR=6, CRP <5, Ca=8.8, Mag=2.9, Alb=3.9 1/10/22 : wo=282, stools # 5, 1-2x/D  1/10/22 Today:  Feeling well, no c/o , CP, SOB/ AMARO, Cough, Wheeze, Palpitations, edema              Most recent labs  reviewed w patient:1/4/22 1/31/22: calprotectin=84 2/2/22: xe=230 2/14/22: Hgb=10.6, ESR=9, CRP <5, Ca=8.3, Mag=1.6, Alb=3.8, Iron=25, Ferritin=15 3/3/22:  iv Iron infusion x 1 3/8/22 MRI Abd/Pelv: thickened distal Jejunal loops which are tethered; distal ileal segment  (10-12cm ) previously actively inflammed has decreased & now characterized by an area of stricture, however the vladislav-TI  5mm to the anastamosis is enhanced as well as narrowed.  colon just  distal to the anastamosis has a focal segment of inflammation & stricture.  the recto-sigmoid appears inflammed  3/28/22: Hgb=14.4 , ESR=1, CRP <5, Ca=8.5, Mag=1.7,Alb=4.1, Iron=104, Vftsfrlf=712, 4/5/22: ju=469, Bms: # 5-6 , 1-2 x/day    5/10/22  :  Last entyvios were -->7/1, 8/5, 9/2, 10/15/21              Stelara # 1 IV--10/25/21, second dose SC~ 12/10/21, 3rd~ 2/14/22,  4th-- mid April , 5th--mid june                Early December 2021  had a " flare" loose BMs, N/V and bloat              Took Pred 40mg qd and tapered down 10mg / week , now off pred x 7-8 weeks  4/13/22 Dr. Heard:  pt states he had a flare the weekend of 4/9/22 w vomiting/distension                pt self-started prednisone 40mg , this was just before his april stelara dose                claims he was feeling better               Dr. Heard: sched colonoscopy w ? dilatation   5/10/22: tapered of pred 40mg , 10mg/wk over 1 month, now off 2weeks          no pain, n/v,  BMs: # 4-5, 1-2 x Day , wt=-132 5/17/22 Dr. [facility]: ped scope passed w/o resist in vladislav-TI, one single apthae w psuedopolyp.  Flares probably 2/2 to adhesive dis, imaging may consistently show wall thickening  5/27/22 Labs:  Alb=3.8. Phos=2.7, Mag=1.9, Ca=9.2, PTH=72, Vit D=105, ALP=76                + IV Iron  6/20/22 Labs:  Alb=4.5, Phos=0.8, Mag=1.8, Ca=8.5, HPE=330, ALP=83                          Hgb=14, ESR=2, CRP<5, Ccsouncq=958, Iron=86 6/21/22:  no pain, n/v,  BMs: # 4-5, 1-2 x Day ,                ig=045 7/26/22:  no pain, n/v,  BMs: # 4-5, 1-2 x Day ,                ou=978 9/30/22 EGD: mild gastritis, no HP; 0.75cm gastric polyp:fundic;                4cm HH, Esophagitis A--, + Barretts 10/17/22 : Alb=4.3, Phos=2.1, Mag=1.8, Ca=9,  PTH=95, ALP=80, TSH=12, Vit D=69                          Hgb=12.7 , ESR=2, CRP<5, Ferritin=57, Iron=85 12/16/22 : Alb=3.8, Phos=1.6, Mag=1.7, Ca=8.9 , ALP=68,                           Hgb=10.8 , ESR=1, Rajsvuym=420, Iron=67, INR=2.3  12/19/22: et=090, had some N and distension the day after Thanksgiving                      Was having BMs and passing gas, went of liquids x 1-2 days--> resolved 1/30/23: Alb=4.4, Phos=3, Mag=1.8, Ca=9.5,  PTH=96, ALP=79,  Vit D=144                          Hgb=13.6 , ESR=5, CRP<5, Ferritin=55, Iron=56, INR=1.6    2/6/23 : kh=621, no abd pain, BMs: # 4 qd   3/17/23: developed cugh,congestion, fever, malaise  3/19/23 + Covid; Received Iv Remdesivir x 3 at home 3/27/ 23: yc=264, BMs: #4-5,  1-2x/ D   Alb=4 , Phos=1, Mag=1.8, Ca=8.5 , ALP=94  Hgb=13.8, ESR=5, CRP<5, Ykknuyyk=074, Iron=78, INR=5.9  4/21/23 : Alb=4, Phos=2.9, Mag=1.9, Ca=8.9,  PTH=89, ALP=75,  Vit D=105/ 80, INR=3.2  5/22/23 Ei=992; Hgb=12.7, ESR=2, CRP<5, Ferritin=19, Iron=42, INR=2.4               Alb=4.2, Phos=2.4, Mag=1.8, Ca=9.1, ALP=68,                      8/4/23 :  Alb=3.8, Phos=2.1, Mag=1.8, Ca=8.7,  ZSZ=683, ALP=68,  Vit D=78 INR=2.1  8/21/23 : Hgb=14, ESR=4, CRP=40, Ehqjlulr=619, Iron=37, INR=4.8                Alb=4 , Phos=1.8, Mag=1.6, Ca=9.3, ALP=84,       8/22/23  :  s/p  iron infusion in 2/10/23 , last Stelera beginning 7/2023, next beginning of 9/2023                 Today: lh=840 , , this weekend  had a flare, abd distension, no N/V, fever                  BMs: started # 5/6--> now # 6-7, no blood or mucous                  doesnt recall anything to solid or fiberous                  Started Pred 40mg qd, and liquid diet--> feeling better                  Less distension, passing gas and BMs  * Abd pain-->no * Nausea--> better * Vomit--> no * Early satiety--> no * Belching--> no * Hiccups--> no * Regurgitation--> no * Acid Taste / Water Brash--> no * Ht burn--> no * Dysphagia--> no * Throat Clearing--> no * Hoarseness--> no * Post-Nasal Drip--> no * Congestion--> no * Globus--> no * Cough--> no * Wheeze / PC-> -no * Constipation--> no * Diarrhea--> yes * Bloating--> better * Strain on Defecation--> no * Incompl Evac--> no * Flatulence--> no * Gurgling--> no * Melena--> no * BPBPR-> -no * Anorexia--> no * Wt. Loss--> no                  PRIOR HISTORY--2017 1/17/17: Hgb=9.2, ESR=6, CRP=5; 1/19/17: BMs: #4, 5-6, 2-3x/D, Fp=157, Started Creon 1 tid cc        3rd Entyvio beginning Feb        2/14/17: Labs; Hgb=9.6, MCV=97, ESR=5, CRP< 5, D12=927, FA>23, Iron=59, Retic =3.2,        2/17/17 Fecal Calprotectin=16, Fats: Increased neutral & Split        3/13/17: Labs Hgb=9.5, MCV=95, ESR=7, CRP <5, ALB=3.1        3/16/17: lot of stress, to move -[place], had a job-fell thru, BMs: # 5, 2-4 x/D, wt= 131w clothes        4/19/17 EGD: gastritis, MACKENZIE, No HP, 2cm HH, +Barretts, No Dysplasia        Colonoscopy: Anastamosis: open w mild -mod active colitis, enteritis severe adj to anastomosis, mild more proximal, remainder of colon-wnl, 2-3 deg int hemorrhoids        4/26/17 : Hgb=7.3, MCV=95, ESR=13, CRP<5;Immediately post procedure stools very dark on eliquis/iron.        Admitted to [facility]: Hgb=8.3-->8.9 after 2 U, Alb 3.3, BUN=17, creat=1.3, Malina/Mzsku=336/460        4/27/17: Alb=2.3, BUN=12, creat=1.2, Malina/Lipase=209/863-No abd pain, N/V; 5/5/17: Hgb=10.7.        5/8/17 Entyvio iv. 5/8-5/9 w dark red diarrhea; 5/10/17 Hgb=7.8.        5/11/17 Adm [facility] w stools brown, Hgb=7.9, BUN=17, Creat=1.4, Alb=2.9; S/P 2 Units- Hgb=10.6, BUN=15/1.1.        Prednisone 40mg qd, entocort d/dee.; 5/18/17: Hgb-10.4, dd=497, BMs: #5, 1-2x/D, no pain        6/8/17: Hgb=7.4,MCV=98, CRP<5-ASA stopped, Pred20--> 30        6/10/17: Hgb=8.2, Alb=2.9, BUN=20/1.2 ; 6/12/17: Hgb=8.3, Retic=0.19, Iron=10, Sat%=3, Ferritin=57, FA=23,R76=078, 6/13/17: s/p 2 Unit PRBCs        6/15/17: started MCT oil, Rz=826 , BMs: # 5, 1-2x/D, stools are brownish/green         7/11/17: [facility] w unsteadiness. MRI Head: R Cortical Temporo-occipital encephalomalacia, MRA H&N-no sign lesions, PER-wnl.Hgb=9.9--> 8.4->9.7 after 1 Unit. Seen by Torri: received IV Iron         CRP<5, B91=961, FSM=506, FA>23,Iron=22,Sat%=6, Ferritin=42, Alb=3.5. D/C on warfarin 2mg        7/20 Hgb=8.5, 7/28 Hgb=7.7, 7/31-s/p 1 Unit         As outpt seeing Torri, to get IV Iron and 5 IV Copper        Had 'FLU' Fever=101, chills, bloat, N/V/D, Bilious. no pain, melena,brbpr        Given anti-emetic by dr Butler,then able to keep things down        On prednisone 25, warfarin w INR bet 1.6-2        8/17/17: Hgb=9.9, ESR=20, CRP-P,Df=932, BMs: # 5, 1-2x/D, stools are brownish/green        10/2/17: Hgb=8.8, MCV=89,Phos=1.7, K=3.9, Mag=1.9, Alb=3.6,Iron<10,Ferritin=21, INR=2        10/17/17: Hgb=7.9,ESR=6,CRP<5, INR=1.6        10/25/17: Hgb=10, ESR=5, CRP=5, Alb=3.6, Phos=2, Mjuaympc=318, Y77=061        11/16/17: Hgb=11.2, ESR=14, CRP=12,         11/13/17: MRE-Chr mild distension of distal & vladislav-ileum s/o mild stricturing at anast, mild mural thick & mucosal enhancemt ~ 20cm; little change from 2015 c/w mild acute on chr ileitis, 2.1 cm Liver hemangioma        11/28/17: Entyvio        11/29/17: Hgb=9.6, ESR=10, CRP=5.8, Alb=3.8,Ferritin-P, Iron=14,Sat=4%, Phos=2.5        12/19;17: Hgb=10.1, ESR=12, CRP=6.5; 12/21/17: BMs:#3-5, 1-3x/D , brown, no blood, no pain, id=843        1/3/18:Hgb=11.7, ESR=19, CRP=6.5, Alb=4.1, Oalgjglk=746, Iron=31, Sat%=9,Zinc=55          PRIOR HISTORY---2013:         2/20/13 CT markedly dilated sb loops ext to anast, colonoscopy w open anast ,mild-mod remy-anastamotic disease. quick response to entocort/humira--probably adhesive dis.         8/10/13 w GNR bacteremia, ADA 1.7 /KAYLAH 3.4, Humira 8/7, 8/13,8/18,9/15        CT- mucosal thickening and spiculation of the distal sb extending to the anastamosis, thickening and stranding of adj mesenteric fat.        Humira increased to 40mg weekly, entocort 4        9/2013 MRE--dilated loops in mid and distal ileum, markedly thickened and narrowed TI w decreased peristalsis of TI        11/15/13 ADA-24.9, KAYLAH-0          PRIOR HISTORY---2014:         2/15/14--CT dilated loops SB, loop of sb in mid abd 4.3cm w infiltrative changes in the mesentery, bowel tapers in the RLQ to the anastamosis w/o transition        WBC=15K, Rx w IV steroids and Abx         3/7/14 SBFT: last 10cm sb prox to anast mild distension and sl irregularity. In the mid portion of this loop there is a mild narrowing which appears to reopen but is some what narrowed.        3/20/14 ADA=33.1, KAYLAH=0, Lialda switch to Apriso 4 (2/2014)          PRIOR HISTORY--2015         1/11/15 Adm [facility] w 1 day N,Recurrent V, Abd pain/distension. CT-multiple distended & fluid-filled sb loops, mild wall thickening of ileum w No inflamm changes.        WBC=14.6, Hgb=17, RX w NGT suction, Levaquin iv, Solumedrol 40mg q 12( 1/12-->1/16) to prednisone 30mg BID w 5mg taper/wk        3/18/15 --Dr. Butler w edema /High BP, prednisone was tapered slowly to 2.5mg         switched to entocort 9mg qd, edema and bp improved w lasix 20mg         BMs:#4-5, 3x/D, Hgb=11, ESR=4, CRP<5        4/28/15 - 5/1/15: Adm [facility] w 1 day Abd pain, distension,N/V. WBC=10K, HGB=15         CT Abd/Pelv: Diffusely dilated SB loops, thick walled ileum        Rx w NGT, IVF, IV Solumedrol--> Prednisone 30mg BID; tapered to 5mg---> Budesonide 9mg qd        6/10/15 Colonoscopy: Inflammatory ST mass on the ileal side of anast, opening appeared ulcerated,scarred & severely narrowed        6/11/15: [facility] w N/V x1, decr appetite, CT ABD: no obstruction, dilated thickened sb loops of distal jej and ileum        6/19/15 [facility]: s/p Lap w extensive lysis of adhesions, hepatic flex, sigmoid and sb anastamosis, s/p partial r colectomy and sb resection--side to side elisabeth: 8-10 inch from prior anast to TV colon.        7/17/15: BMs:#4-6, 4-5x/D, wt 131(from 126)        8/20/15: BMs: #4, 1-2x/D or #5, 2-3x/D, nk=516, dry cough,Hgb=10, WBC=3, BBU=207, CRP=56, ESR=21        8/25/15 CT Abd/Pelv: Svl RLQ sb loops w wall thickening, mild nodularity & inflamm stranding in mesentery        Advised-restart Entocort 9mg qd, Apriso 4 qd, Maintain Humira but given RX to check drug/Ab levels        9/15/15: did nt restart meds,Hgb=9.9, WBC=4, ESR=19, CRP=5.8, zb=145; [facility] : ADA=8.5, Antibodies< 1.7        10/15/15: No pain, BMs:#4, 1-2x/D, #5-6, 3-4x/D, throat clearing/cough-better, nn=955        11/30/15: No pain, BMs:# 4, 5-6 intermittently, No throat clear, fp=360          PRIOR HISTORY-2016         1/22/16; 4/8/16; 6/6/16 : No pain, BMs:# 4-5 1-2x/D, also #5-6 3x/D for 2-3D/wk, bh=094        7/14/16: qs=061, 9/22/16: ue=032.5        11/10/16: 9.5lb wt loss, states BMs: 5-6, 5x/D--Taking Magnesium, No pain/anorexia        Labs: Stool Rcoxerirntti=207, + Incr Fecal fat, Alb=3.4, FA =23, T38=733, Hgb=12, ESR=10, CRP <5        Magnesium-d/c, Obtain MRE asap--Start steroids and possibly switch to Entyvio        DDx discussed: active crohns--loss response to Humira, Malabsorption--loss Bile acids, Bile-induced diarrhea, SIBO        Pt wanted to wait for imaging-did not start rx        12/1//16 MRE: RUQ ileocolonic anastamosis--narrowed w upstream dilatation to 3.2 cm        Pt refused pred, Started Entocort 9mg qd and Flagyl 250mg qid about 7-10days ago         awaiting Entyvio load to start 12/22, then 1/5; had Cut back on iron bid        12/15/16: BMs:# 5, 2-3x/D, occas #6, No pain, Less bloat/flatulence, Hk=152.

## 2023-08-22 NOTE — ASSESSMENT
[FreeTextEntry1] : 1. CROHNS DISEASE   of both small and large intestine: s/p flare 8/25-->8/30/21, then  early 12/2021-s/p pred tapered over 4 weeks, again the weekend 4/9/22 rx w prednisone 40_  mg ( just before last Stelera dose) --> now off since early 5/2022;     8/19/23 w flare started prednisone this weekend at 40mg      s/p ileocolonic resection x 2--last 6/19/15 for recurrent sbos:  prior was s/p 4 SBOs w/i 2 yrs--2/2 distal sb disease adj to anastamosis when on Humira weekly had an  ADA =33 (3/20/14), -but had sbo 2/2014 at ADA=25 and another sbo 4/28/15 6/10/2015 Colonoscopy: Inflamm ST mass on ileal side w ulceration/scarred/narrow 6/11/2015 CT: dilated thickened sb loops distal jej & ileum Post-op 6/2015 probably some malabsorption 2/2 to removal of R Colon and ileum:  had WT. Loss-->r/o malabsorption:  ?bile-induced->-secretory vs decr micelles, active dis, PLE ?  SIBO--Elev FA, Alb=3.4-->3.1-->2.9--> (7/28/17) ?SIBO/Prevent post-op recurrence --> trial Flagyl 250 mg qid~12/7/16-->d/c: 5/11/17 Also discussed trial of Cholestyramine/Xifaxin -wanted to wait 11/20/16 ACTIVE Crohn's-->  9.5lb wt loss, BMs: #5-6, 5x/D-- but taking Magnesium  12/1/16 MRE: RUQ ileocolonic anastamosis--narrowed w upstream dilatation to 3.2 cm Labs: Stool Xghtvgefuqlv=526, + Incr Fecal fat, Alb=3.4, FA =23, K61=008, Hgb=12.3,ESR=10,CRP <5 4/19/17 :Colonoscopy: Anastamosis: open w mild -mod active colitis, enteritis severe adj to anastomosis, mild more proximal, remainder of colon=wnl 5/11/17- Adm PMHC w stools brown, but Hgb=7.9, BUN=17, Creat=1.4, Alb=2.9. S/P 2 Units w Hgb=10.6, BUN=15/1.1, Prednisone 40mg taper, entocort d/dee 6/8/17: Hgb=7.4, MCV=98, CRP<5--ASA stopped, Pred20--> 30mg, 6/13/17: s/p 2 Unit PRBCs 7/11/17 [facility] w unsteadiness. MRI Head: R Cortical Temporo-occipital encephalomalacia Hgb=9.9--> 8.4->9.7 after 1 Unit. Seen by Heme: received IV Iron  CRP<5, H89=070, HJA=147, FA>23,Iron=22,Sat%=6, Ferritin=42, Alb=3.5. D/C on warfarin 2mg 7/28/17 Hgb=7.7; 7/31/17-s/p 1 Unit  Heme Consultation ~ 8/2017: started  IV Infusions of  Iron and  IV Copper 8/17/17: Hgb=9.9, ESR=20, Ci=328--Azibmrfynf s/p GI infection w N/V/D, no obstruction 10/17/17: Hgb=7.9,ESR=6,CRP<5, INR=1.6, Got IV Iron x 2, since 10/2/17 for Iron<10, Hgb=8.8 11/16/17: Hgb=11.2, ESR=14, CRP=12, 10/26/17 Stool fat-neg, calprotectin-30 11/13/17: MRE-Chr mild distension of distal & alfredito-ileum s/o mild stricturing at anast, mild mural thick & mucosal enhancemt ~ 20cm; little change from 2015 c/w mild acute on chr ileitis, 2.1 cm Liver hemangioma 10/9/18: Hgb=11.6, MCV=94, ESR=14, CRP=5.4, INR=2.2 10/10/18 MRE: stricturing of neoterminal ileum w worsened upstream dilatation, moderate length of upstream ileum w stricturing extending at least 20cm and more extensive then previous. suggestion of 2 adj extraluminal fluid collections which may be w/i the wall of strictured ileum near the ileocolonic anastamosis pt had declined to call numbers given for  IBD center at [facility] for new or investigational rx's--biologics.   later he felt  he was  stablizing w his  wt loss, and inflammatory markers  2/28/19 w ESR=12, CRP=6.5 & 2/21/19 stool calprotectin--> 232 8/15/19: ESR=10, CRP=5.2, Calprotectin--> 88 9/19/19: ESR=9, CRP=5.5 10/17/19: ESR=7, CRP< 5 11/6/19 : ESR=6, CRP=0.18 11/27/19: ESR=6, CRP <5 1/13/20: ESR=7, CRP=0.2 1/30/20: ESR=9, CRP=11  2/3/20 EGD: gastritis, +MACKENZIE, No HP, +erosion w ooze -clipped, 0.5cm polyp-neg; 4cm HH, Esophagitis A, + Barretts, VC: 1+ Colonoscopy: 05cm rectal polyp: HP, 2nd deg int hemorrhoids mild-mod activity: MARILY w cryptitis & mild archect distortion at ileocolonic anast: colon & ileal side, no stricture  3/2/20:ESR=7, CRP=0.26 3/9/20: VDZ>40, Ab to VDZ <1.6 3/17/20: ESR=6, CRP=6.4 10/17/20: ESR=11, CRP <5 1/4/21:ESR=9, CRP<5 2/22/21: ESR=8, CRP<5 4/5/21: ESR=9, CRP<5 6/4/21: ESR=9, CRP<5   6/11/21: wt= 135,   no pain, stool more formed # 4, 1-2x/d               s/p episode --abd distenstion, pain, N/V, no BM              eventually started having diarrhea and flatus, BMs returned to normal              lost 2-3 lbs but regained 7/4/21: CRP <5, Hgb=12  7/16/21 MRE: limited to incomplete bowel distention, chr distal ileitis/probable inflammatory stricturing vs collapse of the alfredito-TI--but improved since 2018 , moderate proctitis 7/12/21   --  ? flare --> entyvio should have been  q 5 wk , went q 8 wks                      next dose should be in 4 weeks x 2 then 5 weeks, to check levels 7/20/21: Cliically stable, unclear if MRE accurate 2/2 underdistension, but gained wt and CRP--normal 7/23/21 Entyvio level= 39, Abs <1.6 8/23/21: Labs--Hgb=13.6, ESR=10, CRP=6.6, Vrlmwmwr=118, Iron=26, Alb=3.9, BUN=16 8/26/21 p/w sbo  w N/V, abd pain/bloating  x 3 days, unable to keep things down Labs: WBC=5, Hgb=12, CRP=43, ESR=11, Alb=4.4, BUN=28, Creat=-1.6 CT Abd/Pelvis: diffuse marked dilatation of SB Loops w transition pt in R. mid/lower abdomen ~ 5-6cm, proximal to the ileocolonic anastomosis RX w IV solumedrol 20mg q8h, NGT, IVF, pt refused TPN, also w UTI from prostatitis 8/27 NGT was pulled, and clears started and advanced to LR,  was d/c home 8/30 on prednisone 40mg qd w taper by 10mg/wk to 20mg qd    (  **Entyvio's  :time 0=12/22/16 ( Humira 40mg QW); 1/5/17--2wks, s/p 3rd infusion at wk 6, then q 6weeks  #6 :6/21/17-->held 2/2 Shingles, then  Infusions as follows=9/19/17 , 10/17/17, 11/28/17, 1/16/18 ,2/16/18, 3/19/18,4/16/18, 5/14/18, 6/25/18, 8/7/18, 9/17/18, 10/16/18, 11/13/18, 12/11/18, 1/14/19, 2/15/19, 3/15/19, 5/16/19, 6/18/19,7/24/19, 9/9/19, 10/2/19, 11/4/19, 12/12/19, 1/6/20, 2/4/20, 3/3/20, 9/17/20, 10/15/20, 11/18/20, 1/11/21, 2/8/21, 3/8/21, 4/8/21, 6/3/21, 7/1/21, 8/2/21, 9/2/21,10/25/21  )  3/8/22 MRI Abd/Pelv: thickened distal Jejunal loops which are tethered; distal ileal segment  (10-12cm ) previously actively inflammed has decreased & now characterized by an area of stricture, however the alfredito-TI  5mm to the anastamosis is enhanced as well as narrowed.  Colon just  distal to the anastamosis has a focal segment of inflammation & stricture.  the recto-sigmoid appears inflammed  3/28/22: Hgb=14.4 , ESR=1, CRP <5, Ca=8.5, Mag=1.7,Alb=4.1, Iron=104, Qtszbpmu=818,  5/9//22: Hgb=13.8 , ESR=2, CRP <5, Ca=8.8, Mag=1.6, Alb=4, Iron=64, Feduilad=983   5/17/22 Dr. Heard Colonoscopy: ped scope passed w/o resist in alfredito-TI, one single apthae w psuedopolyp.  6/21/22 Labs: Hgb=14, ESR=2, CRP<5,  Alb=4.5, Phos=0.8, Mag=1.8, Ca=8.5,Iahcpwfe=656, Iron=86  7/25/22 Labs:  Hgb=14, ESR=2, CRP<5, Alb=4.2, Phos=1.5, Mag=1.8, Ca=8.6, Tficcnbk=099, Iron=57, PT=24, INR=2.1  10/17/22 Labs: Hgb=12.7, ESR=2, CRP<5, Alb=4.3, Phos=2.1, Mag=1.8, Ca=9, Ferritin=57, Iron=85, PT=23, INR=2     12/16/22 : Labs: Hgb=10.8 , ESR=1, Alb=3.8, Phos=1.6, Mag=1.7, Ca=8.9 , ALP=68, Dcoqxyic=147, Iron=67, INR=2.3     1/30/23: Labs:   Hgb=13.6 , ESR=5, CRP<5,  Alb=4.4, Phos=3, Mag=1.8, Ca=9.5, ALP=79,Ferritin=55, Iron=56,    3/27/ 23: Labs:  Hgb=13.8, ESR=5, CRP<5 ,Alb=4 , Phos=1, Mag=1.8, Ca=8.5 , ALP=94, Jfvqgqnp=240, Iron=78,                                      INR=5.9    5/22/23 Labs: Hgb=12.7,ESR=2,CRP<5, Alb=4.2, Phos=2.4, Mag=1.8, Ca=9.1, ALP=68,Ferritin=19, Iron=42,  INR=2.4                  8/4/23 :  Alb=3.8, Phos=2.1, Mag=1.8, Ca=8.7,  IWC=258, ALP=68,  Vit D=78 INR=2.1 8/21/23 : Hgb=14, ESR=4, CRP=40, Xqdeqjzr=841, Iron=37, INR=4.8; Alb=4 , Phos=1.8, Mag=1.6, Ca=9.3, ALP=84    A / PLAN:  s/p sbo prox to anastamosis                  inflammatory vs fibrosis vs combination: 3/2022 recent MRI shows improvement of inflammation w      possible residual stricture in the ileum and probable colitis near the anastamosis and distal colon       Responded to  steroids iv--> po--d/c ~ 10/20/21,      tapered steroids from 40mg qd  to 20mg, [id number] qd and  taper 5mg/wk      then po taper again early 12/2021 40mg qd w 10mg taper/ wk--      PO prednisone 40mg mg qd started on 4/9/22 ,tapered off ~ early 5/2022      PO prednisone 40mg mg qd started on 8/19/23, taper 10mg/wk--> 20mg then 5 mg /wk        Entyvio level was 39 w/o Abs~ 3weeks after 7/1/21 dose ,       speaks to inflammatory component & probably loss of response      Stelara # 1 dose on 10/25/21, #2 on 12/10/21, # 3 on  2/11/22,  # 4 on 4/20/22 , #5 mid June 2022,                    #6 mid 8/2022, #7 beginning 12/2022;  #8 on 2/2/23, #9 4/2023, #10 6/2023 , last dose--> 7/8/23         IBD consensus : due to malnutrition /steroid dependency, surgery is next best option        but pt reported feeling well and wanted to pursue medical treatment       repeated  imaging in March after 3 months of Stelara, then consider surgery vs improved candidacy for                endoscopic manipulation         f/u w  IBD consultant Dr. Heard at   & reviewed imaging after 3 months of Stelera        for  Colonoscopy ( w possible balloon dilatation )-> last 1 3/4 yrs ago    5/17/22 Dr. Heard Colonoscopy: ped scope passed w/o resist in alfredito-TI, one single apthae w psuedopolyp.     No Dilatation need, very mild dis at Alfredito-TI, MRE may show wall thickening, sbo's probably adhesive dis  1/4/22 Labs: Hgb=11.5, ESR=6, CRP<5, Alb=3.9 1/31/22 Calprotectin =84 2/14/22: Hgb=10.6, ESR=9, CRP <5, Ca=8.3, Mag=1.6, Alb=3.8, Iron=25, Ferritin=15 3/28/22: Hgb=14, ESR=1, CRP<5 , Ca=8.5, Mag=1.7, Alb=4, Iron=104, Nesydigd=058 5/9//22: Hgb=13.8 , ESR=2, CRP <5, Ca=8.8, Mag=1.6, Alb=4, Iron=64, Tsqmmmql=457    6/21/22 : Hgb=14, ESR=2, CRP<5,  Ca=8.5, ,Alb=4.5,  Mag=1.8, Iron=86,Pmycryyx=266,  7/25/22 :  Hgb=14, ESR=2, CRP<5, Alb=4.2, Phos=1.5, Mag=1.8, Ca=8.6, Zuspqmpk=024, Iron=57 10/17/22:  Hgb=12.7 , ESR=2, CRP<5, Alb=4.3, Phos=2.1, Mag=1.8, Ca=9, Ferritin=57, Iron=85  12/16/22 : Labs: Hgb=10.8 , ESR=1, Alb=3.8, Phos=1.6, Mag=1.7, Ca=8.9 , ALP=68, Pkxccywt=129, Iron=67,  1/30/23: Labs:   Hgb=13.6 , ESR=5, CRP<5,  Alb=4.4, Phos=3, Mag=1.8, Ca=9.5, ALP=79,Ferritin=55, Iron=56,    3/27/ 23: Labs:  Hgb=13.8, ESR=5, CRP<5 ,Alb=4 , Phos=1, Mag=1.8, Ca=8.5 , ALP=94, Ccstvgdz=122, Iron=78,                 INR=5.9   5/22/23 Labs: Hgb=12.7,ESR=2,CRP<5, Alb=4.2, Phos=2.4, Mag=1.8, Ca=9.1, ALP=68,Ferritin=19, Iron=42, INR=2.4  8/4/23 :  Alb=3.8, Phos=2.1, Mag=1.8, Ca=8.7,  KDH=412, ALP=68,  Vit D=78 INR=2.1                                 8/21/23 : Hgb=14, ESR=4, CRP=40,  Alb=4 , Phos=1.8, Mag=1.6, Ca=9.3, ALP=84,Pnjfesyv=691, Iron=37,  Probiotics 3 qd  Diet:  LR, Lactose free, protein drinks tid MCT oil begun but never maintained  **trend --cbc, esr, crp, albumin, calprotectin     **monitor wt--- usu bet 136-139, 7/20/21=136, 11/22/21=139, 1/4/22=132, 2/22/22=132, 4/5/22=131,5/10/75=364,                         6/21/22=133, 7/27/22=136, 12/19/22=138; 2/6/23=140, 3/28/62=709, 5/23/23 =137,. rnhcj=823                                                                                                                                                                                                                                                                                                                                                                                                                                                                                                                                                                                                                                                                                                                               2. Iron deficiency anemia :   had initially dropped , after clinical flare and post procedure bleeding Probably multifactorial: ACD, Blood loss, malabsorption/nutrient deficiencies Eliquis-->warfarin after CVA, On Entyvio  7/11/17 s/p  Adm for unsteady gait w MRI c/w CVA--warfarin begun: possible better control of AC Chromagen 2 qd--> IV Iron , s/p IV Cu x 5, FA 1mg qd , B12 SL & IM Still requiring IV Iron and cu infusions prn  most recent IV Iron infusion  x 2 for Fewrritin = 19  2/22/21: A90=528,  6/4/21: Cu=91, Zinc=69, Ferritin=32,Iron=43,  7/12/21: No=601, Zinc=74, Ntkpulrb=938, Iron=35 11/15/21 : Hgb=12,  Ferritin =104, Ca=9, Mg=1.7 1/4/22: Hgb=11.5, Ca=8.8, Mg=2.9, [place]=156  2/14/22: Hgb=10.6, Ca=8.3, Mag=1.6, Alb=3.8, Iron=25, Ferritin=15 3/28/22: Hgb=14.4 , Ca=8.5, Mag=1.7,Alb=4.1, Iron=104, Ekgchatq=555,  5/9//22: Hgb=13.8 , Ca=8.8, Mag=1.6, Alb=4, Iron=64, Rgkqpypx=271   6/20/22: Hgb=14.4  , Ca=8.5, Mag=1.8, Alb=4.5, Iron=96, Bogzpxla=762 7/25/22 Labs:  Hgb=14,Ca=8.6,  Mag=1.8,Alb=4.2,Iron=57 Aqjfldmd=013, , PT=24, INR=2.1 10/17/22 Labs: Hgb=12.7,Ca=9,  Mag=1.8,Alb=4.3,Iron=85 Ferritin=57, , PT=23, INR=2 12/16/22 : Labs: Hgb=10.8 , Alb=3.8, Iron=67,Vehssarz=962, , INR=2.3   1/30/23: Labs:   Hgb=13.6 , Ca=9.5, Mag=1.8, Alb=4.4,,Ferritin=55, Iron=56, INR=1.6   3/27/ 23: Labs:  Hgb=13.8, ESR=5, CRP<5 , Mag=1.8, Alb=4, Snoontiy=903, Iron=78, INR=5.9    5/22/23 Labs: Hgb=12.7,ESR=2,CRP<5, Alb=4.2,Ferritin=19, Iron=42, INR=2.4 8/21/23 : Labs: Hgb=14, ESR=4, CRP=40, Vrzvfusl=500, Iron=37, INR=4.8; Alb=4 ,  B12 1cc IM ____L. delt--  given today,  trend cbc, B12, FA, Iron panel  Adj INR--closer to low 2's.         3. Osteoporosis  : Progression on BD from 5/5/16 Crohns, h/o steroid use Calcium citrate & Vit D per Endo Forteo since 5/10/19 , then  Reclast           Repeat BD of 8/2018 --showed worsening to Osteoporosis from 2016 Rec Endo consult w Dr. Akers :Osteoporosis center at [facility]--pt never went originally  9/21/18: Phos=2.4, Ca=8.7, Alb=3.4, Vit D=27, BUY=988,  10/16/18: Phos=2.8, Ca=8.7, Alb=3.5, 1/14/19: Phos=2.6,  Ca=8.7, Alb=3.6 2/28/19: Phos=1.8, Ca=8.9, Alb=3.7, VitD=39, FQZ=254 3/18/19: Ca=8.5, Alb=3.7, Vit D=44.6, PTH=93 7/11/19: Ca=9.1, Alb=3.7, Phos=3.9, Vit D=40/ 306, FDF=065 8/15/19: Ca=9, Alb=4.2, Phos=4.4, VitD=59, GSU=886 10/17/19: Ca=9.6, Alb=3.9, Phos=3.5 11/6/19: Ca=9.1, Alb=3.9. Phos=3.7, VitD=50, PTH=80 1/13/20: ca=9.1 1/30/20: Ca=9.2, Alb=3.9, Phos=2.7, Mag=1.5, Vit D=103/41, PTH=87 2/18/20:ca=8.5, Alb=3.6,  3/2/20: Ca=8.5, Alb=3.6 3/17/20: Ca=9.1, Alb=3.7, Phos=3.4, Mag=1.7 10/17/20: Ca=8.9, Alb=4, Phos=2.3, Mag-2.0, Vit D=66, PTH=47 1/4/21 : Ca=9.4, Alb=4.2, Phos=2.7, Mag=2, Vit D=64, PTH=87.5 4/5/21: Ca=9.1, Alb=4, Phos=3.1, Mag=1.7,  6/4/21: ca=9, Alb=3.8, Phos=2.8, Mag=1.8 7/12/21: Ca=8.6, ALB=6, phosph=2.3, Mag=1.8 11/15/21: Ca=9, Alb=3.7, Mag=1.7 1/4/22: ca=8.8, Alb=3.9, Mg=2.9, phos=2.9 1/21/22: Ca=8.8, Alb=3.9, Vit D=60, PTH=85 2/14/22:  Ca=8.3, Mag=1.6, Alb=3.8,  3/28/22: Ca=8.5, Mag=1.7, Alb=4.1, Phos=1.2  5/9//22:  Ca=8.8, Mag=1.6, Alb=4, Phos=1.6    5/27/22: Ca=9.2 , Mag=1.9, Alb=3.8 , Phos=2.7, PTH=72, VitD=105   6/20/ 22: Ca=8.5  , Mag=1.8, Alb=4.5 , Phos=0.8, UZS=701,    7/25/22 :  Ca=8.6, Mag=1.8 , Alb=4.2, Phos=1.5,    10/17/22: Ca=9, Mag=1.8 , Alb=4.3, Phos=2.1, PTH=95, Vit D=25  12/16/22 : Labs: Ca=8.9, Mag=1.7,  Alb=3.8, Phos=1.6,    1/30/23 Labs:   Ca=9.5, Mag=1.8, ALP=79, Alb=4.4, Phos=3, PTH=96, Vit D =144   3/27/ 23: Labs: Ca=8.5, Mag=1.8 , ALP=94,  Alb=4 , Phos=1,    4/21/23 Labs:   Ca=8.9, Mag=1.9, ALP=75, Alb=4., Phos=2.9, PTH=89, Vit D =108   5/22/23 Labs:Ca=9.1,Mag=1.8 , ALP=68,Alb=4.2, Phos=2.4,    8/4/23 : Labs   Ca=8.7, Mag=1.8,  ALP=68, Alb=3.8, Phos=2.1,  JAX=657,  Vit D=78  8/21/23 : Labs: Ca=9.3, Mag=1.6, ALP=84, Alb=4 ,Phos=1.8 Dr. Akers: Hyperparathyroidism-- probably secondary due to low Vit D/Ca & ? superimposed primary,  [facility] Consult init felt  Parathyroid scan showing activity in mediastinum is ectopic parathyroid, then felt sx not indicated at that time, elev PTH is secondary to low  Vit D/Ca Rx replete Vit D and current IV Calcium-as per endo   trend Vit D, Ca, Phos, PTH,    BD--9/2020: inprovmt in spine and hip , no change in wrist, BD--10/17/22: Decr T score  in spine & hip, incr in wrist, repeat-->  10/5/20, 9/2021,6/2022-s/p Reclast--repeat--> 6/2023       4. GERD:  recently less active by ENT , no ht burn,  dysphagia,  + throat clear               h/o  Dry cough, CXR:ana, saw ENT eulogio-->LPR * ++ LPR,  ++  Page's w No Dysplasia,  + H/O  Esophagitis grade: A   was found   Recommend:  * Anti-reflux diet & life-style changes reviewed & re-emphasized.  ++  Bedge required * Weight reduction & regular exercise emphasized  * need for  PPI:  Omep 40 BID--> 40mg qd  ,  ++ H2B q HS:Zantac 300mg--> Pepcid 40mg No Carafate  1 gram:   --was emphasized I reviewed & summarized the prospective randomized & retrospective non-randomized studies looking at potential long term SE's of PPIs, w special attention to associations & actual cause as related to GI infections, bone loss, cognitive changes, KD, Covid, vitamin & electrolyte deficiencies questions were answered, pt advised that PPIs should be used when needed as indicated by their clinical indication and response and tapering off is always the goal if possible. pt understood.  *  F/U  EGD: --> 2024--for Barretts screening / surveillance  * No  need for pH Monitor,   Manometry,   Esophagram -- *  ++  need for ENT  eval/F/U,  No  need for Surgical  eval  --        5. Hemorrhoids:  well - controlled.  No pain,  swelling,  itch,  bleeding * Discussed previously the potential complications of thrombosis,  pain,  infection,  swelling, itching,  bleeding  Reccomend:  * currently Low   - Fiber Diet was emphasized * 6  --  8 cups of decaffeinated fluid daily was emphasized  * Sitz Bathes prn,   No:  Anusol HC  Suppos / Cream  IA BID -- was needed * No:  Tucks BID,  Balneol Lotion,   Calmoseptine Oint -- was needed ;    ++ Prep H prn * No:  need for  Colorectal surgical evaluation for possible ablation        6. Barretts esophagus without dysplasia   Notes: see GERD.        7. Hepatomegaly-->not confirmed by recent abd sono CTE w relative enlargemt, ? lobulation & enlarged portal/mesenteric veins LFTs-wnl, no ascites or edema R/O CLD, Hepatic vein thrombosis Abd sono w doppler--> Liver and spleen normal size, no thrombosis, normal portal and hepatic vein flow     .

## 2023-08-23 ENCOUNTER — RESULT REVIEW (OUTPATIENT)
Age: 58
End: 2023-08-23

## 2023-08-23 ENCOUNTER — APPOINTMENT (OUTPATIENT)
Dept: HEMATOLOGY ONCOLOGY | Facility: CLINIC | Age: 58
End: 2023-08-23

## 2023-08-23 VITALS
SYSTOLIC BLOOD PRESSURE: 122 MMHG | RESPIRATION RATE: 16 BRPM | TEMPERATURE: 97.2 F | OXYGEN SATURATION: 99 % | HEART RATE: 70 BPM | HEIGHT: 69 IN | DIASTOLIC BLOOD PRESSURE: 81 MMHG

## 2023-09-07 ENCOUNTER — APPOINTMENT (OUTPATIENT)
Dept: HEMATOLOGY ONCOLOGY | Facility: CLINIC | Age: 58
End: 2023-09-07

## 2023-09-07 ENCOUNTER — RESULT REVIEW (OUTPATIENT)
Age: 58
End: 2023-09-07

## 2023-09-07 ENCOUNTER — RX RENEWAL (OUTPATIENT)
Age: 58
End: 2023-09-07

## 2023-09-07 VITALS
TEMPERATURE: 97.2 F | BODY MASS INDEX: 20.3 KG/M2 | DIASTOLIC BLOOD PRESSURE: 76 MMHG | HEIGHT: 69 IN | WEIGHT: 137.06 LBS | SYSTOLIC BLOOD PRESSURE: 117 MMHG | RESPIRATION RATE: 16 BRPM | HEART RATE: 79 BPM | OXYGEN SATURATION: 97 %

## 2023-09-14 ENCOUNTER — APPOINTMENT (OUTPATIENT)
Dept: HEMATOLOGY ONCOLOGY | Facility: CLINIC | Age: 58
End: 2023-09-14

## 2023-09-14 ENCOUNTER — RESULT REVIEW (OUTPATIENT)
Age: 58
End: 2023-09-14

## 2023-09-14 VITALS
HEART RATE: 97 BPM | HEIGHT: 69 IN | OXYGEN SATURATION: 96 % | BODY MASS INDEX: 19.99 KG/M2 | WEIGHT: 135 LBS | RESPIRATION RATE: 16 BRPM | DIASTOLIC BLOOD PRESSURE: 82 MMHG | TEMPERATURE: 97.8 F | SYSTOLIC BLOOD PRESSURE: 113 MMHG

## 2023-09-28 ENCOUNTER — RESULT REVIEW (OUTPATIENT)
Age: 58
End: 2023-09-28

## 2023-09-28 ENCOUNTER — APPOINTMENT (OUTPATIENT)
Dept: HEMATOLOGY ONCOLOGY | Facility: CLINIC | Age: 58
End: 2023-09-28

## 2023-09-28 VITALS
SYSTOLIC BLOOD PRESSURE: 106 MMHG | DIASTOLIC BLOOD PRESSURE: 69 MMHG | OXYGEN SATURATION: 94 % | WEIGHT: 137.31 LBS | HEART RATE: 102 BPM | RESPIRATION RATE: 16 BRPM | BODY MASS INDEX: 20.34 KG/M2 | HEIGHT: 69 IN | TEMPERATURE: 97.8 F

## 2023-10-04 ENCOUNTER — NON-APPOINTMENT (OUTPATIENT)
Age: 58
End: 2023-10-04

## 2023-10-05 ENCOUNTER — RESULT REVIEW (OUTPATIENT)
Age: 58
End: 2023-10-05

## 2023-10-05 ENCOUNTER — APPOINTMENT (OUTPATIENT)
Dept: HEMATOLOGY ONCOLOGY | Facility: CLINIC | Age: 58
End: 2023-10-05

## 2023-10-11 ENCOUNTER — LABORATORY RESULT (OUTPATIENT)
Age: 58
End: 2023-10-11

## 2023-10-16 ENCOUNTER — APPOINTMENT (OUTPATIENT)
Dept: ENDOCRINOLOGY | Facility: CLINIC | Age: 58
End: 2023-10-16

## 2023-10-18 ENCOUNTER — RESULT REVIEW (OUTPATIENT)
Age: 58
End: 2023-10-18

## 2023-10-18 RX ORDER — WARFARIN 3 MG/1
3 TABLET ORAL
Qty: 90 | Refills: 3 | Status: ACTIVE | COMMUNITY
Start: 2019-01-17 | End: 1900-01-01

## 2023-10-20 ENCOUNTER — RESULT REVIEW (OUTPATIENT)
Age: 58
End: 2023-10-20

## 2023-10-24 ENCOUNTER — APPOINTMENT (OUTPATIENT)
Dept: GASTROENTEROLOGY | Facility: CLINIC | Age: 58
End: 2023-10-24
Payer: COMMERCIAL

## 2023-10-24 ENCOUNTER — RX RENEWAL (OUTPATIENT)
Age: 58
End: 2023-10-24

## 2023-10-24 PROCEDURE — 99214 OFFICE O/P EST MOD 30 MIN: CPT

## 2023-11-02 LAB
PROMETHEUS ANSER UST: NORMAL
SERUM USTEKINUMAB CONCENTRATION: 7.1 UG/ML
USTEKINUMAB ANTIBODIES CONCENTRATION: < 1.6 U/ML

## 2023-11-10 ENCOUNTER — APPOINTMENT (OUTPATIENT)
Dept: PODIATRY | Facility: CLINIC | Age: 58
End: 2023-11-10
Payer: COMMERCIAL

## 2023-11-10 VITALS — BODY MASS INDEX: 20.29 KG/M2 | HEIGHT: 69 IN | WEIGHT: 137 LBS

## 2023-11-10 PROCEDURE — 99213 OFFICE O/P EST LOW 20 MIN: CPT | Mod: 25

## 2023-11-10 PROCEDURE — 11055 PARING/CUTG B9 HYPRKER LES 1: CPT

## 2023-11-27 ENCOUNTER — APPOINTMENT (OUTPATIENT)
Dept: INTERNAL MEDICINE | Facility: CLINIC | Age: 58
End: 2023-11-27
Payer: COMMERCIAL

## 2023-11-27 ENCOUNTER — LABORATORY RESULT (OUTPATIENT)
Age: 58
End: 2023-11-27

## 2023-11-27 VITALS
HEIGHT: 69 IN | BODY MASS INDEX: 19.7 KG/M2 | WEIGHT: 133 LBS | OXYGEN SATURATION: 99 % | DIASTOLIC BLOOD PRESSURE: 62 MMHG | TEMPERATURE: 97.8 F | HEART RATE: 83 BPM | SYSTOLIC BLOOD PRESSURE: 122 MMHG

## 2023-11-27 DIAGNOSIS — R59.0 LOCALIZED ENLARGED LYMPH NODES: ICD-10-CM

## 2023-11-27 DIAGNOSIS — E61.0 COPPER DEFICIENCY: ICD-10-CM

## 2023-11-27 DIAGNOSIS — Z87.19 PERSONAL HISTORY OF OTHER DISEASES OF THE DIGESTIVE SYSTEM: ICD-10-CM

## 2023-11-27 DIAGNOSIS — Z00.01 ENCOUNTER FOR GENERAL ADULT MEDICAL EXAMINATION WITH ABNORMAL FINDINGS: ICD-10-CM

## 2023-11-27 DIAGNOSIS — K21.9 GASTRO-ESOPHAGEAL REFLUX DISEASE W/OUT ESOPHAGITIS: ICD-10-CM

## 2023-11-27 PROCEDURE — 99213 OFFICE O/P EST LOW 20 MIN: CPT | Mod: 25

## 2023-11-27 PROCEDURE — 36415 COLL VENOUS BLD VENIPUNCTURE: CPT

## 2023-11-27 PROCEDURE — 99396 PREV VISIT EST AGE 40-64: CPT | Mod: 25

## 2023-11-27 RX ORDER — PREDNISONE 5 MG/1
5 TABLET ORAL DAILY
Qty: 240 | Refills: 1 | Status: DISCONTINUED | COMMUNITY
Start: 2023-08-22 | End: 2023-11-27

## 2023-11-27 RX ORDER — CALCIUM CITRATE 200(950)MG
950 (200 CA) TABLET ORAL TWICE DAILY
Qty: 540 | Refills: 0 | Status: ACTIVE | COMMUNITY
Start: 2019-02-13

## 2023-11-28 LAB
25(OH)D3 SERPL-MCNC: 69.5 NG/ML
ALBUMIN SERPL ELPH-MCNC: 4.2 G/DL
ALP BLD-CCNC: 84 U/L
ALT SERPL-CCNC: 29 U/L
ANION GAP SERPL CALC-SCNC: 12 MMOL/L
APPEARANCE: CLEAR
APTT BLD: 41.7 SEC
AST SERPL-CCNC: 25 U/L
BASOPHILS # BLD AUTO: 0.03 K/UL
BASOPHILS NFR BLD AUTO: 0.6 %
BILIRUB SERPL-MCNC: 0.3 MG/DL
BILIRUBIN URINE: NEGATIVE
BLOOD URINE: NEGATIVE
BUN SERPL-MCNC: 11 MG/DL
CALCIUM SERPL-MCNC: 9.1 MG/DL
CHLORIDE SERPL-SCNC: 107 MMOL/L
CHOLEST SERPL-MCNC: 190 MG/DL
CO2 SERPL-SCNC: 22 MMOL/L
COLOR: YELLOW
CREAT SERPL-MCNC: 1.08 MG/DL
EGFR: 79 ML/MIN/1.73M2
EOSINOPHIL # BLD AUTO: 0.05 K/UL
EOSINOPHIL NFR BLD AUTO: 1.1 %
FOLATE SERPL-MCNC: 17.3 NG/ML
GLUCOSE QUALITATIVE U: NEGATIVE MG/DL
GLUCOSE SERPL-MCNC: 88 MG/DL
HCT VFR BLD CALC: 45 %
HCV AB SER QL: NONREACTIVE
HCV S/CO RATIO: 0.04 S/CO
HDLC SERPL-MCNC: 61 MG/DL
HGB BLD-MCNC: 14 G/DL
IMM GRANULOCYTES NFR BLD AUTO: 0.2 %
INR PPP: 1.74 RATIO
KETONES URINE: NEGATIVE MG/DL
LDLC SERPL CALC-MCNC: 106 MG/DL
LEUKOCYTE ESTERASE URINE: ABNORMAL
LYMPHOCYTES # BLD AUTO: 0.57 K/UL
LYMPHOCYTES NFR BLD AUTO: 12.3 %
MAN DIFF?: NORMAL
MCHC RBC-ENTMCNC: 30 PG
MCHC RBC-ENTMCNC: 31.1 GM/DL
MCV RBC AUTO: 96.4 FL
MONOCYTES # BLD AUTO: 0.37 K/UL
MONOCYTES NFR BLD AUTO: 8 %
NEUTROPHILS # BLD AUTO: 3.61 K/UL
NEUTROPHILS NFR BLD AUTO: 77.8 %
NITRITE URINE: NEGATIVE
NONHDLC SERPL-MCNC: 128 MG/DL
PH URINE: 5.5
PLATELET # BLD AUTO: 301 K/UL
POTASSIUM SERPL-SCNC: 4.8 MMOL/L
PROT SERPL-MCNC: 6.1 G/DL
PROTEIN URINE: 30 MG/DL
PSA FREE FLD-MCNC: 7 %
PSA FREE SERPL-MCNC: 0.11 NG/ML
PSA SERPL-MCNC: 1.65 NG/ML
PT BLD: 19.3 SEC
RBC # BLD: 4.67 M/UL
RBC # FLD: 15.4 %
SODIUM SERPL-SCNC: 142 MMOL/L
SPECIFIC GRAVITY URINE: 1.02
T4 FREE SERPL-MCNC: 1.5 NG/DL
TRIGL SERPL-MCNC: 125 MG/DL
TSH SERPL-ACNC: 0.53 UIU/ML
UROBILINOGEN URINE: 0.2 MG/DL
VIT B12 SERPL-MCNC: 294 PG/ML
WBC # FLD AUTO: 4.64 K/UL

## 2023-11-29 ENCOUNTER — APPOINTMENT (OUTPATIENT)
Dept: INTERNAL MEDICINE | Facility: CLINIC | Age: 58
End: 2023-11-29
Payer: COMMERCIAL

## 2023-11-29 ENCOUNTER — LABORATORY RESULT (OUTPATIENT)
Age: 58
End: 2023-11-29

## 2023-11-29 ENCOUNTER — APPOINTMENT (OUTPATIENT)
Dept: INTERNAL MEDICINE | Facility: CLINIC | Age: 58
End: 2023-11-29

## 2023-11-29 ENCOUNTER — APPOINTMENT (OUTPATIENT)
Dept: ENDOCRINOLOGY | Facility: CLINIC | Age: 58
End: 2023-11-29
Payer: COMMERCIAL

## 2023-11-29 DIAGNOSIS — E89.0 POSTPROCEDURAL HYPOTHYROIDISM: ICD-10-CM

## 2023-11-29 PROCEDURE — 96372 THER/PROPH/DIAG INJ SC/IM: CPT

## 2023-11-29 PROCEDURE — P9615: CPT

## 2023-11-29 PROCEDURE — 99215 OFFICE O/P EST HI 40 MIN: CPT | Mod: 95

## 2023-11-30 ENCOUNTER — RESULT REVIEW (OUTPATIENT)
Age: 58
End: 2023-11-30

## 2023-11-30 ENCOUNTER — APPOINTMENT (OUTPATIENT)
Dept: HEMATOLOGY ONCOLOGY | Facility: CLINIC | Age: 58
End: 2023-11-30

## 2023-11-30 VITALS
OXYGEN SATURATION: 98 % | RESPIRATION RATE: 16 BRPM | BODY MASS INDEX: 20.31 KG/M2 | DIASTOLIC BLOOD PRESSURE: 95 MMHG | SYSTOLIC BLOOD PRESSURE: 145 MMHG | TEMPERATURE: 97.3 F | HEIGHT: 69 IN | HEART RATE: 79 BPM | WEIGHT: 137.13 LBS

## 2023-11-30 LAB
APPEARANCE: CLEAR
BILIRUBIN URINE: NEGATIVE
BLOOD URINE: ABNORMAL
COLOR: YELLOW
GLUCOSE QUALITATIVE U: NEGATIVE MG/DL
KETONES URINE: NEGATIVE MG/DL
LEUKOCYTE ESTERASE URINE: ABNORMAL
NITRITE URINE: POSITIVE
PH URINE: 5.5
PROTEIN URINE: 30 MG/DL
SPECIFIC GRAVITY URINE: 1.02
UROBILINOGEN URINE: 0.2 MG/DL

## 2023-12-01 ENCOUNTER — APPOINTMENT (OUTPATIENT)
Dept: INTERNAL MEDICINE | Facility: CLINIC | Age: 58
End: 2023-12-01
Payer: COMMERCIAL

## 2023-12-01 DIAGNOSIS — R30.0 DYSURIA: ICD-10-CM

## 2023-12-01 LAB — PCA AB SER QL IF: NORMAL

## 2023-12-01 PROCEDURE — P9615: CPT

## 2023-12-01 PROCEDURE — 96372 THER/PROPH/DIAG INJ SC/IM: CPT

## 2023-12-04 LAB — IF BLOCK AB SER QL: 1 AU/ML

## 2023-12-04 RX ORDER — CIPROFLOXACIN HYDROCHLORIDE 500 MG/1
500 TABLET, FILM COATED ORAL
Qty: 14 | Refills: 0 | Status: DISCONTINUED | COMMUNITY
Start: 2023-12-01 | End: 2023-12-04

## 2023-12-05 ENCOUNTER — NON-APPOINTMENT (OUTPATIENT)
Age: 58
End: 2023-12-05

## 2023-12-05 ENCOUNTER — APPOINTMENT (OUTPATIENT)
Dept: INTERNAL MEDICINE | Facility: CLINIC | Age: 58
End: 2023-12-05
Payer: COMMERCIAL

## 2023-12-05 DIAGNOSIS — H91.90 UNSPECIFIED HEARING LOSS, UNSPECIFIED EAR: ICD-10-CM

## 2023-12-05 LAB
HOMOCYSTEINE LEVEL: 17.8 UMOL/L
METHYLMALONIC ACID LEVEL: 206 NMOL/L

## 2023-12-05 PROCEDURE — 96372 THER/PROPH/DIAG INJ SC/IM: CPT

## 2023-12-06 LAB — BACTERIA UR CULT: ABNORMAL

## 2023-12-11 ENCOUNTER — NON-APPOINTMENT (OUTPATIENT)
Age: 58
End: 2023-12-11

## 2023-12-11 ENCOUNTER — APPOINTMENT (OUTPATIENT)
Dept: GASTROENTEROLOGY | Facility: CLINIC | Age: 58
End: 2023-12-11
Payer: COMMERCIAL

## 2023-12-11 PROCEDURE — 99214 OFFICE O/P EST MOD 30 MIN: CPT

## 2023-12-11 RX ORDER — OMEPRAZOLE 40 MG/1
40 CAPSULE, DELAYED RELEASE ORAL
Qty: 180 | Refills: 3 | Status: ACTIVE | COMMUNITY
Start: 2021-09-17 | End: 1900-01-01

## 2023-12-12 ENCOUNTER — APPOINTMENT (OUTPATIENT)
Dept: INTERNAL MEDICINE | Facility: CLINIC | Age: 58
End: 2023-12-12
Payer: COMMERCIAL

## 2023-12-12 PROCEDURE — 96372 THER/PROPH/DIAG INJ SC/IM: CPT

## 2023-12-15 ENCOUNTER — RESULT REVIEW (OUTPATIENT)
Age: 58
End: 2023-12-15

## 2023-12-19 ENCOUNTER — APPOINTMENT (OUTPATIENT)
Dept: INTERNAL MEDICINE | Facility: CLINIC | Age: 58
End: 2023-12-19
Payer: COMMERCIAL

## 2023-12-19 PROCEDURE — 96372 THER/PROPH/DIAG INJ SC/IM: CPT

## 2023-12-19 RX ADMIN — CYANOCOBALAMIN 0 MCG/ML: 1000 INJECTION INTRAMUSCULAR; SUBCUTANEOUS at 00:00

## 2023-12-20 ENCOUNTER — NON-APPOINTMENT (OUTPATIENT)
Age: 58
End: 2023-12-20

## 2023-12-20 RX ORDER — CYANOCOBALAMIN 1000 UG/ML
1000 INJECTION INTRAMUSCULAR; SUBCUTANEOUS
Qty: 0 | Refills: 0 | Status: COMPLETED | OUTPATIENT
Start: 2023-12-19

## 2023-12-21 ENCOUNTER — RESULT REVIEW (OUTPATIENT)
Age: 58
End: 2023-12-21

## 2023-12-22 ENCOUNTER — APPOINTMENT (OUTPATIENT)
Dept: GASTROENTEROLOGY | Facility: HOSPITAL | Age: 58
End: 2023-12-22

## 2023-12-26 ENCOUNTER — APPOINTMENT (OUTPATIENT)
Dept: INTERNAL MEDICINE | Facility: CLINIC | Age: 58
End: 2023-12-26
Payer: COMMERCIAL

## 2023-12-26 PROCEDURE — 96372 THER/PROPH/DIAG INJ SC/IM: CPT

## 2023-12-26 RX ADMIN — CYANOCOBALAMIN 0 MCG/ML: 1000 INJECTION, SOLUTION INTRAMUSCULAR at 00:00

## 2023-12-27 LAB
25(OH)D3 SERPL-MCNC: 73.2 NG/ML
ALBUMIN SERPL ELPH-MCNC: 4 G/DL
ALP BLD-CCNC: 99 U/L
ALT SERPL-CCNC: 48 U/L
ANION GAP SERPL CALC-SCNC: 11 MMOL/L
AST SERPL-CCNC: 29 U/L
BILIRUB SERPL-MCNC: 0.5 MG/DL
BUN SERPL-MCNC: 15 MG/DL
CALCIUM SERPL-MCNC: 8.6 MG/DL
CALCIUM SERPL-MCNC: 8.6 MG/DL
CHLORIDE SERPL-SCNC: 107 MMOL/L
CO2 SERPL-SCNC: 23 MMOL/L
CREAT SERPL-MCNC: 1.12 MG/DL
EGFR: 76 ML/MIN/1.73M2
GLUCOSE SERPL-MCNC: 95 MG/DL
MAGNESIUM SERPL-MCNC: 1.8 MG/DL
PARATHYROID HORMONE INTACT: 91 PG/ML
PHOSPHATE SERPL-MCNC: 0.8 MG/DL
POTASSIUM SERPL-SCNC: 4.2 MMOL/L
PROT SERPL-MCNC: 5.8 G/DL
SODIUM SERPL-SCNC: 141 MMOL/L
TSH SERPL-ACNC: 0.46 UIU/ML

## 2023-12-29 RX ORDER — CYANOCOBALAMIN 1000 UG/ML
1000 INJECTION INTRAMUSCULAR; SUBCUTANEOUS
Qty: 0 | Refills: 0 | Status: COMPLETED | OUTPATIENT
Start: 2023-12-26

## 2023-12-31 ENCOUNTER — TRANSCRIPTION ENCOUNTER (OUTPATIENT)
Age: 58
End: 2023-12-31

## 2024-01-01 ENCOUNTER — NON-APPOINTMENT (OUTPATIENT)
Age: 59
End: 2024-01-01

## 2024-01-05 ENCOUNTER — APPOINTMENT (OUTPATIENT)
Dept: ENDOCRINOLOGY | Facility: CLINIC | Age: 59
End: 2024-01-05
Payer: COMMERCIAL

## 2024-01-05 ENCOUNTER — LABORATORY RESULT (OUTPATIENT)
Age: 59
End: 2024-01-05

## 2024-01-05 VITALS
WEIGHT: 131 LBS | SYSTOLIC BLOOD PRESSURE: 112 MMHG | HEIGHT: 68 IN | OXYGEN SATURATION: 98 % | BODY MASS INDEX: 19.85 KG/M2 | DIASTOLIC BLOOD PRESSURE: 84 MMHG | HEART RATE: 81 BPM

## 2024-01-05 LAB
24R-OH-CALCIDIOL SERPL-MCNC: 33.1 PG/ML
COLLAGEN CTX SERPL-MCNC: 186 PG/ML

## 2024-01-05 PROCEDURE — 99491 CHRNC CARE MGMT PHYS 1ST 30: CPT

## 2024-01-05 PROCEDURE — 99496 TRANSJ CARE MGMT HIGH F2F 7D: CPT | Mod: 25

## 2024-01-05 RX ORDER — DIBASIC SODIUM PHOSPHATE, MONOBASIC POTASSIUM PHOSPHATE AND MONOBASIC SODIUM PHOSPHATE 852; 155; 130 MG/1; MG/1; MG/1
155-852-130 TABLET ORAL
Qty: 14 | Refills: 1 | Status: DISCONTINUED | COMMUNITY
Start: 2023-08-21 | End: 2024-01-05

## 2024-01-05 RX ORDER — NITROFURANTOIN (MONOHYDRATE/MACROCRYSTALS) 25; 75 MG/1; MG/1
100 CAPSULE ORAL
Qty: 14 | Refills: 0 | Status: DISCONTINUED | COMMUNITY
Start: 2023-12-06 | End: 2024-01-05

## 2024-01-05 RX ORDER — SULFAMETHOXAZOLE AND TRIMETHOPRIM 800; 160 MG/1; MG/1
800-160 TABLET ORAL
Qty: 60 | Refills: 0 | Status: DISCONTINUED | COMMUNITY
Start: 2023-12-04 | End: 2024-01-05

## 2024-01-06 LAB
25(OH)D3 SERPL-MCNC: 73.1 NG/ML
ALBUMIN SERPL ELPH-MCNC: 4.2 G/DL
ALP BLD-CCNC: 85 U/L
ALT SERPL-CCNC: 34 U/L
ANION GAP SERPL CALC-SCNC: 10 MMOL/L
AST SERPL-CCNC: 26 U/L
BILIRUB SERPL-MCNC: 0.3 MG/DL
BUN SERPL-MCNC: 11 MG/DL
CALCIUM SERPL-MCNC: 8.5 MG/DL
CALCIUM SERPL-MCNC: 8.5 MG/DL
CHLORIDE SERPL-SCNC: 105 MMOL/L
CO2 SERPL-SCNC: 23 MMOL/L
CREAT SERPL-MCNC: 1 MG/DL
EGFR: 87 ML/MIN/1.73M2
GLUCOSE SERPL-MCNC: 73 MG/DL
PARATHYROID HORMONE INTACT: 189 PG/ML
POTASSIUM SERPL-SCNC: 4.2 MMOL/L
PROT SERPL-MCNC: 6.1 G/DL
SODIUM SERPL-SCNC: 139 MMOL/L

## 2024-01-06 NOTE — ASSESSMENT
[0] : 2) Feeling down, depressed, or hopeless: Not at all (0) [PHQ-2 Negative - No further assessment needed] : PHQ-2 Negative - No further assessment needed [FreeTextEntry1] : phosphorus added to the lab,  d/w [facility] at [facility] lab higher iPTH will separate phosphorus from calcium intake and take 2tab tid repeat labs with Dr Garcia has appointment on Wednesday , unless phosphate critically low again will follow phosphate level  I called the pt, plan discussed  [[id number]] : 0

## 2024-01-06 NOTE — HISTORY OF PRESENT ILLNESS
[Home] : at home, [unfilled] , at the time of the visit. [Medical Office: ([facility])___] : at the medical office located in  [Verbal consent obtained from patient] : the patient, [unfilled] [FreeTextEntry1] : Last Reclast 6/17/2022 Mr. GUDELIA DUNN is a 58 year old male  coming for a f/u past hospital admission for severe hypophosphatemia secondary to hyperparathyroidism which was secondary to hypocalcemia, has not been taking his calcium supplements for few days as he was not feeling good Seen me for severe osteoporosis with h/o bilateral hip fractures, hyperparathyroidism, low D and hypocalcemia secondary to Chron's disease on Stelara shot every other month Dr Morton  on Vit D 50,000 IU 3 times a week alternating with 2 times a week every other week , he held it for a month of May only, levels high 4/23  started Forteo May 10 2019 then first Reclast infusion in May 2021 with no side effects last DEXA 10/2022 mildy worse after 9/21 much improved likely due to not enough calcium  on IV calcium each Friday at the infusion center, last one a month ago  labs done much improved   Ca citrate 950mg 3 tab bid  Clacium IV every other week  also iron IV every 6 weeks  reports compliance   24hr urine low calcium while low D    total thyroidectomy and central compartment  dissection 9/11/2020 Pathology showing 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 No evidence of lymphatic or vascular invasion no extra thyroid extension. 8 of 10 lymph nodes positive for metastatic papillary thyroid carcinoma.  Large test metastatic focus is 0.7 cm in greatest dimension extra low dose extension present. Thyroid gland left sided within normal limits. Tihymic tissue present.  received iodine 131 at 29.3 mCi in October 2020 Posterior ideation scan showed focal site of increased tracer localization in the neck to the right of midline as was evident on prior study on October 23, 1920.  started Levothyroxine 125 mcg on 9/18/2020 no side effects dose increased to 137 mcg on November 2020  last DEXA 9/2020 As compared to the patient's most recent study dated 9/12/2019, there has been a statistically significant interval increase in bone density at both the lumbar spine and left hip with no s tatistically significant change noted at the left forearm. worse T score LFN -2.8

## 2024-01-09 LAB
24R-OH-CALCIDIOL SERPL-MCNC: 53.4 PG/ML
CA-I SERPL-SCNC: 4.8 MG/DL

## 2024-01-10 ENCOUNTER — APPOINTMENT (OUTPATIENT)
Dept: INTERNAL MEDICINE | Facility: CLINIC | Age: 59
End: 2024-01-10
Payer: COMMERCIAL

## 2024-01-10 ENCOUNTER — NON-APPOINTMENT (OUTPATIENT)
Age: 59
End: 2024-01-10

## 2024-01-10 VITALS
TEMPERATURE: 98.8 F | SYSTOLIC BLOOD PRESSURE: 134 MMHG | HEIGHT: 68 IN | BODY MASS INDEX: 19.85 KG/M2 | OXYGEN SATURATION: 98 % | HEART RATE: 91 BPM | WEIGHT: 131 LBS | DIASTOLIC BLOOD PRESSURE: 72 MMHG

## 2024-01-10 DIAGNOSIS — Z11.1 ENCOUNTER FOR SCREENING FOR RESPIRATORY TUBERCULOSIS: ICD-10-CM

## 2024-01-10 DIAGNOSIS — Z15.89 GENETIC SUSCEPTIBILITY TO OTHER DISEASE: ICD-10-CM

## 2024-01-10 DIAGNOSIS — Z09 ENCOUNTER FOR FOLLOW-UP EXAMINATION AFTER COMPLETED TREATMENT FOR CONDITIONS OTHER THAN MALIGNANT NEOPLASM: ICD-10-CM

## 2024-01-10 PROCEDURE — 99495 TRANSJ CARE MGMT MOD F2F 14D: CPT

## 2024-01-10 NOTE — REVIEW OF SYSTEMS
[Abdominal Pain] : no abdominal pain [Nausea] : no nausea [Diarrhea] : no diarrhea [Dysuria] : no dysuria [Hesitancy] : no hesitancy [Nocturia] : nocturia [Muscle Pain] : no muscle pain [Muscle Weakness] : no muscle weakness [Back Pain] : no back pain [Negative] : Neurological

## 2024-01-10 NOTE — HISTORY OF PRESENT ILLNESS
[Post-hospitalization from ___ Hospital] : Post-hospitalization from [unfilled] Hospital [Admitted on: ___] : The patient was admitted on [unfilled] [Discharged on ___] : discharged on [unfilled] [Discharge Summary] : discharge summary [Pertinent Labs] : pertinent labs [Discharge Med List] : discharge medication list [Med Reconciliation] : medication reconciliation has been completed [[email or web address]] : Miles comes in for medical follow-up after he was admitted to [facility] in December 26.  He was told by his endocrinologist to get admitted for a phosphate level of 0.8.  He had several IV phosphate infusion and he was discharged on oral phosphate.  At the last visit with Dr. Akers phosphate was 1.7.  He feels well.

## 2024-01-12 LAB
ALBUMIN SERPL ELPH-MCNC: 4.3 G/DL
ALP BLD-CCNC: 87 U/L
ALT SERPL-CCNC: 27 U/L
ANION GAP SERPL CALC-SCNC: 16 MMOL/L
APTT BLD: 38.6 SEC
AST SERPL-CCNC: 26 U/L
BILIRUB SERPL-MCNC: 0.3 MG/DL
BUN SERPL-MCNC: 9 MG/DL
CALCIUM SERPL-MCNC: 8.9 MG/DL
CHLORIDE SERPL-SCNC: 110 MMOL/L
CO2 SERPL-SCNC: 18 MMOL/L
CREAT SERPL-MCNC: 1.02 MG/DL
EGFR: 85 ML/MIN/1.73M2
GLUCOSE SERPL-MCNC: 100 MG/DL
INR PPP: 1.57 RATIO
PHOSPHATE SERPL-MCNC: 1.7 MG/DL
POTASSIUM SERPL-SCNC: 4 MMOL/L
PROT SERPL-MCNC: 6.1 G/DL
PT BLD: 17.5 SEC
SODIUM SERPL-SCNC: 144 MMOL/L

## 2024-01-15 LAB
M TB IFN-G BLD-IMP: NEGATIVE
QUANTIFERON TB PLUS MITOGEN MINUS NIL: 0.58 IU/ML
QUANTIFERON TB PLUS NIL: 0.01 IU/ML
QUANTIFERON TB PLUS TB1 MINUS NIL: 0 IU/ML
QUANTIFERON TB PLUS TB2 MINUS NIL: 0 IU/ML

## 2024-01-18 ENCOUNTER — APPOINTMENT (OUTPATIENT)
Dept: HEMATOLOGY ONCOLOGY | Facility: CLINIC | Age: 59
End: 2024-01-18

## 2024-01-18 ENCOUNTER — APPOINTMENT (OUTPATIENT)
Dept: ENDOCRINOLOGY | Facility: CLINIC | Age: 59
End: 2024-01-18

## 2024-01-18 ENCOUNTER — RX RENEWAL (OUTPATIENT)
Age: 59
End: 2024-01-18

## 2024-01-18 ENCOUNTER — RESULT REVIEW (OUTPATIENT)
Age: 59
End: 2024-01-18

## 2024-01-18 VITALS
HEART RATE: 87 BPM | RESPIRATION RATE: 16 BRPM | HEIGHT: 68 IN | SYSTOLIC BLOOD PRESSURE: 128 MMHG | OXYGEN SATURATION: 99 % | WEIGHT: 133.56 LBS | TEMPERATURE: 97.5 F | DIASTOLIC BLOOD PRESSURE: 87 MMHG | BODY MASS INDEX: 20.24 KG/M2

## 2024-01-22 ENCOUNTER — RESULT REVIEW (OUTPATIENT)
Age: 59
End: 2024-01-22

## 2024-01-23 RX ORDER — POTASSIUM PHOSPHATE,MONOBASIC 100 %
CRYSTALS MISCELLANEOUS
Refills: 0 | Status: DISCONTINUED | COMMUNITY
Start: 2024-01-05 | End: 2024-01-23

## 2024-01-24 ENCOUNTER — APPOINTMENT (OUTPATIENT)
Dept: GASTROENTEROLOGY | Facility: HOSPITAL | Age: 59
End: 2024-01-24

## 2024-01-29 ENCOUNTER — APPOINTMENT (OUTPATIENT)
Dept: GASTROENTEROLOGY | Facility: CLINIC | Age: 59
End: 2024-01-29
Payer: COMMERCIAL

## 2024-01-29 PROCEDURE — 99214 OFFICE O/P EST MOD 30 MIN: CPT

## 2024-01-29 PROCEDURE — G2211 COMPLEX E/M VISIT ADD ON: CPT

## 2024-01-29 NOTE — ASSESSMENT
[FreeTextEntry1] : 1. CROHNS DISEASE of both small and large intestine: s/p flare 8/25-->8/30/21, then early 12/2021-s/p pred tapered over 4 weeks, again the weekend 4/9/22 rx w prednisone 40_ mg ( just before last Stelera dose) --> now off since early 5/2022;  8/19/23 w flare started prednisone 40mg & tapred over 4 weeks  ~ 9/23/23 Had dark stool x 2-3 days, Warfarin held 2 days & Omep BID, Hgb dropped to 8.6   s/p ileocolonic resection x 2--last 6/19/15 for recurrent sbos: prior was s/p 4 SBOs w/i 2 yrs--2/2 distal sb disease adj to anastamosis when on Humira weekly had an ADA =33 (3/20/14), -but had sbo 2/2014 at ADA=25 and another sbo 4/28/15 6/10/2015 Colonoscopy: Inflamm ST mass on ileal side w ulceration/scarred/narrow 6/11/2015 CT: dilated thickened sb loops distal jej & ileum Post-op 6/2015 probably some malabsorption 2/2 to removal of R Colon and ileum: had WT. Loss-->r/o malabsorption: ?bile-induced->-secretory vs decr micelles, active dis, PLE ? SIBO--Elev FA, Alb=3.4-->3.1-->2.9--> (7/28/17) ?SIBO/Prevent post-op recurrence --> trial Flagyl 250 mg qid~12/7/16-->d/c: 5/11/17 Also discussed trial of Cholestyramine/Xifaxin -wanted to wait 11/20/16 ACTIVE Crohn's--> 9.5lb wt loss, BMs: #5-6, 5x/D-- but taking Magnesium 12/1/16 MRE: RUQ ileocolonic anastamosis--narrowed w upstream dilatation to 3.2 cm Labs: Stool Xywxhgaknqjq=922, + Incr Fecal fat, Alb=3.4, FA =23, P66=826, Hgb=12.3,ESR=10,CRP <5 4/19/17 :Colonoscopy: Anastamosis: open w mild -mod active colitis, enteritis severe adj to anastomosis, mild more proximal, remainder of colon=wnl 5/11/17- Adm PMHC w stools brown, but Hgb=7.9, BUN=17, Creat=1.4, Alb=2.9. S/P 2 Units w Hgb=10.6, BUN=15/1.1, Prednisone 40mg taper, entocort d/dee 6/8/17: Hgb=7.4, MCV=98, CRP<5--ASA stopped, Pred20--> 30mg, 6/13/17: s/p 2 Unit PRBCs 7/11/17 [facility] w unsteadiness. MRI Head: R Cortical Temporo-occipital encephalomalacia Hgb=9.9--> 8.4->9.7 after 1 Unit. Seen by Heme: received IV Iron CRP<5, S73=209, GMS=969, FA>23,Iron=22,Sat%=6, Ferritin=42, Alb=3.5. D/C on warfarin 2mg 7/28/17 Hgb=7.7; 7/31/17-s/p 1 Unit Heme Consultation ~ 8/2017: started IV Infusions of Iron and IV Copper 8/17/17: Hgb=9.9, ESR=20, Sg=730--Buobbwtrwu s/p GI infection w N/V/D, no obstruction 10/17/17: Hgb=7.9,ESR=6,CRP<5, INR=1.6, Got IV Iron x 2, since 10/2/17 for Iron<10, Hgb=8.8 11/16/17: Hgb=11.2, ESR=14, CRP=12, 10/26/17 Stool fat-neg, calprotectin-30 11/13/17: MRE-Chr mild distension of distal & alfredito-ileum s/o mild stricturing at anast, mild mural thick & mucosal enhancemt ~ 20cm; little change from 2015 c/w mild acute on chr ileitis, 2.1 cm Liver hemangioma 10/9/18: Hgb=11.6, MCV=94, ESR=14, CRP=5.4, INR=2.2 10/10/18 MRE: stricturing of neoterminal ileum w worsened upstream dilatation, moderate length of upstream ileum w stricturing extending at least 20cm and more extensive then previous. suggestion of 2 adj extraluminal fluid collections which may be w/i the wall of strictured ileum near the ileocolonic anastamosis pt had declined to call numbers given for IBD center at [facility] [place] for new or investigational rx's--biologics. later he felt he was stablizing w his wt loss, and inflammatory markers  2/28/19 w ESR=12, CRP=6.5 & 2/21/19 stool calprotectin--> 232 8/15/19: ESR=10, CRP=5.2, Calprotectin--> 88 9/19/19: ESR=9, CRP=5.5 10/17/19: ESR=7, CRP< 5 11/6/19 : ESR=6, CRP=0.18 11/27/19: ESR=6, CRP <5 1/13/20: ESR=7, CRP=0.2 1/30/20: ESR=9, CRP=11  2/3/20 EGD: gastritis, +MACKENZIE, No HP, +erosion w ooze -clipped, 0.5cm polyp-neg; 4cm HH, Esophagitis A, + Barretts, VC: 1+ Colonoscopy: 05cm rectal polyp: HP, 2nd deg int hemorrhoids mild-mod activity: MARILY w cryptitis & mild archect distortion at ileocolonic anast: colon & ileal side, no stricture  3/2/20:ESR=7, CRP=0.26 3/9/20: VDZ>40, Ab to VDZ <1.6 3/17/20: ESR=6, CRP=6.4 10/17/20: ESR=11, CRP <5 1/4/21:ESR=9, CRP<5 2/22/21: ESR=8, CRP<5 4/5/21: ESR=9, CRP<5 6/4/21: ESR=9, CRP<5 6/11/21: wt= 135, no pain, stool more formed # 4, 1-2x/d  s/p episode --abd distenstion, pain, N/V, no BM  eventually started having diarrhea and flatus, BMs returned to normal  lost 2-3 lbs but regained 7/4/21: CRP <5, Hgb=12  7/16/21 MRE: limited to incomplete bowel distention, chr distal ileitis/probable inflammatory stricturing vs collapse of the alfredito-TI--but improved since 2018 , moderate proctitis 7/12/21 -- ? flare --> entyvio should have been q 5 wk , went q 8 wks  next dose should be in 4 weeks x 2 then 5 weeks, to check levels 7/20/21: Cliically stable, unclear if MRE accurate 2/2 underdistension, but gained wt and CRP--normal 7/23/21 Entyvio level= 39, Abs <1.6 8/23/21: Labs--Hgb=13.6, ESR=10, CRP=6.6, Xgqnwumx=412, Iron=26, Alb=3.9, BUN=16 8/26/21 p/w sbo w N/V, abd pain/bloating x 3 days, unable to keep things down Labs: WBC=5, Hgb=12, CRP=43, ESR=11, Alb=4.4, BUN=28, Creat=-1.6 CT Abd/Pelvis: diffuse marked dilatation of SB Loops w transition pt in R. mid/lower abdomen ~ 5-6cm, proximal to the ileocolonic anastomosis RX w IV solumedrol 20mg q8h, NGT, IVF, pt refused TPN, also w UTI from prostatitis 8/27 NGT was pulled, and clears started and advanced to LR, was d/c home 8/30 on prednisone 40mg qd w taper by 10mg/wk to 20mg qd   ( **Entyvio's :time 0=12/22/16 ( Humira 40mg QW); 1/5/17--2wks, s/p 3rd infusion at wk 6, then q 6weeks #6 :6/21/17-->held 2/2 Shingles, then Infusions as follows=9/19/17 , 10/17/17, 11/28/17, 1/16/18 ,2/16/18, 3/19/18,4/16/18, 5/14/18, 6/25/18, 8/7/18, 9/17/18, 10/16/18, 11/13/18, 12/11/18, 1/14/19, 2/15/19, 3/15/19, 5/16/19, 6/18/19,7/24/19, 9/9/19, 10/2/19, 11/4/19, 12/12/19, 1/6/20, 2/4/20, 3/3/20, 9/17/20, 10/15/20, 11/18/20, 1/11/21, 2/8/21, 3/8/21, 4/8/21, 6/3/21, 7/1/21, 8/2/21, 9/2/21,10/25/21 )  3/8/22 MRI Abd/Pelv: thickened distal Jejunal loops which are tethered; distal ileal segment (10-12cm ) previously actively inflammed has decreased & now characterized by an area of stricture, however the alfredito-TI 5mm to the anastamosis is enhanced as well as narrowed. Colon just distal to the anastamosis has a focal segment of inflammation & stricture. the recto-sigmoid appears inflammed  3/28/22: Hgb=14.4 , ESR=1, CRP <5, Ca=8.5, Mag=1.7,Alb=4.1, Iron=104, Nemhfirx=988,  5/9//22: Hgb=13.8 , ESR=2, CRP <5, Ca=8.8, Mag=1.6, Alb=4, Iron=64, Yrpgcqkn=730 5/17/22 Dr. Heard Colonoscopy: ped scope passed w/o resist in alfredito-TI, one single apthae w psuedopolyp.  6/21/22 Labs: Hgb=14, ESR=2, CRP<5, Alb=4.5, Phos=0.8, Mag=1.8, Ca=8.5,Shwfyejm=527, Iron=86  7/25/22 Labs: Hgb=14, ESR=2, CRP<5, Alb=4.2, Phos=1.5, Mag=1.8, Ca=8.6, Hthjrajm=435, Iron=57, PT=24, INR=2.1 10/17/22 Labs: Hgb=12.7, ESR=2, CRP<5, Alb=4.3, Phos=2.1, Mag=1.8, Ca=9, Ferritin=57, Iron=85, PT=23, INR=2 12/16/22 : Labs: Hgb=10.8 , ESR=1, Alb=3.8, Phos=1.6, Mag=1.7, Ca=8.9 , ALP=68, Oqvhnubh=591, Iron=67, INR=2.3 1/30/23: Labs: Hgb=13.6 , ESR=5, CRP<5, Alb=4.4, Phos=3, Mag=1.8, Ca=9.5, ALP=79,Ferritin=55, Iron=56,  3/27/ 23: Labs: Hgb=13.8, ESR=5, CRP<5 ,Alb=4 , Phos=1, Mag=1.8, Ca=8.5 , ALP=94, Xqnzkuto=819, Iron=78,  INR=5.9  5/22/23 Labs: Hgb=12.7,ESR=2,CRP<5, Alb=4.2, Phos=2.4, Mag=1.8, Ca=9.1, ALP=68,Ferritin=19, Iron=42, INR=2.4 8/4/23 : Alb=3.8, Phos=2.1, Mag=1.8, Ca=8.7, XEE=188, ALP=68, Vit D=78 INR=2.1 8/21/23 : Hgb=14, ESR=4, CRP=40, Wwmlzull=785, Iron=37, INR=4.8; Alb=4 , Phos=1.8, Mag=1.6, Ca=9.3, ALP=84 9/28/23 Labs: Hgb=8.6, Iron=22, sat%=7, Ferritin=17  10/5/23 Labs: Hgb=8.7, Iron=52, Sat=16%, Xisvekef=771  10/12/23 : Labs: Vit D=67, Ca=8, PTH=160, Mag=2  10/20/23 Labs: Hgb=10.9, INR=1.6, CRP<5, Ngjtkwqo=508, ALB=3.7, Ca=7.9, ALP=68 10/18/23 MRE: persistent mural enhancement without wall thickening in distal small bowel loops, which is a nonspecific imaging sign and may reflect inflammation, however no other mural or mesenteric findings to indicate active inflammatory small bowel Crohn's disease.  No evidence of stricture. No imaging signs of penetrating disease.  Chronic central mesenteric fibrofatty proliferation. 10/27/23: Ustekinumab=7.1, Ust Abs <1.6 11/27/23 :   Hgb=13.3,  PT=19, INR=1.7, Ferritin=33, Iron=46, Sat=13%,                    10/27/23: Ustekinumab=7.1, Ust Abs <1.6 11/27/23 : FA=17, X20=871, Vit D=69, ca=9.1, ALP=84,  Alb=4.2 12/22/23 Colonoscopy: mild ileitis of the most distal alfredito-TI; small superficial apthous ulcer on colonic side of anastamosis, Random  colon BX--> wnl           12/27/23 -12/31/23: Phos=0.8, EBN=835, Vit D =66, Ca=7.9 --> Phos=1.5, Ca=8.4, Hgb=12.6, Alb=3.5 1/10/24 Phos=1.7, HDD=331, Ca=8.5, Vit D =53, Vit D-25= 73,  1/18/24 :  Hgb=13, Lunvawsb=684,Iron=67,  CRP<5, ESR=1,  Ca=7.9, Alb=4.1, ALP=84  A / PLAN: Recent GI Bleed on warfarin, although MRE looks better, probably from ileum, Hgb improving  h/o sbo's prox to anastamosis  inflammatory vs fibrosis vs combination: 3/2022 MRI shows improvement of inflammation w possible residual stricture in the ileum and probable colitis near the anastamosis and distal colon  Responded to steroids iv--> po--d/c ~ 10/20/21,  tapered steroids from 40mg qd to 20mg, [id number] qd and taper 5mg/wk  then po taper again early 12/2021 40mg qd w 10mg taper/ wk--  PO prednisone 40mg mg qd started on 4/9/22 ,tapered off ~ early 5/2022  PO prednisone 40mg mg qd started on 8/19/23, taper 10mg/wk--> 20mg then 5 mg /wk   Entyvio level was 39 w/o Abs~ 3weeks after 7/1/21 dose ,  speaks to inflammatory component & probably loss of response  Stelara # 1 dose on 10/25/21, #2 on 12/10/21, # 3 on 2/11/22, # 4 on 4/20/22 , #5 mid June 2022,  #6 mid 8/2022, #7 beginning 12/2022; #8 on 2/2/23, #9 4/2023, #10 6/2023 , last dose--> 9/2023   IBD consensus : due to malnutrition /steroid dependency, surgery is next best option  but pt reported feeling well and wanted to pursue medical treatment  repeated imaging in March after 3 months of Stelara, then consider surgery vs improved candidacy for endoscopic manipulation  f/u w IBD consultant Dr. Heard at  & reviewed imaging after 3 months of Stelera  for Colonoscopy ( w possible balloon dilatation )-> last 1 3/4 yrs ago   5/17/22 Dr. Heard Colonoscopy: ped scope passed w/o resist in alfredito-TI, one single apthae w psuedopolyp.  No Dilatation need, very mild dis at Alfredito-TI, MRE may show wall thickening, sbo's probably adhesive dis 12/22/23 Colonoscopy: mild ileitis of the most distal alfredito-TI; small superficial apthous ulcer on colonic side of anastamosis, Random  colon BX--> wnl           1/4/22 Labs: Hgb=11.5, ESR=6, CRP<5, Alb=3.9 1/31/22 Calprotectin =84 2/14/22: Hgb=10.6, ESR=9, CRP <5, Ca=8.3, Mag=1.6, Alb=3.8, Iron=25, Ferritin=15 3/28/22: Hgb=14, ESR=1, CRP<5 , Ca=8.5, Mag=1.7, Alb=4, Iron=104, Xecqiwrq=861 5/9//22: Hgb=13.8 , ESR=2, CRP <5, Ca=8.8, Mag=1.6, Alb=4, Iron=64, Jdjmpawb=565  6/21/22 : Hgb=14, ESR=2, CRP<5, Ca=8.5, ,Alb=4.5, Mag=1.8, Iron=86,Atmhghca=348,  7/25/22 : Hgb=14, ESR=2, CRP<5, Alb=4.2, Phos=1.5, Mag=1.8, Ca=8.6, Ytqhdnfg=345, Iron=57 10/17/22: Hgb=12.7 , ESR=2, CRP<5, Alb=4.3, Phos=2.1, Mag=1.8, Ca=9, Ferritin=57, Iron=85  12/16/22 : Labs: Hgb=10.8 , ESR=1, Alb=3.8, Phos=1.6, Mag=1.7, Ca=8.9 , ALP=68, Dsccvvbv=464, Iron=67,  1/30/23: Labs: Hgb=13.6 , ESR=5, CRP<5, Alb=4.4, Phos=3, Mag=1.8, Ca=9.5, ALP=79,Ferritin=55, Iron=56,  3/27/ 23: Labs: Hgb=13.8, ESR=5, CRP<5 ,Alb=4 , Phos=1, Mag=1.8, Ca=8.5 , ALP=94, Hlbiyggt=516, Iron=78, INR=5.9  5/22/23 Labs: Hgb=12.7,ESR=2,CRP<5, Alb=4.2, Phos=2.4, Mag=1.8, Ca=9.1, ALP=68,Ferritin=19, Iron=42, INR=2.4  8/4/23 : Alb=3.8, Phos=2.1, Mag=1.8, Ca=8.7, VED=515, ALP=68, Vit D=78 INR=2.1  8/21/23 : Hgb=14, ESR=4, CRP=40, Alb=4 , Phos=1.8, Mag=1.6, Ca=9.3, ALP=84,Crmmsqcy=246, Iron=37, 9/28/23 Labs: Hgb=8.6, Iron=22, sat%=7, Ferritin=17 10/5/23 Labs: Hgb=8.7, Iron=52, Sat=16%, Hvxyhgvz=642 10/12/23 : Labs: Vit D=67, Ca=8, PTH=160, Mag=2  10/20/23 : Hgb=10.9, INR=1.6, CRP<5, Tezqesch=613, ALB=3.7, Ca=7.9, ALP=68  10/27/23: Ustekinumab=7.1, Ust Abs <1.6  11/27/23 :   Hgb=13.3,  PT=19, INR=1.7, Ferritin=33, Iron=46, Sat=13%,                     FA=17, A27=030, Vit D=69, ca=9.1, ALP=84,  Alb=4.2 1/18/24 :  Hgb=13, Urhbsfdx=480,Iron=67,  CRP<5, ESR=1,  Ca=7.9, Alb=4.1, ALP=84  Probiotics 3 qd Diet: LR, Lactose free, protein drinks tid MCT oil begun but never maintained **trend --cbc, esr, crp, albumin, calprotectin  **monitor wt--- usu bet 136-139, 7/20/21=136, 11/22/21=139, 1/4/22=132, 2/22/22=132, 4/5/22=131,5/10/58=710,  6/21/22=133, 7/27/22=136, 12/19/22=138; 2/6/23=140, 3/28/05=490, 5/23/23 =137, 8/22/23=135,10/24/76=277, 12/11/23=136,  ht=137         2. Iron deficiency anemia : --> recent drop from GIB--> 9/2023   had initially dropped , after clinical flare and post procedure bleeding Probably multifactorial: ACD, Blood loss, malabsorption/nutrient deficiencies Eliquis-->warfarin after CVA, On Entyvio 7/11/17 s/p Adm for unsteady gait w MRI c/w CVA--warfarin begun: possible better control of AC Chromagen 2 qd--> IV Iron , s/p IV Cu x 5, FA 1mg qd , B12 SL & IM Still requiring IV Iron and cu infusions prn  most recent IV Iron infusion x for Ferritin = 33 2/22/21: D20=534, 6/4/21: Cu=91, Zinc=69, Ferritin=32,Iron=43, 7/12/21: Io=299, Zinc=74, Rloxiiyz=210, Iron=35 11/15/21 : Hgb=12, Ferritin =104, Ca=9, Mg=1.7 1/4/22: Hgb=11.5, Ca=8.8, Mg=2.9, Euchvaj=413 2/14/22: Hgb=10.6, Ca=8.3, Mag=1.6, Alb=3.8, Iron=25, Ferritin=15 3/28/22: Hgb=14.4 , Ca=8.5, Mag=1.7,Alb=4.1, Iron=104, Ujtqohsh=357,  5/9//22: Hgb=13.8 , Ca=8.8, Mag=1.6, Alb=4, Iron=64, Drqoyutv=573 6/20/22: Hgb=14.4 , Ca=8.5, Mag=1.8, Alb=4.5, Iron=96, Abzwspuw=042 7/25/22 Labs: Hgb=14,Ca=8.6, Mag=1.8,Alb=4.2,Iron=57 Kmfbkqjg=871, , PT=24, INR=2.1 10/17/22 Labs: Hgb=12.7,Ca=9, Mag=1.8,Alb=4.3,Iron=85 Ferritin=57, , PT=23, INR=2 12/16/22 : Labs: Hgb=10.8 , Alb=3.8, Iron=67,Yjdhzhah=754, , INR=2.3 1/30/23: Labs: Hgb=13.6 , Ca=9.5, Mag=1.8, Alb=4.4,Ferritin=55, Iron=56, INR=1.6  3/27/ 23: Labs: Hgb=13.8, ESR=5, CRP<5 , Mag=1.8, Alb=4, Qsrpyqax=166, Iron=78, INR=5.9  5/22/23 Labs: Hgb=12.7,ESR=2,CRP<5, Alb=4.2,Ferritin=19, Iron=42, INR=2.4 8/21/23 : Labs: Hgb=14, ESR=4, CRP=40, Vnddamdq=659, Iron=37, INR=4.8; Alb=4 , 9/28/23 Labs: Hgb=8.6, Iron=22, sat%=7, Ferritin=17 10/5/23 Labs: Hgb=8.7, Iron=52, Sat=16%, Ikyshajg=253 10/20/23 : Hgb=10.9, INR=1.6, CRP<5, Awghthno=247,  11/27/23 :   Hgb=13.3, INR=1.7, Iron=46, Sat=13%, Ferritin=33, FA=17, P70=660, 1/18/24 :  Hgb=13, INR=2.1,Iron=67, Ytwpnxad=165, CRP<5, ESR=1,  Ca=7.9, Alb=4.1, ALP=84 12/22/23  EGD:  gastritis, No HP; 2cm HH, Esophagitis A--,  + Barretts 12/22/23 Colonoscopy: mild ileitis of the most distal alfredito-TI; small superficial apthous ulcer on colonic side of anastamosis, Random  colon BX--> wnl  B12 1cc IM ____L. delt--  given today, trend cbc, B12, FA, Iron panel Adj INR--closer to low 2's. had capsule endoscopy on 1/26/24 --> EGD/ Colonoscopy w mild dis at anastamosis;  r/o  prox disease        3. Osteoporosis : Progression on BD from 5/5/16 Crohns, h/o steroid use Calcium citrate & Vit D per Endo Forteo since 5/10/19 , then Reclast Repeat BD of 8/2018 --showed worsening to Osteoporosis from 2016 Rec Endo consult w Dr. Akers :Osteoporosis center at [facility]--pt never went originally 9/21/18: Phos=2.4, Ca=8.7, Alb=3.4, Vit D=27, ROW=965, 10/16/18: Phos=2.8, Ca=8.7, Alb=3.5, 1/14/19: Phos=2.6, Ca=8.7, Alb=3.6 2/28/19: Phos=1.8, Ca=8.9, Alb=3.7, VitD=39, TXL=998 3/18/19: Ca=8.5, Alb=3.7, Vit D=44.6, PTH=93 7/11/19: Ca=9.1, Alb=3.7, Phos=3.9, Vit D=40/ 306, DDR=990 8/15/19: Ca=9, Alb=4.2, Phos=4.4, VitD=59, IMC=464 10/17/19: Ca=9.6, Alb=3.9, Phos=3.5 11/6/19: Ca=9.1, Alb=3.9. Phos=3.7, VitD=50, PTH=80 1/13/20: ca=9.1 1/30/20: Ca=9.2, Alb=3.9, Phos=2.7, Mag=1.5, Vit D=103/41, PTH=87 2/18/20:ca=8.5, Alb=3.6, 3/2/20: Ca=8.5, Alb=3.6 3/17/20: Ca=9.1, Alb=3.7, Phos=3.4, Mag=1.7 10/17/20: Ca=8.9, Alb=4, Phos=2.3, Mag-2.0, Vit D=66, PTH=47 1/4/21 : Ca=9.4, Alb=4.2, Phos=2.7, Mag=2, Vit D=64, PTH=87.5 4/5/21: Ca=9.1, Alb=4, Phos=3.1, Mag=1.7, 6/4/21: ca=9, Alb=3.8, Phos=2.8, Mag=1.8 7/12/21: Ca=8.6, ALB=6, phosph=2.3, Mag=1.8 11/15/21: Ca=9, Alb=3.7, Mag=1.7 1/4/22: ca=8.8, Alb=3.9, Mg=2.9, phos=2.9 1/21/22: Ca=8.8, Alb=3.9, Vit D=60, PTH=85 2/14/22: Ca=8.3, Mag=1.6, Alb=3.8, 3/28/22: Ca=8.5, Mag=1.7, Alb=4.1, Phos=1.2  5/9//22: Ca=8.8, Mag=1.6, Alb=4, Phos=1.6  5/27/22: Ca=9.2 , Mag=1.9, Alb=3.8 , Phos=2.7, PTH=72, VitD=105  6/20/ 22: Ca=8.5 , Mag=1.8, Alb=4.5 , Phos=0.8, BNT=519,  7/25/22 : Ca=8.6, Mag=1.8 , Alb=4.2, Phos=1.5,  10/17/22: Ca=9, Mag=1.8 , Alb=4.3, Phos=2.1, PTH=95, Vit D=25  12/16/22 : Labs: Ca=8.9, Mag=1.7, Alb=3.8, Phos=1.6,  1/30/23 Labs: Ca=9.5, Mag=1.8, ALP=79, Alb=4.4, Phos=3, PTH=96, Vit D =144  3/27/ 23: Labs: Ca=8.5, Mag=1.8 , ALP=94, Alb=4 , Phos=1,  4/21/23 Labs: Ca=8.9, Mag=1.9, ALP=75, Alb=4., Phos=2.9, PTH=89, Vit D =108  5/22/23 Labs:Ca=9.1,Mag=1.8 , ALP=68,Alb=4.2, Phos=2.4,  8/4/23 : Labs Ca=8.7, Mag=1.8, ALP=68, Alb=3.8, Phos=2.1, BBG=482, Vit D=78 8/21/23 : Labs: Ca=9.3, Mag=1.6, ALP=84, Alb=4 ,Phos=1.8 10/12/23 : Labs: Ca=8, PTH=160, Mag=2, Vit D=67, 10/20/23 Labs: Ca=7.9, ALP=68,ALB=3.7, 11/27/23 :  Ca=9.1, ALP=84,  Alb=4.2Vit D=69, 12/27/23 -12/31/23:  Ca=7.9, ALP=98,  Phos=0.8, VMA=756, Vit D =66, --> Phos=1.5, Ca=8.4, ALP=85,Alb=3.5 1/10/24:  Ca=8.5, ALP=87,  Phos=1.7, EBQ=542,  Vit D 1,25=53, Vit D-25= 73,  1/18/24 :  Ca=7.9,  ALP=84,   Alb=4.1,   Dr. Akers: Hyperparathyroidism-- probably secondary due to low Vit D/Ca & ? superimposed primary, [facility] ENT Consult init felt Parathyroid scan showing activity in mediastinum is ectopic parathyroid, then felt sx not indicated at that time, elev PTH is secondary to low Vit D/Ca ? Vit D Resistance--> in the past did well w Vit D,1,25--> 86-97 & Vit D25-->  but Ca~ 9.4 Rx replete Vit D and current IV Calcium-as per endo  trend Vit D, Ca, Phos, PTH,  BD--9/2020: inprovmt in spine and hip , no change in wrist, BD--10/17/22: Decr T score in spine & hip, incr in wrist, repeat--> 10/5/20, 9/2021,6/2022, 1/2/24 -s/p Reclast--repeat-->      4. GERD: recently less active by ENT , no ht burn, dysphagia, + throat clear  h/o Dry cough, CXR:ana, saw ENT bergstein-->LPR * ++ LPR, ++ Page's w No Dysplasia, + H/O Esophagitis grade: A was found  Recommend: * Anti-reflux diet & life-style changes reviewed & re-emphasized. ++ Bedge required * Weight reduction & regular exercise emphasized  * need for PPI: Omep 40 BID--> 40mg qd , ++ H2B q HS:Zantac 300mg--> Pepcid 40mg No Carafate 1 gram: --was emphasized I reviewed & summarized the prospective randomized & retrospective non-randomized studies looking at potential long term SE's of PPIs, w special attention to associations & actual cause as related to GI infections, bone loss, cognitive changes, KD, Covid, vitamin & electrolyte deficiencies questions were answered, pt advised that PPIs should be used when needed as indicated by their clinical indication and response and tapering off is always the goal if possible. pt understood.  * F/U EGD: --> 2026--for Barretts screening / surveillance * No need for pH Monitor, Manometry, Esophagram -- * ++ need for ENT eval/F/U, No need for Surgical eval --      5. Hemorrhoids: well - controlled. No pain, swelling, itch, bleeding * Discussed previously the potential complications of thrombosis, pain, infection, swelling, itching, bleeding Reccomend: * currently Low - Fiber Diet was emphasized * 6 -- 8 cups of decaffeinated fluid daily was emphasized * Sitz [place] prn, No: Anusol HC Suppos / Cream [place] BID -- was needed * No: Tucks BID, Balneol Lotion, Calmoseptine Oint -- was needed ; ++ Prep H prn * No: need for Colorectal surgical evaluation for possible ablation        6. Barretts esophagus without dysplasia Notes: see GERD.    7. Hepatomegaly-->not confirmed by recent abd sono CTE w relative enlargemt, ? lobulation & enlarged portal/mesenteric veins LFTs-wnl, no ascites or edema R/O CLD, Hepatic vein thrombosis Abd sono w doppler--> Liver and spleen normal size, no thrombosis, normal portal and hepatic vein flow

## 2024-01-29 NOTE — HISTORY OF PRESENT ILLNESS
[de-identified] :    This HPI  reflects a summary and review of records : including previous and most recent  Labs, body imaging, consults and progress notes, operative and pathology reports, EKG reports, ED records, found in [employer], [employer],  [employer] and any additional records brought in by  the patient at the time of the visit.            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-->Eliquis' warfarin         GERD w Page's, Hemorrhoids, BPH,         S/P Ileocolonic resection 1998        Since then multiple SBOs          Got IV Iron x 2, since 10/2/17        10/19/17: feeling better, Su=096 on prednisone 15mg x 3weeks, BMs Brown: #5-6, 1-2/D, occas 3-4/d        10/25/17: Hgb=10, ESR=5, CRP=5, Alb=3.6, Phos=2, Cpiawulr=624, J99=713        11/16/17: Hgb=11.2, ESR=14, CRP=12,         11/13/17: MRE-Chr mild distension of distal & vladislav-ileum s/o mild stricturing at anast, mild mural thick & mucosal enhancemt ~ 20cm; little change from 2015 c/w mild acute on chr ileitis, 2.1 cm Liver hemangioma        11/28/17: Entyvio        11/29/17: Hgb=9.6, ESR=10, CRP=5.8, Alb=3.8,Ferritin-19, Iron=14,Sat=4%, Phos=2.5, Qq=081, BMs:# 5, 1-2x/D, brown,        12/19;17: Hgb=10.1, ESR=12, CRP=6.5; 12/21/17: BMs:#3-5, 1-3x/D , brown, no blood, no pain, ne=528        1/3/18:Hgb=11.7, ESR=19, CRP=6.5, Alb=4.1, Ornrmxjy=390, Iron=31, Sat%=9,Zinc=55        1/16/18: Entyvio, Hgb=11.4, ESR=10, CRP=6.9        1/18/18: Today: cellulitis R foot, saw dr KEITH--rx augmentum x 10D, Entyvio 1/9 to 1/16, he=507 w clothes,BMs: # 4-5, 1-2x/D         2/14/18: Hgb=11.1, ESR=16, CRP=11.6, Alb=3.6, Iron=28, Ferritin=23, INR=1.5         2/16/18: s/p Entyvio; Iron infusion, again on 2/27 2/20/18: Hgb=10.6, ESR=20, CRP=12, INR=1.7        2/27/18: s/p iron infusion        3/9/18: Dr. Burden for tenosynovitis, rx w Zorvolex: Diclofenac x 5 days--? response        3/14/18: Nf=604; BMs: # 5, 1-2x/D; Hgb=11, ESR=18, CRP=11,Irom=45,Mabjehbg=549,Alb=3.6, INR=1.5        3/19/18: s/p Entyvio        3/22/18: sg=688, BMs: # 5, 3-4 x/d        4/16/18: s/p Entyvio,Hgb=11.9, INR=1.3, ESR=18, CRP=8.4         4/18/18 EGD: gastritis, no HP, no IM, 2+ Mucous, 0.5cm gastric polyp: fundic, 4cm HH, + Barretts:3cm, no dysplasia        4/25/18: Hgb=11.5, INR=1.6, Iron=40, Sat=13%, Ferritin=57, Alb=3.2, Phos=2.2, Mag=1.7        4/30/18: s/p Iron Infusion        5/14/18: s/p Entyvio         5/23/18: Hgb=11.5, ESR=16, CRP< 5        5/29/18: s/p Iron Infusion        6/6/18: Hgb=10.6, INR=1.7, Ottttgyd=562, Alb=3.5, Phos=2.1, U52=589, gj=649; BMs: # 5, 2-3x/D         6/19/18: Hgb=10.6, INR=1, CRP=15, ESR=23        6/21/18: R ankle is acting up, swollen, pain, having MRI done, took a couple of advil, on low carbs ,BMs: # 5, 1-2x/D, cb=037        6/26/18: MRI: fx/stress rxn-talar body/navicula; stress related changes--cuneform, cuboid,distal tibia; mod tibiotalar effusion, mild-mod peroneal tendinosis        7/19/18: entyvio 6/26/18, eliminated all carbs--anti inflammatory diet, mz=508, BMs: # 4-5, 1-3x/D         7/25/18: Hgb=10, INR=1.3, Alb=3.3, Phos=2.4, Lvewensx=484, sat%=12, Iron=35        8/2/18: BD--osteoporosis of hip/spine: sig decrease BD--17.6% of hip, 17% of spine, osteopenia of wrist: 6.4%        8/7/18: Entyvio        8/21/18: Hgb=11.1, INR=1.5, ESR=19, CRP=18.6        8/23/18: recently w tooth infection, old implant--loose and removed; rx w amox, and advil        eating almost no carbs, ku=105, # 5-6, 2-3x/d         8/24/18: Stool fat--neg, Wjklsupeuqxd=652        9/5/18: Hgb=11.4, INR=1.3, ESR=9, CRP=11.3, Iron=41, Lzkizsid=930, Alb=3.8,        9/17/18: entyvio, Hgb=10.7, INR=2.2, ESR=17, CRP=9.1,         9/20/18: oy=995, BMs: # 5-6, 1-2x/D         9/21/18: Phos=2.4, Ca=8.7, Alb=3.4, Vit D=27, EYW=674,         Dr. Akers: Hyperparathyroidism: probably secondary due to low Vit D/Ca & ? superimposed primary, rx replete Vit D and Calcium        10/9/18: Hgb=11.6, MCV=94, ESR=14, CRP=5.4, INR=2.2        10/10/18 MRE: stricturing of neoterminal ileum w worsened upstream dilatation, moderate length of upstream ileum w stricturing extending at least 20cm and more extensive then previous. suggestion of 2 adj extraluminal fluid collections which may be w/i the wall of strictured ileum near the ileocolonic anastomosis. surrounding spiculation and tethered appearance of bowel in this region. 10/16/18: entyvio, Hgb=11.7, MCV=94, ESR=14, CRP <5, Alb=3.5 10/18/18: Vd=468,   BMs: # 5-6, 3x/D ,  11/13/18: Entyvio 11/21/18 CTE w C: limited 2/2 bowel underdistention, mucosal hyperenhancemt & extensive SM edema of distal ileum including vladislav-ileum, difficult to exclude a sml fluid collection, Liver w relative enlargement w suggestion of lobulation, enlargemt of PV,SMV,IMV,Spl V, rectal V. No ascites 11/28/18: Hgb=10, ESR=19, CRP=17,Alb=3.5,Iron=35, Sat%=11, Xoeinrsy=121, INR=1.3 11/29/18: ci=490, BMs: # 5-6, 3-4x/D 12/3/18: Abd sono--Liver 14.8cm Incr heterogenicity, spleen--wnl 12/11/18 & 1/14/19: Entyvio 1/14/19:Entyvio,  Hgb=10.7, ESR=15, Alb=3.6, Iron=36, Ferritin=67, Sat%=11, INR=1.6 1/24/19:  ws=263, stools are # 4-6, 3-4x/d , no pain 2/5/19: Hgb=11.2, ESR=14, CRP=0.36 2/28/19: Hgb=11.5, ESR=12, CRP=6.5, Alb=3.7, Iron=69, Jxxtukpi=183, Ca=8.9                Bms: # 4-5, 2-3x/D , sy=918,  Entyvio : last 2/1519,                had parathyroid scan pre-op, still w elev PTH, + activity in mediastinum,? ectopic parathyroid tissue vs neoplasm.  To see ENT and have Imaging at [facility] 3/28/19: Bms: # 4-5 , 3x/d ;kx=801 4/11/19: Hgb-9.7, Iron=45, ESR=18, CRP=9.4, Alb=3.5,  Saw [facility] S[facility] at [facility], felt hyperparathyroid is secondary to Low Ca/Vit D, no sx at this time 4/16/19: BMs: # 5-6, 3-4x/D, eg=302,  Entyvio : last 3/1519,  got IV iron 4/12/19 for Ferritin=53 4/27/19: s/p R Hip Nailing--missed 4/2019 2/2 fresh wound 5/16/19 & 6/18/19 Entyvio 7/2/19: Hgb=11.7, Ferritin=41,ESR=12, CRP=5.5, B6=2.8,Mag=1.8,Phos=3.9,Bg=861,Zinc=61 7/11/19: s/p IV iron 7/11/19: Alb=3.7, OAG=851, Ca=9.1, Vit D=40/306,  7/24/19: Entyvio, Alb=3.8, WUC=742, Ca=8.9, Vit D=128  8/1/19: Bms: # 5-6, occas #4, 2-3x/D , qf=105 8/15/19: Hgb=12, ESR=10, CRP=5.2, Ferritin=68, Alb=3.4, Phos=4.4,Mg=1.7, Ca=8.5,Calprotectin=88, 8/20/19: EFC=637, Ca=9, Alb=4.2 9/19/19: Hgb=12.8, ESR=9, CRP=5.5, Alb=3.7, Pyonhwuw=633, Mag=1.8, Ca=8.9, Phos=4.2 9/26/19: ql=076, BMs: #5-6, 2-3x/D, no pain 10/17/19: kc=216, BMs: #4-6, 1-2x/D, no Pain; Hgb=14, ESR=7, CRP<5, Alb=3.9, Rwlyofbt=383, Mag=1.7, Ca=9.6 10/24/19: oh=934, Bms: # 4-6 , no pain, 11/6/19: ESR=6, CRP=6, PTH=80,Ca=9.1, VitD=50 11/14/19: ai=548, BMs: # : 4, 1-2, 0ccas #5  11/17/19: ESR=6, CRP<5, Nqwknass=781,   12/19/19: pm=365, BMs: #5-6, 2-3x/D 1/6/20: entyvio 1/13/20: ESR=7, CRP=0.2, Hgb=13.5, Qozzkuuo=965, H54=182, FA=10, Alb=4.1, Ca=9.1 1/16/20: wt = 127, BMs: # 5, 1-3x/d 1/30/20: ESR=9, CRP=11, Hgb=13.9, Mjbrfwnm=977,Iron=38, Alb=3.9,Ca=9.2, PTH=87, 2/3/20 EGD: gastritis, +MACKENZIE, No HP, +erosion w ooze -clipped, 0.5cm polyp-neg; 4cm HH, Esophagitis A, + Barretts, VC: 1+ Colonoscopy: 05cm rectal polyp: HP, 2nd deg int hemorrhoids mild-mod activity: MARILY w cryptitis & mild archect distortion at ileocolonic anast: colon & ileal side, no stricture 2/4/20: Entyvio; 2/12/20: IV Iron, 3/3/20: Entyvio 2/25/20: zf=805,  BMs: # 4-5, 1-2x/ d 3/19/20: pk=239, BMs: #4-5, 1-2x/D 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 10/17/20 : Hgb=13, CRP< 5, ESR= 11, Iron=44, Ferritin=46, Alb=4, Phos=2.3,  11/5/20: qd=790; s/p IV Iron x 2 ,  11/4 and 1 week prior;   BMs:  # 4-5, 1-2x/D , no pain 11/18/20  Entyvio + IV Ca  1/4/21: Hgb=13.6, CRP<5, ESR=9, Ferritin=60, Alb=4.2, Phos=2.7 1/11/21: Entyvio & IV Ca 1/14/21: no pain, stool more formed ,  gk=372 - 137; BMs:  # 4-5, 1-2x/D  2/8/21: Entyvio & IV Ca 2/23/21 IV Ca 2/22/21: Hgb=12.7, CRP<5, ESR=8, Ubqlejcod=014, Phos=2.3, Mag=1.8, V09=440, Zinc=58, Oi=353 2/26/21: yz=781,  BMs:  # 4-5, 1-2x/D , no pain  3/8/21 & 4/8/21: Entyvio 3/9/21 & 3/24/21: IV Iron 4/5/21: Hgb=13, CRP<5, ESR=9, Ferritin=94, Phos=3.1, Mag=1.7,Ca=9.1/ Alb=4              Pt Ins co is refusing to cover Entyvio q 4wks, despite numerous attempts to appeal, they also refuse to set up a peer to peer discussion betw myself and another healthcare provider, even one that works for a third party.  They do acknowledge that there is literature supporting the successful use in individual cases who don't respond to the q 8 wk interval and need less then q 8weeks , but state it had not in FDA approved at less then 8wks so they will not approve it.  I spoke to the ins co rep and discussed that fact that patients should not be  pigeon holed since practicing medicine is done on an individual basis.  Humans are not all the same.   Their  response didn't change eventhough the pt clinically needed the medication and it  induced a beneficial clinical response towards remission.  Therefore the patient and I decided to slowly increase the interval since the insurance company will not pay for this benefit.  Hopefully he may be able to maintain his present clinical state.  If not we will return to q 4weeks and will again appeal using this patients real clinical data .  4/9/21:  xu=893,  no pain, stool more formed # 4, 1-2x/d  6/11/21: wt= 135,   no pain, stool more formed # 4, 1-2x/d               recently had an episode of abd distenstion, pain, N/V, no BM              eventually started having diarrhea and flatus, BMs returned to normal              lost 2-3 lbs but regained  Doesnt recall eating anything different, no fever, chills, brbpr, melena  entyvio was 6/3/21--which is almost 8 weeks, was supposed to be 5-6 weeks  to start               6/4/21 Hgb=12, CRP<5, Ferritin=32, pt to receive IV iron      7/12/21 Labs: Hgb=13.6, ESR=10, CRP=<5, Vrwofpj=077, Alb=3.7, BUN=16   7/16/21 MRE: limited to incomplete bowel distention, chr distal ileitis/probable inflammatory stricturing vs collapse of the vladislav-TI--but improved since 2018 , moderate proctitis 7/20/21:  Last entyvio was --7/1, next early august                 cq=202 ,  no pain, stool more formed # 4-5/6, 1-2x/d  7/23/21 Entyvio level= 39, Abs <1.6 8/23/21: Labs--Hgb=13.6, ESR=10, CRP=6.6, Pjwvqbqr=572, Iron=26, Alb=3.9, BUN=16 8/26/21 p/w w N/V, abd pain/bloating  x 3 days, unable to keep things down Labs: WBC=5, Hgb=12, CRP=43, ESR=11, Alb=4.4, BUN=28, Creat=-1.6 CT Abd/Pelvis: diffuse marked dilatation of SB Loops w transition pt in Rmid/lower abdomen ~ 5-6c, proximal to the ileocolonic anastomosis RX w IV solumedrol 20mg q8h, NGT, IVF, pt refused TPN, also w UTI from prostatitis 8/27 NGT was pulled, and clears started and advanced to LR,  was d/c home 8/30 on prednisone 40mg qd w taper by 10mg/wk--> to 20mg 10/15 Entyvio 10/25 Stelera (Ustekinumab)  # 1 11/3/21 [facility]: wt above 140, off pred x 2 weeks,  1-2 BMs qd  Discussed at IBD conference, reviewed previous colonoscopy, and recent imaging; consensus that due to malnutrition and steroid dependency, surgery is next best option however pt reports feeling great and wants to pursue medical treatment which is reasonable given he feels well  repeat imaging in mid-January after 3 months of Stelara, and then consider referral to surgery vs improved candidacy for endoscopic manipulation (currently presumed one long stricture).   11/15/21 : uk=710, Hgb=12, ESR=3, CRP <5, Ferritin =104, Ca=9, Mg=1.7, Alb=3.7  12/10/21 : iron infusion 1/4/22: Hgb=11.5, ESR=6, CRP <5, Ca=8.8, Mag=2.9, Alb=3.9 1/10/22 : kq=418, stools # 5, 1-2x/D  1/10/22 Today:  Feeling well, no c/o , CP, SOB/ AMARO, Cough, Wheeze, Palpitations, edema              Most recent labs  reviewed w patient:1/4/22 1/31/22: calprotectin=84 2/2/22: ds=119 2/14/22: Hgb=10.6, ESR=9, CRP <5, Ca=8.3, Mag=1.6, Alb=3.8, Iron=25, Ferritin=15 3/3/22:  iv Iron infusion x 1 3/8/22 MRI Abd/Pelv: thickened distal Jejunal loops which are tethered; distal ileal segment  (10-12cm ) previously actively inflammed has decreased & now characterized by an area of stricture, however the vladislav-TI  5mm to the anastamosis is enhanced as well as narrowed.  colon just  distal to the anastamosis has a focal segment of inflammation & stricture.  the recto-sigmoid appears inflammed  3/28/22: Hgb=14.4 , ESR=1, CRP <5, Ca=8.5, Mag=1.7,Alb=4.1, Iron=104, Afhruakh=427, 4/5/22: xu=052, Bms: # 5-6 , 1-2 x/day    5/10/22  :  Last entyvios were -->7/1, 8/5, 9/2, 10/15/21              Stelara # 1 IV--10/25/21, second dose SC~ 12/10/21, 3rd~ 2/14/22,  4th-- mid April , 5th--mid june                Early December 2021  had a " flare" loose BMs, N/V and bloat              Took Pred 40mg qd and tapered down 10mg / week , now off pred x 7-8 weeks  4/13/22 Dr. Heard:  pt states he had a flare the weekend of 4/9/22 w vomiting/distension                pt self-started prednisone 40mg , this was just before his april stelara dose                claims he was feeling better               Dr. Heard: sched colonoscopy w ? dilatation   5/10/22: tapered of pred 40mg , 10mg/wk over 1 month, now off 2weeks          no pain, n/v,  BMs: # 4-5, 1-2 x Day , wt=-132 5/17/22 Dr. Heard Colonoscopy: ped scope passed w/o resist in vladislav-TI, one single apthae w psuedopolyp.  Flares probably 2/2 to adhesive dis, imaging may consistently show wall thickening  5/27/22 Labs:  Alb=3.8. Phos=2.7, Mag=1.9, Ca=9.2, PTH=72, Vit D=105, ALP=76                + IV Iron  6/20/22 Labs:  Alb=4.5, Phos=0.8, Mag=1.8, Ca=8.5, CUO=282, ALP=83                          Hgb=14, ESR=2, CRP<5, Houpbvee=774, Iron=86 6/21/22:  no pain, n/v,  BMs: # 4-5, 1-2 x Day ,                ex=647 7/26/22:  no pain, n/v,  BMs: # 4-5, 1-2 x Day ,                id=609 9/30/22 EGD: mild gastritis, no HP; 0.75cm gastric polyp:fundic;                4cm HH, Esophagitis A--, + Barretts 10/17/22 : Alb=4.3, Phos=2.1, Mag=1.8, Ca=9,  PTH=95, ALP=80, TSH=12, Vit D=69                          Hgb=12.7 , ESR=2, CRP<5, Ferritin=57, Iron=85 12/16/22 : Alb=3.8, Phos=1.6, Mag=1.7, Ca=8.9 , ALP=68,                           Hgb=10.8 , ESR=1, Eflzzdel=384, Iron=67, INR=2.3  12/19/22: ow=861, had some N and distension the day after Thanksgiving                      Was having BMs and passing gas, went of liquids x 1-2 days--> resolved 1/30/23: Alb=4.4, Phos=3, Mag=1.8, Ca=9.5,  PTH=96, ALP=79,  Vit D=144                          Hgb=13.6 , ESR=5, CRP<5, Ferritin=55, Iron=56, INR=1.6    2/6/23 : kw=170, no abd pain, BMs: # 4 qd   3/17/23: developed cugh,congestion, fever, malaise  3/19/23 + Covid; Received Iv Remdesivir x 3 at home 3/27/ 23: fy=350, BMs: #4-5,  1-2x/ D   Alb=4 , Phos=1, Mag=1.8, Ca=8.5 , ALP=94  Hgb=13.8, ESR=5, CRP<5, Vbscpeoa=821, Iron=78, INR=5.9  4/21/23 : Alb=4, Phos=2.9, Mag=1.9, Ca=8.9,  PTH=89, ALP=75,  Vit D=105/ 80, INR=3.2  5/22/23 Da=030; Hgb=12.7, ESR=2, CRP<5, Ferritin=19, Iron=42, INR=2.4               Alb=4.2, Phos=2.4, Mag=1.8, Ca=9.1, ALP=68,                      8/4/23 :  Alb=3.8, Phos=2.1, Mag=1.8, Ca=8.7,  LXR=924, ALP=68,  Vit D=78 INR=2.1  8/21/23 : Hgb=14, ESR=4, CRP=40, Ubmsirom=173, Iron=37, INR=4.8                Alb=4 , Phos=1.8, Mag=1.6, Ca=9.3, ALP=84,      8/22/23  :   lo=159 , this weekend  had a flare, abd distension, no N/V, fever                  BMs: started # 5/6--> now # 6-7, no blood or mucous                  doesnt recall anything too solid or fiberous                  Started Pred 40mg qd, and liquid diet--> feeling better                  Less distension, passing gas and BMs  10/24/23:  bm=123, BMs: #4, 1-2x/D                   pt reports an episode of dark stool w 2-3 days ~ 9/23/23                  He held his Warfarin for a couple of days and stool returned to normal color                  He increased his Omep 40mg BID                  He had no Abd pain, N/V, ht burn, dysphagia, bloat                  9/28/23 Labs: Hgb=8.6, Iron=22, sat%=7, Ferritin=17                  10/5/23 Labs: Hgb=8.7, Iron=52, Sat=16%, Mtdlpjmj=991                  10/12/23 : Labs: Vit D=67, Ca=8, PTH=160, Mag=2                  10/20/23 Labs: Hgb=10.9, INR=1.6, CRP<5, Yxartbka=425, ALB=3.7, Ca=7.9, ALP=68 10/18/23 MRE:  persistent mural enhancement without wall thickening in distal small bowel loops, which is a nonspecific imaging sign and may reflect inflammation, however no other mural or mesenteric findings to indicate active inflammatory small bowel Crohn's disease.  No evidence of stricture. No imaging signs of penetrating disease.  Chronic central mesenteric fibrofatty proliferation. 10/27/23: Ustekinumab=7.1, Ust Abs <1.6 11/27/23 : FA=17, S23=309, Vit D=69, ca=9.1, ALP=84,  Alb=4.2                  Hgb=13.3,  PT=19, INR=1.7, Ferritin=33, Iron=46, Sat=13%,  12/5/23 : Homocysteine=18, BJG=772 12/11/23 :  ic=474, BMs: #4, 1-2x/D , Melena-->no    12/22/23  EGD:  gastritis, No HP; 2cm HH, Esophagitis A--,  + Barretts 12/22/23 Colonoscopy: mild ileitis of the most distal vladislav-TI; small superficial apthous ulcer on colonic side of anastamosis, Random  colon BX--> wnl  12/27/23 -12/31/23 [facility] for Hypophosphatemia=0.8, Rx w IV KPhos MXP=068, Vit D =66, 7.6--> Phos=1.5, Ca=8.4, Hgb=12.6 D/C on kPhos 1 gm TID seen by [facility] 1/10/24 Phos=1.7, Vit D =53, Vit D-25= 73, Ca=8.5, DBA=368 1/18/24 Csvuwswk=329, CRP<5, ESR=1,  Ca=7.9, Hgb=13  1/29/24 s/p  iron infusion in 12/8/23 , Calcium 1/26/24 , last Stelera MID 12/2023, next begining  of 2/2024                 Today: un=289                  BMs: #4, 1-2x/D                   Melena-->no        * Abd pain-->no * Nausea-->  * Vomit--> no * Early satiety--> no * Belching--> no * Hiccups--> no * Regurgitation--> no * Acid Taste / Water Brash--> no * Ht burn--> no * Dysphagia--> no * Throat Clearing--> no * Hoarseness--> no * Post-Nasal Drip--> no * Congestion--> no * Globus--> no * Cough--> no * Wheeze / PC-> -no * Constipation--> no * Diarrhea--> No  * Bloating--> No  * Strain on Defecation--> no * Incompl Evac--> no * Flatulence--> no * Gurgling--> no * Melena--> no * BPBPR-> -no * Anorexia--> no * Wt. Loss--> no                  PRIOR HISTORY--2017 1/17/17: Hgb=9.2, ESR=6, CRP=5; 1/19/17: BMs: #4, 5-6, 2-3x/D, Hy=500, Started Creon 1 tid cc        3rd Entyvio beginning Feb 2/14/17: Labs; Hgb=9.6, MCV=97, ESR=5, CRP< 5, X84=902, FA>23, Iron=59, Retic =3.2,        2/17/17 Fecal Calprotectin=16, Fats: Increased neutral & Split        3/13/17: Labs Hgb=9.5, MCV=95, ESR=7, CRP <5, ALB=3.1        3/16/17: lot of stress, to move -[place], had a job-fell thru, BMs: # 5, 2-4 x/D, wt= 131w clothes        4/19/17 EGD: gastritis, MACKENZIE, No HP, 2cm HH, +Barretts, No Dysplasia        Colonoscopy: Anastamosis: open w mild -mod active colitis, enteritis severe adj to anastomosis, mild more proximal, remainder of colon-wnl, 2-3 deg int hemorrhoids        4/26/17 : Hgb=7.3, MCV=95, ESR=13, CRP<5;Immediately post procedure stools very dark on eliquis/iron.        Admitted to [facility]: Hgb=8.3-->8.9 after 2 U, Alb 3.3, BUN=17, creat=1.3, Malina/Evmrb=458/460        4/27/17: Alb=2.3, BUN=12, creat=1.2, Malina/Lipase=209/863-No abd pain, N/V; 5/5/17: Hgb=10.7.        5/8/17 Entyvio iv. 5/8-5/9 w dark red diarrhea; 5/10/17 Hgb=7.8.        5/11/17 Adm [facility] w stools brown, Hgb=7.9, BUN=17, Creat=1.4, Alb=2.9; S/P 2 Units- Hgb=10.6, BUN=15/1.1.        Prednisone 40mg qd, entocort d/dee.; 5/18/17: Hgb-10.4, fe=430, BMs: #5, 1-2x/D, no pain        6/8/17: Hgb=7.4,MCV=98, CRP<5-ASA stopped, Pred20--> 30        6/10/17: Hgb=8.2, Alb=2.9, BUN=20/1.2 ; 6/12/17: Hgb=8.3, Retic=0.19, Iron=10, Sat%=3, Ferritin=57, FA=23,J09=539, 6/13/17: s/p 2 Unit PRBCs        6/15/17: started MCT oil, Aq=649 , BMs: # 5, 1-2x/D, stools are brownish/green         7/11/17: [facility] w unsteadiness. MRI Head: R Cortical Temporo-occipital encephalomalacia, MRA H&N-no sign lesions, PER-wnl.Hgb=9.9--> 8.4->9.7 after 1 Unit. Seen by Heme: received IV Iron         CRP<5, J87=947, SEN=525, FA>23,Iron=22,Sat%=6, Ferritin=42, Alb=3.5. D/C on warfarin 2mg        7/20 Hgb=8.5, 7/28 Hgb=7.7, 7/31-s/p 1 Unit         As outpt seeing Heme, to get IV Iron and 5 IV Copper        Had 'FLU' Fever=101, chills, bloat, N/V/D, Bilious. no pain, melena,brbpr        Given anti-emetic by dr Butler,then able to keep things down        On prednisone 25, warfarin w INR bet 1.6-2        8/17/17: Hgb=9.9, ESR=20, CRP-P,Fm=015, BMs: # 5, 1-2x/D, stools are brownish/green        10/2/17: Hgb=8.8, MCV=89,Phos=1.7, K=3.9, Mag=1.9, Alb=3.6,Iron<10,Ferritin=21, INR=2        10/17/17: Hgb=7.9,ESR=6,CRP<5, INR=1.6        10/25/17: Hgb=10, ESR=5, CRP=5, Alb=3.6, Phos=2, [place]=342, M22=428        11/16/17: Hgb=11.2, ESR=14, CRP=12,         11/13/17: MRE-Chr mild distension of distal & vladislav-ileum s/o mild stricturing at anast, mild mural thick & mucosal enhancemt ~ 20cm; little change from 2015 c/w mild acute on chr ileitis, 2.1 cm Liver hemangioma        11/28/17: Entyvio        11/29/17: Hgb=9.6, ESR=10, CRP=5.8, Alb=3.8,Ferritin-P, Iron=14,Sat=4%, Phos=2.5        12/19;17: Hgb=10.1, ESR=12, CRP=6.5; 12/21/17: BMs:#3-5, 1-3x/D , brown, no blood, no pain, fz=740        1/3/18:Hgb=11.7, ESR=19, CRP=6.5, Alb=4.1, Bmtexjrw=769, Iron=31, Sat%=9,Zinc=55          PRIOR HISTORY---2013:         2/20/13 CT markedly dilated sb loops ext to anast, colonoscopy w open anast ,mild-mod remy-anastamotic disease. quick response to entocort/humira--probably adhesive dis.         8/10/13 w GNR bacteremia, ADA 1.7 /KAYLAH 3.4, Humira 8/7, 8/13,8/18,9/15        CT- mucosal thickening and spiculation of the distal sb extending to the anastamosis, thickening and stranding of adj mesenteric fat.        Humira increased to 40mg weekly, entocort 4        9/2013 MRE--dilated loops in mid and distal ileum, markedly thickened and narrowed TI w decreased peristalsis of TI        11/15/13 ADA-24.9, KAYLAH-0          PRIOR HISTORY---2014:         2/15/14--CT dilated loops SB, loop of sb in mid abd 4.3cm w infiltrative changes in the mesentery, bowel tapers in the RLQ to the anastamosis w/o transition        WBC=15K, Rx w IV steroids and Abx         3/7/14 SBFT: last 10cm sb prox to anast mild distension and sl irregularity. In the mid portion of this loop there is a mild narrowing which appears to reopen but is some what narrowed.        3/20/14 ADA=33.1, KAYLAH=0, Lialda switch to Apriso 4 (2/2014)          PRIOR HISTORY--2015         1/11/15 Adm [facility] w 1 day N,Recurrent V, Abd pain/distension. CT-multiple distended & fluid-filled sb loops, mild wall thickening of ileum w No inflamm changes.        WBC=14.6, Hgb=17, RX w NGT suction, Levaquin iv, Solumedrol 40mg q 12( 1/12-->1/16) to prednisone 30mg BID w 5mg taper/wk        3/18/15 --Dr. Butler w edema /High BP, prednisone was tapered slowly to 2.5mg         switched to entocort 9mg qd, edema and bp improved w lasix 20mg         BMs:#4-5, 3x/D, Hgb=11, ESR=4, CRP<5        4/28/15 - 5/1/15: Adm [facility] w 1 day Abd pain, distension,N/V. WBC=10K, HGB=15         CT Abd/Pelv: Diffusely dilated SB loops, thick walled ileum        Rx w NGT, IVF, IV Solumedrol--> Prednisone 30mg BID; tapered to 5mg---> Budesonide 9mg qd        6/10/15 Colonoscopy: Inflammatory ST mass on the ileal side of anast, opening appeared ulcerated,scarred & severely narrowed        6/11/15: [facility] w N/V x1, decr appetite, CT ABD: no obstruction, dilated thickened sb loops of distal jej and ileum        6/19/15 [facility]: s/p Lap w extensive lysis of adhesions, hepatic flex, sigmoid and sb anastamosis, s/p partial r colectomy and sb resection--side to side elisabeth: 8-10 inch from prior anast to TV colon.        7/17/15: BMs:#4-6, 4-5x/D, wt 131(from 126)        8/20/15: BMs: #4, 1-2x/D or #5, 2-3x/D, rc=398, dry cough,Hgb=10, WBC=3, JQW=918, CRP=56, ESR=21        8/25/15 CT Abd/Pelv: Svl RLQ sb loops w wall thickening, mild nodularity & inflamm stranding in mesentery        Advised-restart Entocort 9mg qd, Apriso 4 qd, Maintain Humira but given RX to check drug/Ab levels        9/15/15: did nt restart meds,Hgb=9.9, WBC=4, ESR=19, CRP=5.8, oz=805; [facility] : ADA=8.5, Antibodies< 1.7        10/15/15: No pain, BMs:#4, 1-2x/D, #5-6, 3-4x/D, throat clearing/cough-better, mo=549        11/30/15: No pain, BMs:# 4, 5-6 intermittently, No throat clear, lw=911          PRIOR HISTORY-2016         1/22/16; 4/8/16; 6/6/16 : No pain, BMs:# 4-5 1-2x/D, also #5-6 3x/D for 2-3D/wk, cj=651        7/14/16: lj=836, 9/22/16: yt=994.5        11/10/16: 9.5lb wt loss, states BMs: 5-6, 5x/D--Taking Magnesium, No pain/anorexia        Labs: Stool Trdmoudpmwdp=254, + Incr Fecal fat, Alb=3.4, FA =23, A28=085, Hgb=12, ESR=10, CRP <5        Magnesium-d/c, Obtain MRE asap--Start steroids and possibly switch to Entyvio        DDx discussed: active crohns--loss response to Humira, Malabsorption--loss Bile acids, Bile-induced diarrhea, SIBO        Pt wanted to wait for imaging-did not start rx        12/1//16 MRE: 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

## 2024-01-31 ENCOUNTER — RX RENEWAL (OUTPATIENT)
Age: 59
End: 2024-01-31

## 2024-02-01 ENCOUNTER — NON-APPOINTMENT (OUTPATIENT)
Age: 59
End: 2024-02-01

## 2024-02-03 ENCOUNTER — APPOINTMENT (OUTPATIENT)
Dept: PODIATRY | Facility: CLINIC | Age: 59
End: 2024-02-03
Payer: COMMERCIAL

## 2024-02-03 VITALS
SYSTOLIC BLOOD PRESSURE: 129 MMHG | HEIGHT: 68 IN | WEIGHT: 133 LBS | DIASTOLIC BLOOD PRESSURE: 82 MMHG | BODY MASS INDEX: 20.16 KG/M2

## 2024-02-03 DIAGNOSIS — M77.41 METATARSALGIA, RIGHT FOOT: ICD-10-CM

## 2024-02-03 PROCEDURE — L3000: CPT | Mod: LT

## 2024-02-03 PROCEDURE — 11055 PARING/CUTG B9 HYPRKER LES 1: CPT

## 2024-02-03 PROCEDURE — 99213 OFFICE O/P EST LOW 20 MIN: CPT | Mod: 25

## 2024-02-03 NOTE — HISTORY OF PRESENT ILLNESS
[FreeTextEntry1] : The above-named patient referred presents to the office with a chief complaint of pain to the bottom of the right foot history of present illness is several months self treatment has offered no relief alteration of shoe gear has provided no relief and he presents today for treatment

## 2024-02-03 NOTE — PROCEDURE
[FreeTextEntry1] : Based on my physical examination and my clinical findings and the patient's description of the symptoms, a complete differential diagnosis was reviewed with the patient. Possible diagnoses as well as treatment options explained in great detail. All questions asked and answered appropriately. I had a lengthy discussion with the patient regarding bursitis the etiology and treatment options. I did explain to the patient what this means in medical as well as lay in terms and treatment options for bursitis. Since bursitis is an inflammation treatment options were reviewed including but were not limited to physical therapy, anti-inflammatory medication by mouth, steroid injection locally, offloading with orthotics, padding to the area, or a combination of all the above. The discussion with the patient regarding treatment options with and complete with all questions asked and answered appropriately. Shaving and padding of a benign hyperkeratotic lesion During the evaluation and management I had a lengthy discussion with the patient regarding benefits of functional foot orthoses. I explained to the patient the etiology and treatment options and one of them included the offloading and balancing of the painful portion of the foot. I explained the importance of balancing in offloading the painful area as part of the overall treatment process to advance healing. I have asked the patient to consider this as part of the treatment After a lengthy discussion with the patient regarding the possible benefits of orthotics and what we hope to achieve with them as it relates to their diagnosis the patient has agreed to be casted for the devices, The patient was then casted for a pair of custom functional foot orthoses with the subtalar joint in neutral, the forefoot locked, The patient was advised that they will be notified when the orthotics are returned from the laboratory, Should there be any questions or concerns they were advised to contact the office immediately, Educational literature regarding orthotics were dispensed, Included in the casting for orthotics was an overall gait exam and biomechanical evaluation A complete and thorough evaluation of the type of shoes they should be wearing and type of shoes for this time of year was discussed with patient.

## 2024-02-06 ENCOUNTER — NON-APPOINTMENT (OUTPATIENT)
Age: 59
End: 2024-02-06

## 2024-02-06 NOTE — DISCUSSION/SUMMARY
[FreeTextEntry1] : Discussed results of small bowel capsule study with patient. Scattered irregular appearing mucosa in the small bowel, no active bleeding, possible small erosions. Capsule seen entering the colon. Will discuss with Dr. Mejias, will likely plan on a single balloon enteroscopy for further evaluation.

## 2024-02-07 ENCOUNTER — NON-APPOINTMENT (OUTPATIENT)
Age: 59
End: 2024-02-07

## 2024-02-07 NOTE — ASU PATIENT PROFILE, ADULT - NS SC CAGE ALCOHOL GUILTY ABOUT
Patient came into the [facility] lab to have his blood work done.  Orders were released from epic but unable to obtain specimen and patient left.  Labs will need reordered.    no

## 2024-02-07 NOTE — REASON FOR VISIT
20 [Follow-Up Visit] : a follow-up visit for [FreeTextEntry2] : Anemia, MTHFR Gene mutation, DVT, colitis, copper deficiency

## 2024-02-20 ENCOUNTER — APPOINTMENT (OUTPATIENT)
Dept: PODIATRY | Facility: CLINIC | Age: 59
End: 2024-02-20
Payer: COMMERCIAL

## 2024-02-20 VITALS — WEIGHT: 133 LBS | HEIGHT: 68 IN | BODY MASS INDEX: 20.16 KG/M2

## 2024-02-20 DIAGNOSIS — M77.51 OTHER ENTHESOPATHY OF RT FOOT AND ANKLE: ICD-10-CM

## 2024-02-20 DIAGNOSIS — L85.1 ACQUIRED KERATOSIS [KERATODERMA] PALMARIS ET PLANTARIS: ICD-10-CM

## 2024-02-20 PROCEDURE — 97760 ORTHOTIC MGMT&TRAING 1ST ENC: CPT | Mod: NC

## 2024-02-20 NOTE — PROCEDURE
[FreeTextEntry1] : The patient presents for dispensing of custom molded foot orthotics. I have removed the orthotics from the packaging and I have examined him. They do appear to be made as per my instructions regarding materials additions corrections. Upon placing him to the patient's foot they do appear to fit nicely. There is no material failure nor gapping. They were then trimmed to fit and placed inside the patient's shoe gear. There appears to be a good fit. Upon initial ambulation the patient has no initial complaints regarding pain off edges were tight fit. The patient did ambulate around the office for several minutes and had a favorable result. I then explained to the patient the normal break-in period. The paperwork supplied by the laboratory was reviewed with the patient. They understand a normal break-in period is to be gradual over several weeks. I've advised the patient that different, weird, and uncomfortable are certainly acceptable words in the beginning however pain, blister and callus are things that should not occur. Once the proper break-in was explained to the patient they were given an appointment to follow up. A complete and thorough evaluation of the type of shoes they should be wearing and type of shoes for this time of year was discussed with patient.

## 2024-02-20 NOTE — HISTORY OF PRESENT ILLNESS
[FreeTextEntry1] : The above-named patient referred presents to the office with a chief complaint of pain to the bottom of the right foot history of present illness is several months self treatment has offered no relief alteration of shoe gear has provided no relief and he presents today for treatment The patient presents today for evaluation, fitting and dispensing of custom made foot orthotics

## 2024-02-20 NOTE — PHYSICAL EXAM
[No Joint Swelling] : no joint swelling [] : normal strength/tone [Normal Foot/Ankle] : Both lower extremities were exposed and visualized. Standing exam demonstrates normal foot posture and alignment. Hindfoot exam shows no hindfoot valgus or varus [de-identified] : Pain to the plantar aspect of the third and fourth metatarsal heads on the right foot signs and symptoms consistent with lesser metatarsal bursitis and metatarsalgia [FreeTextEntry1] : Hyperkeratotic lesion to the plantar aspect of the right foot below the third and fourth metatarsal head area in the form of an IPK

## 2024-02-22 ENCOUNTER — RESULT REVIEW (OUTPATIENT)
Age: 59
End: 2024-02-22

## 2024-02-27 ENCOUNTER — LABORATORY RESULT (OUTPATIENT)
Age: 59
End: 2024-02-27

## 2024-03-05 ENCOUNTER — APPOINTMENT (OUTPATIENT)
Dept: GASTROENTEROLOGY | Facility: CLINIC | Age: 59
End: 2024-03-05
Payer: COMMERCIAL

## 2024-03-05 VITALS
HEIGHT: 68 IN | DIASTOLIC BLOOD PRESSURE: 80 MMHG | BODY MASS INDEX: 20.92 KG/M2 | WEIGHT: 138 LBS | SYSTOLIC BLOOD PRESSURE: 124 MMHG | HEART RATE: 83 BPM

## 2024-03-05 PROCEDURE — 99214 OFFICE O/P EST MOD 30 MIN: CPT

## 2024-03-05 NOTE — HISTORY OF PRESENT ILLNESS
[de-identified] :    This HPI  reflects a summary and review of records : including previous and most recent  Labs, body imaging, consults and progress notes, operative and pathology reports, EKG reports, ED records, found in [employer], [employer],  [employer] and any additional records brought in by  the patient at the time of the visit.            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-->Eliquis' warfarin         GERD w Page's, Hemorrhoids, BPH,         S/P Ileocolonic resection 1998        Since then multiple SBOs          Got IV Iron x 2, since 10/2/17        10/19/17: feeling better, Mq=892 on prednisone 15mg x 3weeks, BMs Brown: #5-6, 1-2/D, occas 3-4/d        10/25/17: Hgb=10, ESR=5, CRP=5, Alb=3.6, Phos=2, Ssuhopdm=527, W81=487        11/16/17: Hgb=11.2, ESR=14, CRP=12,         11/13/17: MRE-Chr mild distension of distal & vladislav-ileum s/o mild stricturing at anast, mild mural thick & mucosal enhancemt ~ 20cm; little change from 2015 c/w mild acute on chr ileitis, 2.1 cm Liver hemangioma        11/28/17: Entyvio        11/29/17: Hgb=9.6, ESR=10, CRP=5.8, Alb=3.8,Ferritin-19, Iron=14,Sat=4%, Phos=2.5, Ik=873, BMs:# 5, 1-2x/D, brown,        12/19;17: Hgb=10.1, ESR=12, CRP=6.5; 12/21/17: BMs:#3-5, 1-3x/D , brown, no blood, no pain, fq=996        1/3/18:Hgb=11.7, ESR=19, CRP=6.5, Alb=4.1, Alckmbag=135, Iron=31, Sat%=9,Zinc=55        1/16/18: Entyvio, Hgb=11.4, ESR=10, CRP=6.9        1/18/18: Today: cellulitis R foot, saw dr KEITH--rx augmentum x 10D, Entyvio 1/9 to 1/16, qe=531 w clothes,BMs: # 4-5, 1-2x/D         2/14/18: Hgb=11.1, ESR=16, CRP=11.6, Alb=3.6, Iron=28, Ferritin=23, INR=1.5         2/16/18: s/p Entyvio; Iron infusion, again on 2/27 2/20/18: Hgb=10.6, ESR=20, CRP=12, INR=1.7        2/27/18: s/p iron infusion        3/9/18: Dr. Burden for tenosynovitis, rx w Zorvolex: Diclofenac x 5 days--? response        3/14/18: Vz=454; BMs: # 5, 1-2x/D; Hgb=11, ESR=18, CRP=11,Irom=45,Avaenzph=278,Alb=3.6, INR=1.5        3/19/18: s/p Entyvio        3/22/18: lh=406, BMs: # 5, 3-4 x/d        4/16/18: s/p Entyvio,Hgb=11.9, INR=1.3, ESR=18, CRP=8.4         4/18/18 EGD: gastritis, no HP, no IM, 2+ Mucous, 0.5cm gastric polyp: fundic, 4cm HH, + Barretts:3cm, no dysplasia        4/25/18: Hgb=11.5, INR=1.6, Iron=40, Sat=13%, Ferritin=57, Alb=3.2, Phos=2.2, Mag=1.7        4/30/18: s/p Iron Infusion        5/14/18: s/p Entyvio         5/23/18: Hgb=11.5, ESR=16, CRP< 5        5/29/18: s/p Iron Infusion        6/6/18: Hgb=10.6, INR=1.7, Idshzhcf=240, Alb=3.5, Phos=2.1, F39=630, tl=436; BMs: # 5, 2-3x/D         6/19/18: Hgb=10.6, INR=1, CRP=15, ESR=23        6/21/18: R ankle is acting up, swollen, pain, having MRI done, took a couple of advil, on low carbs ,BMs: # 5, 1-2x/D, yn=484        6/26/18: MRI: fx/stress rxn-talar body/navicula; stress related changes--cuneform, cuboid,distal tibia; mod tibiotalar effusion, mild-mod peroneal tendinosis        7/19/18: entyvio 6/26/18, eliminated all carbs--anti inflammatory diet, rl=672, BMs: # 4-5, 1-3x/D         7/25/18: Hgb=10, INR=1.3, Alb=3.3, Phos=2.4, Bbqgrtkx=258, sat%=12, Iron=35        8/2/18: BD--osteoporosis of hip/spine: sig decrease BD--17.6% of hip, 17% of spine, osteopenia of wrist: 6.4%        8/7/18: Entyvio        8/21/18: Hgb=11.1, INR=1.5, ESR=19, CRP=18.6        8/23/18: recently w tooth infection, old implant--loose and removed; rx w amox, and advil        eating almost no carbs, ho=118, # 5-6, 2-3x/d         8/24/18: Stool fat--neg, Tedfhtotfbeh=972        9/5/18: Hgb=11.4, INR=1.3, ESR=9, CRP=11.3, Iron=41, Qoloihmx=580, Alb=3.8,        9/17/18: entyvio, Hgb=10.7, INR=2.2, ESR=17, CRP=9.1,         9/20/18: ow=540, BMs: # 5-6, 1-2x/D         9/21/18: Phos=2.4, Ca=8.7, Alb=3.4, Vit D=27, LKT=883,         Dr. Akers: Hyperparathyroidism: probably secondary due to low Vit D/Ca & ? superimposed primary, rx replete Vit D and Calcium        10/9/18: Hgb=11.6, MCV=94, ESR=14, CRP=5.4, INR=2.2        10/10/18 MRE: stricturing of neoterminal ileum w worsened upstream dilatation, moderate length of upstream ileum w stricturing extending at least 20cm and more extensive then previous. suggestion of 2 adj extraluminal fluid collections which may be w/i the wall of strictured ileum near the ileocolonic anastomosis. surrounding spiculation and tethered appearance of bowel in this region. 10/16/18: entyvio, Hgb=11.7, MCV=94, ESR=14, CRP <5, Alb=3.5 10/18/18: Ng=559,   BMs: # 5-6, 3x/D ,  11/13/18: Entyvio 11/21/18 CTE w C: limited 2/2 bowel underdistention, mucosal hyperenhancemt & extensive SM edema of distal ileum including vladislav-ileum, difficult to exclude a sml fluid collection, Liver w relative enlargement w suggestion of lobulation, enlargemt of PV,SMV,IMV,Spl V, rectal V. No ascites 11/28/18: Hgb=10, ESR=19, CRP=17,Alb=3.5,Iron=35, Sat%=11, Fxiyibdh=824, INR=1.3 11/29/18: sx=752, BMs: # 5-6, 3-4x/D 12/3/18: Abd sono--Liver 14.8cm Incr heterogenicity, spleen--wnl 12/11/18 & 1/14/19: Entyvio 1/14/19:Entyvio,  Hgb=10.7, ESR=15, Alb=3.6, Iron=36, Ferritin=67, Sat%=11, INR=1.6 1/24/19:  tx=844, stools are # 4-6, 3-4x/d , no pain 2/5/19: Hgb=11.2, ESR=14, CRP=0.36 2/28/19: Hgb=11.5, ESR=12, CRP=6.5, Alb=3.7, Iron=69, Zgjmzkzo=090, Ca=8.9                Bms: # 4-5, 2-3x/D , ck=979,  Entyvio : last 2/1519,                had parathyroid scan pre-op, still w elev PTH, + activity in mediastinum,? ectopic parathyroid tissue vs neoplasm.  To see ENT and have [facility] at [facility] 3/28/19: Bms: # 4-5 , 3x/d ;up=275 4/11/19: Hgb-9.7, Iron=45, ESR=18, CRP=9.4, Alb=3.5,  Saw Endo SX at [facility], felt hyperparathyroid is secondary to Low Ca/Vit D, no sx at this time 4/16/19: BMs: # 5-6, 3-4x/D, yy=280,  Entyvio : last 3/1519,  got IV iron 4/12/19 for Ferritin=53 4/27/19: s/p R Hip Nailing--missed 4/2019 2/2 fresh wound 5/16/19 & 6/18/19 Entyvio 7/2/19: Hgb=11.7, Ferritin=41,ESR=12, CRP=5.5, B6=2.8,Mag=1.8,Phos=3.9,Vy=849,Zinc=61 7/11/19: s/p IV iron 7/11/19: Alb=3.7, PMF=642, Ca=9.1, Vit D=40/306,  7/24/19: Entyvio, Alb=3.8, GHB=970, Ca=8.9, Vit D=128  8/1/19: Bms: # 5-6, occas #4, 2-3x/D , lp=495 8/15/19: Hgb=12, ESR=10, CRP=5.2, Ferritin=68, Alb=3.4, Phos=4.4,Mg=1.7, Ca=8.5,Calprotectin=88, 8/20/19: RWG=093, Ca=9, Alb=4.2 9/19/19: Hgb=12.8, ESR=9, CRP=5.5, Alb=3.7, Sqaovidd=647, Mag=1.8, Ca=8.9, Phos=4.2 9/26/19: jv=736, BMs: #5-6, 2-3x/D, no pain 10/17/19: lf=884, BMs: #4-6, 1-2x/D, no Pain; Hgb=14, ESR=7, CRP<5, Alb=3.9, Mrvnuqwt=993, Mag=1.7, Ca=9.6 10/24/19: gc=661, Bms: # 4-6 , no pain, 11/6/19: ESR=6, CRP=6, PTH=80,Ca=9.1, VitD=50 11/14/19: qu=712, BMs: # : 4, 1-2, 0ccas #5  11/17/19: ESR=6, CRP<5, Ehgavrbd=914,   12/19/19: uz=473, BMs: #5-6, 2-3x/D 1/6/20: entyvio 1/13/20: ESR=7, CRP=0.2, Hgb=13.5, Opancbxb=954, C49=157, FA=10, Alb=4.1, Ca=9.1 1/16/20: wt = 127, BMs: # 5, 1-3x/d 1/30/20: ESR=9, CRP=11, Hgb=13.9, Ddfwikqa=413,Iron=38, Alb=3.9,Ca=9.2, PTH=87, 2/3/20 EGD: gastritis, +MACKENZIE, No HP, +erosion w ooze -clipped, 0.5cm polyp-neg; 4cm HH, Esophagitis A, + Barretts, VC: 1+ Colonoscopy: 05cm rectal polyp: HP, 2nd deg int hemorrhoids mild-mod activity: MARILY w cryptitis & mild archect distortion at ileocolonic anast: colon & ileal side, no stricture 2/4/20: Entyvio; 2/12/20: IV Iron, 3/3/20: Entyvio 2/25/20: oi=343,  BMs: # 4-5, 1-2x/ d 3/19/20: fb=162, BMs: #4-5, 1-2x/D 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 10/17/20 : Hgb=13, CRP< 5, ESR= 11, Iron=44, Ferritin=46, Alb=4, Phos=2.3,  11/5/20: oa=277; s/p IV Iron x 2 ,  11/4 and 1 week prior;   BMs:  # 4-5, 1-2x/D , no pain 11/18/20  Entyvio + IV Ca  1/4/21: Hgb=13.6, CRP<5, ESR=9, Ferritin=60, Alb=4.2, Phos=2.7 1/11/21: Entyvio & IV Ca 1/14/21: no pain, stool more formed ,  zt=829 - 137; BMs:  # 4-5, 1-2x/D  2/8/21: Entyvio & IV Ca 2/23/21 IV Ca 2/22/21: Hgb=12.7, CRP<5, ESR=8, Trrxvpstd=385, Phos=2.3, Mag=1.8, Q59=655, Zinc=58, Aj=269 2/26/21: tb=387,  BMs:  # 4-5, 1-2x/D , no pain  3/8/21 & 4/8/21: Entyvio 3/9/21 & 3/24/21: IV Iron 4/5/21: Hgb=13, CRP<5, ESR=9, Ferritin=94, Phos=3.1, Mag=1.7,Ca=9.1/ Alb=4              Pt Ins co is refusing to cover Entyvio q 4wks, despite numerous attempts to appeal, they also refuse to set up a peer to peer discussion betw myself and another healthcare provider, even one that works for a third party.  They do acknowledge that there is literature supporting the successful use in individual cases who don't respond to the q 8 wk interval and need less then q 8weeks , but state it had not in FDA approved at less then 8wks so they will not approve it.  I spoke to the ins co rep and discussed that fact that patients should not be  pigeon holed since practicing medicine is done on an individual basis.  Humans are not all the same.   Their  response didn't change eventhough the pt clinically needed the medication and it  induced a beneficial clinical response towards remission.  Therefore the patient and I decided to slowly increase the interval since the insurance company will not pay for this benefit.  Hopefully he may be able to maintain his present clinical state.  If not we will return to q 4weeks and will again appeal using this patients real clinical data .  4/9/21:  zk=111,  no pain, stool more formed # 4, 1-2x/d  6/11/21: wt= 135,   no pain, stool more formed # 4, 1-2x/d               recently had an episode of abd distenstion, pain, N/V, no BM              eventually started having diarrhea and flatus, BMs returned to normal              lost 2-3 lbs but regained  Doesnt recall eating anything different, no fever, chills, brbpr, melena  entyvio was 6/3/21--which is almost 8 weeks, was supposed to be 5-6 weeks  to start               6/4/21 Hgb=12, CRP<5, Ferritin=32, pt to receive IV iron      7/12/21 Labs: Hgb=13.6, ESR=10, CRP=<5, Vncqnqt=407, Alb=3.7, BUN=16   7/16/21 MRE: limited to incomplete bowel distention, chr distal ileitis/probable inflammatory stricturing vs collapse of the vladislav-TI--but improved since 2018 , moderate proctitis 7/20/21:  Last entyvio was --7/1, next early august                 fs=100 ,  no pain, stool more formed # 4-5/6, 1-2x/d  7/23/21 Entyvio level= 39, Abs <1.6 8/23/21: Labs--Hgb=13.6, ESR=10, CRP=6.6, Cafuqoto=570, Iron=26, Alb=3.9, BUN=16 8/26/21 p/w w N/V, abd pain/bloating  x 3 days, unable to keep things down Labs: WBC=5, Hgb=12, CRP=43, ESR=11, Alb=4.4, BUN=28, Creat=-1.6 CT Abd/Pelvis: diffuse marked dilatation of SB Loops w transition pt in Rmid/lower abdomen ~ 5-6c, proximal to the ileocolonic anastomosis RX w IV solumedrol 20mg q8h, NGT, IVF, pt refused TPN, also w UTI from prostatitis 8/27 NGT was pulled, and clears started and advanced to LR,  was d/c home 8/30 on prednisone 40mg qd w taper by 10mg/wk--> to 20mg 10/15 Entyvio 10/25 Stelera (Ustekinumab)  # 1 11/3/21 [facility]: wt above 140, off pred x 2 weeks,  1-2 BMs qd  Discussed at IBD conference, reviewed previous colonoscopy, and recent imaging; consensus that due to malnutrition and steroid dependency, surgery is next best option however pt reports feeling great and wants to pursue medical treatment which is reasonable given he feels well  repeat imaging in mid-January after 3 months of Stelara, and then consider referral to surgery vs improved candidacy for endoscopic manipulation (currently presumed one long stricture).   11/15/21 : rc=284, Hgb=12, ESR=3, CRP <5, Ferritin =104, Ca=9, Mg=1.7, Alb=3.7  12/10/21 : iron infusion 1/4/22: Hgb=11.5, ESR=6, CRP <5, Ca=8.8, Mag=2.9, Alb=3.9 1/10/22 : zk=166, stools # 5, 1-2x/D  1/10/22 Today:  Feeling well, no c/o , CP, SOB/ AMARO, Cough, Wheeze, Palpitations, edema              Most recent labs  reviewed w patient:1/4/22 1/31/22: calprotectin=84 2/2/22: my=948 2/14/22: Hgb=10.6, ESR=9, CRP <5, Ca=8.3, Mag=1.6, Alb=3.8, Iron=25, Ferritin=15 3/3/22:  iv Iron infusion x 1 3/8/22 MRI Abd/Pelv: thickened distal Jejunal loops which are tethered; distal ileal segment  (10-12cm ) previously actively inflammed has decreased & now characterized by an area of stricture, however the vladislav-TI  5mm to the anastamosis is enhanced as well as narrowed.  colon just  distal to the anastamosis has a focal segment of inflammation & stricture.  the recto-sigmoid appears inflammed  3/28/22: Hgb=14.4 , ESR=1, CRP <5, Ca=8.5, Mag=1.7,Alb=4.1, Iron=104, Zmsgazlo=826, 4/5/22: af=581, Bms: # 5-6 , 1-2 x/day    5/10/22  :  Last entyvios were -->7/1, 8/5, 9/2, 10/15/21              Stelara # 1 IV--10/25/21, second dose SC~ 12/10/21, 3rd~ 2/14/22,  4th-- mid April , 5th--mid june                Early December 2021  had a " flare" loose BMs, N/V and bloat              Took Pred 40mg qd and tapered down 10mg / week , now off pred x 7-8 weeks  4/13/22 Dr. Heard:  pt states he had a flare the weekend of 4/9/22 w vomiting/distension                pt self-started prednisone 40mg , this was just before his april stelara dose                claims he was feeling better               Dr. Heard: sched colonoscopy w ? dilatation   5/10/22: tapered of pred 40mg , 10mg/wk over 1 month, now off 2weeks          no pain, n/v,  BMs: # 4-5, 1-2 x Day , wt=-132 5/17/22 Dr. Heard Colonoscopy: ped scope passed w/o resist in vladislav-TI, one single apthae w psuedopolyp.  Flares probably 2/2 to adhesive dis, imaging may consistently show wall thickening  5/27/22 Labs:  Alb=3.8. Phos=2.7, Mag=1.9, Ca=9.2, PTH=72, Vit D=105, ALP=76                + IV Iron  6/20/22 Labs:  Alb=4.5, Phos=0.8, Mag=1.8, Ca=8.5, JMU=121, ALP=83                          Hgb=14, ESR=2, CRP<5, Qnncucvp=577, Iron=86 6/21/22:  no pain, n/v,  BMs: # 4-5, 1-2 x Day ,                ne=275 7/26/22:  no pain, n/v,  BMs: # 4-5, 1-2 x Day ,                of=253 9/30/22 EGD: mild gastritis, no HP; 0.75cm gastric polyp:fundic;                4cm HH, Esophagitis A--, + Barretts 10/17/22 : Alb=4.3, Phos=2.1, Mag=1.8, Ca=9,  PTH=95, ALP=80, TSH=12, Vit D=69                          Hgb=12.7 , ESR=2, CRP<5, Ferritin=57, Iron=85 12/16/22 : Alb=3.8, Phos=1.6, Mag=1.7, Ca=8.9 , ALP=68,                           Hgb=10.8 , ESR=1, Hdnebuic=445, Iron=67, INR=2.3  12/19/22: ss=177, had some N and distension the day after Thanksgiving                      Was having BMs and passing gas, went of liquids x 1-2 days--> resolved 1/30/23: Alb=4.4, Phos=3, Mag=1.8, Ca=9.5,  PTH=96, ALP=79,  Vit D=144                          Hgb=13.6 , ESR=5, CRP<5, Ferritin=55, Iron=56, INR=1.6    2/6/23 : zc=350, no abd pain, BMs: # 4 qd   3/17/23: developed cugh,congestion, fever, malaise  3/19/23 + Covid; Received Iv Remdesivir x 3 at home 3/27/ 23: ry=070, BMs: #4-5,  1-2x/ D   Alb=4 , Phos=1, Mag=1.8, Ca=8.5 , ALP=94  Hgb=13.8, ESR=5, CRP<5, Novnehfb=674, Iron=78, INR=5.9  4/21/23 : Alb=4, Phos=2.9, Mag=1.9, Ca=8.9,  PTH=89, ALP=75,  Vit D=105/ 80, INR=3.2  5/22/23 Dx=070; Hgb=12.7, ESR=2, CRP<5, Ferritin=19, Iron=42, INR=2.4               Alb=4.2, Phos=2.4, Mag=1.8, Ca=9.1, ALP=68,                      8/4/23 :  Alb=3.8, Phos=2.1, Mag=1.8, Ca=8.7,  DCW=208, ALP=68,  Vit D=78 INR=2.1  8/21/23 : Hgb=14, ESR=4, CRP=40, Onphslhd=990, Iron=37, INR=4.8                Alb=4 , Phos=1.8, Mag=1.6, Ca=9.3, ALP=84,      8/22/23  :   hk=603 , this weekend  had a flare, abd distension, no N/V, fever                  BMs: started # 5/6--> now # 6-7, no blood or mucous                  doesnt recall anything too solid or fiberous                  Started Pred 40mg qd, and liquid diet--> feeling better                  Less distension, passing gas and BMs  10/24/23:  qe=263, BMs: #4, 1-2x/D                   pt reports an episode of dark stool w 2-3 days ~ 9/23/23                  He held his Warfarin for a couple of days and stool returned to normal color                  He increased his Omep 40mg BID                  He had no Abd pain, N/V, ht burn, dysphagia, bloat                  9/28/23 Labs: Hgb=8.6, Iron=22, sat%=7, Ferritin=17                  10/5/23 Labs: Hgb=8.7, Iron=52, Sat=16%, Fxbwlgsp=062                  10/12/23 : Labs: Vit D=67, Ca=8, PTH=160, Mag=2                  10/20/23 Labs: Hgb=10.9, INR=1.6, CRP<5, Wceyarty=461, ALB=3.7, Ca=7.9, ALP=68 10/18/23 MRE:  persistent mural enhancement without wall thickening in distal small bowel loops, which is a nonspecific imaging sign and may reflect inflammation, however no other mural or mesenteric findings to indicate active inflammatory small bowel Crohn's disease.  No evidence of stricture. No imaging signs of penetrating disease.  Chronic central mesenteric fibrofatty proliferation. 10/27/23: Ustekinumab=7.1, Ust Abs <1.6 11/27/23 : FA=17, L04=466, Vit D=69, ca=9.1, ALP=84,  Alb=4.2                  Hgb=13.3,  PT=19, INR=1.7, Ferritin=33, Iron=46, Sat=13%,  12/5/23 : Homocysteine=18, KAZ=093 12/11/23 :  od=717, BMs: #4, 1-2x/D , Melena-->no    12/22/23  EGD:  gastritis, No HP; 2cm HH, Esophagitis A--,  + Barretts 12/22/23 Colonoscopy: mild ileitis of the most distal vladislav-TI; small superficial apthous ulcer on colonic side of anastamosis, Random  colon BX--> wnl  12/27/23 -12/31/23 PMHC for Hypophosphatemia=0.8, Rx w IV KPhos UCU=089, Vit D =66, 7.6--> Phos=1.5, Ca=8.4, Hgb=12.6 D/C on kPhos 1 gm TID seen by [facility] 1/10/24 Phos=1.7, Vit D =53, Vit D-25= 73, Ca=8.5, MPQ=360 1/18/24 Nylxzbsp=796, CRP<5, ESR=1,  Ca=7.9, Hgb=13 1/29/24:  ys=455,  BMs: #4, 1-2x/D , no  Melena 1/26/24 Dr. Reynoso Capsule--> scattered irregular appearing mucosa, possible erosions, no bleeding            for single balloon enteroscopy 2/27/24  : Labs: Vit D-25=84, Ca=8.9, RHN=975, Mag=1.7, Phos=2.8                          ALB=4, BUN=15/ 1.1, K=4.6, ALP=63  3/5/24 s/p  iron infusion in 12/8/23 , Calcium 2/23/24  , last Stelera  begining 2/2024; next begining  of 4/2024                 Today: ma=244                  BMs: #4, 1-2x/D                   Melena-->no        * Abd pain-->no * Nausea-->  * Vomit--> no * Early satiety--> no * Belching--> no * Hiccups--> no * Regurgitation--> no * Acid Taste / Water Brash--> no * Ht burn--> no * Dysphagia--> no * Throat Clearing--> no * Hoarseness--> no * Post-Nasal Drip--> no * Congestion--> no * Globus--> no * Cough--> no * Wheeze / PC-> -no * Constipation--> no * Diarrhea--> No  * Bloating--> No  * Strain on Defecation--> no * Incompl Evac--> no * Flatulence--> no * Gurgling--> no * Melena--> no * BPBPR-> -no * Anorexia--> no * Wt. Loss--> no                  PRIOR HISTORY--2017 1/17/17: Hgb=9.2, ESR=6, CRP=5; 1/19/17: BMs: #4, 5-6, 2-3x/D, Fc=954, Started Creon 1 tid cc        [address] [place] [address] beginning Feb 2/14/17: Labs; Hgb=9.6, MCV=97, ESR=5, CRP< 5, T25=843, FA>23, Iron=59, Retic =3.2,        2/17/17 Fecal Calprotectin=16, Fats: Increased neutral & Split        3/13/17: Labs Hgb=9.5, MCV=95, ESR=7, CRP <5, ALB=3.1        3/16/17: lot of stress, to move -[place], had a job-fell thru, BMs: # 5, 2-4 x/D, wt= 131w clothes        4/19/17 EGD: gastritis, MACKENZIE, No HP, 2cm HH, +Barretts, No Dysplasia        Colonoscopy: Anastamosis: open w mild -mod active colitis, enteritis severe adj to anastomosis, mild more proximal, remainder of colon-wnl, 2-3 deg int hemorrhoids        4/26/17 : Hgb=7.3, MCV=95, ESR=13, CRP<5;Immediately post procedure stools very dark on eliquis/iron.        Admitted to Psychiatric: Hgb=8.3-->8.9 after 2 U, Alb 3.3, BUN=17, creat=1.3, Malina/Rowvp=551/460        4/27/17: Alb=2.3, BUN=12, creat=1.2, Malina/Lipase=209/863-No abd pain, N/V; 5/5/17: Hgb=10.7.        5/8/17 Entyvio iv. 5/8-5/9 w dark red diarrhea; 5/10/17 Hgb=7.8.        5/11/17 Adm [facility] w stools brown, Hgb=7.9, BUN=17, Creat=1.4, Alb=2.9; S/P 2 Units- Hgb=10.6, BUN=15/1.1.        Prednisone 40mg qd, entocort d/dee.; 5/18/17: Hgb-10.4, dc=086, BMs: #5, 1-2x/D, no pain        6/8/17: Hgb=7.4,MCV=98, CRP<5-ASA stopped, Pred20--> 30        6/10/17: Hgb=8.2, Alb=2.9, BUN=20/1.2 ; 6/12/17: Hgb=8.3, Retic=0.19, Iron=10, Sat%=3, Ferritin=57, FA=23,Q02=904, 6/13/17: s/p 2 Unit PRBCs        6/15/17: started MCT oil, Tl=908 , BMs: # 5, 1-2x/D, stools are brownish/green         7/11/17: [facility] w unsteadiness. MRI Head: R Cortical Temporo-occipital encephalomalacia, MRA H&N-no sign lesions, PER-wnl.Hgb=9.9--> 8.4->9.7 after 1 Unit. Seen by Heme: received IV Iron         CRP<5, E84=902, QOL=893, FA>23,Iron=22,Sat%=6, Ferritin=42, Alb=3.5. D/C on warfarin 2mg        7/20 Hgb=8.5, 7/28 Hgb=7.7, 7/31-s/p 1 Unit         As outpt seeing Heme, to get IV Iron and 5 IV Copper        Had 'FLU' Fever=101, chills, bloat, N/V/D, Bilious. no pain, melena,brbpr        Given anti-emetic by dr Butler,then able to keep things down        On prednisone 25, warfarin w INR bet 1.6-2        8/17/17: Hgb=9.9, ESR=20, CRP-P,Ti=837, BMs: # 5, 1-2x/D, stools are brownish/green        10/2/17: Hgb=8.8, MCV=89,Phos=1.7, K=3.9, Mag=1.9, Alb=3.6,Iron<10,Ferritin=21, INR=2        10/17/17: Hgb=7.9,ESR=6,CRP<5, INR=1.6        10/25/17: Hgb=10, ESR=5, CRP=5, Alb=3.6, Phos=2, Awrbtbao=475, K61=741        11/16/17: Hgb=11.2, ESR=14, CRP=12,         11/13/17: MRE-Chr mild distension of distal & vladislav-ileum s/o mild stricturing at anast, mild mural thick & mucosal enhancemt ~ 20cm; little change from 2015 c/w mild acute on chr ileitis, 2.1 cm Liver hemangioma        11/28/17: Entyvio        11/29/17: Hgb=9.6, ESR=10, CRP=5.8, Alb=3.8,Ferritin-P, Iron=14,Sat=4%, Phos=2.5        12/19;17: Hgb=10.1, ESR=12, CRP=6.5; 12/21/17: BMs:#3-5, 1-3x/D , brown, no blood, no pain, lh=446        1/3/18:Hgb=11.7, ESR=19, CRP=6.5, Alb=4.1, Cekwcsof=798, Iron=31, Sat%=9,Zinc=55          PRIOR HISTORY---2013:         2/20/13 CT markedly dilated sb loops ext to anast, colonoscopy w open anast ,mild-mod remy-anastamotic disease. quick response to entocort/humira--probably adhesive dis.         8/10/13 w GNR bacteremia, ADA 1.7 /KAYLAH 3.4, Humira 8/7, 8/13,8/18,9/15        CT- mucosal thickening and spiculation of the distal sb extending to the anastamosis, thickening and stranding of adj mesenteric fat.        Humira increased to 40mg weekly, entocort 4        9/2013 MRE--dilated loops in mid and distal ileum, markedly thickened and narrowed TI w decreased peristalsis of TI        11/15/13 ADA-24.9, KAYLAH-0          PRIOR HISTORY---2014:         2/15/14--CT dilated loops SB, loop of sb in mid abd 4.3cm w infiltrative changes in the mesentery, bowel tapers in the RLQ to the anastamosis w/o transition        WBC=15K, Rx w IV steroids and Abx         3/7/14 SBFT: last 10cm sb prox to anast mild distension and sl irregularity. In the mid portion of this loop there is a mild narrowing which appears to reopen but is some what narrowed.        3/20/14 ADA=33.1, KAYLAH=0, Lialda switch to Apriso 4 (2/2014)          PRIOR HISTORY--2015         1/11/15 Adm [facility] w 1 day N,Recurrent V, Abd pain/distension. CT-multiple distended & fluid-filled sb loops, mild wall thickening of ileum w No inflamm changes.        WBC=14.6, Hgb=17, RX w NGT suction, Levaquin iv, Solumedrol 40mg q 12( 1/12-->1/16) to prednisone 30mg BID w 5mg taper/wk        3/18/15 --Dr. Butler w edema /High BP, prednisone was tapered slowly to 2.5mg         switched to entocort 9mg qd, edema and bp improved w lasix 20mg         BMs:#4-5, 3x/D, Hgb=11, ESR=4, CRP<5        4/28/15 - 5/1/15: Adm [facility] w 1 day Abd pain, distension,N/V. WBC=10K, HGB=15         CT Abd/Pelv: Diffusely dilated SB loops, thick walled ileum        Rx w NGT, IVF, IV Solumedrol--> Prednisone 30mg BID; tapered to 5mg---> Budesonide 9mg qd        6/10/15 Colonoscopy: Inflammatory ST mass on the ileal side of anast, opening appeared ulcerated,scarred & severely narrowed        6/11/15: [facility] w N/V x1, decr appetite, CT ABD: no obstruction, dilated thickened sb loops of distal jej and ileum        6/19/15 [facility]: s/p Lap w extensive lysis of adhesions, hepatic flex, sigmoid and sb anastamosis, s/p partial r colectomy and sb resection--side to side elisabeth: 8-10 inch from prior anast to TV colon.        7/17/15: BMs:#4-6, 4-5x/D, wt 131(from 126)        8/20/15: BMs: #4, 1-2x/D or #5, 2-3x/D, cl=704, dry cough,Hgb=10, WBC=3, LJR=378, CRP=56, ESR=21        8/25/15 CT Abd/Pelv: Svl RLQ sb loops w wall thickening, mild nodularity & inflamm stranding in mesentery        Advised-restart Entocort 9mg qd, Apriso 4 qd, Maintain Humira but given RX to check drug/Ab levels        9/15/15: did nt restart meds,Hgb=9.9, WBC=4, ESR=19, CRP=5.8, fp=587; [facility] : ADA=8.5, Antibodies< 1.7        10/15/15: No pain, BMs:#4, 1-2x/D, #5-6, 3-4x/D, throat clearing/cough-better, ta=974        11/30/15: No pain, BMs:# 4, 5-6 intermittently, No throat clear, dn=977          PRIOR HISTORY-2016         1/22/16; 4/8/16; 6/6/16 : No pain, BMs:# 4-5 1-2x/D, also #5-6 3x/D for 2-3D/wk, zs=097        7/14/16: uw=511, 9/22/16: qy=766.5        11/10/16: 9.5lb wt loss, states BMs: 5-6, 5x/D--Taking Magnesium, No pain/anorexia        Labs: Stool Stqtdmzppjsb=521, + Incr Fecal fat, Alb=3.4, FA =23, P86=304, Hgb=12, ESR=10, CRP <5        Magnesium-d/c, Obtain MRE asap--Start steroids and possibly switch to Entyvio        DDx discussed: active crohns--loss response to Humira, Malabsorption--loss Bile acids, Bile-induced diarrhea, SIBO        Pt wanted to wait for imaging-did not start rx        12/1//16 MRE: RUQ ileocolonic anastamosis--narrowed w upstream dilatation to 3.2 cm        Pt refused pred, Started Entocort 9mg qd and Flagyl 250mg qid about 7-10days ago         awaiting Entyvio load to start 12/22, then 1/5; had Cut back on iron bid        12/15/16: BMs:# 5, 2-3x/D, occas #6, No pain, Less bloat/flatulence, Ip=633.

## 2024-03-05 NOTE — ASSESSMENT
[FreeTextEntry1] : 1. CROHNS DISEASE of both small and large intestine: s/p flare 8/25-->8/30/21, then early 12/2021-s/p pred tapered over 4 weeks, again the weekend 4/9/22 rx w prednisone 40_ mg ( just before last Stelera dose) --> now off since early 5/2022;  8/19/23 w flare started prednisone 40mg & tapred over 4 weeks  ~ 9/23/23 Had dark stool x 2-3 days, Warfarin held 2 days & Omep BID, Hgb dropped to 8.6   s/p ileocolonic resection x 2--last 6/19/15 for recurrent sbos: prior was s/p 4 SBOs w/i 2 yrs--2/2 distal sb disease adj to anastamosis when on Humira weekly had an ADA =33 (3/20/14), -but had sbo 2/2014 at ADA=25 and another sbo 4/28/15 6/10/2015 Colonoscopy: Inflamm ST mass on ileal side w ulceration/scarred/narrow 6/11/2015 CT: dilated thickened sb loops distal jej & ileum Post-op 6/2015 probably some malabsorption 2/2 to removal of R Colon and ileum: had WT. Loss-->r/o malabsorption: ?bile-induced->-secretory vs decr micelles, active dis, PLE ? SIBO--Elev FA, Alb=3.4-->3.1-->2.9--> (7/28/17) ?SIBO/Prevent post-op recurrence --> trial Flagyl 250 mg qid~12/7/16-->d/c: 5/11/17 Also discussed trial of Cholestyramine/Xifaxin -wanted to wait 11/20/16 ACTIVE Crohn's--> 9.5lb wt loss, BMs: #5-6, 5x/D-- but taking Magnesium 12/1/16 MRE: RUQ ileocolonic anastamosis--narrowed w upstream dilatation to 3.2 cm Labs: Stool Geasvlvxjaza=738, + Incr Fecal fat, Alb=3.4, FA =23, X37=513, Hgb=12.3,ESR=10,CRP <5 4/19/17 :Colonoscopy: Anastamosis: open w mild -mod active colitis, enteritis severe adj to anastomosis, mild more proximal, remainder of colon=wnl 5/11/17- Adm PMHC w stools brown, but Hgb=7.9, BUN=17, Creat=1.4, Alb=2.9. S/P 2 Units w Hgb=10.6, BUN=15/1.1, Prednisone 40mg taper, entocort d/dee 6/8/17: Hgb=7.4, MCV=98, CRP<5--ASA stopped, Pred20--> 30mg, 6/13/17: s/p 2 Unit PRBCs 7/11/17 [facility] w unsteadiness. MRI Head: R Cortical Temporo-occipital encephalomalacia Hgb=9.9--> 8.4->9.7 after 1 Unit. Seen by Heme: received IV Iron CRP<5, D81=377, BFU=994, FA>23,Iron=22,Sat%=6, Ferritin=42, Alb=3.5. D/C on warfarin 2mg 7/28/17 Hgb=7.7; 7/31/17-s/p 1 Unit Heme Consultation ~ 8/2017: started IV Infusions of Iron and IV Copper 8/17/17: Hgb=9.9, ESR=20, Xt=906--Skgcyiwujr s/p GI infection w N/V/D, no obstruction 10/17/17: Hgb=7.9,ESR=6,CRP<5, INR=1.6, Got IV Iron x 2, since 10/2/17 for Iron<10, Hgb=8.8 11/16/17: Hgb=11.2, ESR=14, CRP=12, 10/26/17 Stool fat-neg, calprotectin-30 11/13/17: MRE-Chr mild distension of distal & alfredito-ileum s/o mild stricturing at anast, mild mural thick & mucosal enhancemt ~ 20cm; little change from 2015 c/w mild acute on chr ileitis, 2.1 cm Liver hemangioma 10/9/18: Hgb=11.6, MCV=94, ESR=14, CRP=5.4, INR=2.2 10/10/18 MRE: stricturing of neoterminal ileum w worsened upstream dilatation, moderate length of upstream ileum w stricturing extending at least 20cm and more extensive then previous. suggestion of 2 adj extraluminal fluid collections which may be w/i the wall of strictured ileum near the ileocolonic anastamosis pt had declined to call numbers given for IBD center at [facility] for new or investigational rx's--biologics. later he felt he was stablizing w his wt loss, and inflammatory markers  2/28/19 w ESR=12, CRP=6.5 & 2/21/19 stool calprotectin--> 232 8/15/19: ESR=10, CRP=5.2, Calprotectin--> 88 9/19/19: ESR=9, CRP=5.5 10/17/19: ESR=7, CRP< 5 11/6/19 : ESR=6, CRP=0.18 11/27/19: ESR=6, CRP <5 1/13/20: ESR=7, CRP=0.2 1/30/20: ESR=9, CRP=11  2/3/20 EGD: gastritis, +MACKENZIE, No HP, +erosion w ooze -clipped, 0.5cm polyp-neg; 4cm HH, Esophagitis A, + Barretts, VC: 1+ Colonoscopy: 05cm rectal polyp: HP, 2nd deg int hemorrhoids mild-mod activity: MARILY w cryptitis & mild archect distortion at ileocolonic anast: colon & ileal side, no stricture  3/2/20:ESR=7, CRP=0.26 3/9/20: VDZ>40, Ab to VDZ <1.6 3/17/20: ESR=6, CRP=6.4 10/17/20: ESR=11, CRP <5 1/4/21:ESR=9, CRP<5 2/22/21: ESR=8, CRP<5 4/5/21: ESR=9, CRP<5 6/4/21: ESR=9, CRP<5 6/11/21: wt= 135, no pain, stool more formed # 4, 1-2x/d  s/p episode --abd distenstion, pain, N/V, no BM  eventually started having diarrhea and flatus, BMs returned to normal  lost 2-3 lbs but regained 7/4/21: CRP <5, Hgb=12  7/16/21 MRE: limited to incomplete bowel distention, chr distal ileitis/probable inflammatory stricturing vs collapse of the alfredito-TI--but improved since 2018 , moderate proctitis 7/12/21 -- ? flare --> entyvio should have been q 5 wk , went q 8 wks  next dose should be in 4 weeks x 2 then 5 weeks, to check levels 7/20/21: Cliically stable, unclear if MRE accurate 2/2 underdistension, but gained wt and CRP--normal 7/23/21 Entyvio level= 39, Abs <1.6 8/23/21: Labs--Hgb=13.6, ESR=10, CRP=6.6, Wsrakkpt=727, Iron=26, Alb=3.9, BUN=16 8/26/21 p/w sbo w N/V, abd pain/bloating x 3 days, unable to keep things down Labs: WBC=5, Hgb=12, CRP=43, ESR=11, Alb=4.4, BUN=28, Creat=-1.6 CT Abd/Pelvis: diffuse marked dilatation of SB Loops w transition pt in R. mid/lower abdomen ~ 5-6cm, proximal to the ileocolonic anastomosis RX w IV solumedrol 20mg q8h, NGT, IVF, pt refused TPN, also w UTI from prostatitis 8/27 NGT was pulled, and clears started and advanced to LR, was d/c home 8/30 on prednisone 40mg qd w taper by 10mg/wk to 20mg qd   ( **Entyvio's :time 0=12/22/16 ( Humira 40mg QW); 1/5/17--2wks, s/p 3rd infusion at wk 6, then q 6weeks #6 :6/21/17-->held 2/2 Shingles, then Infusions as follows=9/19/17 , 10/17/17, 11/28/17, 1/16/18 ,2/16/18, 3/19/18,4/16/18, 5/14/18, 6/25/18, 8/7/18, 9/17/18, 10/16/18, 11/13/18, 12/11/18, 1/14/19, 2/15/19, 3/15/19, 5/16/19, 6/18/19,7/24/19, 9/9/19, 10/2/19, 11/4/19, 12/12/19, 1/6/20, 2/4/20, 3/3/20, 9/17/20, 10/15/20, 11/18/20, 1/11/21, 2/8/21, 3/8/21, 4/8/21, 6/3/21, 7/1/21, 8/2/21, 9/2/21,10/25/21 )  3/8/22 MRI Abd/Pelv: thickened distal Jejunal loops which are tethered; distal ileal segment (10-12cm ) previously actively inflammed has decreased & now characterized by an area of stricture, however the alfredito-TI 5mm to the anastamosis is enhanced as well as narrowed. Colon just distal to the anastamosis has a focal segment of inflammation & stricture. the recto-sigmoid appears inflammed  3/28/22: Hgb=14.4 , ESR=1, CRP <5, Ca=8.5, Mag=1.7,Alb=4.1, Iron=104, Kvtddkeb=574,  5/9//22: Hgb=13.8 , ESR=2, CRP <5, Ca=8.8, Mag=1.6, Alb=4, Iron=64, Uxvxuzhz=820 5/17/22 Dr. Heard Colonoscopy: ped scope passed w/o resist in alfredito-TI, one single apthae w psuedopolyp.  6/21/22 Labs: Hgb=14, ESR=2, CRP<5, Alb=4.5, Phos=0.8, Mag=1.8, Ca=8.5,Rgncvfjn=884, Iron=86  7/25/22 Labs: Hgb=14, ESR=2, CRP<5, Alb=4.2, Phos=1.5, Mag=1.8, Ca=8.6, Uwzuzykz=838, Iron=57, PT=24, INR=2.1 10/17/22 Labs: Hgb=12.7, ESR=2, CRP<5, Alb=4.3, Phos=2.1, Mag=1.8, Ca=9, Ferritin=57, Iron=85, PT=23, INR=2 12/16/22 : Labs: Hgb=10.8 , ESR=1, Alb=3.8, Phos=1.6, Mag=1.7, Ca=8.9 , ALP=68, Nkrccomg=707, Iron=67, INR=2.3 1/30/23: Labs: Hgb=13.6 , ESR=5, CRP<5, Alb=4.4, Phos=3, Mag=1.8, Ca=9.5, ALP=79,Ferritin=55, Iron=56,  3/27/ 23: Labs: Hgb=13.8, ESR=5, CRP<5 ,Alb=4 , Phos=1, Mag=1.8, Ca=8.5 , ALP=94, Scnhjwgc=964, Iron=78,  INR=5.9  5/22/23 Labs: Hgb=12.7,ESR=2,CRP<5, Alb=4.2, Phos=2.4, Mag=1.8, Ca=9.1, ALP=68,Ferritin=19, Iron=42, INR=2.4 8/4/23 : Alb=3.8, Phos=2.1, Mag=1.8, Ca=8.7, PIZ=884, ALP=68, Vit D=78 INR=2.1 8/21/23 : Hgb=14, ESR=4, CRP=40, Njhczsrm=064, Iron=37, INR=4.8; Alb=4 , Phos=1.8, Mag=1.6, Ca=9.3, ALP=84 9/28/23 Labs: Hgb=8.6, Iron=22, sat%=7, Ferritin=17  10/5/23 Labs: Hgb=8.7, Iron=52, Sat=16%, Twbvkjfp=848  10/12/23 : Labs: Vit D=67, Ca=8, PTH=160, Mag=2  10/20/23 Labs: Hgb=10.9, INR=1.6, CRP<5, Pdaywkvc=100, ALB=3.7, Ca=7.9, ALP=68 10/18/23 MRE: persistent mural enhancement without wall thickening in distal small bowel loops, which is a nonspecific imaging sign and may reflect inflammation, however no other mural or mesenteric findings to indicate active inflammatory small bowel Crohn's disease.  No evidence of stricture. No imaging signs of penetrating disease.  Chronic central mesenteric fibrofatty proliferation. 10/27/23: Ustekinumab=7.1, Ust Abs <1.6 11/27/23 :   Hgb=13.3,  PT=19, INR=1.7, Ferritin=33, Iron=46, Sat=13%,                    10/27/23: Ustekinumab=7.1, Ust Abs <1.6 11/27/23 : FA=17, W83=735, Vit D=69, ca=9.1, ALP=84,  Alb=4.2 12/22/23 Colonoscopy: mild ileitis of the most distal alfredito-TI; small superficial apthous ulcer on colonic side of anastamosis, Random  colon BX--> wnl           12/27/23 -12/31/23: Phos=0.8, JJE=586, Vit D =66, Ca=7.9 --> Phos=1.5, Ca=8.4, Hgb=12.6, Alb=3.5 1/10/24 Phos=1.7, WMU=299, Ca=8.5, Vit D =53, Vit D-25= 73,  1/18/24 :  Hgb=13, Newpkiat=308,Iron=67,  CRP<5, ESR=1,  Ca=7.9, Alb=4.1, ALP=84  A / PLAN: Recent GI Bleed on warfarin, although MRE looks better, probably from ileum, Hgb improving  h/o sbo's prox to anastamosis  inflammatory vs fibrosis vs combination: 3/2022 MRI shows improvement of inflammation w possible residual stricture in the ileum and probable colitis near the anastamosis and distal colon  Responded to steroids iv--> po--d/c ~ 10/20/21,  tapered steroids from 40mg qd to 20mg, [id number] qd and taper 5mg/wk  then po taper again early 12/2021 40mg qd w 10mg taper/ wk--  PO prednisone 40mg mg qd started on 4/9/22 ,tapered off ~ early 5/2022  PO prednisone 40mg mg qd started on 8/19/23, taper 10mg/wk--> 20mg then 5 mg /wk   Entyvio level was 39 w/o Abs~ 3weeks after 7/1/21 dose ,  speaks to inflammatory component & probably loss of response  Stelara # 1 dose on 10/25/21, #2 on 12/10/21, # 3 on 2/11/22, # 4 on 4/20/22 , #5 mid June 2022,  #6 mid 8/2022, #7 beginning 12/2022; #8 on 2/2/23, #9 on 4/2023, #10 on 6/2023 , last dose--> 2/2024    IBD consensus : due to malnutrition /steroid dependency, surgery is next best option  but pt reported feeling well and wanted to pursue medical treatment  repeated imaging in March after 3 months of Stelara, then consider surgery vs improved candidacy for endoscopic manipulation  f/u w IBD consultant Dr. Heard at  & reviewed imaging after 3 months of Stelera  for Colonoscopy ( w possible balloon dilatation )-> last 1 3/4 yrs ago   5/17/22 Dr. Heard Colonoscopy: ped scope passed w/o resist in alfredito-TI, one single apthae w psuedopolyp.  No Dilatation need, very mild dis at Alfredito-TI, MRE may show wall thickening, sbo's probably adhesive dis 12/22/23 Colonoscopy: mild ileitis of the most distal alfredito-TI; small superficial apthous ulcer on colonic side of anastamosis, Random  colon BX--> wnl        1/26/ 24 Dr. Reynoso Capsule--> scattered irregular appearing mucosa, possible erosions, no bleeding       1/4/22 Labs: Hgb=11.5, ESR=6, CRP<5, Alb=3.9 1/31/22 Calprotectin =84 2/14/22: Hgb=10.6, ESR=9, CRP <5, Ca=8.3, Mag=1.6, Alb=3.8, Iron=25, Ferritin=15 3/28/22: Hgb=14, ESR=1, CRP<5 , Ca=8.5, Mag=1.7, Alb=4, Iron=104, Heoxfmcv=852 5/9//22: Hgb=13.8 , ESR=2, CRP <5, Ca=8.8, Mag=1.6, Alb=4, Iron=64, Cyfevspt=553  6/21/22 : Hgb=14, ESR=2, CRP<5, Ca=8.5, ,Alb=4.5, Mag=1.8, Iron=86,Tjwvrzfa=532,  7/25/22 : Hgb=14, ESR=2, CRP<5, Alb=4.2, Phos=1.5, Mag=1.8, Ca=8.6, Gcjdlptv=789, Iron=57 10/17/22: Hgb=12.7 , ESR=2, CRP<5, Alb=4.3, Phos=2.1, Mag=1.8, Ca=9, Ferritin=57, Iron=85  12/16/22 : Labs: Hgb=10.8 , ESR=1, Alb=3.8, Phos=1.6, Mag=1.7, Ca=8.9 , ALP=68, Orytxzah=401, Iron=67,  1/30/23: Labs: Hgb=13.6 , ESR=5, CRP<5, Alb=4.4, Phos=3, Mag=1.8, Ca=9.5, ALP=79,Ferritin=55, Iron=56,  3/27/ 23: Labs: Hgb=13.8, ESR=5, CRP<5 ,Alb=4 , Phos=1, Mag=1.8, Ca=8.5 , ALP=94, Pwvvtkzf=045, Iron=78, INR=5.9  5/22/23 Labs: Hgb=12.7,ESR=2,CRP<5, Alb=4.2, Phos=2.4, Mag=1.8, Ca=9.1, ALP=68,Ferritin=19, Iron=42, INR=2.4  8/4/23 : Alb=3.8, Phos=2.1, Mag=1.8, Ca=8.7, QSJ=198, ALP=68, Vit D=78 INR=2.1  8/21/23 : Hgb=14, ESR=4, CRP=40, Alb=4 , Phos=1.8, Mag=1.6, Ca=9.3, ALP=84,Snemmaog=273, Iron=37, 9/28/23 Labs: Hgb=8.6, Iron=22, sat%=7, Ferritin=17 10/5/23 Labs: Hgb=8.7, Iron=52, Sat=16%, Kudgptya=263 10/12/23 : Labs: Vit D=67, Ca=8, PTH=160, Mag=2  10/20/23 : Hgb=10.9, INR=1.6, CRP<5, Sqcnvsdq=206, ALB=3.7, Ca=7.9, ALP=68  10/27/23: Ustekinumab=7.1, Ust Abs <1.6  11/27/23 :   Hgb=13.3,  PT=19, INR=1.7, Ferritin=33, Iron=46, Sat=13%,                     FA=17, H92=603, Vit D=69, ca=9.1, ALP=84,  Alb=4.2 1/18/24 :  Hgb=13, Sjmyoqrh=150,Iron=67,  CRP<5, ESR=1,  Ca=7.9, Alb=4.1, ALP=84 2/27/24  : Labs: Vit D-25=84, Ca=8.9, QAX=432, Mag=1.7, Phos=2.8;  ALB=4, ALP=63 Probiotics 3 qd Diet: LR, Lactose free, protein drinks tid MCT oil begun but never maintained **trend --cbc, esr, crp, albumin, calprotectin   for single balloon enteroscopy  **monitor wt--- usu bet 136-139, 7/20/21=136, 11/22/21=139, 1/4/22=132, 2/22/22=132, 4/5/22=131,5/10/28=427,  6/21/22=133, 7/27/22=136, 12/19/22=138; 2/6/29=503, 3/28/59=710, 5/23/23 =137, 8/22/23=135,10/24/73=345, 12/11/23=136,1/29/24= 135,  3/5/24 = wt= 137         2. Iron deficiency anemia : --> recent drop from GIB--> 9/2023   had initially dropped , after clinical flare and post procedure bleeding Probably multifactorial: ACD, Blood loss, malabsorption/nutrient deficiencies Eliquis-->warfarin after CVA, On Entyvio 7/11/17 s/p Adm for unsteady gait w MRI c/w CVA--warfarin begun: possible better control of AC Chromagen 2 qd--> IV Iron , s/p IV Cu x 5, FA 1mg qd , B12 SL & IM Still requiring IV Iron and cu infusions prn  most recent IV Iron infusion x for Ferritin = 33 2/22/21: T65=167, 6/4/21: Cu=91, Zinc=69, Ferritin=32,Iron=43, 7/12/21: Nj=132, Zinc=74, Mwtfwquh=751, Iron=35 11/15/21 : Hgb=12, Ferritin =104, Ca=9, Mg=1.7 1/4/22: Hgb=11.5, Ca=8.8, Mg=2.9, Diampsz=459 2/14/22: Hgb=10.6, Ca=8.3, Mag=1.6, Alb=3.8, Iron=25, Ferritin=15 3/28/22: Hgb=14.4 , Ca=8.5, Mag=1.7,Alb=4.1, Iron=104, Yavjarfi=539,  5/9//22: Hgb=13.8 , Ca=8.8, Mag=1.6, Alb=4, Iron=64, Cwftmamp=641 6/20/22: Hgb=14.4 , Ca=8.5, Mag=1.8, Alb=4.5, Iron=96, Giurmnog=926 7/25/22 Labs: Hgb=14,Ca=8.6, Mag=1.8,Alb=4.2,Iron=57 Plzmkjxx=252, , PT=24, INR=2.1 10/17/22 Labs: Hgb=12.7,Ca=9, Mag=1.8,Alb=4.3,Iron=85 Ferritin=57, , PT=23, INR=2 12/16/22 : Labs: Hgb=10.8 , Alb=3.8, Iron=67,Qipahrfv=134, , INR=2.3 1/30/23: Labs: Hgb=13.6 , Ca=9.5, Mag=1.8, Alb=4.4,Ferritin=55, Iron=56, INR=1.6  3/27/ 23: Labs: Hgb=13.8, ESR=5, CRP<5 , Mag=1.8, Alb=4, Eufhugvb=027, Iron=78, INR=5.9  5/22/23 Labs: Hgb=12.7,ESR=2,CRP<5, Alb=4.2,Ferritin=19, Iron=42, INR=2.4 8/21/23 : Labs: Hgb=14, ESR=4, CRP=40, Tselhglm=584, Iron=37, INR=4.8; Alb=4 , 9/28/23 Labs: Hgb=8.6, Iron=22, sat%=7, Ferritin=17 10/5/23 Labs: Hgb=8.7, Iron=52, Sat=16%, Catixerb=624 10/20/23 : Hgb=10.9, INR=1.6, CRP<5, Aqzlhgaf=620,  11/27/23 :   Hgb=13.3, INR=1.7, Iron=46, Sat=13%, Ferritin=33, FA=17, U58=843, 1/18/24 :  Hgb=13, INR=2.1,Iron=67, Lwpxoobs=147, CRP<5, ESR=1,  Ca=7.9, Alb=4.1, ALP=84 12/22/23  EGD:  gastritis, No HP; 2cm HH, Esophagitis A--,  + Barretts 12/22/23 Colonoscopy: mild ileitis of the most distal alfredito-TI; small superficial apthous ulcer on colonic side of anastamosis, Random  colon BX--> wnl 1/26/ 24 Dr. Reynoso Capsule--> scattered irregular appearing mucosa, possible erosions, no bleeding          B12 1cc IM ____L. delt--   given today, trend cbc, B12, FA, Iron panel Adj INR--closer to low 2's.    for single balloon enteroscopy        3. Osteoporosis : Progression on BD from 5/5/16 Crohns, h/o steroid use Calcium citrate & Vit D per Endo Forteo since 5/10/19 , then Reclast Repeat BD of 8/2018 --showed worsening to Osteoporosis from 2016 Rec Endo consult w Dr. Akers :Osteoporosis center at [facility]--pt never went originally 9/21/18: Phos=2.4, Ca=8.7, Alb=3.4, Vit D=27, EIN=912, 10/16/18: Phos=2.8, Ca=8.7, Alb=3.5, 1/14/19: Phos=2.6, Ca=8.7, Alb=3.6 2/28/19: Phos=1.8, Ca=8.9, Alb=3.7, VitD=39, SSF=911 3/18/19: Ca=8.5, Alb=3.7, Vit D=44.6, PTH=93 7/11/19: Ca=9.1, Alb=3.7, Phos=3.9, Vit D=40/ 306, MSX=213 8/15/19: Ca=9, Alb=4.2, Phos=4.4, VitD=59, DQH=740 10/17/19: Ca=9.6, Alb=3.9, Phos=3.5 11/6/19: Ca=9.1, Alb=3.9. Phos=3.7, VitD=50, PTH=80 1/13/20: ca=9.1 1/30/20: Ca=9.2, Alb=3.9, Phos=2.7, Mag=1.5, Vit D=103/41, PTH=87 2/18/20:ca=8.5, Alb=3.6, 3/2/20: Ca=8.5, Alb=3.6 3/17/20: Ca=9.1, Alb=3.7, Phos=3.4, Mag=1.7 10/17/20: Ca=8.9, Alb=4, Phos=2.3, Mag-2.0, Vit D=66, PTH=47 1/4/21 : Ca=9.4, Alb=4.2, Phos=2.7, Mag=2, Vit D=64, PTH=87.5 4/5/21: Ca=9.1, Alb=4, Phos=3.1, Mag=1.7, 6/4/21: ca=9, Alb=3.8, Phos=2.8, Mag=1.8 7/12/21: Ca=8.6, ALB=6, phosph=2.3, Mag=1.8 11/15/21: Ca=9, Alb=3.7, Mag=1.7 1/4/22: ca=8.8, Alb=3.9, Mg=2.9, phos=2.9 1/21/22: Ca=8.8, Alb=3.9, Vit D=60, PTH=85 2/14/22: Ca=8.3, Mag=1.6, Alb=3.8, 3/28/22: Ca=8.5, Mag=1.7, Alb=4.1, Phos=1.2  5/9//22: Ca=8.8, Mag=1.6, Alb=4, Phos=1.6  5/27/22: Ca=9.2 , Mag=1.9, Alb=3.8 , Phos=2.7, PTH=72, VitD=105  6/20/ 22: Ca=8.5 , Mag=1.8, Alb=4.5 , Phos=0.8, NUL=603,  7/25/22 : Ca=8.6, Mag=1.8 , Alb=4.2, Phos=1.5,  10/17/22: Ca=9, Mag=1.8 , Alb=4.3, Phos=2.1, PTH=95, Vit D=25  12/16/22 : Labs: Ca=8.9, Mag=1.7, Alb=3.8, Phos=1.6,  1/30/23 Labs: Ca=9.5, Mag=1.8, ALP=79, Alb=4.4, Phos=3, PTH=96, Vit D =144  3/27/ 23: Labs: Ca=8.5, Mag=1.8 , ALP=94, Alb=4 , Phos=1,  4/21/23 Labs: Ca=8.9, Mag=1.9, ALP=75, Alb=4., Phos=2.9, PTH=89, Vit D =108  5/22/23 Labs:Ca=9.1,Mag=1.8 , ALP=68,Alb=4.2, Phos=2.4,  8/4/23 : Labs Ca=8.7, Mag=1.8, ALP=68, Alb=3.8, Phos=2.1, AAC=618, Vit D=78 8/21/23 : Labs: Ca=9.3, Mag=1.6, ALP=84, Alb=4 ,Phos=1.8 10/12/23 : Labs: Ca=8, PTH=160, Mag=2, Vit D=67, 10/20/23 Labs: Ca=7.9, ALP=68,ALB=3.7, 11/27/23 :  Ca=9.1, ALP=84,  Alb=4.2Vit D=69, 12/27/23 -12/31/23:  Ca=7.9, ALP=98,  Phos=0.8, IAB=207, Vit D =66, --> Phos=1.5, Ca=8.4, ALP=85,Alb=3.5 1/10/24:  Ca=8.5, ALP=87,  Phos=1.7, IFH=266,  Vit D 1,25=53, Vit D-25= 73,  1/18/24 :  Ca=7.9,  ALP=84,   Alb=4.1,  2/27/24  : Labs: Ca=8.9, ALP=63, ALB=4,Phos=2.8, QTN=026, Vit D 1,25=70, Vit D-25=84,  Mag=1.7,                           Dr. Akers: Hyperparathyroidism-- probably secondary due to low Vit D/Ca & ? superimposed primary, [facility] ENT Consult init felt Parathyroid scan showing activity in mediastinum is ectopic parathyroid, then felt sx not indicated at that time, elev PTH is secondary to low Vit D/Ca ? Vit D Resistance--> in the past did well w Vit D,1,25--> 86-97 & Vit D25-->  but Ca~ 9.4 Rx replete Vit D and current IV Calcium-as per endo  trend Vit D, Ca, Phos, PTH,  BD--9/2020: inprovmt in spine and hip , no change in wrist, BD--10/17/22: Decr T score in spine & hip, incr in wrist, repeat--> BD--2/22/24: Spine T= -1.2, Fem neck T= -2.1; Total Hip T= -2;  Forearm T= -2.2 --> spine & hip were better 10/5/20, 9/2021,6/2022, 1/2/24 -s/p Reclast--repeat-->      4. GERD: recently less active by ENT , no ht burn, dysphagia, + throat clear  h/o Dry cough, CXR:ana, saw ENT bergstein-->LPR * ++ LPR, ++ Page's w No Dysplasia, + H/O Esophagitis grade: A was found  Recommend: * Anti-reflux diet & life-style changes reviewed & re-emphasized. ++ Bedge required * Weight reduction & regular exercise emphasized  * need for PPI: Omep 40 BID--> 40mg qd , ++ H2B q HS:Zantac 300mg--> Pepcid 40mg I reviewed & summarized the prospective randomized & retrospective non-randomized studies looking at potential long term SE's of PPIs, w special attention to associations & actual cause as related to GI infections, bone loss, cognitive changes, KD, Covid, vitamin & electrolyte deficiencies questions were answered, pt advised that PPIs should be used when needed as indicated by their clinical indication and response and tapering off is always the goal if possible. pt understood.  * F/U EGD: --> 2026--for Barretts screening / surveillance * No need for pH Monitor, Manometry, Esophagram -- * ++ need for ENT eval/F/U, No need for Surgical eval --      5. Hemorrhoids: well - controlled. No pain, swelling, itch, bleeding * Discussed previously the potential complications of thrombosis, pain, infection, swelling, itching, bleeding Reccomend: * currently Low - Fiber Diet was emphasized * 6 -- 8 cups of decaffeinated fluid daily was emphasized * Sitz Bathes prn, No: Anusol HC Suppos / Cream MO BID -- was needed * No: Tucks BID, Balneol Lotion, Calmoseptine Oint -- was needed ; ++ Prep H prn * No: need for Colorectal surgical evaluation for possible ablation       6.  Hepatomegaly-->not confirmed by recent abd sono CTE w relative enlargemt, ? lobulation & enlarged portal/mesenteric veins LFTs-wnl, no ascites or edema R/O CLD, Hepatic vein thrombosis Abd sono w doppler--> Liver and spleen normal size, no thrombosis, normal portal and hepatic vein flow

## 2024-03-12 ENCOUNTER — RESULT REVIEW (OUTPATIENT)
Age: 59
End: 2024-03-12

## 2024-03-12 ENCOUNTER — APPOINTMENT (OUTPATIENT)
Dept: HEMATOLOGY ONCOLOGY | Facility: CLINIC | Age: 59
End: 2024-03-12

## 2024-03-12 VITALS
RESPIRATION RATE: 16 BRPM | TEMPERATURE: 98 F | HEIGHT: 68 IN | DIASTOLIC BLOOD PRESSURE: 87 MMHG | SYSTOLIC BLOOD PRESSURE: 131 MMHG | OXYGEN SATURATION: 99 % | HEART RATE: 87 BPM | BODY MASS INDEX: 20.5 KG/M2 | WEIGHT: 135.25 LBS

## 2024-03-15 ENCOUNTER — APPOINTMENT (OUTPATIENT)
Dept: ENDOCRINOLOGY | Facility: CLINIC | Age: 59
End: 2024-03-15
Payer: COMMERCIAL

## 2024-03-15 DIAGNOSIS — R73.9 HYPERGLYCEMIA, UNSPECIFIED: ICD-10-CM

## 2024-03-15 DIAGNOSIS — E89.0 POSTPROCEDURAL HYPOTHYROIDISM: ICD-10-CM

## 2024-03-15 DIAGNOSIS — E60 DIETARY ZINC DEFICIENCY: ICD-10-CM

## 2024-03-15 DIAGNOSIS — D80.1 NONFAMILIAL HYPOGAMMAGLOBULINEMIA: ICD-10-CM

## 2024-03-15 LAB
24R-OH-CALCIDIOL SERPL-MCNC: 70.6 PG/ML
25(OH)D3 SERPL-MCNC: 83.9 NG/ML
ALBUMIN SERPL ELPH-MCNC: 4 G/DL
ALP BLD-CCNC: 63 U/L
ALT SERPL-CCNC: 32 U/L
ANION GAP SERPL CALC-SCNC: 8 MMOL/L
AST SERPL-CCNC: 31 U/L
BILIRUB SERPL-MCNC: 0.3 MG/DL
BUN SERPL-MCNC: 15 MG/DL
CA-I SERPL-SCNC: 4.8 MG/DL
CALCIUM SERPL-MCNC: 8.9 MG/DL
CALCIUM SERPL-MCNC: 8.9 MG/DL
CAU: 7 MG/DL
CHLORIDE SERPL-SCNC: 107 MMOL/L
CO2 SERPL-SCNC: 25 MMOL/L
COLLAGEN CTX SERPL-MCNC: 90 PG/ML
CREAT 24H UR-MCNC: 1.3 G/24 H
CREAT 24H UR-MCNC: 1.3 G/24 H
CREAT ?TM UR-MCNC: 86 MG/DL
CREAT ?TM UR-MCNC: 86 MG/DL
CREAT SERPL-MCNC: 1.15 MG/DL
EGFR: 73 ML/MIN/1.73M2
GLUCOSE SERPL-MCNC: 90 MG/DL
MAGNESIUM SERPL-MCNC: 1.7 MG/DL
OSTEOCALCIN SERPL-MCNC: 5.6 NG/ML
PARATHYROID HORMONE INTACT: 125 PG/ML
PHOSPHATE SERPL-MCNC: 2.8 MG/DL
POTASSIUM SERPL-SCNC: 4.6 MMOL/L
PROT ?TM UR-MCNC: 24 HR
PROT ?TM UR-MCNC: 24 HR
PROT SERPL-MCNC: 5.9 G/DL
SODIUM SERPL-SCNC: 140 MMOL/L
SPECIMEN VOL 24H UR: 108 MG/24 H
SPECIMEN VOL 24H UR: 1550 ML
SPECIMEN VOL 24H UR: 1550 ML
T4 FREE SERPL-MCNC: 1.4 NG/DL
THYROGLOB AB SERPL-ACNC: <20 IU/ML
THYROGLOB SERPL-MCNC: <0.2 NG/ML
TSH SERPL-ACNC: 1.49 UIU/ML

## 2024-03-15 PROCEDURE — 99215 OFFICE O/P EST HI 40 MIN: CPT

## 2024-03-15 RX ORDER — ERGOCALCIFEROL 1.25 MG/1
1.25 MG CAPSULE, LIQUID FILLED ORAL
Qty: 12 | Refills: 1 | Status: ACTIVE | COMMUNITY
Start: 2018-12-11

## 2024-03-15 NOTE — REASON FOR VISIT
[Follow - Up] : a follow-up visit [Hypothyroidism] : hypothyroidism [Thyroid Cancer] : thyroid cancer [Osteoporosis] : osteoporosis

## 2024-03-16 ENCOUNTER — NON-APPOINTMENT (OUTPATIENT)
Age: 59
End: 2024-03-16

## 2024-03-20 ENCOUNTER — RX RENEWAL (OUTPATIENT)
Age: 59
End: 2024-03-20

## 2024-03-29 PROBLEM — R73.9 HYPERGLYCEMIA: Status: ACTIVE | Noted: 2021-09-24

## 2024-03-29 PROBLEM — E89.0 STATUS POST TOTAL THYROIDECTOMY: Status: ACTIVE | Noted: 2020-09-17

## 2024-03-29 PROBLEM — E60 ZINC DEFICIENCY: Status: ACTIVE | Noted: 2019-10-17

## 2024-03-29 PROBLEM — D80.1 HYPOGAMMAGLOBULINEMIA: Status: ACTIVE | Noted: 2018-11-28

## 2024-03-29 NOTE — HISTORY OF PRESENT ILLNESS
[Home] : at home, [unfilled] , at the time of the visit. [Medical Office: ([facility])___] : at the medical office located in  [Verbal consent obtained from patient] : the patient, [unfilled] [FreeTextEntry1] : Last Reclast 6/17/2022 Mr. GUDELIA DUNN is a 58 year old male  coming for a f/u past hospital admission for severe hypophosphatemia secondary to hyperparathyroidism which was secondary to hypocalcemia, has not been taking his calcium supplements for few days as he was not feeling good Seen me for severe osteoporosis with h/o bilateral hip fractures, hyperparathyroidism, low D and hypocalcemia secondary to Chron's disease on Stelara shot every other month Dr Morton  on Vit D 50,000 IU 3 times a week alternating with 2 times a week every other week , he held it for a month of May only, levels high 4/23  started Forteo May 10 2019 then first Reclast infusion in May 2021 with no side effects last DEXA 10/2022 mildy worse after 9/21 much improved likely due to not enough calcium  on IV calcium each Friday at the infusion center, last one a month ago  labs done much improved   Ca citrate 950mg 3 tab bid  Clacium IV every other week  also iron IV every 6 weeks  reports compliance   24hr urine low calcium while low D    total thyroidectomy and central compartment  dissection 9/11/2020 Pathology showing 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 No evidence of lymphatic or vascular invasion no extra thyroid extension. 8 of 10 lymph nodes positive for metastatic papillary thyroid carcinoma.  Large test metastatic focus is 0.7 cm in greatest dimension extra low dose extension present. Thyroid gland left sided within normal limits. Tihymic tissue present.  received iodine 131 at 29.3 mCi in October 2020 Posterior ideation scan showed focal site of increased tracer localization in the neck to the right of midline as was evident on prior study on October 23, 1920.  started Levothyroxine 125 mcg on 9/18/2020 no side effects dose increased to 137 mcg on November 2020  last DEXA 9/2020 As compared to the patient's most recent study dated 9/12/2019, there has been a statistically significant interval increase in bone density at both the lumbar spine and left hip with no s tatistically significant change noted at the left forearm. worse T score LFN -2.8  pt went back to 2 vid D per week , Dr Mims told him he has resistance to Vit D , trying for the last month has Calcium 9.1

## 2024-03-29 NOTE — ASSESSMENT
[0] : 2) Feeling down, depressed, or hopeless: Not at all (0) [PHQ-2 Negative - No further assessment needed] : PHQ-2 Negative - No further assessment needed [[id number]] : 0 [FreeTextEntry1] : phosphorus added to the lab,  d/w Lui at [facility] lab higher iPTH will separate phosphorus from calcium intake and take 2tab tid repeat labs with Dr Garcia has appointment on Wednesday , unless phosphate critically low again will follow phosphate level  I called the pt, plan discussed

## 2024-03-29 NOTE — PHYSICAL EXAM
[Alert] : alert [Well Nourished] : well nourished [No Acute Distress] : no acute distress [Well Developed] : well developed [Normal Sclera/Conjunctiva] : normal sclera/conjunctiva [No Proptosis] : no proptosis [EOMI] : extra ocular movement intact [Normal Oropharynx] : the oropharynx was normal [Thyroid Not Enlarged] : the thyroid was not enlarged [No Thyroid Nodules] : no palpable thyroid nodules [No Respiratory Distress] : no respiratory distress [No Accessory Muscle Use] : no accessory muscle use [Clear to Auscultation] : lungs were clear to auscultation bilaterally [Normal Rate] : heart rate was normal [Normal S1, S2] : normal S1 and S2 [Regular Rhythm] : with a regular rhythm [No Edema] : no peripheral edema [Pedal Pulses Normal] : the pedal pulses are present [Normal Bowel Sounds] : normal bowel sounds [Not Tender] : non-tender [Not Distended] : not distended [Normal Anterior Cervical Nodes] : no anterior cervical lymphadenopathy [Soft] : abdomen soft [No Spinal Tenderness] : no spinal tenderness [Spine Straight] : spine straight [Normal Gait] : normal gait [No Stigmata of Cushings Syndrome] : no stigmata of Cushings Syndrome [No Rash] : no rash [Normal Strength/Tone] : muscle strength and tone were normal [Normal Reflexes] : deep tendon reflexes were 2+ and symmetric [No Tremors] : no tremors [Oriented x3] : oriented to person, place, and time [Acanthosis Nigricans] : no acanthosis nigricans

## 2024-04-04 ENCOUNTER — RESULT REVIEW (OUTPATIENT)
Age: 59
End: 2024-04-04

## 2024-04-05 ENCOUNTER — TRANSCRIPTION ENCOUNTER (OUTPATIENT)
Age: 59
End: 2024-04-05

## 2024-04-05 ENCOUNTER — APPOINTMENT (OUTPATIENT)
Dept: GASTROENTEROLOGY | Facility: HOSPITAL | Age: 59
End: 2024-04-05

## 2024-04-16 ENCOUNTER — APPOINTMENT (OUTPATIENT)
Dept: GASTROENTEROLOGY | Facility: CLINIC | Age: 59
End: 2024-04-16

## 2024-04-18 ENCOUNTER — RX RENEWAL (OUTPATIENT)
Age: 59
End: 2024-04-18

## 2024-05-09 ENCOUNTER — RESULT REVIEW (OUTPATIENT)
Age: 59
End: 2024-05-09

## 2024-05-09 ENCOUNTER — APPOINTMENT (OUTPATIENT)
Dept: HEMATOLOGY ONCOLOGY | Facility: CLINIC | Age: 59
End: 2024-05-09

## 2024-05-09 VITALS
TEMPERATURE: 97.5 F | RESPIRATION RATE: 16 BRPM | HEIGHT: 68 IN | OXYGEN SATURATION: 98 % | HEART RATE: 82 BPM | BODY MASS INDEX: 20.24 KG/M2 | DIASTOLIC BLOOD PRESSURE: 80 MMHG | WEIGHT: 133.56 LBS | SYSTOLIC BLOOD PRESSURE: 116 MMHG

## 2024-05-20 RX ORDER — USTEKINUMAB 90 MG/ML
90 INJECTION, SOLUTION SUBCUTANEOUS
Qty: 1 | Refills: 3 | Status: ACTIVE | COMMUNITY
Start: 2021-10-01 | End: 1900-01-01

## 2024-05-28 ENCOUNTER — APPOINTMENT (OUTPATIENT)
Dept: GASTROENTEROLOGY | Facility: CLINIC | Age: 59
End: 2024-05-28
Payer: COMMERCIAL

## 2024-05-28 DIAGNOSIS — Z12.11 ENCOUNTER FOR SCREENING FOR MALIGNANT NEOPLASM OF COLON: ICD-10-CM

## 2024-05-28 DIAGNOSIS — K64.8 OTHER HEMORRHOIDS: ICD-10-CM

## 2024-05-28 DIAGNOSIS — K22.70 BARRETT'S ESOPHAGUS W/OUT DYSPLASIA: ICD-10-CM

## 2024-05-28 DIAGNOSIS — K21.00 GASTRO-ESOPHAGEAL REFLUX DISEASE WITH ESOPHAGITIS, WITHOUT BLEEDING: ICD-10-CM

## 2024-05-28 DIAGNOSIS — E53.8 DEFICIENCY OF OTHER SPECIFIED B GROUP VITAMINS: ICD-10-CM

## 2024-05-28 PROCEDURE — 99214 OFFICE O/P EST MOD 30 MIN: CPT

## 2024-05-28 NOTE — HISTORY OF PRESENT ILLNESS
[de-identified] :    This HPI  reflects a summary and review of records : including previous and most recent  Labs, body imaging, consults and progress notes, operative and pathology reports, EKG reports, ED records, found in [employer], [employer],  [employer] and any additional records brought in by  the patient at the time of the visit.            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-->Eliquis' warfarin         GERD w Page's, Hemorrhoids, BPH,         S/P Ileocolonic resection 1998        Since then multiple SBOs          Got IV Iron x 2, since 10/2/17        10/19/17: feeling better, Kb=391 on prednisone 15mg x 3weeks, BMs Brown: #5-6, 1-2/D, occas 3-4/d        10/25/17: Hgb=10, ESR=5, CRP=5, Alb=3.6, Phos=2, Qjvbviuy=295, F00=593        11/16/17: Hgb=11.2, ESR=14, CRP=12,         11/13/17: MRE-Chr mild distension of distal & vladislav-ileum s/o mild stricturing at anast, mild mural thick & mucosal enhancemt ~ 20cm; little change from 2015 c/w mild acute on chr ileitis, 2.1 cm Liver hemangioma        11/28/17: Entyvio        11/29/17: Hgb=9.6, ESR=10, CRP=5.8, Alb=3.8,Ferritin-19, Iron=14,Sat=4%, Phos=2.5, Iz=416, BMs:# 5, 1-2x/D, brown,        12/19;17: Hgb=10.1, ESR=12, CRP=6.5; 12/21/17: BMs:#3-5, 1-3x/D , brown, no blood, no pain, ws=933        1/3/18:Hgb=11.7, ESR=19, CRP=6.5, Alb=4.1, Syyvdwnw=046, Iron=31, Sat%=9,Zinc=55        1/16/18: Entyvio, Hgb=11.4, ESR=10, CRP=6.9        1/18/18: Today: cellulitis R foot, saw dr KEITH--rx augmentum x 10D, Entyvio 1/9 to 1/16, aq=299 w clothes,BMs: # 4-5, 1-2x/D         2/14/18: Hgb=11.1, ESR=16, CRP=11.6, Alb=3.6, Iron=28, Ferritin=23, INR=1.5         2/16/18: s/p Entyvio; Iron infusion, again on 2/27 2/20/18: Hgb=10.6, ESR=20, CRP=12, INR=1.7        2/27/18: s/p iron infusion        3/9/18: Dr. Burden for tenosynovitis, rx w Zorvolex: Diclofenac x 5 days--? response        3/14/18: Rg=187; BMs: # 5, 1-2x/D; Hgb=11, ESR=18, CRP=11,Irom=45,Hzqetnnv=647,Alb=3.6, INR=1.5        3/19/18: s/p Entyvio        3/22/18: ao=846, BMs: # 5, 3-4 x/d        4/16/18: s/p Entyvio,Hgb=11.9, INR=1.3, ESR=18, CRP=8.4         4/18/18 EGD: gastritis, no HP, no IM, 2+ Mucous, 0.5cm gastric polyp: fundic, 4cm HH, + Barretts:3cm, no dysplasia        4/25/18: Hgb=11.5, INR=1.6, Iron=40, Sat=13%, Ferritin=57, Alb=3.2, Phos=2.2, Mag=1.7        4/30/18: s/p Iron Infusion        5/14/18: s/p Entyvio         5/23/18: Hgb=11.5, ESR=16, CRP< 5        5/29/18: s/p Iron Infusion        6/6/18: Hgb=10.6, INR=1.7, Blyzlpkh=351, Alb=3.5, Phos=2.1, Y69=644, ev=332; BMs: # 5, 2-3x/D         6/19/18: Hgb=10.6, INR=1, CRP=15, ESR=23        6/21/18: R ankle is acting up, swollen, pain, having MRI done, took a couple of advil, on low carbs ,BMs: # 5, 1-2x/D, nr=397        6/26/18: MRI: fx/stress rxn-talar body/navicula; stress related changes--cuneform, cuboid,distal tibia; mod tibiotalar effusion, mild-mod peroneal tendinosis        7/19/18: entyvio 6/26/18, eliminated all carbs--anti inflammatory diet, fs=252, BMs: # 4-5, 1-3x/D         7/25/18: Hgb=10, INR=1.3, Alb=3.3, Phos=2.4, Hcmunyzy=024, sat%=12, Iron=35        8/2/18: BD--osteoporosis of hip/spine: sig decrease BD--17.6% of hip, 17% of spine, osteopenia of wrist: 6.4%        8/7/18: Entyvio        8/21/18: Hgb=11.1, INR=1.5, ESR=19, CRP=18.6        8/23/18: recently w tooth infection, old implant--loose and removed; rx w amox, and advil        eating almost no carbs, aj=401, # 5-6, 2-3x/d         8/24/18: Stool fat--neg, Yryxpqkvotzi=849        9/5/18: Hgb=11.4, INR=1.3, ESR=9, CRP=11.3, Iron=41, Kqrimggo=756, Alb=3.8,        9/17/18: entyvio, Hgb=10.7, INR=2.2, ESR=17, CRP=9.1,         9/20/18: sn=543, BMs: # 5-6, 1-2x/D         9/21/18: Phos=2.4, Ca=8.7, Alb=3.4, Vit D=27, YZN=438,         Dr. Akers: Hyperparathyroidism: probably secondary due to low Vit D/Ca & ? superimposed primary, rx replete Vit D and Calcium        10/9/18: Hgb=11.6, MCV=94, ESR=14, CRP=5.4, INR=2.2        10/10/18 MRE: stricturing of neoterminal ileum w worsened upstream dilatation, moderate length of upstream ileum w stricturing extending at least 20cm and more extensive then previous. suggestion of 2 adj extraluminal fluid collections which may be w/i the wall of strictured ileum near the ileocolonic anastomosis. surrounding spiculation and tethered appearance of bowel in this region. 10/16/18: entyvio, Hgb=11.7, MCV=94, ESR=14, CRP <5, Alb=3.5 10/18/18: Zr=084,   BMs: # 5-6, 3x/D ,  11/13/18: Entyvio 11/21/18 CTE w C: limited 2/2 bowel underdistention, mucosal hyperenhancemt & extensive SM edema of distal ileum including vladislav-ileum, difficult to exclude a sml fluid collection, Liver w relative enlargement w suggestion of lobulation, enlargemt of PV,SMV,IMV,Spl V, rectal V. No ascites 11/28/18: Hgb=10, ESR=19, CRP=17,Alb=3.5,Iron=35, Sat%=11, Dtuzeqtp=109, INR=1.3 11/29/18: au=124, BMs: # 5-6, 3-4x/D 12/3/18: Abd sono--Liver 14.8cm Incr heterogenicity, spleen--wnl 12/11/18 & 1/14/19: Entyvio 1/14/19:Entyvio,  Hgb=10.7, ESR=15, Alb=3.6, Iron=36, Ferritin=67, Sat%=11, INR=1.6 1/24/19:  gf=297, stools are # 4-6, 3-4x/d , no pain 2/5/19: Hgb=11.2, ESR=14, CRP=0.36 2/28/19: Hgb=11.5, ESR=12, CRP=6.5, Alb=3.7, Iron=69, Fvraqyav=195, Ca=8.9                Bms: # 4-5, 2-3x/D , wz=517,  Entyvio : last 2/1519,                had parathyroid scan pre-op, still w elev PTH, + activity in mediastinum,? ectopic parathyroid tissue vs neoplasm.  To see ENT and have [facility] at [facility] 3/28/19: Bms: # 4-5 , 3x/d ;yg=207 4/11/19: Hgb-9.7, Iron=45, ESR=18, CRP=9.4, Alb=3.5,  Saw Endo SX at [facility], felt hyperparathyroid is secondary to Low Ca/Vit D, no sx at this time 4/16/19: BMs: # 5-6, 3-4x/D, qi=474,  Entyvio : last 3/1519,  got IV iron 4/12/19 for Ferritin=53 4/27/19: s/p R Hip Nailing--missed 4/2019 2/2 fresh wound 5/16/19 & 6/18/19 Entyvio 7/2/19: Hgb=11.7, Ferritin=41,ESR=12, CRP=5.5, B6=2.8,Mag=1.8,Phos=3.9,Pc=999,Zinc=61 7/11/19: s/p IV iron 7/11/19: Alb=3.7, CCW=275, Ca=9.1, Vit D=40/306,  7/24/19: Entyvio, Alb=3.8, QTD=271, Ca=8.9, Vit D=128  8/1/19: Bms: # 5-6, occas #4, 2-3x/D , xh=738 8/15/19: Hgb=12, ESR=10, CRP=5.2, Ferritin=68, Alb=3.4, Phos=4.4,Mg=1.7, Ca=8.5,Calprotectin=88, 8/20/19: CTK=231, Ca=9, Alb=4.2 9/19/19: Hgb=12.8, ESR=9, CRP=5.5, Alb=3.7, Essyiwyh=744, Mag=1.8, Ca=8.9, Phos=4.2 9/26/19: ev=787, BMs: #5-6, 2-3x/D, no pain 10/17/19: pz=970, BMs: #4-6, 1-2x/D, no Pain; Hgb=14, ESR=7, CRP<5, Alb=3.9, Texqfeqh=438, Mag=1.7, Ca=9.6 10/24/19: rk=829, Bms: # 4-6 , no pain, 11/6/19: ESR=6, CRP=6, PTH=80,Ca=9.1, VitD=50 11/14/19: yn=908, BMs: # : 4, 1-2, 0ccas #5  11/17/19: ESR=6, CRP<5, Eerbabse=216,   12/19/19: iz=608, BMs: #5-6, 2-3x/D 1/6/20: entyvio 1/13/20: ESR=7, CRP=0.2, Hgb=13.5, Hahbhjjq=589, G07=971, FA=10, Alb=4.1, Ca=9.1 1/16/20: wt = 127, BMs: # 5, 1-3x/d 1/30/20: ESR=9, CRP=11, Hgb=13.9, Ejkomfjy=777,Iron=38, Alb=3.9,Ca=9.2, PTH=87, 2/3/20 EGD: gastritis, +MACKENZIE, No HP, +erosion w ooze -clipped, 0.5cm polyp-neg; 4cm HH, Esophagitis A, + Barretts, VC: 1+ Colonoscopy: 05cm rectal polyp: HP, 2nd deg int hemorrhoids mild-mod activity: MARILY w cryptitis & mild archect distortion at ileocolonic anast: colon & ileal side, no stricture 2/4/20: Entyvio; 2/12/20: IV Iron, 3/3/20: Entyvio 2/25/20: fd=132,  BMs: # 4-5, 1-2x/ d 3/19/20: co=583, BMs: #4-5, 1-2x/D 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 10/17/20 : Hgb=13, CRP< 5, ESR= 11, Iron=44, Ferritin=46, Alb=4, Phos=2.3,  11/5/20: yj=272; s/p IV Iron x 2 ,  11/4 and 1 week prior;   BMs:  # 4-5, 1-2x/D , no pain 11/18/20  Entyvio + IV Ca  1/4/21: Hgb=13.6, CRP<5, ESR=9, Ferritin=60, Alb=4.2, Phos=2.7 1/11/21: Entyvio & IV Ca 1/14/21: no pain, stool more formed ,  ls=035 - 137; BMs:  # 4-5, 1-2x/D  2/8/21: Entyvio & IV Ca 2/23/21 IV Ca 2/22/21: Hgb=12.7, CRP<5, ESR=8, Ntqgaqmiq=417, Phos=2.3, Mag=1.8, Y06=325, Zinc=58, Vh=001 2/26/21: hw=190,  BMs:  # 4-5, 1-2x/D , no pain  3/8/21 & 4/8/21: Entyvio 3/9/21 & 3/24/21: IV Iron 4/5/21: Hgb=13, CRP<5, ESR=9, Ferritin=94, Phos=3.1, Mag=1.7,Ca=9.1/ Alb=4              Pt Ins co is refusing to cover Entyvio q 4wks, despite numerous attempts to appeal, they also refuse to set up a peer to peer discussion betw myself and another healthcare provider, even one that works for a third party.  They do acknowledge that there is literature supporting the successful use in individual cases who don't respond to the q 8 wk interval and need less then q 8weeks , but state it had not in FDA approved at less then 8wks so they will not approve it.  I spoke to the ins co rep and discussed that fact that patients should not be  pigeon holed since practicing medicine is done on an individual basis.  Humans are not all the same.   Their  response didn't change eventhough the pt clinically needed the medication and it  induced a beneficial clinical response towards remission.  Therefore the patient and I decided to slowly increase the interval since the insurance company will not pay for this benefit.  Hopefully he may be able to maintain his present clinical state.  If not we will return to q 4weeks and will again appeal using this patients real clinical data .  4/9/21:  fk=001,  no pain, stool more formed # 4, 1-2x/d  6/11/21: wt= 135,   no pain, stool more formed # 4, 1-2x/d               recently had an episode of abd distenstion, pain, N/V, no BM              eventually started having diarrhea and flatus, BMs returned to normal              lost 2-3 lbs but regained  Doesnt recall eating anything different, no fever, chills, brbpr, melena  entyvio was 6/3/21--which is almost 8 weeks, was supposed to be 5-6 weeks  to start               6/4/21 Hgb=12, CRP<5, Ferritin=32, pt to receive IV iron      7/12/21 Labs: Hgb=13.6, ESR=10, CRP=<5, Vpkjlhc=597, Alb=3.7, BUN=16   7/16/21 MRE: limited to incomplete bowel distention, chr distal ileitis/probable inflammatory stricturing vs collapse of the vladislav-TI--but improved since 2018 , moderate proctitis 7/20/21:  Last entyvio was --7/1, next early august                 fq=611 ,  no pain, stool more formed # 4-5/6, 1-2x/d  7/23/21 Entyvio level= 39, Abs <1.6 8/23/21: Labs--Hgb=13.6, ESR=10, CRP=6.6, Wnopaiqd=195, Iron=26, Alb=3.9, BUN=16 8/26/21 p/w w N/V, abd pain/bloating  x 3 days, unable to keep things down Labs: WBC=5, Hgb=12, CRP=43, ESR=11, Alb=4.4, BUN=28, Creat=-1.6 CT Abd/Pelvis: diffuse marked dilatation of SB Loops w transition pt in Rmid/lower abdomen ~ 5-6c, proximal to the ileocolonic anastomosis RX w IV solumedrol 20mg q8h, NGT, IVF, pt refused TPN, also w UTI from prostatitis 8/27 NGT was pulled, and clears started and advanced to LR,  was d/c home 8/30 on prednisone 40mg qd w taper by 10mg/wk--> to 20mg 10/15 Entyvio 10/25 Stelera (Ustekinumab)  # 1 11/3/21 [facility]: wt above 140, off pred x 2 weeks,  1-2 BMs qd  Discussed at IBD conference, reviewed previous colonoscopy, and recent imaging; consensus that due to malnutrition and steroid dependency, surgery is next best option however pt reports feeling great and wants to pursue medical treatment which is reasonable given he feels well  repeat imaging in mid-January after 3 months of Stelara, and then consider referral to surgery vs improved candidacy for endoscopic manipulation (currently presumed one long stricture).   11/15/21 : ot=476, Hgb=12, ESR=3, CRP <5, Ferritin =104, Ca=9, Mg=1.7, Alb=3.7  12/10/21 : iron infusion 1/4/22: Hgb=11.5, ESR=6, CRP <5, Ca=8.8, Mag=2.9, Alb=3.9 1/10/22 : jr=461, stools # 5, 1-2x/D  1/10/22 Today:  Feeling well, no c/o , CP, SOB/ AMARO, Cough, Wheeze, Palpitations, edema              Most recent labs  reviewed w patient:1/4/22 1/31/22: calprotectin=84 2/2/22: dj=513 2/14/22: Hgb=10.6, ESR=9, CRP <5, Ca=8.3, Mag=1.6, Alb=3.8, Iron=25, Ferritin=15 3/3/22:  iv Iron infusion x 1 3/8/22 MRI Abd/Pelv: thickened distal Jejunal loops which are tethered; distal ileal segment  (10-12cm ) previously actively inflammed has decreased & now characterized by an area of stricture, however the vladislav-TI  5mm to the anastamosis is enhanced as well as narrowed.  colon just  distal to the anastamosis has a focal segment of inflammation & stricture.  the recto-sigmoid appears inflammed  3/28/22: Hgb=14.4 , ESR=1, CRP <5, Ca=8.5, Mag=1.7,Alb=4.1, Iron=104, Pityeqzo=669, 4/5/22: of=295, Bms: # 5-6 , 1-2 x/day    5/10/22  :  Last entyvios were -->7/1, 8/5, 9/2, 10/15/21              Stelara # 1 IV--10/25/21, second dose SC~ 12/10/21, 3rd~ 2/14/22,  4th-- mid April , 5th--mid june                Early December 2021  had a " flare" loose BMs, N/V and bloat              Took Pred 40mg qd and tapered down 10mg / week , now off pred x 7-8 weeks  4/13/22 Dr. Heard:  pt states he had a flare the weekend of 4/9/22 w vomiting/distension                pt self-started prednisone 40mg , this was just before his april stelara dose                claims he was feeling better               Dr. Heard: sched colonoscopy w ? dilatation   5/10/22: tapered of pred 40mg , 10mg/wk over 1 month, now off 2weeks          no pain, n/v,  BMs: # 4-5, 1-2 x Day , wt=-132 5/17/22 Dr. Heard Colonoscopy: ped scope passed w/o resist in vladislav-TI, one single apthae w psuedopolyp.  Flares probably 2/2 to adhesive dis, imaging may consistently show wall thickening  5/27/22 Labs:  Alb=3.8. Phos=2.7, Mag=1.9, Ca=9.2, PTH=72, Vit D=105, ALP=76                + IV Iron  6/20/22 Labs:  Alb=4.5, Phos=0.8, Mag=1.8, Ca=8.5, EQA=435, ALP=83                          Hgb=14, ESR=2, CRP<5, Scfbdorz=628, Iron=86 6/21/22:  no pain, n/v,  BMs: # 4-5, 1-2 x Day ,                kt=370 7/26/22:  no pain, n/v,  BMs: # 4-5, 1-2 x Day ,                cl=621 9/30/22 EGD: mild gastritis, no HP; 0.75cm gastric polyp:fundic;                4cm HH, Esophagitis A--, + Barretts 10/17/22 : Alb=4.3, Phos=2.1, Mag=1.8, Ca=9,  PTH=95, ALP=80, TSH=12, Vit D=69                          Hgb=12.7 , ESR=2, CRP<5, Ferritin=57, Iron=85 12/16/22 : Alb=3.8, Phos=1.6, Mag=1.7, Ca=8.9 , ALP=68,                           Hgb=10.8 , ESR=1, Ncjuliiz=857, Iron=67, INR=2.3  12/19/22: xo=222, had some N and distension the day after Thanksgiving                      Was having BMs and passing gas, went of liquids x 1-2 days--> resolved 1/30/23: Alb=4.4, Phos=3, Mag=1.8, Ca=9.5,  PTH=96, ALP=79,  Vit D=144                          Hgb=13.6 , ESR=5, CRP<5, Ferritin=55, Iron=56, INR=1.6    2/6/23 : ka=366, no abd pain, BMs: # 4 qd   3/17/23: developed cugh,congestion, fever, malaise  3/19/23 + Covid; Received Iv Remdesivir x 3 at home 3/27/ 23: qy=254, BMs: #4-5,  1-2x/ D   Alb=4 , Phos=1, Mag=1.8, Ca=8.5 , ALP=94  Hgb=13.8, ESR=5, CRP<5, Zrfdgaef=342, Iron=78, INR=5.9  4/21/23 : Alb=4, Phos=2.9, Mag=1.9, Ca=8.9,  PTH=89, ALP=75,  Vit D=105/ 80, INR=3.2  5/22/23 Tm=636; Hgb=12.7, ESR=2, CRP<5, Ferritin=19, Iron=42, INR=2.4               Alb=4.2, Phos=2.4, Mag=1.8, Ca=9.1, ALP=68,                      8/4/23 :  Alb=3.8, Phos=2.1, Mag=1.8, Ca=8.7,  AZA=710, ALP=68,  Vit D=78 INR=2.1  8/21/23 : Hgb=14, ESR=4, CRP=40, Ecfsqmaf=560, Iron=37, INR=4.8                Alb=4 , Phos=1.8, Mag=1.6, Ca=9.3, ALP=84,      8/22/23  :   xh=120 , this weekend  had a flare, abd distension, no N/V, fever                  BMs: started # 5/6--> now # 6-7, no blood or mucous                  doesnt recall anything too solid or fiberous                  Started Pred 40mg qd, and liquid diet--> feeling better                  Less distension, passing gas and BMs  10/24/23:  vs=022, BMs: #4, 1-2x/D                   pt reports an episode of dark stool w 2-3 days ~ 9/23/23                  He held his Warfarin for a couple of days and stool returned to normal color                  He increased his Omep 40mg BID                  He had no Abd pain, N/V, ht burn, dysphagia, bloat                  9/28/23 Labs: Hgb=8.6, Iron=22, sat%=7, Ferritin=17                  10/5/23 Labs: Hgb=8.7, Iron=52, Sat=16%, Tehmduxx=610                  10/12/23 : Labs: Vit D=67, Ca=8, PTH=160, Mag=2                  10/20/23 Labs: Hgb=10.9, INR=1.6, CRP<5, Uzukndiv=194, ALB=3.7, Ca=7.9, ALP=68 10/18/23 MRE:  persistent mural enhancement without wall thickening in distal small bowel loops, which is a nonspecific imaging sign and may reflect inflammation, however no other mural or mesenteric findings to indicate active inflammatory small bowel Crohn's disease.  No evidence of stricture. No imaging signs of penetrating disease.  Chronic central mesenteric fibrofatty proliferation. 10/27/23: Ustekinumab=7.1, Ust Abs <1.6 11/27/23 : FA=17, C21=688, Vit D=69, ca=9.1, ALP=84,  Alb=4.2                  Hgb=13.3,  PT=19, INR=1.7, Ferritin=33, Iron=46, Sat=13%,  12/5/23 : Homocysteine=18, JWC=720 12/11/23 :  fl=929, BMs: #4, 1-2x/D , Melena-->no    12/22/23  EGD:  gastritis, No HP; 2cm HH, Esophagitis A--,  + Barretts 12/22/23 Colonoscopy: mild ileitis of the most distal vladislav-TI; small superficial apthous ulcer on colonic side of anastamosis, Random  colon BX--> wnl  12/27/23 -12/31/23 [facility] for Hypophosphatemia=0.8, Rx w IV KPhos OMQ=396, Vit D =66, 7.6--> Phos=1.5, Ca=8.4, Hgb=12.6 D/C on kPhos 1 gm TID seen by [facility] 1/10/24 Phos=1.7, Vit D =53, Vit D-25= 73, Ca=8.5, SBC=439 1/18/24 Zwwrzzqi=206, CRP<5, ESR=1,  Ca=7.9, Hgb=13 1/29/24:  wk=000,  BMs: #4, 1-2x/D , no  Melena 1/26/24 Dr. Reynoso Capsule--> scattered irregular appearing mucosa, possible erosions, no bleeding            for single balloon enteroscopy 2/27/24  : Labs: Vit D-25=84, Ca=8.9, JTN=372, Mag=1.7, Phos=2.8                          ALB=4, BUN=15/ 1.1, K=4.6, ALP=63 3/5/24: WT = 137,  BMs: #4, 1-2x/D  3/12/24 :  Ferritin=23, Iron=25, sat=10%, CRP<5, ESR=2,  Hgb=13.7, INR=2                  Ca=9.1, Phos=2.2,  ALB=4.1, BUN=15/ 1.2, K=4.1, ALP=75 IV Iron: 3/22, 3/28, 3/29. 4/1/24 5/9/24 :  Ehxqmlfr=107, Iron=86, sat=31%, CRP<5, ESR=2,  Hgb=15, INR=1.6                  Ca=8.8, Phos=1.1, Mag=1.9,  ALB=4.2, BUN=11/ 1.2, K=3.9, ALP=75  5/28/24  s/p  iron infusion in 4/1/24, Calcium 4/5/24  , last Stelera 5/30/24; next begining  of 8/2024                 Today: xt=104                  BMs: #4, 1-2x/D                   Melena-->no        * Abd pain-->no * Nausea-->  * Vomit--> no * Early satiety--> no * Belching--> no * Hiccups--> no * Regurgitation--> no * Acid Taste / Water Brash--> no * Ht burn--> no * Dysphagia--> no * Throat Clearing--> no * Hoarseness--> no * Post-Nasal Drip--> no * Congestion--> no * Globus--> no * Cough--> no * Wheeze / PC-> -no * Constipation--> no * Diarrhea--> No  * Bloating--> No  * Strain on Defecation--> no * Incompl Evac--> no * Flatulence--> no * Gurgling--> no * Melena--> no * BPBPR-> -no * Anorexia--> no * Wt. Loss--> no                  PRIOR HISTORY--2017 1/17/17: Hgb=9.2, ESR=6, CRP=5; 1/19/17: BMs: #4, 5-6, 2-3x/D, Eg=766, Started Creon 1 tid cc        3rd Entyvio beginning Feb 2/14/17: Labs; Hgb=9.6, MCV=97, ESR=5, CRP< 5, E15=811, FA>23, Iron=59, Retic =3.2,        2/17/17 Fecal Calprotectin=16, Fats: Increased neutral & Split        3/13/17: Labs Hgb=9.5, MCV=95, ESR=7, CRP <5, ALB=3.1        3/16/17: lot of stress, to move -[place], had a job-fell thru, BMs: # 5, 2-4 x/D, wt= 131w clothes        4/19/17 EGD: gastritis, MACKENZIE, No HP, 2cm HH, +Barretts, No Dysplasia        Colonoscopy: Anastamosis: open w mild -mod active colitis, enteritis severe adj to anastomosis, mild more proximal, remainder of colon-wnl, 2-3 deg int hemorrhoids        4/26/17 : Hgb=7.3, MCV=95, ESR=13, CRP<5;Immediately post procedure stools very dark on eliquis/iron.        Admitted to [facility]: Hgb=8.3-->8.9 after 2 U, Alb 3.3, BUN=17, creat=1.3, Malina/Lpizh=474/460        4/27/17: Alb=2.3, BUN=12, creat=1.2, Malina/Lipase=209/863-No abd pain, N/V; 5/5/17: Hgb=10.7.        5/8/17 Entyvio iv. 5/8-5/9 w dark red diarrhea; 5/10/17 Hgb=7.8.        5/11/17 Adm [facility] w stools brown, Hgb=7.9, BUN=17, Creat=1.4, Alb=2.9; S/P 2 Units- Hgb=10.6, BUN=15/1.1.        Prednisone 40mg qd, entocort d/dee.; 5/18/17: Hgb-10.4, ni=350, BMs: #5, 1-2x/D, no pain        6/8/17: Hgb=7.4,MCV=98, CRP<5-ASA stopped, Pred20--> 30        6/10/17: Hgb=8.2, Alb=2.9, BUN=20/1.2 ; 6/12/17: Hgb=8.3, Retic=0.19, Iron=10, Sat%=3, Ferritin=57, FA=23,R65=720, 6/13/17: s/p 2 Unit PRBCs        6/15/17: started MCT oil, So=102 , BMs: # 5, 1-2x/D, stools are brownish/green         7/11/17: [facility] w unsteadiness. MRI Head: R Cortical Temporo-occipital encephalomalacia, MRA H&N-no sign lesions, PER-wnl.Hgb=9.9--> 8.4->9.7 after 1 Unit. Seen by Torri: received IV Iron         CRP<5, X39=070, XJE=580, FA>23,Iron=22,Sat%=6, Ferritin=42, Alb=3.5. D/C on warfarin 2mg        7/20 Hgb=8.5, 7/28 Hgb=7.7, 7/31-s/p 1 Unit         As outpt seeing Torri, to get IV Iron and 5 IV Copper        Had 'FLU' Fever=101, chills, bloat, N/V/D, Bilious. no pain, melena,brbpr        Given anti-emetic by dr Butler,then able to keep things down        On prednisone 25, warfarin w INR bet 1.6-2        8/17/17: Hgb=9.9, ESR=20, CRP-P,Qd=884, BMs: # 5, 1-2x/D, stools are brownish/green        10/2/17: Hgb=8.8, MCV=89,Phos=1.7, K=3.9, Mag=1.9, Alb=3.6,Iron<10,Ferritin=21, INR=2        10/17/17: Hgb=7.9,ESR=6,CRP<5, INR=1.6        10/25/17: Hgb=10, ESR=5, CRP=5, Alb=3.6, Phos=2, Xnnhvkwh=697, E31=588        11/16/17: Hgb=11.2, ESR=14, CRP=12,         11/13/17: MRE-Chr mild distension of distal & vladislav-ileum s/o mild stricturing at anast, mild mural thick & mucosal enhancemt ~ 20cm; little change from 2015 c/w mild acute on chr ileitis, 2.1 cm Liver hemangioma        11/28/17: Entyvio        11/29/17: Hgb=9.6, ESR=10, CRP=5.8, Alb=3.8,Ferritin-P, Iron=14,Sat=4%, Phos=2.5        12/19;17: Hgb=10.1, ESR=12, CRP=6.5; 12/21/17: BMs:#3-5, 1-3x/D , brown, no blood, no pain, vl=647        1/3/18:Hgb=11.7, ESR=19, CRP=6.5, Alb=4.1, Ryxdxowz=665, Iron=31, Sat%=9,Zinc=55          PRIOR HISTORY---2013:         2/20/13 CT markedly dilated sb loops ext to anast, colonoscopy w open anast ,mild-mod remy-anastamotic disease. quick response to entocort/humira--probably adhesive dis.         8/10/13 w GNR bacteremia, ADA 1.7 /KAYLAH 3.4, Humira 8/7, 8/13,8/18,9/15        CT- mucosal thickening and spiculation of the distal sb extending to the anastamosis, thickening and stranding of adj mesenteric fat.        Humira increased to 40mg weekly, entocort 4        9/2013 MRE--dilated loops in mid and distal ileum, markedly thickened and narrowed TI w decreased peristalsis of TI        11/15/13 ADA-24.9, KAYALH-0          PRIOR HISTORY---2014:         2/15/14--CT dilated loops SB, loop of sb in mid abd 4.3cm w infiltrative changes in the mesentery, bowel tapers in the RLQ to the anastamosis w/o transition        WBC=15K, Rx w IV steroids and Abx         3/7/14 SBFT: last 10cm sb prox to anast mild distension and sl irregularity. In the mid portion of this loop there is a mild narrowing which appears to reopen but is some what narrowed.        3/20/14 ADA=33.1, KAYLAH=0, Lialda switch to Apriso 4 (2/2014)          PRIOR HISTORY--2015         1/11/15 Adm [facility] w 1 day N,Recurrent V, Abd pain/distension. CT-multiple distended & fluid-filled sb loops, mild wall thickening of ileum w No inflamm changes.        WBC=14.6, Hgb=17, RX w NGT suction, Levaquin iv, Solumedrol 40mg q 12( 1/12-->1/16) to prednisone 30mg BID w 5mg taper/wk        3/18/15 --Dr. Butler w edema /High BP, prednisone was tapered slowly to 2.5mg         switched to entocort 9mg qd, edema and bp improved w lasix 20mg         BMs:#4-5, 3x/D, Hgb=11, ESR=4, CRP<5        4/28/15 - 5/1/15: [facility] w 1 day Abd pain, distension,N/V. WBC=10K, HGB=15         CT Abd/Pelv: Diffusely dilated SB loops, thick walled ileum        Rx w NGT, IVF, IV Solumedrol--> Prednisone 30mg BID; tapered to 5mg---> Budesonide 9mg qd        6/10/15 Colonoscopy: Inflammatory ST mass on the ileal side of anast, opening appeared ulcerated,scarred & severely narrowed        6/11/15: [facility] w N/V x1, decr appetite, CT ABD: no obstruction, dilated thickened sb loops of distal jej and ileum        6/19/15 [facility]: s/p Lap w extensive lysis of adhesions, hepatic flex, sigmoid and sb anastamosis, s/p partial r colectomy and sb resection--side to side elisabeth: 8-10 inch from prior anast to TV colon.        7/17/15: BMs:#4-6, 4-5x/D, wt 131(from 126)        8/20/15: BMs: #4, 1-2x/D or #5, 2-3x/D, gb=170, dry cough,Hgb=10, WBC=3, CCC=195, CRP=56, ESR=21        8/25/15 CT Abd/Pelv: Svl RLQ sb loops w wall thickening, mild nodularity & inflamm stranding in mesentery        Advised-restart Entocort 9mg qd, Apriso 4 qd, Maintain Humira but given RX to check drug/Ab levels        9/15/15: did nt restart meds,Hgb=9.9, WBC=4, ESR=19, CRP=5.8, jm=191; [facility] : ADA=8.5, Antibodies< 1.7        10/15/15: No pain, BMs:#4, 1-2x/D, #5-6, 3-4x/D, throat clearing/cough-better, ru=609        11/30/15: No pain, BMs:# 4, 5-6 intermittently, No throat clear, yl=896          PRIOR HISTORY-2016 1/22/16; 4/8/16; 6/6/16 : No pain, BMs:# 4-5 1-2x/D, also #5-6 3x/D for 2-3D/wk, jx=366        7/14/16: gj=141, 9/22/16: iw=391.5        11/10/16: 9.5lb wt loss, states BMs: 5-6, 5x/D--Taking Magnesium, No pain/anorexia        Labs: Stool Tbovpgwrymdu=007, + Incr Fecal fat, Alb=3.4, FA =23, S51=422, Hgb=12, ESR=10, CRP <5        Magnesium-d/c, Obtain MRE asap--Start steroids and possibly switch to Entyvio        DDx discussed: active crohns--loss response to Humira, Malabsorption--loss Bile acids, Bile-induced diarrhea, SIBO        Pt wanted to wait for imaging-did not start rx        12/1//16 MRE: RUQ ileocolonic anastamosis--narrowed w upstream dilatation to 3.2 cm        Pt refused pred, Started Entocort 9mg qd and Flagyl 250mg qid about 7-10days ago         awaiting Entyvio load to start 12/22, then 1/5; had Cut back on iron bid        12/15/16: BMs:# 5, 2-3x/D, occas #6, No pain, Less bloat/flatulence, Db=113.

## 2024-05-28 NOTE — ASSESSMENT
[FreeTextEntry1] : 1. CROHNS DISEASE of both small and large intestine: s/p flare 8/25-->8/30/21, then early 12/2021-s/p pred tapered over 4 weeks, again the weekend 4/9/22 rx w prednisone 40_ mg ( just before last Stelera dose) --> now off since early 5/2022;  8/19/23 w flare started prednisone 40mg & tapred over 4 weeks  ~ 9/23/23 Had dark stool x 2-3 days, Warfarin held 2 days & Omep BID, Hgb dropped to 8.6   s/p ileocolonic resection x 2--last 6/19/15 for recurrent sbos: prior was s/p 4 SBOs w/i 2 yrs--2/2 distal sb disease adj to anastamosis when on Humira weekly had an ADA =33 (3/20/14), -but had sbo 2/2014 at ADA=25 and another sbo 4/28/15 6/10/2015 Colonoscopy: Inflamm ST mass on ileal side w ulceration/scarred/narrow 6/11/2015 CT: dilated thickened sb loops distal jej & ileum Post-op 6/2015 probably some malabsorption 2/2 to removal of R Colon and ileum: had WT. Loss-->r/o malabsorption: ?bile-induced->-secretory vs decr micelles, active dis, PLE ? SIBO--Elev FA, Alb=3.4-->3.1-->2.9--> (7/28/17) ?SIBO/Prevent post-op recurrence --> trial Flagyl 250 mg qid~12/7/16-->d/c: 5/11/17 Also discussed trial of Cholestyramine/Xifaxin -wanted to wait 11/20/16 ACTIVE Crohn's--> 9.5lb wt loss, BMs: #5-6, 5x/D-- but taking Magnesium 12/1/16 MRE: RUQ ileocolonic anastamosis--narrowed w upstream dilatation to 3.2 cm Labs: Stool Xlflnvrlpkhe=192, + Incr Fecal fat, Alb=3.4, FA =23, S85=204, Hgb=12.3,ESR=10,CRP <5 4/19/17 :Colonoscopy: Anastamosis: open w mild -mod active colitis, enteritis severe adj to anastomosis, mild more proximal, remainder of colon=wnl 5/11/17- Adm PMHC w stools brown, but Hgb=7.9, BUN=17, Creat=1.4, Alb=2.9. S/P 2 Units w Hgb=10.6, BUN=15/1.1, Prednisone 40mg taper, entocort d/dee 6/8/17: Hgb=7.4, MCV=98, CRP<5--ASA stopped, Pred20--> 30mg, 6/13/17: s/p 2 Unit PRBCs 7/11/17 [facility] w unsteadiness. MRI Head: R Cortical Temporo-occipital encephalomalacia Hgb=9.9--> 8.4->9.7 after 1 Unit. Seen by Heme: received IV Iron CRP<5, E19=956, KXM=670, FA>23,Iron=22,Sat%=6, Ferritin=42, Alb=3.5. D/C on warfarin 2mg 7/28/17 Hgb=7.7; 7/31/17-s/p 1 Unit Heme Consultation ~ 8/2017: started IV Infusions of Iron and IV Copper 8/17/17: Hgb=9.9, ESR=20, Da=580--Wxpyxvtyhk s/p GI infection w N/V/D, no obstruction 10/17/17: Hgb=7.9,ESR=6,CRP<5, INR=1.6, Got IV Iron x 2, since 10/2/17 for Iron<10, Hgb=8.8 11/16/17: Hgb=11.2, ESR=14, CRP=12, 10/26/17 Stool fat-neg, calprotectin-30 11/13/17: MRE-Chr mild distension of distal & alfredito-ileum s/o mild stricturing at anast, mild mural thick & mucosal enhancemt ~ 20cm; little change from 2015 c/w mild acute on chr ileitis, 2.1 cm Liver hemangioma 10/9/18: Hgb=11.6, MCV=94, ESR=14, CRP=5.4, INR=2.2 10/10/18 MRE: stricturing of neoterminal ileum w worsened upstream dilatation, moderate length of upstream ileum w stricturing extending at least 20cm and more extensive then previous. suggestion of 2 adj extraluminal fluid collections which may be w/i the wall of strictured ileum near the ileocolonic anastamosis pt had declined to call numbers given for IBD center at [facility] for new or investigational rx's--biologics. later he felt he was stablizing w his wt loss, and inflammatory markers  2/28/19 w ESR=12, CRP=6.5 & 2/21/19 stool calprotectin--> 232 8/15/19: ESR=10, CRP=5.2, Calprotectin--> 88 9/19/19: ESR=9, CRP=5.5 10/17/19: ESR=7, CRP< 5 11/6/19 : ESR=6, CRP=0.18 11/27/19: ESR=6, CRP <5 1/13/20: ESR=7, CRP=0.2 1/30/20: ESR=9, CRP=11  2/3/20 EGD: gastritis, +MACKENZIE, No HP, +erosion w ooze -clipped, 0.5cm polyp-neg; 4cm HH, Esophagitis A, + Barretts, VC: 1+ Colonoscopy: 05cm rectal polyp: HP, 2nd deg int hemorrhoids mild-mod activity: MARILY w cryptitis & mild archect distortion at ileocolonic anast: colon & ileal side, no stricture  3/2/20:ESR=7, CRP=0.26 3/9/20: VDZ>40, Ab to VDZ <1.6 3/17/20: ESR=6, CRP=6.4 10/17/20: ESR=11, CRP <5 1/4/21:ESR=9, CRP<5 2/22/21: ESR=8, CRP<5 4/5/21: ESR=9, CRP<5 6/4/21: ESR=9, CRP<5 6/11/21: wt= 135, no pain, stool more formed # 4, 1-2x/d  s/p episode --abd distenstion, pain, N/V, no BM  eventually started having diarrhea and flatus, BMs returned to normal  lost 2-3 lbs but regained 7/4/21: CRP <5, Hgb=12  7/16/21 MRE: limited to incomplete bowel distention, chr distal ileitis/probable inflammatory stricturing vs collapse of the alfredito-TI--but improved since 2018 , moderate proctitis 7/12/21 -- ? flare --> entyvio should have been q 5 wk , went q 8 wks  next dose should be in 4 weeks x 2 then 5 weeks, to check levels 7/20/21: Cliically stable, unclear if MRE accurate 2/2 underdistension, but gained wt and CRP--normal 7/23/21 Entyvio level= 39, Abs <1.6 8/23/21: Labs--Hgb=13.6, ESR=10, CRP=6.6, Dpbubhpg=743, Iron=26, Alb=3.9, BUN=16 8/26/21 p/w sbo w N/V, abd pain/bloating x 3 days, unable to keep things down Labs: WBC=5, Hgb=12, CRP=43, ESR=11, Alb=4.4, BUN=28, Creat=-1.6 CT Abd/Pelvis: diffuse marked dilatation of SB Loops w transition pt in R. mid/lower abdomen ~ 5-6cm, proximal to the ileocolonic anastomosis RX w IV solumedrol 20mg q8h, NGT, IVF, pt refused TPN, also w UTI from prostatitis 8/27 NGT was pulled, and clears started and advanced to LR, was d/c home 8/30 on prednisone 40mg qd w taper by 10mg/wk to 20mg qd   ( **Entyvio's :time 0=12/22/16 ( Humira 40mg QW); 1/5/17--2wks, s/p 3rd infusion at wk 6, then q 6weeks #6 :6/21/17-->held 2/2 Shingles, then Infusions as follows=9/19/17 , 10/17/17, 11/28/17, 1/16/18 ,2/16/18, 3/19/18,4/16/18, 5/14/18, 6/25/18, 8/7/18, 9/17/18, 10/16/18, 11/13/18, 12/11/18, 1/14/19, 2/15/19, 3/15/19, 5/16/19, 6/18/19,7/24/19, 9/9/19, 10/2/19, 11/4/19, 12/12/19, 1/6/20, 2/4/20, 3/3/20, 9/17/20, 10/15/20, 11/18/20, 1/11/21, 2/8/21, 3/8/21, 4/8/21, 6/3/21, 7/1/21, 8/2/21, 9/2/21,10/25/21 )  3/8/22 MRI Abd/Pelv: thickened distal Jejunal loops which are tethered; distal ileal segment (10-12cm ) previously actively inflammed has decreased & now characterized by an area of stricture, however the alfredito-TI 5mm to the anastamosis is enhanced as well as narrowed. Colon just distal to the anastamosis has a focal segment of inflammation & stricture. the recto-sigmoid appears inflammed  3/28/22: Hgb=14.4 , ESR=1, CRP <5, Ca=8.5, Mag=1.7,Alb=4.1, Iron=104, Vytwfghl=710,  5/9//22: Hgb=13.8 , ESR=2, CRP <5, Ca=8.8, Mag=1.6, Alb=4, Iron=64, Armauibm=480 5/17/22 Dr. Heard Colonoscopy: ped scope passed w/o resist in alfredito-TI, one single apthae w psuedopolyp.  6/21/22 Labs: Hgb=14, ESR=2, CRP<5, Alb=4.5, Phos=0.8, Mag=1.8, Ca=8.5,Schoxqjr=049, Iron=86  7/25/22 Labs: Hgb=14, ESR=2, CRP<5, Alb=4.2, Phos=1.5, Mag=1.8, Ca=8.6, Euhcqlwy=876, Iron=57, PT=24, INR=2.1 10/17/22 Labs: Hgb=12.7, ESR=2, CRP<5, Alb=4.3, Phos=2.1, Mag=1.8, Ca=9, Ferritin=57, Iron=85, PT=23, INR=2 12/16/22 : Labs: Hgb=10.8 , ESR=1, Alb=3.8, Phos=1.6, Mag=1.7, Ca=8.9 , ALP=68, Gzoixbwn=514, Iron=67, INR=2.3 1/30/23: Labs: Hgb=13.6 , ESR=5, CRP<5, Alb=4.4, Phos=3, Mag=1.8, Ca=9.5, ALP=79,Ferritin=55, Iron=56,  3/27/ 23: Labs: Hgb=13.8, ESR=5, CRP<5 ,Alb=4 , Phos=1, Mag=1.8, Ca=8.5 , ALP=94, Yrnmcghg=312, Iron=78,  INR=5.9  5/22/23 Labs: Hgb=12.7,ESR=2,CRP<5, Alb=4.2, Phos=2.4, Mag=1.8, Ca=9.1, ALP=68,Ferritin=19, Iron=42, INR=2.4 8/4/23 : Alb=3.8, Phos=2.1, Mag=1.8, Ca=8.7, NJZ=816, ALP=68, Vit D=78 INR=2.1 8/21/23 : Hgb=14, ESR=4, CRP=40, Fqwwyply=529, Iron=37, INR=4.8; Alb=4 , Phos=1.8, Mag=1.6, Ca=9.3, ALP=84 9/28/23 Labs: Hgb=8.6, Iron=22, sat%=7, Ferritin=17  10/5/23 Labs: Hgb=8.7, Iron=52, Sat=16%, Gxebmdik=177  10/12/23 : Labs: Vit D=67, Ca=8, PTH=160, Mag=2  10/20/23 Labs: Hgb=10.9, INR=1.6, CRP<5, Matgppge=230, ALB=3.7, Ca=7.9, ALP=68 10/18/23 MRE: persistent mural enhancement without wall thickening in distal small bowel loops, which is a nonspecific imaging sign and may reflect inflammation, however no other mural or mesenteric findings to indicate active inflammatory small bowel Crohn's disease.  No evidence of stricture. No imaging signs of penetrating disease.  Chronic central mesenteric fibrofatty proliferation. 10/27/23: Ustekinumab=7.1, Ust Abs <1.6 11/27/23 :   Hgb=13.3,  PT=19, INR=1.7, Ferritin=33, Iron=46, Sat=13%,                    10/27/23: Ustekinumab=7.1, Ust Abs <1.6 11/27/23 : FA=17, M93=994, Vit D=69, ca=9.1, ALP=84,  Alb=4.2 12/22/23 Colonoscopy: mild ileitis of the most distal alfredito-TI; small superficial apthous ulcer on colonic side of anastamosis, Random  colon BX--> wnl           12/27/23 -12/31/23: Phos=0.8, BMW=505, Vit D =66, Ca=7.9 --> Phos=1.5, Ca=8.4, Hgb=12.6, Alb=3.5 1/10/24 Phos=1.7, WVQ=450, Ca=8.5, Vit D =53, Vit D-25= 73,  1/18/24 :  Hgb=13, Tbxbmaum=817,Iron=67,  CRP<5, ESR=1,  Ca=7.9, Alb=4.1, ALP=84 5/9/24 :  Hgb=15, Vxjvuqbx=043, Iron=86, sat=31%, CRP<5, ESR=2,                   Ca=8.8, Phos=1.1, Mag=1.9,  ALB=4.2, ALP=75  A / PLAN: Recent GI Bleed on warfarin, although MRE looks better, probably from ileum, Hgb improving  h/o sbo's prox to anastamosis  inflammatory vs fibrosis vs combination: 3/2022 MRI shows improvement of inflammation w possible residual stricture in the ileum and probable colitis near the anastamosis and distal colon  Responded to steroids iv--> po--d/c ~ 10/20/21,  tapered steroids from 40mg qd to 20mg, [id number] qd and taper 5mg/wk  then po taper again early 12/2021 40mg qd w 10mg taper/ wk--  PO prednisone 40mg mg qd started on 4/9/22 ,tapered off ~ early 5/2022  PO prednisone 40mg mg qd started on 8/19/23, taper 10mg/wk--> 20mg then 5 mg /wk   Entyvio level was 39 w/o Abs~ 3weeks after 7/1/21 dose ,  speaks to inflammatory component & probably loss of response  Stelara # 1 dose on 10/25/21, #2 on 12/10/21, # 3 on 2/11/22, # 4 on 4/20/22 , #5 mid June 2022,  #6 mid 8/2022, #7 beginning 12/2022; #8 on 2/2/23, #9 on 4/2023, #10 on 6/2023 , last dose--> 5/30/24    IBD consensus : due to malnutrition /steroid dependency, surgery is next best option  but pt reported feeling well and wanted to pursue medical treatment  repeated imaging in March after 3 months of Stelara, then consider surgery vs improved candidacy for endoscopic manipulation  f/u w IBD consultant Dr. Heard at  & reviewed imaging after 3 months of Stelera  for Colonoscopy ( w possible balloon dilatation )-> last 1 3/4 yrs ago   5/17/22 Dr. Heard Colonoscopy: ped scope passed w/o resist in alfredito-TI, one single apthae w psuedopolyp.  No Dilatation need, very mild dis at Alfredito-TI, MRE may show wall thickening, sbo's probably adhesive dis 12/22/23 Colonoscopy: mild ileitis of the most distal alfredito-TI; small superficial apthous ulcer on colonic side of anastamosis, Random  colon BX--> wnl        1/26/ 24 Dr. Reynoso Capsule--> scattered irregular appearing mucosa, possible erosions, no bleeding 4/5/24 Dr. Reynoso: [facility]; bx w  mild patchy IELs       1/4/22 Labs: Hgb=11.5, ESR=6, CRP<5, Alb=3.9 1/31/22 Calprotectin =84 2/14/22: Hgb=10.6, ESR=9, CRP <5, Ca=8.3, Mag=1.6, Alb=3.8, Iron=25, Ferritin=15 3/28/22: Hgb=14, ESR=1, CRP<5 , Ca=8.5, Mag=1.7, Alb=4, Iron=104, Nppigikc=190 5/9//22: Hgb=13.8 , ESR=2, CRP <5, Ca=8.8, Mag=1.6, Alb=4, Iron=64, Evwppcor=523  6/21/22 : Hgb=14, ESR=2, CRP<5, Ca=8.5, ,Alb=4.5, Mag=1.8, Iron=86,Dfhpreyt=048,  7/25/22 : Hgb=14, ESR=2, CRP<5, Alb=4.2, Phos=1.5, Mag=1.8, Ca=8.6, Nawirgce=791, Iron=57 10/17/22: Hgb=12.7 , ESR=2, CRP<5, Alb=4.3, Phos=2.1, Mag=1.8, Ca=9, Ferritin=57, Iron=85  12/16/22 : Labs: Hgb=10.8 , ESR=1, Alb=3.8, Phos=1.6, Mag=1.7, Ca=8.9 , ALP=68, Vvclymsa=380, Iron=67,  1/30/23: Labs: Hgb=13.6 , ESR=5, CRP<5, Alb=4.4, Phos=3, Mag=1.8, Ca=9.5, ALP=79,Ferritin=55, Iron=56,  3/27/ 23: Labs: Hgb=13.8, ESR=5, CRP<5 ,Alb=4 , Phos=1, Mag=1.8, Ca=8.5 , ALP=94, Ptyqyywg=871, Iron=78, INR=5.9  5/22/23 Labs: Hgb=12.7,ESR=2,CRP<5, Alb=4.2, Phos=2.4, Mag=1.8, Ca=9.1, ALP=68,Ferritin=19, Iron=42, INR=2.4  8/4/23 : Alb=3.8, Phos=2.1, Mag=1.8, Ca=8.7, PRD=140, ALP=68, Vit D=78 INR=2.1  8/21/23 : Hgb=14, ESR=4, CRP=40, Alb=4 , Phos=1.8, Mag=1.6, Ca=9.3, ALP=84,Yyjvebpc=554, Iron=37, 9/28/23 Labs: Hgb=8.6, Iron=22, sat%=7, Ferritin=17 10/5/23 Labs: Hgb=8.7, Iron=52, Sat=16%, Nllikmlp=772 10/12/23 : Labs: Vit D=67, Ca=8, PTH=160, Mag=2  10/20/23 : Hgb=10.9, INR=1.6, CRP<5, Yrzgelbr=424, ALB=3.7, Ca=7.9, ALP=68  10/27/23: Ustekinumab=7.1, Ust Abs <1.6  11/27/23 :   Hgb=13.3,  PT=19, INR=1.7, Ferritin=33, Iron=46, Sat=13%,                     FA=17, I52=470, Vit D=69, ca=9.1, ALP=84,  Alb=4.2 1/18/24 :  Hgb=13, Fqglmpup=924,Iron=67,  CRP<5, ESR=1,  Ca=7.9, Alb=4.1, ALP=84 2/27/24  : Labs: Vit D-25=84, Ca=8.9, TOJ=476, Mag=1.7, Phos=2.8;  ALB=4, ALP=63 3/12/24 :    Hgb=13.7, INR=2,  Ferritin=23, Iron=25, sat=10%, CRP<5, ESR=2,                   Ca=9.1, Phos=2.2,  ALB=4.1, ALP=75 5/9/24 :  Hgb=15,  Btashlwq=267, Iron=86, sat=31%, CRP<5, ESR=2,  INR=1.6                  Ca=8.8, Phos=1.1, Mag=1.9,  ALB=4.2, ALP=75  Probiotics 3 qd Diet: LR, Lactose free, protein drinks tid MCT oil begun but never maintained **trend --cbc, esr, crp, albumin, calprotectin  **monitor wt--- usu bet 136-139, 7/20/21=136, 11/22/21=139, 1/4/22=132, 2/22/22=132, 4/5/22=131,5/10/46=777,  6/21/22=133, 7/27/22=136, 12/19/22=138; 2/6/23=140, 3/28/81=147, 5/23/23 =137, 8/22/23=135,10/24/37=082, 12/11/23=136,1/29/24= 135,  3/5/24 =137, 5/28/24=134 ,  wt=          2. Iron deficiency anemia : --> recent drop from GIB--> 9/2023   had initially dropped , after clinical flare and post procedure bleeding Probably multifactorial: ACD, Blood loss, malabsorption/nutrient deficiencies Eliquis-->warfarin after CVA, On Entyvio 7/11/17 s/p Adm for unsteady gait w MRI c/w CVA--warfarin begun: possible better control of AC Chromagen 2 qd--> IV Iron , s/p IV Cu x 5, FA 1mg qd , B12 SL & IM Still requiring IV Iron and cu infusions prn  most recent IV Iron infusion x for Ferritin = 33 2/22/21: L98=350, 6/4/21: Cu=91, Zinc=69, Ferritin=32,Iron=43, 7/12/21: Pf=688, Zinc=74, Pbofvrkv=063, Iron=35 11/15/21 : Hgb=12, Ferritin =104, Ca=9, Mg=1.7 1/4/22: Hgb=11.5, Ca=8.8, Mg=2.9, Wjzjeuu=617 2/14/22: Hgb=10.6, Ca=8.3, Mag=1.6, Alb=3.8, Iron=25, Ferritin=15 3/28/22: Hgb=14.4 , Ca=8.5, Mag=1.7,Alb=4.1, Iron=104, Uswbhwso=278,  5/9//22: Hgb=13.8 , Ca=8.8, Mag=1.6, Alb=4, Iron=64, Llnwvnkc=214 6/20/22: Hgb=14.4 , Ca=8.5, Mag=1.8, Alb=4.5, Iron=96, Vlpmjdum=457 7/25/22 Labs: Hgb=14,Ca=8.6, Mag=1.8,Alb=4.2,Iron=57 Hptnflfz=813, , PT=24, INR=2.1 10/17/22 Labs: Hgb=12.7,Ca=9, Mag=1.8,Alb=4.3,Iron=85 Ferritin=57, , PT=23, INR=2 12/16/22 : Labs: Hgb=10.8 , Alb=3.8, Iron=67,Txqthani=715, , INR=2.3 1/30/23: Labs: Hgb=13.6 , Ca=9.5, Mag=1.8, Alb=4.4,Ferritin=55, Iron=56, INR=1.6  3/27/ 23: Labs: Hgb=13.8, ESR=5, CRP<5 , Mag=1.8, Alb=4, Mricjtcn=144, Iron=78, INR=5.9  5/22/23 Labs: Hgb=12.7,ESR=2,CRP<5, Alb=4.2,Ferritin=19, Iron=42, INR=2.4 8/21/23 : Labs: Hgb=14, ESR=4, CRP=40, Iwwchyeh=139, Iron=37, INR=4.8; Alb=4 , 9/28/23 Labs: Hgb=8.6, Iron=22, sat%=7, Ferritin=17 10/5/23 Labs: Hgb=8.7, Iron=52, Sat=16%, Kypaqezh=547 10/20/23 : Hgb=10.9, INR=1.6, CRP<5, Lbbtkicf=672,  11/27/23 :   Hgb=13.3, INR=1.7, Iron=46, Sat=13%, Ferritin=33, FA=17, Z11=688, 1/18/24 :  Hgb=13, INR=2.1,Iron=67, Rtiiiyaj=139, CRP<5, ESR=1,  Ca=7.9, Alb=4.1, ALP=84 3/12/24 :  Hgb=13.7, INR=2,  Iron=25, Ferritin=23,  CRP<5, ESR=2, Ca=9.1, Phos=2.2,  ALB=4.1, ALP=75 5/9/24 :  Hgb=15, INR=1.6 , Iron=86, Irblsscc=671,sat=31%, CRP<5, ESR=2, Ca=8.8, Phos=1.1  ALB=4.2,  ALP=75 12/22/23  EGD:  gastritis, No HP; 2cm HH, Esophagitis A--,  + Barretts 12/22/23 Colonoscopy: mild ileitis of the most distal alfredito-TI; small superficial apthous ulcer on colonic side of anastamosis, Random  colon BX--> wnl 1/26/ 24 Dr. Reynoso Capsule--> scattered irregular appearing mucosa, possible erosions, no bleeding 4/5/24 Dr. Reynoso: Single Balloon Enteroscopy: to mid [facility]--> wnl; bx w  mild patchy IELs  B12 1cc IM ____L. delt--  given today, trend cbc, B12, FA, Iron panel Adj INR--closer to low 2's.    for single balloon enteroscopy        3. Osteoporosis : Progression on BD from 5/5/16 Crohns, h/o steroid use Calcium citrate & Vit D per Endo Forteo since 5/10/19 , then Reclast Repeat BD of 8/2018 --showed worsening to Osteoporosis from 2016 Rec Endo consult w Dr. Akers :Osteoporosis center at [facility]--pt never went originally 9/21/18: Phos=2.4, Ca=8.7, Alb=3.4, Vit D=27, LUK=235, 10/16/18: Phos=2.8, Ca=8.7, Alb=3.5, 1/14/19: Phos=2.6, Ca=8.7, Alb=3.6 2/28/19: Phos=1.8, Ca=8.9, Alb=3.7, VitD=39, JTN=483 3/18/19: Ca=8.5, Alb=3.7, Vit D=44.6, PTH=93 7/11/19: Ca=9.1, Alb=3.7, Phos=3.9, Vit D=40/ 306, RRS=695 8/15/19: Ca=9, Alb=4.2, Phos=4.4, VitD=59, XPP=704 10/17/19: Ca=9.6, Alb=3.9, Phos=3.5 11/6/19: Ca=9.1, Alb=3.9. Phos=3.7, VitD=50, PTH=80 1/13/20: ca=9.1 1/30/20: Ca=9.2, Alb=3.9, Phos=2.7, Mag=1.5, Vit D=103/41, PTH=87 2/18/20:ca=8.5, Alb=3.6, 3/2/20: Ca=8.5, Alb=3.6 3/17/20: Ca=9.1, Alb=3.7, Phos=3.4, Mag=1.7 10/17/20: Ca=8.9, Alb=4, Phos=2.3, Mag-2.0, Vit D=66, PTH=47 1/4/21 : Ca=9.4, Alb=4.2, Phos=2.7, Mag=2, Vit D=64, PTH=87.5 4/5/21: Ca=9.1, Alb=4, Phos=3.1, Mag=1.7, 6/4/21: ca=9, Alb=3.8, Phos=2.8, Mag=1.8 7/12/21: Ca=8.6, ALB=6, phosph=2.3, Mag=1.8 11/15/21: Ca=9, Alb=3.7, Mag=1.7 1/4/22: ca=8.8, Alb=3.9, Mg=2.9, phos=2.9 1/21/22: Ca=8.8, Alb=3.9, Vit D=60, PTH=85 2/14/22: Ca=8.3, Mag=1.6, Alb=3.8, 3/28/22: Ca=8.5, Mag=1.7, Alb=4.1, Phos=1.2  5/9//22: Ca=8.8, Mag=1.6, Alb=4, Phos=1.6  5/27/22: Ca=9.2 , Mag=1.9, Alb=3.8 , Phos=2.7, PTH=72, VitD=105  6/20/ 22: Ca=8.5 , Mag=1.8, Alb=4.5 , Phos=0.8, DEZ=840,  7/25/22 : Ca=8.6, Mag=1.8 , Alb=4.2, Phos=1.5,  10/17/22: Ca=9, Mag=1.8 , Alb=4.3, Phos=2.1, PTH=95, Vit D=25  12/16/22 : Labs: Ca=8.9, Mag=1.7, Alb=3.8, Phos=1.6,  1/30/23 Labs: Ca=9.5, Mag=1.8, ALP=79, Alb=4.4, Phos=3, PTH=96, Vit D =144  3/27/ 23: Labs: Ca=8.5, Mag=1.8 , ALP=94, Alb=4 , Phos=1,  4/21/23 Labs: Ca=8.9, Mag=1.9, ALP=75, Alb=4., Phos=2.9, PTH=89, Vit D =108  5/22/23 Labs:Ca=9.1,Mag=1.8 , ALP=68,Alb=4.2, Phos=2.4,  8/4/23 : Labs Ca=8.7, Mag=1.8, ALP=68, Alb=3.8, Phos=2.1, BPC=102, Vit D=78 8/21/23 : Labs: Ca=9.3, Mag=1.6, ALP=84, Alb=4 ,Phos=1.8 10/12/23 : Labs: Ca=8, PTH=160, Mag=2, Vit D=67, 10/20/23 Labs: Ca=7.9, ALP=68,ALB=3.7, 11/27/23 :  Ca=9.1, ALP=84,  Alb=4.2Vit D=69, 12/27/23 -12/31/23:  Ca=7.9, ALP=98,  Phos=0.8, GMY=187, Vit D =66, --> Phos=1.5, Ca=8.4, ALP=85,Alb=3.5 1/10/24:  Ca=8.5, ALP=87,  Phos=1.7, RYP=164,  Vit D 1,25=53, Vit D-25= 73,  1/18/24 :  Ca=7.9,  ALP=84,   Alb=4.1,  2/27/24  : Labs: Ca=8.9, ALP=63, ALB=4,Phos=2.8, GQY=707, Vit D 1,25=70, Vit D-25=84,  Mag=1.7,   3/12/24 :   Ca=9.1, Phos=2.2,  ALB=4.1, ALP=75             5/9/24 :  Ca=8.8, Phos=1.1, Mag=1.9,  ALB=4.2, ALP=75          Dr. Akers: Hyperparathyroidism-- probably secondary due to low Vit D/Ca & ? superimposed primary, [facility] ENT Consult init felt Parathyroid scan showing activity in mediastinum is ectopic parathyroid, then felt sx not indicated at that time, elev PTH is secondary to low Vit D/Ca ? Vit D Resistance--> in the past did well w Vit D,1,25--> 86-97 & Vit D25-->  but w Ca~ 9.4 Rx replete Vit D , Phosphate and currently  IV Calcium-as per endo  trend Vit D, Ca, Phos, PTH,  BD--9/2020: inprovmt in spine and hip , no change in wrist, BD--10/17/22: Decr T score in spine & hip, incr in wrist, repeat--> BD--2/22/24: Spine T= -1.2, Fem neck T= -2.1; Total Hip T= -2;  Forearm T= -2.2 --> spine & hip were better 10/5/20, 9/2021,6/2022, 1/2/24 -s/p Reclast--repeat-->      4. GERD: recently less active by ENT , no ht burn, dysphagia, + throat clear  h/o Dry cough, CXR:ana, saw ENT bergstein-->LPR * ++ LPR, ++ Page's w No Dysplasia, + H/O Esophagitis grade: A was found  Recommend: * Anti-reflux diet & life-style changes reviewed & re-emphasized. ++ Bedge required * Weight reduction & regular exercise emphasized  * need for PPI: Omep 40 BID--> 40mg qd , ++ H2B q HS:Zantac 300mg--> Pepcid 40mg I reviewed & summarized the prospective randomized & retrospective non-randomized studies looking at potential long term SE's of PPIs, w special attention to associations & actual cause as related to GI infections, bone loss, cognitive changes, KD, Covid, vitamin & electrolyte deficiencies questions were answered, pt advised that PPIs should be used when needed as indicated by their clinical indication and response and tapering off is always the goal if possible. pt understood.  * F/U EGD: --> 2026--for Barretts screening / surveillance * No need for pH Monitor, Manometry, Esophagram -- * ++ need for ENT eval/F/U, No need for Surgical eval --      5. Hemorrhoids: well - controlled. No pain, swelling, itch, bleeding * Discussed previously the potential complications of thrombosis, pain, infection, swelling, itching, bleeding Reccomend: * currently Low - Fiber Diet was emphasized * 6 -- 8 cups of decaffeinated fluid daily was emphasized * Sitz Bathes prn, No: Anusol HC Suppos / Cream WA BID -- was needed * No: Tucks BID, Balneol Lotion, Calmoseptine Oint -- was needed ; ++ Prep H prn * No: need for Colorectal surgical evaluation for possible ablation       6.  Hepatomegaly-->not confirmed by recent abd sono CTE w relative enlargemt, ? lobulation & enlarged portal/mesenteric veins LFTs-wnl, no ascites or edema R/O CLD, Hepatic vein thrombosis Abd sono w doppler--> Liver and spleen normal size, no thrombosis, normal portal and hepatic vein flow

## 2024-06-03 ENCOUNTER — NON-APPOINTMENT (OUTPATIENT)
Age: 59
End: 2024-06-03

## 2024-06-03 NOTE — ADDENDUM
[FreeTextEntry1] : Imaging\par  \par  10/10/18 MRE: stricturing of neoterminal ileum w worsened upstream dilatation, moderate length of upstream ileum w stricturing extending at least 20cm and more extensive then previous. suggestionof 2 adj extraluminal fluid collections which may be w/i the wall of strictured ileum near the ileocolonic anastomosis. surrounding spiculation and tethered appearance of bowel in this region.\par  \par  11/13/17: MRE-Chr mild distension of distal & vladislav-ileum s/o mild stricturing at anast, mild mural thick & mucosal enhancemt ~ 20cm; little change from 2015 c/w mild acute on chr ileitis, 2.1 cm Liver hemangioma\par  12/1//16 MRE: RUQ ileocolonic anastamosis--narrowed w upstream dilatation to 3.2 cm\par  5/5/16 BD: Osteoporotic, sig decr all areas: Lumb T=2.6, L Hip=2.1, L arm=2.5\par  3/10/15 BD: Osteopeneia, sig decr since 1/2013 Lumbar T=2.2, L Hip=1.9, Larm=1.6\par  6/11/15: CT ABD/Pelv: no obstruction, dilated thickened sb loops of distal jej and ileum\par  4/28/15 CT Abd/Pelv: Diffusely dilated SB loops, thick walled ileum\par  1/11/15 CT ABD/PEL w po C: multiple distended and fluid-filled sb loops, mild wall thickening of ileum--no inflammatory changes seen\par  3/7/14 SBFT: last 10cm prox to anast w mild distension, sl irregular. In the mid portion of this loop + mild narrowing but appears to open \par  2/15/14 CT ABD/PEL w po C: Loop in mid abd dilated to 4.3cm, bowel wall mildly thickened. No abrupt transition, bowel tapers in the RLQ to level of anastamosis. \par  9/9/13 MRE: Dilatation of loops mid and distal ileum. Distal ileum w marked thickening and decr peristalsis. Mild thickening in sigmoid colon\par  8/11/13 Ct ABD/PEL w po C: Diffusely abnl sb w marked distension extending to anastamosis. Mucoasl Thickening and spiculation of distal sb extending to anastamosis, there is thickening and stranding of adj mesenteric fat \par  1/24/13 BD: Osteopenia of the wrist, spine and hip--sig decr in the wrist \par  \par  Procedures:\par  \par  4/19/17 EGD: gastritis, MACKENZIE, No HP\par  2cm HH, +Barretts, No Dysplasia\par  Colonoscopy: Anastamosis: open w mild -mod active colitis, enteritis severe adj to anastomosis, mild more proximal, remainder of colon-wnl, 2-3 deg int hemorrhoids\par  6/10/15 Colonoscopy: Inflammatory ST mass on the ileal side of anast, opening appeared ulcerated,scarred & severely narrowed\par  8/3/10 Colonoscopy: active dz at anastamosis,ulcerated/edema/narrowed\par  8/20/14 EGD: Gastritis, mild, No HP, No IM\par  GE wide open at 40cm, 5 cm seg Barretts, No Dysplasia.
28 y/o M h/o of Chrohn's with rectal bleeding. will check labs, iv hydration, discuss with GI, possible IV steroids.

## 2024-06-03 NOTE — ASSESSMENT
[FreeTextEntry1] : 1. CROHNS DISEASE of both small and large intestine: s/p flare 8/25-->8/30/21, then early 12/2021-s/p pred tapered over 4 weeks, again the weekend 4/9/22 rx w prednisone 40_ mg ( just before last Stelera dose) --> now off since early 5/2022;  8/19/23 w flare started prednisone 40mg & tapred over 4 weeks  ~ 9/23/23 Had dark stool x 2-3 days, Warfarin held 2 days & Omep BID, Hgb dropped to 8.6   s/p ileocolonic resection x 2--last 6/19/15 for recurrent sbos: prior was s/p 4 SBOs w/i 2 yrs--2/2 distal sb disease adj to anastamosis when on Humira weekly had an ADA =33 (3/20/14), -but had sbo 2/2014 at ADA=25 and another sbo 4/28/15 6/10/2015 Colonoscopy: Inflamm ST mass on ileal side w ulceration/scarred/narrow 6/11/2015 CT: dilated thickened sb loops distal jej & ileum Post-op 6/2015 probably some malabsorption 2/2 to removal of R Colon and ileum: had WT. Loss-->r/o malabsorption: ?bile-induced->-secretory vs decr micelles, active dis, PLE ? SIBO--Elev FA, Alb=3.4-->3.1-->2.9--> (7/28/17) ?SIBO/Prevent post-op recurrence --> trial Flagyl 250 mg qid~12/7/16-->d/c: 5/11/17 Also discussed trial of Cholestyramine/Xifaxin -wanted to wait 11/20/16 ACTIVE Crohn's--> 9.5lb wt loss, BMs: #5-6, 5x/D-- but taking Magnesium 12/1/16 MRE: RUQ ileocolonic anastamosis--narrowed w upstream dilatation to 3.2 cm Labs: Stool Tvrddicxjlzp=703, + Incr Fecal fat, Alb=3.4, FA =23, C41=882, Hgb=12.3,ESR=10,CRP <5 4/19/17 :Colonoscopy: Anastamosis: open w mild -mod active colitis, enteritis severe adj to anastomosis, mild more proximal, remainder of colon=wnl 5/11/17- Adm PMHC w stools brown, but Hgb=7.9, BUN=17, Creat=1.4, Alb=2.9. S/P 2 Units w Hgb=10.6, BUN=15/1.1, Prednisone 40mg taper, entocort d/dee 6/8/17: Hgb=7.4, MCV=98, CRP<5--ASA stopped, Pred20--> 30mg, 6/13/17: s/p 2 Unit PRBCs 7/11/17 [facility] w unsteadiness. MRI Head: R Cortical Temporo-occipital encephalomalacia Hgb=9.9--> 8.4->9.7 after 1 Unit. Seen by Heme: received IV Iron CRP<5, B88=652, BWA=898, FA>23,Iron=22,Sat%=6, Ferritin=42, Alb=3.5. D/C on warfarin 2mg 7/28/17 Hgb=7.7; 7/31/17-s/p 1 Unit Heme Consultation ~ 8/2017: started IV Infusions of Iron and IV Copper 8/17/17: Hgb=9.9, ESR=20, Hm=454--Tbbchtjdmr s/p GI infection w N/V/D, no obstruction 10/17/17: Hgb=7.9,ESR=6,CRP<5, INR=1.6, Got IV Iron x 2, since 10/2/17 for Iron<10, Hgb=8.8 11/16/17: Hgb=11.2, ESR=14, CRP=12, 10/26/17 Stool fat-neg, calprotectin-30 11/13/17: MRE-Chr mild distension of distal & alfredito-ileum s/o mild stricturing at anast, mild mural thick & mucosal enhancemt ~ 20cm; little change from 2015 c/w mild acute on chr ileitis, 2.1 cm Liver hemangioma 10/9/18: Hgb=11.6, MCV=94, ESR=14, CRP=5.4, INR=2.2 10/10/18 MRE: stricturing of neoterminal ileum w worsened upstream dilatation, moderate length of upstream ileum w stricturing extending at least 20cm and more extensive then previous. suggestion of 2 adj extraluminal fluid collections which may be w/i the wall of strictured ileum near the ileocolonic anastamosis pt had declined to call numbers given for IBD center at [facility] for new or investigational rx's--biologics. later he felt he was stablizing w his wt loss, and inflammatory markers  2/28/19 w ESR=12, CRP=6.5 & 2/21/19 stool calprotectin--> 232 8/15/19: ESR=10, CRP=5.2, Calprotectin--> 88 9/19/19: ESR=9, CRP=5.5 10/17/19: ESR=7, CRP< 5 11/6/19 : ESR=6, CRP=0.18 11/27/19: ESR=6, CRP <5 1/13/20: ESR=7, CRP=0.2 1/30/20: ESR=9, CRP=11  2/3/20 EGD: gastritis, +MACKENZIE, No HP, +erosion w ooze -clipped, 0.5cm polyp-neg; 4cm HH, Esophagitis A, + Barretts, VC: 1+ Colonoscopy: 05cm rectal polyp: HP, 2nd deg int hemorrhoids mild-mod activity: MARILY w cryptitis & mild archect distortion at ileocolonic anast: colon & ileal side, no stricture  3/2/20:ESR=7, CRP=0.26 3/9/20: VDZ>40, Ab to VDZ <1.6 3/17/20: ESR=6, CRP=6.4 10/17/20: ESR=11, CRP <5 1/4/21:ESR=9, CRP<5 2/22/21: ESR=8, CRP<5 4/5/21: ESR=9, CRP<5 6/4/21: ESR=9, CRP<5 6/11/21: wt= 135, no pain, stool more formed # 4, 1-2x/d  s/p episode --abd distenstion, pain, N/V, no BM  eventually started having diarrhea and flatus, BMs returned to normal  lost 2-3 lbs but regained 7/4/21: CRP <5, Hgb=12  7/16/21 MRE: limited to incomplete bowel distention, chr distal ileitis/probable inflammatory stricturing vs collapse of the alfredito-TI--but improved since 2018 , moderate proctitis 7/12/21 -- ? flare --> entyvio should have been q 5 wk , went q 8 wks  next dose should be in 4 weeks x 2 then 5 weeks, to check levels 7/20/21: Cliically stable, unclear if MRE accurate 2/2 underdistension, but gained wt and CRP--normal 7/23/21 Entyvio level= 39, Abs <1.6 8/23/21: Labs--Hgb=13.6, ESR=10, CRP=6.6, Qnsbxuhw=856, Iron=26, Alb=3.9, BUN=16 8/26/21 p/w sbo w N/V, abd pain/bloating x 3 days, unable to keep things down Labs: WBC=5, Hgb=12, CRP=43, ESR=11, Alb=4.4, BUN=28, Creat=-1.6 CT Abd/Pelvis: diffuse marked dilatation of SB Loops w transition pt in R. mid/lower abdomen ~ 5-6cm, proximal to the ileocolonic anastomosis RX w IV solumedrol 20mg q8h, NGT, IVF, pt refused TPN, also w UTI from prostatitis 8/27 NGT was pulled, and clears started and advanced to LR, was d/c home 8/30 on prednisone 40mg qd w taper by 10mg/wk to 20mg qd   ( **Entyvio's :time 0=12/22/16 ( Humira 40mg QW); 1/5/17--2wks, s/p 3rd infusion at wk 6, then q 6weeks #6 :6/21/17-->held 2/2 Shingles, then Infusions as follows=9/19/17 , 10/17/17, 11/28/17, 1/16/18 ,2/16/18, 3/19/18,4/16/18, 5/14/18, 6/25/18, 8/7/18, 9/17/18, 10/16/18, 11/13/18, 12/11/18, 1/14/19, 2/15/19, 3/15/19, 5/16/19, 6/18/19,7/24/19, 9/9/19, 10/2/19, 11/4/19, 12/12/19, 1/6/20, 2/4/20, 3/3/20, 9/17/20, 10/15/20, 11/18/20, 1/11/21, 2/8/21, 3/8/21, 4/8/21, 6/3/21, 7/1/21, 8/2/21, 9/2/21,10/25/21 )  3/8/22 MRI Abd/Pelv: thickened distal Jejunal loops which are tethered; distal ileal segment (10-12cm ) previously actively inflammed has decreased & now characterized by an area of stricture, however the alfredito-TI 5mm to the anastamosis is enhanced as well as narrowed. Colon just distal to the anastamosis has a focal segment of inflammation & stricture. the recto-sigmoid appears inflammed  3/28/22: Hgb=14.4 , ESR=1, CRP <5, Ca=8.5, Mag=1.7,Alb=4.1, Iron=104, Rhdprbkw=357,  5/9//22: Hgb=13.8 , ESR=2, CRP <5, Ca=8.8, Mag=1.6, Alb=4, Iron=64, Wielwvcj=726 5/17/22 Dr. Heard Colonoscopy: ped scope passed w/o resist in alfredito-TI, one single apthae w psuedopolyp.  6/21/22 Labs: Hgb=14, ESR=2, CRP<5, Alb=4.5, Phos=0.8, Mag=1.8, Ca=8.5,Uyzomzmo=771, Iron=86  7/25/22 Labs: Hgb=14, ESR=2, CRP<5, Alb=4.2, Phos=1.5, Mag=1.8, Ca=8.6, Zjltazcy=203, Iron=57, PT=24, INR=2.1 10/17/22 Labs: Hgb=12.7, ESR=2, CRP<5, Alb=4.3, Phos=2.1, Mag=1.8, Ca=9, Ferritin=57, Iron=85, PT=23, INR=2 12/16/22 : Labs: Hgb=10.8 , ESR=1, Alb=3.8, Phos=1.6, Mag=1.7, Ca=8.9 , ALP=68, Ngrtyuju=095, Iron=67, INR=2.3 1/30/23: Labs: Hgb=13.6 , ESR=5, CRP<5, Alb=4.4, Phos=3, Mag=1.8, Ca=9.5, ALP=79,Ferritin=55, Iron=56,  3/27/ 23: Labs: Hgb=13.8, ESR=5, CRP<5 ,Alb=4 , Phos=1, Mag=1.8, Ca=8.5 , ALP=94, Vkbuaufs=398, Iron=78,  INR=5.9  5/22/23 Labs: Hgb=12.7,ESR=2,CRP<5, Alb=4.2, Phos=2.4, Mag=1.8, Ca=9.1, ALP=68,Ferritin=19, Iron=42, INR=2.4 8/4/23 : Alb=3.8, Phos=2.1, Mag=1.8, Ca=8.7, ZZQ=429, ALP=68, Vit D=78 INR=2.1 8/21/23 : Hgb=14, ESR=4, CRP=40, Yqjsxumb=179, Iron=37, INR=4.8; Alb=4 , Phos=1.8, Mag=1.6, Ca=9.3, ALP=84 9/28/23 Labs: Hgb=8.6, Iron=22, sat%=7, Ferritin=17  10/5/23 Labs: Hgb=8.7, Iron=52, Sat=16%, Cpxjrled=860  10/12/23 : Labs: Vit D=67, Ca=8, PTH=160, Mag=2  10/20/23 Labs: Hgb=10.9, INR=1.6, CRP<5, Ugbgrgfh=354, ALB=3.7, Ca=7.9, ALP=68 10/18/23 MRE: persistent mural enhancement without wall thickening in distal small bowel loops, which is a nonspecific imaging sign and may reflect inflammation, however no other mural or mesenteric findings to indicate active inflammatory small bowel Crohn's disease.  No evidence of stricture. No imaging signs of penetrating disease.  Chronic central mesenteric fibrofatty proliferation. 10/27/23: Ustekinumab=7.1, Ust Abs <1.6 11/27/23 :   Hgb=13.3,  PT=19, INR=1.7, Ferritin=33, Iron=46, Sat=13%,                    10/27/23: Ustekinumab=7.1, Ust Abs <1.6 11/27/23 : FA=17, A60=107, Vit D=69, ca=9.1, ALP=84,  Alb=4.2 12/22/23 Colonoscopy: mild ileitis of the most distal alfredito-TI; small superficial apthous ulcer on colonic side of anastamosis, Random  colon BX--> wnl           12/27/23 -12/31/23: Phos=0.8, VGI=672, Vit D =66, Ca=7.9 --> Phos=1.5, Ca=8.4, Hgb=12.6, Alb=3.5 1/10/24 Phos=1.7, QTU=789, Ca=8.5, Vit D =53, Vit D-25= 73,  1/18/24 :  Hgb=13, Vejgbiln=061,Iron=67,  CRP<5, ESR=1,  Ca=7.9, Alb=4.1, ALP=84 5/9/24 :  Hgb=15, Sajqwiej=748, Iron=86, sat=31%, CRP<5, ESR=2,                   Ca=8.8, Phos=1.1, Mag=1.9,  ALB=4.2, ALP=75  A / PLAN: Recent GI Bleed on warfarin, although MRE looks better, probably from ileum, Hgb improving  h/o sbo's prox to anastamosis  inflammatory vs fibrosis vs combination: 3/2022 MRI shows improvement of inflammation w possible residual stricture in the ileum and probable colitis near the anastamosis and distal colon  Responded to steroids iv--> po--d/c ~ 10/20/21,  tapered steroids from 40mg qd to 20mg, sykk96ir qd and taper 5mg/wk  then po taper again early 12/2021 40mg qd w 10mg taper/ wk--  PO prednisone 40mg mg qd started on 4/9/22 ,tapered off ~ early 5/2022  PO prednisone 40mg mg qd started on 8/19/23, taper 10mg/wk--> 20mg then 5 mg /wk   Entyvio level was 39 w/o Abs~ 3weeks after 7/1/21 dose ,  speaks to inflammatory component & probably loss of response  Stelara # 1 dose on 10/25/21, #2 on 12/10/21, # 3 on 2/11/22, # 4 on 4/20/22 , #5 mid June 2022,  #6 mid 8/2022, #7 beginning 12/2022; #8 on 2/2/23, #9 on 4/2023, #10 on 6/2023 , last dose--> 5/30/24    IBD consensus : due to malnutrition /steroid dependency, surgery is next best option  but pt reported feeling well and wanted to pursue medical treatment  repeated imaging in March after 3 months of Stelara, then consider surgery vs improved candidacy for endoscopic manipulation  f/u w IBD consultant Dr. Heard at  & reviewed imaging after 3 months of Stelera  for Colonoscopy ( w possible balloon dilatation )-> last 1 3/4 yrs ago   5/17/22 Dr. Heard Colonoscopy: ped scope passed w/o resist in alfredito-TI, one single apthae w psuedopolyp.  No Dilatation need, very mild dis at Alfredito-TI, MRE may show wall thickening, sbo's probably adhesive dis 12/22/23 Colonoscopy: mild ileitis of the most distal alfredito-TI; small superficial apthous ulcer on colonic side of anastamosis, Random  colon BX--> wnl        1/26/ 24 Dr. Reynoso Capsule--> scattered irregular appearing mucosa, possible erosions, no bleeding 4/5/24 Dr. Reynoso: [facility]; bx w  mild patchy IELs       1/4/22 Labs: Hgb=11.5, ESR=6, CRP<5, Alb=3.9 1/31/22 Calprotectin =84 2/14/22: Hgb=10.6, ESR=9, CRP <5, Ca=8.3, Mag=1.6, Alb=3.8, Iron=25, Ferritin=15 3/28/22: Hgb=14, ESR=1, CRP<5 , Ca=8.5, Mag=1.7, Alb=4, Iron=104, Nkdrjrqt=336 5/9//22: Hgb=13.8 , ESR=2, CRP <5, Ca=8.8, Mag=1.6, Alb=4, Iron=64, Ukeyuxqd=348  6/21/22 : Hgb=14, ESR=2, CRP<5, Ca=8.5, ,Alb=4.5, Mag=1.8, Iron=86,Nndxetey=950,  7/25/22 : Hgb=14, ESR=2, CRP<5, Alb=4.2, Phos=1.5, Mag=1.8, Ca=8.6, Cepijpyb=628, Iron=57 10/17/22: Hgb=12.7 , ESR=2, CRP<5, Alb=4.3, Phos=2.1, Mag=1.8, Ca=9, Ferritin=57, Iron=85  12/16/22 : Labs: Hgb=10.8 , ESR=1, Alb=3.8, Phos=1.6, Mag=1.7, Ca=8.9 , ALP=68, Ipttdmjn=993, Iron=67,  1/30/23: Labs: Hgb=13.6 , ESR=5, CRP<5, Alb=4.4, Phos=3, Mag=1.8, Ca=9.5, ALP=79,Ferritin=55, Iron=56,  3/27/ 23: Labs: Hgb=13.8, ESR=5, CRP<5 ,Alb=4 , Phos=1, Mag=1.8, Ca=8.5 , ALP=94, Jlaimjlv=777, Iron=78, INR=5.9  5/22/23 Labs: Hgb=12.7,ESR=2,CRP<5, Alb=4.2, Phos=2.4, Mag=1.8, Ca=9.1, ALP=68,Ferritin=19, Iron=42, INR=2.4  8/4/23 : Alb=3.8, Phos=2.1, Mag=1.8, Ca=8.7, WND=829, ALP=68, Vit D=78 INR=2.1  8/21/23 : Hgb=14, ESR=4, CRP=40, Alb=4 , Phos=1.8, Mag=1.6, Ca=9.3, ALP=84,Iqmchzjl=124, Iron=37, 9/28/23 Labs: Hgb=8.6, Iron=22, sat%=7, Ferritin=17 10/5/23 Labs: Hgb=8.7, Iron=52, Sat=16%, Avcvusdk=169 10/12/23 : Labs: Vit D=67, Ca=8, PTH=160, Mag=2  10/20/23 : Hgb=10.9, INR=1.6, CRP<5, Ihxqckfc=248, ALB=3.7, Ca=7.9, ALP=68  10/27/23: Ustekinumab=7.1, Ust Abs <1.6  11/27/23 :   Hgb=13.3,  PT=19, INR=1.7, Ferritin=33, Iron=46, Sat=13%,                     FA=17, C05=391, Vit D=69, ca=9.1, ALP=84,  Alb=4.2 1/18/24 :  Hgb=13, Qiklunjn=554,Iron=67,  CRP<5, ESR=1,  Ca=7.9, Alb=4.1, ALP=84 2/27/24  : Labs: Vit D-25=84, Ca=8.9, ZHZ=848, Mag=1.7, Phos=2.8;  ALB=4, ALP=63 3/12/24 :    Hgb=13.7, INR=2,  Ferritin=23, Iron=25, sat=10%, CRP<5, ESR=2,                   Ca=9.1, Phos=2.2,  ALB=4.1, ALP=75 5/9/24 :  Hgb=15,  Xdwljekk=662, Iron=86, sat=31%, CRP<5, ESR=2,  INR=1.6                  Ca=8.8, Phos=1.1, Mag=1.9,  ALB=4.2, ALP=75  Probiotics 3 qd Diet: LR, Lactose free, protein drinks tid MCT oil begun but never maintained **trend --cbc, esr, crp, albumin, calprotectin  **monitor wt--- usu bet 136-139, 7/20/21=136, 11/22/21=139, 1/4/22=132, 2/22/22=132, 4/5/22=131,5/10/66=685,  6/21/22=133, 7/27/22=136, 12/19/22=138; 2/6/23=140, 3/28/79=993, 5/23/23 =137, 8/22/23=135,10/24/86=880, 12/11/23=136,1/29/24= 135,  3/5/24 =137, 5/28/24=134 ,  7/9/24 wt=    , wt=         2. Iron deficiency anemia : --> recent drop from GIB--> 9/2023   had initially dropped , after clinical flare and post procedure bleeding Probably multifactorial: ACD, Blood loss, malabsorption/nutrient deficiencies Eliquis-->warfarin after CVA, On Entyvio 7/11/17 s/p Adm for unsteady gait w MRI c/w CVA--warfarin begun: possible better control of AC Chromagen 2 qd--> IV Iron , s/p IV Cu x 5, FA 1mg qd , B12 SL & IM Still requiring IV Iron and cu infusions prn  most recent IV Iron infusion x for Ferritin = 33 2/22/21: D18=322, 6/4/21: Cu=91, Zinc=69, Ferritin=32,Iron=43, 7/12/21: Tw=157, Zinc=74, Inoqbrly=055, Iron=35 11/15/21 : Hgb=12, Ferritin =104, Ca=9, Mg=1.7 1/4/22: Hgb=11.5, Ca=8.8, Mg=2.9, Sbfdkvm=557 2/14/22: Hgb=10.6, Ca=8.3, Mag=1.6, Alb=3.8, Iron=25, Ferritin=15 3/28/22: Hgb=14.4 , Ca=8.5, Mag=1.7,Alb=4.1, Iron=104, Pxozkkoz=898,  5/9//22: Hgb=13.8 , Ca=8.8, Mag=1.6, Alb=4, Iron=64, Yvaiahij=934 6/20/22: Hgb=14.4 , Ca=8.5, Mag=1.8, Alb=4.5, Iron=96, Nzcfjmrr=519 7/25/22 Labs: Hgb=14,Ca=8.6, Mag=1.8,Alb=4.2,Iron=57 Hgccdjlt=666, , PT=24, INR=2.1 10/17/22 Labs: Hgb=12.7,Ca=9, Mag=1.8,Alb=4.3,Iron=85 Ferritin=57, , PT=23, INR=2 12/16/22 : Labs: Hgb=10.8 , Alb=3.8, Iron=67,Mylcyqft=499, , INR=2.3 1/30/23: Labs: Hgb=13.6 , Ca=9.5, Mag=1.8, Alb=4.4,Ferritin=55, Iron=56, INR=1.6  3/27/ 23: Labs: Hgb=13.8, ESR=5, CRP<5 , Mag=1.8, Alb=4, Pymysgiw=306, Iron=78, INR=5.9  5/22/23 Labs: Hgb=12.7,ESR=2,CRP<5, Alb=4.2,Ferritin=19, Iron=42, INR=2.4 8/21/23 : Labs: Hgb=14, ESR=4, CRP=40, Ypbodamo=075, Iron=37, INR=4.8; Alb=4 , 9/28/23 Labs: Hgb=8.6, Iron=22, sat%=7, Ferritin=17 10/5/23 Labs: Hgb=8.7, Iron=52, Sat=16%, Uhjnrsmg=694 10/20/23 : Hgb=10.9, INR=1.6, CRP<5, Iafrlpsp=549,  11/27/23 :   Hgb=13.3, INR=1.7, Iron=46, Sat=13%, Ferritin=33, FA=17, K83=484, 1/18/24 :  Hgb=13, INR=2.1,Iron=67, Eoacdger=751, CRP<5, ESR=1,  Ca=7.9, Alb=4.1, ALP=84 3/12/24 :  Hgb=13.7, INR=2,  Iron=25, Ferritin=23,  CRP<5, ESR=2, Ca=9.1, Phos=2.2,  ALB=4.1, ALP=75 5/9/24 :  Hgb=15, INR=1.6 , Iron=86, Kpvxivry=645,sat=31%, CRP<5, ESR=2, Ca=8.8, Phos=1.1  ALB=4.2,  ALP=75  12/22/23  EGD:  gastritis, No HP; 2cm HH, Esophagitis A--,  + Barretts 12/22/23 Colonoscopy: mild ileitis of the most distal alfredito-TI; small superficial apthous ulcer on colonic side of anastamosis, Random  colon BX--> wnl 1/26/ 24 Dr. Reynoso Capsule--> scattered irregular appearing mucosa, possible erosions, no bleeding 4/5/24 Dr. Reynoso: Single Balloon Enteroscopy: to mid [facility]--> wnl; bx w  mild patchy IELs  B12 1cc IM ____L. delt--  not given today, trend cbc, B12, FA, Iron panel Adj INR--closer to low 2's.    for single balloon enteroscopy        3. Osteoporosis : Progression on BD from 5/5/16 Crohns, h/o steroid use Calcium citrate & Vit D per Endo Forteo since 5/10/19 , then Reclast Repeat BD of 8/2018 --showed worsening to Osteoporosis from 2016 Rec Endo consult w Dr. Akers :Osteoporosis center at [facility]--pt never went originally 9/21/18: Phos=2.4, Ca=8.7, Alb=3.4, Vit D=27, RXF=002, 10/16/18: Phos=2.8, Ca=8.7, Alb=3.5, 1/14/19: Phos=2.6, Ca=8.7, Alb=3.6 2/28/19: Phos=1.8, Ca=8.9, Alb=3.7, VitD=39, SEW=540 3/18/19: Ca=8.5, Alb=3.7, Vit D=44.6, PTH=93 7/11/19: Ca=9.1, Alb=3.7, Phos=3.9, Vit D=40/ 306, IPW=261 8/15/19: Ca=9, Alb=4.2, Phos=4.4, VitD=59, YQL=886 10/17/19: Ca=9.6, Alb=3.9, Phos=3.5 11/6/19: Ca=9.1, Alb=3.9. Phos=3.7, VitD=50, PTH=80 1/13/20: ca=9.1 1/30/20: Ca=9.2, Alb=3.9, Phos=2.7, Mag=1.5, Vit D=103/41, PTH=87 2/18/20:ca=8.5, Alb=3.6, 3/2/20: Ca=8.5, Alb=3.6 3/17/20: Ca=9.1, Alb=3.7, Phos=3.4, Mag=1.7 10/17/20: Ca=8.9, Alb=4, Phos=2.3, Mag-2.0, Vit D=66, PTH=47 1/4/21 : Ca=9.4, Alb=4.2, Phos=2.7, Mag=2, Vit D=64, PTH=87.5 4/5/21: Ca=9.1, Alb=4, Phos=3.1, Mag=1.7, 6/4/21: ca=9, Alb=3.8, Phos=2.8, Mag=1.8 7/12/21: Ca=8.6, ALB=6, phosph=2.3, Mag=1.8 11/15/21: Ca=9, Alb=3.7, Mag=1.7 1/4/22: ca=8.8, Alb=3.9, Mg=2.9, phos=2.9 1/21/22: Ca=8.8, Alb=3.9, Vit D=60, PTH=85 2/14/22: Ca=8.3, Mag=1.6, Alb=3.8, 3/28/22: Ca=8.5, Mag=1.7, Alb=4.1, Phos=1.2  5/9//22: Ca=8.8, Mag=1.6, Alb=4, Phos=1.6  5/27/22: Ca=9.2 , Mag=1.9, Alb=3.8 , Phos=2.7, PTH=72, VitD=105  6/20/ 22: Ca=8.5 , Mag=1.8, Alb=4.5 , Phos=0.8, GVZ=496,  7/25/22 : Ca=8.6, Mag=1.8 , Alb=4.2, Phos=1.5,  10/17/22: Ca=9, Mag=1.8 , Alb=4.3, Phos=2.1, PTH=95, Vit D=25  12/16/22 : Labs: Ca=8.9, Mag=1.7, Alb=3.8, Phos=1.6,  1/30/23 Labs: Ca=9.5, Mag=1.8, ALP=79, Alb=4.4, Phos=3, PTH=96, Vit D =144  3/27/ 23: Labs: Ca=8.5, Mag=1.8 , ALP=94, Alb=4 , Phos=1,  4/21/23 Labs: Ca=8.9, Mag=1.9, ALP=75, Alb=4., Phos=2.9, PTH=89, Vit D =108  5/22/23 Labs:Ca=9.1,Mag=1.8 , ALP=68,Alb=4.2, Phos=2.4,  8/4/23 : Labs Ca=8.7, Mag=1.8, ALP=68, Alb=3.8, Phos=2.1, VKP=788, Vit D=78 8/21/23 : Labs: Ca=9.3, Mag=1.6, ALP=84, Alb=4 ,Phos=1.8 10/12/23 : Labs: Ca=8, PTH=160, Mag=2, Vit D=67, 10/20/23 Labs: Ca=7.9, ALP=68,ALB=3.7, 11/27/23 :  Ca=9.1, ALP=84,  Alb=4.2Vit D=69, 12/27/23 -12/31/23:  Ca=7.9, ALP=98,  Phos=0.8, PJV=628, Vit D =66, --> Phos=1.5, Ca=8.4, ALP=85,Alb=3.5 1/10/24:  Ca=8.5, ALP=87,  Phos=1.7, PFH=829,  Vit D 1,25=53, Vit D-25= 73,  1/18/24 :  Ca=7.9,  ALP=84,   Alb=4.1,  2/27/24  : Labs: Ca=8.9, ALP=63, ALB=4,Phos=2.8, ZWV=696, Vit D 1,25=70, Vit D-25=84,  Mag=1.7,   3/12/24 :   Ca=9.1, Phos=2.2,  ALB=4.1, ALP=75             5/9/24 :  Ca=8.8, Phos=1.1, Mag=1.9,  ALB=4.2, ALP=75          Dr. Akers: Hyperparathyroidism-- probably secondary due to low Vit D/Ca & ? superimposed primary, [facility] ENT Consult init felt Parathyroid scan showing activity in mediastinum is ectopic parathyroid, then felt sx not indicated at that time, elev PTH is secondary to low Vit D/Ca ? Vit D Resistance--> in the past did well w Vit D,1,25--> 86-97 & Vit D25-->  but w Ca~ 9.4 Rx replete Vit D , Phosphate and currently  IV Calcium-as per endo  trend Vit D, Ca, Phos, PTH,  BD--9/2020: inprovmt in spine and hip , no change in wrist, BD--10/17/22: Decr T score in spine & hip, incr in wrist, repeat--> BD--2/22/24: Spine T= -1.2, Fem neck T= -2.1; Total Hip T= -2;  Forearm T= -2.2 --> spine & hip were better 10/5/20, 9/2021,6/2022, 1/2/24 -s/p Reclast--repeat-->      4. GERD: recently less active by ENT , no ht burn, dysphagia, + throat clear  h/o Dry cough, CXR:ana, saw ENT bergstein-->LPR * ++ LPR, ++ Page's w No Dysplasia, + H/O Esophagitis grade: A was found  Recommend: * Anti-reflux diet & life-style changes reviewed & re-emphasized. ++ Bedge required * Weight reduction & regular exercise emphasized  * need for PPI: Omep 40 BID--> 40mg qd , ++ H2B q HS:Zantac 300mg--> Pepcid 40mg I reviewed & summarized the prospective randomized & retrospective non-randomized studies looking at potential long term SE's of PPIs, w special attention to associations & actual cause as related to GI infections, bone loss, cognitive changes, KD, Covid, vitamin & electrolyte deficiencies questions were answered, pt advised that PPIs should be used when needed as indicated by their clinical indication and response and tapering off is always the goal if possible. pt understood.  * F/U EGD: --> 2026--for Barretts screening / surveillance * No need for pH Monitor, Manometry, Esophagram -- * ++ need for ENT eval/F/U, No need for Surgical eval --      5. Hemorrhoids: well - controlled. No pain, swelling, itch, bleeding * Discussed previously the potential complications of thrombosis, pain, infection, swelling, itching, bleeding Reccomend: * currently Low - Fiber Diet was emphasized * 6 -- 8 cups of decaffeinated fluid daily was emphasized * Sitz Bathes prn, No: Anusol HC Suppos / Cream MA BID -- was needed * No: Tucks BID, Balneol Lotion, Calmoseptine Oint -- was needed ; ++ Prep H prn * No: need for Colorectal surgical evaluation for possible ablation       6.  Hepatomegaly-->not confirmed by recent abd sono CTE w relative enlargemt, ? lobulation & enlarged portal/mesenteric veins LFTs-wnl, no ascites or edema R/O CLD, Hepatic vein thrombosis Abd sono w doppler--> Liver and spleen normal size, no thrombosis, normal portal and hepatic vein flow

## 2024-06-03 NOTE — REASON FOR VISIT
[Follow-up] : a follow-up of an existing diagnosis [Follow-Up: _____] : a [unfilled] follow-up visit [FreeTextEntry1] : for Crohns, gerd, anemia

## 2024-06-03 NOTE — HISTORY OF PRESENT ILLNESS
[de-identified] :    This HPI  reflects a summary and review of records : including previous and most recent  Labs, body imaging, consults and progress notes, operative and pathology reports, EKG reports, ED records, found in [employer], [employer],  [employer] and any additional records brought in by  the patient at the time of the visit.            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-->Eliquis' warfarin         GERD w Page's, Hemorrhoids, BPH,         S/P Ileocolonic resection 1998        Since then multiple SBOs          Got IV Iron x 2, since 10/2/17        10/19/17: feeling better, Eu=733 on prednisone 15mg x 3weeks, BMs Brown: #5-6, 1-2/D, occas 3-4/d        10/25/17: Hgb=10, ESR=5, CRP=5, Alb=3.6, Phos=2, Uxmvqhhl=425, I27=630        11/16/17: Hgb=11.2, ESR=14, CRP=12,         11/13/17: MRE-Chr mild distension of distal & vladislav-ileum s/o mild stricturing at anast, mild mural thick & mucosal enhancemt ~ 20cm; little change from 2015 c/w mild acute on chr ileitis, 2.1 cm Liver hemangioma        11/28/17: Entyvio        11/29/17: Hgb=9.6, ESR=10, CRP=5.8, Alb=3.8,Ferritin-19, Iron=14,Sat=4%, Phos=2.5, Th=285, BMs:# 5, 1-2x/D, brown,        12/19;17: Hgb=10.1, ESR=12, CRP=6.5; 12/21/17: BMs:#3-5, 1-3x/D , brown, no blood, no pain, yd=076        1/3/18:Hgb=11.7, ESR=19, CRP=6.5, Alb=4.1, Tsiqqwwz=670, Iron=31, Sat%=9,Zinc=55        1/16/18: Entyvio, Hgb=11.4, ESR=10, CRP=6.9        1/18/18: Today: cellulitis R foot, saw dr KEITH--rx augmentum x 10D, Entyvio 1/9 to 1/16, vm=081 w clothes,BMs: # 4-5, 1-2x/D         2/14/18: Hgb=11.1, ESR=16, CRP=11.6, Alb=3.6, Iron=28, Ferritin=23, INR=1.5         2/16/18: s/p Entyvio; Iron infusion, again on 2/27 2/20/18: Hgb=10.6, ESR=20, CRP=12, INR=1.7        2/27/18: s/p iron infusion        3/9/18: Dr. Burden for tenosynovitis, rx w Zorvolex: Diclofenac x 5 days--? response        3/14/18: Tl=962; BMs: # 5, 1-2x/D; Hgb=11, ESR=18, CRP=11,Irom=45,Tqtjrzan=159,Alb=3.6, INR=1.5        3/19/18: s/p Entyvio        3/22/18: fl=036, BMs: # 5, 3-4 x/d        4/16/18: s/p Entyvio,Hgb=11.9, INR=1.3, ESR=18, CRP=8.4         4/18/18 EGD: gastritis, no HP, no IM, 2+ Mucous, 0.5cm gastric polyp: fundic, 4cm HH, + Barretts:3cm, no dysplasia        4/25/18: Hgb=11.5, INR=1.6, Iron=40, Sat=13%, Ferritin=57, Alb=3.2, Phos=2.2, Mag=1.7        4/30/18: s/p Iron Infusion        5/14/18: s/p Entyvio         5/23/18: Hgb=11.5, ESR=16, CRP< 5        5/29/18: s/p Iron Infusion        6/6/18: Hgb=10.6, INR=1.7, Afxfuirk=812, Alb=3.5, Phos=2.1, G54=161, na=786; BMs: # 5, 2-3x/D         6/19/18: Hgb=10.6, INR=1, CRP=15, ESR=23        6/21/18: R ankle is acting up, swollen, pain, having MRI done, took a couple of advil, on low carbs ,BMs: # 5, 1-2x/D, ah=961        6/26/18: MRI: fx/stress rxn-talar body/navicula; stress related changes--cuneform, cuboid,distal tibia; mod tibiotalar effusion, mild-mod peroneal tendinosis        7/19/18: entyvio 6/26/18, eliminated all carbs--anti inflammatory diet, ua=027, BMs: # 4-5, 1-3x/D         7/25/18: Hgb=10, INR=1.3, Alb=3.3, Phos=2.4, Hbqxjvoq=641, sat%=12, Iron=35        8/2/18: BD--osteoporosis of hip/spine: sig decrease BD--17.6% of hip, 17% of spine, osteopenia of wrist: 6.4%        8/7/18: Entyvio        8/21/18: Hgb=11.1, INR=1.5, ESR=19, CRP=18.6        8/23/18: recently w tooth infection, old implant--loose and removed; rx w amox, and advil        eating almost no carbs, zw=956, # 5-6, 2-3x/d         8/24/18: Stool fat--neg, Tohdnuuaffpu=917        9/5/18: Hgb=11.4, INR=1.3, ESR=9, CRP=11.3, Iron=41, Qmwkhckc=539, Alb=3.8,        9/17/18: entyvio, Hgb=10.7, INR=2.2, ESR=17, CRP=9.1,         9/20/18: dr=101, BMs: # 5-6, 1-2x/D         9/21/18: Phos=2.4, Ca=8.7, Alb=3.4, Vit D=27, DTG=900,         Dr. Akers: Hyperparathyroidism: probably secondary due to low Vit D/Ca & ? superimposed primary, rx replete Vit D and Calcium        10/9/18: Hgb=11.6, MCV=94, ESR=14, CRP=5.4, INR=2.2        10/10/18 MRE: stricturing of neoterminal ileum w worsened upstream dilatation, moderate length of upstream ileum w stricturing extending at least 20cm and more extensive then previous. suggestion of 2 adj extraluminal fluid collections which may be w/i the wall of strictured ileum near the ileocolonic anastomosis. surrounding spiculation and tethered appearance of bowel in this region. 10/16/18: entyvio, Hgb=11.7, MCV=94, ESR=14, CRP <5, Alb=3.5 10/18/18: Kj=368,   BMs: # 5-6, 3x/D ,  11/13/18: Entyvio 11/21/18 CTE w C: limited 2/2 bowel underdistention, mucosal hyperenhancemt & extensive SM edema of distal ileum including vladislav-ileum, difficult to exclude a sml fluid collection, Liver w relative enlargement w suggestion of lobulation, enlargemt of PV,SMV,IMV,Spl V, rectal V. No ascites 11/28/18: Hgb=10, ESR=19, CRP=17,Alb=3.5,Iron=35, Sat%=11, Iidectzx=919, INR=1.3 11/29/18: xy=487, BMs: # 5-6, 3-4x/D 12/3/18: Abd sono--Liver 14.8cm Incr heterogenicity, spleen--wnl 12/11/18 & 1/14/19: Entyvio 1/14/19:Entyvio,  Hgb=10.7, ESR=15, Alb=3.6, Iron=36, Ferritin=67, Sat%=11, INR=1.6 1/24/19:  mh=812, stools are # 4-6, 3-4x/d , no pain 2/5/19: Hgb=11.2, ESR=14, CRP=0.36 2/28/19: Hgb=11.5, ESR=12, CRP=6.5, Alb=3.7, Iron=69, Sfzsboip=018, Ca=8.9                Bms: # 4-5, 2-3x/D , yq=697,  Entyvio : last 2/1519,                had parathyroid scan pre-op, still w elev PTH, + activity in mediastinum,? ectopic parathyroid tissue vs neoplasm.  To see ENT and have Imaging at [facility] 3/28/19: Bms: # 4-5 , 3x/d ;ft=047 4/11/19: Hgb-9.7, Iron=45, ESR=18, CRP=9.4, Alb=3.5,  Saw Endo SX at [facility], felt hyperparathyroid is secondary to Low Ca/Vit D, no sx at this time 4/16/19: BMs: # 5-6, 3-4x/D, vj=386,  Entyvio : last 3/1519,  got IV iron 4/12/19 for Ferritin=53 4/27/19: s/p R Hip Nailing--missed 4/2019 2/2 fresh wound 5/16/19 & 6/18/19 Entyvio 7/2/19: Hgb=11.7, Ferritin=41,ESR=12, CRP=5.5, B6=2.8,Mag=1.8,Phos=3.9,Mi=095,Zinc=61 7/11/19: s/p IV iron 7/11/19: Alb=3.7, IXX=697, Ca=9.1, Vit D=40/306,  7/24/19: Entyvio, Alb=3.8, BHI=043, Ca=8.9, Vit D=128  8/1/19: Bms: # 5-6, occas #4, 2-3x/D , qh=362 8/15/19: Hgb=12, ESR=10, CRP=5.2, Ferritin=68, Alb=3.4, Phos=4.4,Mg=1.7, Ca=8.5,Calprotectin=88, 8/20/19: IMJ=404, Ca=9, Alb=4.2 9/19/19: Hgb=12.8, ESR=9, CRP=5.5, Alb=3.7, Yycnleze=704, Mag=1.8, Ca=8.9, Phos=4.2 9/26/19: jo=369, BMs: #5-6, 2-3x/D, no pain 10/17/19: rh=201, BMs: #4-6, 1-2x/D, no Pain; Hgb=14, ESR=7, CRP<5, Alb=3.9, Bcchsmfd=154, Mag=1.7, Ca=9.6 10/24/19: um=242, Bms: # 4-6 , no pain, 11/6/19: ESR=6, CRP=6, PTH=80,Ca=9.1, VitD=50 11/14/19: lz=143, BMs: # : 4, 1-2, 0ccas #5  11/17/19: ESR=6, CRP<5, Vvjqaeub=688,   12/19/19: gq=194, BMs: #5-6, 2-3x/D 1/6/20: entyvio 1/13/20: ESR=7, CRP=0.2, Hgb=13.5, Yrrxfdtn=300, R58=693, FA=10, Alb=4.1, Ca=9.1 1/16/20: wt = 127, BMs: # 5, 1-3x/d 1/30/20: ESR=9, CRP=11, Hgb=13.9, Hpglmuir=856,Iron=38, Alb=3.9,Ca=9.2, PTH=87, 2/3/20 EGD: gastritis, +MACKENZIE, No HP, +erosion w ooze -clipped, 0.5cm polyp-neg; 4cm HH, Esophagitis A, + Barretts, VC: 1+ Colonoscopy: 05cm rectal polyp: HP, 2nd deg int hemorrhoids mild-mod activity: MARILY w cryptitis & mild archect distortion at ileocolonic anast: colon & ileal side, no stricture 2/4/20: Entyvio; 2/12/20: IV Iron, 3/3/20: Entyvio 2/25/20: vj=244,  BMs: # 4-5, 1-2x/ d 3/19/20: ng=724, BMs: #4-5, 1-2x/D 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 10/17/20 : Hgb=13, CRP< 5, ESR= 11, Iron=44, Ferritin=46, Alb=4, Phos=2.3,  11/5/20: ju=703; s/p IV Iron x 2 ,  11/4 and 1 week prior;   BMs:  # 4-5, 1-2x/D , no pain 11/18/20  Entyvio + IV Ca  1/4/21: Hgb=13.6, CRP<5, ESR=9, Ferritin=60, Alb=4.2, Phos=2.7 1/11/21: Entyvio & IV Ca 1/14/21: no pain, stool more formed ,  of=872 - 137; BMs:  # 4-5, 1-2x/D  2/8/21: Entyvio & IV Ca 2/23/21 IV Ca 2/22/21: Hgb=12.7, CRP<5, ESR=8, Izidgcqcd=843, Phos=2.3, Mag=1.8, W36=421, Zinc=58, Mq=035 2/26/21: os=593,  BMs:  # 4-5, 1-2x/D , no pain  3/8/21 & 4/8/21: Entyvio 3/9/21 & 3/24/21: IV Iron 4/5/21: Hgb=13, CRP<5, ESR=9, Ferritin=94, Phos=3.1, Mag=1.7,Ca=9.1/ Alb=4              Pt Ins co is refusing to cover Entyvio q 4wks, despite numerous attempts to appeal, they also refuse to set up a peer to peer discussion betw myself and another healthcare provider, even one that works for a third party.  They do acknowledge that there is literature supporting the successful use in individual cases who don't respond to the q 8 wk interval and need less then q 8weeks , but state it had not in FDA approved at less then 8wks so they will not approve it.  I spoke to the ins co rep and discussed that fact that patients should not be  pigeon holed since practicing medicine is done on an individual basis.  Humans are not all the same.   Their  response didn't change eventhough the pt clinically needed the medication and it  induced a beneficial clinical response towards remission.  Therefore the patient and I decided to slowly increase the interval since the insurance company will not pay for this benefit.  Hopefully he may be able to maintain his present clinical state.  If not we will return to q 4weeks and will again appeal using this patients real clinical data .  4/9/21:  ja=731,  no pain, stool more formed # 4, 1-2x/d  6/11/21: wt= 135,   no pain, stool more formed # 4, 1-2x/d               recently had an episode of abd distenstion, pain, N/V, no BM              eventually started having diarrhea and flatus, BMs returned to normal              lost 2-3 lbs but regained  Doesnt recall eating anything different, no fever, chills, brbpr, melena  entyvio was 6/3/21--which is almost 8 weeks, was supposed to be 5-6 weeks  to start               6/4/21 Hgb=12, CRP<5, Ferritin=32, pt to receive IV iron      7/12/21 Labs: Hgb=13.6, ESR=10, CRP=<5, Sdgdynz=765, Alb=3.7, BUN=16   7/16/21 MRE: limited to incomplete bowel distention, chr distal ileitis/probable inflammatory stricturing vs collapse of the vladislav-TI--but improved since 2018 , moderate proctitis 7/20/21:  Last entyvio was --7/1, next early august                 cm=776 ,  no pain, stool more formed # 4-5/6, 1-2x/d  7/23/21 Entyvio level= 39, Abs <1.6 8/23/21: Labs--Hgb=13.6, ESR=10, CRP=6.6, Phlvcgfj=979, Iron=26, Alb=3.9, BUN=16 8/26/21 p/w w N/V, abd pain/bloating  x 3 days, unable to keep things down Labs: WBC=5, Hgb=12, CRP=43, ESR=11, Alb=4.4, BUN=28, Creat=-1.6 CT Abd/Pelvis: diffuse marked dilatation of SB Loops w transition pt in Rmid/lower abdomen ~ 5-6c, proximal to the ileocolonic anastomosis RX w IV solumedrol 20mg q8h, NGT, IVF, pt refused TPN, also w UTI from prostatitis 8/27 NGT was pulled, and clears started and advanced to LR,  was d/c home 8/30 on prednisone 40mg qd w taper by 10mg/wk--> to 20mg 10/15 Entyvio 10/25 Stelera (Ustekinumab)  # 1 11/3/21 [facility]: wt above 140, off pred x 2 weeks,  1-2 BMs qd  Discussed at IBD conference, reviewed previous colonoscopy, and recent imaging; consensus that due to malnutrition and steroid dependency, surgery is next best option however pt reports feeling great and wants to pursue medical treatment which is reasonable given he feels well  repeat imaging in mid-January after 3 months of Stelara, and then consider referral to surgery vs improved candidacy for endoscopic manipulation (currently presumed one long stricture).   11/15/21 : qa=615, Hgb=12, ESR=3, CRP <5, Ferritin =104, Ca=9, Mg=1.7, Alb=3.7  12/10/21 : iron infusion 1/4/22: Hgb=11.5, ESR=6, CRP <5, Ca=8.8, Mag=2.9, Alb=3.9 1/10/22 : sq=110, stools # 5, 1-2x/D  1/10/22 Today:  Feeling well, no c/o , CP, SOB/ AMARO, Cough, Wheeze, Palpitations, edema              Most recent labs  reviewed w patient:1/4/22 1/31/22: calprotectin=84 2/2/22: hm=529 2/14/22: Hgb=10.6, ESR=9, CRP <5, Ca=8.3, Mag=1.6, Alb=3.8, Iron=25, Ferritin=15 3/3/22:  iv Iron infusion x 1 3/8/22 MRI Abd/Pelv: thickened distal Jejunal loops which are tethered; distal ileal segment  (10-12cm ) previously actively inflammed has decreased & now characterized by an area of stricture, however the vladislav-TI  5mm to the anastamosis is enhanced as well as narrowed.  colon just  distal to the anastamosis has a focal segment of inflammation & stricture.  the recto-sigmoid appears inflammed  3/28/22: Hgb=14.4 , ESR=1, CRP <5, Ca=8.5, Mag=1.7,Alb=4.1, Iron=104, Jcyavxmv=992, 4/5/22: br=985, Bms: # 5-6 , 1-2 x/day    5/10/22  :  Last entyvios were -->7/1, 8/5, 9/2, 10/15/21              Stelara # 1 IV--10/25/21, second dose SC~ 12/10/21, 3rd~ 2/14/22,  4th-- mid April , 5th--mid june                Early December 2021  had a " flare" loose BMs, N/V and bloat              Took Pred 40mg qd and tapered down 10mg / week , now off pred x 7-8 weeks  4/13/22 Dr. Heard:  pt states he had a flare the weekend of 4/9/22 w vomiting/distension                pt self-started prednisone 40mg , this was just before his april stelara dose                claims he was feeling better               Dr. Heard: sched colonoscopy w ? dilatation   5/10/22: tapered of pred 40mg , 10mg/wk over 1 month, now off 2weeks          no pain, n/v,  BMs: # 4-5, 1-2 x Day , wt=-132 5/17/22 Dr. Heard Colonoscopy: ped scope passed w/o resist in vladislav-TI, one single apthae w psuedopolyp.  Flares probably 2/2 to adhesive dis, imaging may consistently show wall thickening  5/27/22 Labs:  Alb=3.8. Phos=2.7, Mag=1.9, Ca=9.2, PTH=72, Vit D=105, ALP=76                + IV Iron  6/20/22 Labs:  Alb=4.5, Phos=0.8, Mag=1.8, Ca=8.5, MMQ=672, ALP=83                          Hgb=14, ESR=2, CRP<5, Xnrwodae=027, Iron=86 6/21/22:  no pain, n/v,  BMs: # 4-5, 1-2 x Day ,                zs=552 7/26/22:  no pain, n/v,  BMs: # 4-5, 1-2 x Day ,                zp=605 9/30/22 EGD: mild gastritis, no HP; 0.75cm gastric polyp:fundic;                4cm HH, Esophagitis A--, + Barretts 10/17/22 : Alb=4.3, Phos=2.1, Mag=1.8, Ca=9,  PTH=95, ALP=80, TSH=12, Vit D=69                          Hgb=12.7 , ESR=2, CRP<5, Ferritin=57, Iron=85 12/16/22 : Alb=3.8, Phos=1.6, Mag=1.7, Ca=8.9 , ALP=68,                           Hgb=10.8 , ESR=1, Hjsnxsrz=497, Iron=67, INR=2.3  12/19/22: nk=995, had some N and distension the day after Thanksgiving                      Was having BMs and passing gas, went of liquids x 1-2 days--> resolved 1/30/23: Alb=4.4, Phos=3, Mag=1.8, Ca=9.5,  PTH=96, ALP=79,  Vit D=144                          Hgb=13.6 , ESR=5, CRP<5, Ferritin=55, Iron=56, INR=1.6    2/6/23 : nq=323, no abd pain, BMs: # 4 qd   3/17/23: developed cugh,congestion, fever, malaise  3/19/23 + Covid; Received Iv Remdesivir x 3 at home 3/27/ 23: xd=922, BMs: #4-5,  1-2x/ D   Alb=4 , Phos=1, Mag=1.8, Ca=8.5 , ALP=94  Hgb=13.8, ESR=5, CRP<5, Vjoooggj=903, Iron=78, INR=5.9  4/21/23 : Alb=4, Phos=2.9, Mag=1.9, Ca=8.9,  PTH=89, ALP=75,  Vit D=105/ 80, INR=3.2  5/22/23 Oj=172; Hgb=12.7, ESR=2, CRP<5, Ferritin=19, Iron=42, INR=2.4               Alb=4.2, Phos=2.4, Mag=1.8, Ca=9.1, ALP=68,                      8/4/23 :  Alb=3.8, Phos=2.1, Mag=1.8, Ca=8.7,  RZY=158, ALP=68,  Vit D=78 INR=2.1  8/21/23 : Hgb=14, ESR=4, CRP=40, Zbvqxair=168, Iron=37, INR=4.8                Alb=4 , Phos=1.8, Mag=1.6, Ca=9.3, ALP=84,      8/22/23  :   cv=379 , this weekend  had a flare, abd distension, no N/V, fever                  BMs: started # 5/6--> now # 6-7, no blood or mucous                  doesnt recall anything too solid or fiberous                  Started Pred 40mg qd, and liquid diet--> feeling better                  Less distension, passing gas and BMs  10/24/23:  pk=454, BMs: #4, 1-2x/D                   pt reports an episode of dark stool w 2-3 days ~ 9/23/23                  He held his Warfarin for a couple of days and stool returned to normal color                  He increased his Omep 40mg BID                  He had no Abd pain, N/V, ht burn, dysphagia, bloat                  9/28/23 Labs: Hgb=8.6, Iron=22, sat%=7, Ferritin=17                  10/5/23 Labs: Hgb=8.7, Iron=52, Sat=16%, Wblczaer=074                  10/12/23 : Labs: Vit D=67, Ca=8, PTH=160, Mag=2                  10/20/23 Labs: Hgb=10.9, INR=1.6, CRP<5, Jhirmxff=574, ALB=3.7, Ca=7.9, ALP=68 10/18/23 MRE:  persistent mural enhancement without wall thickening in distal small bowel loops, which is a nonspecific imaging sign and may reflect inflammation, however no other mural or mesenteric findings to indicate active inflammatory small bowel Crohn's disease.  No evidence of stricture. No imaging signs of penetrating disease.  Chronic central mesenteric fibrofatty proliferation. 10/27/23: Ustekinumab=7.1, Ust Abs <1.6 11/27/23 : FA=17, Z72=292, Vit D=69, ca=9.1, ALP=84,  Alb=4.2                  Hgb=13.3,  PT=19, INR=1.7, Ferritin=33, Iron=46, Sat=13%,  12/5/23 : Homocysteine=18, GSI=083 12/11/23 :  ik=145, BMs: #4, 1-2x/D , Melena-->no    12/22/23  EGD:  gastritis, No HP; 2cm HH, Esophagitis A--,  + Barretts 12/22/23 Colonoscopy: mild ileitis of the most distal vladislav-TI; small superficial apthous ulcer on colonic side of anastamosis, Random  colon BX--> wnl  12/27/23 -12/31/23 [facility] for Hypophosphatemia=0.8, Rx w IV KPhos QRO=608, Vit D =66, 7.6--> Phos=1.5, Ca=8.4, Hgb=12.6 D/C on kPhos 1 gm TID seen by [facility] 1/10/24 Phos=1.7, Vit D =53, Vit D-25= 73, Ca=8.5, EDD=890 1/18/24 Fndgiocf=501, CRP<5, ESR=1,  Ca=7.9, Hgb=13 1/29/24:  ie=862,  BMs: #4, 1-2x/D , no  Melena 1/26/24 Dr. Reynoso Capsule--> scattered irregular appearing mucosa, possible erosions, no bleeding            for single balloon enteroscopy 2/27/24  : Labs: Vit D-25=84, Ca=8.9, RGA=696, Mag=1.7, Phos=2.8                          ALB=4, BUN=15/ 1.1, K=4.6, ALP=63 3/5/24: WT = 137,  BMs: #4, 1-2x/D  3/12/24 :  Ferritin=23, Iron=25, sat=10%, CRP<5, ESR=2,  Hgb=13.7, INR=2                  Ca=9.1, Phos=2.2,  ALB=4.1, BUN=15/ 1.2, K=4.1, ALP=75 IV Iron: 3/22, 3/28, 3/29. 4/1/24 5/9/24 :  Wpyfhfaa=016, Iron=86, sat=31%, CRP<5, ESR=2,  Hgb=15, INR=1.6                  Ca=8.8, Phos=1.1, Mag=1.9,  ALB=4.2, BUN=11/ 1.2, K=3.9, ALP=75 5/28/24:  yk=226, BMs: #4, 1-2x/D , no melena, ht burn  7/9/24 :   s/p  iron infusion in 4/1/24, Calcium 4/5/24  , last Stelera 5/30/24; next begining  of 8/2024                 Today: wt=                  BMs: #4, 1-2x/D                   Melena-->no        * Abd pain-->no * Nausea-->  * Vomit--> no * Early satiety--> no * Belching--> no * Hiccups--> no * Regurgitation--> no * Acid Taste / Water Brash--> no * Ht burn--> no * Dysphagia--> no * Throat Clearing--> no * Hoarseness--> no * Post-Nasal Drip--> no * Congestion--> no * Globus--> no * Cough--> no * Wheeze / PC-> -no * Constipation--> no * Diarrhea--> No  * Bloating--> No  * Strain on Defecation--> no * Incompl Evac--> no * Flatulence--> no * Gurgling--> no * Melena--> no * BPBPR-> -no * Anorexia--> no * Wt. Loss--> no                  PRIOR HISTORY--2017 1/17/17: Hgb=9.2, ESR=6, CRP=5; 1/19/17: BMs: #4, 5-6, 2-3x/D, Ki=847, Started Creon 1 tid cc        3rd Entyvio beginning Feb        2/14/17: Labs; Hgb=9.6, MCV=97, ESR=5, CRP< 5, F90=967, FA>23, Iron=59, Retic =3.2,        2/17/17 Fecal Calprotectin=16, Fats: Increased neutral & Split        3/13/17: Labs Hgb=9.5, MCV=95, ESR=7, CRP <5, ALB=3.1        3/16/17: lot of stress, to move -[place], had a job-fell thru, BMs: # 5, 2-4 x/D, wt= 131w clothes        4/19/17 EGD: gastritis, MACKENZIE, No HP, 2cm HH, +Barretts, No Dysplasia        Colonoscopy: Anastamosis: open w mild -mod active colitis, enteritis severe adj to anastomosis, mild more proximal, remainder of colon-wnl, 2-3 deg int hemorrhoids        4/26/17 : Hgb=7.3, MCV=95, ESR=13, CRP<5;Immediately post procedure stools very dark on eliquis/iron.        Admitted to [facility]: Hgb=8.3-->8.9 after 2 U, Alb 3.3, BUN=17, creat=1.3, Malina/Uxohv=122/460        4/27/17: Alb=2.3, BUN=12, creat=1.2, Malina/Lipase=209/863-No abd pain, N/V; 5/5/17: Hgb=10.7.        5/8/17 Entyvio iv. 5/8-5/9 w dark red diarrhea; 5/10/17 Hgb=7.8.        5/11/17 Adm [facility] w stools brown, Hgb=7.9, BUN=17, Creat=1.4, Alb=2.9; S/P 2 Units- Hgb=10.6, BUN=15/1.1.        Prednisone 40mg qd, entocort d/dee.; 5/18/17: Hgb-10.4, aa=259, BMs: #5, 1-2x/D, no pain        6/8/17: Hgb=7.4,MCV=98, CRP<5-ASA stopped, Pred20--> 30        6/10/17: Hgb=8.2, Alb=2.9, BUN=20/1.2 ; 6/12/17: Hgb=8.3, Retic=0.19, Iron=10, Sat%=3, Ferritin=57, FA=23,M46=303, 6/13/17: s/p 2 Unit PRBCs        6/15/17: started MCT oil, Vk=816 , BMs: # 5, 1-2x/D, stools are brownish/green         7/11/17: [facility] w unsteadiness. MRI Head: R Cortical Temporo-occipital encephalomalacia, MRA H&N-no sign lesions, PER-wnl.Hgb=9.9--> 8.4->9.7 after 1 Unit. Seen by Torri: received IV Iron         CRP<5, X61=863, NSW=976, FA>23,Iron=22,Sat%=6, Ferritin=42, Alb=3.5. D/C on warfarin 2mg        7/20 Hgb=8.5, 7/28 Hgb=7.7, 7/31-s/p 1 Unit         As outpt seeing Torri, to get IV Iron and 5 IV Copper        Had 'FLU' Fever=101, chills, bloat, N/V/D, Bilious. no pain, melena,brbpr        Given anti-emetic by dr Butler,then able to keep things down        On prednisone 25, warfarin w INR bet 1.6-2        8/17/17: Hgb=9.9, ESR=20, CRP-P,Es=152, BMs: # 5, 1-2x/D, stools are brownish/green        10/2/17: Hgb=8.8, MCV=89,Phos=1.7, K=3.9, Mag=1.9, Alb=3.6,Iron<10,Ferritin=21, INR=2        10/17/17: Hgb=7.9,ESR=6,CRP<5, INR=1.6        10/25/17: Hgb=10, ESR=5, CRP=5, Alb=3.6, Phos=2, Qfacsffh=368, R60=693        11/16/17: Hgb=11.2, ESR=14, CRP=12,         11/13/17: MRE-Chr mild distension of distal & vladislav-ileum s/o mild stricturing at anast, mild mural thick & mucosal enhancemt ~ 20cm; little change from 2015 c/w mild acute on chr ileitis, 2.1 cm Liver hemangioma        11/28/17: Entyvio        11/29/17: Hgb=9.6, ESR=10, CRP=5.8, Alb=3.8,Ferritin-P, Iron=14,Sat=4%, Phos=2.5        12/19;17: Hgb=10.1, ESR=12, CRP=6.5; 12/21/17: BMs:#3-5, 1-3x/D , brown, no blood, no pain, kq=186        1/3/18:Hgb=11.7, ESR=19, CRP=6.5, Alb=4.1, Jzfjhewm=474, Iron=31, Sat%=9,Zinc=55          PRIOR HISTORY---2013:         2/20/13 CT markedly dilated sb loops ext to anast, colonoscopy w open anast ,mild-mod remy-anastamotic disease. quick response to entocort/humira--probably adhesive dis.         8/10/13 w GNR bacteremia, ADA 1.7 /KAYLAH 3.4, Humira 8/7, 8/13,8/18,9/15        CT- mucosal thickening and spiculation of the distal sb extending to the anastamosis, thickening and stranding of adj mesenteric fat.        Humira increased to 40mg weekly, entocort 4        9/2013 MRE--dilated loops in mid and distal ileum, markedly thickened and narrowed TI w decreased peristalsis of TI        11/15/13 ADA-24.9, KAYLAH-0          PRIOR HISTORY---2014:         2/15/14--CT dilated loops SB, loop of sb in mid abd 4.3cm w infiltrative changes in the mesentery, bowel tapers in the RLQ to the anastamosis w/o transition        WBC=15K, Rx w IV steroids and Abx         3/7/14 SBFT: last 10cm sb prox to anast mild distension and sl irregularity. In the mid portion of this loop there is a mild narrowing which appears to reopen but is some what narrowed.        3/20/14 ADA=33.1, KAYLAH=0, Lialda switch to Apriso 4 (2/2014)          PRIOR HISTORY--2015         1/11/15 Adm [facility] w 1 day N,Recurrent V, Abd pain/distension. CT-multiple distended & fluid-filled sb loops, mild wall thickening of ileum w No inflamm changes.        WBC=14.6, Hgb=17, RX w NGT suction, Levaquin iv, Solumedrol 40mg q 12( 1/12-->1/16) to prednisone 30mg BID w 5mg taper/wk        3/18/15 --Dr. Butler w edema /High BP, prednisone was tapered slowly to 2.5mg         switched to entocort 9mg qd, edema and bp improved w lasix 20mg         BMs:#4-5, 3x/D, Hgb=11, ESR=4, CRP<5        4/28/15 - 5/1/15: [facility] w 1 day Abd pain, distension,N/V. WBC=10K, HGB=15         CT Abd/Pelv: Diffusely dilated SB loops, thick walled ileum        Rx w NGT, IVF, IV Solumedrol--> Prednisone 30mg BID; tapered to 5mg---> Budesonide 9mg qd        6/10/15 Colonoscopy: Inflammatory ST mass on the ileal side of anast, opening appeared ulcerated,scarred & severely narrowed        6/11/15: [facility] w N/V x1, decr appetite, CT ABD: no obstruction, dilated thickened sb loops of distal jej and ileum        6/19/15 [facility]: s/p Lap w extensive lysis of adhesions, hepatic flex, sigmoid and sb anastamosis, s/p partial r colectomy and sb resection--side to side elisabeth: 8-10 inch from prior anast to TV colon.        7/17/15: BMs:#4-6, 4-5x/D, wt 131(from 126)        8/20/15: BMs: #4, 1-2x/D or #5, 2-3x/D, mm=798, dry cough,Hgb=10, WBC=3, JMR=989, CRP=56, ESR=21        8/25/15 CT Abd/Pelv: Svl RLQ sb loops w wall thickening, mild nodularity & inflamm stranding in mesentery        Advised-restart Entocort 9mg qd, Apriso 4 qd, Maintain Humira but given RX to check drug/Ab levels        9/15/15: did nt restart meds,Hgb=9.9, WBC=4, ESR=19, CRP=5.8, ce=313; [facility] : ADA=8.5, Antibodies< 1.7        10/15/15: No pain, BMs:#4, 1-2x/D, #5-6, 3-4x/D, throat clearing/cough-better, rx=515        11/30/15: No pain, BMs:# 4, 5-6 intermittently, No throat clear, io=665          PRIOR HISTORY-2016 1/22/16; 4/8/16; 6/6/16 : No pain, BMs:# 4-5 1-2x/D, also #5-6 3x/D for 2-3D/wk, jr=267        7/14/16: zc=695, 9/22/16: eo=893.5        11/10/16: 9.5lb wt loss, states BMs: 5-6, 5x/D--Taking Magnesium, No pain/anorexia        Labs: Stool Lmeknzdnarzs=597, + Incr Fecal fat, Alb=3.4, FA =23, D41=182, Hgb=12, ESR=10, CRP <5        Magnesium-d/c, Obtain MRE asap--Start steroids and possibly switch to Entyvio        DDx discussed: active crohns--loss response to Humira, Malabsorption--loss Bile acids, Bile-induced diarrhea, SIBO        Pt wanted to wait for imaging-did not start rx        12/1//16 MRE: RUQ ileocolonic anastamosis--narrowed w upstream dilatation to 3.2 cm        Pt refused pred, Started Entocort 9mg qd and Flagyl 250mg qid about 7-10days ago         awaiting Entyvio load to start 12/22, then 1/5; had Cut back on iron bid        12/15/16: BMs:# 5, 2-3x/D, occas #6, No pain, Less bloat/flatulence, Lw=262.

## 2024-06-14 ENCOUNTER — RESULT REVIEW (OUTPATIENT)
Age: 59
End: 2024-06-14

## 2024-06-20 ENCOUNTER — APPOINTMENT (OUTPATIENT)
Dept: ENDOCRINOLOGY | Facility: CLINIC | Age: 59
End: 2024-06-20

## 2024-06-25 ENCOUNTER — APPOINTMENT (OUTPATIENT)
Dept: ENDOCRINOLOGY | Facility: CLINIC | Age: 59
End: 2024-06-25
Payer: COMMERCIAL

## 2024-06-25 DIAGNOSIS — D50.8 OTHER IRON DEFICIENCY ANEMIAS: ICD-10-CM

## 2024-06-25 DIAGNOSIS — E67.3 HYPERVITAMINOSIS D: ICD-10-CM

## 2024-06-25 DIAGNOSIS — E83.51 HYPOCALCEMIA: ICD-10-CM

## 2024-06-25 DIAGNOSIS — C77.9 SECONDARY AND UNSPECIFIED MALIGNANT NEOPLASM OF LYMPH NODE, UNSPECIFIED: ICD-10-CM

## 2024-06-25 DIAGNOSIS — E83.39 OTHER DISORDERS OF PHOSPHORUS METABOLISM: ICD-10-CM

## 2024-06-25 DIAGNOSIS — M81.8 OTHER OSTEOPOROSIS W/OUT CURRENT PATHOLOGICAL FRACTURE: ICD-10-CM

## 2024-06-25 DIAGNOSIS — K50.80 CROHN'S DISEASE OF BOTH SMALL AND LARGE INTESTINE W/OUT COMPLICATIONS: ICD-10-CM

## 2024-06-25 PROCEDURE — 99215 OFFICE O/P EST HI 40 MIN: CPT

## 2024-06-25 PROCEDURE — G2211 COMPLEX E/M VISIT ADD ON: CPT

## 2024-06-25 RX ORDER — DIBASIC SODIUM PHOSPHATE, MONOBASIC POTASSIUM PHOSPHATE AND MONOBASIC SODIUM PHOSPHATE 852; 155; 130 MG/1; MG/1; MG/1
155-852-130 TABLET ORAL
Qty: 90 | Refills: 1 | Status: ACTIVE | COMMUNITY
Start: 2024-01-18 | End: 1900-01-01

## 2024-06-25 RX ORDER — LEVOTHYROXINE SODIUM 150 UG/1
150 TABLET ORAL
Qty: 90 | Refills: 2 | Status: ACTIVE | COMMUNITY
Start: 2023-10-24 | End: 1900-01-01

## 2024-06-26 ENCOUNTER — RX RENEWAL (OUTPATIENT)
Age: 59
End: 2024-06-26

## 2024-06-28 ENCOUNTER — RESULT REVIEW (OUTPATIENT)
Age: 59
End: 2024-06-28

## 2024-07-05 ENCOUNTER — APPOINTMENT (OUTPATIENT)
Dept: HEMATOLOGY ONCOLOGY | Facility: CLINIC | Age: 59
End: 2024-07-05

## 2024-07-05 ENCOUNTER — RESULT REVIEW (OUTPATIENT)
Age: 59
End: 2024-07-05

## 2024-07-05 VITALS
SYSTOLIC BLOOD PRESSURE: 128 MMHG | BODY MASS INDEX: 20.76 KG/M2 | OXYGEN SATURATION: 97 % | HEART RATE: 89 BPM | TEMPERATURE: 98.2 F | HEIGHT: 68 IN | RESPIRATION RATE: 16 BRPM | DIASTOLIC BLOOD PRESSURE: 88 MMHG | WEIGHT: 137 LBS

## 2024-07-09 ENCOUNTER — APPOINTMENT (OUTPATIENT)
Dept: GASTROENTEROLOGY | Facility: CLINIC | Age: 59
End: 2024-07-09
Payer: COMMERCIAL

## 2024-07-09 VITALS
WEIGHT: 136 LBS | BODY MASS INDEX: 20.61 KG/M2 | HEIGHT: 68 IN | DIASTOLIC BLOOD PRESSURE: 74 MMHG | SYSTOLIC BLOOD PRESSURE: 112 MMHG | HEART RATE: 83 BPM

## 2024-07-09 DIAGNOSIS — K21.00 GASTRO-ESOPHAGEAL REFLUX DISEASE WITH ESOPHAGITIS, WITHOUT BLEEDING: ICD-10-CM

## 2024-07-09 DIAGNOSIS — E53.8 DEFICIENCY OF OTHER SPECIFIED B GROUP VITAMINS: ICD-10-CM

## 2024-07-09 DIAGNOSIS — K50.80 CROHN'S DISEASE OF BOTH SMALL AND LARGE INTESTINE W/OUT COMPLICATIONS: ICD-10-CM

## 2024-07-09 DIAGNOSIS — D50.8 OTHER IRON DEFICIENCY ANEMIAS: ICD-10-CM

## 2024-07-09 DIAGNOSIS — K22.70 BARRETT'S ESOPHAGUS W/OUT DYSPLASIA: ICD-10-CM

## 2024-07-09 DIAGNOSIS — K64.8 OTHER HEMORRHOIDS: ICD-10-CM

## 2024-07-09 DIAGNOSIS — Z12.11 ENCOUNTER FOR SCREENING FOR MALIGNANT NEOPLASM OF COLON: ICD-10-CM

## 2024-07-09 PROCEDURE — 99214 OFFICE O/P EST MOD 30 MIN: CPT

## 2024-08-20 ENCOUNTER — APPOINTMENT (OUTPATIENT)
Dept: GASTROENTEROLOGY | Facility: CLINIC | Age: 59
End: 2024-08-20
Payer: COMMERCIAL

## 2024-08-20 DIAGNOSIS — Z12.11 ENCOUNTER FOR SCREENING FOR MALIGNANT NEOPLASM OF COLON: ICD-10-CM

## 2024-08-20 DIAGNOSIS — K22.70 BARRETT'S ESOPHAGUS W/OUT DYSPLASIA: ICD-10-CM

## 2024-08-20 DIAGNOSIS — K50.80 CROHN'S DISEASE OF BOTH SMALL AND LARGE INTESTINE W/OUT COMPLICATIONS: ICD-10-CM

## 2024-08-20 DIAGNOSIS — D50.8 OTHER IRON DEFICIENCY ANEMIAS: ICD-10-CM

## 2024-08-20 DIAGNOSIS — K21.00 GASTRO-ESOPHAGEAL REFLUX DISEASE WITH ESOPHAGITIS, WITHOUT BLEEDING: ICD-10-CM

## 2024-08-20 DIAGNOSIS — E53.8 DEFICIENCY OF OTHER SPECIFIED B GROUP VITAMINS: ICD-10-CM

## 2024-08-20 DIAGNOSIS — K64.8 OTHER HEMORRHOIDS: ICD-10-CM

## 2024-08-20 PROCEDURE — 99214 OFFICE O/P EST MOD 30 MIN: CPT

## 2024-08-20 NOTE — ASSESSMENT
[FreeTextEntry1] : 1. CROHNS DISEASE of both small and large intestine: s/p flare 8/25-->8/30/21, then early 12/2021-s/p pred tapered over 4 weeks, again the weekend 4/9/22 rx w prednisone 40_ mg ( just before last Stelera dose) --> now off since early 5/2022;  8/19/23 w flare started prednisone 40mg & tapred over 4 weeks  ~ 9/23/23 Had dark stool x 2-3 days, Warfarin held 2 days & Omep BID, Hgb dropped to 8.6   s/p ileocolonic resection x 2--last 6/19/15 for recurrent sbos: prior was s/p 4 SBOs w/i 2 yrs--2/2 distal sb disease adj to anastamosis when on Humira weekly had an ADA =33 (3/20/14), -but had sbo 2/2014 at ADA=25 and another sbo 4/28/15 6/10/2015 Colonoscopy: Inflamm ST mass on ileal side w ulceration/scarred/narrow 6/11/2015 CT: dilated thickened sb loops distal jej & ileum Post-op 6/2015 probably some malabsorption 2/2 to removal of R Colon and ileum: had WT. Loss-->r/o malabsorption: ?bile-induced->-secretory vs decr micelles, active dis, PLE ? SIBO--Elev FA, Alb=3.4-->3.1-->2.9--> (7/28/17) ?SIBO/Prevent post-op recurrence --> trial Flagyl 250 mg qid~12/7/16-->d/c: 5/11/17 Also discussed trial of Cholestyramine/Xifaxin -wanted to wait 11/20/16 ACTIVE Crohn's--> 9.5lb wt loss, BMs: #5-6, 5x/D-- but taking Magnesium 12/1/16 MRE: RUQ ileocolonic anastamosis--narrowed w upstream dilatation to 3.2 cm Labs: Stool Lirxsupssvki=735, + Incr Fecal fat, Alb=3.4, FA =23, I65=452, Hgb=12.3,ESR=10,CRP <5 4/19/17 :Colonoscopy: Anastamosis: open w mild -mod active colitis, enteritis severe adj to anastomosis, mild more proximal, remainder of colon=wnl 5/11/17- Adm PMHC w stools brown, but Hgb=7.9, BUN=17, Creat=1.4, Alb=2.9. S/P 2 Units w Hgb=10.6, BUN=15/1.1, Prednisone 40mg taper, entocort d/dee 6/8/17: Hgb=7.4, MCV=98, CRP<5--ASA stopped, Pred20--> 30mg, 6/13/17: s/p 2 Unit PRBCs 7/11/17 [facility] w unsteadiness. MRI Head: R Cortical Temporo-occipital encephalomalacia Hgb=9.9--> 8.4->9.7 after 1 Unit. Seen by Heme: received IV Iron CRP<5, M80=643, KIS=220, FA>23,Iron=22,Sat%=6, Ferritin=42, Alb=3.5. D/C on warfarin 2mg 7/28/17 Hgb=7.7; 7/31/17-s/p 1 Unit Heme Consultation ~ 8/2017: started IV Infusions of Iron and IV Copper 8/17/17: Hgb=9.9, ESR=20, Zb=860--Jibrrpaesp s/p GI infection w N/V/D, no obstruction 10/17/17: Hgb=7.9,ESR=6,CRP<5, INR=1.6, Got IV Iron x 2, since 10/2/17 for Iron<10, Hgb=8.8 11/16/17: Hgb=11.2, ESR=14, CRP=12, 10/26/17 Stool fat-neg, calprotectin-30 11/13/17: MRE-Chr mild distension of distal & alfredito-ileum s/o mild stricturing at anast, mild mural thick & mucosal enhancemt ~ 20cm; little change from 2015 c/w mild acute on chr ileitis, 2.1 cm Liver hemangioma 10/9/18: Hgb=11.6, MCV=94, ESR=14, CRP=5.4, INR=2.2 10/10/18 MRE: stricturing of neoterminal ileum w worsened upstream dilatation, moderate length of upstream ileum w stricturing extending at least 20cm and more extensive then previous. suggestion of 2 adj extraluminal fluid collections which may be w/i the wall of strictured ileum near the ileocolonic anastamosis pt had declined to call numbers given for IBD center at [facility] for new or investigational rx's--biologics. later he felt he was stablizing w his wt loss, and inflammatory markers  2/28/19 w ESR=12, CRP=6.5 & 2/21/19 stool calprotectin--> 232 8/15/19: ESR=10, CRP=5.2, Calprotectin--> 88 9/19/19: ESR=9, CRP=5.5 10/17/19: ESR=7, CRP< 5 11/6/19 : ESR=6, CRP=0.18 11/27/19: ESR=6, CRP <5 1/13/20: ESR=7, CRP=0.2 1/30/20: ESR=9, CRP=11  2/3/20 EGD: gastritis, +MACKENZIE, No HP, +erosion w ooze -clipped, 0.5cm polyp-neg; 4cm HH, Esophagitis A, + Barretts, VC: 1+ Colonoscopy: 05cm rectal polyp: HP, 2nd deg int hemorrhoids mild-mod activity: MARILY w cryptitis & mild archect distortion at ileocolonic anast: colon & ileal side, no stricture  3/2/20:ESR=7, CRP=0.26 3/9/20: VDZ>40, Ab to VDZ <1.6 3/17/20: ESR=6, CRP=6.4 10/17/20: ESR=11, CRP <5 1/4/21:ESR=9, CRP<5 2/22/21: ESR=8, CRP<5 4/5/21: ESR=9, CRP<5 6/4/21: ESR=9, CRP<5 6/11/21: wt= 135, no pain, stool more formed # 4, 1-2x/d  s/p episode --abd distenstion, pain, N/V, no BM  eventually started having diarrhea and flatus, BMs returned to normal  lost 2-3 lbs but regained 7/4/21: CRP <5, Hgb=12  7/16/21 MRE: limited to incomplete bowel distention, chr distal ileitis/probable inflammatory stricturing vs collapse of the alfredito-TI--but improved since 2018 , moderate proctitis 7/12/21 -- ? flare --> entyvio should have been q 5 wk , went q 8 wks  next dose should be in 4 weeks x 2 then 5 weeks, to check levels 7/20/21: Cliically stable, unclear if MRE accurate 2/2 underdistension, but gained wt and CRP--normal 7/23/21 Entyvio level= 39, Abs <1.6 8/23/21: Labs--Hgb=13.6, ESR=10, CRP=6.6, Agghjbwi=572, Iron=26, Alb=3.9, BUN=16 8/26/21 p/w sbo w N/V, abd pain/bloating x 3 days, unable to keep things down Labs: WBC=5, Hgb=12, CRP=43, ESR=11, Alb=4.4, BUN=28, Creat=-1.6 CT Abd/Pelvis: diffuse marked dilatation of SB Loops w transition pt in R. mid/lower abdomen ~ 5-6cm, proximal to the ileocolonic anastomosis RX w IV solumedrol 20mg q8h, NGT, IVF, pt refused TPN, also w UTI from prostatitis 8/27 NGT was pulled, and clears started and advanced to LR, was d/c home 8/30 on prednisone 40mg qd w taper by 10mg/wk to 20mg qd   ( **Entyvio's :time 0=12/22/16 ( Humira 40mg QW); 1/5/17--2wks, s/p 3rd infusion at wk 6, then q 6weeks #6 :6/21/17-->held 2/2 Shingles, then Infusions as follows=9/19/17 , 10/17/17, 11/28/17, 1/16/18 ,2/16/18, 3/19/18,4/16/18, 5/14/18, 6/25/18, 8/7/18, 9/17/18, 10/16/18, 11/13/18, 12/11/18, 1/14/19, 2/15/19, 3/15/19, 5/16/19, 6/18/19,7/24/19, 9/9/19, 10/2/19, 11/4/19, 12/12/19, 1/6/20, 2/4/20, 3/3/20, 9/17/20, 10/15/20, 11/18/20, 1/11/21, 2/8/21, 3/8/21, 4/8/21, 6/3/21, 7/1/21, 8/2/21, 9/2/21,10/25/21 )  3/8/22 MRI Abd/Pelv: thickened distal Jejunal loops which are tethered; distal ileal segment (10-12cm ) previously actively inflammed has decreased & now characterized by an area of stricture, however the alfredito-TI 5mm to the anastamosis is enhanced as well as narrowed. Colon just distal to the anastamosis has a focal segment of inflammation & stricture. the recto-sigmoid appears inflammed  3/28/22: Hgb=14.4 , ESR=1, CRP <5, Ca=8.5, Mag=1.7,Alb=4.1, Iron=104, Agrswwkf=000,  5/9//22: Hgb=13.8 , ESR=2, CRP <5, Ca=8.8, Mag=1.6, Alb=4, Iron=64, Ixcdgzur=875 5/17/22 Dr. Heard Colonoscopy: ped scope passed w/o resist in alfredito-TI, one single apthae w psuedopolyp.  6/21/22 Labs: Hgb=14, ESR=2, CRP<5, Alb=4.5, Phos=0.8, Mag=1.8, Ca=8.5,Tiiurkmd=147, Iron=86  7/25/22 Labs: Hgb=14, ESR=2, CRP<5, Alb=4.2, Phos=1.5, Mag=1.8, Ca=8.6, Lhjgfxlc=177, Iron=57, PT=24, INR=2.1 10/17/22 Labs: Hgb=12.7, ESR=2, CRP<5, Alb=4.3, Phos=2.1, Mag=1.8, Ca=9, Ferritin=57, Iron=85, PT=23, INR=2 12/16/22 : Labs: Hgb=10.8 , ESR=1, Alb=3.8, Phos=1.6, Mag=1.7, Ca=8.9 , ALP=68, Efkmocut=040, Iron=67, INR=2.3 1/30/23: Labs: Hgb=13.6 , ESR=5, CRP<5, Alb=4.4, Phos=3, Mag=1.8, Ca=9.5, ALP=79,Ferritin=55, Iron=56,  3/27/ 23: Labs: Hgb=13.8, ESR=5, CRP<5 ,Alb=4 , Phos=1, Mag=1.8, Ca=8.5 , ALP=94, Pgvscetl=621, Iron=78,  INR=5.9  5/22/23 Labs: Hgb=12.7,ESR=2,CRP<5, Alb=4.2, Phos=2.4, Mag=1.8, Ca=9.1, ALP=68,Ferritin=19, Iron=42, INR=2.4 8/4/23 : Alb=3.8, Phos=2.1, Mag=1.8, Ca=8.7, BZO=569, ALP=68, Vit D=78 INR=2.1 8/21/23 : Hgb=14, ESR=4, CRP=40, Mbsbovcw=252, Iron=37, INR=4.8; Alb=4 , Phos=1.8, Mag=1.6, Ca=9.3, ALP=84 9/28/23 Labs: Hgb=8.6, Iron=22, sat%=7, Ferritin=17  10/5/23 Labs: Hgb=8.7, Iron=52, Sat=16%, Ogifeerd=577  10/12/23 : Labs: Vit D=67, Ca=8, PTH=160, Mag=2  10/20/23 Labs: Hgb=10.9, INR=1.6, CRP<5, Fpovjeuf=423, ALB=3.7, Ca=7.9, ALP=68 10/18/23 MRE: persistent mural enhancement without wall thickening in distal small bowel loops, which is a nonspecific imaging sign and may reflect inflammation, however no other mural or mesenteric findings to indicate active inflammatory small bowel Crohn's disease.  No evidence of stricture. No imaging signs of penetrating disease.  Chronic central mesenteric fibrofatty proliferation. 10/27/23: Ustekinumab=7.1, Ust Abs <1.6 11/27/23 :   Hgb=13.3,  PT=19, INR=1.7, Ferritin=33, Iron=46, Sat=13%,                    10/27/23: Ustekinumab=7.1, Ust Abs <1.6 11/27/23 : FA=17, E79=184, Vit D=69, ca=9.1, ALP=84,  Alb=4.2 12/22/23 Colonoscopy: mild ileitis of the most distal alfredito-TI; small superficial apthous ulcer on colonic side of anastamosis, Random  colon BX--> wnl           12/27/23 -12/31/23: Phos=0.8, TVD=141, Vit D =66, Ca=7.9 --> Phos=1.5, Ca=8.4, Hgb=12.6, Alb=3.5 1/10/24 Phos=1.7, SHL=887, Ca=8.5, Vit D =53, Vit D-25= 73,  1/18/24 :  Hgb=13, Ijnwlljk=823,Iron=67,  CRP<5, ESR=1,  Ca=7.9, Alb=4.1, ALP=84 5/9/24 :  Hgb=15, Qlxawwsk=780, Iron=86, sat=31%, CRP<5, ESR=2,                   Ca=8.8, Phos=1.1, Mag=1.9,  ALB=4.2, ALP=75  A / PLAN: Recent GI Bleed on warfarin, although MRE looks better, probably from ileum, Hgb improving  h/o sbo's prox to anastamosis  inflammatory vs fibrosis vs combination: 3/2022 MRI shows improvement of inflammation w possible residual stricture in the ileum and probable colitis near the anastamosis and distal colon  Responded to steroids iv--> po--d/c ~ 10/20/21,  tapered steroids from 40mg qd to 20mg, xfru60nt qd and taper 5mg/wk  then po taper again early 12/2021 40mg qd w 10mg taper/ wk--  PO prednisone 40mg mg qd started on 4/9/22 ,tapered off ~ early 5/2022  PO prednisone 40mg mg qd started on 8/19/23, taper 10mg/wk--> 20mg then 5 mg /wk   Entyvio level was 39 w/o Abs~ 3weeks after 7/1/21 dose ,  speaks to inflammatory component & probably loss of response  Stelara # 1 dose on 10/25/21, #2 on 12/10/21, # 3 on 2/11/22, # 4 on 4/20/22 , #5 mid June 2022,  #6 mid 8/2022, #7 beginning 12/2022; #8 on 2/2/23, #9 on 4/2023, #10 on 6/2023 , last dose-->7/2024    IBD consensus : due to malnutrition /steroid dependency, surgery is next best option  but pt reported feeling well and wanted to pursue medical treatment  repeated imaging in March after 3 months of Stelara, then consider surgery vs improved candidacy for endoscopic manipulation  f/u w IBD consultant Dr. Heard at  & reviewed imaging after 3 months of Stelera  for Colonoscopy ( w possible balloon dilatation )-> last 1 3/4 yrs ago   5/17/22 Dr. Heard Colonoscopy: ped scope passed w/o resist in alfredito-TI, one single apthae w psuedopolyp.  No Dilatation needed, very mild dis at Alfredito-TI, MRE may show wall thickening, sbo's probably adhesive dis 12/22/23 Colonoscopy: mild ileitis of the most distal alfredito-TI; small superficial apthous ulcer on colonic side of anastamosis, Random  colon BX--> wnl        1/26/ 24 Dr. Reynoso Capsule--> scattered irregular appearing mucosa, possible erosions, no bleeding 4/5/24 Dr. Reynoso: Single Balloon Enteroscopy: to mid juju--> wnl; bx w  mild patchy IELs       1/4/22 Labs: Hgb=11.5, ESR=6, CRP<5, Alb=3.9 1/31/22 Calprotectin =84 2/14/22: Hgb=10.6, ESR=9, CRP <5, Ca=8.3, Mag=1.6, Alb=3.8, Iron=25, Ferritin=15 3/28/22: Hgb=14, ESR=1, CRP<5 , Ca=8.5, Mag=1.7, Alb=4, Iron=104, Ksmgdwlf=894 5/9//22: Hgb=13.8 , ESR=2, CRP <5, Ca=8.8, Mag=1.6, Alb=4, Iron=64, Zqdwmece=776  6/21/22 : Hgb=14, ESR=2, CRP<5, Ca=8.5, ,Alb=4.5, Mag=1.8, Iron=86,Hlbqwdzv=083,  7/25/22 : Hgb=14, ESR=2, CRP<5, Alb=4.2, Phos=1.5, Mag=1.8, Ca=8.6, Sjtomvew=789, Iron=57 10/17/22: Hgb=12.7 , ESR=2, CRP<5, Alb=4.3, Phos=2.1, Mag=1.8, Ca=9, Ferritin=57, Iron=85  12/16/22 : Labs: Hgb=10.8 , ESR=1, Alb=3.8, Phos=1.6, Mag=1.7, Ca=8.9 , ALP=68, Lmsqdedd=385, Iron=67,  1/30/23: Labs: Hgb=13.6 , ESR=5, CRP<5, Alb=4.4, Phos=3, Mag=1.8, Ca=9.5, ALP=79,Ferritin=55, Iron=56,  3/27/ 23: Labs: Hgb=13.8, ESR=5, CRP<5 ,Alb=4 , Phos=1, Mag=1.8, Ca=8.5 , ALP=94, Jlufarxp=894, Iron=78, INR=5.9  5/22/23 Labs: Hgb=12.7,ESR=2,CRP<5, Alb=4.2, Phos=2.4, Mag=1.8, Ca=9.1, ALP=68,Ferritin=19, Iron=42, INR=2.4  8/4/23 : Alb=3.8, Phos=2.1, Mag=1.8, Ca=8.7, PXS=076, ALP=68, Vit D=78 INR=2.1  8/21/23 : Hgb=14, ESR=4, CRP=40, Alb=4 , Phos=1.8, Mag=1.6, Ca=9.3, ALP=84,Fqrojetn=839, Iron=37, 9/28/23 Labs: Hgb=8.6, Iron=22, sat%=7, Ferritin=17 10/5/23 Labs: Hgb=8.7, Iron=52, Sat=16%, Vxxbfwja=040 10/12/23 : Labs: Vit D=67, Ca=8, PTH=160, Mag=2  10/20/23 : Hgb=10.9, INR=1.6, CRP<5, Bbahyxfi=518, ALB=3.7, Ca=7.9, ALP=68  10/27/23: Ustekinumab=7.1, Ust Abs <1.6  11/27/23 :   Hgb=13.3,  PT=19, INR=1.7, Ferritin=33, Iron=46, Sat=13%,                     FA=17, J47=618, Vit D=69, ca=9.1, ALP=84,  Alb=4.2 1/18/24 :  Hgb=13, Ygrsakwf=519,Iron=67,  CRP<5, ESR=1,  Ca=7.9, Alb=4.1, ALP=84 2/27/24  : Labs: Vit D-25=84, Ca=8.9, SYE=199, Mag=1.7, Phos=2.8;  ALB=4, ALP=63 3/12/24 :    Hgb=13.7, INR=2,  Ferritin=23, Iron=25, sat=10%, CRP<5, ESR=2,                   Ca=9.1, Phos=2.2,  ALB=4.1, ALP=75 5/9/24 :  Hgb=15,  Dhjmzjya=634, Iron=86, sat=31%, CRP<5, ESR=2,  INR=1.6                  Ca=8.8, Phos=1.1, Mag=1.9,  ALB=4.2, ALP=75 6/14/24:  Vit D=75, Vit D 1,25=68, Ca=9.1, ALP=76, Phos=3.9, ALB=3.9 7/5/24  Sppvelho=329, Iron=91, sat=34%, Hgb=13.8, INR=1.6 Probiotics 3 qd Diet: LR, Lactose free, protein drinks tid MCT oil begun but never maintained **trend --cbc, esr, crp, albumin, calprotectin  **monitor wt--- usu bet 136-139, 7/20/21=136, 11/22/21=139, 1/4/22=132, 2/22/22=132, 4/5/22=131,5/10/35=896,  6/21/22=133, 7/27/22=136, 12/19/22=138; 2/6/23=140, 3/28/38=783, 5/23/23 =137, 8/22/23=135,10/24/43=023, 12/11/23=136,1/29/24= 135,  3/5/24 =137, 5/28/24=134 ,  7/9/24 zg=489, 8/20/24 cx=526         2. Iron deficiency anemia : --> recent drop from GIB--> 9/2023   had initially dropped , after clinical flare and post procedure bleeding Probably multifactorial: ACD, Blood loss, malabsorption/nutrient deficiencies Eliquis-->warfarin after CVA, On Entyvio 7/11/17 s/p Adm for unsteady gait w MRI c/w CVA--warfarin begun: possible better control of AC Chromagen 2 qd--> IV Iron , s/p IV Cu x 5, FA 1mg qd , B12 SL & IM Still requiring IV Iron and cu infusions prn  most recent IV Iron infusion x for Ferritin = 23 2/22/21: V51=692, 6/4/21: Cu=91, Zinc=69, Ferritin=32,Iron=43, 7/12/21: Pu=953, Zinc=74, Qxoezole=043, Iron=35 11/15/21 : Hgb=12, Ferritin =104, Ca=9, Mg=1.7 1/4/22: Hgb=11.5, Ca=8.8, Mg=2.9, Ymqhuiq=735 2/14/22: Hgb=10.6, Ca=8.3, Mag=1.6, Alb=3.8, Iron=25, Ferritin=15 3/28/22: Hgb=14.4 , Ca=8.5, Mag=1.7,Alb=4.1, Iron=104, Hueurcwj=890,  5/9//22: Hgb=13.8 , Ca=8.8, Mag=1.6, Alb=4, Iron=64, Higxfbra=204 6/20/22: Hgb=14.4 , Ca=8.5, Mag=1.8, Alb=4.5, Iron=96, Vavcqnqb=879 7/25/22 Labs: Hgb=14,Ca=8.6, Mag=1.8,Alb=4.2,Iron=57 Ltxgpsdo=544, , PT=24, INR=2.1 10/17/22 Labs: Hgb=12.7,Ca=9, Mag=1.8,Alb=4.3,Iron=85 Ferritin=57, , PT=23, INR=2 12/16/22 : Labs: Hgb=10.8 , Alb=3.8, Iron=67,Szvkcerq=850, , INR=2.3 1/30/23: Labs: Hgb=13.6 , Ca=9.5, Mag=1.8, Alb=4.4,Ferritin=55, Iron=56, INR=1.6  3/27/ 23: Labs: Hgb=13.8, ESR=5, CRP<5 , Mag=1.8, Alb=4, Bwlmeiry=437, Iron=78, INR=5.9  5/22/23 Labs: Hgb=12.7,ESR=2,CRP<5, Alb=4.2,Ferritin=19, Iron=42, INR=2.4 8/21/23 : Labs: Hgb=14, ESR=4, CRP=40, Ryaglgbv=110, Iron=37, INR=4.8; Alb=4 , 9/28/23 Labs: Hgb=8.6, Iron=22, sat%=7, Ferritin=17 10/5/23 Labs: Hgb=8.7, Iron=52, Sat=16%, Xnmdffkb=092 10/20/23 : Hgb=10.9, INR=1.6, CRP<5, Rdmdkgvl=193,  11/27/23 :   Hgb=13.3, INR=1.7, Iron=46, Sat=13%, Ferritin=33, FA=17, C34=495, 1/18/24 :  Hgb=13, INR=2.1,Iron=67, Urwfkpwr=332, CRP<5, ESR=1,  Ca=7.9, Alb=4.1, ALP=84 3/12/24 :  Hgb=13.7, INR=2,  Iron=25, Ferritin=23,  CRP<5, ESR=2, Ca=9.1, Phos=2.2,  ALB=4.1, ALP=75 5/9/24 :  Hgb=15, INR=1.6 , Iron=86, Tuljbvip=198,sat=31%, CRP<5, ESR=2, Ca=8.8, Phos=1.1  ALB=4.2,  ALP=75 6/14/24:  Ca=9.1, Phos=3.9, Mag=1.7,  ALB=3.9,  ALP=76 7/5/24 : Hgb=13.8, INR=1.6, Iron=91,Ahnccloa=639,  sat=34%,   12/22/23  EGD:  gastritis, No HP; 2cm HH, Esophagitis A--,  + Barretts 12/22/23 Colonoscopy: mild ileitis of the most distal alfredito-TI; small superficial apthous ulcer on colonic side of anastamosis, Random  colon BX--> wnl 1/26/ 24 Dr. Reynoso Capsule--> scattered irregular appearing mucosa, possible erosions, no bleeding 4/5/24 Dr. Reynoso: Single Balloon Enteroscopy: to mid jejujum--> wnl; bx w  mild patchy IELs  B12 1cc IM ____L. delt--   given today, trend cbc, B12, FA, Iron panel Adj INR--closer to low 2's.         3. Osteoporosis : Progression on BD from 5/5/16 Crohns, h/o steroid use Calcium citrate & Vit D per Endo Forteo since 5/10/19 , then Reclast Repeat BD of 8/2018 --showed worsening to Osteoporosis from 2016 Rec Endo consult w Dr. Akers :Osteoporosis center at [facility]--pt never went originally 9/21/18: Phos=2.4, Ca=8.7, Alb=3.4, Vit D=27, XFN=536, 10/16/18: Phos=2.8, Ca=8.7, Alb=3.5, 1/14/19: Phos=2.6, Ca=8.7, Alb=3.6 2/28/19: Phos=1.8, Ca=8.9, Alb=3.7, VitD=39, WWC=685 3/18/19: Ca=8.5, Alb=3.7, Vit D=44.6, PTH=93 7/11/19: Ca=9.1, Alb=3.7, Phos=3.9, Vit D=40/ 306, BSX=368 8/15/19: Ca=9, Alb=4.2, Phos=4.4, VitD=59, VIU=531 10/17/19: Ca=9.6, Alb=3.9, Phos=3.5 11/6/19: Ca=9.1, Alb=3.9. Phos=3.7, VitD=50, PTH=80 1/13/20: ca=9.1 1/30/20: Ca=9.2, Alb=3.9, Phos=2.7, Mag=1.5, Vit D=103/41, PTH=87 2/18/20:ca=8.5, Alb=3.6, 3/2/20: Ca=8.5, Alb=3.6 3/17/20: Ca=9.1, Alb=3.7, Phos=3.4, Mag=1.7 10/17/20: Ca=8.9, Alb=4, Phos=2.3, Mag-2.0, Vit D=66, PTH=47 1/4/21 : Ca=9.4, Alb=4.2, Phos=2.7, Mag=2, Vit D=64, PTH=87.5 4/5/21: Ca=9.1, Alb=4, Phos=3.1, Mag=1.7, 6/4/21: ca=9, Alb=3.8, Phos=2.8, Mag=1.8 7/12/21: Ca=8.6, ALB=6, phosph=2.3, Mag=1.8 11/15/21: Ca=9, Alb=3.7, Mag=1.7 1/4/22: ca=8.8, Alb=3.9, Mg=2.9, phos=2.9 1/21/22: Ca=8.8, Alb=3.9, Vit D=60, PTH=85 2/14/22: Ca=8.3, Mag=1.6, Alb=3.8, 3/28/22: Ca=8.5, Mag=1.7, Alb=4.1, Phos=1.2  5/9//22: Ca=8.8, Mag=1.6, Alb=4, Phos=1.6  5/27/22: Ca=9.2 , Mag=1.9, Alb=3.8 , Phos=2.7, PTH=72, VitD=105  6/20/ 22: Ca=8.5 , Mag=1.8, Alb=4.5 , Phos=0.8, XSI=738,  7/25/22 : Ca=8.6, Mag=1.8 , Alb=4.2, Phos=1.5,  10/17/22: Ca=9, Mag=1.8 , Alb=4.3, Phos=2.1, PTH=95, Vit D=25  12/16/22 : Labs: Ca=8.9, Mag=1.7, Alb=3.8, Phos=1.6,  1/30/23 Labs: Ca=9.5, Mag=1.8, ALP=79, Alb=4.4, Phos=3, PTH=96, Vit D =144  3/27/ 23: Labs: Ca=8.5, Mag=1.8 , ALP=94, Alb=4 , Phos=1,  4/21/23 Labs: Ca=8.9, Mag=1.9, ALP=75, Alb=4., Phos=2.9, PTH=89, Vit D =108  5/22/23 Labs:Ca=9.1,Mag=1.8 , ALP=68,Alb=4.2, Phos=2.4,  8/4/23 : Labs Ca=8.7, Mag=1.8, ALP=68, Alb=3.8, Phos=2.1, MWH=059, Vit D=78 8/21/23 : Labs: Ca=9.3, Mag=1.6, ALP=84, Alb=4 ,Phos=1.8 10/12/23 : Labs: Ca=8, PTH=160, Mag=2, Vit D=67, 10/20/23 Labs: Ca=7.9, ALP=68,ALB=3.7, 11/27/23 :  Ca=9.1, ALP=84,  Alb=4.2Vit D=69, 12/27/23 -12/31/23:  Ca=7.9, ALP=98,  Phos=0.8, UAV=300, Vit D =66, --> Phos=1.5, Ca=8.4, ALP=85,Alb=3.5 1/10/24:  Ca=8.5, ALP=87,  Phos=1.7, CBF=493,  Vit D 1,25=53, Vit D-25= 73,  1/18/24 :  Ca=7.9,  ALP=84,   Alb=4.1,  2/27/24  : Labs: Ca=8.9, ALP=63, ALB=4,Phos=2.8, FZF=523, Vit D 1,25=70, Vit D-25=84,  Mag=1.7,   3/12/24 :   Ca=9.1, Phos=2.2,  ALB=4.1, ALP=75             5/9/24 :  Ca=8.8, Phos=1.1, Mag=1.9,  ALB=4.2, ALP=75         6/14/24:  Ca=9.1, Phos=3.9, Mag=1.7,  ALB=3.9, ALP=76, ETF=243, Vit D=75, Vit D 1,25=68, Dr. Akers: Hyperparathyroidism-- probably secondary due to low Vit D/Ca & ? superimposed primary, [facility] ENT Consult init felt Parathyroid scan showing activity in mediastinum is ectopic parathyroid, then felt sx not indicated at that time, elev PTH is secondary to low Vit D/Ca ? Vit D Resistance--> in the past did well w Vit D,1,25--> 86-97 & Vit D25-->  but w Ca~ 9.4 Rx replete Vit D , Phosphate and currently  IV Calcium-as per endo  trend Vit D, Ca, Phos, PTH,  BD--9/2020: inprovmt in spine and hip , no change in wrist, BD--10/17/22: Decr T score in spine & hip, incr in wrist, repeat--> BD--2/22/24: Spine T= -1.2, Fem neck T= -2.1; Total Hip T= -2;  Forearm T= -2.2 --> spine & hip were better 10/5/20, 9/2021,6/2022, 1/2/24 -s/p Reclast--repeat-->      4. GERD: recently less active by ENT , no ht burn, dysphagia, + throat clear  h/o Dry cough, CXR:ana, saw ENT bergstein-->LPR * ++ LPR, ++ Page's w No Dysplasia, + H/O Esophagitis grade: A was found  Recommend: * Anti-reflux diet & life-style changes reviewed & re-emphasized. ++ Bedge required * Weight reduction & regular exercise emphasized  * need for PPI: Omep 40 BID--> 40mg qd , ++ H2B q HS:Zantac 300mg--> Pepcid 40mg I reviewed & summarized the prospective randomized & retrospective non-randomized studies looking at potential long term SE's of PPIs, w special attention to associations & actual cause as related to GI infections, bone loss, cognitive changes, KD, Covid, vitamin & electrolyte deficiencies questions were answered, pt advised that PPIs should be used when needed as indicated by their clinical indication and response and tapering off is always the goal if possible. pt understood.  * F/U EGD: --> 2026--for Barretts screening / surveillance * No need for pH Monitor, Manometry, Esophagram -- * ++ need for ENT eval/F/U, No need for Surgical eval --      5. Hemorrhoids: well - controlled. No pain, swelling, itch, bleeding * Discussed previously the potential complications of thrombosis, pain, infection, swelling, itching, bleeding Reccomend: * currently Low - Fiber Diet was emphasized * 6 -- 8 cups of decaffeinated fluid daily was emphasized * Sitz Bathes prn, No: Anusol HC Suppos / Cream MO BID -- was needed * No: Tucks BID, Balneol Lotion, Calmoseptine Oint -- was needed ; ++ Prep H prn * No: need for Colorectal surgical evaluation for possible ablation       6.  Hepatomegaly-->not confirmed by recent abd sono CTE w relative enlargemt, ? lobulation & enlarged portal/mesenteric veins LFTs-wnl, no ascites or edema R/O CLD, Hepatic vein thrombosis Abd sono w doppler--> Liver and spleen normal size, no thrombosis, normal portal and hepatic vein flow

## 2024-08-20 NOTE — HISTORY OF PRESENT ILLNESS
[de-identified] :    This HPI  reflects a summary and review of records : including previous and most recent  Labs, body imaging, consults and progress notes, operative and pathology reports, EKG reports, ED records, found in [employer], [employer],  [facility] and any additional records brought in by  the patient at the time of the visit.            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-->Eliquis' warfarin         GERD w Page's, Hemorrhoids, BPH,         S/P Ileocolonic resection 1998        Since then multiple SBOs          Got IV Iron x 2, since 10/2/17        10/19/17: feeling better, Li=456 on prednisone 15mg x 3weeks, BMs Brown: #5-6, 1-2/D, occas 3-4/d        10/25/17: Hgb=10, ESR=5, CRP=5, Alb=3.6, Phos=2, Vcpdoyqn=514, Z81=102        11/16/17: Hgb=11.2, ESR=14, CRP=12,         11/13/17: MRE-Chr mild distension of distal & vladislav-ileum s/o mild stricturing at anast, mild mural thick & mucosal enhancemt ~ 20cm; little change from 2015 c/w mild acute on chr ileitis, 2.1 cm Liver hemangioma        11/28/17: Entyvio        11/29/17: Hgb=9.6, ESR=10, CRP=5.8, Alb=3.8,Ferritin-19, Iron=14,Sat=4%, Phos=2.5, Bh=973, BMs:# 5, 1-2x/D, brown,        12/19;17: Hgb=10.1, ESR=12, CRP=6.5; 12/21/17: BMs:#3-5, 1-3x/D , brown, no blood, no pain, lv=309        1/3/18:Hgb=11.7, ESR=19, CRP=6.5, Alb=4.1, Hwkcipyu=020, Iron=31, Sat%=9,Zinc=55        1/16/18: Entyvio, Hgb=11.4, ESR=10, CRP=6.9        1/18/18: Today: cellulitis R foot, saw dr KEITH--rx augmentum x 10D, Entyvio 1/9 to 1/16, fk=762 w clothes,BMs: # 4-5, 1-2x/D         2/14/18: Hgb=11.1, ESR=16, CRP=11.6, Alb=3.6, Iron=28, Ferritin=23, INR=1.5         2/16/18: s/p Entyvio; Iron infusion, again on 2/27 2/20/18: Hgb=10.6, ESR=20, CRP=12, INR=1.7        2/27/18: s/p iron infusion        3/9/18: Dr. Burden for tenosynovitis, rx w Zorvolex: Diclofenac x 5 days--? response        3/14/18: Bx=031; BMs: # 5, 1-2x/D; Hgb=11, ESR=18, CRP=11,Irom=45,Qxubkrke=546,Alb=3.6, INR=1.5        3/19/18: s/p Entyvio        3/22/18: ml=551, BMs: # 5, 3-4 x/d        4/16/18: s/p Entyvio,Hgb=11.9, INR=1.3, ESR=18, CRP=8.4         4/18/18 EGD: gastritis, no HP, no IM, 2+ Mucous, 0.5cm gastric polyp: fundic, 4cm HH, + Barretts:3cm, no dysplasia        4/25/18: Hgb=11.5, INR=1.6, Iron=40, Sat=13%, Ferritin=57, Alb=3.2, Phos=2.2, Mag=1.7        4/30/18: s/p Iron Infusion        5/14/18: s/p Entyvio         5/23/18: Hgb=11.5, ESR=16, CRP< 5        5/29/18: s/p Iron Infusion        6/6/18: Hgb=10.6, INR=1.7, Oubfukvm=355, Alb=3.5, Phos=2.1, E73=495, dy=327; BMs: # 5, 2-3x/D         6/19/18: Hgb=10.6, INR=1, CRP=15, ESR=23        6/21/18: R ankle is acting up, swollen, pain, having MRI done, took a couple of advil, on low carbs ,BMs: # 5, 1-2x/D, qi=372        6/26/18: MRI: fx/stress rxn-talar body/navicula; stress related changes--cuneform, cuboid,distal tibia; mod tibiotalar effusion, mild-mod peroneal tendinosis        7/19/18: entyvio 6/26/18, eliminated all carbs--anti inflammatory diet, ih=329, BMs: # 4-5, 1-3x/D         7/25/18: Hgb=10, INR=1.3, Alb=3.3, Phos=2.4, Ucdlqdte=563, sat%=12, Iron=35        8/2/18: BD--osteoporosis of hip/spine: sig decrease BD--17.6% of hip, 17% of spine, osteopenia of wrist: 6.4%        8/7/18: Entyvio        8/21/18: Hgb=11.1, INR=1.5, ESR=19, CRP=18.6        8/23/18: recently w tooth infection, old implant--loose and removed; rx w amox, and advil        eating almost no carbs, oy=327, # 5-6, 2-3x/d         8/24/18: Stool fat--neg, Yplzwqopuype=310        9/5/18: Hgb=11.4, INR=1.3, ESR=9, CRP=11.3, Iron=41, Fiyxgdol=999, Alb=3.8,        9/17/18: entyvio, Hgb=10.7, INR=2.2, ESR=17, CRP=9.1,         9/20/18: vt=797, BMs: # 5-6, 1-2x/D         9/21/18: Phos=2.4, Ca=8.7, Alb=3.4, Vit D=27, NWF=973,         Dr. Akers: Hyperparathyroidism: probably secondary due to low Vit D/Ca & ? superimposed primary, rx replete Vit D and Calcium        10/9/18: Hgb=11.6, MCV=94, ESR=14, CRP=5.4, INR=2.2        10/10/18 MRE: stricturing of neoterminal ileum w worsened upstream dilatation, moderate length of upstream ileum w stricturing extending at least 20cm and more extensive then previous. suggestion of 2 adj extraluminal fluid collections which may be w/i the wall of strictured ileum near the ileocolonic anastomosis. surrounding spiculation and tethered appearance of bowel in this region. 10/16/18: entyvio, Hgb=11.7, MCV=94, ESR=14, CRP <5, Alb=3.5 10/18/18: Pl=726,   BMs: # 5-6, 3x/D ,  11/13/18: Entyvio 11/21/18 CTE w C: limited 2/2 bowel underdistention, mucosal hyperenhancemt & extensive SM edema of distal ileum including vladislav-ileum, difficult to exclude a sml fluid collection, Liver w relative enlargement w suggestion of lobulation, enlargemt of PV,SMV,IMV,Spl V, rectal V. No ascites 11/28/18: Hgb=10, ESR=19, CRP=17,Alb=3.5,Iron=35, Sat%=11, Ghxbuaew=512, INR=1.3 11/29/18: yq=037, BMs: # 5-6, 3-4x/D 12/3/18: Abd sono--Liver 14.8cm Incr heterogenicity, spleen--wnl 12/11/18 & 1/14/19: Entyvio 1/14/19:Entyvio,  Hgb=10.7, ESR=15, Alb=3.6, Iron=36, Ferritin=67, Sat%=11, INR=1.6 1/24/19:  sh=674, stools are # 4-6, 3-4x/d , no pain 2/5/19: Hgb=11.2, ESR=14, CRP=0.36 2/28/19: Hgb=11.5, ESR=12, CRP=6.5, Alb=3.7, Iron=69, Imbxbmsa=361, Ca=8.9                Bms: # 4-5, 2-3x/D , bu=421,  Entyvio : last 2/1519,                had parathyroid scan pre-op, still w elev PTH, + activity in mediastinum,? ectopic parathyroid tissue vs neoplasm.  To see ENT and have Imaging at [facility] 3/28/19: Bms: # 4-5 , 3x/d ;hm=054 4/11/19: Hgb-9.7, Iron=45, ESR=18, CRP=9.4, Alb=3.5,  Saw Endo SX at [facility], felt hyperparathyroid is secondary to Low Ca/Vit D, no sx at this time 4/16/19: BMs: # 5-6, 3-4x/D, ho=101,  Entyvio : last 3/1519,  got IV iron 4/12/19 for Ferritin=53 4/27/19: s/p R Hip Nailing--missed 4/2019 2/2 fresh wound 5/16/19 & 6/18/19 Entyvio 7/2/19: Hgb=11.7, Ferritin=41,ESR=12, CRP=5.5, B6=2.8,Mag=1.8,Phos=3.9,Do=604,Zinc=61 7/11/19: s/p IV iron 7/11/19: Alb=3.7, NWM=726, Ca=9.1, Vit D=40/306,  7/24/19: Entyvio, Alb=3.8, OKW=905, Ca=8.9, Vit D=128  8/1/19: Bms: # 5-6, occas #4, 2-3x/D , ff=427 8/15/19: Hgb=12, ESR=10, CRP=5.2, Ferritin=68, Alb=3.4, Phos=4.4,Mg=1.7, Ca=8.5,Calprotectin=88, 8/20/19: HZC=333, Ca=9, Alb=4.2 9/19/19: Hgb=12.8, ESR=9, CRP=5.5, Alb=3.7, Oboeflxs=603, Mag=1.8, Ca=8.9, Phos=4.2 9/26/19: dw=348, BMs: #5-6, 2-3x/D, no pain 10/17/19: gt=454, BMs: #4-6, 1-2x/D, no Pain; Hgb=14, ESR=7, CRP<5, Alb=3.9, Jcgptspu=285, Mag=1.7, Ca=9.6 10/24/19: nb=580, Bms: # 4-6 , no pain, 11/6/19: ESR=6, CRP=6, PTH=80,Ca=9.1, VitD=50 11/14/19: yg=159, BMs: # : 4, 1-2, 0ccas #5  11/17/19: ESR=6, CRP<5, Msiysxll=716,   12/19/19: jo=430, BMs: #5-6, 2-3x/D 1/6/20: entyvio 1/13/20: ESR=7, CRP=0.2, Hgb=13.5, Vezvtdox=568, H35=514, FA=10, Alb=4.1, Ca=9.1 1/16/20: wt = 127, BMs: # 5, 1-3x/d 1/30/20: ESR=9, CRP=11, Hgb=13.9, Fcdevwvh=891,Iron=38, Alb=3.9,Ca=9.2, PTH=87, 2/3/20 EGD: gastritis, +MACKENZIE, No HP, +erosion w ooze -clipped, 0.5cm polyp-neg; 4cm HH, Esophagitis A, + Barretts, VC: 1+ Colonoscopy: 05cm rectal polyp: HP, 2nd deg int hemorrhoids mild-mod activity: MARILY w cryptitis & mild archect distortion at ileocolonic anast: colon & ileal side, no stricture 2/4/20: Entyvio; 2/12/20: IV Iron, 3/3/20: Entyvio 2/25/20: pl=445,  BMs: # 4-5, 1-2x/ d 3/19/20: ne=988, BMs: #4-5, 1-2x/D 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 10/17/20 : Hgb=13, CRP< 5, ESR= 11, Iron=44, Ferritin=46, Alb=4, Phos=2.3,  11/5/20: gy=664; s/p IV Iron x 2 ,  11/4 and 1 week prior;   BMs:  # 4-5, 1-2x/D , no pain 11/18/20  Entyvio + IV Ca  1/4/21: Hgb=13.6, CRP<5, ESR=9, Ferritin=60, Alb=4.2, Phos=2.7 1/11/21: Entyvio & IV Ca 1/14/21: no pain, stool more formed ,  ht=944 - 137; BMs:  # 4-5, 1-2x/D  2/8/21: Entyvio & IV Ca 2/23/21 IV Ca 2/22/21: Hgb=12.7, CRP<5, ESR=8, Ojkoxiwko=918, Phos=2.3, Mag=1.8, T21=781, Zinc=58, Rm=853 2/26/21: pi=139,  BMs:  # 4-5, 1-2x/D , no pain  3/8/21 & 4/8/21: Entyvio 3/9/21 & 3/24/21: IV Iron 4/5/21: Hgb=13, CRP<5, ESR=9, Ferritin=94, Phos=3.1, Mag=1.7,Ca=9.1/ Alb=4              Pt Ins co is refusing to cover Entyvio q 4wks, despite numerous attempts to appeal, they also refuse to set up a peer to peer discussion betw myself and another healthcare provider, even one that works for a third party.  They do acknowledge that there is literature supporting the successful use in individual cases who don't respond to the q 8 wk interval and need less then q 8weeks , but state it had not in FDA approved at less then 8wks so they will not approve it.  I spoke to the ins co rep and discussed that fact that patients should not be  pigeon holed since practicing medicine is done on an individual basis.  Humans are not all the same.   Their  response didn't change eventhough the pt clinically needed the medication and it  induced a beneficial clinical response towards remission.  Therefore the patient and I decided to slowly increase the interval since the insurance company will not pay for this benefit.  Hopefully he may be able to maintain his present clinical state.  If not we will return to q 4weeks and will again appeal using this patients real clinical data .  4/9/21:  kt=777,  no pain, stool more formed # 4, 1-2x/d  6/11/21: wt= 135,   no pain, stool more formed # 4, 1-2x/d               recently had an episode of abd distenstion, pain, N/V, no BM              eventually started having diarrhea and flatus, BMs returned to normal              lost 2-3 lbs but regained  Doesnt recall eating anything different, no fever, chills, brbpr, melena  entyvio was 6/3/21--which is almost 8 weeks, was supposed to be 5-6 weeks  to start               6/4/21 Hgb=12, CRP<5, Ferritin=32, pt to receive IV iron      7/12/21 Labs: Hgb=13.6, ESR=10, CRP=<5, Mbgifsn=771, Alb=3.7, BUN=16   7/16/21 MRE: limited to incomplete bowel distention, chr distal ileitis/probable inflammatory stricturing vs collapse of the vladislav-TI--but improved since 2018 , moderate proctitis 7/20/21:  Last entyvio was --7/1, next early august                 yp=447 ,  no pain, stool more formed # 4-5/6, 1-2x/d  7/23/21 Entyvio level= 39, Abs <1.6 8/23/21: Labs--Hgb=13.6, ESR=10, CRP=6.6, Uumfpzfl=599, Iron=26, Alb=3.9, BUN=16 8/26/21 p/w w N/V, abd pain/bloating  x 3 days, unable to keep things down Labs: WBC=5, Hgb=12, CRP=43, ESR=11, Alb=4.4, BUN=28, Creat=-1.6 CT Abd/Pelvis: diffuse marked dilatation of SB Loops w transition pt in Rmid/lower abdomen ~ 5-6c, proximal to the ileocolonic anastomosis RX w IV solumedrol 20mg q8h, NGT, IVF, pt refused TPN, also w UTI from prostatitis 8/27 NGT was pulled, and clears started and advanced to LR,  was d/c home 8/30 on prednisone 40mg qd w taper by 10mg/wk--> to 20mg 10/15 Entyvio 10/25 Stelera (Ustekinumab)  # 1 11/3/21 [facility]: wt above 140, off pred x 2 weeks,  1-2 BMs qd  Discussed at IBD conference, reviewed previous colonoscopy, and recent imaging; consensus that due to malnutrition and steroid dependency, surgery is next best option however pt reports feeling great and wants to pursue medical treatment which is reasonable given he feels well  repeat imaging in mid-January after 3 months of Stelara, and then consider referral to surgery vs improved candidacy for endoscopic manipulation (currently presumed one long stricture).   11/15/21 : kg=302, Hgb=12, ESR=3, CRP <5, Ferritin =104, Ca=9, Mg=1.7, Alb=3.7  12/10/21 : iron infusion 1/4/22: Hgb=11.5, ESR=6, CRP <5, Ca=8.8, Mag=2.9, Alb=3.9 1/10/22 : yj=116, stools # 5, 1-2x/D  1/10/22 Today:  Feeling well, no c/o , CP, SOB/ AMARO, Cough, Wheeze, Palpitations, edema              Most recent labs  reviewed w patient:1/4/22 1/31/22: calprotectin=84 2/2/22: ru=776 2/14/22: Hgb=10.6, ESR=9, CRP <5, Ca=8.3, Mag=1.6, Alb=3.8, Iron=25, Ferritin=15 3/3/22:  iv Iron infusion x 1 3/8/22 MRI Abd/Pelv: thickened distal Jejunal loops which are tethered; distal ileal segment  (10-12cm ) previously actively inflammed has decreased & now characterized by an area of stricture, however the vladislav-TI  5mm to the anastamosis is enhanced as well as narrowed.  colon just  distal to the anastamosis has a focal segment of inflammation & stricture.  the recto-sigmoid appears inflammed  3/28/22: Hgb=14.4 , ESR=1, CRP <5, Ca=8.5, Mag=1.7,Alb=4.1, Iron=104, Aatfmacc=308, 4/5/22: yg=503, Bms: # 5-6 , 1-2 x/day    5/10/22  :  Last entyvios were -->7/1, 8/5, 9/2, 10/15/21              Stelara # 1 IV--10/25/21, second dose SC~ 12/10/21, 3rd~ 2/14/22,  4th-- mid April , 5th--mid june                Early December 2021  had a " flare" loose BMs, N/V and bloat              Took Pred 40mg qd and tapered down 10mg / week , now off pred x 7-8 weeks  4/13/22 Dr. Heard:  pt states he had a flare the weekend of 4/9/22 w vomiting/distension                pt self-started prednisone 40mg , this was just before his april stelara dose                claims he was feeling better               Dr. Heard: sched colonoscopy w ? dilatation   5/10/22: tapered of pred 40mg , 10mg/wk over 1 month, now off 2weeks          no pain, n/v,  BMs: # 4-5, 1-2 x Day , wt=-132 5/17/22 Dr. Heard Colonoscopy: ped scope passed w/o resist in vladislav-TI, one single apthae w psuedopolyp.  Flares probably 2/2 to adhesive dis, imaging may consistently show wall thickening  5/27/22 Labs:  Alb=3.8. Phos=2.7, Mag=1.9, Ca=9.2, PTH=72, Vit D=105, ALP=76                + IV Iron  6/20/22 Labs:  Alb=4.5, Phos=0.8, Mag=1.8, Ca=8.5, MJV=413, ALP=83                          Hgb=14, ESR=2, CRP<5, Vzoyozqe=805, Iron=86 6/21/22:  no pain, n/v,  BMs: # 4-5, 1-2 x Day ,                qc=508 7/26/22:  no pain, n/v,  BMs: # 4-5, 1-2 x Day ,                lq=322 9/30/22 EGD: mild gastritis, no HP; 0.75cm gastric polyp:fundic;                4cm HH, Esophagitis A--, + Barretts 10/17/22 : Alb=4.3, Phos=2.1, Mag=1.8, Ca=9,  PTH=95, ALP=80, TSH=12, Vit D=69                          Hgb=12.7 , ESR=2, CRP<5, Ferritin=57, Iron=85 12/16/22 : Alb=3.8, Phos=1.6, Mag=1.7, Ca=8.9 , ALP=68,                           Hgb=10.8 , ESR=1, Vmhetvdi=449, Iron=67, INR=2.3  12/19/22: hu=621, had some N and distension the day after Thanksgiving                      Was having BMs and passing gas, went of liquids x 1-2 days--> resolved 1/30/23: Alb=4.4, Phos=3, Mag=1.8, Ca=9.5,  PTH=96, ALP=79,  Vit D=144                          Hgb=13.6 , ESR=5, CRP<5, Ferritin=55, Iron=56, INR=1.6    2/6/23 : kg=261, no abd pain, BMs: # 4 qd   3/17/23: developed cugh,congestion, fever, malaise  3/19/23 + Covid; Received Iv Remdesivir x 3 at home 3/27/ 23: mn=676, BMs: #4-5,  1-2x/ D   Alb=4 , Phos=1, Mag=1.8, Ca=8.5 , ALP=94  Hgb=13.8, ESR=5, CRP<5, [place]=102, Iron=78, INR=5.9  4/21/23 : Alb=4, Phos=2.9, Mag=1.9, Ca=8.9,  PTH=89, ALP=75,  Vit D=105/ 80, INR=3.2  5/22/23 Oy=342; Hgb=12.7, ESR=2, CRP<5, Ferritin=19, Iron=42, INR=2.4               Alb=4.2, Phos=2.4, Mag=1.8, Ca=9.1, ALP=68,                      8/4/23 :  Alb=3.8, Phos=2.1, Mag=1.8, Ca=8.7,  NZA=968, ALP=68,  Vit D=78 INR=2.1  8/21/23 : Hgb=14, ESR=4, CRP=40, Mzsfpryy=012, Iron=37, INR=4.8                Alb=4 , Phos=1.8, Mag=1.6, Ca=9.3, ALP=84,      8/22/23  :   vo=793 , this weekend  had a flare, abd distension, no N/V, fever                  BMs: started # 5/6--> now # 6-7, no blood or mucous                  doesnt recall anything too solid or fiberous                  Started Pred 40mg qd, and liquid diet--> feeling better                  Less distension, passing gas and BMs  10/24/23:  qk=350, BMs: #4, 1-2x/D                   pt reports an episode of dark stool w 2-3 days ~ 9/23/23                  He held his Warfarin for a couple of days and stool returned to normal color                  He increased his Omep 40mg BID                  He had no Abd pain, N/V, ht burn, dysphagia, bloat                  9/28/23 Labs: Hgb=8.6, Iron=22, sat%=7, Ferritin=17                  10/5/23 Labs: Hgb=8.7, Iron=52, Sat=16%, Degxmqcj=773                  10/12/23 : Labs: Vit D=67, Ca=8, PTH=160, Mag=2                  10/20/23 Labs: Hgb=10.9, INR=1.6, CRP<5, Pxjnojje=013, ALB=3.7, Ca=7.9, ALP=68 10/18/23 MRE:  persistent mural enhancement without wall thickening in distal small bowel loops, which is a nonspecific imaging sign and may reflect inflammation, however no other mural or mesenteric findings to indicate active inflammatory small bowel Crohn's disease.  No evidence of stricture. No imaging signs of penetrating disease.  Chronic central mesenteric fibrofatty proliferation. 10/27/23: Ustekinumab=7.1, Ust Abs <1.6 11/27/23 : FA=17, W04=053, Vit D=69, ca=9.1, ALP=84,  Alb=4.2                  Hgb=13.3,  PT=19, INR=1.7, Ferritin=33, Iron=46, Sat=13%,  12/5/23 : Homocysteine=18, SIR=407 12/11/23 :  hw=358, BMs: #4, 1-2x/D , Melena-->no    12/22/23  EGD:  gastritis, No HP; 2cm HH, Esophagitis A--,  + Barretts 12/22/23 Colonoscopy: mild ileitis of the most distal vladislav-TI; small superficial apthous ulcer on colonic side of anastamosis, Random  colon BX--> wnl  12/27/23 -12/31/23 [facility] for Hypophosphatemia=0.8, Rx w IV KPhos PLH=955, Vit D =66, 7.6--> Phos=1.5, Ca=8.4, Hgb=12.6 D/C on kPhos 1 gm TID seen by [facility] 1/10/24 Phos=1.7, Vit D =53, Vit D-25= 73, Ca=8.5, TGJ=334 1/18/24 Ouehoslp=975, CRP<5, ESR=1,  Ca=7.9, Hgb=13 1/29/24:  lc=248,  BMs: #4, 1-2x/D , no  Melena 1/26/24 Dr. Reynoso Capsule--> scattered irregular appearing mucosa, possible erosions, no bleeding            for single balloon enteroscopy 2/27/24  : Labs: Vit D-25=84, Ca=8.9, DMS=298, Mag=1.7, Phos=2.8                          ALB=4, BUN=15/ 1.1, K=4.6, ALP=63 3/5/24: WT = 137,  BMs: #4, 1-2x/D  3/12/24 :  Ferritin=23, Iron=25, sat=10%, CRP<5, ESR=2,  Hgb=13.7, INR=2                  Ca=9.1, Phos=2.2,  ALB=4.1, BUN=15/ 1.2, K=4.1, ALP=75 IV Iron: 3/22, 3/28, 3/29. 4/1/24 5/9/24 :  Fkkqipaw=602, Iron=86, sat=31%, CRP<5, ESR=2,  Hgb=15, INR=1.6                  Ca=8.8, Phos=1.1, Mag=1.9,  ALB=4.2, BUN=11/ 1.2, K=3.9, ALP=75 5/28/24:  fh=245, BMs: #4, 1-2x/D , no melena, ht burn  6/14/24: EAC=870, Vit D=75, Vit D 1,25=68, Ca=9.1, Phos=3.9, Mag=1.7,  ALB=3.9, BUN=14, K=4.2, ALP=76 7/5/24:  Zgpwbwds=560, Iron=91, sat=34%, Hgb=13.8, INR=1.6 7/9/24 : kz=929, BMs: #4, 1-2x/D , no melena, ht burn  8/20/24  :   s/p  iron infusion in 7/12/24, Calcium 7/25/24  , last Stelera 7/2024; next begining  of 9/2024                 Today: zg=494                  BMs: #4 1-2x/D                   Melena-->no        * Abd pain-->no * Nausea--> no * Vomit--> no * Early satiety--> no * Belching--> no * Hiccups--> no * Regurgitation--> no * Acid Taste / Water Brash--> no * Ht burn--> no * Dysphagia--> no * Throat Clearing--> no * Hoarseness--> no * Post-Nasal Drip--> no * Congestion--> no * Globus--> no * Cough--> no * Wheeze / PC-> -no * Constipation--> no * Diarrhea--> No  * Bloating--> No  * Strain on Defecation--> no * Incompl Evac--> no * Flatulence--> no * Gurgling--> no * Melena--> no * BPBPR-> -no * Anorexia--> no * Wt. Loss--> no                  PRIOR HISTORY--2017 1/17/17: Hgb=9.2, ESR=6, CRP=5; 1/19/17: BMs: #4, 5-6, 2-3x/D, Yl=658, Started Creon 1 tid cc        3rd Entyvio beginning Feb 2/14/17: Labs; Hgb=9.6, MCV=97, ESR=5, CRP< 5, C19=816, FA>23, Iron=59, Retic =3.2,        2/17/17 Fecal Calprotectin=16, Fats: Increased neutral & Split        3/13/17: Labs Hgb=9.5, MCV=95, ESR=7, CRP <5, ALB=3.1        3/16/17: lot of stress, to move -[place], had a job-fell thru, BMs: # 5, 2-4 x/D, wt= 131w clothes        4/19/17 EGD: gastritis, MACKENZIE, No HP, 2cm HH, +Barretts, No Dysplasia        Colonoscopy: Anastamosis: open w mild -mod active colitis, enteritis severe adj to anastomosis, mild more proximal, remainder of colon-wnl, 2-3 deg int hemorrhoids        4/26/17 : Hgb=7.3, MCV=95, ESR=13, CRP<5;Immediately post procedure stools very dark on eliquis/iron.        Admitted to [facility]: Hgb=8.3-->8.9 after 2 U, Alb 3.3, BUN=17, creat=1.3, Malina/Tkork=452/460        4/27/17: Alb=2.3, BUN=12, creat=1.2, Malina/Lipase=209/863-No abd pain, N/V; 5/5/17: Hgb=10.7.        5/8/17 Entyvio iv. 5/8-5/9 w dark red diarrhea; 5/10/17 Hgb=7.8.        5/11/17 Adm [facility] w stools brown, Hgb=7.9, BUN=17, Creat=1.4, Alb=2.9; S/P 2 Units- Hgb=10.6, BUN=15/1.1.        Prednisone 40mg qd, entocort d/dee.; 5/18/17: Hgb-10.4, bl=445, BMs: #5, 1-2x/D, no pain        6/8/17: Hgb=7.4,MCV=98, CRP<5-ASA stopped, Pred20--> 30        6/10/17: Hgb=8.2, Alb=2.9, BUN=20/1.2 ; 6/12/17: Hgb=8.3, Retic=0.19, Iron=10, Sat%=3, Ferritin=57, FA=23,X51=543, 6/13/17: s/p 2 Unit PRBCs        6/15/17: started MCT oil, Fu=759 , BMs: # 5, 1-2x/D, stools are brownish/green         7/11/17: [facility] w unsteadiness. MRI Head: R Cortical Temporo-occipital encephalomalacia, MRA H&N-no sign lesions, PER-wnl.Hgb=9.9--> 8.4->9.7 after 1 Unit. Seen by Torri: received IV Iron         CRP<5, T43=295, INB=247, FA>23,Iron=22,Sat%=6, Ferritin=42, Alb=3.5. D/C on warfarin 2mg        7/20 Hgb=8.5, 7/28 Hgb=7.7, 7/31-s/p 1 Unit         As outpt seeing Heme, to get IV Iron and 5 IV Copper        Had 'FLU' Fever=101, chills, bloat, N/V/D, Bilious. no pain, melena,brbpr        Given anti-emetic by dr Butler,then able to keep things down        On prednisone 25, warfarin w INR bet 1.6-2        8/17/17: Hgb=9.9, ESR=20, CRP-P,Dy=249, BMs: # 5, 1-2x/D, stools are brownish/green        10/2/17: Hgb=8.8, MCV=89,Phos=1.7, K=3.9, Mag=1.9, Alb=3.6,Iron<10,Ferritin=21, INR=2        10/17/17: Hgb=7.9,ESR=6,CRP<5, INR=1.6        10/25/17: Hgb=10, ESR=5, CRP=5, Alb=3.6, Phos=2, Noidiizr=053, X86=187        11/16/17: Hgb=11.2, ESR=14, CRP=12,         11/13/17: MRE-Chr mild distension of distal & vladislav-ileum s/o mild stricturing at anast, mild mural thick & mucosal enhancemt ~ 20cm; little change from 2015 c/w mild acute on chr ileitis, 2.1 cm Liver hemangioma        11/28/17: Entyvio        11/29/17: Hgb=9.6, ESR=10, CRP=5.8, Alb=3.8,Ferritin-P, Iron=14,Sat=4%, Phos=2.5        12/19;17: Hgb=10.1, ESR=12, CRP=6.5; 12/21/17: BMs:#3-5, 1-3x/D , brown, no blood, no pain, nd=365        1/3/18:Hgb=11.7, ESR=19, CRP=6.5, Alb=4.1, Midlwsqa=321, Iron=31, Sat%=9,Zinc=55          PRIOR HISTORY---2013:         2/20/13 CT markedly dilated sb loops ext to anast, colonoscopy w open anast ,mild-mod remy-anastamotic disease. quick response to entocort/humira--probably adhesive dis.         8/10/13 w GNR bacteremia, ADA 1.7 /KAYLAH 3.4, Humira 8/7, 8/13,8/18,9/15        CT- mucosal thickening and spiculation of the distal sb extending to the anastamosis, thickening and stranding of adj mesenteric fat.        Humira increased to 40mg weekly, entocort 4        9/2013 MRE--dilated loops in mid and distal ileum, markedly thickened and narrowed TI w decreased peristalsis of TI        11/15/13 ADA-24.9, KAYLAH-0          PRIOR HISTORY---2014:         2/15/14--CT dilated loops SB, loop of sb in mid abd 4.3cm w infiltrative changes in the mesentery, bowel tapers in the RLQ to the anastamosis w/o transition        WBC=15K, Rx w IV steroids and Abx         3/7/14 SBFT: last 10cm sb prox to anast mild distension and sl irregularity. In the mid portion of this loop there is a mild narrowing which appears to reopen but is some what narrowed.        3/20/14 ADA=33.1, KAYLAH=0, Lialda switch to Apriso 4 (2/2014)          PRIOR HISTORY--2015         1/11/15 Adm [facility] w 1 day N,Recurrent V, Abd pain/distension. CT-multiple distended & fluid-filled sb loops, mild wall thickening of ileum w No inflamm changes.        WBC=14.6, Hgb=17, RX w NGT suction, Levaquin iv, Solumedrol 40mg q 12( 1/12-->1/16) to prednisone 30mg BID w 5mg taper/wk        3/18/15 --Dr. Butler w edema /High BP, prednisone was tapered slowly to 2.5mg         switched to entocort 9mg qd, edema and bp improved w lasix 20mg         BMs:#4-5, 3x/D, Hgb=11, ESR=4, CRP<5        4/28/15 - 5/1/15: Adm [facility] w 1 day Abd pain, distension,N/V. WBC=10K, HGB=15         CT Abd/Pelv: Diffusely dilated SB loops, thick walled ileum        Rx w NGT, IVF, IV Solumedrol--> Prednisone 30mg BID; tapered to 5mg---> Budesonide 9mg qd        6/10/15 Colonoscopy: Inflammatory ST mass on the ileal side of anast, opening appeared ulcerated,scarred & severely narrowed        6/11/15: [facility] w N/V x1, decr appetite, CT ABD: no obstruction, dilated thickened sb loops of distal jej and ileum        6/19/15 [facility]: s/p Lap w extensive lysis of adhesions, hepatic flex, sigmoid and sb anastamosis, s/p partial r colectomy and sb resection--side to side elisabeth: 8-10 inch from prior anast to TV colon.        7/17/15: BMs:#4-6, 4-5x/D, wt 131(from 126)        8/20/15: BMs: #4, 1-2x/D or #5, 2-3x/D, ou=056, dry cough,Hgb=10, WBC=3, PTZ=840, CRP=56, ESR=21        8/25/15 CT Abd/Pelv: Svl RLQ sb loops w wall thickening, mild nodularity & inflamm stranding in mesentery        Advised-restart Entocort 9mg qd, Apriso 4 qd, Maintain Humira but given RX to check drug/Ab levels        9/15/15: did nt restart meds,Hgb=9.9, WBC=4, ESR=19, CRP=5.8, lo=397; [facility] : ADA=8.5, Antibodies< 1.7        10/15/15: No pain, BMs:#4, 1-2x/D, #5-6, 3-4x/D, throat clearing/cough-better, pu=639        11/30/15: No pain, BMs:# 4, 5-6 intermittently, No throat clear, ut=936          PRIOR HISTORY-2016 1/22/16; 4/8/16; 6/6/16 : No pain, BMs:# 4-5 1-2x/D, also #5-6 3x/D for 2-3D/wk, wx=699        7/14/16: nw=507, 9/22/16: lz=870.5        11/10/16: 9.5lb wt loss, states BMs: 5-6, 5x/D--Taking Magnesium, No pain/anorexia        Labs: Stool Forgnogwslpy=274, + Incr Fecal fat, Alb=3.4, FA =23, W77=495, Hgb=12, ESR=10, CRP <5        Magnesium-d/c, Obtain MRE asap--Start steroids and possibly switch to Entyvio        DDx discussed: active crohns--loss response to Humira, Malabsorption--loss Bile acids, Bile-induced diarrhea, SIBO        Pt wanted to wait for imaging-did not start rx        12/1//16 MRE: RUQ ileocolonic anastamosis--narrowed w upstream dilatation to 3.2 cm        Pt refused pred, Started Entocort 9mg qd and Flagyl 250mg qid about 7-10days ago         awaiting Entyvio load to start 12/22, then 1/5; had Cut back on iron bid        12/15/16: BMs:# 5, 2-3x/D, occas #6, No pain, Less bloat/flatulence, Gx=878.

## 2024-08-21 ENCOUNTER — NON-APPOINTMENT (OUTPATIENT)
Age: 59
End: 2024-08-21

## 2024-08-21 LAB
ALBUMIN SERPL ELPH-MCNC: 4 G/DL
ALP BLD-CCNC: 73 U/L
ALT SERPL-CCNC: 34 U/L
ANION GAP SERPL CALC-SCNC: 14 MMOL/L
AST SERPL-CCNC: 31 U/L
BILIRUB SERPL-MCNC: 0.3 MG/DL
BUN SERPL-MCNC: 15 MG/DL
CALCIUM SERPL-MCNC: 8.7 MG/DL
CHLORIDE SERPL-SCNC: 109 MMOL/L
CO2 SERPL-SCNC: 22 MMOL/L
CREAT SERPL-MCNC: 1.1 MG/DL
CRP SERPL-MCNC: <3 MG/L
EGFR: 77 ML/MIN/1.73M2
ERYTHROCYTE [SEDIMENTATION RATE] IN BLOOD BY WESTERGREN METHOD: 2 MM/HR
GLUCOSE SERPL-MCNC: 47 MG/DL
HCT VFR BLD CALC: 49 %
HGB BLD-MCNC: 14.5 G/DL
INR PPP: 1.79 RATIO
MCHC RBC-ENTMCNC: 29.6 GM/DL
MCHC RBC-ENTMCNC: 31.1 PG
MCV RBC AUTO: 105.2 FL
PLATELET # BLD AUTO: 229 K/UL
POTASSIUM SERPL-SCNC: 4 MMOL/L
PROT SERPL-MCNC: 6.2 G/DL
PT BLD: 19.9 SEC
RBC # BLD: 4.66 M/UL
RBC # FLD: 16 %
SODIUM SERPL-SCNC: 145 MMOL/L
WBC # FLD AUTO: 4.87 K/UL

## 2024-08-21 NOTE — ASSESSMENT
[FreeTextEntry1] : 1. CROHNS DISEASE of both small and large intestine: s/p flare 8/25-->8/30/21, then early 12/2021-s/p pred tapered over 4 weeks, again the weekend 4/9/22 rx w prednisone 40_ mg ( just before last Stelera dose) --> now off since early 5/2022;  8/19/23 w flare started prednisone 40mg & tapred over 4 weeks  ~ 9/23/23 Had dark stool x 2-3 days, Warfarin held 2 days & Omep BID, Hgb dropped to 8.6   s/p ileocolonic resection x 2--last 6/19/15 for recurrent sbos: prior was s/p 4 SBOs w/i 2 yrs--2/2 distal sb disease adj to anastamosis when on Humira weekly had an ADA =33 (3/20/14), -but had sbo 2/2014 at ADA=25 and another sbo 4/28/15 6/10/2015 Colonoscopy: Inflamm ST mass on ileal side w ulceration/scarred/narrow 6/11/2015 CT: dilated thickened sb loops distal jej & ileum Post-op 6/2015 probably some malabsorption 2/2 to removal of R Colon and ileum: had WT. Loss-->r/o malabsorption: ?bile-induced->-secretory vs decr micelles, active dis, PLE ? SIBO--Elev FA, Alb=3.4-->3.1-->2.9--> (7/28/17) ?SIBO/Prevent post-op recurrence --> trial Flagyl 250 mg qid~12/7/16-->d/c: 5/11/17 Also discussed trial of Cholestyramine/Xifaxin -wanted to wait 11/20/16 ACTIVE Crohn's--> 9.5lb wt loss, BMs: #5-6, 5x/D-- but taking Magnesium 12/1/16 MRE: RUQ ileocolonic anastamosis--narrowed w upstream dilatation to 3.2 cm Labs: Stool Vfioksrbaefv=667, + Incr Fecal fat, Alb=3.4, FA =23, N90=182, Hgb=12.3,ESR=10,CRP <5 4/19/17 :Colonoscopy: Anastamosis: open w mild -mod active colitis, enteritis severe adj to anastomosis, mild more proximal, remainder of colon=wnl 5/11/17- Adm PMHC w stools brown, but Hgb=7.9, BUN=17, Creat=1.4, Alb=2.9. S/P 2 Units w Hgb=10.6, BUN=15/1.1, Prednisone 40mg taper, entocort d/dee 6/8/17: Hgb=7.4, MCV=98, CRP<5--ASA stopped, Pred20--> 30mg, 6/13/17: s/p 2 Unit PRBCs 7/11/17 [facility] w unsteadiness. MRI Head: R Cortical Temporo-occipital encephalomalacia Hgb=9.9--> 8.4->9.7 after 1 Unit. Seen by Heme: received IV Iron CRP<5, N79=195, NUM=790, FA>23,Iron=22,Sat%=6, Ferritin=42, Alb=3.5. D/C on warfarin 2mg 7/28/17 Hgb=7.7; 7/31/17-s/p 1 Unit Heme Consultation ~ 8/2017: started IV Infusions of Iron and IV Copper 8/17/17: Hgb=9.9, ESR=20, Mt=071--Exgmnkmsjz s/p GI infection w N/V/D, no obstruction 10/17/17: Hgb=7.9,ESR=6,CRP<5, INR=1.6, Got IV Iron x 2, since 10/2/17 for Iron<10, Hgb=8.8 11/16/17: Hgb=11.2, ESR=14, CRP=12, 10/26/17 Stool fat-neg, calprotectin-30 11/13/17: MRE-Chr mild distension of distal & alfredito-ileum s/o mild stricturing at anast, mild mural thick & mucosal enhancemt ~ 20cm; little change from 2015 c/w mild acute on chr ileitis, 2.1 cm Liver hemangioma 10/9/18: Hgb=11.6, MCV=94, ESR=14, CRP=5.4, INR=2.2 10/10/18 MRE: stricturing of neoterminal ileum w worsened upstream dilatation, moderate length of upstream ileum w stricturing extending at least 20cm and more extensive then previous. suggestion of 2 adj extraluminal fluid collections which may be w/i the wall of strictured ileum near the ileocolonic anastamosis pt had declined to call numbers given for IBD center at [facility] for new or investigational rx's--biologics. later he felt he was stablizing w his wt loss, and inflammatory markers  2/28/19 w ESR=12, CRP=6.5 & 2/21/19 stool calprotectin--> 232 8/15/19: ESR=10, CRP=5.2, Calprotectin--> 88 9/19/19: ESR=9, CRP=5.5 10/17/19: ESR=7, CRP< 5 11/6/19 : ESR=6, CRP=0.18 11/27/19: ESR=6, CRP <5 1/13/20: ESR=7, CRP=0.2 1/30/20: ESR=9, CRP=11  2/3/20 EGD: gastritis, +MACKENZIE, No HP, +erosion w ooze -clipped, 0.5cm polyp-neg; 4cm HH, Esophagitis A, + Barretts, VC: 1+ Colonoscopy: 05cm rectal polyp: HP, 2nd deg int hemorrhoids mild-mod activity: MARILY w cryptitis & mild archect distortion at ileocolonic anast: colon & ileal side, no stricture  3/2/20:ESR=7, CRP=0.26 3/9/20: VDZ>40, Ab to VDZ <1.6 3/17/20: ESR=6, CRP=6.4 10/17/20: ESR=11, CRP <5 1/4/21:ESR=9, CRP<5 2/22/21: ESR=8, CRP<5 4/5/21: ESR=9, CRP<5 6/4/21: ESR=9, CRP<5 6/11/21: wt= 135, no pain, stool more formed # 4, 1-2x/d  s/p episode --abd distenstion, pain, N/V, no BM  eventually started having diarrhea and flatus, BMs returned to normal  lost 2-3 lbs but regained 7/4/21: CRP <5, Hgb=12  7/16/21 MRE: limited to incomplete bowel distention, chr distal ileitis/probable inflammatory stricturing vs collapse of the alfredito-TI--but improved since 2018 , moderate proctitis 7/12/21 -- ? flare --> entyvio should have been q 5 wk , went q 8 wks  next dose should be in 4 weeks x 2 then 5 weeks, to check levels 7/20/21: Cliically stable, unclear if MRE accurate 2/2 underdistension, but gained wt and CRP--normal 7/23/21 Entyvio level= 39, Abs <1.6 8/23/21: Labs--Hgb=13.6, ESR=10, CRP=6.6, Rxjlpxaj=221, Iron=26, Alb=3.9, BUN=16 8/26/21 p/w sbo w N/V, abd pain/bloating x 3 days, unable to keep things down Labs: WBC=5, Hgb=12, CRP=43, ESR=11, Alb=4.4, BUN=28, Creat=-1.6 CT Abd/Pelvis: diffuse marked dilatation of SB Loops w transition pt in R. mid/lower abdomen ~ 5-6cm, proximal to the ileocolonic anastomosis RX w IV solumedrol 20mg q8h, NGT, IVF, pt refused TPN, also w UTI from prostatitis 8/27 NGT was pulled, and clears started and advanced to LR, was d/c home 8/30 on prednisone 40mg qd w taper by 10mg/wk to 20mg qd   ( **Entyvio's :time 0=12/22/16 ( Humira 40mg QW); 1/5/17--2wks, s/p 3rd infusion at wk 6, then q 6weeks #6 :6/21/17-->held 2/2 Shingles, then Infusions as follows=9/19/17 , 10/17/17, 11/28/17, 1/16/18 ,2/16/18, 3/19/18,4/16/18, 5/14/18, 6/25/18, 8/7/18, 9/17/18, 10/16/18, 11/13/18, 12/11/18, 1/14/19, 2/15/19, 3/15/19, 5/16/19, 6/18/19,7/24/19, 9/9/19, 10/2/19, 11/4/19, 12/12/19, 1/6/20, 2/4/20, 3/3/20, 9/17/20, 10/15/20, 11/18/20, 1/11/21, 2/8/21, 3/8/21, 4/8/21, 6/3/21, 7/1/21, 8/2/21, 9/2/21,10/25/21 )  3/8/22 MRI Abd/Pelv: thickened distal Jejunal loops which are tethered; distal ileal segment (10-12cm ) previously actively inflammed has decreased & now characterized by an area of stricture, however the alfredito-TI 5mm to the anastamosis is enhanced as well as narrowed. Colon just distal to the anastamosis has a focal segment of inflammation & stricture. the recto-sigmoid appears inflammed  3/28/22: Hgb=14.4 , ESR=1, CRP <5, Ca=8.5, Mag=1.7,Alb=4.1, Iron=104, Gczmgnwe=653,  5/9//22: Hgb=13.8 , ESR=2, CRP <5, Ca=8.8, Mag=1.6, Alb=4, Iron=64, Ffyysdfj=926 5/17/22 Dr. Heard Colonoscopy: ped scope passed w/o resist in alfredito-TI, one single apthae w psuedopolyp.  6/21/22 Labs: Hgb=14, ESR=2, CRP<5, Alb=4.5, Phos=0.8, Mag=1.8, Ca=8.5,Ydicnfyt=095, Iron=86  7/25/22 Labs: Hgb=14, ESR=2, CRP<5, Alb=4.2, Phos=1.5, Mag=1.8, Ca=8.6, Skffmpek=502, Iron=57, PT=24, INR=2.1 10/17/22 Labs: Hgb=12.7, ESR=2, CRP<5, Alb=4.3, Phos=2.1, Mag=1.8, Ca=9, Ferritin=57, Iron=85, PT=23, INR=2 12/16/22 : Labs: Hgb=10.8 , ESR=1, Alb=3.8, Phos=1.6, Mag=1.7, Ca=8.9 , ALP=68, Cnijdhrd=163, Iron=67, INR=2.3 1/30/23: Labs: Hgb=13.6 , ESR=5, CRP<5, Alb=4.4, Phos=3, Mag=1.8, Ca=9.5, ALP=79,Ferritin=55, Iron=56,  3/27/ 23: Labs: Hgb=13.8, ESR=5, CRP<5 ,Alb=4 , Phos=1, Mag=1.8, Ca=8.5 , ALP=94, [place]=420, Iron=78,  INR=5.9  5/22/23 Labs: Hgb=12.7,ESR=2,CRP<5, Alb=4.2, Phos=2.4, Mag=1.8, Ca=9.1, ALP=68,Ferritin=19, Iron=42, INR=2.4 8/4/23 : Alb=3.8, Phos=2.1, Mag=1.8, Ca=8.7, ZYF=013, ALP=68, Vit D=78 INR=2.1 8/21/23 : Hgb=14, ESR=4, CRP=40, [place]tuun=293, Iron=37, INR=4.8; Alb=4 , Phos=1.8, Mag=1.6, Ca=9.3, ALP=84 9/28/23 Labs: Hgb=8.6, Iron=22, sat%=7, Ferritin=17  10/5/23 Labs: Hgb=8.7, Iron=52, Sat=16%, Vesfoovr=863  10/12/23 : Labs: Vit D=67, Ca=8, PTH=160, Mag=2  10/20/23 Labs: Hgb=10.9, INR=1.6, CRP<5, Zydqylxs=668, ALB=3.7, Ca=7.9, ALP=68 10/18/23 MRE: persistent mural enhancement without wall thickening in distal small bowel loops, which is a nonspecific imaging sign and may reflect inflammation, however no other mural or mesenteric findings to indicate active inflammatory small bowel Crohn's disease.  No evidence of stricture. No imaging signs of penetrating disease.  Chronic central mesenteric fibrofatty proliferation. 10/27/23: Ustekinumab=7.1, Ust Abs <1.6 11/27/23 :   Hgb=13.3,  PT=19, INR=1.7, Ferritin=33, Iron=46, Sat=13%,                    10/27/23: Ustekinumab=7.1, Ust Abs <1.6 11/27/23 : FA=17, J59=522, Vit D=69, ca=9.1, ALP=84,  Alb=4.2 12/22/23 Colonoscopy: mild ileitis of the most distal alfredito-TI; small superficial apthous ulcer on colonic side of anastamosis, Random  colon BX--> wnl           12/27/23 -12/31/23: Phos=0.8, ERX=383, Vit D =66, Ca=7.9 --> Phos=1.5, Ca=8.4, Hgb=12.6, Alb=3.5 1/10/24 Phos=1.7, RPF=541, Ca=8.5, Vit D =53, Vit D-25= 73,  1/18/24 :  Hgb=13, Jkoqrcnx=199,Iron=67,  CRP<5, ESR=1,  Ca=7.9, Alb=4.1, ALP=84 5/9/24 :  Hgb=15, Bkxpfmwj=771, Iron=86, sat=31%, CRP<5, ESR=2,                   Ca=8.8, Phos=1.1, Mag=1.9,  ALB=4.2, ALP=75 8/20/24 :   Hgb=14.5, INR=1.8; CRP<5, ESR=2,  Ca=8.7,   ALB=4, ALP=73  A / PLAN: Recent GI Bleed on warfarin, although MRE looks better, probably from ileum, Hgb improving  h/o sbo's prox to anastamosis  inflammatory vs fibrosis vs combination: 3/2022 MRI shows improvement of inflammation w possible residual stricture in the ileum and probable colitis near the anastamosis and distal colon  Responded to steroids iv--> po--d/c ~ 10/20/21,  tapered steroids from 40mg qd to 20mg, [id number] qd and taper 5mg/wk  then po taper again early 12/2021 40mg qd w 10mg taper/ wk--  PO prednisone 40mg mg qd started on 4/9/22 ,tapered off ~ early 5/2022  PO prednisone 40mg mg qd started on 8/19/23, taper 10mg/wk--> 20mg then 5 mg /wk   Entyvio level was 39 w/o Abs~ 3weeks after 7/1/21 dose ,  speaks to inflammatory component & probably loss of response  Stelara # 1 dose on 10/25/21, #2 on 12/10/21, # 3 on 2/11/22, # 4 on 4/20/22 , #5 mid June 2022,  #6 mid 8/2022, #7 beginning 12/2022; #8 on 2/2/23, #9 on 4/2023, #10 on 6/2023 , last dose-->7/2024    IBD consensus : due to malnutrition /steroid dependency, surgery is next best option  but pt reported feeling well and wanted to pursue medical treatment  repeated imaging in March after 3 months of Stelara, then consider surgery vs improved candidacy for endoscopic manipulation  f/u w IBD consultant Dr. Heard at  & reviewed imaging after 3 months of Stelera  for Colonoscopy ( w possible balloon dilatation )-> last 1 3/4 yrs ago   5/17/22 Dr. Heard Colonoscopy: ped scope passed w/o resist in alfredito-TI, one single apthae w psuedopolyp.  No Dilatation needed, very mild dis at Alfredito-TI, MRE may show wall thickening, sbo's probably adhesive dis 12/22/23 Colonoscopy: mild ileitis of the most distal alfredito-TI; small superficial apthous ulcer on colonic side of anastamosis, Random  colon BX--> wnl        1/26/ 24 Dr. Reynoso Capsule--> scattered irregular appearing mucosa, possible erosions, no bleeding 4/5/24 Dr. Reynoso: Single Balloon Enteroscopy: to mid jejujum--> wnl; bx w  mild patchy IELs       1/4/22 Labs: Hgb=11.5, ESR=6, CRP<5, Alb=3.9 1/31/22 Calprotectin =84 2/14/22: Hgb=10.6, ESR=9, CRP <5, Ca=8.3, Mag=1.6, Alb=3.8, Iron=25, Ferritin=15 3/28/22: Hgb=14, ESR=1, CRP<5 , Ca=8.5, Mag=1.7, Alb=4, Iron=104, Kqvbtlbn=612 5/9//22: Hgb=13.8 , ESR=2, CRP <5, Ca=8.8, Mag=1.6, Alb=4, Iron=64, Dpvrozmu=983  6/21/22 : Hgb=14, ESR=2, CRP<5, Ca=8.5, ,Alb=4.5, Mag=1.8, Iron=86,Qthnsapr=154,  7/25/22 : Hgb=14, ESR=2, CRP<5, Alb=4.2, Phos=1.5, Mag=1.8, Ca=8.6, Nxozqihg=771, Iron=57 10/17/22: Hgb=12.7 , ESR=2, CRP<5, Alb=4.3, Phos=2.1, Mag=1.8, Ca=9, Ferritin=57, Iron=85  12/16/22 : Labs: Hgb=10.8 , ESR=1, Alb=3.8, Phos=1.6, Mag=1.7, Ca=8.9 , ALP=68, Likdunty=975, Iron=67,  1/30/23: Labs: Hgb=13.6 , ESR=5, CRP<5, Alb=4.4, Phos=3, Mag=1.8, Ca=9.5, ALP=79,Ferritin=55, Iron=56,  3/27/ 23: Labs: Hgb=13.8, ESR=5, CRP<5 ,Alb=4 , Phos=1, Mag=1.8, Ca=8.5 , ALP=94, Fdloxdkx=490, Iron=78, INR=5.9  5/22/23 Labs: Hgb=12.7,ESR=2,CRP<5, Alb=4.2, Phos=2.4, Mag=1.8, Ca=9.1, ALP=68,Ferritin=19, Iron=42, INR=2.4  8/4/23 : Alb=3.8, Phos=2.1, Mag=1.8, Ca=8.7, SFK=847, ALP=68, Vit D=78 INR=2.1  8/21/23 : Hgb=14, ESR=4, CRP=40, Alb=4 , Phos=1.8, Mag=1.6, Ca=9.3, ALP=84,Fnajtkgz=931, Iron=37, 9/28/23 Labs: Hgb=8.6, Iron=22, sat%=7, Ferritin=17 10/5/23 Labs: Hgb=8.7, Iron=52, Sat=16%, Rugpvhfh=648 10/12/23 : Labs: Vit D=67, Ca=8, PTH=160, Mag=2  10/20/23 : Hgb=10.9, INR=1.6, CRP<5, Pguvmdur=831, ALB=3.7, Ca=7.9, ALP=68  10/27/23: Ustekinumab=7.1, Ust Abs <1.6  11/27/23 :   Hgb=13.3,  PT=19, INR=1.7, Ferritin=33, Iron=46, Sat=13%,                     FA=17, Q53=421, Vit D=69, ca=9.1, ALP=84,  Alb=4.2 1/18/24 :  Hgb=13, Zadnguma=333,Iron=67,  CRP<5, ESR=1,  Ca=7.9, Alb=4.1, ALP=84 2/27/24  : Labs: Vit D-25=84, Ca=8.9, LGC=313, Mag=1.7, Phos=2.8;  ALB=4, ALP=63 3/12/24 :    Hgb=13.7, INR=2,  Ferritin=23, Iron=25, sat=10%, CRP<5, ESR=2,                   Ca=9.1, Phos=2.2,  ALB=4.1, ALP=75 5/9/24 :  Hgb=15,  Vbrfgnaq=372, Iron=86, sat=31%, CRP<5, ESR=2,  INR=1.6                  Ca=8.8, Phos=1.1, Mag=1.9,  ALB=4.2, ALP=75 6/14/24:  Vit D=75, Vit D 1,25=68, Ca=9.1, ALP=76, Phos=3.9, ALB=3.9 7/5/24  Thvofspf=197, Iron=91, sat=34%, Hgb=13.8, INR=1.6 8/20/24 :     Hgb=14.5, INR=1.8, CRP<5, ESR=2,Ca=8.7,   ALB=4, BUN=15/ 1.1, K=4, ALP=73  Probiotics 3 qd Diet: LR, Lactose free, protein drinks tid MCT oil begun but never maintained **trend --cbc, esr, crp, albumin, calprotectin  **monitor wt--- usu bet 136-139, 7/20/21=136, 11/22/21=139, 1/4/22=132, 2/22/22=132, 4/5/22=131,5/10/71=342,  6/21/22=133, 7/27/22=136, 12/19/22=138; 2/6/23=140, 3/28/85=157, 5/23/23 =137, 8/22/23=135,10/24/00=478, 12/11/23=136,1/29/24= 135,  3/5/24 =137, 5/28/24=134 ,  7/9/24 nh=697, 8/20/24 do=400, 10/1/23 wt=         2. Iron deficiency anemia : --> recent drop from GIB--> 9/2023   had initially dropped , after clinical flare and post procedure bleeding Probably multifactorial: ACD, Blood loss, malabsorption/nutrient deficiencies Eliquis-->warfarin after CVA, On Entyvio 7/11/17 s/p Adm for unsteady gait w MRI c/w CVA--warfarin begun: possible better control of AC Chromagen 2 qd--> IV Iron , s/p IV Cu x 5, FA 1mg qd , B12 SL & IM Still requiring IV Iron and cu infusions prn  most recent IV Iron infusion x for Ferritin = 23 2/22/21: C09=586, 6/4/21: Cu=91, Zinc=69, Ferritin=32,Iron=43, 7/12/21: Ky=890, Zinc=74, Nwnpwhzw=983, Iron=35 11/15/21 : Hgb=12, Ferritin =104, Ca=9, Mg=1.7 1/4/22: Hgb=11.5, Ca=8.8, Mg=2.9, Fcpxfys=615 2/14/22: Hgb=10.6, Ca=8.3, Mag=1.6, Alb=3.8, Iron=25, Ferritin=15 3/28/22: Hgb=14.4 , Ca=8.5, Mag=1.7,Alb=4.1, Iron=104, Kupzwldz=035,  5/9//22: Hgb=13.8 , Ca=8.8, Mag=1.6, Alb=4, Iron=64, Gqnwcnck=185 6/20/22: Hgb=14.4 , Ca=8.5, Mag=1.8, Alb=4.5, Iron=96, Xkeducdd=485 7/25/22 Labs: Hgb=14,Ca=8.6, Mag=1.8,Alb=4.2,Iron=57 Ekozjzrt=413, , PT=24, INR=2.1 10/17/22 Labs: Hgb=12.7,Ca=9, Mag=1.8,Alb=4.3,Iron=85 Ferritin=57, , PT=23, INR=2 12/16/22 : Labs: Hgb=10.8 , Alb=3.8, Iron=67,Qswpwpye=921, , INR=2.3 1/30/23: Labs: Hgb=13.6 , Ca=9.5, Mag=1.8, Alb=4.4,Ferritin=55, Iron=56, INR=1.6  3/27/ 23: Labs: Hgb=13.8, ESR=5, CRP<5 , Mag=1.8, Alb=4, Owqanomw=319, Iron=78, INR=5.9  5/22/23 Labs: Hgb=12.7,ESR=2,CRP<5, Alb=4.2,Ferritin=19, Iron=42, INR=2.4 8/21/23 : Labs: Hgb=14, ESR=4, CRP=40, Ujphacpj=177, Iron=37, INR=4.8; Alb=4 , 9/28/23 Labs: Hgb=8.6, Iron=22, sat%=7, Ferritin=17 10/5/23 Labs: Hgb=8.7, Iron=52, Sat=16%, Piplcqxn=032 10/20/23 : Hgb=10.9, INR=1.6, CRP<5, Hwusskda=030,  11/27/23 :   Hgb=13.3, INR=1.7, Iron=46, Sat=13%, Ferritin=33, FA=17, N04=030, 1/18/24 :  Hgb=13, INR=2.1,Iron=67, Kkbbztmq=545, CRP<5, ESR=1,  Ca=7.9, Alb=4.1, ALP=84 3/12/24 :  Hgb=13.7, INR=2,  Iron=25, Ferritin=23,  CRP<5, ESR=2, Ca=9.1, Phos=2.2,  ALB=4.1, ALP=75 5/9/24 :  Hgb=15, INR=1.6 , Iron=86, Xvcyfjey=927,sat=31%, CRP<5, ESR=2, Ca=8.8, Phos=1.1  ALB=4.2,  ALP=75 6/14/24:  Ca=9.1, Phos=3.9, Mag=1.7,  ALB=3.9,  ALP=76 7/5/24 : Hgb=13.8, INR=1.6, Iron=91,Dtwgftiq=455,  sat=34%,  8/20/24 :  Hgb=14.5, INR=1.8, CRP<5, ESR=2, ALB=4  12/22/23  EGD:  gastritis, No HP; 2cm HH, Esophagitis A--,  + Barretts 12/22/23 Colonoscopy: mild ileitis of the most distal alfredito-TI; small superficial apthous ulcer on colonic side of anastamosis, Random  colon BX--> wnl 1/26/ 24 Dr. Reynoso Capsule--> scattered irregular appearing mucosa, possible erosions, no bleeding 4/5/24 Dr. Reynoso: Single Balloon Enteroscopy: to mid jejujum--> wnl; bx w  mild patchy IELs  B12 1cc IM ____L. delt--  not  given today, trend cbc, B12, FA, Iron panel Adj INR--closer to low 2's.         3. Osteoporosis : Progression on BD from 5/5/16 Crohns, h/o steroid use Calcium citrate & Vit D per Endo Forteo since 5/10/19 , then Reclast Repeat BD of 8/2018 --showed worsening to Osteoporosis from 2016 Rec Endo consult w Dr. Akers :Osteoporosis center at [facility]--pt never went originally 9/21/18: Phos=2.4, Ca=8.7, Alb=3.4, Vit D=27, MOB=562, 10/16/18: Phos=2.8, Ca=8.7, Alb=3.5, 1/14/19: Phos=2.6, Ca=8.7, Alb=3.6 2/28/19: Phos=1.8, Ca=8.9, Alb=3.7, VitD=39, CPM=429 3/18/19: Ca=8.5, Alb=3.7, Vit D=44.6, PTH=93 7/11/19: Ca=9.1, Alb=3.7, Phos=3.9, Vit D=40/ 306, DVV=641 8/15/19: Ca=9, Alb=4.2, Phos=4.4, VitD=59, OME=405 10/17/19: Ca=9.6, Alb=3.9, Phos=3.5 11/6/19: Ca=9.1, Alb=3.9. Phos=3.7, VitD=50, PTH=80 1/13/20: ca=9.1 1/30/20: Ca=9.2, Alb=3.9, Phos=2.7, Mag=1.5, Vit D=103/41, PTH=87 2/18/20:ca=8.5, Alb=3.6, 3/2/20: Ca=8.5, Alb=3.6 3/17/20: Ca=9.1, Alb=3.7, Phos=3.4, Mag=1.7 10/17/20: Ca=8.9, Alb=4, Phos=2.3, Mag-2.0, Vit D=66, PTH=47 1/4/21 : Ca=9.4, Alb=4.2, Phos=2.7, Mag=2, Vit D=64, PTH=87.5 4/5/21: Ca=9.1, Alb=4, Phos=3.1, Mag=1.7, 6/4/21: ca=9, Alb=3.8, Phos=2.8, Mag=1.8 7/12/21: Ca=8.6, ALB=6, phosph=2.3, Mag=1.8 11/15/21: Ca=9, Alb=3.7, Mag=1.7 1/4/22: ca=8.8, Alb=3.9, Mg=2.9, phos=2.9 1/21/22: Ca=8.8, Alb=3.9, Vit D=60, PTH=85 2/14/22: Ca=8.3, Mag=1.6, Alb=3.8, 3/28/22: Ca=8.5, Mag=1.7, Alb=4.1, Phos=1.2  5/9//22: Ca=8.8, Mag=1.6, Alb=4, Phos=1.6  5/27/22: Ca=9.2 , Mag=1.9, Alb=3.8 , Phos=2.7, PTH=72, VitD=105  6/20/ 22: Ca=8.5 , Mag=1.8, Alb=4.5 , Phos=0.8, YJW=539,  7/25/22 : Ca=8.6, Mag=1.8 , Alb=4.2, Phos=1.5,  10/17/22: Ca=9, Mag=1.8 , Alb=4.3, Phos=2.1, PTH=95, Vit D=25  12/16/22 : Labs: Ca=8.9, Mag=1.7, Alb=3.8, Phos=1.6,  1/30/23 Labs: Ca=9.5, Mag=1.8, ALP=79, Alb=4.4, Phos=3, PTH=96, Vit D =144  3/27/ 23: Labs: Ca=8.5, Mag=1.8 , ALP=94, Alb=4 , Phos=1,  4/21/23 Labs: Ca=8.9, Mag=1.9, ALP=75, Alb=4., Phos=2.9, PTH=89, Vit D =108  5/22/23 Labs:Ca=9.1,Mag=1.8 , ALP=68,Alb=4.2, Phos=2.4,  8/4/23 : Labs Ca=8.7, Mag=1.8, ALP=68, Alb=3.8, Phos=2.1, ORT=069, Vit D=78 8/21/23 : Labs: Ca=9.3, Mag=1.6, ALP=84, Alb=4 ,Phos=1.8 10/12/23 : Labs: Ca=8, PTH=160, Mag=2, Vit D=67, 10/20/23 Labs: Ca=7.9, ALP=68,ALB=3.7, 11/27/23 :  Ca=9.1, ALP=84,  Alb=4.2Vit D=69, 12/27/23 -12/31/23:  Ca=7.9, ALP=98,  Phos=0.8, XOL=150, Vit D =66, --> Phos=1.5, Ca=8.4, ALP=85,Alb=3.5 1/10/24:  Ca=8.5, ALP=87,  Phos=1.7, VBW=231,  Vit D 1,25=53, Vit D-25= 73,  1/18/24 :  Ca=7.9,  ALP=84,   Alb=4.1,  2/27/24  : Labs: Ca=8.9, ALP=63, ALB=4,Phos=2.8, EMR=188, Vit D 1,25=70, Vit D-25=84,  Mag=1.7,   3/12/24 :   Ca=9.1, Phos=2.2,  ALB=4.1, ALP=75             5/9/24 :  Ca=8.8, Phos=1.1, Mag=1.9,  ALB=4.2, ALP=75         6/14/24:  Ca=9.1, Phos=3.9, Mag=1.7,  ALB=3.9, ALP=76, VTJ=403, Vit D=75, Vit D 1,25=68,  Dr. Akers: Hyperparathyroidism-- probably secondary due to low Vit D/Ca & ? superimposed primary, [facility] ENT Consult init felt Parathyroid scan showing activity in mediastinum is ectopic parathyroid, then felt sx not indicated at that time, elev PTH is secondary to low Vit D/Ca ? Vit D Resistance--> in the past did well w Vit D,1,25--> 86-97 & Vit D25-->  but w Ca~ 9.4 Rx replete Vit D , Phosphate and currently  IV Calcium-as per endo  trend Vit D, Ca, Phos, PTH,  BD--9/2020: inprovmt in spine and hip , no change in wrist, BD--10/17/22: Decr T score in spine & hip, incr in wrist, repeat--> BD--2/22/24: Spine T= -1.2, Fem neck T= -2.1; Total Hip T= -2;  Forearm T= -2.2 --> spine & hip were better 10/5/20, 9/2021,6/2022, 1/2/24 -s/p Reclast--repeat-->      4. GERD: recently less active by ENT , no ht burn, dysphagia, + throat clear  h/o Dry cough, CXR:ana, saw ENT bergstein-->LPR * ++ LPR, ++ Page's w No Dysplasia, + H/O Esophagitis grade: A was found  Recommend: * Anti-reflux diet & life-style changes reviewed & re-emphasized. ++ Bedge required * Weight reduction & regular exercise emphasized  * need for PPI: Omep 40 BID--> 40mg qd , ++ H2B q HS:Zantac 300mg--> Pepcid 40mg I reviewed & summarized the prospective randomized & retrospective non-randomized studies looking at potential long term SE's of PPIs, w special attention to associations & actual cause as related to GI infections, bone loss, cognitive changes, KD, Covid, vitamin & electrolyte deficiencies questions were answered, pt advised that PPIs should be used when needed as indicated by their clinical indication and response and tapering off is always the goal if possible. pt understood.  * F/U EGD: --> 2026--for Barretts screening / surveillance * No need for pH Monitor, Manometry, Esophagram -- * ++ need for ENT eval/F/U, No need for Surgical eval --      5. Hemorrhoids: well - controlled. No pain, swelling, itch, bleeding * Discussed previously the potential complications of thrombosis, pain, infection, swelling, itching, bleeding Reccomend: * currently Low - Fiber Diet was emphasized * 6 -- 8 cups of decaffeinated fluid daily was emphasized * Sitz Bathes prn, No: Anusol HC Suppos / Cream [place] BID -- was needed * No: Tucks BID, Balneol Lotion, Calmoseptine Oint -- was needed ; ++ Prep H prn * No: need for Colorectal surgical evaluation for possible ablation       6.  Hepatomegaly-->not confirmed by recent abd sono CTE w relative enlargemt, ? lobulation & enlarged portal/mesenteric veins LFTs-wnl, no ascites or edema R/O CLD, Hepatic vein thrombosis Abd sono w doppler--> Liver and spleen normal size, no thrombosis, normal portal and hepatic vein flow

## 2024-08-21 NOTE — HISTORY OF PRESENT ILLNESS
[de-identified] :    This HPI  reflects a summary and review of records : including previous and most recent  Labs, body imaging, consults and progress notes, operative and pathology reports, EKG reports, ED records, found in [facility], "[facility]  [facility] and any additional records brought in by  the patient at the time of the visit.            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-->Eliquis' warfarin         GERD w Page's, Hemorrhoids, BPH,         S/P Ileocolonic resection 1998        Since then multiple SBOs          Got IV Iron x 2, since 10/2/17        10/19/17: feeling better, Ju=695 on prednisone 15mg x 3weeks, BMs Brown: #5-6, 1-2/D, occas 3-4/d        10/25/17: Hgb=10, ESR=5, CRP=5, Alb=3.6, Phos=2, Dybtdske=683, A77=977        11/16/17: Hgb=11.2, ESR=14, CRP=12,         11/13/17: MRE-Chr mild distension of distal & vladislav-ileum s/o mild stricturing at anast, mild mural thick & mucosal enhancemt ~ 20cm; little change from 2015 c/w mild acute on chr ileitis, 2.1 cm Liver hemangioma        11/28/17: Entyvio        11/29/17: Hgb=9.6, ESR=10, CRP=5.8, Alb=3.8,Ferritin-19, Iron=14,Sat=4%, Phos=2.5, Rl=190, BMs:# 5, 1-2x/D, brown,        12/19;17: Hgb=10.1, ESR=12, CRP=6.5; 12/21/17: BMs:#3-5, 1-3x/D , brown, no blood, no pain, ur=216        1/3/18:Hgb=11.7, ESR=19, CRP=6.5, Alb=4.1, Wwbplycj=481, Iron=31, Sat%=9,Zinc=55        1/16/18: Entyvio, Hgb=11.4, ESR=10, CRP=6.9        1/18/18: Today: cellulitis R foot, saw dr KEITH--rx augmentum x 10D, Entyvio 1/9 to 1/16, zp=351 w clothes,BMs: # 4-5, 1-2x/D         2/14/18: Hgb=11.1, ESR=16, CRP=11.6, Alb=3.6, Iron=28, Ferritin=23, INR=1.5         2/16/18: s/p Entyvio; Iron infusion, again on 2/27 2/20/18: Hgb=10.6, ESR=20, CRP=12, INR=1.7        2/27/18: s/p iron infusion        3/9/18: Dr. Burden for tenosynovitis, rx w Zorvolex: Diclofenac x 5 days--? response        3/14/18: Km=921; BMs: # 5, 1-2x/D; Hgb=11, ESR=18, CRP=11,Irom=45,Vgxkrhjs=746,Alb=3.6, INR=1.5        3/19/18: s/p Entyvio        3/22/18: hx=886, BMs: # 5, 3-4 x/d        4/16/18: s/p Entyvio,Hgb=11.9, INR=1.3, ESR=18, CRP=8.4         4/18/18 EGD: gastritis, no HP, no IM, 2+ Mucous, 0.5cm gastric polyp: fundic, 4cm HH, + Barretts:3cm, no dysplasia        4/25/18: Hgb=11.5, INR=1.6, Iron=40, Sat=13%, Ferritin=57, Alb=3.2, Phos=2.2, Mag=1.7        4/30/18: s/p Iron Infusion        5/14/18: s/p Entyvio         5/23/18: Hgb=11.5, ESR=16, CRP< 5        5/29/18: s/p Iron Infusion        6/6/18: Hgb=10.6, INR=1.7, Plblmkgy=035, Alb=3.5, Phos=2.1, B64=498, oy=609; BMs: # 5, 2-3x/D         6/19/18: Hgb=10.6, INR=1, CRP=15, ESR=23        6/21/18: R ankle is acting up, swollen, pain, having MRI done, took a couple of advil, on low carbs ,BMs: # 5, 1-2x/D, zf=335        6/26/18: MRI: fx/stress rxn-talar body/navicula; stress related changes--cuneform, cuboid,distal tibia; mod tibiotalar effusion, mild-mod peroneal tendinosis        7/19/18: entyvio 6/26/18, eliminated all carbs--anti inflammatory diet, ok=849, BMs: # 4-5, 1-3x/D         7/25/18: Hgb=10, INR=1.3, Alb=3.3, Phos=2.4, Dvxaohih=162, sat%=12, Iron=35        8/2/18: BD--osteoporosis of hip/spine: sig decrease BD--17.6% of hip, 17% of spine, osteopenia of wrist: 6.4%        8/7/18: Entyvio        8/21/18: Hgb=11.1, INR=1.5, ESR=19, CRP=18.6        8/23/18: recently w tooth infection, old implant--loose and removed; rx w amox, and advil        eating almost no carbs, mz=260, # 5-6, 2-3x/d         8/24/18: Stool fat--neg, Qqbjmoijdtzx=090        9/5/18: Hgb=11.4, INR=1.3, ESR=9, CRP=11.3, Iron=41, Dcckpoqn=921, Alb=3.8,        9/17/18: entyvio, Hgb=10.7, INR=2.2, ESR=17, CRP=9.1,         9/20/18: rb=407, BMs: # 5-6, 1-2x/D         9/21/18: Phos=2.4, Ca=8.7, Alb=3.4, Vit D=27, MHL=787,         Dr. Akers: Hyperparathyroidism: probably secondary due to low Vit D/Ca & ? superimposed primary, rx replete Vit D and Calcium        10/9/18: Hgb=11.6, MCV=94, ESR=14, CRP=5.4, INR=2.2        10/10/18 MRE: stricturing of neoterminal ileum w worsened upstream dilatation, moderate length of upstream ileum w stricturing extending at least 20cm and more extensive then previous. suggestion of 2 adj extraluminal fluid collections which may be w/i the wall of strictured ileum near the ileocolonic anastomosis. surrounding spiculation and tethered appearance of bowel in this region. 10/16/18: entyvio, Hgb=11.7, MCV=94, ESR=14, CRP <5, Alb=3.5 10/18/18: Ra=767,   BMs: # 5-6, 3x/D ,  11/13/18: Entyvio 11/21/18 CTE w C: limited 2/2 bowel underdistention, mucosal hyperenhancemt & extensive SM edema of distal ileum including vladislav-ileum, difficult to exclude a sml fluid collection, Liver w relative enlargement w suggestion of lobulation, enlargemt of PV,SMV,IMV,Spl V, rectal V. No ascites 11/28/18: Hgb=10, ESR=19, CRP=17,Alb=3.5,Iron=35, Sat%=11, Iehfxafg=604, INR=1.3 11/29/18: dm=849, BMs: # 5-6, 3-4x/D 12/3/18: Abd sono--Liver 14.8cm Incr heterogenicity, spleen--wnl 12/11/18 & 1/14/19: Entyvio 1/14/19:Entyvio,  Hgb=10.7, ESR=15, Alb=3.6, Iron=36, Ferritin=67, Sat%=11, INR=1.6 1/24/19:  me=777, stools are # 4-6, 3-4x/d , no pain 2/5/19: Hgb=11.2, ESR=14, CRP=0.36 2/28/19: Hgb=11.5, ESR=12, CRP=6.5, Alb=3.7, Iron=69, Atjlmlhn=757, Ca=8.9                Bms: # 4-5, 2-3x/D , np=924,  Entyvio : last 2/1519,                had parathyroid scan pre-op, still w elev PTH, + activity in mediastinum,? ectopic parathyroid tissue vs neoplasm.  To see ENT and have Imaging at [facility] 3/28/19: Bms: # 4-5 , 3x/d ;ry=389 4/11/19: Hgb-9.7, Iron=45, ESR=18, CRP=9.4, Alb=3.5,  Saw Endo SX at [facility], felt hyperparathyroid is secondary to Low Ca/Vit D, no sx at this time 4/16/19: BMs: # 5-6, 3-4x/D, cx=150,  Entyvio : last 3/1519,  got IV iron 4/12/19 for Ferritin=53 4/27/19: s/p R Hip Nailing--missed 4/2019 2/2 fresh wound 5/16/19 & 6/18/19 Entyvio 7/2/19: Hgb=11.7, Ferritin=41,ESR=12, CRP=5.5, B6=2.8,Mag=1.8,Phos=3.9,Nj=693,Zinc=61 7/11/19: s/p IV iron 7/11/19: Alb=3.7, WPK=040, Ca=9.1, Vit D=40/306,  7/24/19: Entyvio, Alb=3.8, EMM=850, Ca=8.9, Vit D=128  8/1/19: Bms: # 5-6, occas #4, 2-3x/D , dj=394 8/15/19: Hgb=12, ESR=10, CRP=5.2, Ferritin=68, Alb=3.4, Phos=4.4,Mg=1.7, Ca=8.5,Calprotectin=88, 8/20/19: CZE=796, Ca=9, Alb=4.2 9/19/19: Hgb=12.8, ESR=9, CRP=5.5, Alb=3.7, Ccfsrudx=713, Mag=1.8, Ca=8.9, Phos=4.2 9/26/19: kn=561, BMs: #5-6, 2-3x/D, no pain 10/17/19: uy=992, BMs: #4-6, 1-2x/D, no Pain; Hgb=14, ESR=7, CRP<5, Alb=3.9, Rgoxyxpw=394, Mag=1.7, Ca=9.6 10/24/19: mu=608, Bms: # 4-6 , no pain, 11/6/19: ESR=6, CRP=6, PTH=80,Ca=9.1, VitD=50 11/14/19: xo=665, BMs: # : 4, 1-2, 0ccas #5  11/17/19: ESR=6, CRP<5, Uegqxnww=766,   12/19/19: sv=122, BMs: #5-6, 2-3x/D 1/6/20: entyvio 1/13/20: ESR=7, CRP=0.2, Hgb=13.5, Thubeika=530, C07=846, FA=10, Alb=4.1, Ca=9.1 1/16/20: wt = 127, BMs: # 5, 1-3x/d 1/30/20: ESR=9, CRP=11, Hgb=13.9, Mesxirqc=453,Iron=38, Alb=3.9,Ca=9.2, PTH=87, 2/3/20 EGD: gastritis, +MACKENZIE, No HP, +erosion w ooze -clipped, 0.5cm polyp-neg; 4cm HH, Esophagitis A, + Barretts, VC: 1+ Colonoscopy: 05cm rectal polyp: HP, 2nd deg int hemorrhoids mild-mod activity: MARILY w cryptitis & mild archect distortion at ileocolonic anast: colon & ileal side, no stricture 2/4/20: Entyvio; 2/12/20: IV Iron, 3/3/20: Entyvio 2/25/20: hy=644,  BMs: # 4-5, 1-2x/ d 3/19/20: ja=770, BMs: #4-5, 1-2x/D 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 10/17/20 : Hgb=13, CRP< 5, ESR= 11, Iron=44, Ferritin=46, Alb=4, Phos=2.3,  11/5/20: kj=138; s/p IV Iron x 2 ,  11/4 and 1 week prior;   BMs:  # 4-5, 1-2x/D , no pain 11/18/20  Entyvio + IV Ca  1/4/21: Hgb=13.6, CRP<5, ESR=9, Ferritin=60, Alb=4.2, Phos=2.7 1/11/21: Entyvio & IV Ca 1/14/21: no pain, stool more formed ,  gn=922 - 137; BMs:  # 4-5, 1-2x/D  2/8/21: Entyvio & IV Ca 2/23/21 IV Ca 2/22/21: Hgb=12.7, CRP<5, ESR=8, Swbqczjoi=063, Phos=2.3, Mag=1.8, P78=105, Zinc=58, Eo=302 2/26/21: jz=669,  BMs:  # 4-5, 1-2x/D , no pain  3/8/21 & 4/8/21: Entyvio 3/9/21 & 3/24/21: IV Iron 4/5/21: Hgb=13, CRP<5, ESR=9, Ferritin=94, Phos=3.1, Mag=1.7,Ca=9.1/ Alb=4              Pt Ins co is refusing to cover Entyvio q 4wks, despite numerous attempts to appeal, they also refuse to set up a peer to peer discussion betw myself and another healthcare provider, even one that works for a third party.  They do acknowledge that there is literature supporting the successful use in individual cases who don't respond to the q 8 wk interval and need less then q 8weeks , but state it had not in FDA approved at less then 8wks so they will not approve it.  I spoke to the ins co rep and discussed that fact that patients should not be  pigeon holed since practicing medicine is done on an individual basis.  Humans are not all the same.   Their  response didn't change eventhough the pt clinically needed the medication and it  induced a beneficial clinical response towards remission.  Therefore the patient and I decided to slowly increase the interval since the insurance company will not pay for this benefit.  Hopefully he may be able to maintain his present clinical state.  If not we will return to q 4weeks and will again appeal using this patients real clinical data .  4/9/21:  oq=172,  no pain, stool more formed # 4, 1-2x/d  6/11/21: wt= 135,   no pain, stool more formed # 4, 1-2x/d               recently had an episode of abd distenstion, pain, N/V, no BM              eventually started having diarrhea and flatus, BMs returned to normal              lost 2-3 lbs but regained  Doesnt recall eating anything different, no fever, chills, brbpr, melena  entyvio was 6/3/21--which is almost 8 weeks, was supposed to be 5-6 weeks  to start               6/4/21 Hgb=12, CRP<5, Ferritin=32, pt to receive IV iron      7/12/21 Labs: Hgb=13.6, ESR=10, CRP=<5, Wdalwkv=792, Alb=3.7, BUN=16   7/16/21 MRE: limited to incomplete bowel distention, chr distal ileitis/probable inflammatory stricturing vs collapse of the vladislav-TI--but improved since 2018 , moderate proctitis 7/20/21:  Last entyvio was --7/1, next early august                 qe=168 ,  no pain, stool more formed # 4-5/6, 1-2x/d  7/23/21 Entyvio level= 39, Abs <1.6 8/23/21: Labs--Hgb=13.6, ESR=10, CRP=6.6, Zrmpyoiz=349, Iron=26, Alb=3.9, BUN=16 8/26/21 p/w w N/V, abd pain/bloating  x 3 days, unable to keep things down Labs: WBC=5, Hgb=12, CRP=43, ESR=11, Alb=4.4, BUN=28, Creat=-1.6 CT Abd/Pelvis: diffuse marked dilatation of SB Loops w transition pt in Rmid/lower abdomen ~ 5-6c, proximal to the ileocolonic anastomosis RX w IV solumedrol 20mg q8h, NGT, IVF, pt refused TPN, also w UTI from prostatitis 8/27 NGT was pulled, and clears started and advanced to LR,  was d/c home 8/30 on prednisone 40mg qd w taper by 10mg/wk--> to 20mg 10/15 Entyvio 10/25 Stelera (Ustekinumab)  # 1 11/3/21 [facility]: wt above 140, off pred x 2 weeks,  1-2 BMs qd  Discussed at IBD conference, reviewed previous colonoscopy, and recent imaging; consensus that due to malnutrition and steroid dependency, surgery is next best option however pt reports feeling great and wants to pursue medical treatment which is reasonable given he feels well  repeat imaging in mid-January after 3 months of Stelara, and then consider referral to surgery vs improved candidacy for endoscopic manipulation (currently presumed one long stricture).   11/15/21 : xi=693, Hgb=12, ESR=3, CRP <5, Ferritin =104, Ca=9, Mg=1.7, Alb=3.7  12/10/21 : iron infusion 1/4/22: Hgb=11.5, ESR=6, CRP <5, Ca=8.8, Mag=2.9, Alb=3.9 1/10/22 : sy=945, stools # 5, 1-2x/D  1/10/22 Today:  Feeling well, no c/o , CP, SOB/ AMARO, Cough, Wheeze, Palpitations, edema              Most recent labs  reviewed w patient:1/4/22 1/31/22: calprotectin=84 2/2/22: lm=233 2/14/22: Hgb=10.6, ESR=9, CRP <5, Ca=8.3, Mag=1.6, Alb=3.8, Iron=25, Ferritin=15 3/3/22:  iv Iron infusion x 1 3/8/22 MRI Abd/Pelv: thickened distal Jejunal loops which are tethered; distal ileal segment  (10-12cm ) previously actively inflammed has decreased & now characterized by an area of stricture, however the vladislav-TI  5mm to the anastamosis is enhanced as well as narrowed.  colon just  distal to the anastamosis has a focal segment of inflammation & stricture.  the recto-sigmoid appears inflammed  3/28/22: Hgb=14.4 , ESR=1, CRP <5, Ca=8.5, Mag=1.7,Alb=4.1, Iron=104, Trsfzqjb=691, 4/5/22: gf=788, Bms: # 5-6 , 1-2 x/day    5/10/22  :  Last entyvios were -->7/1, 8/5, 9/2, 10/15/21              Stelara # 1 IV--10/25/21, second dose SC~ 12/10/21, 3rd~ 2/14/22,  4th-- mid April , 5th--mid june                Early December 2021  had a " flare" loose BMs, N/V and bloat              Took Pred 40mg qd and tapered down 10mg / week , now off pred x 7-8 weeks  4/13/22 Dr. Heard:  pt states he had a flare the weekend of 4/9/22 w vomiting/distension                pt self-started prednisone 40mg , this was just before his april stelara dose                claims he was feeling better               Dr. Heard: sched colonoscopy w ? dilatation   5/10/22: tapered of pred 40mg , 10mg/wk over 1 month, now off 2weeks          no pain, n/v,  BMs: # 4-5, 1-2 x Day , wt=-132 5/17/22 Dr. Heard Colonoscopy: ped scope passed w/o resist in vladislav-TI, one single apthae w psuedopolyp.  Flares probably 2/2 to adhesive dis, imaging may consistently show wall thickening  5/27/22 Labs:  Alb=3.8. Phos=2.7, Mag=1.9, Ca=9.2, PTH=72, Vit D=105, ALP=76                + IV Iron  6/20/22 Labs:  Alb=4.5, Phos=0.8, Mag=1.8, Ca=8.5, MGU=194, ALP=83                          Hgb=14, ESR=2, CRP<5, Kjzhpiqm=699, Iron=86 6/21/22:  no pain, n/v,  BMs: # 4-5, 1-2 x Day ,                ht=538 7/26/22:  no pain, n/v,  BMs: # 4-5, 1-2 x Day ,                rv=803 9/30/22 EGD: mild gastritis, no HP; 0.75cm gastric polyp:fundic;                4cm HH, Esophagitis A--, + Barretts 10/17/22 : Alb=4.3, Phos=2.1, Mag=1.8, Ca=9,  PTH=95, ALP=80, TSH=12, Vit D=69                          Hgb=12.7 , ESR=2, CRP<5, Ferritin=57, Iron=85 12/16/22 : Alb=3.8, Phos=1.6, Mag=1.7, Ca=8.9 , ALP=68,                           Hgb=10.8 , ESR=1, Kgwrtltu=323, Iron=67, INR=2.3  12/19/22: cn=791, had some N and distension the day after Thanksgiving                      Was having BMs and passing gas, went of liquids x 1-2 days--> resolved 1/30/23: Alb=4.4, Phos=3, Mag=1.8, Ca=9.5,  PTH=96, ALP=79,  Vit D=144                          Hgb=13.6 , ESR=5, CRP<5, Ferritin=55, Iron=56, INR=1.6    2/6/23 : ta=470, no abd pain, BMs: # 4 qd   3/17/23: developed cugh,congestion, fever, malaise  3/19/23 + Covid; Received Iv Remdesivir x 3 at home 3/27/ 23: rm=698, BMs: #4-5,  1-2x/ D   Alb=4 , Phos=1, Mag=1.8, Ca=8.5 , ALP=94  Hgb=13.8, ESR=5, CRP<5, Onpxjbwy=052, Iron=78, INR=5.9  4/21/23 : Alb=4, Phos=2.9, Mag=1.9, Ca=8.9,  PTH=89, ALP=75,  Vit D=105/ 80, INR=3.2  5/22/23 Pe=898; Hgb=12.7, ESR=2, CRP<5, Ferritin=19, Iron=42, INR=2.4               Alb=4.2, Phos=2.4, Mag=1.8, Ca=9.1, ALP=68,                      8/4/23 :  Alb=3.8, Phos=2.1, Mag=1.8, Ca=8.7,  FHO=925, ALP=68,  Vit D=78 INR=2.1  8/21/23 : Hgb=14, ESR=4, CRP=40, Ylvsunpn=326, Iron=37, INR=4.8                Alb=4 , Phos=1.8, Mag=1.6, Ca=9.3, ALP=84,      8/22/23  :   ik=615 , this weekend  had a flare, abd distension, no N/V, fever                  BMs: started # 5/6--> now # 6-7, no blood or mucous                  doesnt recall anything too solid or fiberous                  Started Pred 40mg qd, and liquid diet--> feeling better                  Less distension, passing gas and BMs  10/24/23:  qc=484, BMs: #4, 1-2x/D                   pt reports an episode of dark stool w 2-3 days ~ 9/23/23                  He held his Warfarin for a couple of days and stool returned to normal color                  He increased his Omep 40mg BID                  He had no Abd pain, N/V, ht burn, dysphagia, bloat                  9/28/23 Labs: Hgb=8.6, Iron=22, sat%=7, Ferritin=17                  10/5/23 Labs: Hgb=8.7, Iron=52, Sat=16%, Gpxojryk=437                  10/12/23 : Labs: Vit D=67, Ca=8, PTH=160, Mag=2                  10/20/23 Labs: Hgb=10.9, INR=1.6, CRP<5, Zyccwuxc=428, ALB=3.7, Ca=7.9, ALP=68 10/18/23 MRE:  persistent mural enhancement without wall thickening in distal small bowel loops, which is a nonspecific imaging sign and may reflect inflammation, however no other mural or mesenteric findings to indicate active inflammatory small bowel Crohn's disease.  No evidence of stricture. No imaging signs of penetrating disease.  Chronic central mesenteric fibrofatty proliferation. 10/27/23: Ustekinumab=7.1, Ust Abs <1.6 11/27/23 : FA=17, P77=320, Vit D=69, ca=9.1, ALP=84,  Alb=4.2                  Hgb=13.3,  PT=19, INR=1.7, Ferritin=33, Iron=46, Sat=13%,  12/5/23 : Homocysteine=18, HPL=692 12/11/23 :  xh=758, BMs: #4, 1-2x/D , Melena-->no    12/22/23  EGD:  gastritis, No HP; 2cm HH, Esophagitis A--,  + Barretts 12/22/23 Colonoscopy: mild ileitis of the most distal vladislav-TI; small superficial apthous ulcer on colonic side of anastamosis, Random  colon BX--> wnl  12/27/23 -12/31/23 PMHC for Hypophosphatemia=0.8, Rx w IV KPhos MFT=814, Vit D =66, 7.6--> Phos=1.5, Ca=8.4, Hgb=12.6 D/C on kPhos 1 gm TID seen by [facility] 1/10/24 Phos=1.7, Vit D =53, Vit D-25= 73, Ca=8.5, PTT=771 1/18/24 Kowdmyzr=021, CRP<5, ESR=1,  Ca=7.9, Hgb=13 1/29/24:  ek=179,  BMs: #4, 1-2x/D , no  Melena 1/26/24 Dr. Reynoso Capsule--> scattered irregular appearing mucosa, possible erosions, no bleeding            for single balloon enteroscopy 2/27/24  : Labs: Vit D-25=84, Ca=8.9, POU=782, Mag=1.7, Phos=2.8                          ALB=4, BUN=15/ 1.1, K=4.6, ALP=63 3/5/24: WT = 137,  BMs: #4, 1-2x/D  3/12/24 :  Ferritin=23, Iron=25, sat=10%, CRP<5, ESR=2,  Hgb=13.7, INR=2                  Ca=9.1, Phos=2.2,  ALB=4.1, BUN=15/ 1.2, K=4.1, ALP=75 IV Iron: 3/22, 3/28, 3/29. 4/1/24 5/9/24 :  Kzayrptt=739, Iron=86, sat=31%, CRP<5, ESR=2,  Hgb=15, INR=1.6                  Ca=8.8, Phos=1.1, Mag=1.9,  ALB=4.2, BUN=11/ 1.2, K=3.9, ALP=75 5/28/24:  tv=792, BMs: #4, 1-2x/D , no melena, ht burn  6/14/24: MLG=323, Vit D=75, Vit D 1,25=68, Ca=9.1, Phos=3.9, Mag=1.7,  ALB=3.9, BUN=14, K=4.2, ALP=76 7/5/24:  Ubypcetr=898, Iron=91, sat=34%, Hgb=13.8, INR=1.6 7/9/24 : ue=340, BMs: #4, 1-2x/D , no melena, ht burn 8/20/24 : nl=472, BMs: #4, 1-2x/D , no melena, ht burn Labs:   CRP<5, ESR=2,  Hgb=14.5, INR=1.8                  Ca=8.7,   ALB=4, BUN=15/ 1.1, K=4, ALP=73  10/1/24 :   s/p  iron infusion in 7/12/24, Calcium 7/25/24  , last Stelera 7/2024; next begining  of 9/2024                 Today: wt=1                  BMs: #4 1-2x/D                   Melena-->no        * Abd pain-->no * Nausea--> no * Vomit--> no * Early satiety--> no * Belching--> no * Hiccups--> no * Regurgitation--> no * Acid Taste / Water Brash--> no * Ht burn--> no * Dysphagia--> no * Throat Clearing--> no * Hoarseness--> no * Post-Nasal Drip--> no * Congestion--> no * Globus--> no * Cough--> no * Wheeze / PC-> -no * Constipation--> no * Diarrhea--> No  * Bloating--> No  * Strain on Defecation--> no * Incompl Evac--> no * Flatulence--> no * Gurgling--> no * Melena--> no * BPBPR-> -no * Anorexia--> no * Wt. Loss--> no                  PRIOR HISTORY--2017 1/17/17: Hgb=9.2, ESR=6, CRP=5; 1/19/17: BMs: #4, 5-6, 2-3x/D, Ce=704, Started Creon 1 tid cc        3rd Entyvio beginning Feb 2/14/17: Labs; Hgb=9.6, MCV=97, ESR=5, CRP< 5, T75=269, FA>23, Iron=59, Retic =3.2,        2/17/17 Fecal Calprotectin=16, Fats: Increased neutral & Split        3/13/17: Labs Hgb=9.5, MCV=95, ESR=7, CRP <5, ALB=3.1        3/16/17: lot of stress, to move -[place], had a job-fell thru, BMs: # 5, 2-4 x/D, wt= 131w clothes        4/19/17 EGD: gastritis, MACKENZIE, No HP, 2cm HH, +Barretts, No Dysplasia        Colonoscopy: Anastamosis: open w mild -mod active colitis, enteritis severe adj to anastomosis, mild more proximal, remainder of colon-wnl, 2-3 deg int hemorrhoids        4/26/17 : Hgb=7.3, MCV=95, ESR=13, CRP<5;Immediately post procedure stools very dark on eliquis/iron.        Admitted to [facility]: Hgb=8.3-->8.9 after 2 U, Alb 3.3, BUN=17, creat=1.3, Malina/Peuhe=272/460        4/27/17: Alb=2.3, BUN=12, creat=1.2, Malina/Lipase=209/863-No abd pain, N/V; 5/5/17: Hgb=10.7.        5/8/17 Entyvio iv. 5/8-5/9 w dark red diarrhea; 5/10/17 Hgb=7.8.        5/11/17 Adm [facility] w stools brown, Hgb=7.9, BUN=17, Creat=1.4, Alb=2.9; S/P 2 Units- Hgb=10.6, BUN=15/1.1.        Prednisone 40mg qd, entocort d/ede.; 5/18/17: Hgb-10.4, jx=091, BMs: #5, 1-2x/D, no pain        6/8/17: Hgb=7.4,MCV=98, CRP<5-ASA stopped, Pred20--> 30        6/10/17: Hgb=8.2, Alb=2.9, BUN=20/1.2 ; 6/12/17: Hgb=8.3, Retic=0.19, Iron=10, Sat%=3, Ferritin=57, FA=23,U18=508, 6/13/17: s/p 2 Unit PRBCs        6/15/17: started MCT oil, Me=854 , BMs: # 5, 1-2x/D, stools are brownish/green         7/11/17: [facility] w unsteadiness. MRI Head: R Cortical Temporo-occipital encephalomalacia, MRA H&N-no sign lesions, PER-wnl.Hgb=9.9--> 8.4->9.7 after 1 Unit. Seen by Heme: received IV Iron         CRP<5, M75=338, STD=362, FA>23,Iron=22,Sat%=6, Ferritin=42, Alb=3.5. D/C on warfarin 2mg        7/20 Hgb=8.5, 7/28 Hgb=7.7, 7/31-s/p 1 Unit         As outpt seeing Heme, to get IV Iron and 5 IV Copper        Had 'FLU' Fever=101, chills, bloat, N/V/D, Bilious. no pain, melena,brbpr        Given anti-emetic by dr Butler,then able to keep things down        On prednisone 25, warfarin w INR bet 1.6-2        8/17/17: Hgb=9.9, ESR=20, CRP-P,Py=484, BMs: # 5, 1-2x/D, stools are brownish/green        10/2/17: Hgb=8.8, MCV=89,Phos=1.7, K=3.9, Mag=1.9, Alb=3.6,Iron<10,Ferritin=21, INR=2        10/17/17: Hgb=7.9,ESR=6,CRP<5, INR=1.6        10/25/17: Hgb=10, ESR=5, CRP=5, Alb=3.6, Phos=2, Qnquovbe=584, Z78=254        11/16/17: Hgb=11.2, ESR=14, CRP=12,         11/13/17: MRE-Chr mild distension of distal & vladislav-ileum s/o mild stricturing at anast, mild mural thick & mucosal enhancemt ~ 20cm; little change from 2015 c/w mild acute on chr ileitis, 2.1 cm Liver hemangioma        11/28/17: Entyvio        11/29/17: Hgb=9.6, ESR=10, CRP=5.8, Alb=3.8,Ferritin-P, Iron=14,Sat=4%, Phos=2.5        12/19;17: Hgb=10.1, ESR=12, CRP=6.5; 12/21/17: BMs:#3-5, 1-3x/D , brown, no blood, no pain, at=907        1/3/18:Hgb=11.7, ESR=19, CRP=6.5, Alb=4.1, Vwtljjmx=869, Iron=31, Sat%=9,Zinc=55          PRIOR HISTORY---2013:         2/20/13 CT markedly dilated sb loops ext to anast, colonoscopy w open anast ,mild-mod remy-anastamotic disease. quick response to entocort/humira--probably adhesive dis.         8/10/13 w GNR bacteremia, ADA 1.7 /KAYLAH 3.4, Humira 8/7, 8/13,8/18,9/15        CT- mucosal thickening and spiculation of the distal sb extending to the anastamosis, thickening and stranding of adj mesenteric fat.        Humira increased to 40mg weekly, entocort 4        9/2013 MRE--dilated loops in mid and distal ileum, markedly thickened and narrowed TI w decreased peristalsis of TI        11/15/13 ADA-24.9, KAYLAH-0          PRIOR HISTORY---2014:         2/15/14--CT dilated loops SB, loop of sb in mid abd 4.3cm w infiltrative changes in the mesentery, bowel tapers in the RLQ to the anastamosis w/o transition        WBC=15K, Rx w IV steroids and Abx         3/7/14 SBFT: last 10cm sb prox to anast mild distension and sl irregularity. In the mid portion of this loop there is a mild narrowing which appears to reopen but is some what narrowed.        3/20/14 ADA=33.1, KAYLAH=0, Lialda switch to Apriso 4 (2/2014)          PRIOR HISTORY--2015         1/11/15 Adm [facility] w 1 day N,Recurrent V, Abd pain/distension. CT-multiple distended & fluid-filled sb loops, mild wall thickening of ileum w No inflamm changes.        WBC=14.6, Hgb=17, RX w NGT suction, Levaquin iv, Solumedrol 40mg q 12( 1/12-->1/16) to prednisone 30mg BID w 5mg taper/wk        3/18/15 --Dr. Butlre w edema /High BP, prednisone was tapered slowly to 2.5mg         switched to entocort 9mg qd, edema and bp improved w lasix 20mg         BMs:#4-5, 3x/D, Hgb=11, ESR=4, CRP<5        4/28/15 - 5/1/15: Adm [facility] w 1 day Abd pain, distension,N/V. WBC=10K, HGB=15         CT Abd/Pelv: Diffusely dilated SB loops, thick walled ileum        Rx w NGT, IVF, IV Solumedrol--> Prednisone 30mg BID; tapered to 5mg---> Budesonide 9mg qd        6/10/15 Colonoscopy: Inflammatory ST mass on the ileal side of anast, opening appeared ulcerated,scarred & severely narrowed        6/11/15: [facility] w N/V x1, decr appetite, CT ABD: no obstruction, dilated thickened sb loops of distal jej and ileum        6/19/15 [facility]: s/p Lap w extensive lysis of adhesions, hepatic flex, sigmoid and sb anastamosis, s/p partial r colectomy and sb resection--side to side elisabeth: 8-10 inch from prior anast to TV colon.        7/17/15: BMs:#4-6, 4-5x/D, wt 131(from 126)        8/20/15: BMs: #4, 1-2x/D or #5, 2-3x/D, df=394, dry cough,Hgb=10, WBC=3, IEY=045, CRP=56, ESR=21        8/25/15 CT Abd/Pelv: Svl RLQ sb loops w wall thickening, mild nodularity & inflamm stranding in mesentery        Advised-restart Entocort 9mg qd, Apriso 4 qd, Maintain Humira but given RX to check drug/Ab levels        9/15/15: did nt restart meds,Hgb=9.9, WBC=4, ESR=19, CRP=5.8, nh=789; [facility] : ADA=8.5, Antibodies< 1.7        10/15/15: No pain, BMs:#4, 1-2x/D, #5-6, 3-4x/D, throat clearing/cough-better, mz=460        11/30/15: No pain, BMs:# 4, 5-6 intermittently, No throat clear, qs=353          PRIOR HISTORY-2016 1/22/16; 4/8/16; 6/6/16 : No pain, BMs:# 4-5 1-2x/D, also #5-6 3x/D for 2-3D/wk, lb=088        7/14/16: bx=103, 9/22/16: ft=277.5        11/10/16: 9.5lb wt loss, states BMs: 5-6, 5x/D--Taking Magnesium, No pain/anorexia        Labs: Stool Oevojddsralg=066, + Incr Fecal fat, Alb=3.4, FA =23, F26=630, Hgb=12, ESR=10, CRP <5        Magnesium-d/c, Obtain MRE asap--Start steroids and possibly switch to Entyvio        DDx discussed: active crohns--loss response to Humira, Malabsorption--loss Bile acids, Bile-induced diarrhea, SIBO        Pt wanted to wait for imaging-did not start rx        12/1//16 MRE: RUQ ileocolonic anastamosis--narrowed w upstream dilatation to 3.2 cm        Pt refused pred, Started Entocort 9mg qd and Flagyl 250mg qid about 7-10days ago         awaiting Entyvio load to start 12/22, then 1/5; had Cut back on iron bid        12/15/16: BMs:# 5, 2-3x/D, occas #6, No pain, Less bloat/flatulence, Jc=254.

## 2024-09-24 ENCOUNTER — RESULT REVIEW (OUTPATIENT)
Age: 59
End: 2024-09-24

## 2024-09-24 ENCOUNTER — APPOINTMENT (OUTPATIENT)
Dept: HEMATOLOGY ONCOLOGY | Facility: CLINIC | Age: 59
End: 2024-09-24

## 2024-09-24 VITALS
BODY MASS INDEX: 20.92 KG/M2 | HEART RATE: 71 BPM | WEIGHT: 138.06 LBS | DIASTOLIC BLOOD PRESSURE: 91 MMHG | RESPIRATION RATE: 16 BRPM | TEMPERATURE: 97.9 F | SYSTOLIC BLOOD PRESSURE: 136 MMHG | HEIGHT: 68 IN | OXYGEN SATURATION: 98 %

## 2024-09-27 ENCOUNTER — RESULT REVIEW (OUTPATIENT)
Age: 59
End: 2024-09-27

## 2024-10-01 ENCOUNTER — APPOINTMENT (OUTPATIENT)
Dept: GASTROENTEROLOGY | Facility: CLINIC | Age: 59
End: 2024-10-01

## 2024-10-01 ENCOUNTER — APPOINTMENT (OUTPATIENT)
Dept: ENDOCRINOLOGY | Facility: CLINIC | Age: 59
End: 2024-10-01
Payer: COMMERCIAL

## 2024-10-01 VITALS
BODY MASS INDEX: 20.37 KG/M2 | SYSTOLIC BLOOD PRESSURE: 142 MMHG | WEIGHT: 134 LBS | DIASTOLIC BLOOD PRESSURE: 86 MMHG | HEART RATE: 74 BPM | OXYGEN SATURATION: 98 %

## 2024-10-01 VITALS — BODY MASS INDEX: 20.31 KG/M2 | HEIGHT: 68.11 IN

## 2024-10-01 DIAGNOSIS — E67.3 HYPERVITAMINOSIS D: ICD-10-CM

## 2024-10-01 DIAGNOSIS — M81.8 OTHER OSTEOPOROSIS W/OUT CURRENT PATHOLOGICAL FRACTURE: ICD-10-CM

## 2024-10-01 DIAGNOSIS — E21.1 SECONDARY HYPERPARATHYROIDISM, NOT ELSEWHERE CLASSIFIED: ICD-10-CM

## 2024-10-01 DIAGNOSIS — E83.39 OTHER DISORDERS OF PHOSPHORUS METABOLISM: ICD-10-CM

## 2024-10-01 DIAGNOSIS — E83.51 HYPOCALCEMIA: ICD-10-CM

## 2024-10-01 DIAGNOSIS — C77.9 SECONDARY AND UNSPECIFIED MALIGNANT NEOPLASM OF LYMPH NODE, UNSPECIFIED: ICD-10-CM

## 2024-10-01 PROCEDURE — 99215 OFFICE O/P EST HI 40 MIN: CPT

## 2024-10-01 PROCEDURE — G2211 COMPLEX E/M VISIT ADD ON: CPT

## 2024-10-01 NOTE — HISTORY OF PRESENT ILLNESS
[Home] : at home, [unfilled] , at the time of the visit. [Medical Office: ([facility])___] : at the medical office located in  [Verbal consent obtained from patient] : the patient, [unfilled] [FreeTextEntry1] : Last Reclast 6/17/2022 Mr. GUDELIA DUNN is a 58 year old male  coming for a f/u past hospital admission for severe hypophosphatemia secondary to hyperparathyroidism which was secondary to hypocalcemia, has not been taking his calcium supplements for few days as he was not feeling good Seen me for severe osteoporosis with h/o bilateral hip fractures, hyperparathyroidism, low D and hypocalcemia secondary to Chron's disease on Stelara shot every other month Dr Morton  on Vit D 50,000 IU 3 times a week alternating with 2 times a week every other week , he held it for a month of May only, levels high 4/23  started Forteo May 10 2019 then first Reclast infusion in May 2021 with no side effects last DEXA 10/2022 mildy worse after 9/21 much improved likely due to not enough calcium  on IV calcium each Friday at the infusion center, last one a month ago  labs done much improved   Ca citrate 950mg 3 tab bid  Clacium IV every other week  also iron IV every 6 weeks  reports compliance   24hr urine low calcium while low D    total thyroidectomy and central compartment  dissection 9/11/2020 Pathology showing 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 No evidence of lymphatic or vascular invasion no extra thyroid extension. 8 of 10 lymph nodes positive for metastatic papillary thyroid carcinoma.  Large test metastatic focus is 0.7 cm in greatest dimension extra low dose extension present. Thyroid gland left sided within normal limits. Tihymic tissue present.  received iodine 131 at 29.3 mCi in October 2020 Posterior ideation scan showed focal site of increased tracer localization in the neck to the right of midline as was evident on prior study on October 23, 1920.  started Levothyroxine 125 mcg on 9/18/2020 no side effects dose increased to 137 mcg on November 2020  last DEXA 9/2020 As compared to the patient's most recent study dated 9/12/2019, there has been a statistically significant interval increase in bone density at both the lumbar spine and left hip with no s tatistically significant change noted at the left forearm. worse T score LFN -2.8  pt went back to 2 vid D per week , Dr Mims told him he has resistance to Vit D , trying for the last month has Calcium 9.1  6/25/24  had phosphate infusion 4 weeks from Dr Biswas   10/01/24 Pt is doing well. He had iron infusion last Friday September 25th and it was right after lab work.

## 2024-10-01 NOTE — HISTORY OF PRESENT ILLNESS
[Home] : at home, [unfilled] , at the time of the visit. [Medical Office: ([facility])___] : at the medical office located in  [Verbal consent obtained from patient] : the patient, [unfilled] [FreeTextEntry1] : Last Reclast 6/17/2022 Mr. GUDELIA DUNN is a 58 year old male  coming for a f/u past hospital admission for severe hypophosphatemia secondary to hyperparathyroidism which was secondary to hypocalcemia, has not been taking his calcium supplements for few days as he was not feeling good Seen me for severe osteoporosis with h/o bilateral hip fractures, hyperparathyroidism, low D and hypocalcemia secondary to Chron's disease on Stelara shot every other month Dr Morton  on Vit D 50,000 IU 3 times a week alternating with 2 times a week every other week , he held it for a month of May only, levels high 4/23  started Forteo May 10 2019 then first Reclast infusion in May 2021 with no side effects last DEXA 10/2022 mildy worse after 9/21 much improved likely due to not enough calcium  on IV calcium each Friday at the infusion center, last one a month ago  labs done much improved   Ca citrate 950mg 3 tab bid  Clacium IV every other week  also iron IV every 6 weeks  reports compliance   24hr urine low calcium while low D    total thyroidectomy and central compartment  dissection 9/11/2020 Pathology showing 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 No evidence of lymphatic or vascular invasion no extra thyroid extension. 8 of 10 lymph nodes positive for metastatic papillary thyroid carcinoma.  Large test metastatic focus is 0.7 cm in greatest dimension extra low dose extension present. Thyroid gland left sided within normal limits. Tihymic tissue present.  received iodine 131 at 29.3 mCi in October 2020 Posterior ideation scan showed focal site of increased tracer localization in the neck to the right of midline as was evident on prior study on October 23, 1920.  started Levothyroxine 125 mcg on 9/18/2020 no side effects dose increased to 137 mcg on November 2020  last DEXA 9/2020 As compared to the patient's most recent study dated 9/12/2019, there has been a statistically significant interval increase in bone density at both the lumbar spine and left hip with no s tatistically significant change noted at the left forearm. worse T score LFN -2.8  pt went back to 2 vid D per week , Dr Mims told him he has resistance to Vit D , trying for the last month has Calcium 9.1  6/25/24  had phosphate infusion 4 weeks from Dr Biswas   10/01/24 Pt is doing well. He had iron infusion last Friday September 25th and it was right after lab work.

## 2024-10-01 NOTE — END OF VISIT
[Time Spent: ___ minutes] : I have spent [unfilled] minutes of time on the encounter which excludes teaching and separately reported services. [FreeTextEntry3] :  I, Lalit Guardado, am scribing for and in the presence of Dr. Montserrat Akers in the following sections: HISTORY OF PRESENT ILLNESS; REVIEW OF SYSTEMS; PHYSICAL EXAM; ASSESSMENT/ PLAN.I, Montserrat Akers, personally performed the services described in the documentation, reviewed the documentation recorded by the scribe in my presence, and it accurately and completely records my words and actions. 10/01/24

## 2024-10-02 RX ORDER — ASCORBATE CALCIUM 500 MG
800 TABLET ORAL
Qty: 360 | Refills: 3 | Status: ACTIVE | COMMUNITY
Start: 2024-10-01 | End: 1900-01-01

## 2024-10-08 ENCOUNTER — APPOINTMENT (OUTPATIENT)
Dept: GASTROENTEROLOGY | Facility: CLINIC | Age: 59
End: 2024-10-08
Payer: COMMERCIAL

## 2024-10-08 DIAGNOSIS — K21.00 GASTRO-ESOPHAGEAL REFLUX DISEASE WITH ESOPHAGITIS, WITHOUT BLEEDING: ICD-10-CM

## 2024-10-08 DIAGNOSIS — Z12.11 ENCOUNTER FOR SCREENING FOR MALIGNANT NEOPLASM OF COLON: ICD-10-CM

## 2024-10-08 DIAGNOSIS — K50.80 CROHN'S DISEASE OF BOTH SMALL AND LARGE INTESTINE W/OUT COMPLICATIONS: ICD-10-CM

## 2024-10-08 DIAGNOSIS — K64.8 OTHER HEMORRHOIDS: ICD-10-CM

## 2024-10-08 DIAGNOSIS — E53.8 DEFICIENCY OF OTHER SPECIFIED B GROUP VITAMINS: ICD-10-CM

## 2024-10-08 DIAGNOSIS — K22.70 BARRETT'S ESOPHAGUS W/OUT DYSPLASIA: ICD-10-CM

## 2024-10-08 DIAGNOSIS — D50.8 OTHER IRON DEFICIENCY ANEMIAS: ICD-10-CM

## 2024-10-08 PROCEDURE — 99214 OFFICE O/P EST MOD 30 MIN: CPT

## 2024-10-17 RX ORDER — PREDNISONE 10 MG/1
10 TABLET ORAL DAILY
Qty: 120 | Refills: 3 | Status: ACTIVE | COMMUNITY
Start: 2024-10-17 | End: 1900-01-01

## 2024-10-19 ENCOUNTER — NON-APPOINTMENT (OUTPATIENT)
Age: 59
End: 2024-10-19

## 2024-10-19 NOTE — ASSESSMENT
[FreeTextEntry1] : 1. CROHNS DISEASE of both small and large intestine: s/p flare 8/25-->8/30/21, then early 12/2021-s/p pred tapered over 4 weeks, again the weekend 4/9/22 rx w prednisone 40_ mg ( just before last Stelera dose) --> now off since early 5/2022;  8/19/23 w flare started prednisone 40mg & tapred over 4 weeks  ~ 9/23/23 Had dark stool x 2-3 days, Warfarin held 2 days & Omep BID, Hgb dropped to 8.6   s/p ileocolonic resection x 2--last 6/19/15 for recurrent sbos: prior was s/p 4 SBOs w/i 2 yrs--2/2 distal sb disease adj to anastamosis when on Humira weekly had an ADA =33 (3/20/14), -but had sbo 2/2014 at ADA=25 and another sbo 4/28/15 6/10/2015 Colonoscopy: Inflamm ST mass on ileal side w ulceration/scarred/narrow 6/11/2015 CT: dilated thickened sb loops distal jej & ileum Post-op 6/2015 probably some malabsorption 2/2 to removal of R Colon and ileum: had WT. Loss-->r/o malabsorption: ?bile-induced->-secretory vs decr micelles, active dis, PLE ? SIBO--Elev FA, Alb=3.4-->3.1-->2.9--> (7/28/17) ?SIBO/Prevent post-op recurrence --> trial Flagyl 250 mg qid~12/7/16-->d/c: 5/11/17 Also discussed trial of Cholestyramine/Xifaxin -wanted to wait 11/20/16 ACTIVE Crohn's--> 9.5lb wt loss, BMs: #5-6, 5x/D-- but taking Magnesium 12/1/16 MRE: RUQ ileocolonic anastamosis--narrowed w upstream dilatation to 3.2 cm Labs: Stool Ssnvtqhujjqf=948, + Incr Fecal fat, Alb=3.4, FA =23, F92=706, Hgb=12.3,ESR=10,CRP <5 4/19/17 :Colonoscopy: Anastamosis: open w mild -mod active colitis, enteritis severe adj to anastomosis, mild more proximal, remainder of colon=wnl 5/11/17- Adm PMHC w stools brown, but Hgb=7.9, BUN=17, Creat=1.4, Alb=2.9. S/P 2 Units w Hgb=10.6, BUN=15/1.1, Prednisone 40mg taper, entocort d/tex 6/8/17: Hgb=7.4, MCV=98, CRP<5--ASA stopped, Pred20--> 30mg, 6/13/17: s/p 2 Unit PRBCs 7/11/17 [facility] w unsteadiness. MRI Head: R Cortical Temporo-occipital encephalomalacia Hgb=9.9--> 8.4->9.7 after 1 Unit. Seen by Heme: received IV Iron CRP<5, L07=299, CCL=759, FA>23,Iron=22,Sat%=6, Ferritin=42, Alb=3.5. D/C on warfarin 2mg 7/28/17 Hgb=7.7; 7/31/17-s/p 1 Unit Heme Consultation ~ 8/2017: started IV Infusions of Iron and IV Copper 8/17/17: Hgb=9.9, ESR=20, Bs=252--Crgnwjooxo s/p GI infection w N/V/D, no obstruction 10/17/17: Hgb=7.9,ESR=6,CRP<5, INR=1.6, Got IV Iron x 2, since 10/2/17 for Iron<10, Hgb=8.8 11/16/17: Hgb=11.2, ESR=14, CRP=12, 10/26/17 Stool fat-neg, calprotectin-30 11/13/17: MRE-Chr mild distension of distal & maurizio-ileum s/o mild stricturing at anast, mild mural thick & mucosal enhancemt ~ 20cm; little change from 2015 c/w mild acute on chr ileitis, 2.1 cm Liver hemangioma 10/9/18: Hgb=11.6, MCV=94, ESR=14, CRP=5.4, INR=2.2 10/10/18 MRE: stricturing of neoterminal ileum w worsened upstream dilatation, moderate length of upstream ileum w stricturing extending at least 20cm and more extensive then previous. suggestion of 2 adj extraluminal fluid collections which may be w/i the wall of strictured ileum near the ileocolonic anastamosis pt had declined to call numbers given for IBD center at [facility] for new or investigational rx's--biologics. later he felt he was stablizing w his wt loss, and inflammatory markers  2/28/19 w ESR=12, CRP=6.5 & 2/21/19 stool calprotectin--> 232 8/15/19: ESR=10, CRP=5.2, Calprotectin--> 88 9/19/19: ESR=9, CRP=5.5 10/17/19: ESR=7, CRP< 5 11/6/19 : ESR=6, CRP=0.18 11/27/19: ESR=6, CRP <5 1/13/20: ESR=7, CRP=0.2 1/30/20: ESR=9, CRP=11  2/3/20 EGD: gastritis, +CHRISTOPHE, No HP, +erosion w ooze -clipped, 0.5cm polyp-neg; 4cm HH, Esophagitis A, + Barretts, VC: 1+ Colonoscopy: 05cm rectal polyp: HP, 2nd deg int hemorrhoids mild-mod activity: AMIRA w cryptitis & mild archect distortion at ileocolonic anast: colon & ileal side, no stricture  3/2/20:ESR=7, CRP=0.26 3/9/20: VDZ>40, Ab to VDZ <1.6 3/17/20: ESR=6, CRP=6.4 10/17/20: ESR=11, CRP <5 1/4/21:ESR=9, CRP<5 2/22/21: ESR=8, CRP<5 4/5/21: ESR=9, CRP<5 6/4/21: ESR=9, CRP<5 6/11/21: wt= 135, no pain, stool more formed # 4, 1-2x/d  s/p episode --abd distenstion, pain, N/V, no BM  eventually started having diarrhea and flatus, BMs returned to normal  lost 2-3 lbs but regained 7/4/21: CRP <5, Hgb=12  7/16/21 MRE: limited to incomplete bowel distention, chr distal ileitis/probable inflammatory stricturing vs collapse of the maurizio-TI--but improved since 2018 , moderate proctitis 7/12/21 -- ? flare --> entyvio should have been q 5 wk , went q 8 wks  next dose should be in 4 weeks x 2 then 5 weeks, to check levels 7/20/21: Cliically stable, unclear if MRE accurate 2/2 underdistension, but gained wt and CRP--normal 7/23/21 Entyvio level= 39, Abs <1.6 8/23/21: Labs--Hgb=13.6, ESR=10, CRP=6.6, Uafuqhet=234, Iron=26, Alb=3.9, BUN=16 8/26/21 p/w sbo w N/V, abd pain/bloating x 3 days, unable to keep things down Labs: WBC=5, Hgb=12, CRP=43, ESR=11, Alb=4.4, BUN=28, Creat=-1.6 CT Abd/Pelvis: diffuse marked dilatation of SB Loops w transition pt in R. mid/lower abdomen ~ 5-6cm, proximal to the ileocolonic anastomosis RX w IV solumedrol 20mg q8h, NGT, IVF, pt refused TPN, also w UTI from prostatitis 8/27 NGT was pulled, and clears started and advanced to LR, was d/c home 8/30 on prednisone 40mg qd w taper by 10mg/wk to 20mg qd   ( **Entyvio's :time 0=12/22/16 ( Humira 40mg QW); 1/5/17--2wks, s/p 3rd infusion at wk 6, then q 6weeks #6 :6/21/17-->held 2/2 Shingles, then Infusions as follows=9/19/17 , 10/17/17, 11/28/17, 1/16/18 ,2/16/18, 3/19/18,4/16/18, 5/14/18, 6/25/18, 8/7/18, 9/17/18, 10/16/18, 11/13/18, 12/11/18, 1/14/19, 2/15/19, 3/15/19, 5/16/19, 6/18/19,7/24/19, 9/9/19, 10/2/19, 11/4/19, 12/12/19, 1/6/20, 2/4/20, 3/3/20, 9/17/20, 10/15/20, 11/18/20, 1/11/21, 2/8/21, 3/8/21, 4/8/21, 6/3/21, 7/1/21, 8/2/21, 9/2/21,10/25/21 )  3/8/22 MRI Abd/Pelv: thickened distal Jejunal loops which are tethered; distal ileal segment (10-12cm ) previously actively inflammed has decreased & now characterized by an area of stricture, however the maurizio-TI 5mm to the anastamosis is enhanced as well as narrowed. Colon just distal to the anastamosis has a focal segment of inflammation & stricture. the recto-sigmoid appears inflammed  3/28/22: Hgb=14.4 , ESR=1, CRP <5, Ca=8.5, Mag=1.7,Alb=4.1, Iron=104, Ftnualcg=632,  5/9//22: Hgb=13.8 , ESR=2, CRP <5, Ca=8.8, Mag=1.6, Alb=4, Iron=64, Pywisjde=901 5/17/22 Dr. Walsh Colonoscopy: ped scope passed w/o resist in maurizio-TI, one single apthae w psuedopolyp.  6/21/22 Labs: Hgb=14, ESR=2, CRP<5, Alb=4.5, Phos=0.8, Mag=1.8, Ca=8.5,Zizduohj=504, Iron=86  7/25/22 Labs: Hgb=14, ESR=2, CRP<5, Alb=4.2, Phos=1.5, Mag=1.8, Ca=8.6, Qmntacfc=682, Iron=57, PT=24, INR=2.1 10/17/22 Labs: Hgb=12.7, ESR=2, CRP<5, Alb=4.3, Phos=2.1, Mag=1.8, Ca=9, Ferritin=57, Iron=85, PT=23, INR=2 12/16/22 : Labs: Hgb=10.8 , ESR=1, Alb=3.8, Phos=1.6, Mag=1.7, Ca=8.9 , ALP=68, Lrvspaqg=604, Iron=67, INR=2.3 1/30/23: Labs: Hgb=13.6 , ESR=5, CRP<5, Alb=4.4, Phos=3, Mag=1.8, Ca=9.5, ALP=79,Ferritin=55, Iron=56,  3/27/ 23: Labs: Hgb=13.8, ESR=5, CRP<5 ,Alb=4 , Phos=1, Mag=1.8, Ca=8.5 , ALP=94, Tbgrhrvz=278, Iron=78,  INR=5.9  5/22/23 Labs: Hgb=12.7,ESR=2,CRP<5, Alb=4.2, Phos=2.4, Mag=1.8, Ca=9.1, ALP=68,Ferritin=19, Iron=42, INR=2.4 8/4/23 : Alb=3.8, Phos=2.1, Mag=1.8, Ca=8.7, ICP=510, ALP=68, Vit D=78 INR=2.1 8/21/23 : Hgb=14, ESR=4, CRP=40, Pvzipmzg=279, Iron=37, INR=4.8; Alb=4 , Phos=1.8, Mag=1.6, Ca=9.3, ALP=84 9/28/23 Labs: Hgb=8.6, Iron=22, sat%=7, Ferritin=17  10/5/23 Labs: Hgb=8.7, Iron=52, Sat=16%, Hatqaild=230  10/12/23 : Labs: Vit D=67, Ca=8, PTH=160, Mag=2  10/20/23 Labs: Hgb=10.9, INR=1.6, CRP<5, Oefncylv=707, ALB=3.7, Ca=7.9, ALP=68 10/18/23 MRE: persistent mural enhancement without wall thickening in distal small bowel loops, which is a nonspecific imaging sign and may reflect inflammation, however no other mural or mesenteric findings to indicate active inflammatory small bowel Crohn's disease.  No evidence of stricture. No imaging signs of penetrating disease.  Chronic central mesenteric fibrofatty proliferation. 10/27/23: Ustekinumab=7.1, Ust Abs <1.6 11/27/23 :   Hgb=13.3,  PT=19, INR=1.7, Ferritin=33, Iron=46, Sat=13%,                    10/27/23: Ustekinumab=7.1, Ust Abs <1.6 11/27/23 : FA=17, P56=299, Vit D=69, ca=9.1, ALP=84,  Alb=4.2 12/22/23 Colonoscopy: mild ileitis of the most distal maurizio-TI; small superficial apthous ulcer on colonic side of anastamosis, Random  colon BX--> wnl           12/27/23 -12/31/23: Phos=0.8, WWM=777, Vit D =66, Ca=7.9 --> Phos=1.5, Ca=8.4, Hgb=12.6, Alb=3.5 1/10/24 Phos=1.7, SOQ=065, Ca=8.5, Vit D =53, Vit D-25= 73,  1/18/24 :  Hgb=13, Veproenp=586,Iron=67,  CRP<5, ESR=1,  Ca=7.9, Alb=4.1, ALP=84 5/9/24 :  Hgb=15, Bylwxugr=222, Iron=86, sat=31%, CRP<5, ESR=2,                   Ca=8.8, Phos=1.1, Mag=1.9,  ALB=4.2, ALP=75 8/20/24 :   Hgb=14.5, INR=1.8; CRP<5, ESR=2,  Ca=8.7,   ALB=4, ALP=73 9/27/24 :   Hgb=14.4 , INR=2.3, CRP<5, ESR=2, Wmfuqqyo=862, Iron=95, sat%=32,  Phos=2.8, ALB=4,              A / PLAN: Recent GI Bleed on warfarin, although MRE looks better, probably from ileum, Hgb improving  h/o sbo's prox to anastamosis  inflammatory vs fibrosis vs combination: 3/2022 MRI shows improvement of inflammation w possible residual stricture in the ileum and probable colitis near the anastamosis and distal colon  Responded to steroids iv--> po--d/c ~ 10/20/21,  tapered steroids from 40mg qd to 20mg, [id number] qd and taper 5mg/wk  then po taper again early 12/2021 40mg qd w 10mg taper/ wk--  PO prednisone 40mg mg qd started on 4/9/22 ,tapered off ~ early 5/2022  PO prednisone 40mg mg qd started on 8/19/23, taper 10mg/wk--> 20mg then 5 mg /wk   Entyvio level was 39 w/o Abs~ 3weeks after 7/1/21 dose ,  speaks to inflammatory component & probably loss of response  Stelara # 1 dose on 10/25/21, #2 on 12/10/21, # 3 on 2/11/22, # 4 on 4/20/22 , #5 mid June 2022,  #6 mid 8/2022, #7 beginning 12/2022; #8 on 2/2/23, #9 on 4/2023, #10 on 6/2023 , last dose-->9/2024    IBD consensus : due to malnutrition /steroid dependency, surgery is next best option  but pt reported feeling well and wanted to pursue medical treatment  repeated imaging in March after 3 months of Stelara, then consider surgery vs improved candidacy for endoscopic manipulation  f/u w IBD consultant Dr. Walsh at  & reviewed imaging after 3 months of Stelera  for Colonoscopy ( w possible balloon dilatation )-> last 1 3/4 yrs ago   5/17/22 Dr. Walsh Colonoscopy: ped scope passed w/o resist in maurizio-TI, one single apthae w psuedopolyp.  No Dilatation needed, very mild dis at Maurizio-TI, MRE may show wall thickening, sbo's probably adhesive dis 12/22/23 Colonoscopy: mild ileitis of the most distal maurizio-TI; small superficial apthous ulcer on colonic side of anastamosis, Random  colon BX--> wnl        1/26/ 24 Dr. Pritchard Capsule--> scattered irregular appearing mucosa, possible erosions, no bleeding 4/5/24 Dr. Pritchard: Single Balloon Enteroscopy: to mid jejujum--> wnl; bx w  mild patchy IELs       1/4/22 Labs: Hgb=11.5, ESR=6, CRP<5, Alb=3.9 1/31/22 Calprotectin =84 2/14/22: Hgb=10.6, ESR=9, CRP <5, Ca=8.3, Mag=1.6, Alb=3.8, Iron=25, Ferritin=15 3/28/22: Hgb=14, ESR=1, CRP<5 , Ca=8.5, Mag=1.7, Alb=4, Iron=104, Ewsehfco=681 5/9//22: Hgb=13.8 , ESR=2, CRP <5, Ca=8.8, Mag=1.6, Alb=4, Iron=64, Ofqdnvwg=918  6/21/22 : Hgb=14, ESR=2, CRP<5, Ca=8.5, ,Alb=4.5, Mag=1.8, Iron=86,Zqhhwhhr=983,  7/25/22 : Hgb=14, ESR=2, CRP<5, Alb=4.2, Phos=1.5, Mag=1.8, Ca=8.6, Wtnvdfwy=208, Iron=57 10/17/22: Hgb=12.7 , ESR=2, CRP<5, Alb=4.3, Phos=2.1, Mag=1.8, Ca=9, Ferritin=57, Iron=85  12/16/22 : Labs: Hgb=10.8 , ESR=1, Alb=3.8, Phos=1.6, Mag=1.7, Ca=8.9 , ALP=68, Tblzsiug=139, Iron=67,  1/30/23: Labs: Hgb=13.6 , ESR=5, CRP<5, Alb=4.4, Phos=3, Mag=1.8, Ca=9.5, ALP=79,Ferritin=55, Iron=56,  3/27/ 23: Labs: Hgb=13.8, ESR=5, CRP<5 ,Alb=4 , Phos=1, Mag=1.8, Ca=8.5 , ALP=94, Bqyevfrx=241, Iron=78, INR=5.9  5/22/23 Labs: Hgb=12.7,ESR=2,CRP<5, Alb=4.2, Phos=2.4, Mag=1.8, Ca=9.1, ALP=68,Ferritin=19, Iron=42, INR=2.4  8/4/23 : Alb=3.8, Phos=2.1, Mag=1.8, Ca=8.7, BBW=258, ALP=68, Vit D=78 INR=2.1  8/21/23 : Hgb=14, ESR=4, CRP=40, Alb=4 , Phos=1.8, Mag=1.6, Ca=9.3, ALP=84,Bqbvmmfi=305, Iron=37, 9/28/23 Labs: Hgb=8.6, Iron=22, sat%=7, Ferritin=17 10/5/23 Labs: Hgb=8.7, Iron=52, Sat=16%, Ulghngpn=069 10/12/23 : Labs: Vit D=67, Ca=8, PTH=160, Mag=2  10/20/23 : Hgb=10.9, INR=1.6, CRP<5, Nrwonzbr=276, ALB=3.7, Ca=7.9, ALP=68  10/27/23: Ustekinumab=7.1, Ust Abs <1.6  11/27/23 :   Hgb=13.3,  PT=19, INR=1.7, Ferritin=33, Iron=46, Sat=13%,                     FA=17, J06=050, Vit D=69, ca=9.1, ALP=84,  Alb=4.2 1/18/24 :  Hgb=13, Swnvupwt=012,Iron=67,  CRP<5, ESR=1,  Ca=7.9, Alb=4.1, ALP=84 2/27/24  : Labs: Vit D-25=84, Ca=8.9, PPS=573, Mag=1.7, Phos=2.8;  ALB=4, ALP=63 3/12/24 :    Hgb=13.7, INR=2,  Ferritin=23, Iron=25, sat=10%, CRP<5, ESR=2,                   Ca=9.1, Phos=2.2,  ALB=4.1, ALP=75 5/9/24 :  Hgb=15,  Ntgsaszj=800, Iron=86, sat=31%, CRP<5, ESR=2,  INR=1.6                  Ca=8.8, Phos=1.1, Mag=1.9,  ALB=4.2, ALP=75 6/14/24:  Vit D=75, Vit D 1,25=68, Ca=9.1, ALP=76, Phos=3.9, ALB=3.9 7/5/24  Xwdemfbb=549, Iron=91, sat=34%, Hgb=13.8, INR=1.6 8/20/24 : Hgb=14.5, INR=1.8, CRP<5, ESR=2,Ca=8.7,   ALB=4, BUN=15/ 1.1, K=4, ALP=73 9/27/24 :  Hgb=14.4 , INR=2.3, CRP<5, ESR=2, Ca=9.1,  Phos=2.8,  ALB=4, BUN=16,K=4, ALP=71; Pejhovev=788,   Probiotics 3 qd Diet: LR, Lactose free, protein drinks tid MCT oil begun but never maintained **trend --cbc, esr, crp, albumin, calprotectin  **monitor wt--- usu bet 136-139, 7/20/21=136, 11/22/21=139, 1/4/22=132, 2/22/22=132, 4/5/22=131,5/10/65=968,  6/21/22=133, 7/27/22=136, 12/19/22=138; 2/6/23=140, 3/28/35=575, 5/23/23 =137, 8/22/23=135,10/24/08=415, 12/11/23=136,1/29/24= 135,  3/5/24 =137, 5/28/24=134 ,  7/9/24 gl=104, 8/20/24 ul=776, 10/1/24 wf=870         2. Iron deficiency anemia : --> recent drop from GIB--> 9/2023   had initially dropped , after clinical flare and post procedure bleeding Probably multifactorial: ACD, Blood loss, malabsorption/nutrient deficiencies Eliquis-->warfarin after CVA, On Entyvio 7/11/17 s/p Adm for unsteady gait w MRI c/w CVA--warfarin begun: possible better control of AC Chromagen 2 qd--> IV Iron , s/p IV Cu x 5, FA 1mg qd , B12 SL & IM Still requiring IV Iron and cu infusions prn  most recent IV Iron infusion x for Ferritin = 23 2/22/21: N20=097, 6/4/21: Cu=91, Zinc=69, Ferritin=32,Iron=43, 7/12/21: Ah=411, Zinc=74, Qlzbawyr=522, Iron=35 11/15/21 : Hgb=12, Ferritin =104, Ca=9, Mg=1.7 1/4/22: Hgb=11.5, Ca=8.8, Mg=2.9, Veargpw=842 2/14/22: Hgb=10.6, Ca=8.3, Mag=1.6, Alb=3.8, Iron=25, Ferritin=15 3/28/22: Hgb=14.4 , Ca=8.5, Mag=1.7,Alb=4.1, Iron=104, Tzytrpwt=792,  5/9//22: Hgb=13.8 , Ca=8.8, Mag=1.6, Alb=4, Iron=64, Iujgfqvd=934 6/20/22: Hgb=14.4 , Ca=8.5, Mag=1.8, Alb=4.5, Iron=96, Qqvdgvzo=018 7/25/22 Labs: Hgb=14,Ca=8.6, Mag=1.8,Alb=4.2,Iron=57 Jhqenaon=094, , PT=24, INR=2.1 10/17/22 Labs: Hgb=12.7,Ca=9, Mag=1.8,Alb=4.3,Iron=85 Ferritin=57, , PT=23, INR=2 12/16/22 : Labs: Hgb=10.8 , Alb=3.8, Iron=67,Ihbgkwtt=605, , INR=2.3 1/30/23: Labs: Hgb=13.6 , Ca=9.5, Mag=1.8, Alb=4.4,Ferritin=55, Iron=56, INR=1.6  3/27/ 23: Labs: Hgb=13.8, ESR=5, CRP<5 , Mag=1.8, Alb=4, Wlqnnwuu=388, Iron=78, INR=5.9  5/22/23 Labs: Hgb=12.7,ESR=2,CRP<5, Alb=4.2,Ferritin=19, Iron=42, INR=2.4 8/21/23 : Labs: Hgb=14, ESR=4, CRP=40, Wqipbjxk=892, Iron=37, INR=4.8; Alb=4 , 9/28/23 Labs: Hgb=8.6, Iron=22, sat%=7, Ferritin=17 10/5/23 Labs: Hgb=8.7, Iron=52, Sat=16%, Mhmtbgvm=562 10/20/23 : Hgb=10.9, INR=1.6, CRP<5, Vkivjgza=877,  11/27/23 :   Hgb=13.3, INR=1.7, Iron=46, Sat=13%, Ferritin=33, FA=17, B47=063, 1/18/24 :  Hgb=13, INR=2.1,Iron=67, Ecjoupwe=707, CRP<5, ESR=1,  Ca=7.9, Alb=4.1, ALP=84 3/12/24 :  Hgb=13.7, INR=2,  Iron=25, Ferritin=23,  CRP<5, ESR=2, Ca=9.1, Phos=2.2,  ALB=4.1, ALP=75 5/9/24 :  Hgb=15, INR=1.6 , Iron=86, Exkngjxs=433,sat=31%, CRP<5, ESR=2, Ca=8.8, Phos=1.1  ALB=4.2,  ALP=75 6/14/24:  Ca=9.1, Phos=3.9, Mag=1.7,  ALB=3.9,  ALP=76 7/5/24 : Hgb=13.8, INR=1.6, Iron=91,Cqcatzan=750,  sat=34%,  8/20/24 :  Hgb=14.5, INR=1.8, CRP<5, ESR=2, ALB=4 9/27/24 : Hgb=14.4 , INR=2.3 , CRP <5, ESR=2, Pqrujxkd=174, Iron=95, sat%=32,    12/22/23  EGD:  gastritis, No HP; 2cm HH, Esophagitis A--,  + Barretts 12/22/23 Colonoscopy: mild ileitis of the most distal maurizio-TI; small superficial apthous ulcer on colonic side of anastamosis, Random  colon BX--> wnl 1/26/ 24 Dr. Pritchard Capsule--> scattered irregular appearing mucosa, possible erosions, no bleeding 4/5/24 Dr. Pritchard: Single Balloon Enteroscopy: to mid jejujum--> wnl; bx w  mild patchy IELs  B12 1cc IM ____L. delt--  given today, trend cbc, B12, FA, Iron panel Adj INR--closer to low 2's.         3. Osteoporosis : Progression on BD from 5/5/16 Crohns, h/o steroid use Calcium citrate & Vit D per Endo Forteo since 5/10/19 , then Reclast Repeat BD of 8/2018 --showed worsening to Osteoporosis from 2016 Rec Endo consult w Dr. Jara :Osteoporosis center at [facility]--pt never went originally 9/21/18: Phos=2.4, Ca=8.7, Alb=3.4, Vit D=27, HGX=510, 10/16/18: Phos=2.8, Ca=8.7, Alb=3.5, 1/14/19: Phos=2.6, Ca=8.7, Alb=3.6 2/28/19: Phos=1.8, Ca=8.9, Alb=3.7, VitD=39, DHO=180 3/18/19: Ca=8.5, Alb=3.7, Vit D=44.6, PTH=93 7/11/19: Ca=9.1, Alb=3.7, Phos=3.9, Vit D=40/ 306, IFO=358 8/15/19: Ca=9, Alb=4.2, Phos=4.4, VitD=59, LFI=993 10/17/19: Ca=9.6, Alb=3.9, Phos=3.5 11/6/19: Ca=9.1, Alb=3.9. Phos=3.7, VitD=50, PTH=80 1/13/20: ca=9.1 1/30/20: Ca=9.2, Alb=3.9, Phos=2.7, Mag=1.5, Vit D=103/41, PTH=87 2/18/20:ca=8.5, Alb=3.6, 3/2/20: Ca=8.5, Alb=3.6 3/17/20: Ca=9.1, Alb=3.7, Phos=3.4, Mag=1.7 10/17/20: Ca=8.9, Alb=4, Phos=2.3, Mag-2.0, Vit D=66, PTH=47 1/4/21 : Ca=9.4, Alb=4.2, Phos=2.7, Mag=2, Vit D=64, PTH=87.5 4/5/21: Ca=9.1, Alb=4, Phos=3.1, Mag=1.7, 6/4/21: ca=9, Alb=3.8, Phos=2.8, Mag=1.8 7/12/21: Ca=8.6, ALB=6, phosph=2.3, Mag=1.8 11/15/21: Ca=9, Alb=3.7, Mag=1.7 1/4/22: ca=8.8, Alb=3.9, Mg=2.9, phos=2.9 1/21/22: Ca=8.8, Alb=3.9, Vit D=60, PTH=85 2/14/22: Ca=8.3, Mag=1.6, Alb=3.8, 3/28/22: Ca=8.5, Mag=1.7, Alb=4.1, Phos=1.2  5/9//22: Ca=8.8, Mag=1.6, Alb=4, Phos=1.6  5/27/22: Ca=9.2 , Mag=1.9, Alb=3.8 , Phos=2.7, PTH=72, VitD=105  6/20/ 22: Ca=8.5 , Mag=1.8, Alb=4.5 , Phos=0.8, RTI=870,  7/25/22 : Ca=8.6, Mag=1.8 , Alb=4.2, Phos=1.5,  10/17/22: Ca=9, Mag=1.8 , Alb=4.3, Phos=2.1, PTH=95, Vit D=25  12/16/22 : Labs: Ca=8.9, Mag=1.7, Alb=3.8, Phos=1.6,  1/30/23 Labs: Ca=9.5, Mag=1.8, ALP=79, Alb=4.4, Phos=3, PTH=96, Vit D =144  3/27/ 23: Labs: Ca=8.5, Mag=1.8 , ALP=94, Alb=4 , Phos=1,  4/21/23 Labs: Ca=8.9, Mag=1.9, ALP=75, Alb=4., Phos=2.9, PTH=89, Vit D =108  5/22/23 Labs:Ca=9.1,Mag=1.8 , ALP=68,Alb=4.2, Phos=2.4,  8/4/23 : Labs Ca=8.7, Mag=1.8, ALP=68, Alb=3.8, Phos=2.1, XTJ=133, Vit D=78 8/21/23 : Labs: Ca=9.3, Mag=1.6, ALP=84, Alb=4 ,Phos=1.8 10/12/23 : Labs: Ca=8, PTH=160, Mag=2, Vit D=67, 10/20/23 Labs: Ca=7.9, ALP=68,ALB=3.7, 11/27/23 :  Ca=9.1, ALP=84,  Alb=4.2Vit D=69, 12/27/23 -12/31/23:  Ca=7.9, ALP=98,  Phos=0.8, OZU=168, Vit D =66, --> Phos=1.5, Ca=8.4, ALP=85,Alb=3.5 1/10/24:  Ca=8.5, ALP=87,  Phos=1.7, OKS=625,  Vit D 1,25=53, Vit D-25= 73,  1/18/24 :  Ca=7.9,  ALP=84,   Alb=4.1,  2/27/24  : Labs: Ca=8.9, ALP=63, ALB=4,Phos=2.8, ISN=946, Vit D 1,25=70, Vit D-25=84,  Mag=1.7,   3/12/24 :   Ca=9.1, Phos=2.2,  ALB=4.1, ALP=75             5/9/24 :  Ca=8.8, Phos=1.1, Mag=1.9,  ALB=4.2, ALP=75         6/14/24:  Ca=9.1, Phos=3.9, Mag=1.7,  ALB=3.9, ALP=76, III=511, Vit D=75, Vit D 1,25=68, 9/27/24 :  Ca=9.1, Phos=2.8, Mag=1.8, ALB=4, ALP=71, VXY=778, Vit D=71, Vit D 1,25=73,   Dr. Jara: Hyperparathyroidism-- probably secondary due to low Vit D/Ca & ? superimposed primary, [facility] ENT Consult init felt Parathyroid scan showing activity in mediastinum is ectopic parathyroid, then felt sx not indicated at that time, elev PTH is secondary to low Vit D/Ca ? Vit D Resistance--> in the past did well w Vit D,1,25--> 86-97 & Vit D25-->  but w Ca~ 9.4 Rx replete Vit D , Phosphate and currently  IV Calcium & po as per endo  trend Vit D, Ca, Phos, PTH,  BD--9/2020: inprovmt in spine and hip , no change in wrist, BD--10/17/22: Decr T score in spine & hip, incr in wrist, repeat--> BD--2/22/24: Spine T= -1.2, Fem neck T= -2.1; Total Hip T= -2;  Forearm T= -2.2 --> spine & hip were better 10/5/20, 9/2021,6/2022, 1/2/24 -s/p Reclast--repeat-->      4. GERD: recently less active by ENT , no ht burn, dysphagia, + throat clear  h/o Dry cough, CXR:sameer, saw ENT bergstein-->LPR * ++ LPR, ++ Stewart's w No Dysplasia, + H/O Esophagitis grade: A was found  Recommend: * Anti-reflux diet & life-style changes reviewed & re-emphasized. ++ Bedge required * Weight reduction & regular exercise emphasized  * need for PPI: Omep 40 BID--> 40mg qd , ++ H2B q HS:Zantac 300mg--> Pepcid 40mg I reviewed & summarized the prospective randomized & retrospective non-randomized studies looking at potential long term SE's of PPIs, w special attention to associations & actual cause as related to GI infections, bone loss, cognitive changes, KD, Covid, vitamin & electrolyte deficiencies questions were answered, pt advised that PPIs should be used when needed as indicated by their clinical indication and response and tapering off is always the goal if possible. pt understood.  * F/U EGD: --> 2026--for Barretts screening / surveillance * No need for pH Monitor, Manometry, Esophagram -- * ++ need for ENT eval/F/U, No need for Surgical eval --      5. Hemorrhoids: well - controlled. No pain, swelling, itch, bleeding * Discussed previously the potential complications of thrombosis, pain, infection, swelling, itching, bleeding Reccomend: * currently Low - Fiber Diet was emphasized * 6 -- 8 cups of decaffeinated fluid daily was emphasized * Sitz Bathes prn, No: Anusol HC Suppos / Cream DC BID -- was needed * No: Tucks BID, Balneol Lotion, Calmoseptine Oint -- was needed ; ++ Prep H prn * No: need for Colorectal surgical evaluation for possible ablation       6.  Hepatomegaly-->not confirmed by recent abd sono CTE w relative enlargemt, ? lobulation & enlarged portal/mesenteric veins LFTs-wnl, no ascites or edema R/O CLD, Hepatic vein thrombosis Abd sono w doppler--> Liver and spleen normal size, no thrombosis, normal portal and hepatic vein flow

## 2024-10-19 NOTE — HISTORY OF PRESENT ILLNESS
[de-identified] :    This HPI  reflects a summary and review of records : including previous and most recent  Labs, body imaging, consults and progress notes, operative and pathology reports, EKG reports, ED records, found in [facility], [facility],  [facility] and any additional records brought in by  the patient at the time of the visit.            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-->Eliquis' warfarin         GERD w Stewart's, Hemorrhoids, BPH,         S/P Ileocolonic resection 1998        Since then multiple SBOs          Got IV Iron x 2, since 10/2/17        10/19/17: feeling better, Pd=542 on prednisone 15mg x 3weeks, BMs Brown: #5-6, 1-2/D, occas 3-4/d        10/25/17: Hgb=10, ESR=5, CRP=5, Alb=3.6, Phos=2, Ehuqzxvi=116, M06=040        11/16/17: Hgb=11.2, ESR=14, CRP=12,         11/13/17: MRE-Chr mild distension of distal & joanna-ileum s/o mild stricturing at anast, mild mural thick & mucosal enhancemt ~ 20cm; little change from 2015 c/w mild acute on chr ileitis, 2.1 cm Liver hemangioma        11/28/17: Entyvio        11/29/17: Hgb=9.6, ESR=10, CRP=5.8, Alb=3.8,Ferritin-19, Iron=14,Sat=4%, Phos=2.5, Pz=646, BMs:# 5, 1-2x/D, brown,        12/19;17: Hgb=10.1, ESR=12, CRP=6.5; 12/21/17: BMs:#3-5, 1-3x/D , brown, no blood, no pain, vn=593        1/3/18:Hgb=11.7, ESR=19, CRP=6.5, Alb=4.1, Kxgkcpgz=531, Iron=31, Sat%=9,Zinc=55        1/16/18: Entyvio, Hgb=11.4, ESR=10, CRP=6.9        1/18/18: Today: cellulitis R foot, saw dr CHACKO--rx augmentum x 10D, Entyvio 1/9 to 1/16, cm=038 w clothes,BMs: # 4-5, 1-2x/D         2/14/18: Hgb=11.1, ESR=16, CRP=11.6, Alb=3.6, Iron=28, Ferritin=23, INR=1.5         2/16/18: s/p Entyvio; Iron infusion, again on 2/27 2/20/18: Hgb=10.6, ESR=20, CRP=12, INR=1.7        2/27/18: s/p iron infusion        3/9/18: Dr. Cardenas for tenosynovitis, rx w Zorvolex: Diclofenac x 5 days--? response        3/14/18: Fy=251; BMs: # 5, 1-2x/D; Hgb=11, ESR=18, CRP=11,Irom=45,Rofxcvaq=198,Alb=3.6, INR=1.5        3/19/18: s/p Entyvio        3/22/18: pv=430, BMs: # 5, 3-4 x/d        4/16/18: s/p Entyvio,Hgb=11.9, INR=1.3, ESR=18, CRP=8.4         4/18/18 EGD: gastritis, no HP, no IM, 2+ Mucous, 0.5cm gastric polyp: fundic, 4cm HH, + Barretts:3cm, no dysplasia        4/25/18: Hgb=11.5, INR=1.6, Iron=40, Sat=13%, Ferritin=57, Alb=3.2, Phos=2.2, Mag=1.7        4/30/18: s/p Iron Infusion        5/14/18: s/p Entyvio         5/23/18: Hgb=11.5, ESR=16, CRP< 5        5/29/18: s/p Iron Infusion        6/6/18: Hgb=10.6, INR=1.7, Xxrxunvr=164, Alb=3.5, Phos=2.1, H51=952, ic=005; BMs: # 5, 2-3x/D         6/19/18: Hgb=10.6, INR=1, CRP=15, ESR=23        6/21/18: R ankle is acting up, swollen, pain, having MRI done, took a couple of advil, on low carbs ,BMs: # 5, 1-2x/D, jw=012        6/26/18: MRI: fx/stress rxn-talar body/navicula; stress related changes--cuneform, cuboid,distal tibia; mod tibiotalar effusion, mild-mod peroneal tendinosis        7/19/18: entyvio 6/26/18, eliminated all carbs--anti inflammatory diet, oo=909, BMs: # 4-5, 1-3x/D         7/25/18: Hgb=10, INR=1.3, Alb=3.3, Phos=2.4, Wpxirfpy=285, sat%=12, Iron=35        8/2/18: BD--osteoporosis of hip/spine: sig decrease BD--17.6% of hip, 17% of spine, osteopenia of wrist: 6.4%        8/7/18: Entyvio        8/21/18: Hgb=11.1, INR=1.5, ESR=19, CRP=18.6        8/23/18: recently w tooth infection, old implant--loose and removed; rx w amox, and advil        eating almost no carbs, jt=878, # 5-6, 2-3x/d         8/24/18: Stool fat--neg, Nzaluqltdbmd=810        9/5/18: Hgb=11.4, INR=1.3, ESR=9, CRP=11.3, Iron=41, Xurretra=288, Alb=3.8,        9/17/18: entyvio, Hgb=10.7, INR=2.2, ESR=17, CRP=9.1,         9/20/18: mm=230, BMs: # 5-6, 1-2x/D         9/21/18: Phos=2.4, Ca=8.7, Alb=3.4, Vit D=27, GDD=792,         Dr. Jara: Hyperparathyroidism: probably secondary due to low Vit D/Ca & ? superimposed primary, rx replete Vit D and Calcium        10/9/18: Hgb=11.6, MCV=94, ESR=14, CRP=5.4, INR=2.2        10/10/18 MRE: stricturing of neoterminal ileum w worsened upstream dilatation, moderate length of upstream ileum w stricturing extending at least 20cm and more extensive then previous. suggestion of 2 adj extraluminal fluid collections which may be w/i the wall of strictured ileum near the ileocolonic anastomosis. surrounding spiculation and tethered appearance of bowel in this region. 10/16/18: entyvio, Hgb=11.7, MCV=94, ESR=14, CRP <5, Alb=3.5 10/18/18: Ub=811,   BMs: # 5-6, 3x/D ,  11/13/18: Entyvio 11/21/18 CTE w C: limited 2/2 bowel underdistention, mucosal hyperenhancemt & extensive SM edema of distal ileum including joanna-ileum, difficult to exclude a sml fluid collection, Liver w relative enlargement w suggestion of lobulation, enlargemt of PV,SMV,IMV,Spl V, rectal V. No ascites 11/28/18: Hgb=10, ESR=19, CRP=17,Alb=3.5,Iron=35, Sat%=11, Yglryhuu=910, INR=1.3 11/29/18: nn=002, BMs: # 5-6, 3-4x/D 12/3/18: Abd sono--Liver 14.8cm Incr heterogenicity, spleen--wnl 12/11/18 & 1/14/19: Entyvio 1/14/19:Entyvio,  Hgb=10.7, ESR=15, Alb=3.6, Iron=36, Ferritin=67, Sat%=11, INR=1.6 1/24/19:  mo=401, stools are # 4-6, 3-4x/d , no pain 2/5/19: Hgb=11.2, ESR=14, CRP=0.36 2/28/19: Hgb=11.5, ESR=12, CRP=6.5, Alb=3.7, Iron=69, Ylmvfhxw=591, Ca=8.9                Bms: # 4-5, 2-3x/D , af=709,  Entyvio : last 2/1519,                had parathyroid scan pre-op, still w elev PTH, + activity in mediastinum,? ectopic parathyroid tissue vs neoplasm.  To see ENT and have Imaging at [facility] 3/28/19: Bms: # 4-5 , 3x/d ;mh=386 4/11/19: Hgb-9.7, Iron=45, ESR=18, CRP=9.4, Alb=3.5,  Saw Endo SX at [facility], felt hyperparathyroid is secondary to Low Ca/Vit D, no sx at this time 4/16/19: BMs: # 5-6, 3-4x/D, et=527,  Entyvio : last 3/1519,  got IV iron 4/12/19 for Ferritin=53 4/27/19: s/p R Hip Nailing--missed 4/2019 2/2 fresh wound 5/16/19 & 6/18/19 Entyvio 7/2/19: Hgb=11.7, Ferritin=41,ESR=12, CRP=5.5, B6=2.8,Mag=1.8,Phos=3.9,Ya=772,Zinc=61 7/11/19: s/p IV iron 7/11/19: Alb=3.7, SKJ=257, Ca=9.1, Vit D=40/306,  7/24/19: Entyvio, Alb=3.8, IVH=603, Ca=8.9, Vit D=128  8/1/19: Bms: # 5-6, occas #4, 2-3x/D , hy=197 8/15/19: Hgb=12, ESR=10, CRP=5.2, Ferritin=68, Alb=3.4, Phos=4.4,Mg=1.7, Ca=8.5,Calprotectin=88, 8/20/19: NLH=841, Ca=9, Alb=4.2 9/19/19: Hgb=12.8, ESR=9, CRP=5.5, Alb=3.7, Ppimllvk=214, Mag=1.8, Ca=8.9, Phos=4.2 9/26/19: rb=794, BMs: #5-6, 2-3x/D, no pain 10/17/19: jt=439, BMs: #4-6, 1-2x/D, no Pain; Hgb=14, ESR=7, CRP<5, Alb=3.9, Eudzwgif=150, Mag=1.7, Ca=9.6 10/24/19: ut=618, Bms: # 4-6 , no pain, 11/6/19: ESR=6, CRP=6, PTH=80,Ca=9.1, VitD=50 11/14/19: ac=683, BMs: # : 4, 1-2, 0ccas #5  11/17/19: ESR=6, CRP<5, Oanvzffb=456,   12/19/19: jy=360, BMs: #5-6, 2-3x/D 1/6/20: entyvio 1/13/20: ESR=7, CRP=0.2, Hgb=13.5, Ircxrlru=706, C17=948, FA=10, Alb=4.1, Ca=9.1 1/16/20: wt = 127, BMs: # 5, 1-3x/d 1/30/20: ESR=9, CRP=11, Hgb=13.9, Oarlvyiu=847,Iron=38, Alb=3.9,Ca=9.2, PTH=87, 2/3/20 EGD: gastritis, +CHRISTOPHE, No HP, +erosion w ooze -clipped, 0.5cm polyp-neg; 4cm HH, Esophagitis A, + Barretts, VC: 1+ Colonoscopy: 05cm rectal polyp: HP, 2nd deg int hemorrhoids mild-mod activity: AMIRA w cryptitis & mild archect distortion at ileocolonic anast: colon & ileal side, no stricture 2/4/20: Entyvio; 2/12/20: IV Iron, 3/3/20: Entyvio 2/25/20: vu=828,  BMs: # 4-5, 1-2x/ d 3/19/20: bn=305, BMs: #4-5, 1-2x/D 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 10/17/20 : Hgb=13, CRP< 5, ESR= 11, Iron=44, Ferritin=46, Alb=4, Phos=2.3,  11/5/20: tl=535; s/p IV Iron x 2 ,  11/4 and 1 week prior;   BMs:  # 4-5, 1-2x/D , no pain 11/18/20  Entyvio + IV Ca  1/4/21: Hgb=13.6, CRP<5, ESR=9, Ferritin=60, Alb=4.2, Phos=2.7 1/11/21: Entyvio & IV Ca 1/14/21: no pain, stool more formed ,  po=609 - 137; BMs:  # 4-5, 1-2x/D  2/8/21: Entyvio & IV Ca 2/23/21 IV Ca 2/22/21: Hgb=12.7, CRP<5, ESR=8, Bxudxcixw=799, Phos=2.3, Mag=1.8, U93=455, Zinc=58, Cb=015 2/26/21: az=580,  BMs:  # 4-5, 1-2x/D , no pain  3/8/21 & 4/8/21: Entyvio 3/9/21 & 3/24/21: IV Iron 4/5/21: Hgb=13, CRP<5, ESR=9, Ferritin=94, Phos=3.1, Mag=1.7,Ca=9.1/ Alb=4              Pt Ins co is refusing to cover Entyvio q 4wks, despite numerous attempts to appeal, they also refuse to set up a peer to peer discussion betw myself and another healthcare provider, even one that works for a third party.  They do acknowledge that there is literature supporting the successful use in individual cases who don't respond to the q 8 wk interval and need less then q 8weeks , but state it had not in FDA approved at less then 8wks so they will not approve it.  I spoke to the ins co rep and discussed that fact that patients should not be  pigeon holed since practicing medicine is done on an individual basis.  Humans are not all the same.   Their  response didn't change eventhough the pt clinically needed the medication and it  induced a beneficial clinical response towards remission.  Therefore the patient and I decided to slowly increase the interval since the insurance company will not pay for this benefit.  Hopefully he may be able to maintain his present clinical state.  If not we will return to q 4weeks and will again appeal using this patients real clinical data .  4/9/21:  zp=005,  no pain, stool more formed # 4, 1-2x/d  6/11/21: wt= 135,   no pain, stool more formed # 4, 1-2x/d               recently had an episode of abd distenstion, pain, N/V, no BM              eventually started having diarrhea and flatus, BMs returned to normal              lost 2-3 lbs but regained  Doesnt recall eating anything different, no fever, chills, brbpr, melena  entyvio was 6/3/21--which is almost 8 weeks, was supposed to be 5-6 weeks  to start               6/4/21 Hgb=12, CRP<5, Ferritin=32, pt to receive IV iron      7/12/21 Labs: Hgb=13.6, ESR=10, CRP=<5, Ztzjoyb=353, Alb=3.7, BUN=16   7/16/21 MRE: limited to incomplete bowel distention, chr distal ileitis/probable inflammatory stricturing vs collapse of the joanna-TI--but improved since 2018 , moderate proctitis 7/20/21:  Last entyvio was --7/1, next early august                 qw=395 ,  no pain, stool more formed # 4-5/6, 1-2x/d  7/23/21 Entyvio level= 39, Abs <1.6 8/23/21: Labs--Hgb=13.6, ESR=10, CRP=6.6, Veuxocha=934, Iron=26, Alb=3.9, BUN=16 8/26/21 p/w w N/V, abd pain/bloating  x 3 days, unable to keep things down Labs: WBC=5, Hgb=12, CRP=43, ESR=11, Alb=4.4, BUN=28, Creat=-1.6 CT Abd/Pelvis: diffuse marked dilatation of SB Loops w transition pt in Rmid/lower abdomen ~ 5-6c, proximal to the ileocolonic anastomosis RX w IV solumedrol 20mg q8h, NGT, IVF, pt refused TPN, also w UTI from prostatitis 8/27 NGT was pulled, and clears started and advanced to LR,  was d/c home 8/30 on prednisone 40mg qd w taper by 10mg/wk--> to 20mg 10/15 Entyvio 10/25 Stelera (Ustekinumab)  # 1 11/3/21 [facility]: wt above 140, off pred x 2 weeks,  1-2 BMs qd  Discussed at IBD conference, reviewed previous colonoscopy, and recent imaging; consensus that due to malnutrition and steroid dependency, surgery is next best option however pt reports feeling great and wants to pursue medical treatment which is reasonable given he feels well  repeat imaging in mid-January after 3 months of Stelara, and then consider referral to surgery vs improved candidacy for endoscopic manipulation (currently presumed one long stricture).   11/15/21 : gb=567, Hgb=12, ESR=3, CRP <5, Ferritin =104, Ca=9, Mg=1.7, Alb=3.7  12/10/21 : iron infusion 1/4/22: Hgb=11.5, ESR=6, CRP <5, Ca=8.8, Mag=2.9, Alb=3.9 1/10/22 : cb=605, stools # 5, 1-2x/D  1/10/22 Today:  Feeling well, no c/o , CP, SOB/ PRIETO, Cough, Wheeze, Palpitations, edema              Most recent labs  reviewed w patient:1/4/22 1/31/22: calprotectin=84 2/2/22: wx=823 2/14/22: Hgb=10.6, ESR=9, CRP <5, Ca=8.3, Mag=1.6, Alb=3.8, Iron=25, Ferritin=15 3/3/22:  iv Iron infusion x 1 3/8/22 MRI Abd/Pelv: thickened distal Jejunal loops which are tethered; distal ileal segment  (10-12cm ) previously actively inflammed has decreased & now characterized by an area of stricture, however the joanna-TI  5mm to the anastamosis is enhanced as well as narrowed.  colon just  distal to the anastamosis has a focal segment of inflammation & stricture.  the recto-sigmoid appears inflammed  3/28/22: Hgb=14.4 , ESR=1, CRP <5, Ca=8.5, Mag=1.7,Alb=4.1, Iron=104, Bqcqrtjl=138, 4/5/22: lc=708, Bms: # 5-6 , 1-2 x/day    5/10/22  :  Last entyvios were -->7/1, 8/5, 9/2, 10/15/21              Stelara # 1 IV--10/25/21, second dose SC~ 12/10/21, 3rd~ 2/14/22,  4th-- mid April , 5th--mid june                Early December 2021  had a " flare" loose BMs, N/V and bloat              Took Pred 40mg qd and tapered down 10mg / week , now off pred x 7-8 weeks  4/13/22 Dr. Walsh:  pt states he had a flare the weekend of 4/9/22 w vomiting/distension                pt self-started prednisone 40mg , this was just before his april stelara dose                claims he was feeling better               Dr. [facility]: sched colonoscopy w ? dilatation   5/10/22: tapered of pred 40mg , 10mg/wk over 1 month, now off 2weeks          no pain, n/v,  BMs: # 4-5, 1-2 x Day , wt=-132 5/17/22 Dr. [facility]: ped scope passed w/o resist in joanna-TI, one single apthae w psuedopolyp.  Flares probably 2/2 to adhesive dis, imaging may consistently show wall thickening  5/27/22 Labs:  Alb=3.8. Phos=2.7, Mag=1.9, Ca=9.2, PTH=72, Vit D=105, ALP=76                + IV Iron  6/20/22 Labs:  Alb=4.5, Phos=0.8, Mag=1.8, Ca=8.5, XZM=837, ALP=83                          Hgb=14, ESR=2, CRP<5, Tnmbpszo=754, Iron=86 6/21/22:  no pain, n/v,  BMs: # 4-5, 1-2 x Day ,                dh=378 7/26/22:  no pain, n/v,  BMs: # 4-5, 1-2 x Day ,                fc=549 9/30/22 EGD: mild gastritis, no HP; 0.75cm gastric polyp:fundic;                4cm HH, Esophagitis A--, + Barretts 10/17/22 : Alb=4.3, Phos=2.1, Mag=1.8, Ca=9,  PTH=95, ALP=80, TSH=12, Vit D=69                          Hgb=12.7 , ESR=2, CRP<5, Ferritin=57, Iron=85 12/16/22 : Alb=3.8, Phos=1.6, Mag=1.7, Ca=8.9 , ALP=68,                           Hgb=10.8 , ESR=1, Gdrqafcl=448, Iron=67, INR=2.3  12/19/22: mc=816, had some N and distension the day after Thanksgiving                      Was having BMs and passing gas, went of liquids x 1-2 days--> resolved 1/30/23: Alb=4.4, Phos=3, Mag=1.8, Ca=9.5,  PTH=96, ALP=79,  Vit D=144                          Hgb=13.6 , ESR=5, CRP<5, Ferritin=55, Iron=56, INR=1.6    2/6/23 : bv=975, no abd pain, BMs: # 4 qd   3/17/23: developed cugh,congestion, fever, malaise  3/19/23 + Covid; Received Iv Remdesivir x 3 at home 3/27/ 23: hj=122, BMs: #4-5,  1-2x/ D   Alb=4 , Phos=1, Mag=1.8, Ca=8.5 , ALP=94  Hgb=13.8, ESR=5, CRP<5, Gwgxvsra=728, Iron=78, INR=5.9  4/21/23 : Alb=4, Phos=2.9, Mag=1.9, Ca=8.9,  PTH=89, ALP=75,  Vit D=105/ 80, INR=3.2  5/22/23 Ja=747; Hgb=12.7, ESR=2, CRP<5, Ferritin=19, Iron=42, INR=2.4               Alb=4.2, Phos=2.4, Mag=1.8, Ca=9.1, ALP=68,                      8/4/23 :  Alb=3.8, Phos=2.1, Mag=1.8, Ca=8.7,  FXH=279, ALP=68,  Vit D=78 INR=2.1  8/21/23 : Hgb=14, ESR=4, CRP=40, Mgfgracv=685, Iron=37, INR=4.8                Alb=4 , Phos=1.8, Mag=1.6, Ca=9.3, ALP=84,      8/22/23  :   mi=361 , this weekend  had a flare, abd distension, no N/V, fever                  BMs: started # 5/6--> now # 6-7, no blood or mucous                  doesnt recall anything too solid or fiberous                  Started Pred 40mg qd, and liquid diet--> feeling better                  Less distension, passing gas and BMs  10/24/23:  fh=595, BMs: #4, 1-2x/D                   pt reports an episode of dark stool w 2-3 days ~ 9/23/23                  He held his Warfarin for a couple of days and stool returned to normal color                  He increased his Omep 40mg BID                  He had no Abd pain, N/V, ht burn, dysphagia, bloat                  9/28/23 Labs: Hgb=8.6, Iron=22, sat%=7, Ferritin=17                  10/5/23 Labs: Hgb=8.7, Iron=52, Sat=16%, Ubziadbt=242                  10/12/23 : Labs: Vit D=67, Ca=8, PTH=160, Mag=2                  10/20/23 Labs: Hgb=10.9, INR=1.6, CRP<5, Oiaqcoby=574, ALB=3.7, Ca=7.9, ALP=68 10/18/23 MRE:  persistent mural enhancement without wall thickening in distal small bowel loops, which is a nonspecific imaging sign and may reflect inflammation, however no other mural or mesenteric findings to indicate active inflammatory small bowel Crohn's disease.  No evidence of stricture. No imaging signs of penetrating disease.  Chronic central mesenteric fibrofatty proliferation. 10/27/23: Ustekinumab=7.1, Ust Abs <1.6 11/27/23 : FA=17, M53=535, Vit D=69, ca=9.1, ALP=84,  Alb=4.2                  Hgb=13.3,  PT=19, INR=1.7, Ferritin=33, Iron=46, Sat=13%,  12/5/23 : Homocysteine=18, HLR=324 12/11/23 :  br=086, BMs: #4, 1-2x/D , Melena-->no    12/22/23  EGD:  gastritis, No HP; 2cm HH, Esophagitis A--,  + Barretts 12/22/23 Colonoscopy: mild ileitis of the most distal joanna-TI; small superficial apthous ulcer on colonic side of anastamosis, Random  colon BX--> wnl  12/27/23 -12/31/23 PMHC for Hypophosphatemia=0.8, Rx w IV KPhos DKE=724, Vit D =66, 7.6--> Phos=1.5, Ca=8.4, Hgb=12.6 D/C on kPhos 1 gm TID seen by [facility] 1/10/24 Phos=1.7, Vit D =53, Vit D-25= 73, Ca=8.5, HVM=048 1/18/24 Ydbiqpoy=515, CRP<5, ESR=1,  Ca=7.9, Hgb=13 1/29/24:  by=401,  BMs: #4, 1-2x/D , no  Melena 1/26/24 Dr. Pritchard Capsule--> scattered irregular appearing mucosa, possible erosions, no bleeding            for single balloon enteroscopy 2/27/24  : Labs: Vit D-25=84, Ca=8.9, NLN=722, Mag=1.7, Phos=2.8                          ALB=4, BUN=15/ 1.1, K=4.6, ALP=63 3/5/24: WT = 137,  BMs: #4, 1-2x/D  3/12/24 :  Ferritin=23, Iron=25, sat=10%, CRP<5, ESR=2,  Hgb=13.7, INR=2                  Ca=9.1, Phos=2.2,  ALB=4.1, BUN=15/ 1.2, K=4.1, ALP=75 IV Iron: 3/22, 3/28, 3/29. 4/1/24 5/9/24 :  Thaiehzs=709, Iron=86, sat=31%, CRP<5, ESR=2,  Hgb=15, INR=1.6                  Ca=8.8, Phos=1.1, Mag=1.9,  ALB=4.2, BUN=11/ 1.2, K=3.9, ALP=75 5/28/24:  ol=441, BMs: #4, 1-2x/D , no melena, ht burn  6/14/24: EFR=778, Vit D=75, Vit D 1,25=68, Ca=9.1, Phos=3.9, Mag=1.7,  ALB=3.9, BUN=14, K=4.2, ALP=76 7/5/24:  Njucpsqh=598, Iron=91, sat=34%, Hgb=13.8, INR=1.6 7/9/24 : yu=466, BMs: #4, 1-2x/D , no melena, ht burn 8/20/24 : zd=088, BMs: #4, 1-2x/D , no melena, ht burn Labs:   CRP<5, ESR=2,  Hgb=14.5, INR=1.8                  Ca=8.7,   ALB=4, BUN=15/ 1.1, K=4, ALP=73  9/27/24 : KAM=129, Vit D=71, Vit D 1,25=73, Ca=9.1, Phos=2.8, Mag=1.8,  ALB=4, BUN=16,K=4.,              ALP=71; Wlwcbami=895, Iron=95, sat%=32,  Hgb=14.4 , INR=2.3  10/7/24:    s/p  iron infusion in 7/12/24, Calcium 9/27//24  ,                   last Stelera 9/2024; next begining  of 11/2024                 Today: ox=473                  BMs: #4 1-2x/D                   Melena-->no        * Abd pain-->no * Nausea--> no * Vomit--> no * Early satiety--> no * Belching--> no * Hiccups--> no * Regurgitation--> no * Acid Taste / Water Brash--> no * Ht burn--> no * Dysphagia--> no * Throat Clearing--> no * Hoarseness--> no * Post-Nasal Drip--> no * Congestion--> no * Globus--> no * Cough--> no * Wheeze / PC-> -no * Constipation--> no * Diarrhea--> No  * Bloating--> No  * Strain on Defecation--> no * Incompl Evac--> no * Flatulence--> no * Gurgling--> no * Melena--> no * BPBPR-> -no * Anorexia--> no * Wt. Loss--> no                  PRIOR HISTORY--2017 1/17/17: Hgb=9.2, ESR=6, CRP=5; 1/19/17: BMs: #4, 5-6, 2-3x/D, Dt=458, Started Creon 1 tid cc        3rd Entyv beginning Feb 2/14/17: Labs; Hgb=9.6, MCV=97, ESR=5, CRP< 5, D77=309, FA>23, Iron=59, Retic =3.2,        2/17/17 Fecal Calprotectin=16, Fats: Increased neutral & Split        3/13/17: Labs Hgb=9.5, MCV=95, ESR=7, CRP <5, ALB=3.1        3/16/17: lot of stress, to move -[place], had a job-fell thru, BMs: # 5, 2-4 x/D, wt= 131w clothes        4/19/17 EGD: gastritis, CHRISTOPHE, No HP, 2cm HH, +Barretts, No Dysplasia        Colonoscopy: Anastamosis: open w mild -mod active colitis, enteritis severe adj to anastomosis, mild more proximal, remainder of colon-wnl, 2-3 deg int hemorrhoids        4/26/17 : Hgb=7.3, MCV=95, ESR=13, CRP<5;Immediately post procedure stools very dark on eliquis/iron.        Admitted to [facility]: Hgb=8.3-->8.9 after 2 U, Alb 3.3, BUN=17, creat=1.3, Brianna/Zvgcx=747/460        4/27/17: Alb=2.3, BUN=12, creat=1.2, Brianna/Lipase=209/863-No abd pain, N/V; 5/5/17: Hgb=10.7.        5/8/17 Entyvio iv. 5/8-5/9 w dark red diarrhea; 5/10/17 Hgb=7.8.        5/11/17 Adm [facility] w stools brown, Hgb=7.9, BUN=17, Creat=1.4, Alb=2.9; S/P 2 Units- Hgb=10.6, BUN=15/1.1.        Prednisone 40mg qd, entocort d/tex.; 5/18/17: Hgb-10.4, hs=133, BMs: #5, 1-2x/D, no pain        6/8/17: Hgb=7.4,MCV=98, CRP<5-ASA stopped, Pred20--> 30        6/10/17: Hgb=8.2, Alb=2.9, BUN=20/1.2 ; 6/12/17: Hgb=8.3, Retic=0.19, Iron=10, Sat%=3, Ferritin=57, FA=23,B57=473, 6/13/17: s/p 2 Unit PRBCs        6/15/17: started MCT oil, Te=773 , BMs: # 5, 1-2x/D, stools are brownish/green         7/11/17: [facility] w unsteadiness. MRI Head: R Cortical Temporo-occipital encephalomalacia, MRA H&N-no sign lesions, OLGA-wnl.Hgb=9.9--> 8.4->9.7 after 1 Unit. Seen by Heme: received IV Iron         CRP<5, J22=828, YEG=834, FA>23,Iron=22,Sat%=6, Ferritin=42, Alb=3.5. D/C on warfarin 2mg        7/20 Hgb=8.5, 7/28 Hgb=7.7, 7/31-s/p 1 Unit         As outpt seeing Heme, to get IV Iron and 5 IV Copper        Had 'FLU' Fever=101, chills, bloat, N/V/D, Bilious. no pain, melena,brbpr        Given anti-emetic by dr Boston,then able to keep things down        On prednisone 25, warfarin w INR bet 1.6-2        8/17/17: Hgb=9.9, ESR=20, CRP-P,De=346, BMs: # 5, 1-2x/D, stools are brownish/green        10/2/17: Hgb=8.8, MCV=89,Phos=1.7, K=3.9, Mag=1.9, Alb=3.6,Iron<10,Ferritin=21, INR=2        10/17/17: Hgb=7.9,ESR=6,CRP<5, INR=1.6        10/25/17: Hgb=10, ESR=5, CRP=5, Alb=3.6, Phos=2, Wqcdjldx=613, R18=385        11/16/17: Hgb=11.2, ESR=14, CRP=12,         11/13/17: MRE-Chr mild distension of distal & joanna-ileum s/o mild stricturing at anast, mild mural thick & mucosal enhancemt ~ 20cm; little change from 2015 c/w mild acute on chr ileitis, 2.1 cm Liver hemangioma        11/28/17: Entyvio        11/29/17: Hgb=9.6, ESR=10, CRP=5.8, Alb=3.8,Ferritin-P, Iron=14,Sat=4%, Phos=2.5        12/19;17: Hgb=10.1, ESR=12, CRP=6.5; 12/21/17: BMs:#3-5, 1-3x/D , brown, no blood, no pain, vn=199        1/3/18:Hgb=11.7, ESR=19, CRP=6.5, Alb=4.1, Rpvllyny=847, Iron=31, Sat%=9,Zinc=55          PRIOR HISTORY---2013:         2/20/13 CT markedly dilated sb loops ext to anast, colonoscopy w open anast ,mild-mod fred-anastamotic disease. quick response to entocort/humira--probably adhesive dis.         8/10/13 w GNR bacteremia, ADA 1.7 /JENNIFFER 3.4, Humira 8/7, 8/13,8/18,9/15        CT- mucosal thickening and spiculation of the distal sb extending to the anastamosis, thickening and stranding of adj mesenteric fat.        Humira increased to 40mg weekly, entocort 4        9/2013 MRE--dilated loops in mid and distal ileum, markedly thickened and narrowed TI w decreased peristalsis of TI        11/15/13 ADA-24.9, JENNIFFER-0          PRIOR HISTORY---2014:         2/15/14--CT dilated loops SB, loop of sb in mid abd 4.3cm w infiltrative changes in the mesentery, bowel tapers in the RLQ to the anastamosis w/o transition        WBC=15K, Rx w IV steroids and Abx         3/7/14 SBFT: last 10cm sb prox to anast mild distension and sl irregularity. In the mid portion of this loop there is a mild narrowing which appears to reopen but is some what narrowed.        3/20/14 ADA=33.1, JENNIFFER=0, Lialda switch to Apriso 4 (2/2014)          PRIOR HISTORY--2015         1/11/15 Adm [facility] w 1 day N,Recurrent V, Abd pain/distension. CT-multiple distended & fluid-filled sb loops, mild wall thickening of ileum w No inflamm changes.        WBC=14.6, Hgb=17, RX w NGT suction, Levaquin iv, Solumedrol 40mg q 12( 1/12-->1/16) to prednisone 30mg BID w 5mg taper/wk        3/18/15 --Dr. Boston w edema /High BP, prednisone was tapered slowly to 2.5mg         switched to entocort 9mg qd, edema and bp improved w lasix 20mg         BMs:#4-5, 3x/D, Hgb=11, ESR=4, CRP<5        4/28/15 - 5/1/15: Adm [facility] w 1 day Abd pain, distension,N/V. 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        7/17/15: BMs:#4-6, 4-5x/D, wt 131(from 126)        8/20/15: BMs: #4, 1-2x/D or #5, 2-3x/D, tl=082, dry cough,Hgb=10, WBC=3, PGU=280, CRP=56, ESR=21        8/25/15 CT Abd/Pelv: Svl RLQ sb loops w wall thickening, mild nodularity & inflamm stranding in mesentery        Advised-restart Entocort 9mg qd, Apriso 4 qd, Maintain Humira but given RX to check drug/Ab levels        9/15/15: did nt restart meds,Hgb=9.9, WBC=4, ESR=19, CRP=5.8, cy=360; [facility] : ADA=8.5, Antibodies< 1.7        10/15/15: No pain, BMs:#4, 1-2x/D, #5-6, 3-4x/D, throat clearing/cough-better, el=572        11/30/15: No pain, BMs:# 4, 5-6 intermittently, No throat clear, jo=438          PRIOR HISTORY-2016 1/22/16; 4/8/16; 6/6/16 : No pain, BMs:# 4-5 1-2x/D, also #5-6 3x/D for 2-3D/wk, ue=237        7/14/16: ue=196, 9/22/16: ps=047.5        11/10/16: 9.5lb wt loss, states BMs: 5-6, 5x/D--Taking Magnesium, No pain/anorexia        Labs: Stool Yqovhfqlptsi=770, + Incr Fecal fat, Alb=3.4, FA =23, C24=238, Hgb=12, ESR=10, CRP <5        Magnesium-d/c, Obtain MRE asap--Start steroids and possibly switch to Entyvio        DDx discussed: active crohns--loss response to Humira, Malabsorption--loss Bile acids, Bile-induced diarrhea, SIBO        Pt wanted to wait for imaging-did not start rx        12/1//16 MRE: RUQ ileocolonic anastamosis--narrowed w upstream dilatation to 3.2 cm        Pt refused pred, Started Entocort 9mg qd and Flagyl 250mg qid about 7-10days ago         awaiting Entyvio load to start 12/22, then 1/5; had Cut back on iron bid        12/15/16: BMs:# 5, 2-3x/D, occas #6, No pain, Less bloat/flatulence, Nu=348.

## 2024-10-19 NOTE — DISCUSSION/SUMMARY
[FreeTextEntry1] : Discussed results of small bowel capsule study with patient. Scattered irregular appearing mucosa in the small bowel, no active bleeding, possible small erosions. Capsule seen entering the colon. Will discuss with Dr. Buckner, will likely plan on a single balloon enteroscopy for further evaluation.

## 2024-10-19 NOTE — ADDENDUM
[FreeTextEntry1] : Imaging\par  \par  10/10/18 MRE: stricturing of neoterminal ileum w worsened upstream dilatation, moderate length of upstream ileum w stricturing extending at least 20cm and more extensive then previous. suggestionof 2 adj extraluminal fluid collections which may be w/i the wall of strictured ileum near the ileocolonic anastomosis. surrounding spiculation and tethered appearance of bowel in this region.\par  \par  11/13/17: MRE-Chr mild distension of distal & joanna-ileum s/o mild stricturing at anast, mild mural thick & mucosal enhancemt ~ 20cm; little change from 2015 c/w mild acute on chr ileitis, 2.1 cm Liver hemangioma\par  12/1//16 MRE: RUQ ileocolonic anastamosis--narrowed w upstream dilatation to 3.2 cm\par  5/5/16 BD: Osteoporotic, sig decr all areas: Lumb T=2.6, L Hip=2.1, L arm=2.5\par  3/10/15 BD: Osteopeneia, sig decr since 1/2013 Lumbar T=2.2, L Hip=1.9, Larm=1.6\par  6/11/15: CT ABD/Pelv: no obstruction, dilated thickened sb loops of distal jej and ileum\par  4/28/15 CT Abd/Pelv: Diffusely dilated SB loops, thick walled ileum\par  1/11/15 CT ABD/PEL w po C: multiple distended and fluid-filled sb loops, mild wall thickening of ileum--no inflammatory changes seen\par  3/7/14 SBFT: last 10cm prox to anast w mild distension, sl irregular. In the mid portion of this loop + mild narrowing but appears to open \par  2/15/14 CT ABD/PEL w po C: Loop in mid abd dilated to 4.3cm, bowel wall mildly thickened. No abrupt transition, bowel tapers in the RLQ to level of anastamosis. \par  9/9/13 MRE: Dilatation of loops mid and distal ileum. Distal ileum w marked thickening and decr peristalsis. Mild thickening in sigmoid colon\par  8/11/13 Ct ABD/PEL w po C: Diffusely abnl sb w marked distension extending to anastamosis. Mucoasl Thickening and spiculation of distal sb extending to anastamosis, there is thickening and stranding of adj mesenteric fat \par  1/24/13 BD: Osteopenia of the wrist, spine and hip--sig decr in the wrist \par  \par  Procedures:\par  \par  4/19/17 EGD: gastritis, CHRISTOPHE, No HP\par  2cm HH, +Barretts, No Dysplasia\par  Colonoscopy: Anastamosis: open w mild -mod active colitis, enteritis severe adj to anastomosis, mild more proximal, remainder of colon-wnl, 2-3 deg int hemorrhoids\par  6/10/15 Colonoscopy: Inflammatory ST mass on the ileal side of anast, opening appeared ulcerated,scarred & severely narrowed\par  8/3/10 Colonoscopy: active dz at anastamosis,ulcerated/edema/narrowed\par  8/20/14 EGD: Gastritis, mild, No HP, No IM\par  GE wide open at 40cm, 5 cm seg Barretts, No Dysplasia.

## 2024-10-19 NOTE — HISTORY OF PRESENT ILLNESS
[de-identified] :    This HPI  reflects a summary and review of records : including previous and most recent  Labs, body imaging, consults and progress notes, operative and pathology reports, EKG reports, ED records, found in [employer], [email or web address],  [employer] and any additional records brought in by  the patient at the time of the visit.            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-->Eliquis' warfarin         GERD w Stewart's, Hemorrhoids, BPH,         S/P Ileocolonic resection 1998        Since then multiple SBOs          Got IV Iron x 2, since 10/2/17        10/19/17: feeling better, Sr=100 on prednisone 15mg x 3weeks, BMs Brown: #5-6, 1-2/D, occas 3-4/d        10/25/17: Hgb=10, ESR=5, CRP=5, Alb=3.6, Phos=2, Utbeudwx=276, T19=705        11/16/17: Hgb=11.2, ESR=14, CRP=12,         11/13/17: MRE-Chr mild distension of distal & joanna-ileum s/o mild stricturing at anast, mild mural thick & mucosal enhancemt ~ 20cm; little change from 2015 c/w mild acute on chr ileitis, 2.1 cm Liver hemangioma        11/28/17: Entyvio        11/29/17: Hgb=9.6, ESR=10, CRP=5.8, Alb=3.8,Ferritin-19, Iron=14,Sat=4%, Phos=2.5, Ln=264, BMs:# 5, 1-2x/D, brown,        12/19;17: Hgb=10.1, ESR=12, CRP=6.5; 12/21/17: BMs:#3-5, 1-3x/D , brown, no blood, no pain, aw=567        1/3/18:Hgb=11.7, ESR=19, CRP=6.5, Alb=4.1, Azixwfsk=009, Iron=31, Sat%=9,Zinc=55        1/16/18: Entyvio, Hgb=11.4, ESR=10, CRP=6.9        1/18/18: Today: cellulitis R foot, saw dr CHACKO--rx augmentum x 10D, Entyvio 1/9 to 1/16, yy=421 w clothes,BMs: # 4-5, 1-2x/D         2/14/18: Hgb=11.1, ESR=16, CRP=11.6, Alb=3.6, Iron=28, Ferritin=23, INR=1.5         2/16/18: s/p Entyvio; Iron infusion, again on 2/27 2/20/18: Hgb=10.6, ESR=20, CRP=12, INR=1.7        2/27/18: s/p iron infusion        3/9/18: Dr. Cardenas for tenosynovitis, rx w Zorvolex: Diclofenac x 5 days--? response        3/14/18: Bt=895; BMs: # 5, 1-2x/D; Hgb=11, ESR=18, CRP=11,Irom=45,Wdapeyvx=639,Alb=3.6, INR=1.5        3/19/18: s/p Entyvio        3/22/18: qg=034, BMs: # 5, 3-4 x/d        4/16/18: s/p Entyvio,Hgb=11.9, INR=1.3, ESR=18, CRP=8.4         4/18/18 EGD: gastritis, no HP, no IM, 2+ Mucous, 0.5cm gastric polyp: fundic, 4cm HH, + Barretts:3cm, no dysplasia        4/25/18: Hgb=11.5, INR=1.6, Iron=40, Sat=13%, Ferritin=57, Alb=3.2, Phos=2.2, Mag=1.7        4/30/18: s/p Iron Infusion        5/14/18: s/p Entyvio         5/23/18: Hgb=11.5, ESR=16, CRP< 5        5/29/18: s/p Iron Infusion        6/6/18: Hgb=10.6, INR=1.7, Dnoagogy=511, Alb=3.5, Phos=2.1, N54=851, ir=292; BMs: # 5, 2-3x/D         6/19/18: Hgb=10.6, INR=1, CRP=15, ESR=23        6/21/18: R ankle is acting up, swollen, pain, having MRI done, took a couple of advil, on low carbs ,BMs: # 5, 1-2x/D, zm=273        6/26/18: MRI: fx/stress rxn-talar body/navicula; stress related changes--cuneform, cuboid,distal tibia; mod tibiotalar effusion, mild-mod peroneal tendinosis        7/19/18: entyvio 6/26/18, eliminated all carbs--anti inflammatory diet, ql=507, BMs: # 4-5, 1-3x/D         7/25/18: Hgb=10, INR=1.3, Alb=3.3, Phos=2.4, Fgflufgl=105, sat%=12, Iron=35        8/2/18: BD--osteoporosis of hip/spine: sig decrease BD--17.6% of hip, 17% of spine, osteopenia of wrist: 6.4%        8/7/18: Entyvio        8/21/18: Hgb=11.1, INR=1.5, ESR=19, CRP=18.6        8/23/18: recently w tooth infection, old implant--loose and removed; rx w amox, and advil        eating almost no carbs, qi=240, # 5-6, 2-3x/d         8/24/18: Stool fat--neg, Hievtdcwhlxi=029        9/5/18: Hgb=11.4, INR=1.3, ESR=9, CRP=11.3, Iron=41, Wopwpysw=046, Alb=3.8,        9/17/18: entyvio, Hgb=10.7, INR=2.2, ESR=17, CRP=9.1,         9/20/18: md=788, BMs: # 5-6, 1-2x/D         9/21/18: Phos=2.4, Ca=8.7, Alb=3.4, Vit D=27, AKT=342,         Dr. Jara: Hyperparathyroidism: probably secondary due to low Vit D/Ca & ? superimposed primary, rx replete Vit D and Calcium        10/9/18: Hgb=11.6, MCV=94, ESR=14, CRP=5.4, INR=2.2        10/10/18 MRE: stricturing of neoterminal ileum w worsened upstream dilatation, moderate length of upstream ileum w stricturing extending at least 20cm and more extensive then previous. suggestion of 2 adj extraluminal fluid collections which may be w/i the wall of strictured ileum near the ileocolonic anastomosis. surrounding spiculation and tethered appearance of bowel in this region. 10/16/18: entyvio, Hgb=11.7, MCV=94, ESR=14, CRP <5, Alb=3.5 10/18/18: Lb=982,   BMs: # 5-6, 3x/D ,  11/13/18: Entyvio 11/21/18 CTE w C: limited 2/2 bowel underdistention, mucosal hyperenhancemt & extensive SM edema of distal ileum including joanna-ileum, difficult to exclude a sml fluid collection, Liver w relative enlargement w suggestion of lobulation, enlargemt of PV,SMV,IMV,Spl V, rectal V. No ascites 11/28/18: Hgb=10, ESR=19, CRP=17,Alb=3.5,Iron=35, Sat%=11, Aiuinbne=408, INR=1.3 11/29/18: sb=495, BMs: # 5-6, 3-4x/D 12/3/18: Abd sono--Liver 14.8cm Incr heterogenicity, spleen--wnl 12/11/18 & 1/14/19: Entyvio 1/14/19:Entyvio,  Hgb=10.7, ESR=15, Alb=3.6, Iron=36, Ferritin=67, Sat%=11, INR=1.6 1/24/19:  pl=753, stools are # 4-6, 3-4x/d , no pain 2/5/19: Hgb=11.2, ESR=14, CRP=0.36 2/28/19: Hgb=11.5, ESR=12, CRP=6.5, Alb=3.7, Iron=69, Jtsmhaao=776, Ca=8.9                Bms: # 4-5, 2-3x/D , yd=205,  Entyvio : last 2/1519,                had parathyroid scan pre-op, still w elev PTH, + activity in mediastinum,? ectopic parathyroid tissue vs neoplasm.  To see ENT and have Imaging at [facility] 3/28/19: Bms: # 4-5 , 3x/d ;gr=763 4/11/19: Hgb-9.7, Iron=45, ESR=18, CRP=9.4, Alb=3.5,  Saw Endo SX at [facility], felt hyperparathyroid is secondary to Low Ca/Vit D, no sx at this time 4/16/19: BMs: # 5-6, 3-4x/D, ye=958,  Entyvio : last 3/1519,  got IV iron 4/12/19 for Ferritin=53 4/27/19: s/p R Hip Nailing--missed 4/2019 2/2 fresh wound 5/16/19 & 6/18/19 Entyvio 7/2/19: Hgb=11.7, Ferritin=41,ESR=12, CRP=5.5, B6=2.8,Mag=1.8,Phos=3.9,Cv=613,Zinc=61 7/11/19: s/p IV iron 7/11/19: Alb=3.7, QIX=773, Ca=9.1, Vit D=40/306,  7/24/19: Entyvio, Alb=3.8, NLR=277, Ca=8.9, Vit D=128  8/1/19: Bms: # 5-6, occas #4, 2-3x/D , zq=582 8/15/19: Hgb=12, ESR=10, CRP=5.2, Ferritin=68, Alb=3.4, Phos=4.4,Mg=1.7, Ca=8.5,Calprotectin=88, 8/20/19: ZXV=040, Ca=9, Alb=4.2 9/19/19: Hgb=12.8, ESR=9, CRP=5.5, Alb=3.7, Bxtbreor=106, Mag=1.8, Ca=8.9, Phos=4.2 9/26/19: jx=558, BMs: #5-6, 2-3x/D, no pain 10/17/19: gn=045, BMs: #4-6, 1-2x/D, no Pain; Hgb=14, ESR=7, CRP<5, Alb=3.9, Vexwjivh=057, Mag=1.7, Ca=9.6 10/24/19: lw=556, Bms: # 4-6 , no pain, 11/6/19: ESR=6, CRP=6, PTH=80,Ca=9.1, VitD=50 11/14/19: np=473, BMs: # : 4, 1-2, 0ccas #5  11/17/19: ESR=6, CRP<5, Moxyukpu=523,   12/19/19: hc=254, BMs: #5-6, 2-3x/D 1/6/20: entyvio 1/13/20: ESR=7, CRP=0.2, Hgb=13.5, Qcmhuiwq=722, P44=745, FA=10, Alb=4.1, Ca=9.1 1/16/20: wt = 127, BMs: # 5, 1-3x/d 1/30/20: ESR=9, CRP=11, Hgb=13.9, Lawzkiiu=923,Iron=38, Alb=3.9,Ca=9.2, PTH=87, 2/3/20 EGD: gastritis, +CHRISTOPHE, No HP, +erosion w ooze -clipped, 0.5cm polyp-neg; 4cm HH, Esophagitis A, + Barretts, VC: 1+ Colonoscopy: 05cm rectal polyp: HP, 2nd deg int hemorrhoids mild-mod activity: AMIRA w cryptitis & mild archect distortion at ileocolonic anast: colon & ileal side, no stricture 2/4/20: Entyvio; 2/12/20: IV Iron, 3/3/20: Entyvio 2/25/20: kr=686,  BMs: # 4-5, 1-2x/ d 3/19/20: zf=198, BMs: #4-5, 1-2x/D 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 10/17/20 : Hgb=13, CRP< 5, ESR= 11, Iron=44, Ferritin=46, Alb=4, Phos=2.3,  11/5/20: do=348; s/p IV Iron x 2 ,  11/4 and 1 week prior;   BMs:  # 4-5, 1-2x/D , no pain 11/18/20  Entyvio + IV Ca  1/4/21: Hgb=13.6, CRP<5, ESR=9, Ferritin=60, Alb=4.2, Phos=2.7 1/11/21: Entyvio & IV Ca 1/14/21: no pain, stool more formed ,  ro=215 - 137; BMs:  # 4-5, 1-2x/D  2/8/21: Entyvio & IV Ca 2/23/21 IV Ca 2/22/21: Hgb=12.7, CRP<5, ESR=8, Hiejcdief=234, Phos=2.3, Mag=1.8, T47=466, Zinc=58, Bi=940 2/26/21: jz=966,  BMs:  # 4-5, 1-2x/D , no pain  3/8/21 & 4/8/21: Entyvio 3/9/21 & 3/24/21: IV Iron 4/5/21: Hgb=13, CRP<5, ESR=9, Ferritin=94, Phos=3.1, Mag=1.7,Ca=9.1/ Alb=4              Pt Ins co is refusing to cover Entyvio q 4wks, despite numerous attempts to appeal, they also refuse to set up a peer to peer discussion betw myself and another healthcare provider, even one that works for a third party.  They do acknowledge that there is literature supporting the successful use in individual cases who don't respond to the q 8 wk interval and need less then q 8weeks , but state it had not in FDA approved at less then 8wks so they will not approve it.  I spoke to the ins co rep and discussed that fact that patients should not be  pigeon holed since practicing medicine is done on an individual basis.  Humans are not all the same.   Their  response didn't change eventhough the pt clinically needed the medication and it  induced a beneficial clinical response towards remission.  Therefore the patient and I decided to slowly increase the interval since the insurance company will not pay for this benefit.  Hopefully he may be able to maintain his present clinical state.  If not we will return to q 4weeks and will again appeal using this patients real clinical data .  4/9/21:  ii=323,  no pain, stool more formed # 4, 1-2x/d  6/11/21: wt= 135,   no pain, stool more formed # 4, 1-2x/d               recently had an episode of abd distenstion, pain, N/V, no BM              eventually started having diarrhea and flatus, BMs returned to normal              lost 2-3 lbs but regained  Doesnt recall eating anything different, no fever, chills, brbpr, melena  entyvio was 6/3/21--which is almost 8 weeks, was supposed to be 5-6 weeks  to start               6/4/21 Hgb=12, CRP<5, Ferritin=32, pt to receive IV iron      7/12/21 Labs: Hgb=13.6, ESR=10, CRP=<5, Zevjhsx=842, Alb=3.7, BUN=16   7/16/21 MRE: limited to incomplete bowel distention, chr distal ileitis/probable inflammatory stricturing vs collapse of the joanna-TI--but improved since 2018 , moderate proctitis 7/20/21:  Last entyvio was --7/1, next early august                 zn=293 ,  no pain, stool more formed # 4-5/6, 1-2x/d  7/23/21 Entyvio level= 39, Abs <1.6 8/23/21: Labs--Hgb=13.6, ESR=10, CRP=6.6, Iplztvpk=273, Iron=26, Alb=3.9, BUN=16 8/26/21 p/w w N/V, abd pain/bloating  x 3 days, unable to keep things down Labs: WBC=5, Hgb=12, CRP=43, ESR=11, Alb=4.4, BUN=28, Creat=-1.6 CT Abd/Pelvis: diffuse marked dilatation of SB Loops w transition pt in Rmid/lower abdomen ~ 5-6c, proximal to the ileocolonic anastomosis RX w IV solumedrol 20mg q8h, NGT, IVF, pt refused TPN, also w UTI from prostatitis 8/27 NGT was pulled, and clears started and advanced to LR,  was d/c home 8/30 on prednisone 40mg qd w taper by 10mg/wk--> to 20mg 10/15 Entyvio 10/25 Stelera (Ustekinumab)  # 1 11/3/21 [facility]: wt above 140, off pred x 2 weeks,  1-2 BMs qd  Discussed at IBD conference, reviewed previous colonoscopy, and recent imaging; consensus that due to malnutrition and steroid dependency, surgery is next best option however pt reports feeling great and wants to pursue medical treatment which is reasonable given he feels well  repeat imaging in mid-January after 3 months of Stelara, and then consider referral to surgery vs improved candidacy for endoscopic manipulation (currently presumed one long stricture).   11/15/21 : pk=295, Hgb=12, ESR=3, CRP <5, Ferritin =104, Ca=9, Mg=1.7, Alb=3.7  12/10/21 : iron infusion 1/4/22: Hgb=11.5, ESR=6, CRP <5, Ca=8.8, Mag=2.9, Alb=3.9 1/10/22 : vc=043, stools # 5, 1-2x/D  1/10/22 Today:  Feeling well, no c/o , CP, SOB/ PRIETO, Cough, Wheeze, Palpitations, edema              Most recent labs  reviewed w patient:1/4/22 1/31/22: calprotectin=84 2/2/22: co=816 2/14/22: Hgb=10.6, ESR=9, CRP <5, Ca=8.3, Mag=1.6, Alb=3.8, Iron=25, Ferritin=15 3/3/22:  iv Iron infusion x 1 3/8/22 MRI Abd/Pelv: thickened distal Jejunal loops which are tethered; distal ileal segment  (10-12cm ) previously actively inflammed has decreased & now characterized by an area of stricture, however the joanna-TI  5mm to the anastamosis is enhanced as well as narrowed.  colon just  distal to the anastamosis has a focal segment of inflammation & stricture.  the recto-sigmoid appears inflammed  3/28/22: Hgb=14.4 , ESR=1, CRP <5, Ca=8.5, Mag=1.7,Alb=4.1, Iron=104, Lpymrnrw=112, 4/5/22: vw=026, Bms: # 5-6 , 1-2 x/day    5/10/22  :  Last entyvios were -->7/1, 8/5, 9/2, 10/15/21              Stelara # 1 IV--10/25/21, second dose SC~ 12/10/21, 3rd~ 2/14/22,  4th-- mid April , 5th--mid june                Early December 2021  had a " flare" loose BMs, N/V and bloat              Took Pred 40mg qd and tapered down 10mg / week , now off pred x 7-8 weeks  4/13/22 Dr. Walsh:  pt states he had a flare the weekend of 4/9/22 w vomiting/distension                pt self-started prednisone 40mg , this was just before his april stelara dose                claims he was feeling better               Dr. [facility]: sched colonoscopy w ? dilatation   5/10/22: tapered of pred 40mg , 10mg/wk over 1 month, now off 2weeks          no pain, n/v,  BMs: # 4-5, 1-2 x Day , wt=-132 5/17/22 Dr. [facility]: ped scope passed w/o resist in joanna-TI, one single apthae w psuedopolyp.  Flares probably 2/2 to adhesive dis, imaging may consistently show wall thickening  5/27/22 Labs:  Alb=3.8. Phos=2.7, Mag=1.9, Ca=9.2, PTH=72, Vit D=105, ALP=76                + IV Iron  6/20/22 Labs:  Alb=4.5, Phos=0.8, Mag=1.8, Ca=8.5, ELQ=634, ALP=83                          Hgb=14, ESR=2, CRP<5, Evhmyidm=008, Iron=86 6/21/22:  no pain, n/v,  BMs: # 4-5, 1-2 x Day ,                wf=906 7/26/22:  no pain, n/v,  BMs: # 4-5, 1-2 x Day ,                tl=205 9/30/22 EGD: mild gastritis, no HP; 0.75cm gastric polyp:fundic;                4cm HH, Esophagitis A--, + Barretts 10/17/22 : Alb=4.3, Phos=2.1, Mag=1.8, Ca=9,  PTH=95, ALP=80, TSH=12, Vit D=69                          Hgb=12.7 , ESR=2, CRP<5, Ferritin=57, Iron=85 12/16/22 : Alb=3.8, Phos=1.6, Mag=1.7, Ca=8.9 , ALP=68,                           Hgb=10.8 , ESR=1, Xxafchro=721, Iron=67, INR=2.3  12/19/22: un=703, had some N and distension the day after Thanksgiving                      Was having BMs and passing gas, went of liquids x 1-2 days--> resolved 1/30/23: Alb=4.4, Phos=3, Mag=1.8, Ca=9.5,  PTH=96, ALP=79,  Vit D=144                          Hgb=13.6 , ESR=5, CRP<5, Ferritin=55, Iron=56, INR=1.6    2/6/23 : cb=176, no abd pain, BMs: # 4 qd   3/17/23: developed cugh,congestion, fever, malaise  3/19/23 + Covid; Received Iv Remdesivir x 3 at home 3/27/ 23: ys=838, BMs: #4-5,  1-2x/ D   Alb=4 , Phos=1, Mag=1.8, Ca=8.5 , ALP=94  Hgb=13.8, ESR=5, CRP<5, Xtvvwxtk=744, Iron=78, INR=5.9  4/21/23 : Alb=4, Phos=2.9, Mag=1.9, Ca=8.9,  PTH=89, ALP=75,  Vit D=105/ 80, INR=3.2  5/22/23 Db=530; Hgb=12.7, ESR=2, CRP<5, Ferritin=19, Iron=42, INR=2.4               Alb=4.2, Phos=2.4, Mag=1.8, Ca=9.1, ALP=68,                      8/4/23 :  Alb=3.8, Phos=2.1, Mag=1.8, Ca=8.7,  XJS=657, ALP=68,  Vit D=78 INR=2.1  8/21/23 : Hgb=14, ESR=4, CRP=40, Xqrqkbfk=288, Iron=37, INR=4.8                Alb=4 , Phos=1.8, Mag=1.6, Ca=9.3, ALP=84,      8/22/23  :   md=729 , this weekend  had a flare, abd distension, no N/V, fever                  BMs: started # 5/6--> now # 6-7, no blood or mucous                  doesnt recall anything too solid or fiberous                  Started Pred 40mg qd, and liquid diet--> feeling better                  Less distension, passing gas and BMs  10/24/23:  yw=096, BMs: #4, 1-2x/D                   pt reports an episode of dark stool w 2-3 days ~ 9/23/23                  He held his Warfarin for a couple of days and stool returned to normal color                  He increased his Omep 40mg BID                  He had no Abd pain, N/V, ht burn, dysphagia, bloat                  9/28/23 Labs: Hgb=8.6, Iron=22, sat%=7, Ferritin=17                  10/5/23 Labs: Hgb=8.7, Iron=52, Sat=16%, Namewzgd=202                  10/12/23 : Labs: Vit D=67, Ca=8, PTH=160, Mag=2                  10/20/23 Labs: Hgb=10.9, INR=1.6, CRP<5, Ovqopfty=696, ALB=3.7, Ca=7.9, ALP=68 10/18/23 MRE:  persistent mural enhancement without wall thickening in distal small bowel loops, which is a nonspecific imaging sign and may reflect inflammation, however no other mural or mesenteric findings to indicate active inflammatory small bowel Crohn's disease.  No evidence of stricture. No imaging signs of penetrating disease.  Chronic central mesenteric fibrofatty proliferation. 10/27/23: Ustekinumab=7.1, Ust Abs <1.6 11/27/23 : FA=17, X37=332, Vit D=69, ca=9.1, ALP=84,  Alb=4.2                  Hgb=13.3,  PT=19, INR=1.7, Ferritin=33, Iron=46, Sat=13%,  12/5/23 : Homocysteine=18, IAH=937 12/11/23 :  ak=048, BMs: #4, 1-2x/D , Melena-->no    12/22/23  EGD:  gastritis, No HP; 2cm HH, Esophagitis A--,  + Barretts 12/22/23 Colonoscopy: mild ileitis of the most distal joanna-TI; small superficial apthous ulcer on colonic side of anastamosis, Random  colon BX--> wnl  12/27/23 -12/31/23 PMHC for Hypophosphatemia=0.8, Rx w IV KPhos REJ=661, Vit D =66, 7.6--> Phos=1.5, Ca=8.4, Hgb=12.6 D/C on kPhos 1 gm TID seen by [facility] 1/10/24 Phos=1.7, Vit D =53, Vit D-25= 73, Ca=8.5, ULT=332 1/18/24 Okajgjtz=542, CRP<5, ESR=1,  Ca=7.9, Hgb=13 1/29/24:  ob=911,  BMs: #4, 1-2x/D , no  Melena 1/26/24 Dr. Pritchard Capsule--> scattered irregular appearing mucosa, possible erosions, no bleeding            for single balloon enteroscopy 2/27/24  : Labs: Vit D-25=84, Ca=8.9, MDT=493, Mag=1.7, Phos=2.8                          ALB=4, BUN=15/ 1.1, K=4.6, ALP=63 3/5/24: WT = 137,  BMs: #4, 1-2x/D  3/12/24 :  Ferritin=23, Iron=25, sat=10%, CRP<5, ESR=2,  Hgb=13.7, INR=2                  Ca=9.1, Phos=2.2,  ALB=4.1, BUN=15/ 1.2, K=4.1, ALP=75 IV Iron: 3/22, 3/28, 3/29. 4/1/24 5/9/24 :  Bstgjrcp=597, Iron=86, sat=31%, CRP<5, ESR=2,  Hgb=15, INR=1.6                  Ca=8.8, Phos=1.1, Mag=1.9,  ALB=4.2, BUN=11/ 1.2, K=3.9, ALP=75 5/28/24:  cm=295, BMs: #4, 1-2x/D , no melena, ht burn  6/14/24: HHC=917, Vit D=75, Vit D 1,25=68, Ca=9.1, Phos=3.9, Mag=1.7,  ALB=3.9, BUN=14, K=4.2, ALP=76 7/5/24:  Wfpncqhy=517, Iron=91, sat=34%, Hgb=13.8, INR=1.6 7/9/24 : lg=178, BMs: #4, 1-2x/D , no melena, ht burn 8/20/24 : mi=722, BMs: #4, 1-2x/D , no melena, ht burn Labs:   CRP<5, ESR=2,  Hgb=14.5, INR=1.8                  Ca=8.7,   ALB=4, BUN=15/ 1.1, K=4, ALP=73  9/27/24 : ZWB=942, Vit D=71, Vit D 1,25=73, Ca=9.1, Phos=2.8, Mag=1.8,  ALB=4, BUN=16,K=4.,              ALP=71; Ohaawxzh=781, Iron=95, sat%=32,  Hgb=14.4 , INR=2.3  10/7/24:    s/p  iron infusion in 7/12/24, Calcium 9/27//24  ,                   last Stelera 9/2024; next begining  of 11/2024                 Today: ec=401                  BMs: #4 1-2x/D                   Melena-->no        * Abd pain-->no * Nausea--> no * Vomit--> no * Early satiety--> no * Belching--> no * Hiccups--> no * Regurgitation--> no * Acid Taste / Water Brash--> no * Ht burn--> no * Dysphagia--> no * Throat Clearing--> no * Hoarseness--> no * Post-Nasal Drip--> no * Congestion--> no * Globus--> no * Cough--> no * Wheeze / PC-> -no * Constipation--> no * Diarrhea--> No  * Bloating--> No  * Strain on Defecation--> no * Incompl Evac--> no * Flatulence--> no * Gurgling--> no * Melena--> no * BPBPR-> -no * Anorexia--> no * Wt. Loss--> no                  PRIOR HISTORY--2017 1/17/17: Hgb=9.2, ESR=6, CRP=5; 1/19/17: BMs: #4, 5-6, 2-3x/D, Mj=606, Started Creon 1 tid cc        3rd Entyv beginning Feb 2/14/17: Labs; Hgb=9.6, MCV=97, ESR=5, CRP< 5, U13=758, FA>23, Iron=59, Retic =3.2,        2/17/17 Fecal Calprotectin=16, Fats: Increased neutral & Split        3/13/17: Labs Hgb=9.5, MCV=95, ESR=7, CRP <5, ALB=3.1        3/16/17: lot of stress, to move -[place], had a job-fell thru, BMs: # 5, 2-4 x/D, wt= 131w clothes        4/19/17 EGD: gastritis, CHRISTOPHE, No HP, 2cm HH, +Barretts, No Dysplasia        Colonoscopy: Anastamosis: open w mild -mod active colitis, enteritis severe adj to anastomosis, mild more proximal, remainder of colon-wnl, 2-3 deg int hemorrhoids        4/26/17 : Hgb=7.3, MCV=95, ESR=13, CRP<5;Immediately post procedure stools very dark on eliquis/iron.        Admitted to [facility]: Hgb=8.3-->8.9 after 2 U, Alb 3.3, BUN=17, creat=1.3, Brianna/Pjvhi=556/460        4/27/17: Alb=2.3, BUN=12, creat=1.2, Brianna/Lipase=209/863-No abd pain, N/V; 5/5/17: Hgb=10.7.        5/8/17 Entyvio iv. 5/8-5/9 w dark red diarrhea; 5/10/17 Hgb=7.8.        5/11/17 Adm [facility] w stools brown, Hgb=7.9, BUN=17, Creat=1.4, Alb=2.9; S/P 2 Units- Hgb=10.6, BUN=15/1.1.        Prednisone 40mg qd, entocort d/tex.; 5/18/17: Hgb-10.4, su=236, BMs: #5, 1-2x/D, no pain        6/8/17: Hgb=7.4,MCV=98, CRP<5-ASA stopped, Pred20--> 30        6/10/17: Hgb=8.2, Alb=2.9, BUN=20/1.2 ; 6/12/17: Hgb=8.3, Retic=0.19, Iron=10, Sat%=3, Ferritin=57, FA=23,C02=055, 6/13/17: s/p 2 Unit PRBCs        6/15/17: started MCT oil, Zi=709 , BMs: # 5, 1-2x/D, stools are brownish/green         7/11/17: [facility] w unsteadiness. MRI Head: R Cortical Temporo-occipital encephalomalacia, MRA H&N-no sign lesions, OLGA-wnl.Hgb=9.9--> 8.4->9.7 after 1 Unit. Seen by Heme: received IV Iron         CRP<5, T18=886, CUX=479, FA>23,Iron=22,Sat%=6, Ferritin=42, Alb=3.5. D/C on warfarin 2mg        7/20 Hgb=8.5, 7/28 Hgb=7.7, 7/31-s/p 1 Unit         As outpt seeing Heme, to get IV Iron and 5 IV Copper        Had 'FLU' Fever=101, chills, bloat, N/V/D, Bilious. no pain, melena,brbpr        Given anti-emetic by dr Boston,then able to keep things down        On prednisone 25, warfarin w INR bet 1.6-2        8/17/17: Hgb=9.9, ESR=20, CRP-P,Ut=001, BMs: # 5, 1-2x/D, stools are brownish/green        10/2/17: Hgb=8.8, MCV=89,Phos=1.7, K=3.9, Mag=1.9, Alb=3.6,Iron<10,Ferritin=21, INR=2        10/17/17: Hgb=7.9,ESR=6,CRP<5, INR=1.6        10/25/17: Hgb=10, ESR=5, CRP=5, Alb=3.6, Phos=2, Uxsdbksr=304, G28=276        11/16/17: Hgb=11.2, ESR=14, CRP=12,         11/13/17: MRE-Chr mild distension of distal & joanna-ileum s/o mild stricturing at anast, mild mural thick & mucosal enhancemt ~ 20cm; little change from 2015 c/w mild acute on chr ileitis, 2.1 cm Liver hemangioma        11/28/17: Entyvio        11/29/17: Hgb=9.6, ESR=10, CRP=5.8, Alb=3.8,Ferritin-P, Iron=14,Sat=4%, Phos=2.5        12/19;17: Hgb=10.1, ESR=12, CRP=6.5; 12/21/17: BMs:#3-5, 1-3x/D , brown, no blood, no pain, cl=587        1/3/18:Hgb=11.7, ESR=19, CRP=6.5, Alb=4.1, Yizmjucx=469, Iron=31, Sat%=9,Zinc=55          PRIOR HISTORY---2013:         2/20/13 CT markedly dilated sb loops ext to anast, colonoscopy w open anast ,mild-mod fred-anastamotic disease. quick response to entocort/humira--probably adhesive dis.         8/10/13 w GNR bacteremia, ADA 1.7 /JENNIFFER 3.4, Humira 8/7, 8/13,8/18,9/15        CT- mucosal thickening and spiculation of the distal sb extending to the anastamosis, thickening and stranding of adj mesenteric fat.        Humira increased to 40mg weekly, entocort 4        9/2013 MRE--dilated loops in mid and distal ileum, markedly thickened and narrowed TI w decreased peristalsis of TI        11/15/13 ADA-24.9, JENNIFFER-0          PRIOR HISTORY---2014:         2/15/14--CT dilated loops SB, loop of sb in mid abd 4.3cm w infiltrative changes in the mesentery, bowel tapers in the RLQ to the anastamosis w/o transition        WBC=15K, Rx w IV steroids and Abx         3/7/14 SBFT: last 10cm sb prox to anast mild distension and sl irregularity. In the mid portion of this loop there is a mild narrowing which appears to reopen but is some what narrowed.        3/20/14 ADA=33.1, JENNIFFER=0, Lialda switch to Apriso 4 (2/2014)          PRIOR HISTORY--2015         1/11/15 Adm [facility] w 1 day N,Recurrent V, Abd pain/distension. CT-multiple distended & fluid-filled sb loops, mild wall thickening of ileum w No inflamm changes.        WBC=14.6, Hgb=17, RX w NGT suction, Levaquin iv, Solumedrol 40mg q 12( 1/12-->1/16) to prednisone 30mg BID w 5mg taper/wk        3/18/15 --Dr. Boston w edema /High BP, prednisone was tapered slowly to 2.5mg         switched to entocort 9mg qd, edema and bp improved w lasix 20mg         BMs:#4-5, 3x/D, Hgb=11, ESR=4, CRP<5        4/28/15 - 5/1/15: Adm [facility] w 1 day Abd pain, distension,N/V. 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        7/17/15: BMs:#4-6, 4-5x/D, wt 131(from 126)        8/20/15: BMs: #4, 1-2x/D or #5, 2-3x/D, ed=139, dry cough,Hgb=10, WBC=3, EEE=656, CRP=56, ESR=21        8/25/15 CT Abd/Pelv: Svl RLQ sb loops w wall thickening, mild nodularity & inflamm stranding in mesentery        Advised-restart Entocort 9mg qd, Apriso 4 qd, Maintain Humira but given RX to check drug/Ab levels        9/15/15: did nt restart meds,Hgb=9.9, WBC=4, ESR=19, CRP=5.8, qf=684; [facility] : ADA=8.5, Antibodies< 1.7        10/15/15: No pain, BMs:#4, 1-2x/D, #5-6, 3-4x/D, throat clearing/cough-better, am=578        11/30/15: No pain, BMs:# 4, 5-6 intermittently, No throat clear, yl=022          PRIOR HISTORY-2016 1/22/16; 4/8/16; 6/6/16 : No pain, BMs:# 4-5 1-2x/D, also #5-6 3x/D for 2-3D/wk, bo=440        7/14/16: hk=045, 9/22/16: so=141.5        11/10/16: 9.5lb wt loss, states BMs: 5-6, 5x/D--Taking Magnesium, No pain/anorexia        Labs: Stool Dsnmdmeifrgs=608, + Incr Fecal fat, Alb=3.4, FA =23, B39=417, Hgb=12, ESR=10, CRP <5        Magnesium-d/c, Obtain MRE asap--Start steroids and possibly switch to Entyvio        DDx discussed: active crohns--loss response to Humira, Malabsorption--loss Bile acids, Bile-induced diarrhea, SIBO        Pt wanted to wait for imaging-did not start rx        12/1//16 MRE: RUQ ileocolonic anastamosis--narrowed w upstream dilatation to 3.2 cm        Pt refused pred, Started Entocort 9mg qd and Flagyl 250mg qid about 7-10days ago         awaiting Entyvio load to start 12/22, then 1/5; had Cut back on iron bid        12/15/16: BMs:# 5, 2-3x/D, occas #6, No pain, Less bloat/flatulence, Wq=554.

## 2024-10-19 NOTE — ASSESSMENT
[FreeTextEntry1] : 1. CROHNS DISEASE of both small and large intestine: s/p flare 8/25-->8/30/21, then early 12/2021-s/p pred tapered over 4 weeks, again the weekend 4/9/22 rx w prednisone 40_ mg ( just before last Stelera dose) --> now off since early 5/2022;  8/19/23 w flare started prednisone 40mg & tapred over 4 weeks  ~ 9/23/23 Had dark stool x 2-3 days, Warfarin held 2 days & Omep BID, Hgb dropped to 8.6   s/p ileocolonic resection x 2--last 6/19/15 for recurrent sbos: prior was s/p 4 SBOs w/i 2 yrs--2/2 distal sb disease adj to anastamosis when on Humira weekly had an ADA =33 (3/20/14), -but had sbo 2/2014 at ADA=25 and another sbo 4/28/15 6/10/2015 Colonoscopy: Inflamm ST mass on ileal side w ulceration/scarred/narrow 6/11/2015 CT: dilated thickened sb loops distal jej & ileum Post-op 6/2015 probably some malabsorption 2/2 to removal of R Colon and ileum: had WT. Loss-->r/o malabsorption: ?bile-induced->-secretory vs decr micelles, active dis, PLE ? SIBO--Elev FA, Alb=3.4-->3.1-->2.9--> (7/28/17) ?SIBO/Prevent post-op recurrence --> trial Flagyl 250 mg qid~12/7/16-->d/c: 5/11/17 Also discussed trial of Cholestyramine/Xifaxin -wanted to wait 11/20/16 ACTIVE Crohn's--> 9.5lb wt loss, BMs: #5-6, 5x/D-- but taking Magnesium 12/1/16 MRE: RUQ ileocolonic anastamosis--narrowed w upstream dilatation to 3.2 cm Labs: Stool Bxtrlpjshxfm=839, + Incr Fecal fat, Alb=3.4, FA =23, V39=692, Hgb=12.3,ESR=10,CRP <5 4/19/17 :Colonoscopy: Anastamosis: open w mild -mod active colitis, enteritis severe adj to anastomosis, mild more proximal, remainder of colon=wnl 5/11/17- Adm PMHC w stools brown, but Hgb=7.9, BUN=17, Creat=1.4, Alb=2.9. S/P 2 Units w Hgb=10.6, BUN=15/1.1, Prednisone 40mg taper, entocort d/tex 6/8/17: Hgb=7.4, MCV=98, CRP<5--ASA stopped, Pred20--> 30mg, 6/13/17: s/p 2 Unit PRBCs 7/11/17 [facility] w unsteadiness. MRI Head: R Cortical Temporo-occipital encephalomalacia Hgb=9.9--> 8.4->9.7 after 1 Unit. Seen by Heme: received IV Iron CRP<5, U84=256, QIR=466, FA>23,Iron=22,Sat%=6, Ferritin=42, Alb=3.5. D/C on warfarin 2mg 7/28/17 Hgb=7.7; 7/31/17-s/p 1 Unit Heme Consultation ~ 8/2017: started IV Infusions of Iron and IV Copper 8/17/17: Hgb=9.9, ESR=20, Lx=703--Bumvifhkrp s/p GI infection w N/V/D, no obstruction 10/17/17: Hgb=7.9,ESR=6,CRP<5, INR=1.6, Got IV Iron x 2, since 10/2/17 for Iron<10, Hgb=8.8 11/16/17: Hgb=11.2, ESR=14, CRP=12, 10/26/17 Stool fat-neg, calprotectin-30 11/13/17: MRE-Chr mild distension of distal & maurizio-ileum s/o mild stricturing at anast, mild mural thick & mucosal enhancemt ~ 20cm; little change from 2015 c/w mild acute on chr ileitis, 2.1 cm Liver hemangioma 10/9/18: Hgb=11.6, MCV=94, ESR=14, CRP=5.4, INR=2.2 10/10/18 MRE: stricturing of neoterminal ileum w worsened upstream dilatation, moderate length of upstream ileum w stricturing extending at least 20cm and more extensive then previous. suggestion of 2 adj extraluminal fluid collections which may be w/i the wall of strictured ileum near the ileocolonic anastamosis pt had declined to call numbers given for IBD center at [facility] for new or investigational rx's--biologics. later he felt he was stablizing w his wt loss, and inflammatory markers  2/28/19 w ESR=12, CRP=6.5 & 2/21/19 stool calprotectin--> 232 8/15/19: ESR=10, CRP=5.2, Calprotectin--> 88 9/19/19: ESR=9, CRP=5.5 10/17/19: ESR=7, CRP< 5 11/6/19 : ESR=6, CRP=0.18 11/27/19: ESR=6, CRP <5 1/13/20: ESR=7, CRP=0.2 1/30/20: ESR=9, CRP=11  2/3/20 EGD: gastritis, +CHRISTOPHE, No HP, +erosion w ooze -clipped, 0.5cm polyp-neg; 4cm HH, Esophagitis A, + Barretts, VC: 1+ Colonoscopy: 05cm rectal polyp: HP, 2nd deg int hemorrhoids mild-mod activity: AMIRA w cryptitis & mild archect distortion at ileocolonic anast: colon & ileal side, no stricture  3/2/20:ESR=7, CRP=0.26 3/9/20: VDZ>40, Ab to VDZ <1.6 3/17/20: ESR=6, CRP=6.4 10/17/20: ESR=11, CRP <5 1/4/21:ESR=9, CRP<5 2/22/21: ESR=8, CRP<5 4/5/21: ESR=9, CRP<5 6/4/21: ESR=9, CRP<5 6/11/21: wt= 135, no pain, stool more formed # 4, 1-2x/d  s/p episode --abd distenstion, pain, N/V, no BM  eventually started having diarrhea and flatus, BMs returned to normal  lost 2-3 lbs but regained 7/4/21: CRP <5, Hgb=12  7/16/21 MRE: limited to incomplete bowel distention, chr distal ileitis/probable inflammatory stricturing vs collapse of the maurizio-TI--but improved since 2018 , moderate proctitis 7/12/21 -- ? flare --> entyvio should have been q 5 wk , went q 8 wks  next dose should be in 4 weeks x 2 then 5 weeks, to check levels 7/20/21: Cliically stable, unclear if MRE accurate 2/2 underdistension, but gained wt and CRP--normal 7/23/21 Entyvio level= 39, Abs <1.6 8/23/21: Labs--Hgb=13.6, ESR=10, CRP=6.6, Mzokdiwd=358, Iron=26, Alb=3.9, BUN=16 8/26/21 p/w sbo w N/V, abd pain/bloating x 3 days, unable to keep things down Labs: WBC=5, Hgb=12, CRP=43, ESR=11, Alb=4.4, BUN=28, Creat=-1.6 CT Abd/Pelvis: diffuse marked dilatation of SB Loops w transition pt in R. mid/lower abdomen ~ 5-6cm, proximal to the ileocolonic anastomosis RX w IV solumedrol 20mg q8h, NGT, IVF, pt refused TPN, also w UTI from prostatitis 8/27 NGT was pulled, and clears started and advanced to LR, was d/c home 8/30 on prednisone 40mg qd w taper by 10mg/wk to 20mg qd   ( **Entyvio's :time 0=12/22/16 ( Humira 40mg QW); 1/5/17--2wks, s/p 3rd infusion at wk 6, then q 6weeks #6 :6/21/17-->held 2/2 Shingles, then Infusions as follows=9/19/17 , 10/17/17, 11/28/17, 1/16/18 ,2/16/18, 3/19/18,4/16/18, 5/14/18, 6/25/18, 8/7/18, 9/17/18, 10/16/18, 11/13/18, 12/11/18, 1/14/19, 2/15/19, 3/15/19, 5/16/19, 6/18/19,7/24/19, 9/9/19, 10/2/19, 11/4/19, 12/12/19, 1/6/20, 2/4/20, 3/3/20, 9/17/20, 10/15/20, 11/18/20, 1/11/21, 2/8/21, 3/8/21, 4/8/21, 6/3/21, 7/1/21, 8/2/21, 9/2/21,10/25/21 )  3/8/22 MRI Abd/Pelv: thickened distal Jejunal loops which are tethered; distal ileal segment (10-12cm ) previously actively inflammed has decreased & now characterized by an area of stricture, however the maurizio-TI 5mm to the anastamosis is enhanced as well as narrowed. Colon just distal to the anastamosis has a focal segment of inflammation & stricture. the recto-sigmoid appears inflammed  3/28/22: Hgb=14.4 , ESR=1, CRP <5, Ca=8.5, Mag=1.7,Alb=4.1, Iron=104, Pocnmzzn=568,  5/9//22: Hgb=13.8 , ESR=2, CRP <5, Ca=8.8, Mag=1.6, Alb=4, Iron=64, Mtidxmyz=049 5/17/22 Dr. Walsh Colonoscopy: ped scope passed w/o resist in maurizio-TI, one single apthae w psuedopolyp.  6/21/22 Labs: Hgb=14, ESR=2, CRP<5, Alb=4.5, Phos=0.8, Mag=1.8, Ca=8.5,Cimdwypw=418, Iron=86  7/25/22 Labs: Hgb=14, ESR=2, CRP<5, Alb=4.2, Phos=1.5, Mag=1.8, Ca=8.6, Zlghkhww=041, Iron=57, PT=24, INR=2.1 10/17/22 Labs: Hgb=12.7, ESR=2, CRP<5, Alb=4.3, Phos=2.1, Mag=1.8, Ca=9, Ferritin=57, Iron=85, PT=23, INR=2 12/16/22 : Labs: Hgb=10.8 , ESR=1, Alb=3.8, Phos=1.6, Mag=1.7, Ca=8.9 , ALP=68, Sbplklxw=750, Iron=67, INR=2.3 1/30/23: Labs: Hgb=13.6 , ESR=5, CRP<5, Alb=4.4, Phos=3, Mag=1.8, Ca=9.5, ALP=79,Ferritin=55, Iron=56,  3/27/ 23: Labs: Hgb=13.8, ESR=5, CRP<5 ,Alb=4 , Phos=1, Mag=1.8, Ca=8.5 , ALP=94, Nkdotmia=954, Iron=78,  INR=5.9  5/22/23 Labs: Hgb=12.7,ESR=2,CRP<5, Alb=4.2, Phos=2.4, Mag=1.8, Ca=9.1, ALP=68,Ferritin=19, Iron=42, INR=2.4 8/4/23 : Alb=3.8, Phos=2.1, Mag=1.8, Ca=8.7, ABL=913, ALP=68, Vit D=78 INR=2.1 8/21/23 : Hgb=14, ESR=4, CRP=40, Ofxqgkjl=974, Iron=37, INR=4.8; Alb=4 , Phos=1.8, Mag=1.6, Ca=9.3, ALP=84 9/28/23 Labs: Hgb=8.6, Iron=22, sat%=7, Ferritin=17  10/5/23 Labs: Hgb=8.7, Iron=52, Sat=16%, Hmysorgg=878  10/12/23 : Labs: Vit D=67, Ca=8, PTH=160, Mag=2  10/20/23 Labs: Hgb=10.9, INR=1.6, CRP<5, Nzgvdpzz=831, ALB=3.7, Ca=7.9, ALP=68 10/18/23 MRE: persistent mural enhancement without wall thickening in distal small bowel loops, which is a nonspecific imaging sign and may reflect inflammation, however no other mural or mesenteric findings to indicate active inflammatory small bowel Crohn's disease.  No evidence of stricture. No imaging signs of penetrating disease.  Chronic central mesenteric fibrofatty proliferation. 10/27/23: Ustekinumab=7.1, Ust Abs <1.6 11/27/23 :   Hgb=13.3,  PT=19, INR=1.7, Ferritin=33, Iron=46, Sat=13%,                    10/27/23: Ustekinumab=7.1, Ust Abs <1.6 11/27/23 : FA=17, D66=604, Vit D=69, ca=9.1, ALP=84,  Alb=4.2 12/22/23 Colonoscopy: mild ileitis of the most distal maurizio-TI; small superficial apthous ulcer on colonic side of anastamosis, Random  colon BX--> wnl           12/27/23 -12/31/23: Phos=0.8, ESG=409, Vit D =66, Ca=7.9 --> Phos=1.5, Ca=8.4, Hgb=12.6, Alb=3.5 1/10/24 Phos=1.7, TPB=790, Ca=8.5, Vit D =53, Vit D-25= 73,  1/18/24 :  Hgb=13, Vtxbdjne=414,Iron=67,  CRP<5, ESR=1,  Ca=7.9, Alb=4.1, ALP=84 5/9/24 :  Hgb=15, Avelecyo=597, Iron=86, sat=31%, CRP<5, ESR=2,                   Ca=8.8, Phos=1.1, Mag=1.9,  ALB=4.2, ALP=75 8/20/24 :   Hgb=14.5, INR=1.8; CRP<5, ESR=2,  Ca=8.7,   ALB=4, ALP=73 9/27/24 :   Hgb=14.4 , INR=2.3, CRP<5, ESR=2, Tvyqxtdt=504, Iron=95, sat%=32,  Phos=2.8, ALB=4,              A / PLAN: Recent GI Bleed on warfarin, although MRE looks better, probably from ileum, Hgb improving  h/o sbo's prox to anastamosis  inflammatory vs fibrosis vs combination: 3/2022 MRI shows improvement of inflammation w possible residual stricture in the ileum and probable colitis near the anastamosis and distal colon  Responded to steroids iv--> po--d/c ~ 10/20/21,  tapered steroids from 40mg qd to 20mg, [id number] qd and taper 5mg/wk  then po taper again early 12/2021 40mg qd w 10mg taper/ wk--  PO prednisone 40mg mg qd started on 4/9/22 ,tapered off ~ early 5/2022  PO prednisone 40mg mg qd started on 8/19/23, taper 10mg/wk--> 20mg then 5 mg /wk   Entyvio level was 39 w/o Abs~ 3weeks after 7/1/21 dose ,  speaks to inflammatory component & probably loss of response  Stelara # 1 dose on 10/25/21, #2 on 12/10/21, # 3 on 2/11/22, # 4 on 4/20/22 , #5 mid June 2022,  #6 mid 8/2022, #7 beginning 12/2022; #8 on 2/2/23, #9 on 4/2023, #10 on 6/2023 , last dose-->9/2024    IBD consensus : due to malnutrition /steroid dependency, surgery is next best option  but pt reported feeling well and wanted to pursue medical treatment  repeated imaging in March after 3 months of Stelara, then consider surgery vs improved candidacy for endoscopic manipulation  f/u w IBD consultant Dr. Walsh at  & reviewed imaging after 3 months of Stelera  for Colonoscopy ( w possible balloon dilatation )-> last 1 3/4 yrs ago   5/17/22 Dr. Walsh Colonoscopy: ped scope passed w/o resist in maurizio-TI, one single apthae w psuedopolyp.  No Dilatation needed, very mild dis at Maurizio-TI, MRE may show wall thickening, sbo's probably adhesive dis 12/22/23 Colonoscopy: mild ileitis of the most distal maurizio-TI; small superficial apthous ulcer on colonic side of anastamosis, Random  colon BX--> wnl        1/26/ 24 Dr. Pritchard Capsule--> scattered irregular appearing mucosa, possible erosions, no bleeding 4/5/24 Dr. Pritchard: Single Balloon Enteroscopy: to mid jejujum--> wnl; bx w  mild patchy IELs       1/4/22 Labs: Hgb=11.5, ESR=6, CRP<5, Alb=3.9 1/31/22 Calprotectin =84 2/14/22: Hgb=10.6, ESR=9, CRP <5, Ca=8.3, Mag=1.6, Alb=3.8, Iron=25, Ferritin=15 3/28/22: Hgb=14, ESR=1, CRP<5 , Ca=8.5, Mag=1.7, Alb=4, Iron=104, Kyprmmno=900 5/9//22: Hgb=13.8 , ESR=2, CRP <5, Ca=8.8, Mag=1.6, Alb=4, Iron=64, Pjparqyy=339  6/21/22 : Hgb=14, ESR=2, CRP<5, Ca=8.5, ,Alb=4.5, Mag=1.8, Iron=86,Ypddvkkq=067,  7/25/22 : Hgb=14, ESR=2, CRP<5, Alb=4.2, Phos=1.5, Mag=1.8, Ca=8.6, Ohihiwdq=481, Iron=57 10/17/22: Hgb=12.7 , ESR=2, CRP<5, Alb=4.3, Phos=2.1, Mag=1.8, Ca=9, Ferritin=57, Iron=85  12/16/22 : Labs: Hgb=10.8 , ESR=1, Alb=3.8, Phos=1.6, Mag=1.7, Ca=8.9 , ALP=68, Ljmpieph=605, Iron=67,  1/30/23: Labs: Hgb=13.6 , ESR=5, CRP<5, Alb=4.4, Phos=3, Mag=1.8, Ca=9.5, ALP=79,Ferritin=55, Iron=56,  3/27/ 23: Labs: Hgb=13.8, ESR=5, CRP<5 ,Alb=4 , Phos=1, Mag=1.8, Ca=8.5 , ALP=94, Njeuudnl=442, Iron=78, INR=5.9  5/22/23 Labs: Hgb=12.7,ESR=2,CRP<5, Alb=4.2, Phos=2.4, Mag=1.8, Ca=9.1, ALP=68,Ferritin=19, Iron=42, INR=2.4  8/4/23 : Alb=3.8, Phos=2.1, Mag=1.8, Ca=8.7, CTR=674, ALP=68, Vit D=78 INR=2.1  8/21/23 : Hgb=14, ESR=4, CRP=40, Alb=4 , Phos=1.8, Mag=1.6, Ca=9.3, ALP=84,Ggyakqbd=516, Iron=37, 9/28/23 Labs: Hgb=8.6, Iron=22, sat%=7, Ferritin=17 10/5/23 Labs: Hgb=8.7, Iron=52, Sat=16%, Zfcxihjw=146 10/12/23 : Labs: Vit D=67, Ca=8, PTH=160, Mag=2  10/20/23 : Hgb=10.9, INR=1.6, CRP<5, Qadrbpsz=061, ALB=3.7, Ca=7.9, ALP=68  10/27/23: Ustekinumab=7.1, Ust Abs <1.6  11/27/23 :   Hgb=13.3,  PT=19, INR=1.7, Ferritin=33, Iron=46, Sat=13%,                     FA=17, D78=015, Vit D=69, ca=9.1, ALP=84,  Alb=4.2 1/18/24 :  Hgb=13, Fttfkldd=927,Iron=67,  CRP<5, ESR=1,  Ca=7.9, Alb=4.1, ALP=84 2/27/24  : Labs: Vit D-25=84, Ca=8.9, JFV=523, Mag=1.7, Phos=2.8;  ALB=4, ALP=63 3/12/24 :    Hgb=13.7, INR=2,  Ferritin=23, Iron=25, sat=10%, CRP<5, ESR=2,                   Ca=9.1, Phos=2.2,  ALB=4.1, ALP=75 5/9/24 :  Hgb=15,  Vgfxckch=691, Iron=86, sat=31%, CRP<5, ESR=2,  INR=1.6                  Ca=8.8, Phos=1.1, Mag=1.9,  ALB=4.2, ALP=75 6/14/24:  Vit D=75, Vit D 1,25=68, Ca=9.1, ALP=76, Phos=3.9, ALB=3.9 7/5/24  Ijjxxlqp=455, Iron=91, sat=34%, Hgb=13.8, INR=1.6 8/20/24 : Hgb=14.5, INR=1.8, CRP<5, ESR=2,Ca=8.7,   ALB=4, BUN=15/ 1.1, K=4, ALP=73 9/27/24 :  Hgb=14.4 , INR=2.3, CRP<5, ESR=2, Ca=9.1,  Phos=2.8,  ALB=4, BUN=16,K=4, ALP=71; Oexssznu=370,   Probiotics 3 qd Diet: LR, Lactose free, protein drinks tid MCT oil begun but never maintained **trend --cbc, esr, crp, albumin, calprotectin  **monitor wt--- usu bet 136-139, 7/20/21=136, 11/22/21=139, 1/4/22=132, 2/22/22=132, 4/5/22=131,5/10/70=929,  6/21/22=133, 7/27/22=136, 12/19/22=138; 2/6/23=140, 3/28/71=795, 5/23/23 =137, 8/22/23=135,10/24/89=902, 12/11/23=136,1/29/24= 135,  3/5/24 =137, 5/28/24=134 ,  7/9/24 bp=980, 8/20/24 qt=197, 10/1/24 lj=058         2. Iron deficiency anemia : --> recent drop from GIB--> 9/2023   had initially dropped , after clinical flare and post procedure bleeding Probably multifactorial: ACD, Blood loss, malabsorption/nutrient deficiencies Eliquis-->warfarin after CVA, On Entyvio 7/11/17 s/p Adm for unsteady gait w MRI c/w CVA--warfarin begun: possible better control of AC Chromagen 2 qd--> IV Iron , s/p IV Cu x 5, FA 1mg qd , B12 SL & IM Still requiring IV Iron and cu infusions prn  most recent IV Iron infusion x for Ferritin = 23 2/22/21: T55=775, 6/4/21: Cu=91, Zinc=69, Ferritin=32,Iron=43, 7/12/21: Wr=551, Zinc=74, Wtwxwrei=554, Iron=35 11/15/21 : Hgb=12, Ferritin =104, Ca=9, Mg=1.7 1/4/22: Hgb=11.5, Ca=8.8, Mg=2.9, Txcyrop=843 2/14/22: Hgb=10.6, Ca=8.3, Mag=1.6, Alb=3.8, Iron=25, Ferritin=15 3/28/22: Hgb=14.4 , Ca=8.5, Mag=1.7,Alb=4.1, Iron=104, Elxjsmtu=013,  5/9//22: Hgb=13.8 , Ca=8.8, Mag=1.6, Alb=4, Iron=64, Ocrllfwe=285 6/20/22: Hgb=14.4 , Ca=8.5, Mag=1.8, Alb=4.5, Iron=96, Cnvejjif=048 7/25/22 Labs: Hgb=14,Ca=8.6, Mag=1.8,Alb=4.2,Iron=57 Atxzrody=333, , PT=24, INR=2.1 10/17/22 Labs: Hgb=12.7,Ca=9, Mag=1.8,Alb=4.3,Iron=85 Ferritin=57, , PT=23, INR=2 12/16/22 : Labs: Hgb=10.8 , Alb=3.8, Iron=67,Zycampdz=715, , INR=2.3 1/30/23: Labs: Hgb=13.6 , Ca=9.5, Mag=1.8, Alb=4.4,Ferritin=55, Iron=56, INR=1.6  3/27/ 23: Labs: Hgb=13.8, ESR=5, CRP<5 , Mag=1.8, Alb=4, Knxqbmlk=697, Iron=78, INR=5.9  5/22/23 Labs: Hgb=12.7,ESR=2,CRP<5, Alb=4.2,Ferritin=19, Iron=42, INR=2.4 8/21/23 : Labs: Hgb=14, ESR=4, CRP=40, Bvkwatzq=639, Iron=37, INR=4.8; Alb=4 , 9/28/23 Labs: Hgb=8.6, Iron=22, sat%=7, Ferritin=17 10/5/23 Labs: Hgb=8.7, Iron=52, Sat=16%, Qhwvslez=967 10/20/23 : Hgb=10.9, INR=1.6, CRP<5, Ntoryraw=195,  11/27/23 :   Hgb=13.3, INR=1.7, Iron=46, Sat=13%, Ferritin=33, FA=17, A54=426, 1/18/24 :  Hgb=13, INR=2.1,Iron=67, Awzksxoj=220, CRP<5, ESR=1,  Ca=7.9, Alb=4.1, ALP=84 3/12/24 :  Hgb=13.7, INR=2,  Iron=25, Ferritin=23,  CRP<5, ESR=2, Ca=9.1, Phos=2.2,  ALB=4.1, ALP=75 5/9/24 :  Hgb=15, INR=1.6 , Iron=86, Ndamqxfg=038,sat=31%, CRP<5, ESR=2, Ca=8.8, Phos=1.1  ALB=4.2,  ALP=75 6/14/24:  Ca=9.1, Phos=3.9, Mag=1.7,  ALB=3.9,  ALP=76 7/5/24 : Hgb=13.8, INR=1.6, Iron=91,Knxvuzin=337,  sat=34%,  8/20/24 :  Hgb=14.5, INR=1.8, CRP<5, ESR=2, ALB=4 9/27/24 : Hgb=14.4 , INR=2.3 , CRP <5, ESR=2, Ucmollxu=256, Iron=95, sat%=32,    12/22/23  EGD:  gastritis, No HP; 2cm HH, Esophagitis A--,  + Barretts 12/22/23 Colonoscopy: mild ileitis of the most distal maurizio-TI; small superficial apthous ulcer on colonic side of anastamosis, Random  colon BX--> wnl 1/26/ 24 Dr. Pritchard Capsule--> scattered irregular appearing mucosa, possible erosions, no bleeding 4/5/24 Dr. Pritchard: Single Balloon Enteroscopy: to mid jejujum--> wnl; bx w  mild patchy IELs  B12 1cc IM ____L. delt--  given today, trend cbc, B12, FA, Iron panel Adj INR--closer to low 2's.         3. Osteoporosis : Progression on BD from 5/5/16 Crohns, h/o steroid use Calcium citrate & Vit D per Endo Forteo since 5/10/19 , then Reclast Repeat BD of 8/2018 --showed worsening to Osteoporosis from 2016 Rec Endo consult w Dr. Jara :Osteoporosis center at [facility]--pt never went originally 9/21/18: Phos=2.4, Ca=8.7, Alb=3.4, Vit D=27, LNV=383, 10/16/18: Phos=2.8, Ca=8.7, Alb=3.5, 1/14/19: Phos=2.6, Ca=8.7, Alb=3.6 2/28/19: Phos=1.8, Ca=8.9, Alb=3.7, VitD=39, EVG=142 3/18/19: Ca=8.5, Alb=3.7, Vit D=44.6, PTH=93 7/11/19: Ca=9.1, Alb=3.7, Phos=3.9, Vit D=40/ 306, GZH=224 8/15/19: Ca=9, Alb=4.2, Phos=4.4, VitD=59, LFU=364 10/17/19: Ca=9.6, Alb=3.9, Phos=3.5 11/6/19: Ca=9.1, Alb=3.9. Phos=3.7, VitD=50, PTH=80 1/13/20: ca=9.1 1/30/20: Ca=9.2, Alb=3.9, Phos=2.7, Mag=1.5, Vit D=103/41, PTH=87 2/18/20:ca=8.5, Alb=3.6, 3/2/20: Ca=8.5, Alb=3.6 3/17/20: Ca=9.1, Alb=3.7, Phos=3.4, Mag=1.7 10/17/20: Ca=8.9, Alb=4, Phos=2.3, Mag-2.0, Vit D=66, PTH=47 1/4/21 : Ca=9.4, Alb=4.2, Phos=2.7, Mag=2, Vit D=64, PTH=87.5 4/5/21: Ca=9.1, Alb=4, Phos=3.1, Mag=1.7, 6/4/21: ca=9, Alb=3.8, Phos=2.8, Mag=1.8 7/12/21: Ca=8.6, ALB=6, phosph=2.3, Mag=1.8 11/15/21: Ca=9, Alb=3.7, Mag=1.7 1/4/22: ca=8.8, Alb=3.9, Mg=2.9, phos=2.9 1/21/22: Ca=8.8, Alb=3.9, Vit D=60, PTH=85 2/14/22: Ca=8.3, Mag=1.6, Alb=3.8, 3/28/22: Ca=8.5, Mag=1.7, Alb=4.1, Phos=1.2  5/9//22: Ca=8.8, Mag=1.6, Alb=4, Phos=1.6  5/27/22: Ca=9.2 , Mag=1.9, Alb=3.8 , Phos=2.7, PTH=72, VitD=105  6/20/ 22: Ca=8.5 , Mag=1.8, Alb=4.5 , Phos=0.8, SYR=414,  7/25/22 : Ca=8.6, Mag=1.8 , Alb=4.2, Phos=1.5,  10/17/22: Ca=9, Mag=1.8 , Alb=4.3, Phos=2.1, PTH=95, Vit D=25  12/16/22 : Labs: Ca=8.9, Mag=1.7, Alb=3.8, Phos=1.6,  1/30/23 Labs: Ca=9.5, Mag=1.8, ALP=79, Alb=4.4, Phos=3, PTH=96, Vit D =144  3/27/ 23: Labs: Ca=8.5, Mag=1.8 , ALP=94, Alb=4 , Phos=1,  4/21/23 Labs: Ca=8.9, Mag=1.9, ALP=75, Alb=4., Phos=2.9, PTH=89, Vit D =108  5/22/23 Labs:Ca=9.1,Mag=1.8 , ALP=68,Alb=4.2, Phos=2.4,  8/4/23 : Labs Ca=8.7, Mag=1.8, ALP=68, Alb=3.8, Phos=2.1, GTV=871, Vit D=78 8/21/23 : Labs: Ca=9.3, Mag=1.6, ALP=84, Alb=4 ,Phos=1.8 10/12/23 : Labs: Ca=8, PTH=160, Mag=2, Vit D=67, 10/20/23 Labs: Ca=7.9, ALP=68,ALB=3.7, 11/27/23 :  Ca=9.1, ALP=84,  Alb=4.2Vit D=69, 12/27/23 -12/31/23:  Ca=7.9, ALP=98,  Phos=0.8, SAF=674, Vit D =66, --> Phos=1.5, Ca=8.4, ALP=85,Alb=3.5 1/10/24:  Ca=8.5, ALP=87,  Phos=1.7, BAX=496,  Vit D 1,25=53, Vit D-25= 73,  1/18/24 :  Ca=7.9,  ALP=84,   Alb=4.1,  2/27/24  : Labs: Ca=8.9, ALP=63, ALB=4,Phos=2.8, QHJ=936, Vit D 1,25=70, Vit D-25=84,  Mag=1.7,   3/12/24 :   Ca=9.1, Phos=2.2,  ALB=4.1, ALP=75             5/9/24 :  Ca=8.8, Phos=1.1, Mag=1.9,  ALB=4.2, ALP=75         6/14/24:  Ca=9.1, Phos=3.9, Mag=1.7,  ALB=3.9, ALP=76, SCI=305, Vit D=75, Vit D 1,25=68, 9/27/24 :  Ca=9.1, Phos=2.8, Mag=1.8, ALB=4, ALP=71, TKP=253, Vit D=71, Vit D 1,25=73,   Dr. Jara: Hyperparathyroidism-- probably secondary due to low Vit D/Ca & ? superimposed primary, [facility] E[facility] Consult init felt Parathyroid scan showing activity in mediastinum is ectopic parathyroid, then felt sx not indicated at that time, elev PTH is secondary to low Vit D/Ca ? Vit D Resistance--> in the past did well w Vit D,1,25--> 86-97 & Vit D25-->  but w Ca~ 9.4 Rx replete Vit D , Phosphate and currently  IV Calcium & po as per endo  trend Vit D, Ca, Phos, PTH,  BD--9/2020: inprovmt in spine and hip , no change in wrist, BD--10/17/22: Decr T score in spine & hip, incr in wrist, repeat--> BD--2/22/24: Spine T= -1.2, Fem neck T= -2.1; Total Hip T= -2;  Forearm T= -2.2 --> spine & hip were better 10/5/20, 9/2021,6/2022, 1/2/24 -s/p Reclast--repeat-->      4. GERD: recently less active by ENT , no ht burn, dysphagia, + throat clear  h/o Dry cough, CXR:sameer, saw ENT bergstein-->LPR * ++ LPR, ++ Stewart's w No Dysplasia, + H/O Esophagitis grade: A was found  Recommend: * Anti-reflux diet & life-style changes reviewed & re-emphasized. ++ Bedge required * Weight reduction & regular exercise emphasized  * need for PPI: Omep 40 BID--> 40mg qd , ++ H2B q HS:Zantac 300mg--> Pepcid 40mg I reviewed & summarized the prospective randomized & retrospective non-randomized studies looking at potential long term SE's of PPIs, w special attention to associations & actual cause as related to GI infections, bone loss, cognitive changes, KD, Covid, vitamin & electrolyte deficiencies questions were answered, pt advised that PPIs should be used when needed as indicated by their clinical indication and response and tapering off is always the goal if possible. pt understood.  * F/U EGD: --> 2026--for Barretts screening / surveillance * No need for pH Monitor, Manometry, Esophagram -- * ++ need for ENT eval/F/U, No need for Surgical eval --      5. Hemorrhoids: well - controlled. No pain, swelling, itch, bleeding * Discussed previously the potential complications of thrombosis, pain, infection, swelling, itching, bleeding Reccomend: * currently Low - Fiber Diet was emphasized * 6 -- 8 cups of decaffeinated fluid daily was emphasized * Sitz Bathes prn, No: Anusol HC Suppos / Cream SC BID -- was needed * No: Tucks BID, Balneol Lotion, Calmoseptine Oint -- was needed ; ++ Prep H prn * No: need for Colorectal surgical evaluation for possible ablation       6.  Hepatomegaly-->not confirmed by recent abd sono CTE w relative enlargemt, ? lobulation & enlarged portal/mesenteric veins LFTs-wnl, no ascites or edema R/O CLD, Hepatic vein thrombosis Abd sono w doppler--> Liver and spleen normal size, no thrombosis, normal portal and hepatic vein flow

## 2024-10-20 NOTE — ASSESSMENT
[FreeTextEntry1] : 1. CROHNS DISEASE of both small and large intestine:    s/p ileocolonic resection x 2--last 6/19/15 for recurrent sbos: prior was s/p 4 SBOs w/i 2 yrs--2/2 distal sb disease adj to anastamosis when on Humira weekly had an ADA =33 (3/20/14), -but had sbo 2/2014 at ADA=25 and another sbo 4/28/15 6/10/2015 Colonoscopy: Inflamm ST mass on ileal side w ulceration/scarred/narrow 6/11/2015 CT: dilated thickened sb loops distal jej & ileum 6/19/15 [facility]: s/p Lap w extensive lysis of adhesions, hepatic flex, sigmoid and sb anastamosis, s/p partial r colectomy and sb resection--side to side davis: 8-10 inch from prior anast to TV colon.  Post-op 6/2015 probably some malabsorption 2/2 to removal of R Colon and ileum: had WT. Loss-->r/o malabsorption: ?bile-induced->-secretory vs decr micelles, active dis, PLE ?SIBO/Prevent post-op recurrence --> trial Flagyl 250 mg qid~12/7/16-->d/c: 5/11/17 11/20/16 ACTIVE Crohn's--> 9.5lb wt loss, BMs: #5-6, 5x/D-- but taking Magnesium 12/1/16 MRE: RUQ ileocolonic anastamosis--narrowed w upstream dilatation to 3.2 cm Labs: Stool Neyaujyteaoe=705, + Incr Fecal fat, Alb=3.4, FA =23, V21=434, Hgb=12.3,ESR=10,CRP <5 12/2016  Humira to  Entyvio 4/19/17 :Colonoscopy: Anastamosis: open w mild -mod active colitis, enteritis severe adj to anastomosis, mild more proximal, remainder of colon=wnl 11/16/17: Hgb=11.2, ESR=14, CRP=12, 10/26/17 Stool fat-neg, calprotectin-30 11/13/17: MRE-Chr mild distension of distal & joanna-ileum s/o mild stricturing at anast, mild mural thick & mucosal enhancemt ~ 20cm; little change from 2015 c/w mild acute on chr ileitis, 2.1 cm Liver hemangioma 10/10/18 MRE: stricturing of neoterminal ileum w worsened upstream dilatation, moderate length of upstream ileum w stricturing extending at least 20cm and more extensive then previous. suggestion of 2 adj extraluminal fluid collections which may be w/i the wall of strictured ileum near the ileocolonic anastamosis pt had declined to call numbers [facility] or investigational rx's--biologics. later he felt he was stablizing w his wt loss, and inflammatory markers 2/3/20 Colonoscopy: 05cm rectal polyp: HP, 2nd deg int hemorrhoids mild-mod activity: AMIRA w cryptitis & mild archect distortion at ileocolonic anast: colon & ileal side, no stricture 6/11/21: wt= 135, had  abd distenstion, pain, N/V, no BM, eventually started having diarrhea and flatus, BMs  entyvio was 6/3/21--which is almost 8 weeks, was supposed to be 5-6 weeks to start  7/16/21 MRE: limited to incomplete bowel distention, chr distal ileitis/probable inflammatory stricturing vs collapse of the joanna-TI--but improved since 2018 , moderate proctitis 7/20/21:  unclear if MRE accurate 2/2 underdistension, but gained wt & CRP-wnl;-7/23/21 Entyvio = 39, Abs <1.6 8/26/21 p/w sbo w N/V, abd pain/bloating x 3 days, unable to keep things down CT Abd/Pelvis: diffuse marked dilatation of SB Loops w transition pt in R. mid/lower abdomen ~ 5-6cm, proximal to the ileocolonic anastomosis RX w IV solumedrol 20mg q8h, NGT, IVF, pt refused TPN, also w UTI from prostatitis, d/c home on prednisone 40 10/25/21 Stelera started 11/3/21 [facility]: wt above 140, off pred x 2 weeks, 1-2 BMs qd  Discussed at IBD conference, reviewed previous colonoscopy, and recent imaging; consensus that due to malnutrition and steroid dependency, surgery is next best option however pt reports feeling great and wants to pursue medical treatment which is reasonable given he feels well repeat imaging in mid-January after 3 months of Stelara, and then consider referral to surgery vs improved candidacy for endoscopic manipulation (currently presumed one long stricture). 3/8/22 MRI Abd/Pelv: thickened distal Jejunal loops which are tethered; distal ileal segment (10-12cm ) previously actively inflammed has decreased & now characterized by an area of stricture, however the joanna-TI 5mm to the anastamosis is enhanced as well as narrowed. Colon just distal to the anastamosis has a focal segment of inflammation & stricture. the recto-sigmoid appears inflammed 5/17/22 Dr. [facility]: ped scope passed w/o resist in joanna-TI, one single apthae w psuedopolyp. Flares probably 2/2 to adhesive dis, imaging may consistently show wall thickening  8/22/23 : rv=040 ,  abd distension, no N/V,  BMs: started # 5/6--> now # 6-7,  Started Pred 40mg qd, and liquid   ~9/23/23 dark stool w 2-3 days; He had no Abd pain, N/V  9/28/23 Labs: Hgb=8.6, Iron=22, sat%=7, Ferritin=17 10/27/23: Ustekinumab=7.1, Ust Abs <1.6 11/27/23 :   Hgb=13.3,  PT=19, INR=1.7, Ferritin=33, Iron=46, Sat=13%,   10/18/23 MRE: persistent mural enhancement without wall thickening in distal small bowel loops, which is a nonspecific imaging sign and may reflect inflammation, however no other mural or mesenteric findings to indicate active inflammatory small bowel Crohn's disease.  No evidence of stricture. No imaging signs of penetrating disease. 12/22/23 Colonoscopy: mild ileitis of the most distal joanna-TI; small superficial apthous ulcer on colonic side of anastamosis, Random  colon BX--> wnl        1/26/ 24 Dr. Pritchard Capsule--> scattered irregular appearing mucosa, possible erosions, no bleeding 4/5/24 Dr. Pritchard: Single Balloon Enteroscopy: to mid jejujum--> wnl; bx w  mild patchy IELs               A / PLAN: Crohns disease  h/o sbo's prox to anastamosis  inflammatory vs fibrosis vs combination vs adhesions  s/p flare 8/25/21, 12/2021,4/9/22  ( just before last Stelera dose),8/19/23;    ~ 9/23/23 Had dark stool x 2-3 days, Warfarin held 2 days & Omep BID, Hgb dropped to 8.6  10/18/23 MRE: persistent mural enhancement without wall thickening in distal small bowel loops, which is a nonspecific imaging sign and may reflect inflammation, however no other mural or mesenteric findings to indicate active inflammatory small bowel Crohn's disease.  No evidence of stricture. No imaging signs of penetrating disease.   12/22/23 Colonoscopy: mild ileitis of the most distal joanna-TI; small superficial apthous ulcer on colonic side of anastamosis, Random  colon BX--> wnl         9/27/24 :  Hgb=14.4 , INR=2.3, CRP<5, ESR=2, Ca=9.1,  Phos=2.8,  ALB=4, BUN=16,K=4, ALP=71; Vdqrzmct=535,    10/13/24: Intermittent N/V, cramping, Diarrhea                 Pt didnt want to go to ED                 Started Prednisone                 Unclear if PSBO 2/2 Flare vs adhesions or viral GE   taper steroids from 40mg by 5mg/wk  Stelara( # 1 10/25/21) last dose-->9/2024   Probiotics 3 qd Diet: LR, Lactose free, protein drinks tid MCT oil begun but never maintained repeat MRE check Stelera level **trend --cbc, esr, crp, albumin, calprotectin  **monitor wt--- usu bet 136-139,  12/11/23=136,1/29/24= 135,  3/5/24 =137, 5/28/24=134 ,  7/9/24 uc=365, 8/20/24 oc=580, 10/1/24 hz=623         2. Iron deficiency anemia : --> recent drop from GIB--> 9/2023   had initially dropped , after clinical flare and post procedure bleeding Probably multifactorial: ACD, Blood loss, malabsorption/nutrient deficiencies Eliquis-->warfarin after CVA, On Entyvio 7/11/17 s/p Adm for unsteady gait w MRI c/w CVA--warfarin begun: possible better control of AC Still requiring IV Iron  12/22/23  EGD:  gastritis, No HP; 2cm HH, Esophagitis A--,  + Barretts 12/22/23 Colonoscopy: mild ileitis of the most distal joanna-TI; small superficial apthous ulcer on colonic side of anastamosis, Random  colon BX--> wnl 1/26/ 24 Dr. Pritchard Capsule--> scattered irregular appearing mucosa, possible erosions, no bleeding 4/5/24 Dr. Pritchard: Single Balloon Enteroscopy: to mid jejujum--> wnl; bx w  mild patchy IELs  B12 1cc IM ____L. delt--  not given today, trend cbc, B12, FA, Iron panel Adj INR--closer to low 2's.         3. Osteoporosis : Progression on BD from 5/5/16 Crohns, h/o steroid use  Repeat BD of 8/2018 --showed worsening to Osteoporosis from 2016 BD--9/2020: inprovmt in spine and hip , no change in wrist, BD--10/17/22: Decr T score in spine & hip, incr in wrist BD--2/22/24: Spine T= -1.2, Fem neck T= -2.1; Total Hip T= -2;  Forearm T= -2.2 --> spine & hip were better  Dr. Jara: Hyperparathyroidism-- probably secondary due to low Vit D/Ca & ? superimposed primary, [facility] ENT Consult init felt Parathyroid scan showing activity in mediastinum is ectopic parathyroid, then felt sx not indicated at that time, elev PTH is secondary to low Vit D/Ca ? Vit D Resistance--> in the past did well w Vit D,1,25--> 86-97 & Vit D25-->  but w Ca~ 9.4 12/27/23 -12/31/23 [facility] for Hypophosphatemia=0.8, Rx w IV KPhos FGP=091, Vit D =66, Ca=7.6--> Phos=1.5, Ca=8.4, Hgb=12.6 D/C on kPhos 1 gm TID Rx replete Vit D , Phosphate and currently  IV Calcium & po as per endo Forteo since 5/10/19 , then Reclast 9/27/24 :  Ca=9.1, Phos=2.8, Mag=1.8, ALB=4, ALP=71, FIW=391, Vit D=71, Vit D 1,25=73,  trend Vit D, Ca, Phos, PTH,  10/5/20, 9/2021,6/2022, 1/2/24 -s/p Reclast--repeat-->      4. GERD: recently less active by ENT , no ht burn, dysphagia, + throat clear  h/o Dry cough, CXR:asmeer, saw ENT stuart-->LPR * ++ LPR, ++ Stewart's w No Dysplasia, + H/O Esophagitis grade: A was found  Recommend: * Anti-reflux diet & life-style changes reviewed & re-emphasized. ++ Bedge required * Weight reduction & regular exercise emphasized * need for PPI: Omep 40 BID--> 40mg qd , ++ H2B q HS:Zantac 300mg--> Pepcid 40mg I reviewed & summarized the prospective randomized & retrospective non-randomized studies looking at potential long term SE's of PPIs, w special attention to associations & actual cause as related to GI infections, bone loss, cognitive changes, KD, Covid, vitamin & electrolyte deficiencies questions were answered, pt advised that PPIs should be used when needed as indicated by their clinical indication and response and tapering off is always the goal if possible. pt understood. * F/U EGD: --> 2026--for Barretts screening / surveillance * No need for pH Monitor, Manometry, Esophagram -- * ++ need for ENT eval/F/U, No need for Surgical eval --      5. Hemorrhoids: well - controlled. No pain, swelling, itch, bleeding * Discussed previously the potential complications of thrombosis, pain, infection, swelling, itching, bleeding Reccomend: * currently Low - Fiber Diet was emphasized * 6 -- 8 cups of decaffeinated fluid daily was emphasized * Sitz Bathes prn, No: Anusol HC Suppos / Cream DE BID -- was needed * No: Tucks BID, Balneol Lotion, Calmoseptine Oint -- was needed ; ++ Prep H prn * No: need for Colorectal surgical evaluation for possible ablation       6.  Hepatomegaly--> was not confirmed by recent abd sono CTE w relative enlargemt, ? lobulation & enlarged portal/mesenteric veins LFTs-wnl, no ascites or edema R/O CLD, Hepatic vein thrombosis Abd sono w doppler--> Liver and spleen normal size, no thrombosis, normal portal and hepatic vein flow

## 2024-10-20 NOTE — ASSESSMENT
[FreeTextEntry1] : 1. CROHNS DISEASE of both small and large intestine:    s/p ileocolonic resection x 2--last 6/19/15 for recurrent sbos: prior was s/p 4 SBOs w/i 2 yrs--2/2 distal sb disease adj to anastamosis when on Humira weekly had an ADA =33 (3/20/14), -but had sbo 2/2014 at ADA=25 and another sbo 4/28/15 6/10/2015 Colonoscopy: Inflamm ST mass on ileal side w ulceration/scarred/narrow 6/11/2015 CT: dilated thickened sb loops distal jej & ileum 6/19/15 [facility]: s/p Lap w extensive lysis of adhesions, hepatic flex, sigmoid and sb anastamosis, s/p partial r colectomy and sb resection--side to side davis: 8-10 inch from prior anast to TV colon.  Post-op 6/2015 probably some malabsorption 2/2 to removal of R Colon and ileum: had WT. Loss-->r/o malabsorption: ?bile-induced->-secretory vs decr micelles, active dis, PLE ?SIBO/Prevent post-op recurrence --> trial Flagyl 250 mg qid~12/7/16-->d/c: 5/11/17 11/20/16 ACTIVE Crohn's--> 9.5lb wt loss, BMs: #5-6, 5x/D-- but taking Magnesium 12/1/16 MRE: RUQ ileocolonic anastamosis--narrowed w upstream dilatation to 3.2 cm Labs: Stool Fyhxuhfpctmp=855, + Incr Fecal fat, Alb=3.4, FA =23, I95=366, Hgb=12.3,ESR=10,CRP <5 12/2016  Humira to  Entyvio 4/19/17 :Colonoscopy: Anastamosis: open w mild -mod active colitis, enteritis severe adj to anastomosis, mild more proximal, remainder of colon=wnl 11/16/17: Hgb=11.2, ESR=14, CRP=12, 10/26/17 Stool fat-neg, calprotectin-30 11/13/17: MRE-Chr mild distension of distal & joanna-ileum s/o mild stricturing at anast, mild mural thick & mucosal enhancemt ~ 20cm; little change from 2015 c/w mild acute on chr ileitis, 2.1 cm Liver hemangioma 10/10/18 MRE: stricturing of neoterminal ileum w worsened upstream dilatation, moderate length of upstream ileum w stricturing extending at least 20cm and more extensive then previous. suggestion of 2 adj extraluminal fluid collections which may be w/i the wall of strictured ileum near the ileocolonic anastamosis pt had declined to call numbers [facility] for new or investigational rx's--biologics. later he felt he was stablizing w his wt loss, and inflammatory markers 2/3/20 Colonoscopy: 05cm rectal polyp: HP, 2nd deg int hemorrhoids mild-mod activity: AMIRA w cryptitis & mild archect distortion at ileocolonic anast: colon & ileal side, no stricture 6/11/21: wt= 135, had  abd distenstion, pain, N/V, no BM, eventually started having diarrhea and flatus, BMs  entyvio was 6/3/21--which is almost 8 weeks, was supposed to be 5-6 weeks to start  7/16/21 MRE: limited to incomplete bowel distention, chr distal ileitis/probable inflammatory stricturing vs collapse of the joanna-TI--but improved since 2018 , moderate proctitis 7/20/21:  unclear if MRE accurate 2/2 underdistension, but gained wt & CRP-wnl;-7/23/21 Entyvio = 39, Abs <1.6 8/26/21 p/w sbo w N/V, abd pain/bloating x 3 days, unable to keep things down CT Abd/Pelvis: diffuse marked dilatation of SB Loops w transition pt in R. mid/lower abdomen ~ 5-6cm, proximal to the ileocolonic anastomosis RX w IV solumedrol 20mg q8h, NGT, IVF, pt refused TPN, also w UTI from prostatitis, d/c home on prednisone 40 10/25/21 Stelera started 11/3/21 [facility]: wt above 140, off pred x 2 weeks, 1-2 BMs qd  Discussed at IBD conference, reviewed previous colonoscopy, and recent imaging; consensus that due to malnutrition and steroid dependency, surgery is next best option however pt reports feeling great and wants to pursue medical treatment which is reasonable given he feels well repeat imaging in mid-January after 3 months of Stelara, and then consider referral to surgery vs improved candidacy for endoscopic manipulation (currently presumed one long stricture). 3/8/22 MRI Abd/Pelv: thickened distal Jejunal loops which are tethered; distal ileal segment (10-12cm ) previously actively inflammed has decreased & now characterized by an area of stricture, however the joanna-TI 5mm to the anastamosis is enhanced as well as narrowed. Colon just distal to the anastamosis has a focal segment of inflammation & stricture. the recto-sigmoid appears inflammed 5/17/22 Dr[facility]: ped scope passed w/o resist in joanna-TI, one single apthae w psuedopolyp. Flares probably 2/2 to adhesive dis, imaging may consistently show wall thickening  8/22/23 : va=399 ,  abd distension, no N/V,  BMs: started # 5/6--> now # 6-7,  Started Pred 40mg qd, and liquid   ~9/23/23 dark stool w 2-3 days; He had no Abd pain, N/V  9/28/23 Labs: Hgb=8.6, Iron=22, sat%=7, Ferritin=17 10/27/23: Ustekinumab=7.1, Ust Abs <1.6 11/27/23 :   Hgb=13.3,  PT=19, INR=1.7, Ferritin=33, Iron=46, Sat=13%,   10/18/23 MRE: persistent mural enhancement without wall thickening in distal small bowel loops, which is a nonspecific imaging sign and may reflect inflammation, however no other mural or mesenteric findings to indicate active inflammatory small bowel Crohn's disease.  No evidence of stricture. No imaging signs of penetrating disease. 12/22/23 Colonoscopy: mild ileitis of the most distal joanna-TI; small superficial apthous ulcer on colonic side of anastamosis, Random  colon BX--> wnl        1/26/ 24 Dr. Pritchard Capsule--> scattered irregular appearing mucosa, possible erosions, no bleeding 4/5/24 Dr. Pritchard: Single Balloon Enteroscopy: to mid jejujum--> wnl; bx w  mild patchy IELs               A / PLAN: Crohns disease  h/o sbo's prox to anastamosis  inflammatory vs fibrosis vs combination vs adhesions  s/p flare 8/25/21, 12/2021,4/9/22  ( just before last Stelera dose),8/19/23;    ~ 9/23/23 Had dark stool x 2-3 days, Warfarin held 2 days & Omep BID, Hgb dropped to 8.6  10/18/23 MRE: persistent mural enhancement without wall thickening in distal small bowel loops, which is a nonspecific imaging sign and may reflect inflammation, however no other mural or mesenteric findings to indicate active inflammatory small bowel Crohn's disease.  No evidence of stricture. No imaging signs of penetrating disease.   12/22/23 Colonoscopy: mild ileitis of the most distal joanna-TI; small superficial apthous ulcer on colonic side of anastamosis, Random  colon BX--> wnl         9/27/24 :  Hgb=14.4 , INR=2.3, CRP<5, ESR=2, Ca=9.1,  Phos=2.8,  ALB=4, BUN=16,K=4, ALP=71; Rctdqtrd=013,    10/13/24: Intermittent N/V, cramping, Diarrhea                 Pt didnt want to go to ED                 Started Prednisone                 Unclear if PSBO 2/2 Flare vs adhesions or viral GE   taper steroids from 40mg by 5mg/wk  Stelara( # 1 10/25/21) last dose-->9/2024   Probiotics 3 qd Diet: LR, Lactose free, protein drinks tid MCT oil begun but never maintained repeat MRE check Stelera level **trend --cbc, esr, crp, albumin, calprotectin  **monitor wt--- usu bet 136-139,  12/11/23=136,1/29/24= 135,  3/5/24 =137, 5/28/24=134 ,  7/9/24 xu=430, 8/20/24 uk=111, 10/1/24 hc=236         2. Iron deficiency anemia : --> recent drop from GIB--> 9/2023   had initially dropped , after clinical flare and post procedure bleeding Probably multifactorial: ACD, Blood loss, malabsorption/nutrient deficiencies Eliquis-->warfarin after CVA, On Entyvio 7/11/17 s/p Adm for unsteady gait w MRI c/w CVA--warfarin begun: possible better control of AC Still requiring IV Iron  12/22/23  EGD:  gastritis, No HP; 2cm HH, Esophagitis A--,  + Barretts 12/22/23 Colonoscopy: mild ileitis of the most distal joanna-TI; small superficial apthous ulcer on colonic side of anastamosis, Random  colon BX--> wnl 1/26/ 24 Dr. Pritchard Capsule--> scattered irregular appearing mucosa, possible erosions, no bleeding 4/5/24 Dr. Pritchard: Single Balloon Enteroscopy: to mid jejujum--> wnl; bx w  mild patchy IELs  B12 1cc IM ____L. delt--  not given today, trend cbc, B12, FA, Iron panel Adj INR--closer to low 2's.         3. Osteoporosis : Progression on BD from 5/5/16 Crohns, h/o steroid use  Repeat BD of 8/2018 --showed worsening to Osteoporosis from 2016 BD--9/2020: inprovmt in spine and hip , no change in wrist, BD--10/17/22: Decr T score in spine & hip, incr in wrist BD--2/22/24: Spine T= -1.2, Fem neck T= -2.1; Total Hip T= -2;  Forearm T= -2.2 --> spine & hip were better  Dr. Jara: Hyperparathyroidism-- probably secondary due to low Vit D/Ca & ? superimposed primary, [facility] ENT Consult init felt Parathyroid scan showing activity in mediastinum is ectopic parathyroid, then felt sx not indicated at that time, elev PTH is secondary to low Vit D/Ca ? Vit D Resistance--> in the past did well w Vit D,1,25--> 86-97 & Vit D25-->  but w Ca~ 9.4 12/27/23 -12/31/23 PMHC for Hypophosphatemia=0.8, Rx w IV KPhos PSA=513, Vit D =66, Ca=7.6--> Phos=1.5, Ca=8.4, Hgb=12.6 D/C on kPhos 1 gm TID Rx replete Vit D , Phosphate and currently  IV Calcium & po as per endo Forteo since 5/10/19 , then Reclast 9/27/24 :  Ca=9.1, Phos=2.8, Mag=1.8, ALB=4, ALP=71, GIT=296, Vit D=71, Vit D 1,25=73,  trend Vit D, Ca, Phos, PTH,  10/5/20, 9/2021,6/2022, 1/2/24 -s/p Reclast--repeat-->      4. GERD: recently less active by ENT , no ht burn, dysphagia, + throat clear  h/o Dry cough, CXR:sameer, saw ENT stuart-->LPR * ++ LPR, ++ Stewart's w No Dysplasia, + H/O Esophagitis grade: A was found  Recommend: * Anti-reflux diet & life-style changes reviewed & re-emphasized. ++ Bedge required * Weight reduction & regular exercise emphasized * need for PPI: Omep 40 BID--> 40mg qd , ++ H2B q HS:Zantac 300mg--> Pepcid 40mg I reviewed & summarized the prospective randomized & retrospective non-randomized studies looking at potential long term SE's of PPIs, w special attention to associations & actual cause as related to GI infections, bone loss, cognitive changes, KD, Covid, vitamin & electrolyte deficiencies questions were answered, pt advised that PPIs should be used when needed as indicated by their clinical indication and response and tapering off is always the goal if possible. pt understood. * F/U EGD: --> 2026--for Barretts screening / surveillance * No need for pH Monitor, Manometry, Esophagram -- * ++ need for ENT eval/F/U, No need for Surgical eval --      5. Hemorrhoids: well - controlled. No pain, swelling, itch, bleeding * Discussed previously the potential complications of thrombosis, pain, infection, swelling, itching, bleeding Reccomend: * currently Low - Fiber Diet was emphasized * 6 -- 8 cups of decaffeinated fluid daily was emphasized * Sitz Bathes prn, No: Anusol HC Suppos / Cream NE BID -- was needed * No: Tucks BID, Balneol Lotion, Calmoseptine Oint -- was needed ; ++ Prep H prn * No: need for Colorectal surgical evaluation for possible ablation       6.  Hepatomegaly--> was not confirmed by recent abd sono CTE w relative enlargemt, ? lobulation & enlarged portal/mesenteric veins LFTs-wnl, no ascites or edema R/O CLD, Hepatic vein thrombosis Abd sono w doppler--> Liver and spleen normal size, no thrombosis, normal portal and hepatic vein flow

## 2024-10-20 NOTE — REVIEW OF SYSTEMS
[As Noted in HPI] : as noted in HPI [Negative] : Respiratory [Confused] : no confusion [Easy Bruising] : no tendency for easy bruising

## 2024-10-20 NOTE — HISTORY OF PRESENT ILLNESS
[de-identified] :    This HPI  reflects a summary and review of records : including previous and most recent  Labs, body imaging, consults and progress notes, operative and pathology reports, EKG reports, ED records, found in [employer], [facility],  [email or web address] and any additional records brought in by  the patient at the time of the visit.            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       Gets IV Iron since 10/2/17       12/2016 Humira--> Entyvio       10/25/21 Stelera started   9/23/23  dark stool w 2-3 days   9/28/23 Labs: Hgb=8.6, Iron=22, sat%=7, Ferritin=17 10/18/23 MRE: persistent mural enhancement without wall thickening in distal small bowel loops, which is a nonspecific imaging sign and may reflect inflammation, however no other mural or mesenteric findings to indicate active inflammatory small bowel Crohn's disease. 12/22/23 EGD: gastritis, No HP; 2cm HH, Esophagitis A--, + Barretts 12/22/23 Colonoscopy: mild ileitis of the most distal joanna-TI; small superficial apthous ulcer on colonic side of anastamosis, Random colon BX--> wnl 12/27/23 -12/31/23 [facility] for Hypophosphatemia=0.8, Rx w IV KPhos EJO=644, Vit D =66, Ca=7.6--> Phos=1.5, Ca=8.4, Hgb=12.6 D/C on kPhos 1 gm TID 1/26/24 Dr. Pritchard Capsule--> scattered irregular appearing mucosa, possible erosions, no bleeding            for single balloon enteroscopy 9/27/24 : QVU=578, Vit D=71, Vit D 1,25=73, Ca=9.1, Phos=2.8, Mag=1.8,  ALB=4, BUN=16,K=4.,              ALP=71; Bpbyaqxz=269, Iron=95, sat%=32,  Hgb=14.4 , INR=2.3 10/7/24 : ac=068, No pain, BMs: #4, 1-2x/D, no melena  11/13/24:    Over the Oct 13th weekend, Intermittent, N/V, diarrhea, No fever/chills                    some cramps, no distension, but persistent N/V on soft diet                    Didnt want to go to ED, started Prednisone 40mg                    States next day felt much better, stayed of fluids, stools formed                   s/p  iron infusion in 7/12/24, Calcium 9/27//24  ,                   last Stelera 9/2024; next begining  of 11/2024                 Today: wt=                  BMs: #4,1-2x/D                   Melena-->no        * Abd pain-->no * Nausea--> no * Vomit--> no * Early satiety--> no * Belching--> no * Hiccups--> no * Regurgitation--> no * Acid Taste / Water Brash--> no * Ht burn--> no * Dysphagia--> no * Throat Clearing--> no * Hoarseness--> no * Post-Nasal Drip--> no * Congestion--> no * Globus--> no * Cough--> no * Wheeze / PC-> -no * Constipation--> no * Diarrhea--> No  * Bloating--> No  * Strain on Defecation--> no * Incompl Evac--> no * Flatulence--> no * Gurgling--> no * Melena--> no * BPBPR-> -no * Anorexia--> no * Wt. Loss--> no      PRIOR H/O 2023:  1/30/23: Alb=4.4, Phos=3, Mag=1.8, Ca=9.5,  PTH=96, ALP=79,  Vit D=144                          Hgb=13.6 , ESR=5, CRP<5, Ferritin=55, Iron=56, INR=1.6    2/6/23 : uo=775, no abd pain, BMs: # 4 qd   3/17/23: developed cugh,congestion, fever, malaise  3/19/23 + Covid; Received Iv Remdesivir x 3 at home 3/27/ 23: qc=848, BMs: #4-5,  1-2x/ D   Alb=4 , Phos=1, Mag=1.8, Ca=8.5 , ALP=94  Hgb=13.8, ESR=5, CRP<5, Ldksqsps=415, Iron=78, INR=5.9  4/21/23 : Alb=4, Phos=2.9, Mag=1.9, Ca=8.9,  PTH=89, ALP=75,  Vit D=105/ 80, INR=3.2  5/22/23 Hr=205; Hgb=12.7, ESR=2, CRP<5, Ferritin=19, Iron=42, INR=2.4               Alb=4.2, Phos=2.4, Mag=1.8, Ca=9.1, ALP=68,                      8/4/23 :  Alb=3.8, Phos=2.1, Mag=1.8, Ca=8.7,  HVO=961, ALP=68,  Vit D=78 INR=2.1  8/21/23 : Hgb=14, ESR=4, CRP=40, Jvkxqeee=821, Iron=37, INR=4.8                Alb=4 , Phos=1.8, Mag=1.6, Ca=9.3, ALP=84,      8/22/23  :   ph=277 , this weekend  had a flare, abd distension, no N/V, fever                  BMs: started # 5/6--> now # 6-7, no blood or mucous                  doesnt recall anything too solid or fiberous                  Started Pred 40mg qd, and liquid diet--> feeling better                  Less distension, passing gas and BMs  10/24/23:  rv=793, BMs: #4, 1-2x/D                   pt reports an episode of dark stool w 2-3 days ~ 9/23/23                  He held his Warfarin for a couple of days and stool returned to normal color                  He increased his Omep 40mg BID                  He had no Abd pain, N/V, ht burn, dysphagia, bloat                  9/28/23 Labs: Hgb=8.6, Iron=22, sat%=7, Ferritin=17                  10/5/23 Labs: Hgb=8.7, Iron=52, Sat=16%, Hpaqpovt=530                  10/12/23 : Labs: Vit D=67, Ca=8, PTH=160, Mag=2                  10/20/23 Labs: Hgb=10.9, INR=1.6, CRP<5, Vkawytdd=316, ALB=3.7, Ca=7.9, ALP=68 10/18/23 MRE:  persistent mural enhancement without wall thickening in distal small bowel loops, which is a nonspecific imaging sign and may reflect inflammation, however no other mural or mesenteric findings to indicate active inflammatory small bowel Crohn's disease.  No evidence of stricture. No imaging signs of penetrating disease.  Chronic central mesenteric fibrofatty proliferation. 10/27/23: Ustekinumab=7.1, Ust Abs <1.6 11/27/23 : FA=17, S47=775, Vit D=69, ca=9.1, ALP=84,  Alb=4.2                  Hgb=13.3,  PT=19, INR=1.7, Ferritin=33, Iron=46, Sat=13%,  12/5/23 : Homocysteine=18, XOJ=176 12/11/23 :  ak=369, BMs: #4, 1-2x/D , Melena-->no    12/22/23  EGD:  gastritis, No HP; 2cm HH, Esophagitis A--,  + Barretts 12/22/23 Colonoscopy: mild ileitis of the most distal joanna-TI; small superficial apthous ulcer on colonic side of anastamosis, Random  colon BX--> wnl  12/27/23 -12/31/23 [facility] for Hypophosphatemia=0.8, Rx w IV KPhos LHX=644, Vit D =66, 7.6--> Phos=1.5, Ca=8.4, Hgb=12.6 D/C on kPhos 1 gm TID seen by [facility]     PRIOR H/O 2022 1/4/22: Hgb=11.5, ESR=6, CRP <5, Ca=8.8, Mag=2.9, Alb=3.9 1/10/22 : vt=550, stools # 5, 1-2x/D  1/10/22 Today:  Feeling well, no c/o , CP, SOB/ PRIETO, Cough, Wheeze, Palpitations, edema              Most recent labs  reviewed w patient:1/4/22 1/31/22: calprotectin=84 2/2/22: ao=278 2/14/22: Hgb=10.6, ESR=9, CRP <5, Ca=8.3, Mag=1.6, Alb=3.8, Iron=25, Ferritin=15 3/3/22:  iv Iron infusion x 1 3/8/22 MRI Abd/Pelv: thickened distal Jejunal loops which are tethered; distal ileal segment  (10-12cm ) previously actively inflammed has decreased & now characterized by an area of stricture, however the joanna-TI  5mm to the anastamosis is enhanced as well as narrowed.  colon just  distal to the anastamosis has a focal segment of inflammation & stricture.  the recto-sigmoid appears inflammed  3/28/22: Hgb=14.4 , ESR=1, CRP <5, Ca=8.5, Mag=1.7,Alb=4.1, Iron=104, Sohdlsxn=138, 4/5/22: kh=629, Bms: # 5-6 , 1-2 x/day    5/10/22  :  Last entyvios were -->7/1, 8/5, 9/2, 10/15/21              Stelara # 1 IV--10/25/21, second dose SC~ 12/10/21, 3rd~ 2/14/22,  4th-- mid April , 5th--mid june                Early December 2021  had a " flare" loose BMs, N/V and bloat              Took Pred 40mg qd and tapered down 10mg / week , now off pred x 7-8 weeks  4/13/22 Dr. Walsh:  pt states he had a flare the weekend of 4/9/22 w vomiting/distension                pt self-started prednisone 40mg , this was just before his april stelara dose                claims he was feeling better               Dr. Walsh: sched colonoscopy w ? dilatation   5/10/22: tapered of pred 40mg , 10mg/wk over 1 month, now off 2weeks          no pain, n/v,  BMs: # 4-5, 1-2 x Day , wt=-132 5/17/22 Dr. Walsh Colonoscopy: ped scope passed w/o resist in joanna-TI, one single apthae w psuedopolyp.  Flares probably 2/2 to adhesive dis, imaging may consistently show wall thickening  5/27/22 Labs:  Alb=3.8. Phos=2.7, Mag=1.9, Ca=9.2, PTH=72, Vit D=105, ALP=76                + IV Iron  6/20/22 Labs:  Alb=4.5, Phos=0.8, Mag=1.8, Ca=8.5, YJX=278, ALP=83                          Hgb=14, ESR=2, CRP<5, Uajlzoni=714, Iron=86 6/21/22:  no pain, n/v,  BMs: # 4-5, 1-2 x Day ,                jv=511 7/26/22:  no pain, n/v,  BMs: # 4-5, 1-2 x Day ,                ey=901 9/30/22 EGD: mild gastritis, no HP; 0.75cm gastric polyp:fundic;                4cm HH, Esophagitis A--, + Barretts 10/17/22 : Alb=4.3, Phos=2.1, Mag=1.8, Ca=9,  PTH=95, ALP=80, TSH=12, Vit D=69                          Hgb=12.7 , ESR=2, CRP<5, Ferritin=57, Iron=85 12/16/22 : Alb=3.8, Phos=1.6, Mag=1.7, Ca=8.9 , ALP=68,                           Hgb=10.8 , ESR=1, Ewsuycat=783, Iron=67, INR=2.3  12/19/22: gw=997, had some N and distension the day after Thanksgiving                      Was having BMs and passing gas, went of liquids x 1-2 days--> resolved    PRIOR H/O 2021:  1/4/21: Hgb=13.6, CRP<5, ESR=9, Ferritin=60, Alb=4.2, Phos=2.7 1/11/21: Entyvio & IV Ca 1/14/21: no pain, stool more formed ,  vu=674 - 137; BMs:  # 4-5, 1-2x/D  2/8/21: Entyvio & IV Ca 2/23/21 IV Ca 2/22/21: Hgb=12.7, CRP<5, ESR=8, Avqzhlwbg=209, Phos=2.3, Mag=1.8, D39=203, Zinc=58, Bk=692 2/26/21: bh=347,  BMs:  # 4-5, 1-2x/D , no pain  3/8/21 & 4/8/21: Entyvio 3/9/21 & 3/24/21: IV Iron 4/5/21: Hgb=13, CRP<5, ESR=9, Ferritin=94, Phos=3.1, Mag=1.7,Ca=9.1/ Alb=4              Pt Ins co is refusing to cover Entyvio q 4wks, despite numerous attempts to appeal, they also refuse to set up a peer to peer discussion betw myself and another healthcare provider, even one that works for a third party.  They do acknowledge that there is literature supporting the successful use in individual cases who don't respond to the q 8 wk interval and need less then q 8weeks , but state it had not in FDA approved at less then 8wks so they will not approve it.  I spoke to the ins co rep and discussed that fact that patients should not be  pigeon holed since practicing medicine is done on an individual basis.  Humans are not all the same.   Their  response didn't change eventhough the pt clinically needed the medication and it  induced a beneficial clinical response towards remission.  Therefore the patient and I decided to slowly increase the interval since the insurance company will not pay for this benefit.  Hopefully he may be able to maintain his present clinical state.  If not we will return to q 4weeks and will again appeal using this patients real clinical data .  4/9/21:  ky=616,  no pain, stool more formed # 4, 1-2x/d  6/11/21: wt= 135,   no pain, stool more formed # 4, 1-2x/d               recently had an episode of abd distenstion, pain, N/V, no BM              eventually started having diarrhea and flatus, BMs returned to normal              lost 2-3 lbs but regained  Doesnt recall eating anything different, no fever, chills, brbpr, melena  entyvio was 6/3/21--which is almost 8 weeks, was supposed to be 5-6 weeks  to start               6/4/21 Hgb=12, CRP<5, Ferritin=32, pt to receive IV iron      7/12/21 Labs: Hgb=13.6, ESR=10, CRP=<5, Kclfrec=355, Alb=3.7, BUN=16   7/16/21 MRE: limited to incomplete bowel distention, chr distal ileitis/probable inflammatory stricturing vs collapse of the joanna-TI--but improved since 2018 , moderate proctitis 7/20/21:  Last entyvio was --7/1, next early august                 nw=875 ,  no pain, stool more formed # 4-5/6, 1-2x/d  7/23/21 Entyvio level= 39, Abs <1.6 8/23/21: Labs--Hgb=13.6, ESR=10, CRP=6.6, Bfmrpzzg=048, Iron=26, Alb=3.9, BUN=16 8/26/21 p/w w N/V, abd pain/bloating  x 3 days, unable to keep things down Labs: WBC=5, Hgb=12, CRP=43, ESR=11, Alb=4.4, BUN=28, Creat=-1.6 CT Abd/Pelvis: diffuse marked dilatation of SB Loops w transition pt in Rmid/lower abdomen ~ 5-6c, proximal to the ileocolonic anastomosis RX w IV solumedrol 20mg q8h, NGT, IVF, pt refused TPN, also w UTI from prostatitis 8/27 NGT was pulled, and clears started and advanced to LR,  was d/c home 8/30 on prednisone 40mg qd w taper by 10mg/wk--> to 20mg 10/15 Entyvio 10/25 Stelera (Ustekinumab)  # 1 11/3/21 [facility]: wt above 140, off pred x 2 weeks,  1-2 BMs qd  Discussed at IBD conference, reviewed previous colonoscopy, and recent imaging; consensus that due to malnutrition and steroid dependency, surgery is next best option however pt reports feeling great and wants to pursue medical treatment which is reasonable given he feels well  repeat imaging in mid-January after 3 months of Stelara, and then consider referral to surgery vs improved candidacy for endoscopic manipulation (currently presumed one long stricture).   11/15/21 : xv=912, Hgb=12, ESR=3, CRP <5, Ferritin =104, Ca=9, Mg=1.7, Alb=3.7  12/10/21 : iron infusion   PRIOR H/O 2020:  1/6/20: entyvio 1/13/20: ESR=7, CRP=0.2, Hgb=13.5, Ojyrcjsy=654, H49=931, FA=10, Alb=4.1, Ca=9.1 1/16/20: wt = 127, BMs: # 5, 1-3x/d 1/30/20: ESR=9, CRP=11, Hgb=13.9, Fpwybciw=112,Iron=38, Alb=3.9,Ca=9.2, PTH=87, 2/3/20 EGD: gastritis, +CHRISTOPHE, No HP, +erosion w ooze -clipped, 0.5cm polyp-neg; 4cm HH, Esophagitis A, + Barretts, VC: 1+ Colonoscopy: 05cm rectal polyp: HP, 2nd deg int hemorrhoids mild-mod activity: AMIRA w cryptitis & mild archect distortion at ileocolonic anast: colon & ileal side, no stricture 2/4/20: Entyvio; 2/12/20: IV Iron, 3/3/20: Entyvio 2/25/20: sr=472,  BMs: # 4-5, 1-2x/ d 3/19/20: yi=426, BMs: #4-5, 1-2x/D 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 10/17/20 : Hgb=13, CRP< 5, ESR= 11, Iron=44, Ferritin=46, Alb=4, Phos=2.3,  11/5/20: gb=414; s/p IV Iron x 2 ,  11/4 and 1 week prior;   BMs:  # 4-5, 1-2x/D , no pain 11/18/20  Entyvio + IV Ca    PRIOR H/O 2019:  1/14/19:Entyvio,  Hgb=10.7, ESR=15, Alb=3.6, Iron=36, Ferritin=67, Sat%=11, INR=1.6 1/24/19:  ou=080, stools are # 4-6, 3-4x/d , no pain 2/5/19: Hgb=11.2, ESR=14, CRP=0.36 2/28/19: Hgb=11.5, ESR=12, CRP=6.5, Alb=3.7, Iron=69, Xhzpoqau=811, Ca=8.9                Bms: # 4-5, 2-3x/D , mz=542,  Entyvio : last 2/1519,                had parathyroid scan pre-op, still w elev PTH, + activity in mediastinum,? ectopic parathyroid tissue vs neoplasm.  To see ENT and have Imaging at [facility] 3/28/19: Bms: # 4-5 , 3x/d ;fo=002 4/11/19: Hgb-9.7, Iron=45, ESR=18, CRP=9.4, Alb=3.5,  Saw Endo SX at [facility], felt hyperparathyroid is secondary to Low Ca/Vit D, no sx at this time 4/16/19: BMs: # 5-6, 3-4x/D, jj=990,  Entyvio : last 3/1519,  got IV iron 4/12/19 for Ferritin=53 4/27/19: s/p R Hip Nailing--missed 4/2019 2/2 fresh wound 5/16/19 & 6/18/19 Entyvio 7/2/19: Hgb=11.7, Ferritin=41,ESR=12, CRP=5.5, B6=2.8,Mag=1.8,Phos=3.9,Aj=209,Zinc=61 7/11/19: s/p IV iron 7/11/19: Alb=3.7, HNC=685, Ca=9.1, Vit D=40/306,  7/24/19: Entyvio, Alb=3.8, ROQ=888, Ca=8.9, Vit D=128  8/1/19: Bms: # 5-6, occas #4, 2-3x/D , iw=267 8/15/19: Hgb=12, ESR=10, CRP=5.2, Ferritin=68, Alb=3.4, Phos=4.4,Mg=1.7, Ca=8.5,Calprotectin=88, 8/20/19: NRA=929, Ca=9, Alb=4.2 9/19/19: Hgb=12.8, ESR=9, CRP=5.5, Alb=3.7, Jvreivel=275, Mag=1.8, Ca=8.9, Phos=4.2 9/26/19: ly=596, BMs: #5-6, 2-3x/D, no pain 10/17/19: bj=379, BMs: #4-6, 1-2x/D, no Pain; Hgb=14, ESR=7, CRP<5, Alb=3.9, Kaohtcdi=968, Mag=1.7, Ca=9.6 10/24/19: vd=429, Bms: # 4-6 , no pain, 11/6/19: ESR=6, CRP=6, PTH=80,Ca=9.1, VitD=50 11/14/19: fi=639, BMs: # : 4, 1-2, 0ccas #5  11/17/19: ESR=6, CRP<5, Nmjetcyt=673,   12/19/19: qz=115, BMs: #5-6, 2-3x/D    PRIOR H/O 2018:         1/16/18: Entyvio, Hgb=11.4, ESR=10, CRP=6.9        2/14/18: Hgb=11.1, ESR=16, CRP=11.6, Alb=3.6, Iron=28, Ferritin=23, INR=1.5         2/16/18: s/p Entyvio; Iron infusion, again on 2/27 2/20/18: Hgb=10.6, ESR=20, CRP=12, INR=1.7; 2/27/18: s/p iron infusion        3/14/18: Ed=606; BMs: # 5, 1-2x/D; Hgb=11, ESR=18, CRP=11,Irom=45,Wuqrbhrw=240,Alb=3.6,         INR=1.5        3/19/18: s/p Entyvio,  4/16/18: s/p Entyvio,Hgb=11.9, INR=1.3, ESR=18, CRP=8.4         4/18/18 EGD: gastritis, no HP, no IM, 2+ Mucous, 0.5cm gastric polyp: fundic, 4cm HH, +         Barretts:3cm, no dysplasia        4/25/18: Hgb=11.5, INR=1.6, Iron=40, Sat=13%, Ferritin=57, Alb=3.2, Phos=2.2, Mag=1.7        4/30/18: s/p Iron Infusion; 5/14/18: s/p Entyvio ; 5/29/18: s/p Iron Infusion        6/6/18: Hgb=10.6, INR=1.7, Iwnmslrq=262, Alb=3.5, Phos=2.1, S12=852, aa=073; BMs: # 5, 2-3x/D         6/19/18: Hgb=10.6, INR=1, CRP=15, ESR=23        6/21/18: R ankle is acting up, swollen, pain, having MRI done, took a couple of advil, on low carbs ,BMs: # 5, 1-2x/D, ln=627        6/26/18: MRI: fx/stress rxn-talar body/navicula; stress related changes--cuneform, cuboid,distal tibia; mod tibiotalar effusion, mild-mod peroneal tendinosis        7/19/18: entyvio 6/26/18, eliminated all carbs--anti inflammatory diet, ea=883, BMs: # 4-5, 1-3x/D         7/25/18: Hgb=10, INR=1.3, Alb=3.3, Phos=2.4, Qrhwniag=086, sat%=12, Iron=35        8/2/18: BD--osteoporosis of hip/spine: sig decrease BD--17.6% of hip, 17% of spine,          osteopenia of wrist: 6.4%        8/7/18: Entyvio        8/21/18: Hgb=11.1, INR=1.5, ESR=19, CRP=18.6        8/23/18: recently w tooth infection, old implant--loose and removed; rx w amox, and advil        eating almost no carbs, oc=666, # 5-6, 2-3x/d         8/24/18: Stool fat--neg, Yfzzaevuqzjm=723        9/5/18: Hgb=11.4, INR=1.3, ESR=9, CRP=11.3, Iron=41, Waudhkri=595, Alb=3.8,        9/17/18: entyvio, Hgb=10.7, INR=2.2, ESR=17, CRP=9.1,         9/20/18: oa=622, BMs: # 5-6, 1-2x/D         9/21/18: Phos=2.4, Ca=8.7, Alb=3.4, Vit D=27, PUE=469,         Dr. Jara: Hyperparathyroidism: probably secondary due to low Vit D/Ca & ? superimposed primary, rx replete Vit D and Calcium        10/9/18: Hgb=11.6, MCV=94, ESR=14, CRP=5.4, INR=2.2        10/10/18 MRE: stricturing of neoterminal ileum w worsened upstream dilatation, moderate length of upstream ileum w stricturing extending at least 20cm and more extensive then previous. suggestion of 2 adj extraluminal fluid collections which may be w/i the wall of strictured ileum near the ileocolonic anastomosis. surrounding spiculation and tethered appearance of bowel in this region. 10/16/18: entyvio, Hgb=11.7, MCV=94, ESR=14, CRP <5, Alb=3.5 10/18/18: Ye=319,   BMs: # 5-6, 3x/D ,  11/13/18: Entyvio 11/21/18 CTE w C: limited 2/2 bowel underdistention, mucosal hyperenhancemt & extensive SM edema of distal ileum including joanna-ileum, difficult to exclude a sml fluid collection, Liver w relative enlargement w suggestion of lobulation, enlargemt of PV,SMV,IMV,Spl V, rectal V. No ascites 11/28/18: Hgb=10, ESR=19, CRP=17,Alb=3.5,Iron=35, Sat%=11, Ypwptsjj=848, INR=1.3 11/29/18: ft=241, BMs: # 5-6, 3-4x/D 12/3/18: Abd sono--Liver 14.8cm Incr heterogenicity, spleen--wnl            PRIOR HISTORY--2017 1/17/17: Hgb=9.2, ESR=6, CRP=5; 1/19/17: BMs: #4, 5-6, 2-3x/D, De=144, Started Creon 1 tid cc        3rd Entyvio beginning Feb 2/14/17: Labs; Hgb=9.6, MCV=97, ESR=5, CRP< 5, Q21=375, FA>23, Iron=59, Retic =3.2,        2/17/17 Fecal Calprotectin=16, Fats: Increased neutral & Split        3/13/17: Labs Hgb=9.5, MCV=95, ESR=7, CRP <5, ALB=3.1        3/16/17: lot of stress, to move -[place], had a job-fell thru, BMs: # 5, 2-4 x/D, wt= 131w clothes        4/19/17 EGD: gastritis, CHRISTOPHE, No HP, 2cm HH, +Barretts, No Dysplasia        Colonoscopy: Anastamosis: open w mild -mod active colitis, enteritis severe adj to anastomosis, mild more proximal, remainder of colon-wnl, 2-3 deg int hemorrhoids        4/26/17 : Hgb=7.3, MCV=95, ESR=13, CRP<5;Immediately post procedure stools very dark on eliquis/iron.        Admitted to [facility]: Hgb=8.3-->8.9 after 2 U, Alb 3.3, BUN=17, creat=1.3, Brianna/Osdjv=601/460        4/27/17: Alb=2.3, BUN=12, creat=1.2, Brianna/Lipase=209/863-No abd pain, N/V; 5/5/17: Hgb=10.7.        5/8/17 Entyvio iv. 5/8-5/9 w dark red diarrhea; 5/10/17 Hgb=7.8.        5/11/17 Adm [facility] w stools brown, Hgb=7.9, BUN=17, Creat=1.4, Alb=2.9; S/P 2 Units- Hgb=10.6, BUN=15/1.1.        Prednisone 40mg qd, entocort d/tex.; 5/18/17: Hgb-10.4, vx=260, BMs: #5, 1-2x/D, no pain        6/8/17: Hgb=7.4,MCV=98, CRP<5-ASA stopped, Pred20--> 30        6/10/17: Hgb=8.2, Alb=2.9, BUN=20/1.2 ; 6/12/17: Hgb=8.3, Retic=0.19, Iron=10, Sat%=3, Ferritin=57, FA=23,W74=374, 6/13/17: s/p 2 Unit PRBCs        6/15/17: started MCT oil, Bp=176 , BMs: # 5, 1-2x/D, stools are brownish/green         7/11/17: [facility] w unsteadiness. MRI Head: R Cortical Temporo-occipital encephalomalacia, MRA H&N-no sign lesions, OLGA-wnl.Hgb=9.9--> 8.4->9.7 after 1 Unit. Seen by Heme: received IV Iron         CRP<5, U81=930, ANB=047, FA>23,Iron=22,Sat%=6, Ferritin=42, Alb=3.5. D/C on warfarin 2mg        7/20 Hgb=8.5, 7/28 Hgb=7.7, 7/31-s/p 1 Unit         As outpt seeing Heme, to get IV Iron and 5 IV Copper        Had 'FLU' Fever=101, chills, bloat, N/V/D, Bilious. no pain, melena,brbpr        Given anti-emetic by dr Boston,then able to keep things down        On prednisone 25, warfarin w INR bet 1.6-2        8/17/17: Hgb=9.9, ESR=20, CRP-P,Fn=790, BMs: # 5, 1-2x/D, stools are brownish/green        10/2/17: Hgb=8.8, MCV=89,Phos=1.7, K=3.9, Mag=1.9, Alb=3.6,Iron<10,Ferritin=21, INR=2        10/17/17: Hgb=7.9,ESR=6,CRP<5, INR=1.6        10/25/17: Hgb=10, ESR=5, CRP=5, Alb=3.6, Phos=2, Kmckbnqz=429, F08=917        11/16/17: Hgb=11.2, ESR=14, CRP=12,         11/13/17: MRE-Chr mild distension of distal & joanna-ileum s/o mild stricturing at anast, mild mural thick & mucosal enhancemt ~ 20cm; little change from 2015 c/w mild acute on chr ileitis, 2.1 cm Liver hemangioma        11/28/17: Entyvio        11/29/17: Hgb=9.6, ESR=10, CRP=5.8, Alb=3.8,Ferritin-P, Iron=14,Sat=4%, Phos=2.5        12/19;17: Hgb=10.1, ESR=12, CRP=6.5; 12/21/17: BMs:#3-5, 1-3x/D , brown, no blood, no pain, id=[id number]        1/3/18:Hgb=11.7, ESR=19, CRP=6.5, Alb=4.1, Odixxgsa=216, Iron=31, Sat%=9,Zinc=55          PRIOR HISTORY---2013:         2/20/13 CT markedly dilated sb loops ext to anast, colonoscopy w open anast ,mild-mod fred-anastamotic disease. quick response to entocort/humira--probably adhesive dis.         8/10/13 w GNR bacteremia, ADA 1.7 /JENNIFFER 3.4, Humira 8/7, 8/13,8/18,9/15        CT- mucosal thickening and spiculation of the distal sb extending to the anastamosis, thickening and stranding of adj mesenteric fat.        Humira increased to 40mg weekly, entocort 4        9/2013 MRE--dilated loops in mid and distal ileum, markedly thickened and narrowed TI w decreased peristalsis of TI        11/15/13 ADA-24.9, JENNIFFER-0          PRIOR HISTORY---2014:         2/15/14--CT dilated loops SB, loop of sb in mid abd 4.3cm w infiltrative changes in the mesentery, bowel tapers in the RLQ to the anastamosis w/o transition        WBC=15K, Rx w IV steroids and Abx         3/7/14 SBFT: last 10cm sb prox to anast mild distension and sl irregularity. In the mid portion of this loop there is a mild narrowing which appears to reopen but is some what narrowed.        3/20/14 ADA=33.1, JENNIFFER=0, Lialda switch to Apriso 4 (2/2014)          PRIOR HISTORY--2015         1/11/15 Adm [facility] w 1 day N,Recurrent V, Abd pain/distension. CT-multiple distended & fluid-filled sb loops, mild wall thickening of ileum w No inflamm changes.        WBC=14.6, Hgb=17, RX w NGT suction, Levaquin iv, Solumedrol 40mg q 12( 1/12-->1/16) to prednisone 30mg BID w 5mg taper/wk        3/18/15 --Dr. Boston w edema /High BP, prednisone was tapered slowly to 2.5mg         switched to entocort 9mg qd, edema and bp improved w lasix 20mg         BMs:#4-5, 3x/D, Hgb=11, ESR=4, CRP<5        4/28/15 - 5/1/15: Adm [facility] w 1 day Abd pain, distension,N/V. 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        7/17/15: BMs:#4-6, 4-5x/D, wt 131(from 126)        8/20/15: BMs: #4, 1-2x/D or #5, 2-3x/D, kf=328, dry cough,Hgb=10, WBC=3, ZXD=643, CRP=56, ESR=21        8/25/15 CT Abd/Pelv: Svl RLQ sb loops w wall thickening, mild nodularity & inflamm stranding in mesentery        Advised-restart Entocort 9mg qd, Apriso 4 qd, Maintain Humira but given RX to check drug/Ab levels        9/15/15: did nt restart meds,Hgb=9.9, WBC=4, ESR=19, CRP=5.8, ya=181; [facility] : ADA=8.5, Antibodies< 1.7        10/15/15: No pain, BMs:#4, 1-2x/D, #5-6, 3-4x/D, throat clearing/cough-better, pv=387        11/30/15: No pain, BMs:# 4, 5-6 intermittently, No throat clear, va=878          PRIOR HISTORY-2016         1/22/16; 4/8/16; 6/6/16 : No pain, BMs:# 4-5 1-2x/D, also #5-6 3x/D for 2-3D/wk, uc=222        7/14/16: jn=314, 9/22/16: bm=686.5        11/10/16: 9.5lb wt loss, states BMs: 5-6, 5x/D--Taking Magnesium, No pain/anorexia        Labs: Stool Lujzghgovewx=300, + Incr Fecal fat, Alb=3.4, FA =23, I70=986, Hgb=12, ESR=10, CRP <5        Magnesium-d/c, Obtain MRE asap--Start steroids and possibly switch to Entyvio        DDx discussed: active crohns--loss response to Humira, Malabsorption--loss Bile acids, Bile-induced diarrhea, SIBO        Pt wanted to wait for imaging-did not start rx        12/1//16 MRE: RUQ ileocolonic anastamosis--narrowed w upstream dilatation to 3.2 cm        Pt refused pred, Started Entocort 9mg qd and Flagyl 250mg qid about 7-10days ago         awaiting Entyvio load to start 12/22, then 1/5; had Cut back on iron bid        12/15/16: BMs:# 5, 2-3x/D, occas #6, No pain, Less bloat/flatulence, Mi=160.

## 2024-10-20 NOTE — HISTORY OF PRESENT ILLNESS
[de-identified] :    This HPI  reflects a summary and review of records : including previous and most recent  Labs, body imaging, consults and progress notes, operative and pathology reports, EKG reports, ED records, found in [employer], [employer],  [employer] and any additional records brought in by  the patient at the time of the visit.            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       Gets IV Iron since 10/2/17       12/2016 Humira--> Entyvio       10/25/21 Stelera started   9/23/23  dark stool w 2-3 days   9/28/23 Labs: Hgb=8.6, Iron=22, sat%=7, Ferritin=17 10/18/23 MRE: persistent mural enhancement without wall thickening in distal small bowel loops, which is a nonspecific imaging sign and may reflect inflammation, however no other mural or mesenteric findings to indicate active inflammatory small bowel Crohn's disease. 12/22/23 EGD: gastritis, No HP; 2cm HH, Esophagitis A--, + Barretts 12/22/23 Colonoscopy: mild ileitis of the most distal joanna-TI; small superficial apthous ulcer on colonic side of anastamosis, Random colon BX--> wnl 12/27/23 -12/31/23 [facility] for Hypophosphatemia=0.8, Rx w IV KPhos NSI=754, Vit D =66, Ca=7.6--> Phos=1.5, Ca=8.4, Hgb=12.6 D/C on kPhos 1 gm TID 1/26/24 Dr. Pritchard Capsule--> scattered irregular appearing mucosa, possible erosions, no bleeding            for single balloon enteroscopy 9/27/24 : UEP=713, Vit D=71, Vit D 1,25=73, Ca=9.1, Phos=2.8, Mag=1.8,  ALB=4, BUN=16,K=4.,              ALP=71; Gytnqzah=249, Iron=95, sat%=32,  Hgb=14.4 , INR=2.3 10/7/24 : pb=924, No pain, BMs: #4, 1-2x/D, no melena  11/13/24:    Over the Oct 13th weekend, Intermittent, N/V, diarrhea, No fever/chills                    some cramps, no distension, but persistent N/V on soft diet                    Didnt want to go to ED, started Prednisone 40mg                    States next day felt much better, stayed of fluids, stools formed                   s/p  iron infusion in 7/12/24, Calcium 9/27//24  ,                   last Stelera 9/2024; next begining  of 11/2024                 Today: wt=                  BMs: #4,1-2x/D                   Melena-->no        * Abd pain-->no * Nausea--> no * Vomit--> no * Early satiety--> no * Belching--> no * Hiccups--> no * Regurgitation--> no * Acid Taste / Water Brash--> no * Ht burn--> no * Dysphagia--> no * Throat Clearing--> no * Hoarseness--> no * Post-Nasal Drip--> no * Congestion--> no * Globus--> no * Cough--> no * Wheeze / PC-> -no * Constipation--> no * Diarrhea--> No  * Bloating--> No  * Strain on Defecation--> no * Incompl Evac--> no * Flatulence--> no * Gurgling--> no * Melena--> no * BPBPR-> -no * Anorexia--> no * Wt. Loss--> no      PRIOR H/O 2023:  1/30/23: Alb=4.4, Phos=3, Mag=1.8, Ca=9.5,  PTH=96, ALP=79,  Vit D=144                          Hgb=13.6 , ESR=5, CRP<5, Ferritin=55, Iron=56, INR=1.6    2/6/23 : uq=782, no abd pain, BMs: # 4 qd   3/17/23: developed cugh,congestion, fever, malaise  3/19/23 + Covid; Received Iv Remdesivir x 3 at home 3/27/ 23: to=985, BMs: #4-5,  1-2x/ D   Alb=4 , Phos=1, Mag=1.8, Ca=8.5 , ALP=94  Hgb=13.8, ESR=5, CRP<5, Qwwrjkid=275, Iron=78, INR=5.9  4/21/23 : Alb=4, Phos=2.9, Mag=1.9, Ca=8.9,  PTH=89, ALP=75,  Vit D=105/ 80, INR=3.2  5/22/23 Dd=730; Hgb=12.7, ESR=2, CRP<5, Ferritin=19, Iron=42, INR=2.4               Alb=4.2, Phos=2.4, Mag=1.8, Ca=9.1, ALP=68,                      8/4/23 :  Alb=3.8, Phos=2.1, Mag=1.8, Ca=8.7,  MBN=378, ALP=68,  Vit D=78 INR=2.1  8/21/23 : Hgb=14, ESR=4, CRP=40, Obwdvnjz=497, Iron=37, INR=4.8                Alb=4 , Phos=1.8, Mag=1.6, Ca=9.3, ALP=84,      8/22/23  :   sy=245 , this weekend  had a flare, abd distension, no N/V, fever                  BMs: started # 5/6--> now # 6-7, no blood or mucous                  doesnt recall anything too solid or fiberous                  Started Pred 40mg qd, and liquid diet--> feeling better                  Less distension, passing gas and BMs  10/24/23:  dc=586, BMs: #4, 1-2x/D                   pt reports an episode of dark stool w 2-3 days ~ 9/23/23                  He held his Warfarin for a couple of days and stool returned to normal color                  He increased his Omep 40mg BID                  He had no Abd pain, N/V, ht burn, dysphagia, bloat                  9/28/23 Labs: Hgb=8.6, Iron=22, sat%=7, Ferritin=17                  10/5/23 Labs: Hgb=8.7, Iron=52, Sat=16%, Upxagtqb=197                  10/12/23 : Labs: Vit D=67, Ca=8, PTH=160, Mag=2                  10/20/23 Labs: Hgb=10.9, INR=1.6, CRP<5, Lwfpdnky=576, ALB=3.7, Ca=7.9, ALP=68 10/18/23 MRE:  persistent mural enhancement without wall thickening in distal small bowel loops, which is a nonspecific imaging sign and may reflect inflammation, however no other mural or mesenteric findings to indicate active inflammatory small bowel Crohn's disease.  No evidence of stricture. No imaging signs of penetrating disease.  Chronic central mesenteric fibrofatty proliferation. 10/27/23: Ustekinumab=7.1, Ust Abs <1.6 11/27/23 : FA=17, F74=624, Vit D=69, ca=9.1, ALP=84,  Alb=4.2                  Hgb=13.3,  PT=19, INR=1.7, Ferritin=33, Iron=46, Sat=13%,  12/5/23 : Homocysteine=18, CSI=307 12/11/23 :  qt=928, BMs: #4, 1-2x/D , Melena-->no    12/22/23  EGD:  gastritis, No HP; 2cm HH, Esophagitis A--,  + Barretts 12/22/23 Colonoscopy: mild ileitis of the most distal joanna-TI; small superficial apthous ulcer on colonic side of anastamosis, Random  colon BX--> wnl  12/27/23 -12/31/23 [facility] for Hypophosphatemia=0.8, Rx w IV KPhos VAA=106, Vit D =66, 7.6--> Phos=1.5, Ca=8.4, Hgb=12.6 D/C on kPhos 1 gm TID seen by [facility]     PRIOR H/O 2022 1/4/22: Hgb=11.5, ESR=6, CRP <5, Ca=8.8, Mag=2.9, Alb=3.9 1/10/22 : vc=063, stools # 5, 1-2x/D  1/10/22 Today:  Feeling well, no c/o , CP, SOB/ PRIETO, Cough, Wheeze, Palpitations, edema              Most recent labs  reviewed w patient:1/4/22 1/31/22: calprotectin=84 2/2/22: vq=455 2/14/22: Hgb=10.6, ESR=9, CRP <5, Ca=8.3, Mag=1.6, Alb=3.8, Iron=25, Ferritin=15 3/3/22:  iv Iron infusion x 1 3/8/22 MRI Abd/Pelv: thickened distal Jejunal loops which are tethered; distal ileal segment  (10-12cm ) previously actively inflammed has decreased & now characterized by an area of stricture, however the joanna-TI  5mm to the anastamosis is enhanced as well as narrowed.  colon just  distal to the anastamosis has a focal segment of inflammation & stricture.  the recto-sigmoid appears inflammed  3/28/22: Hgb=14.4 , ESR=1, CRP <5, Ca=8.5, Mag=1.7,Alb=4.1, Iron=104, Axyjkhyr=741, 4/5/22: ns=580, Bms: # 5-6 , 1-2 x/day    5/10/22  :  Last entyvios were -->7/1, 8/5, 9/2, 10/15/21              Stelara # 1 IV--10/25/21, second dose SC~ 12/10/21, 3rd~ 2/14/22,  4th-- mid April , 5th--mid june                Early December 2021  had a " flare" loose BMs, N/V and bloat              Took Pred 40mg qd and tapered down 10mg / week , now off pred x 7-8 weeks  4/13/22 Dr. Walsh:  pt states he had a flare the weekend of 4/9/22 w vomiting/distension                pt self-started prednisone 40mg , this was just before his april stelara dose                claims he was feeling better               Dr. Walsh: sched colonoscopy w ? dilatation   5/10/22: tapered of pred 40mg , 10mg/wk over 1 month, now off 2weeks          no pain, n/v,  BMs: # 4-5, 1-2 x Day , wt=-132 5/17/22 Dr. Walsh Colonoscopy: ped scope passed w/o resist in joanna-TI, one single apthae w psuedopolyp.  Flares probably 2/2 to adhesive dis, imaging may consistently show wall thickening  5/27/22 Labs:  Alb=3.8. Phos=2.7, Mag=1.9, Ca=9.2, PTH=72, Vit D=105, ALP=76                + IV Iron  6/20/22 Labs:  Alb=4.5, Phos=0.8, Mag=1.8, Ca=8.5, VWR=208, ALP=83                          Hgb=14, ESR=2, CRP<5, Iguqfooc=090, Iron=86 6/21/22:  no pain, n/v,  BMs: # 4-5, 1-2 x Day ,                vg=916 7/26/22:  no pain, n/v,  BMs: # 4-5, 1-2 x Day ,                qi=108 9/30/22 EGD: mild gastritis, no HP; 0.75cm gastric polyp:fundic;                4cm HH, Esophagitis A--, + Barretts 10/17/22 : Alb=4.3, Phos=2.1, Mag=1.8, Ca=9,  PTH=95, ALP=80, TSH=12, Vit D=69                          Hgb=12.7 , ESR=2, CRP<5, Ferritin=57, Iron=85 12/16/22 : Alb=3.8, Phos=1.6, Mag=1.7, Ca=8.9 , ALP=68,                           Hgb=10.8 , ESR=1, Iyxuqbvp=792, Iron=67, INR=2.3  12/19/22: eb=721, had some N and distension the day after Thanksgiving                      Was having BMs and passing gas, went of liquids x 1-2 days--> resolved    PRIOR H/O 2021:  1/4/21: Hgb=13.6, CRP<5, ESR=9, Ferritin=60, Alb=4.2, Phos=2.7 1/11/21: Entyvio & IV Ca 1/14/21: no pain, stool more formed ,  hz=114 - 137; BMs:  # 4-5, 1-2x/D  2/8/21: Entyvio & IV Ca 2/23/21 IV Ca 2/22/21: Hgb=12.7, CRP<5, ESR=8, Qblphnxnu=358, Phos=2.3, Mag=1.8, Y45=042, Zinc=58, Al=806 2/26/21: dg=245,  BMs:  # 4-5, 1-2x/D , no pain  3/8/21 & 4/8/21: Entyvio 3/9/21 & 3/24/21: IV Iron 4/5/21: Hgb=13, CRP<5, ESR=9, Ferritin=94, Phos=3.1, Mag=1.7,Ca=9.1/ Alb=4              Pt Ins co is refusing to cover Entyvio q 4wks, despite numerous attempts to appeal, they also refuse to set up a peer to peer discussion betw myself and another healthcare provider, even one that works for a third party.  They do acknowledge that there is literature supporting the successful use in individual cases who don't respond to the q 8 wk interval and need less then q 8weeks , but state it had not in FDA approved at less then 8wks so they will not approve it.  I spoke to the ins co rep and discussed that fact that patients should not be  pigeon holed since practicing medicine is done on an individual basis.  Humans are not all the same.   Their  response didn't change eventhough the pt clinically needed the medication and it  induced a beneficial clinical response towards remission.  Therefore the patient and I decided to slowly increase the interval since the insurance company will not pay for this benefit.  Hopefully he may be able to maintain his present clinical state.  If not we will return to q 4weeks and will again appeal using this patients real clinical data .  4/9/21:  ro=104,  no pain, stool more formed # 4, 1-2x/d  6/11/21: wt= 135,   no pain, stool more formed # 4, 1-2x/d               recently had an episode of abd distenstion, pain, N/V, no BM              eventually started having diarrhea and flatus, BMs returned to normal              lost 2-3 lbs but regained  Doesnt recall eating anything different, no fever, chills, brbpr, melena  entyvio was 6/3/21--which is almost 8 weeks, was supposed to be 5-6 weeks  to start               6/4/21 Hgb=12, CRP<5, Ferritin=32, pt to receive IV iron      7/12/21 Labs: Hgb=13.6, ESR=10, CRP=<5, Rseikkd=884, Alb=3.7, BUN=16   7/16/21 MRE: limited to incomplete bowel distention, chr distal ileitis/probable inflammatory stricturing vs collapse of the joanna-TI--but improved since 2018 , moderate proctitis 7/20/21:  Last entyvio was --7/1, next early august                 xj=515 ,  no pain, stool more formed # 4-5/6, 1-2x/d  7/23/21 Entyvio level= 39, Abs <1.6 8/23/21: Labs--Hgb=13.6, ESR=10, CRP=6.6, Tdnbrwub=376, Iron=26, Alb=3.9, BUN=16 8/26/21 p/w w N/V, abd pain/bloating  x 3 days, unable to keep things down Labs: WBC=5, Hgb=12, CRP=43, ESR=11, Alb=4.4, BUN=28, Creat=-1.6 CT Abd/Pelvis: diffuse marked dilatation of SB Loops w transition pt in Rmid/lower abdomen ~ 5-6c, proximal to the ileocolonic anastomosis RX w IV solumedrol 20mg q8h, NGT, IVF, pt refused TPN, also w UTI from prostatitis 8/27 NGT was pulled, and clears started and advanced to LR,  was d/c home 8/30 on prednisone 40mg qd w taper by 10mg/wk--> to 20mg 10/15 Entyvio 10/25 Stelera (Ustekinumab)  # 1 11/3/21 [facility]: wt above 140, off pred x 2 weeks,  1-2 BMs qd  Discussed at IBD conference, reviewed previous colonoscopy, and recent imaging; consensus that due to malnutrition and steroid dependency, surgery is next best option however pt reports feeling great and wants to pursue medical treatment which is reasonable given he feels well  repeat imaging in mid-January after 3 months of Stelara, and then consider referral to surgery vs improved candidacy for endoscopic manipulation (currently presumed one long stricture).   11/15/21 : tl=526, Hgb=12, ESR=3, CRP <5, Ferritin =104, Ca=9, Mg=1.7, Alb=3.7  12/10/21 : iron infusion   PRIOR H/O 2020:  1/6/20: entyvio 1/13/20: ESR=7, CRP=0.2, Hgb=13.5, Wbuvqupi=177, J43=955, FA=10, Alb=4.1, Ca=9.1 1/16/20: wt = 127, BMs: # 5, 1-3x/d 1/30/20: ESR=9, CRP=11, Hgb=13.9, Fqmsfqlo=106,Iron=38, Alb=3.9,Ca=9.2, PTH=87, 2/3/20 EGD: gastritis, +CHRISTOPHE, No HP, +erosion w ooze -clipped, 0.5cm polyp-neg; 4cm HH, Esophagitis A, + Barretts, VC: 1+ Colonoscopy: 05cm rectal polyp: HP, 2nd deg int hemorrhoids mild-mod activity: AMIRA w cryptitis & mild archect distortion at ileocolonic anast: colon & ileal side, no stricture 2/4/20: Entyvio; 2/12/20: IV Iron, 3/3/20: Entyvio 2/25/20: go=173,  BMs: # 4-5, 1-2x/ d 3/19/20: cb=559, BMs: #4-5, 1-2x/D 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 10/17/20 : Hgb=13, CRP< 5, ESR= 11, Iron=44, Ferritin=46, Alb=4, Phos=2.3,  11/5/20: fq=689; s/p IV Iron x 2 ,  11/4 and 1 week prior;   BMs:  # 4-5, 1-2x/D , no pain 11/18/20  Entyvio + IV Ca    PRIOR H/O 2019:  1/14/19:Entyvio,  Hgb=10.7, ESR=15, Alb=3.6, Iron=36, Ferritin=67, Sat%=11, INR=1.6 1/24/19:  my=419, stools are # 4-6, 3-4x/d , no pain 2/5/19: Hgb=11.2, ESR=14, CRP=0.36 2/28/19: Hgb=11.5, ESR=12, CRP=6.5, Alb=3.7, Iron=69, Jsgyritc=195, Ca=8.9                Bms: # 4-5, 2-3x/D , wv=219,  Entyvio : last 2/1519,                had parathyroid scan pre-op, still w elev PTH, + activity in mediastinum,? ectopic parathyroid tissue vs neoplasm.  To see ENT and have Imaging at [facility] 3/28/19: Bms: # 4-5 , 3x/d ;zr=210 4/11/19: Hgb-9.7, Iron=45, ESR=18, CRP=9.4, Alb=3.5,  Saw Endo SX at [facility], felt hyperparathyroid is secondary to Low Ca/Vit D, no sx at this time 4/16/19: BMs: # 5-6, 3-4x/D, lr=782,  Entyvio : last 3/1519,  got IV iron 4/12/19 for Ferritin=53 4/27/19: s/p R Hip Nailing--missed 4/2019 2/2 fresh wound 5/16/19 & 6/18/19 Entyvio 7/2/19: Hgb=11.7, Ferritin=41,ESR=12, CRP=5.5, B6=2.8,Mag=1.8,Phos=3.9,Qv=873,Zinc=61 7/11/19: s/p IV iron 7/11/19: Alb=3.7, ZII=451, Ca=9.1, Vit D=40/306,  7/24/19: Entyvio, Alb=3.8, LXP=902, Ca=8.9, Vit D=128  8/1/19: Bms: # 5-6, occas #4, 2-3x/D , iw=898 8/15/19: Hgb=12, ESR=10, CRP=5.2, Ferritin=68, Alb=3.4, Phos=4.4,Mg=1.7, Ca=8.5,Calprotectin=88, 8/20/19: KGR=168, Ca=9, Alb=4.2 9/19/19: Hgb=12.8, ESR=9, CRP=5.5, Alb=3.7, Qbutqrwr=587, Mag=1.8, Ca=8.9, Phos=4.2 9/26/19: lk=923, BMs: #5-6, 2-3x/D, no pain 10/17/19: eg=620, BMs: #4-6, 1-2x/D, no Pain; Hgb=14, ESR=7, CRP<5, Alb=3.9, Zpxshxbp=083, Mag=1.7, Ca=9.6 10/24/19: vy=533, Bms: # 4-6 , no pain, 11/6/19: ESR=6, CRP=6, PTH=80,Ca=9.1, VitD=50 11/14/19: or=180, BMs: # : 4, 1-2, 0ccas #5  11/17/19: ESR=6, CRP<5, Bmuzzuve=363,   12/19/19: zs=868, BMs: #5-6, 2-3x/D    PRIOR H/O 2018:         1/16/18: Entyvio, Hgb=11.4, ESR=10, CRP=6.9        2/14/18: Hgb=11.1, ESR=16, CRP=11.6, Alb=3.6, Iron=28, Ferritin=23, INR=1.5         2/16/18: s/p Entyvio; Iron infusion, again on 2/27 2/20/18: Hgb=10.6, ESR=20, CRP=12, INR=1.7; 2/27/18: s/p iron infusion        3/14/18: Dp=495; BMs: # 5, 1-2x/D; Hgb=11, ESR=18, CRP=11,Irom=45,Zlgptkvs=920,Alb=3.6,         INR=1.5        3/19/18: s/p Entyvio,  4/16/18: s/p Entyvio,Hgb=11.9, INR=1.3, ESR=18, CRP=8.4         4/18/18 EGD: gastritis, no HP, no IM, 2+ Mucous, 0.5cm gastric polyp: fundic, 4cm HH, +         Barretts:3cm, no dysplasia        4/25/18: Hgb=11.5, INR=1.6, Iron=40, Sat=13%, Ferritin=57, Alb=3.2, Phos=2.2, Mag=1.7        4/30/18: s/p Iron Infusion; 5/14/18: s/p Entyvio ; 5/29/18: s/p Iron Infusion        6/6/18: Hgb=10.6, INR=1.7, Anfwxagp=830, Alb=3.5, Phos=2.1, C52=246, oq=893; BMs: # 5, 2-3x/D         6/19/18: Hgb=10.6, INR=1, CRP=15, ESR=23        6/21/18: R ankle is acting up, swollen, pain, having MRI done, took a couple of advil, on low carbs ,BMs: # 5, 1-2x/D, hc=084        6/26/18: MRI: fx/stress rxn-talar body/navicula; stress related changes--cuneform, cuboid,distal tibia; mod tibiotalar effusion, mild-mod peroneal tendinosis        7/19/18: entyvio 6/26/18, eliminated all carbs--anti inflammatory diet, nz=552, BMs: # 4-5, 1-3x/D         7/25/18: Hgb=10, INR=1.3, Alb=3.3, Phos=2.4, Pymzfuld=418, sat%=12, Iron=35        8/2/18: BD--osteoporosis of hip/spine: sig decrease BD--17.6% of hip, 17% of spine,          osteopenia of wrist: 6.4%        8/7/18: Entyvio        8/21/18: Hgb=11.1, INR=1.5, ESR=19, CRP=18.6        8/23/18: recently w tooth infection, old implant--loose and removed; rx w amox, and advil        eating almost no carbs, su=708, # 5-6, 2-3x/d         8/24/18: Stool fat--neg, Qzpwzoknbuji=466        9/5/18: Hgb=11.4, INR=1.3, ESR=9, CRP=11.3, Iron=41, Oaiqckdd=280, Alb=3.8,        9/17/18: entyvio, Hgb=10.7, INR=2.2, ESR=17, CRP=9.1,         9/20/18: et=544, BMs: # 5-6, 1-2x/D         9/21/18: Phos=2.4, Ca=8.7, Alb=3.4, Vit D=27, XBP=900,         Dr. Jara: Hyperparathyroidism: probably secondary due to low Vit D/Ca & ? superimposed primary, rx replete Vit D and Calcium        10/9/18: Hgb=11.6, MCV=94, ESR=14, CRP=5.4, INR=2.2        10/10/18 MRE: stricturing of neoterminal ileum w worsened upstream dilatation, moderate length of upstream ileum w stricturing extending at least 20cm and more extensive then previous. suggestion of 2 adj extraluminal fluid collections which may be w/i the wall of strictured ileum near the ileocolonic anastomosis. surrounding spiculation and tethered appearance of bowel in this region. 10/16/18: entyvio, Hgb=11.7, MCV=94, ESR=14, CRP <5, Alb=3.5 10/18/18: Dm=055,   BMs: # 5-6, 3x/D ,  11/13/18: Entyvio 11/21/18 CTE w C: limited 2/2 bowel underdistention, mucosal hyperenhancemt & extensive SM edema of distal ileum including joanna-ileum, difficult to exclude a sml fluid collection, Liver w relative enlargement w suggestion of lobulation, enlargemt of PV,SMV,IMV,Spl V, rectal V. No ascites 11/28/18: Hgb=10, ESR=19, CRP=17,Alb=3.5,Iron=35, Sat%=11, Dlvvcdtg=099, INR=1.3 11/29/18: dg=855, BMs: # 5-6, 3-4x/D 12/3/18: Abd sono--Liver 14.8cm Incr heterogenicity, spleen--wnl            PRIOR HISTORY--2017 1/17/17: Hgb=9.2, ESR=6, CRP=5; 1/19/17: BMs: #4, 5-6, 2-3x/D, Af=199, Started Creon 1 tid cc        3rd Entyvio beginning Feb 2/14/17: Labs; Hgb=9.6, MCV=97, ESR=5, CRP< 5, A07=270, FA>23, Iron=59, Retic =3.2,        2/17/17 Fecal Calprotectin=16, Fats: Increased neutral & Split        3/13/17: Labs Hgb=9.5, MCV=95, ESR=7, CRP <5, ALB=3.1        3/16/17: lot of stress, to move -[place], had a job-fell thru, BMs: # 5, 2-4 x/D, wt= 131w clothes        4/19/17 EGD: gastritis, CHRISTOPHE, No HP, 2cm HH, +Barretts, No Dysplasia        Colonoscopy: Anastamosis: open w mild -mod active colitis, enteritis severe adj to anastomosis, mild more proximal, remainder of colon-wnl, 2-3 deg int hemorrhoids        4/26/17 : Hgb=7.3, MCV=95, ESR=13, CRP<5;Immediately post procedure stools very dark on eliquis/iron.        Admitted to [facility]: Hgb=8.3-->8.9 after 2 U, Alb 3.3, BUN=17, creat=1.3, Brianna/Bmvnp=489/460        4/27/17: Alb=2.3, BUN=12, creat=1.2, Brianna/Lipase=209/863-No abd pain, N/V; 5/5/17: Hgb=10.7.        5/8/17 Entyvio iv. 5/8-5/9 w dark red diarrhea; 5/10/17 Hgb=7.8.        5/11/17 Adm [facility] w stools brown, Hgb=7.9, BUN=17, Creat=1.4, Alb=2.9; S/P 2 Units- Hgb=10.6, BUN=15/1.1.        Prednisone 40mg qd, entocort d/tex.; 5/18/17: Hgb-10.4, go=489, BMs: #5, 1-2x/D, no pain        6/8/17: Hgb=7.4,MCV=98, CRP<5-ASA stopped, Pred20--> 30        6/10/17: Hgb=8.2, Alb=2.9, BUN=20/1.2 ; 6/12/17: Hgb=8.3, Retic=0.19, Iron=10, Sat%=3, Ferritin=57, FA=23,G54=038, 6/13/17: s/p 2 Unit PRBCs        6/15/17: started MCT oil, Jr=355 , BMs: # 5, 1-2x/D, stools are brownish/green         7/11/17: [facility] w unsteadiness. MRI Head: R Cortical Temporo-occipital encephalomalacia, MRA H&N-no sign lesions, OLGA-wnl.Hgb=9.9--> 8.4->9.7 after 1 Unit. Seen by Heme: received IV Iron         CRP<5, K06=647, TLD=396, FA>23,Iron=22,Sat%=6, Ferritin=42, Alb=3.5. D/C on warfarin 2mg        7/20 Hgb=8.5, 7/28 Hgb=7.7, 7/31-s/p 1 Unit         As outpt seeing Heme, to get IV Iron and 5 IV Copper        Had 'FLU' Fever=101, chills, bloat, N/V/D, Bilious. no pain, melena,brbpr        Given anti-emetic by dr Boston,then able to keep things down        On prednisone 25, warfarin w INR bet 1.6-2        8/17/17: Hgb=9.9, ESR=20, CRP-P,Nz=542, BMs: # 5, 1-2x/D, stools are brownish/green        10/2/17: Hgb=8.8, MCV=89,Phos=1.7, K=3.9, Mag=1.9, Alb=3.6,Iron<10,Ferritin=21, INR=2        10/17/17: Hgb=7.9,ESR=6,CRP<5, INR=1.6        10/25/17: Hgb=10, ESR=5, CRP=5, Alb=3.6, Phos=2, Eayvykiz=450, P86=083        11/16/17: Hgb=11.2, ESR=14, CRP=12,         11/13/17: MRE-Chr mild distension of distal & joanna-ileum s/o mild stricturing at anast, mild mural thick & mucosal enhancemt ~ 20cm; little change from 2015 c/w mild acute on chr ileitis, 2.1 cm Liver hemangioma        11/28/17: Entyvio        11/29/17: Hgb=9.6, ESR=10, CRP=5.8, Alb=3.8,Ferritin-P, Iron=14,Sat=4%, Phos=2.5        12/19;17: Hgb=10.1, ESR=12, CRP=6.5; 12/21/17: BMs:#3-5, 1-3x/D , brown, no blood, no pain, gu=028        1/3/18:Hgb=11.7, ESR=19, CRP=6.5, Alb=4.1, Fguwongp=716, Iron=31, Sat%=9,Zinc=55          PRIOR HISTORY---2013:         2/20/13 CT markedly dilated sb loops ext to anast, colonoscopy w open anast ,mild-mod fred-anastamotic disease. quick response to entocort/humira--probably adhesive dis.         8/10/13 w GNR bacteremia, ADA 1.7 /JENNIFFER 3.4, Humira 8/7, 8/13,8/18,9/15        CT- mucosal thickening and spiculation of the distal sb extending to the anastamosis, thickening and stranding of adj mesenteric fat.        Humira increased to 40mg weekly, entocort 4        9/2013 MRE--dilated loops in mid and distal ileum, markedly thickened and narrowed TI w decreased peristalsis of TI        11/15/13 ADA-24.9, JENNIFFER-0          PRIOR HISTORY---2014:         2/15/14--CT dilated loops SB, loop of sb in mid abd 4.3cm w infiltrative changes in the mesentery, bowel tapers in the RLQ to the anastamosis w/o transition        WBC=15K, Rx w IV steroids and Abx         3/7/14 SBFT: last 10cm sb prox to anast mild distension and sl irregularity. In the mid portion of this loop there is a mild narrowing which appears to reopen but is some what narrowed.        3/20/14 ADA=33.1, JENNIFFER=0, Lialda switch to Apriso 4 (2/2014)          PRIOR HISTORY--2015         1/11/15 Adm [facility] w 1 day N,Recurrent V, Abd pain/distension. CT-multiple distended & fluid-filled sb loops, mild wall thickening of ileum w No inflamm changes.        WBC=14.6, Hgb=17, RX w NGT suction, Levaquin iv, Solumedrol 40mg q 12( 1/12-->1/16) to prednisone 30mg BID w 5mg taper/wk        3/18/15 --Dr. Boston w edema /High BP, prednisone was tapered slowly to 2.5mg         switched to entocort 9mg qd, edema and bp improved w lasix 20mg         BMs:#4-5, 3x/D, Hgb=11, ESR=4, CRP<5        4/28/15 - 5/1/15: Adm [facility] w 1 day Abd pain, distension,N/V. 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        7/17/15: BMs:#4-6, 4-5x/D, wt 131(from 126)        8/20/15: BMs: #4, 1-2x/D or #5, 2-3x/D, cc=011, dry cough,Hgb=10, WBC=3, FOU=504, CRP=56, ESR=21        8/25/15 CT Abd/Pelv: Svl RLQ sb loops w wall thickening, mild nodularity & inflamm stranding in mesentery        Advised-restart Entocort 9mg qd, Apriso 4 qd, Maintain Humira but given RX to check drug/Ab levels        9/15/15: did nt restart meds,Hgb=9.9, WBC=4, ESR=19, CRP=5.8, xg=738; [facility] : ADA=8.5, Antibodies< 1.7        10/15/15: No pain, BMs:#4, 1-2x/D, #5-6, 3-4x/D, throat clearing/cough-better, if=435        11/30/15: No pain, BMs:# 4, 5-6 intermittently, No throat clear, ha=698          PRIOR HISTORY-2016         1/22/16; 4/8/16; 6/6/16 : No pain, BMs:# 4-5 1-2x/D, also #5-6 3x/D for 2-3D/wk, cy=446        7/14/16: qe=027, 9/22/16: bn=846.5        11/10/16: 9.5lb wt loss, states BMs: 5-6, 5x/D--Taking Magnesium, No pain/anorexia        Labs: Stool Ohmnwyfguiwk=345, + Incr Fecal fat, Alb=3.4, FA =23, B32=096, Hgb=12, ESR=10, CRP <5        Magnesium-d/c, Obtain MRE asap--Start steroids and possibly switch to Entyvio        DDx discussed: active crohns--loss response to Humira, Malabsorption--loss Bile acids, Bile-induced diarrhea, SIBO        Pt wanted to wait for imaging-did not start rx        12/1//16 MRE: RUQ ileocolonic anastamosis--narrowed w upstream dilatation to 3.2 cm        Pt refused pred, Started Entocort 9mg qd and Flagyl 250mg qid about 7-10days ago         awaiting Entyvio load to start 12/22, then 1/5; had Cut back on iron bid        12/15/16: BMs:# 5, 2-3x/D, occas #6, No pain, Less bloat/flatulence, Mr=467.

## 2024-11-12 ENCOUNTER — APPOINTMENT (OUTPATIENT)
Dept: GASTROENTEROLOGY | Facility: CLINIC | Age: 59
End: 2024-11-12
Payer: COMMERCIAL

## 2024-11-12 VITALS
DIASTOLIC BLOOD PRESSURE: 74 MMHG | HEIGHT: 68.11 IN | WEIGHT: 128 LBS | BODY MASS INDEX: 19.4 KG/M2 | SYSTOLIC BLOOD PRESSURE: 130 MMHG | HEART RATE: 69 BPM

## 2024-11-12 DIAGNOSIS — K64.8 OTHER HEMORRHOIDS: ICD-10-CM

## 2024-11-12 DIAGNOSIS — K21.00 GASTRO-ESOPHAGEAL REFLUX DISEASE WITH ESOPHAGITIS, WITHOUT BLEEDING: ICD-10-CM

## 2024-11-12 DIAGNOSIS — Z12.11 ENCOUNTER FOR SCREENING FOR MALIGNANT NEOPLASM OF COLON: ICD-10-CM

## 2024-11-12 DIAGNOSIS — D50.8 OTHER IRON DEFICIENCY ANEMIAS: ICD-10-CM

## 2024-11-12 DIAGNOSIS — K22.70 BARRETT'S ESOPHAGUS W/OUT DYSPLASIA: ICD-10-CM

## 2024-11-12 DIAGNOSIS — K50.80 CROHN'S DISEASE OF BOTH SMALL AND LARGE INTESTINE W/OUT COMPLICATIONS: ICD-10-CM

## 2024-11-12 PROCEDURE — 99214 OFFICE O/P EST MOD 30 MIN: CPT

## 2024-11-12 NOTE — HISTORY OF PRESENT ILLNESS
[de-identified] :    This HPI  reflects a summary and review of records : including previous and most recent  Labs, body imaging, consults and progress notes, operative and pathology reports, EKG reports, ED records, found in [employer], [email or web address],  [facility] and any additional records brought in by  the patient at the time of the visit.            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       Gets IV Iron since 10/2/17       12/2016 Humira--> Entyvio       10/25/21 Stelera started   9/23/23  dark stool w 2-3 days   9/28/23 Labs: Hgb=8.6, Iron=22, sat%=7, Ferritin=17 10/18/23 MRE: persistent mural enhancement without wall thickening in distal small bowel loops, which is a nonspecific imaging sign and may reflect inflammation, however no other mural or mesenteric findings to indicate active inflammatory small bowel Crohn's disease. 12/22/23 EGD: gastritis, No HP; 2cm HH, Esophagitis A--, + Barretts 12/22/23 Colonoscopy: mild ileitis of the most distal joanna-TI; small superficial apthous ulcer on colonic side of anastamosis, Random colon BX--> wnl 12/27/23 -12/31/23 [facility] for Hypophosphatemia=0.8, Rx w IV KPhos KNP=887, Vit D =66, Ca=7.6--> Phos=1.5, Ca=8.4, Hgb=12.6 D/C on kPhos 1 gm TID 1/26/24 Dr. Pritchard Capsule--> scattered irregular appearing mucosa, possible erosions, no bleeding            for single balloon enteroscopy 9/27/24 : NHF=801, Vit D=71, Vit D 1,25=73, Ca=9.1, Phos=2.8, Mag=1.8,  ALB=4, BUN=16,K=4.,              ALP=71; Xvyjtibw=466, Iron=95, sat%=32,  Hgb=14.4 , INR=2.3 10/7/24 : dt=360, No pain, BMs: #4, 1-2x/D, no melena  11/13/24:    Over the Oct 13th weekend, Intermittent, N/V, diarrhea, No fever/chills                    some cramps, no distension, but persistent N/V on soft diet                    Didnt want to go to ED, started Prednisone 40mg--> now on 10mg                    States next day felt much better, stayed of fluids, stools formed                   s/p  iron infusion in 7/12/24, Calcium 9/27//24  ,                   last Stelera 11/2024 ; next begining  of 1/2025                  Today: pa=027                  BMs: #4,1-2x/D                   Melena-->no        * Abd pain-->no * Nausea--> no * Vomit--> no * Early satiety--> no * Belching--> no * Hiccups--> no * Regurgitation--> no * Acid Taste / Water Brash--> no * Ht burn--> no * Dysphagia--> no * Throat Clearing--> no * Hoarseness--> no * Post-Nasal Drip--> no * Congestion--> no * Globus--> no * Cough--> no * Wheeze / PC-> -no * Constipation--> no * Diarrhea--> No  * Bloating--> No  * Strain on Defecation--> no * Incompl Evac--> no * Flatulence--> no * Gurgling--> no * Melena--> no * BPBPR-> -no * Anorexia--> no * Wt. Loss--> no      PRIOR H/O 2023:  1/30/23: Alb=4.4, Phos=3, Mag=1.8, Ca=9.5,  PTH=96, ALP=79,  Vit D=144                          Hgb=13.6 , ESR=5, CRP<5, Ferritin=55, Iron=56, INR=1.6    2/6/23 : qx=665, no abd pain, BMs: # 4 qd   3/17/23: developed cugh,congestion, fever, malaise  3/19/23 + Covid; Received Iv Remdesivir x 3 at home 3/27/ 23: ay=290, BMs: #4-5,  1-2x/ D   Alb=4 , Phos=1, Mag=1.8, Ca=8.5 , ALP=94  Hgb=13.8, ESR=5, CRP<5, Tppubwcg=539, Iron=78, INR=5.9  4/21/23 : Alb=4, Phos=2.9, Mag=1.9, Ca=8.9,  PTH=89, ALP=75,  Vit D=105/ 80, INR=3.2  5/22/23 Cn=443; Hgb=12.7, ESR=2, CRP<5, Ferritin=19, Iron=42, INR=2.4               Alb=4.2, Phos=2.4, Mag=1.8, Ca=9.1, ALP=68,                      8/4/23 :  Alb=3.8, Phos=2.1, Mag=1.8, Ca=8.7,  ZNO=832, ALP=68,  Vit D=78 INR=2.1  8/21/23 : Hgb=14, ESR=4, CRP=40, Ljivfqwg=479, Iron=37, INR=4.8                Alb=4 , Phos=1.8, Mag=1.6, Ca=9.3, ALP=84,      8/22/23  :   ur=685 , this weekend  had a flare, abd distension, no N/V, fever                  BMs: started # 5/6--> now # 6-7, no blood or mucous                  doesnt recall anything too solid or fiberous                  Started Pred 40mg qd, and liquid diet--> feeling better                  Less distension, passing gas and BMs  10/24/23:  gx=990, BMs: #4, 1-2x/D                   pt reports an episode of dark stool w 2-3 days ~ 9/23/23                  He held his Warfarin for a couple of days and stool returned to normal color                  He increased his Omep 40mg BID                  He had no Abd pain, N/V, ht burn, dysphagia, bloat                  9/28/23 Labs: Hgb=8.6, Iron=22, sat%=7, Ferritin=17                  10/5/23 Labs: Hgb=8.7, Iron=52, Sat=16%, Auhxqelu=996                  10/12/23 : Labs: Vit D=67, Ca=8, PTH=160, Mag=2                  10/20/23 Labs: Hgb=10.9, INR=1.6, CRP<5, Nfrmfzbr=654, ALB=3.7, Ca=7.9, ALP=68 10/18/23 MRE:  persistent mural enhancement without wall thickening in distal small bowel loops, which is a nonspecific imaging sign and may reflect inflammation, however no other mural or mesenteric findings to indicate active inflammatory small bowel Crohn's disease.  No evidence of stricture. No imaging signs of penetrating disease.  Chronic central mesenteric fibrofatty proliferation. 10/27/23: Ustekinumab=7.1, Ust Abs <1.6 11/27/23 : FA=17, O48=919, Vit D=69, ca=9.1, ALP=84,  Alb=4.2                  Hgb=13.3,  PT=19, INR=1.7, Ferritin=33, Iron=46, Sat=13%,  12/5/23 : Homocysteine=18, BAT=256 12/11/23 :  ec=659, BMs: #4, 1-2x/D , Melena-->no    12/22/23  EGD:  gastritis, No HP; 2cm HH, Esophagitis A--,  + Barretts 12/22/23 Colonoscopy: mild ileitis of the most distal joanna-TI; small superficial apthous ulcer on colonic side of anastamosis, Random  colon BX--> wnl  12/27/23 -12/31/23 [facility] for Hypophosphatemia=0.8, Rx w IV KPhos CFI=053, Vit D =66, 7.6--> Phos=1.5, Ca=8.4, Hgb=12.6 D/C on kPhos 1 gm TID seen by [facility] Hem[facility]     PRIOR H/O 2022 1/4/22: Hgb=11.5, ESR=6, CRP <5, Ca=8.8, Mag=2.9, Alb=3.9 1/10/22 : ec=876, stools # 5, 1-2x/D  1/10/22 Today:  Feeling well, no c/o , CP, SOB/ PRIETO, Cough, Wheeze, Palpitations, edema              Most recent labs  reviewed w patient:1/4/22 1/31/22: calprotectin=84 2/2/22: rg=171 2/14/22: Hgb=10.6, ESR=9, CRP <5, Ca=8.3, Mag=1.6, Alb=3.8, Iron=25, Ferritin=15 3/3/22:  iv Iron infusion x 1 3/8/22 MRI Abd/Pelv: thickened distal Jejunal loops which are tethered; distal ileal segment  (10-12cm ) previously actively inflammed has decreased & now characterized by an area of stricture, however the joanna-TI  5mm to the anastamosis is enhanced as well as narrowed.  colon just  distal to the anastamosis has a focal segment of inflammation & stricture.  the recto-sigmoid appears inflammed  3/28/22: Hgb=14.4 , ESR=1, CRP <5, Ca=8.5, Mag=1.7,Alb=4.1, Iron=104, Lxfwtwpk=926, 4/5/22: pv=444, Bms: # 5-6 , 1-2 x/day    5/10/22  :  Last entyvios were -->7/1, 8/5, 9/2, 10/15/21              Stelara # 1 IV--10/25/21, second dose SC~ 12/10/21, 3rd~ 2/14/22,  4th-- mid April , 5th--mid june                Early December 2021  had a " flare" loose BMs, N/V and bloat              Took Pred 40mg qd and tapered down 10mg / week , now off pred x 7-8 weeks  4/13/22 Dr. Walsh:  pt states he had a flare the weekend of 4/9/22 w vomiting/distension                pt self-started prednisone 40mg , this was just before his april stelara dose                claims he was feeling better               Dr. Walsh: sched colonoscopy w ? dilatation   5/10/22: tapered of pred 40mg , 10mg/wk over 1 month, now off 2weeks          no pain, n/v,  BMs: # 4-5, 1-2 x Day , wt=-132 5/17/22 Dr. Walsh Colonoscopy: ped scope passed w/o resist in joanna-TI, one single apthae w psuedopolyp.  Flares probably 2/2 to adhesive dis, imaging may consistently show wall thickening  5/27/22 Labs:  Alb=3.8. Phos=2.7, Mag=1.9, Ca=9.2, PTH=72, Vit D=105, ALP=76                + IV Iron  6/20/22 Labs:  Alb=4.5, Phos=0.8, Mag=1.8, Ca=8.5, FYX=291, ALP=83                          Hgb=14, ESR=2, CRP<5, Ljzjevwd=406, Iron=86 6/21/22:  no pain, n/v,  BMs: # 4-5, 1-2 x Day ,                xj=767 7/26/22:  no pain, n/v,  BMs: # 4-5, 1-2 x Day ,                qg=909 9/30/22 EGD: mild gastritis, no HP; 0.75cm gastric polyp:fundic;                4cm HH, Esophagitis A--, + Barretts 10/17/22 : Alb=4.3, Phos=2.1, Mag=1.8, Ca=9,  PTH=95, ALP=80, TSH=12, Vit D=69                          Hgb=12.7 , ESR=2, CRP<5, Ferritin=57, Iron=85 12/16/22 : Alb=3.8, Phos=1.6, Mag=1.7, Ca=8.9 , ALP=68,                           Hgb=10.8 , ESR=1, Qgraxoye=297, Iron=67, INR=2.3  12/19/22: ue=113, had some N and distension the day after Thanksgiving                      Was having BMs and passing gas, went of liquids x 1-2 days--> resolved    PRIOR H/O 2021:  1/4/21: Hgb=13.6, CRP<5, ESR=9, Ferritin=60, Alb=4.2, Phos=2.7 1/11/21: Entyvio & IV Ca 1/14/21: no pain, stool more formed ,  hn=258 - 137; BMs:  # 4-5, 1-2x/D  2/8/21: Entyvio & IV Ca 2/23/21 IV Ca 2/22/21: Hgb=12.7, CRP<5, ESR=8, Khoxmtsqz=353, Phos=2.3, Mag=1.8, X88=636, Zinc=58, Ld=326 2/26/21: mw=978,  BMs:  # 4-5, 1-2x/D , no pain  3/8/21 & 4/8/21: Entyvio 3/9/21 & 3/24/21: IV Iron 4/5/21: Hgb=13, CRP<5, ESR=9, Ferritin=94, Phos=3.1, Mag=1.7,Ca=9.1/ Alb=4              Pt [facility] is refusing to cover Entyvio q 4wks, despite numerous attempts to appeal, they also refuse to set up a peer to peer discussion betw myself and another healthcare provider, even one that works for a third party.  They do acknowledge that there is literature supporting the successful use in individual cases who don't respond to the q 8 wk interval and need less then q 8weeks , but state it had not in FDA approved at less then 8wks so they will not approve it.  I spoke to the [facility] co rep and discussed that fact that patients should not be  pigeon holed since practicing medicine is done on an individual basis.  Humans are not all the same.   Their  response didn't change eventhough the pt clinically needed the medication and it  induced a beneficial clinical response towards remission.  Therefore the patient and I decided to slowly increase the interval since the insurance company will not pay for this benefit.  Hopefully he may be able to maintain his present clinical state.  If not we will return to q 4weeks and will again appeal using this patients real clinical data .  4/9/21:  dh=184,  no pain, stool more formed # 4, 1-2x/d  6/11/21: wt= 135,   no pain, stool more formed # 4, 1-2x/d               recently had an episode of abd distenstion, pain, N/V, no BM              eventually started having diarrhea and flatus, BMs returned to normal              lost 2-3 lbs but regained  Doesnt recall eating anything different, no fever, chills, brbpr, melena  entyvio was 6/3/21--which is almost 8 weeks, was supposed to be 5-6 weeks  to start               6/4/21 Hgb=12, CRP<5, Ferritin=32, pt to receive IV iron      7/12/21 Labs: Hgb=13.6, ESR=10, CRP=<5, Fxptjjh=639, Alb=3.7, BUN=16   7/16/21 MRE: limited to incomplete bowel distention, chr distal ileitis/probable inflammatory stricturing vs collapse of the joanna-TI--but improved since 2018 , moderate proctitis 7/20/21:  Last entyvio was --7/1, next early august                 al=517 ,  no pain, stool more formed # 4-5/6, 1-2x/d  7/23/21 Entyvio level= 39, Abs <1.6 8/23/21: Labs--Hgb=13.6, ESR=10, CRP=6.6, Kxqipfcm=697, Iron=26, Alb=3.9, BUN=16 8/26/21 p/w w N/V, abd pain/bloating  x 3 days, unable to keep things down Labs: WBC=5, Hgb=12, CRP=43, ESR=11, Alb=4.4, BUN=28, Creat=-1.6 CT Abd/Pelvis: diffuse marked dilatation of SB Loops w transition pt in Rmid/lower abdomen ~ 5-6c, proximal to the ileocolonic anastomosis RX w IV solumedrol 20mg q8h, NGT, IVF, pt refused TPN, also w UTI from prostatitis 8/27 NGT was pulled, and clears started and advanced to LR,  was d/c home 8/30 on prednisone 40mg qd w taper by 10mg/wk--> to 20mg 10/15 Entyvio 10/25 Stelera (Ustekinumab)  # 1 11/3/21 [facility]: wt above 140, off pred x 2 weeks,  1-2 BMs qd  Discussed at IBD conference, reviewed previous colonoscopy, and recent imaging; consensus that due to malnutrition and steroid dependency, surgery is next best option however pt reports feeling great and wants to pursue medical treatment which is reasonable given he feels well  repeat imaging in mid-January after 3 months of Stelara, and then consider referral to surgery vs improved candidacy for endoscopic manipulation (currently presumed one long stricture).   11/15/21 : pl=180, Hgb=12, ESR=3, CRP <5, Ferritin =104, Ca=9, Mg=1.7, Alb=3.7  12/10/21 : iron infusion   PRIOR H/O 2020:  1/6/20: entyvio 1/13/20: ESR=7, CRP=0.2, Hgb=13.5, Qdhdigxu=229, M03=933, FA=10, Alb=4.1, Ca=9.1 1/16/20: wt = 127, BMs: # 5, 1-3x/d 1/30/20: ESR=9, CRP=11, Hgb=13.9, Jbbmvypk=621,Iron=38, Alb=3.9,Ca=9.2, PTH=87, 2/3/20 EGD: gastritis, +CHRISTOPHE, No HP, +erosion w ooze -clipped, 0.5cm polyp-neg; 4cm HH, Esophagitis A, + Barretts, VC: 1+ Colonoscopy: 05cm rectal polyp: HP, 2nd deg int hemorrhoids mild-mod activity: AMIRA w cryptitis & mild archect distortion at ileocolonic anast: colon & ileal side, no stricture 2/4/20: Entyvio; 2/12/20: IV Iron, 3/3/20: Entyvio 2/25/20: xd=001,  BMs: # 4-5, 1-2x/ d 3/19/20: ju=676, BMs: #4-5, 1-2x/D 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 10/17/20 : Hgb=13, CRP< 5, ESR= 11, Iron=44, Ferritin=46, Alb=4, Phos=2.3,  11/5/20: yx=638; s/p IV Iron x 2 ,  11/4 and 1 week prior;   BMs:  # 4-5, 1-2x/D , no pain 11/18/20  Entyvio + IV Ca    PRIOR H/O 2019:  1/14/19:Entyvio,  Hgb=10.7, ESR=15, Alb=3.6, Iron=36, Ferritin=67, Sat%=11, INR=1.6 1/24/19:  kh=208, stools are # 4-6, 3-4x/d , no pain 2/5/19: Hgb=11.2, ESR=14, CRP=0.36 2/28/19: Hgb=11.5, ESR=12, CRP=6.5, Alb=3.7, Iron=69, Evpvzbss=820, Ca=8.9                Bms: # 4-5, 2-3x/D , wq=760,  Entyvio : last 2/1519,                had parathyroid scan pre-op, still w elev PTH, + activity in mediastinum,? ectopic parathyroid tissue vs neoplasm.  To see ENT and have Imaging at [facility] 3/28/19: Bms: # 4-5 , 3x/d ;kk=542 4/11/19: Hgb-9.7, Iron=45, ESR=18, CRP=9.4, Alb=3.5,  Saw Endo SX at [facility], felt hyperparathyroid is secondary to Low Ca/Vit D, no sx at this time 4/16/19: BMs: # 5-6, 3-4x/D, ys=305,  Entyvio : last 3/1519,  got IV iron 4/12/19 for Ferritin=53 4/27/19: s/p R Hip Nailing--missed 4/2019 2/2 fresh wound 5/16/19 & 6/18/19 Entyvio 7/2/19: Hgb=11.7, Ferritin=41,ESR=12, CRP=5.5, B6=2.8,Mag=1.8,Phos=3.9,Sr=614,Zinc=61 7/11/19: s/p IV iron 7/11/19: Alb=3.7, VVD=118, Ca=9.1, Vit D=40/306,  7/24/19: Entyvio, Alb=3.8, EUV=711, Ca=8.9, Vit D=128  8/1/19: Bms: # 5-6, occas #4, 2-3x/D , kn=832 8/15/19: Hgb=12, ESR=10, CRP=5.2, Ferritin=68, Alb=3.4, Phos=4.4,Mg=1.7, Ca=8.5,Calprotectin=88, 8/20/19: TPJ=962, Ca=9, Alb=4.2 9/19/19: Hgb=12.8, ESR=9, CRP=5.5, Alb=3.7, Bzmtlpxm=809, Mag=1.8, Ca=8.9, Phos=4.2 9/26/19: fc=646, BMs: #5-6, 2-3x/D, no pain 10/17/19: qv=156, BMs: #4-6, 1-2x/D, no Pain; Hgb=14, ESR=7, CRP<5, Alb=3.9, Xlfkcpyq=876, Mag=1.7, Ca=9.6 10/24/19: je=796, Bms: # 4-6 , no pain, 11/6/19: ESR=6, CRP=6, PTH=80,Ca=9.1, VitD=50 11/14/19: jz=108, BMs: # : 4, 1-2, 0ccas #5  11/17/19: ESR=6, CRP<5, Qrrobrsf=192,   12/19/19: ux=602, BMs: #5-6, 2-3x/D    PRIOR H/O 2018:         1/16/18: Entyvio, Hgb=11.4, ESR=10, CRP=6.9        2/14/18: Hgb=11.1, ESR=16, CRP=11.6, Alb=3.6, Iron=28, Ferritin=23, INR=1.5         2/16/18: s/p Entyvio; Iron infusion, again on 2/27 2/20/18: Hgb=10.6, ESR=20, CRP=12, INR=1.7; 2/27/18: s/p iron infusion        3/14/18: Iw=479; BMs: # 5, 1-2x/D; Hgb=11, ESR=18, CRP=11,Irom=45,Vrfytoql=231,Alb=3.6,         INR=1.5        3/19/18: s/p Entyvio,  4/16/18: s/p Entyvio,Hgb=11.9, INR=1.3, ESR=18, CRP=8.4         4/18/18 EGD: gastritis, no HP, no IM, 2+ Mucous, 0.5cm gastric polyp: fundic, 4cm HH, +         Barretts:3cm, no dysplasia        4/25/18: Hgb=11.5, INR=1.6, Iron=40, Sat=13%, Ferritin=57, Alb=3.2, Phos=2.2, Mag=1.7        4/30/18: s/p Iron Infusion; 5/14/18: s/p Entyvio ; 5/29/18: s/p Iron Infusion        6/6/18: Hgb=10.6, INR=1.7, Lrzxbjzw=970, Alb=3.5, Phos=2.1, A43=738, ql=488; BMs: # 5, 2-3x/D         6/19/18: Hgb=10.6, INR=1, CRP=15, ESR=23        6/21/18: R ankle is acting up, swollen, pain, having MRI done, took a couple of advil, on low carbs ,BMs: # 5, 1-2x/D, un=961        6/26/18: MRI: fx/stress rxn-talar body/navicula; stress related changes--cuneform, cuboid,distal tibia; mod tibiotalar effusion, mild-mod peroneal tendinosis        7/19/18: entyvio 6/26/18, eliminated all carbs--anti inflammatory diet, bd=087, BMs: # 4-5, 1-3x/D         7/25/18: Hgb=10, INR=1.3, Alb=3.3, Phos=2.4, Sfypcobz=532, sat%=12, Iron=35        8/2/18: BD--osteoporosis of hip/spine: sig decrease BD--17.6% of hip, 17% of spine,          osteopenia of wrist: 6.4%        8/7/18: Entyvio        8/21/18: Hgb=11.1, INR=1.5, ESR=19, CRP=18.6        8/23/18: recently w tooth infection, old implant--loose and removed; rx w amox, and advil        eating almost no carbs, uy=527, # 5-6, 2-3x/d         8/24/18: Stool fat--neg, Bkdchnkzqdvz=968        9/5/18: Hgb=11.4, INR=1.3, ESR=9, CRP=11.3, Iron=41, Ashgmzbv=986, Alb=3.8,        9/17/18: entyvio, Hgb=10.7, INR=2.2, ESR=17, CRP=9.1,         9/20/18: cu=847, BMs: # 5-6, 1-2x/D         9/21/18: Phos=2.4, Ca=8.7, Alb=3.4, Vit D=27, QXF=303,         Dr. Jara: Hyperparathyroidism: probably secondary due to low Vit D/Ca & ? superimposed primary, rx replete Vit D and Calcium        10/9/18: Hgb=11.6, MCV=94, ESR=14, CRP=5.4, INR=2.2        10/10/18 MRE: stricturing of neoterminal ileum w worsened upstream dilatation, moderate length of upstream ileum w stricturing extending at least 20cm and more extensive then previous. suggestion of 2 adj extraluminal fluid collections which may be w/i the wall of strictured ileum near the ileocolonic anastomosis. surrounding spiculation and tethered appearance of bowel in this region. 10/16/18: entyvio, Hgb=11.7, MCV=94, ESR=14, CRP <5, Alb=3.5 10/18/18: Er=628,   BMs: # 5-6, 3x/D ,  11/13/18: Entyvio 11/21/18 CTE w C: limited 2/2 bowel underdistention, mucosal hyperenhancemt & extensive SM edema of distal ileum including joanna-ileum, difficult to exclude a sml fluid collection, Liver w relative enlargement w suggestion of lobulation, enlargemt of PV,SMV,IMV,Spl V, rectal V. No ascites 11/28/18: Hgb=10, ESR=19, CRP=17,Alb=3.5,Iron=35, Sat%=11, Xhyfvqgz=299, INR=1.3 11/29/18: ba=561, BMs: # 5-6, 3-4x/D 12/3/18: Abd sono--Liver 14.8cm Incr heterogenicity, spleen--wnl            PRIOR HISTORY--2017 1/17/17: Hgb=9.2, ESR=6, CRP=5; 1/19/17: BMs: #4, 5-6, 2-3x/D, Mg=898, Started Creon 1 tid cc        3rd Entyvio beginning Feb 2/14/17: Labs; Hgb=9.6, MCV=97, ESR=5, CRP< 5, A94=884, FA>23, Iron=59, Retic =3.2,        2/17/17 Fecal Calprotectin=16, Fats: Increased neutral & Split        3/13/17: Labs Hgb=9.5, MCV=95, ESR=7, CRP <5, ALB=3.1        3/16/17: lot of stress, to move -[place], had a job-fell thru, BMs: # 5, 2-4 x/D, wt= 131w clothes        4/19/17 EGD: gastritis, CHRISTOPHE, No HP, 2cm HH, +Barretts, No Dysplasia        Colonoscopy: Anastamosis: open w mild -mod active colitis, enteritis severe adj to anastomosis, mild more proximal, remainder of colon-wnl, 2-3 deg int hemorrhoids        4/26/17 : Hgb=7.3, MCV=95, ESR=13, CRP<5;Immediately post procedure stools very dark on eliquis/iron.        Admitted to [facility]: Hgb=8.3-->8.9 after 2 U, Alb 3.3, BUN=17, creat=1.3, Brianna/Jofpe=350/460        4/27/17: Alb=2.3, BUN=12, creat=1.2, Brianna/Lipase=209/863-No abd pain, N/V; 5/5/17: Hgb=10.7.        5/8/17 Entyvio iv. 5/8-5/9 w dark red diarrhea; 5/10/17 Hgb=7.8.        5/11/17 Adm [facility] w stools brown, Hgb=7.9, BUN=17, Creat=1.4, Alb=2.9; S/P 2 Units- Hgb=10.6, BUN=15/1.1.        Prednisone 40mg qd, entocort d/tex.; 5/18/17: Hgb-10.4, jq=835, BMs: #5, 1-2x/D, no pain        6/8/17: Hgb=7.4,MCV=98, CRP<5-ASA stopped, Pred20--> 30        6/10/17: Hgb=8.2, Alb=2.9, BUN=20/1.2 ; 6/12/17: Hgb=8.3, Retic=0.19, Iron=10, Sat%=3, Ferritin=57, FA=23,J61=268, 6/13/17: s/p 2 Unit PRBCs        6/15/17: started MCT oil, Bi=916 , BMs: # 5, 1-2x/D, stools are brownish/green         7/11/17: [facility] w unsteadiness. MRI Head: R Cortical Temporo-occipital encephalomalacia, MRA H&N-no sign lesions, OLGA-wnl.Hgb=9.9--> 8.4->9.7 after 1 Unit. Seen by Heme: received IV Iron         CRP<5, D18=874, FFM=188, FA>23,Iron=22,Sat%=6, Ferritin=42, Alb=3.5. D/C on warfarin 2mg        7/20 Hgb=8.5, 7/28 Hgb=7.7, 7/31-s/p 1 Unit         As outpt seeing Heme, to get IV Iron and 5 IV Copper        Had 'FLU' Fever=101, chills, bloat, N/V/D, Bilious. no pain, melena,brbpr        Given anti-emetic by dr Boston,then able to keep things down        On prednisone 25, warfarin w INR bet 1.6-2        8/17/17: Hgb=9.9, ESR=20, CRP-P,Qu=870, BMs: # 5, 1-2x/D, stools are brownish/green        10/2/17: Hgb=8.8, MCV=89,Phos=1.7, K=3.9, Mag=1.9, Alb=3.6,Iron<10,Ferritin=21, INR=2        10/17/17: Hgb=7.9,ESR=6,CRP<5, INR=1.6        10/25/17: Hgb=10, ESR=5, CRP=5, Alb=3.6, Phos=2, Gjqnlcdv=652, Y48=773        11/16/17: Hgb=11.2, ESR=14, CRP=12,         11/13/17: MRE-Chr mild distension of distal & joanna-ileum s/o mild stricturing at anast, mild mural thick & mucosal enhancemt ~ 20cm; little change from 2015 c/w mild acute on chr ileitis, 2.1 cm Liver hemangioma        11/28/17: Entyvio        11/29/17: Hgb=9.6, ESR=10, CRP=5.8, Alb=3.8,Ferritin-P, Iron=14,Sat=4%, Phos=2.5        12/19;17: Hgb=10.1, ESR=12, CRP=6.5; 12/21/17: BMs:#3-5, 1-3x/D , brown, no blood, no pain, no=304        1/3/18:Hgb=11.7, ESR=19, CRP=6.5, Alb=4.1, Bkpvjvet=775, Iron=31, Sat%=9,Zinc=55          PRIOR HISTORY---2013:         2/20/13 CT markedly dilated sb loops ext to anast, colonoscopy w open anast ,mild-mod fred-anastamotic disease. quick response to entocort/humira--probably adhesive dis.         8/10/13 w GNR bacteremia, ADA 1.7 /JENNIFFER 3.4, Humira 8/7, 8/13,8/18,9/15        CT- mucosal thickening and spiculation of the distal sb extending to the anastamosis, thickening and stranding of adj mesenteric fat.        Humira increased to 40mg weekly, entocort 4        9/2013 MRE--dilated loops in mid and distal ileum, markedly thickened and narrowed TI w decreased peristalsis of TI        11/15/13 ADA-24.9, JENNIFFER-0          PRIOR HISTORY---2014:         2/15/14--CT dilated loops SB, loop of sb in mid abd 4.3cm w infiltrative changes in the mesentery, bowel tapers in the RLQ to the anastamosis w/o transition        WBC=15K, Rx w IV steroids and Abx         3/7/14 SBFT: last 10cm sb prox to anast mild distension and sl irregularity. In the mid portion of this loop there is a mild narrowing which appears to reopen but is some what narrowed.        3/20/14 ADA=33.1, JENNIFFER=0, Lialda switch to Apriso 4 (2/2014)          PRIOR HISTORY--2015         1/11/15 Adm [facility] w 1 day N,Recurrent V, Abd pain/distension. CT-multiple distended & fluid-filled sb loops, mild wall thickening of ileum w No inflamm changes.        WBC=14.6, Hgb=17, RX w NGT suction, Levaquin iv, Solumedrol 40mg q 12( 1/12-->1/16) to prednisone 30mg BID w 5mg taper/wk        3/18/15 --Dr. Boston w edema /High BP, prednisone was tapered slowly to 2.5mg         switched to entocort 9mg qd, edema and bp improved w lasix 20mg         BMs:#4-5, 3x/D, Hgb=11, ESR=4, CRP<5        4/28/15 - 5/1/15: Adm [facility] w 1 day Abd pain, distension,N/V. 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        7/17/15: BMs:#4-6, 4-5x/D, wt 131(from 126)        8/20/15: BMs: #4, 1-2x/D or #5, 2-3x/D, cv=818, dry cough,Hgb=10, WBC=3, ZRL=049, CRP=56, ESR=21        8/25/15 CT Abd/Pelv: Svl RLQ sb loops w wall thickening, mild nodularity & inflamm stranding in mesentery        Advised-restart Entocort 9mg qd, Apriso 4 qd, Maintain Humira but given RX to check drug/Ab levels        9/15/15: did nt restart meds,Hgb=9.9, WBC=4, ESR=19, CRP=5.8, bl=341; [facility] : ADA=8.5, Antibodies< 1.7        10/15/15: No pain, BMs:#4, 1-2x/D, #5-6, 3-4x/D, throat clearing/cough-better, qo=507        11/30/15: No pain, BMs:# 4, 5-6 intermittently, No throat clear, na=709          PRIOR HISTORY-2016         1/22/16; 4/8/16; 6/6/16 : No pain, BMs:# 4-5 1-2x/D, also #5-6 3x/D for 2-3D/wk, md=539        7/14/16: ko=733, 9/22/16: in=060.5        11/10/16: 9.5lb wt loss, states BMs: 5-6, 5x/D--Taking Magnesium, No pain/anorexia        Labs: Stool Mcalxwhgeokl=559, + Incr Fecal fat, Alb=3.4, FA =23, M00=279, Hgb=12, ESR=10, CRP <5        Magnesium-d/c, Obtain MRE asap--Start steroids and possibly switch to Entyvio        DDx discussed: active crohns--loss response to Humira, Malabsorption--loss Bile acids, Bile-induced diarrhea, SIBO        Pt wanted to wait for imaging-did not start rx        12/1//16 MRE: RUQ ileocolonic anastamosis--narrowed w upstream dilatation to 3.2 cm        Pt refused pred, Started Entocort 9mg qd and Flagyl 250mg qid about 7-10days ago         awaiting Entyvio load to start 12/22, then 1/5; had Cut back on iron bid        12/15/16: BMs:# 5, 2-3x/D, occas #6, No pain, Less bloat/flatulence, Tb=466.

## 2024-11-12 NOTE — ASSESSMENT
[FreeTextEntry1] : 1. CROHNS DISEASE of both small and large intestine:    s/p ileocolonic resection x 2--last 6/19/15 for recurrent sbos: prior was s/p 4 SBOs w/i 2 yrs--2/2 distal sb disease adj to anastamosis when on Humira weekly had an ADA =33 (3/20/14), -but had sbo 2/2014 at ADA=25 and another sbo 4/28/15 6/10/2015 Colonoscopy: Inflamm ST mass on ileal side w ulceration/scarred/narrow 6/11/2015 CT: dilated thickened sb loops distal jej & ileum 6/19/15 [facility]: s/p Lap w extensive lysis of adhesions, hepatic flex, sigmoid and sb anastamosis, s/p partial r colectomy and sb resection--side to side davis: 8-10 inch from prior anast to TV colon.  Post-op 6/2015 probably some malabsorption 2/2 to removal of R Colon and ileum: had WT. Loss-->r/o malabsorption: ?bile-induced->-secretory vs decr micelles, active dis, PLE ?SIBO/Prevent post-op recurrence --> trial Flagyl 250 mg qid~12/7/16-->d/c: 5/11/17 11/20/16 ACTIVE Crohn's--> 9.5lb wt loss, BMs: #5-6, 5x/D-- but taking Magnesium 12/1/16 MRE: RUQ ileocolonic anastamosis--narrowed w upstream dilatation to 3.2 cm Labs: Stool Xvmnlewbdrwp=241, + Incr Fecal fat, Alb=3.4, FA =23, D84=390, Hgb=12.3,ESR=10,CRP <5 12/2016  Humira to  Entyvio 4/19/17 :Colonoscopy: Anastamosis: open w mild -mod active colitis, enteritis severe adj to anastomosis, mild more proximal, remainder of colon=wnl 11/16/17: Hgb=11.2, ESR=14, CRP=12, 10/26/17 Stool fat-neg, calprotectin-30 11/13/17: MRE-Chr mild distension of distal & joanna-ileum s/o mild stricturing at anast, mild mural thick & mucosal enhancemt ~ 20cm; little change from 2015 c/w mild acute on chr ileitis, 2.1 cm Liver hemangioma 10/10/18 MRE: stricturing of neoterminal ileum w worsened upstream dilatation, moderate length of upstream ileum w stricturing extending at least 20cm and more extensive then previous. suggestion of 2 adj extraluminal fluid collections which may be w/i the wall of strictured ileum near the ileocolonic anastamosis pt had declined to call numbers [facility] or [facility]ational [facility]'s--biologics. later he felt he was stablizing w his wt loss, and inflammatory markers 2/3/20 Colonoscopy: 05cm rectal polyp: HP, 2nd deg int hemorrhoids mild-mod activity: AMIRA w cryptitis & mild archect distortion at ileocolonic anast: colon & ileal side, no stricture 6/11/21: wt= 135, had  abd distenstion, pain, N/V, no BM, eventually started having diarrhea and flatus, BMs  entyvio was 6/3/21--which is almost 8 weeks, was supposed to be 5-6 weeks to start  7/16/21 MRE: limited to incomplete bowel distention, chr distal ileitis/probable inflammatory stricturing vs collapse of the joanna-TI--but improved since 2018 , moderate proctitis 7/20/21:  unclear if MRE accurate 2/2 underdistension, but gained wt & CRP-wnl;-7/23/21 Entyvio = 39, Abs <1.6 8/26/21 p/w sbo w N/V, abd pain/bloating x 3 days, unable to keep things down CT Abd/Pelvis: diffuse marked dilatation of SB Loops w transition pt in R. mid/lower abdomen ~ 5-6cm, proximal to the ileocolonic anastomosis RX w IV solumedrol 20mg q8h, NGT, IVF, pt refused TPN, also w UTI from prostatitis, d/c home on prednisone 40 10/25/21 Stelera started 11/3/21 [facility]: wt above 140, off pred x 2 weeks, 1-2 BMs qd  Discussed at IBD conference, reviewed previous colonoscopy, and recent imaging; consensus that due to malnutrition and steroid dependency, surgery is next best option however pt reports feeling great and wants to pursue medical treatment which is reasonable given he feels well repeat imaging in mid-January after 3 months of Stelara, and then consider referral to surgery vs improved candidacy for endoscopic manipulation (currently presumed one long stricture). 3/8/22 MRI Abd/Pelv: thickened distal Jejunal loops which are tethered; distal ileal segment (10-12cm ) previously actively inflammed has decreased & now characterized by an area of stricture, however the joanna-TI 5mm to the anastamosis is enhanced as well as narrowed. Colon just distal to the anastamosis has a focal segment of inflammation & stricture. the recto-sigmoid appears inflammed 5/17/22 Dr. [facility] Colonoscopy: ped scope passed w/o resist in joanna-TI, one single apthae w psuedopolyp. Flares probably 2/2 to adhesive dis, imaging may consistently show wall thickening  8/22/23 : jc=433 ,  abd distension, no N/V,  BMs: started # 5/6--> now # 6-7,  Started Pred 40mg qd, and liquid   ~9/23/23 dark stool w 2-3 days; He had no Abd pain, N/V  9/28/23 Labs: Hgb=8.6, Iron=22, sat%=7, Ferritin=17 10/27/23: Ustekinumab=7.1, Ust Abs <1.6 11/27/23 :   Hgb=13.3,  PT=19, INR=1.7, Ferritin=33, Iron=46, Sat=13%,   10/18/23 MRE: persistent mural enhancement without wall thickening in distal small bowel loops, which is a nonspecific imaging sign and may reflect inflammation, however no other mural or mesenteric findings to indicate active inflammatory small bowel Crohn's disease.  No evidence of stricture. No imaging signs of penetrating disease. 12/22/23 Colonoscopy: mild ileitis of the most distal joanna-TI; small superficial apthous ulcer on colonic side of anastamosis, Random  colon BX--> wnl        1/26/ 24 Dr. Pritchard Capsule--> scattered irregular appearing mucosa, possible erosions, no bleeding 4/5/24 Dr. Pritchard: Single Balloon Enteroscopy: to mid jejujum--> wnl; bx w  mild patchy IELs               A / PLAN: Crohns disease  h/o sbo's prox to anastamosis  inflammatory vs fibrosis vs combination vs adhesions  s/p flare 8/25/21, 12/2021,4/9/22  ( just before last Stelera dose),8/19/23;    ~ 9/23/23 Had dark stool x 2-3 days, Warfarin held 2 days & Omep BID, Hgb dropped to 8.6  10/18/23 MRE: persistent mural enhancement without wall thickening in distal small bowel loops, which is a nonspecific imaging sign and may reflect inflammation, however no other mural or mesenteric findings to indicate active inflammatory small bowel Crohn's disease.  No evidence of stricture. No imaging signs of penetrating disease.   12/22/23 Colonoscopy: mild ileitis of the most distal joanna-TI; small superficial apthous ulcer on colonic side of anastamosis, Random  colon BX--> wnl         9/27/24 :  Hgb=14.4 , INR=2.3, CRP<5, ESR=2, Ca=9.1,  Phos=2.8,  ALB=4, BUN=16,K=4, ALP=71; Dhgortas=570,    10/13/24: Intermittent N/V, cramping, Diarrhea                 Pt didnt want to go to ED                 Started Prednisone                 Unclear if PSBO 2/2 Flare vs adhesions or viral GE   tapered steroids from 40mg -->10mg presently, would taper by 2.5mg/wk  Stelara( # 1 10/25/21) last dose-->11/2024   Probiotics 3 qd Diet: LR, Lactose free, protein drinks tid MCT oil begun but never maintained repeat MRE **trend --cbc, esr, crp, albumin, calprotectin  **monitor wt--- usu bet 136-139,  12/11/23=136,1/29/24= 135,  3/5/24 =137, 5/28/24=134 ,  7/9/24 wb=244, 8/20/24 fm=336, 10/1/24 hr=021--> 128         2. Iron deficiency anemia : --> recent drop from GIB--> 9/2023   had initially dropped , after clinical flare and post procedure bleeding Probably multifactorial: ACD, Blood loss, malabsorption/nutrient deficiencies Eliquis-->warfarin after CVA, On Entyvio 7/11/17 s/p Adm for unsteady gait w MRI c/w CVA--warfarin begun: possible better control of AC Still requiring IV Iron  12/22/23  EGD:  gastritis, No HP; 2cm HH, Esophagitis A--,  + Barretts 12/22/23 Colonoscopy: mild ileitis of the most distal joanna-TI; small superficial apthous ulcer on colonic side of anastamosis, Random  colon BX--> wnl 1/26/ 24 Dr. Pritchard Capsule--> scattered irregular appearing mucosa, possible erosions, no bleeding 4/5/24 Dr. Pritchard: Single Balloon Enteroscopy: to mid jejujum--> wnl; bx w  mild patchy IELs  B12 1cc IM ____L. delt--  given today, trend cbc, B12, FA, Iron panel Adj INR--closer to low 2's.         3. Osteoporosis : Progression on BD from 5/5/16 Crohns, h/o steroid use  Repeat BD of 8/2018 --showed worsening to Osteoporosis from 2016 BD--9/2020: inprovmt in spine and hip , no change in wrist, BD--10/17/22: Decr T score in spine & hip, incr in wrist BD--2/22/24: Spine T= -1.2, Fem neck T= -2.1; Total Hip T= -2;  Forearm T= -2.2 --> spine & hip were better  Dr. Jara: Hyperparathyroidism-- probably secondary due to low Vit D/Ca & ? superimposed primary, [facility]NT Consult init felt Parathyroid scan showing activity in mediastinum is ectopic parathyroid, then felt sx not indicated at that time, elev PTH is secondary to low Vit D/Ca ? Vit D Resistance--> in the past did well w Vit D,1,25--> 86-97 & Vit D25-->  but w Ca~ 9.4 12/27/23 -12/31/23 [facility] for Hypophosphatemia=0.8, Rx w IV KPhos OOA=698, Vit D =66, Ca=7.6--> Phos=1.5, Ca=8.4, Hgb=12.6 D/C on kPhos 1 gm TID Rx replete Vit D , Phosphate and currently  IV Calcium & po as per endo Forteo since 5/10/19 , then Reclast 9/27/24 :  Ca=9.1, Phos=2.8, Mag=1.8, ALB=4, ALP=71, EHM=639, Vit D=71, Vit D 1,25=73,  trend Vit D, Ca, Phos, PTH,  10/5/20, 9/2021,6/2022, 1/2/24 -s/p Reclast--repeat-->      4. GERD: recently less active by ENT , no ht burn, dysphagia, + throat clear  h/o Dry cough, CXR:sameer, saw ENT bergstein-->LPR * ++ LPR, ++ Stewart's w No Dysplasia, + H/O Esophagitis grade: A was found  Recommend: * Anti-reflux diet & life-style changes reviewed & re-emphasized. ++ Bedge required * Weight reduction & regular exercise emphasized * need for PPI: Omep 40 BID--> 40mg qd , ++ H2B q HS:Zantac 300mg--> Pepcid 40mg I reviewed & summarized the prospective randomized & retrospective non-randomized studies looking at potential long term SE's of PPIs, w special attention to associations & actual cause as related to GI infections, bone loss, cognitive changes, KD, Covid, vitamin & electrolyte deficiencies questions were answered, pt advised that PPIs should be used when needed as indicated by their clinical indication and response and tapering off is always the goal if possible. pt understood. * F/U EGD: --> 2026--for Barretts screening / surveillance * No need for pH Monitor, Manometry, Esophagram -- * ++ need for ENT eval/F/U, No need for Surgical eval --      5. Hemorrhoids: well - controlled. No pain, swelling, itch, bleeding * Discussed previously the potential complications of thrombosis, pain, infection, swelling, itching, bleeding Reccomend: * currently Low - Fiber Diet was emphasized * 6 -- 8 cups of decaffeinated fluid daily was emphasized * Sitz [place] prn, No: Anusol HC Suppos / Cream AK BID -- was needed * No: Tucks BID, Balneol Lotion, Calmoseptine Oint -- was needed ; ++ Prep H prn * No: need for Colorectal surgical evaluation for possible ablation       6.  Hepatomegaly--> was not confirmed by recent abd sono CTE w relative enlargemt, ? lobulation & enlarged portal/mesenteric veins LFTs-wnl, no ascites or edema R/O CLD, Hepatic vein thrombosis Abd sono w doppler--> Liver and spleen normal size, no thrombosis, normal portal and hepatic vein flow

## 2024-11-13 LAB
ALBUMIN SERPL ELPH-MCNC: 3.6 G/DL
ALP BLD-CCNC: 72 U/L
ALT SERPL-CCNC: 28 U/L
ANION GAP SERPL CALC-SCNC: 16 MMOL/L
AST SERPL-CCNC: 23 U/L
BILIRUB SERPL-MCNC: 0.2 MG/DL
BUN SERPL-MCNC: 18 MG/DL
CALCIUM SERPL-MCNC: 9.2 MG/DL
CHLORIDE SERPL-SCNC: 107 MMOL/L
CO2 SERPL-SCNC: 22 MMOL/L
CREAT SERPL-MCNC: 1.1 MG/DL
CRP SERPL-MCNC: 36 MG/L
EGFR: 77 ML/MIN/1.73M2
ERYTHROCYTE [SEDIMENTATION RATE] IN BLOOD BY WESTERGREN METHOD: 9 MM/HR
FERRITIN SERPL-MCNC: 49 NG/ML
FOLATE SERPL-MCNC: 12 NG/ML
GLUCOSE SERPL-MCNC: 60 MG/DL
INR PPP: 5.17 RATIO
IRON SERPL-MCNC: 27 UG/DL
POTASSIUM SERPL-SCNC: 4.1 MMOL/L
PROT SERPL-MCNC: 5.7 G/DL
PT BLD: 60 SEC
SODIUM SERPL-SCNC: 144 MMOL/L
VIT B12 SERPL-MCNC: >2000 PG/ML

## 2024-11-16 ENCOUNTER — RESULT REVIEW (OUTPATIENT)
Age: 59
End: 2024-11-16

## 2024-11-21 LAB — CALPROTECTIN FECAL: 223 UG/G

## 2024-11-27 ENCOUNTER — RESULT REVIEW (OUTPATIENT)
Age: 59
End: 2024-11-27

## 2024-11-29 ENCOUNTER — RESULT REVIEW (OUTPATIENT)
Age: 59
End: 2024-11-29

## 2024-12-02 ENCOUNTER — APPOINTMENT (OUTPATIENT)
Dept: ENDOCRINOLOGY | Facility: CLINIC | Age: 59
End: 2024-12-02

## 2024-12-19 ENCOUNTER — RX RENEWAL (OUTPATIENT)
Age: 59
End: 2024-12-19

## 2024-12-24 ENCOUNTER — APPOINTMENT (OUTPATIENT)
Dept: GASTROENTEROLOGY | Facility: CLINIC | Age: 59
End: 2024-12-24

## 2025-01-14 ENCOUNTER — RESULT REVIEW (OUTPATIENT)
Age: 60
End: 2025-01-14

## 2025-01-14 ENCOUNTER — APPOINTMENT (OUTPATIENT)
Dept: HEMATOLOGY ONCOLOGY | Facility: CLINIC | Age: 60
End: 2025-01-14

## 2025-01-28 ENCOUNTER — APPOINTMENT (OUTPATIENT)
Dept: GASTROENTEROLOGY | Facility: CLINIC | Age: 60
End: 2025-01-28

## 2025-02-23 ENCOUNTER — NON-APPOINTMENT (OUTPATIENT)
Age: 60
End: 2025-02-23

## 2025-02-25 ENCOUNTER — RESULT REVIEW (OUTPATIENT)
Age: 60
End: 2025-02-25

## 2025-02-28 RX ORDER — CALCIUM GLUCONATE IN WATER 1 G/10 ML
1000 SYRINGE (ML) INTRAVENOUS ONCE
Qty: 1 | Refills: 0 | Status: ACTIVE | OUTPATIENT
Start: 2025-02-27

## 2025-03-04 ENCOUNTER — APPOINTMENT (OUTPATIENT)
Dept: GASTROENTEROLOGY | Facility: CLINIC | Age: 60
End: 2025-03-04

## 2025-03-04 ENCOUNTER — APPOINTMENT (OUTPATIENT)
Dept: ENDOCRINOLOGY | Facility: CLINIC | Age: 60
End: 2025-03-04

## 2025-03-13 ENCOUNTER — APPOINTMENT (OUTPATIENT)
Dept: BARIATRICS/WEIGHT MGMT | Facility: CLINIC | Age: 60
End: 2025-03-13

## (undated) DEVICE — FORCEP RADIAL JAW 4 W NDL 2.2MM 2.8MM 240CM ORANGE DISP